# Patient Record
Sex: MALE | Race: WHITE | NOT HISPANIC OR LATINO | Employment: OTHER | URBAN - METROPOLITAN AREA
[De-identification: names, ages, dates, MRNs, and addresses within clinical notes are randomized per-mention and may not be internally consistent; named-entity substitution may affect disease eponyms.]

---

## 2017-01-16 ENCOUNTER — TRANSCRIBE ORDERS (OUTPATIENT)
Dept: ADMINISTRATIVE | Facility: HOSPITAL | Age: 62
End: 2017-01-16

## 2017-01-16 ENCOUNTER — ALLSCRIPTS OFFICE VISIT (OUTPATIENT)
Dept: OTHER | Facility: OTHER | Age: 62
End: 2017-01-16

## 2017-01-16 DIAGNOSIS — R53.81 OTHER MALAISE AND FATIGUE: ICD-10-CM

## 2017-01-16 DIAGNOSIS — E78.00 PURE HYPERCHOLESTEROLEMIA: Primary | ICD-10-CM

## 2017-01-16 DIAGNOSIS — R53.83 OTHER MALAISE AND FATIGUE: ICD-10-CM

## 2017-01-16 DIAGNOSIS — D61.818 OTHER PANCYTOPENIA (HCC): ICD-10-CM

## 2017-01-16 DIAGNOSIS — D75.89 OTHER SPECIFIED DISEASES OF BLOOD AND BLOOD-FORMING ORGANS: ICD-10-CM

## 2017-02-27 ENCOUNTER — HOSPITAL ENCOUNTER (OUTPATIENT)
Dept: ULTRASOUND IMAGING | Facility: HOSPITAL | Age: 62
Discharge: HOME | End: 2017-02-27
Attending: INTERNAL MEDICINE
Payer: COMMERCIAL

## 2017-02-27 DIAGNOSIS — D61.818 OTHER PANCYTOPENIA (HCC): ICD-10-CM

## 2017-02-27 PROCEDURE — 76700 US EXAM ABDOM COMPLETE: CPT

## 2017-03-08 ENCOUNTER — ALLSCRIPTS OFFICE VISIT (OUTPATIENT)
Dept: OTHER | Facility: OTHER | Age: 62
End: 2017-03-08

## 2017-05-04 ENCOUNTER — GENERIC CONVERSION - ENCOUNTER (OUTPATIENT)
Dept: OTHER | Facility: OTHER | Age: 62
End: 2017-05-04

## 2017-05-15 ENCOUNTER — ALLSCRIPTS OFFICE VISIT (OUTPATIENT)
Dept: OTHER | Facility: OTHER | Age: 62
End: 2017-05-15

## 2017-06-19 ENCOUNTER — ALLSCRIPTS OFFICE VISIT (OUTPATIENT)
Dept: OTHER | Facility: OTHER | Age: 62
End: 2017-06-19

## 2017-07-12 ENCOUNTER — ALLSCRIPTS OFFICE VISIT (OUTPATIENT)
Dept: OTHER | Facility: OTHER | Age: 62
End: 2017-07-12

## 2017-07-19 ENCOUNTER — GENERIC CONVERSION - ENCOUNTER (OUTPATIENT)
Dept: OTHER | Facility: OTHER | Age: 62
End: 2017-07-19

## 2017-11-10 DIAGNOSIS — D69.6 THROMBOCYTOPENIA (HCC): ICD-10-CM

## 2017-11-16 ENCOUNTER — ALLSCRIPTS OFFICE VISIT (OUTPATIENT)
Dept: OTHER | Facility: OTHER | Age: 62
End: 2017-11-16

## 2017-11-17 NOTE — PROGRESS NOTES
Assessment    1  DDD (degenerative disc disease), lumbar (722 52) (M51 36)   2  Strain of lumbar paraspinal muscle (847 2) (O55 103I)    Discussion/Summary    Patient with history of degenerative disc disease  History consistent with a sprain of the lumbar spine  He is improved today  Discussed imaging at this time is not recommended  No further treatment needed at this time  In the long run I do recommend core strengthening exercises to help improve have prevent back pain  up as needed and as scheduled visits  Chief Complaint  Patient complains of low back pain  Patient states he was bending down, and heard a crack, that's when the pain started  History of Present Illness  HPI: Four days ago patient was lifting something he hurt his back pop  He did have pain at the time  He does have a history of degenerative disc disease  He currently does not have any pain  No radiation of pain  No aggravation or alleviating symptoms  There is no numbness or tingling  No incontinence  Is able to walk  Review of Systems   Constitutional: no fever-- and-- no chills  Cardiovascular: no complaints of slow or fast heart rate, no chest pain, no palpitations, no leg claudication or lower extremity edema  Respiratory: no complaints of shortness of breath, no wheezing or cough, no dyspnea on exertion, no orthopnea or PND  Gastrointestinal: no complaints of abdominal pain, no constipation, no nausea or vomiting, no diarrhea or bloody stools  Active Problems    1  Acquired pancytopenia (284 19) (D61 818)   2  Acquired thrombocytopenia (287 5) (D69 6)   3  History of Bilateral hip pain (719 45) (M25 551,M25 552)   4  DDD (degenerative disc disease), lumbar (722 52) (M51 36)   5  Depression with anxiety (300 4) (F41 8)   6  Encounter for screening colonoscopy (V76 51) (Z12 11)   7  Hypercholesterolemia (272 0) (E78 00)   8  Major depression, chronic (296 20) (F32 9)   9   Screening PSA (prostate specific antigen) (V76 44) (Z12 5)   10  Strain of lumbar paraspinal muscle (847 2) (S39 012A)   11  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)    Past Medical History  1  History of Acute low back pain (724 2) (M54 5)   2  History of Acute upper respiratory infection (465 9) (J06 9)   3  History of Benign essential hypertension (401 1) (I10)   4  History of Bilateral hip pain (719 45) (M25 551,M25 552)   5  History of fatigue (V13 89) (Z87 898)   6  History of headache (V13 89) (Z87 898)   7  Denied: History of substance abuse   8  History of Macrocytosis (289 89) (D75 89)   9  Major depression, chronic (296 20) (F32 9)   10  History of Paronychia of toe (681 11) (L03 039)    Family History  Mother    1  Denied: Family history of substance abuse   2  Family history of Living and Healthy   3  Denied: Family history of Mental health problem  Father    4  Denied: Family history of substance abuse   5  Family history of valvular heart disease (V17 49) (Z82 49)   6  Family history of Living and Healthy   7  Denied: Family history of Mental health problem  Brother    8  Family history of Mini stroke    Social History     · Always uses seat belt   · Never a smoker   · No drug use   · Remote social alcohol use  The social history was reviewed and updated today  Current Meds   1  Aspirin Low Dose 81 MG TABS; Therapy: (Recorded:19Skt0151) to Recorded   2  BuPROPion HCl ER (XL) 150 MG Oral Tablet Extended Release 24 Hour; TAKE 1 TABLET DAILY AS DIRECTED; Therapy: 03MDU7788 to (Evaluate:14Jun2018)  Requested for: 69IBB6800; Last Rx:19Jun2017 Ordered   3  [employer] Vitamin D3 1000 UNIT CAPS; Therapy: (Recorded:15Hca9363) to Recorded   4  Flax Seed Oil CAPS; Therapy: (Recorded:14Fjd4598) to Recorded    Allergies  1   No Known Drug Allergies    Vitals   Recorded: 08YFE8496 08:14AM   Temperature 98 1 F   Heart Rate 799   Systolic 240   Diastolic 80   Height 6 ft 1 in   Weight 259 lb 6 4 oz   BMI Calculated 34 22   BSA Calculated 2 4       Physical Exam Constitutional  General appearance: No acute distress, well appearing and well nourished  Eyes  Conjunctiva and lids: No swelling, erythema, or discharge  Pupils and irises: Equal, round and reactive to light  Musculoskeletal  Gait and station: Abnormal  -- Low back: Symmetric, no muscle tenderness, full range of motion  Motor and sensory is intact  Reflexes are symmetricall and equal         Results/Data  PHQ-9 Adult Depression Screening 01YYH2231 08:19AM User, Ahs     Test Name Result Flag Reference   PHQ-9 Adult Depression Score 3       Over the last two weeks, how often have you been bothered by any of the following problems? Little interest or pleasure in doing things: Not at all - 0 Feeling down, depressed, or hopeless: Not at all - 0 Trouble falling or staying asleep, or sleeping too much: More than half the days - 2 Feeling tired or having little energy: Several days - 1 Poor appetite or over eating: Not at all - 0 Feeling bad about yourself - or that you are a failure or have let yourself or your family down: Not at all - 0 Trouble concentrating on things, such as reading the newspaper or watching television: Not at all - 0 Moving or speaking so slowly that other people could have noticed  Or the opposite -  being so fidgety or restless that you have been moving around a lot more than usual: Not at all - 0 Thoughts that you would be better off dead, or of hurting yourself in some way: Not at all - 0   PHQ-9 Adult Depression Screening Negative     PHQ-9 Difficulty Level Not difficult at all     PHQ-9 Severity Minimal Depression         Future Appointments    Date/Time Provider Specialty Site   12/04/2017 08:00 AM KELLY Newberry   Hematology [facility]   12/21/2017 07:40 AM Anay Lima MD Family Medicine jimenez aBrney MD       Signatures   Electronically signed by : Kristen Thibodeaux MD; Nov 16 2017  8:40AM EST                       (Author)

## 2017-12-04 ENCOUNTER — GENERIC CONVERSION - ENCOUNTER (OUTPATIENT)
Dept: OTHER | Facility: OTHER | Age: 62
End: 2017-12-04

## 2017-12-15 ENCOUNTER — GENERIC CONVERSION - ENCOUNTER (OUTPATIENT)
Dept: OTHER | Facility: OTHER | Age: 62
End: 2017-12-15

## 2017-12-18 ENCOUNTER — GENERIC CONVERSION - ENCOUNTER (OUTPATIENT)
Dept: OTHER | Facility: OTHER | Age: 62
End: 2017-12-18

## 2017-12-21 ENCOUNTER — GENERIC CONVERSION - ENCOUNTER (OUTPATIENT)
Dept: OTHER | Facility: OTHER | Age: 62
End: 2017-12-21

## 2018-01-10 NOTE — RESULT NOTES
Verified Results  * XR CHEST PA & LATERAL 39XLC3030 11:45AM Kiran Lima     Test Name Result Flag Reference   XR CHEST PA & LATERAL (Report)     CHEST      INDICATION: Shortness of breath for one year  COMPARISON: Chest radiographs May 20, 2010     VIEWS: Frontal and lateral projections; 2 images     FINDINGS:        Cardiomediastinal silhouette appears unremarkable  Atherosclerotic changes in the aorta  Lung volumes diminished  No focal infiltrates  Multiple calcifications in the right upper quadrant, compatible with gallstones  Visualized osseous structures appear within normal limits for the patient's age  IMPRESSION:   Diminished inspiration  No active pulmonary disease  Signed by:   Javi rIving DO   2/5/16       Discussion/Summary   please call  CXR is normal     indidentally it appears he also has gallstones  if he has no stomach pain we can simply watch this  however if he starts with any symtpoms such as stomach pain will need further evaluation

## 2018-01-11 ENCOUNTER — ALLSCRIPTS OFFICE VISIT (OUTPATIENT)
Dept: OTHER | Facility: OTHER | Age: 63
End: 2018-01-11

## 2018-01-12 VITALS
TEMPERATURE: 98.4 F | RESPIRATION RATE: 16 BRPM | HEIGHT: 73 IN | WEIGHT: 268.5 LBS | OXYGEN SATURATION: 95 % | HEART RATE: 106 BPM | BODY MASS INDEX: 35.59 KG/M2 | DIASTOLIC BLOOD PRESSURE: 89 MMHG | SYSTOLIC BLOOD PRESSURE: 134 MMHG

## 2018-01-12 VITALS
HEART RATE: 92 BPM | SYSTOLIC BLOOD PRESSURE: 114 MMHG | HEIGHT: 73 IN | BODY MASS INDEX: 35.41 KG/M2 | TEMPERATURE: 97.7 F | DIASTOLIC BLOOD PRESSURE: 90 MMHG | WEIGHT: 267.2 LBS

## 2018-01-12 NOTE — PROGRESS NOTES
Assessment   1  History of Acute URI (465 9) (J06 9)   2  Post-viral cough syndrome (786 2) (R05)    Discussion/Summary      Cough - c/w post-viral URI cough - feels better but still with cough - reassured this can last 4-6 wks, had no real benefit with Tessalon Perles so will hold on refilling that, advised to try OTC Delsym or Robitussin, call if no better in 2 wks or with any new/worsening symptoms/F/C/wheezing/SOB  The patient was counseled regarding instructions for management,-- patient and family education,-- impressions,-- risks and benefits of treatment options  The treatment plan was reviewed with the patient/guardian  The patient/guardian understands and agrees with the treatment plan       Self Referrals: No      Chief Complaint   cough      History of Present Illness   HPI: Pt with recent URI tx with Doxycycline and Tessalon Perles  He finished the Abx 2 wks ago and feels better  Since then he has con't to have an intermittent cough and a tickle in the throat  He has no current F/C/SOB/wheezing  He has mild congestion but does not note significant PND  He is currently not using anything for the cough but cough drops  He did not really think the Tessalon Perles helped much  He never smoked  Review of Systems        Constitutional: no fever-- and-- no chills  ENT: no earache,-- no sore throat-- and-- no nasal discharge  Cardiovascular: no chest pain  Respiratory: cough, but-- no shortness of breath-- and-- no wheezing  Gastrointestinal: no nausea,-- no vomiting-- and-- no diarrhea  Neurological: no headache-- and-- no dizziness  Active Problems   1  Acquired pancytopenia (284 19) (D61 818)   2  Acquired thrombocytopenia (287 5) (D69 6)   3  History of Bilateral hip pain (719 45) (M25 551,M25 552)   4  DDD (degenerative disc disease), lumbar (722 52) (M51 36)   5  Depression with anxiety (300 4) (F41 8)   6  Encounter for screening colonoscopy (V76 51) (Z12 11)   7  Hypercholesterolemia (272 0) (E78 00)   8  Major depression, chronic (296 20) (F32 9)   9  Screening PSA (prostate specific antigen) (V76 44) (Z12 5)    Social History    · Always uses seat belt   · Never a smoker   · No drug use   · Remote social alcohol use  The social history was reviewed and updated today  Family History   Family History Reviewed: The family history was reviewed and updated today  Current Meds    1  Aspirin Low Dose 81 MG TABS; Therapy: (Recorded:12Myh1796) to Recorded   2  BuPROPion HCl ER (XL) 150 MG Oral Tablet Extended Release 24 Hour; TAKE 1     TABLET DAILY AS DIRECTED; Therapy: 51REW6143 to (Evaluate:14Jun2018)  Requested for: 25NOP0560; Last     Rx:19Jun2017 Ordered   3  [employer] Vitamin D3 1000 UNIT CAPS; Therapy: (Recorded:54Smx7721) to Recorded   4  Flax Seed Oil CAPS; Therapy: (Recorded:11Kqm3591) to Recorded     The medication list was reviewed and updated today  Allergies   1  No Known Drug Allergies    Vitals    Recorded: 71LJD8720 10:22AM   Temperature 98 F   Heart Rate 88   Systolic 881   Diastolic 78   Height 6 ft 1 in   Weight 263 lb    BMI Calculated 34 7   BSA Calculated 2 42     Physical Exam        Constitutional      General appearance: No acute distress, well appearing and well nourished  Ears, Nose, Mouth, and Throat      External inspection of ears and nose: Normal        Otoscopic examination: Tympanic membrance translucent with normal light reflex  Canals patent without erythema  Oropharynx: Abnormal  -- mild post pharyngeal erythema, no exudate  Pulmonary      Respiratory effort: No increased work of breathing or signs of respiratory distress  Auscultation of lungs: Clear to auscultation, equal breath sounds bilaterally, no wheezes, no rales, no rhonci  Cardiovascular      Auscultation of heart: Normal rate and rhythm, normal S1 and S2, without murmurs         Lymphatic      Palpation of lymph nodes in neck: No lymphadenopathy  Musculoskeletal      Gait and station: Normal        Psychiatric      Mood and affect: Normal           Future Appointments      Date/Time Provider Specialty Site   09/10/2018 08:00 AM KELLY Flores   Hematology [facility]cology [facility]   06/21/2018 07:40 AM Dawit Lima MD Family Medicine Lorena Blackburn MD     Signatures    Electronically signed by : Bjorn Hernández DO; Jan 11 2018 10:38AM EST                       (Author)

## 2018-01-13 VITALS
HEIGHT: 74 IN | RESPIRATION RATE: 16 BRPM | DIASTOLIC BLOOD PRESSURE: 82 MMHG | BODY MASS INDEX: 34.43 KG/M2 | OXYGEN SATURATION: 98 % | HEART RATE: 100 BPM | TEMPERATURE: 97.6 F | SYSTOLIC BLOOD PRESSURE: 118 MMHG | WEIGHT: 268.31 LBS

## 2018-01-13 VITALS
WEIGHT: 273 LBS | OXYGEN SATURATION: 95 % | TEMPERATURE: 97.3 F | BODY MASS INDEX: 36.18 KG/M2 | SYSTOLIC BLOOD PRESSURE: 137 MMHG | HEART RATE: 77 BPM | HEIGHT: 73 IN | DIASTOLIC BLOOD PRESSURE: 80 MMHG | RESPIRATION RATE: 16 BRPM

## 2018-01-14 VITALS
HEIGHT: 73 IN | TEMPERATURE: 97.5 F | BODY MASS INDEX: 35.49 KG/M2 | HEART RATE: 104 BPM | DIASTOLIC BLOOD PRESSURE: 82 MMHG | SYSTOLIC BLOOD PRESSURE: 118 MMHG | WEIGHT: 267.8 LBS

## 2018-01-14 VITALS
TEMPERATURE: 98.1 F | SYSTOLIC BLOOD PRESSURE: 104 MMHG | DIASTOLIC BLOOD PRESSURE: 80 MMHG | HEART RATE: 112 BPM | BODY MASS INDEX: 34.38 KG/M2 | HEIGHT: 73 IN | WEIGHT: 259.4 LBS

## 2018-01-16 NOTE — RESULT NOTES
Verified Results  (1) CBC/PLT/DIFF 22Ley2196 07:19AM Kiran Lima     Test Name Result Flag Reference   WBC 5 2 x10E3/uL  3 4-10 8   RBC 5 14 x10E6/uL  4 14-5 80   Hemoglobin 17 3 g/dL  12 6-17 7   Hematocrit 50 2 %  37 5-51 0   MCV 98 fL H 79-97   MCH 33 7 pg H 26 6-33 0   MCHC 34 5 g/dL  31 5-35 7   RDW 13 5 %  12 3-15 4   Platelets 191 E66X0/BM L 150-379   Neutrophils 50 %     Lymphs 37 %     Monocytes 10 %     Eos 3 %     Basos 0 %     Neutrophils (Absolute) 2 6 x10E3/uL  1 4-7 0   Lymphs (Absolute) 2 0 x10E3/uL  0 7-3 1   Monocytes(Absolute) 0 5 x10E3/uL  0 1-0 9   Eos (Absolute) 0 2 x10E3/uL  0 0-0 4   Baso (Absolute) 0 0 x10E3/uL  0 0-0 2   Immature Granulocytes 0 %     Immature Grans (Abs) 0 0 x10E3/uL  0 0-0 1       Discussion/Summary   please call     labs are unremarkable except that his platelet count continues to be low  118 last year, right now 120  recommend seeing Hematology for further opinion regarding rthis           Patient notified of results

## 2018-01-18 NOTE — RESULT NOTES
Message   pt aware     Verified Results  (1) COMPREHENSIVE METABOLIC PANEL 50DHI8189 50:55YB Kiran Lima     Test Name Result Flag Reference   Glucose, Serum 97 mg/dL  65-99   BUN 18 mg/dL  8-27   Creatinine, Serum 1 21 mg/dL  0 76-1 27   eGFR If NonAfricn Am 63 mL/min/1 73  >59   eGFR If Africn Am 73 mL/min/1 73  >59   BUN/Creatinine Ratio 15  10-22   Sodium, Serum 141 mmol/L  134-144   Potassium, Serum 4 8 mmol/L  3 5-5 2   Chloride, Serum 100 mmol/L     Carbon Dioxide, Total 24 mmol/L  18-29   Calcium, Serum 9 2 mg/dL  8 6-10 2   Protein, Total, Serum 6 3 g/dL  6 0-8 5   Albumin, Serum 4 0 g/dL  3 6-4 8   Globulin, Total 2 3 g/dL  1 5-4 5   A/G Ratio 1 7  1 1-2 5   Bilirubin, Total 0 3 mg/dL  0 0-1 2   Alkaline Phosphatase, S 84 IU/L     AST (SGOT) 42 IU/L H 0-40   ALT (SGPT) 33 IU/L  0-44     (1) LIPID PANEL, FASTING 09UVH3017 07:19AM Kiran Lima     Test Name Result Flag Reference   Cholesterol, Total 169 mg/dL  100-199   Triglycerides 89 mg/dL  0-149   HDL Cholesterol 55 mg/dL  >39   VLDL Cholesterol Rudy 18 mg/dL  5-40   LDL Cholesterol Calc 96 mg/dL  0-99   T  Chol/HDL Ratio 3 1 ratio units  0 0-5 0   T  Chol/HDL Ratio                                                             Men  Women                                               1/2 Avg  Risk  3 4    3 3                                                   Avg Risk  5 0    4 4                                                2X Avg  Risk  9 6    7 1                                                3X Avg  Risk 23 4   11 0     (1) PSA (SCREEN) (Dx V76 44 Screen for Prostate Cancer) 22RLR8757 07:19AM Kiran Lima     Test Name Result Flag Reference   Prostate Specific Ag, Serum 0 8 ng/mL  0 0-4 0   Roche ECLIA methodology  According to the American Urological Association, Serum PSA should  decrease and remain at undetectable levels after radical  prostatectomy   The AUA defines biochemical recurrence as an initial  PSA value 0 2 ng/mL or greater followed by a subsequent confirmatory  PSA value 0 2 ng/mL or greater  Values obtained with different assay methods or kits cannot be used  interchangeably  Results cannot be interpreted as absolute evidence  of the presence or absence of malignant disease  Plan  Screening PSA (prostate specific antigen)    · (1) PSA (SCREEN) (Dx V76 44 Screen for Prostate Cancer) ; every 1 year; Last  10TQT1549; Next 08Wmw8447; Status:Active    Discussion/Summary   please call     labs are good

## 2018-01-23 VITALS
HEART RATE: 88 BPM | SYSTOLIC BLOOD PRESSURE: 120 MMHG | BODY MASS INDEX: 34.85 KG/M2 | HEIGHT: 73 IN | DIASTOLIC BLOOD PRESSURE: 78 MMHG | TEMPERATURE: 98 F | WEIGHT: 263 LBS

## 2018-01-24 VITALS
SYSTOLIC BLOOD PRESSURE: 142 MMHG | BODY MASS INDEX: 34.19 KG/M2 | DIASTOLIC BLOOD PRESSURE: 82 MMHG | HEIGHT: 73 IN | TEMPERATURE: 97.9 F | HEART RATE: 88 BPM | WEIGHT: 258 LBS

## 2018-01-24 VITALS
DIASTOLIC BLOOD PRESSURE: 77 MMHG | RESPIRATION RATE: 18 BRPM | SYSTOLIC BLOOD PRESSURE: 117 MMHG | HEART RATE: 94 BPM | HEIGHT: 73 IN | BODY MASS INDEX: 34.95 KG/M2 | TEMPERATURE: 98.2 F | OXYGEN SATURATION: 97 % | WEIGHT: 263.67 LBS

## 2018-01-24 VITALS
RESPIRATION RATE: 14 BRPM | WEIGHT: 259.6 LBS | DIASTOLIC BLOOD PRESSURE: 76 MMHG | TEMPERATURE: 98.6 F | HEIGHT: 73 IN | BODY MASS INDEX: 34.4 KG/M2 | HEART RATE: 108 BPM | SYSTOLIC BLOOD PRESSURE: 118 MMHG

## 2018-01-24 VITALS
SYSTOLIC BLOOD PRESSURE: 118 MMHG | BODY MASS INDEX: 34.38 KG/M2 | TEMPERATURE: 99.4 F | HEIGHT: 73 IN | DIASTOLIC BLOOD PRESSURE: 78 MMHG | WEIGHT: 259.4 LBS | HEART RATE: 100 BPM | RESPIRATION RATE: 20 BRPM

## 2018-02-21 ENCOUNTER — OFFICE VISIT (OUTPATIENT)
Dept: FAMILY MEDICINE CLINIC | Facility: HOSPITAL | Age: 63
End: 2018-02-21
Payer: COMMERCIAL

## 2018-02-21 VITALS
RESPIRATION RATE: 14 BRPM | HEART RATE: 108 BPM | DIASTOLIC BLOOD PRESSURE: 80 MMHG | HEIGHT: 73 IN | TEMPERATURE: 98.2 F | WEIGHT: 259.2 LBS | BODY MASS INDEX: 34.35 KG/M2 | SYSTOLIC BLOOD PRESSURE: 122 MMHG

## 2018-02-21 DIAGNOSIS — L02.212 CUTANEOUS ABSCESS OF BACK EXCLUDING BUTTOCKS: Primary | ICD-10-CM

## 2018-02-21 PROCEDURE — 99213 OFFICE O/P EST LOW 20 MIN: CPT | Performed by: FAMILY MEDICINE

## 2018-02-21 RX ORDER — BUPROPION HYDROCHLORIDE 150 MG/1
1 TABLET ORAL DAILY
COMMUNITY
Start: 2014-03-18 | End: 2018-05-22 | Stop reason: SDUPTHER

## 2018-02-21 RX ORDER — CEPHALEXIN 500 MG/1
500 CAPSULE ORAL EVERY 6 HOURS SCHEDULED
Qty: 40 CAPSULE | Refills: 0 | Status: SHIPPED | OUTPATIENT
Start: 2018-02-21 | End: 2018-03-03

## 2018-02-21 RX ORDER — PERPHENAZINE/AMITRIPTYLINE HCL 2 MG-25 MG
TABLET ORAL
COMMUNITY
End: 2018-08-22 | Stop reason: HOSPADM

## 2018-02-21 NOTE — PATIENT INSTRUCTIONS
Abscess   WHAT YOU NEED TO KNOW:   A warm compress may help your abscess drain  Your healthcare provider may make a cut in the abscess so it can drain  You may need surgery to remove an abscess that is on your hands or buttocks  DISCHARGE INSTRUCTIONS:   Return to the emergency department if:   · The area around your abscess becomes very painful, warm, or has red streaks  · You have a fever and chills  · Your heart is beating faster than usual      · You feel faint or confused  Contact your healthcare provider if:   · Your abscess gets bigger or does not get better  · Your abscess returns  · You have questions or concerns about your condition or care  Medicines: You may  need any of the following:  · Antibiotics  help treat a bacterial infection  · Acetaminophen  decreases pain and fever  It is available without a doctor's order  Ask how much to take and how often to take it  Follow directions  Acetaminophen can cause liver damage if not taken correctly  · NSAIDs , such as ibuprofen, help decrease swelling, pain, and fever  This medicine is available with or without a doctor's order  NSAIDs can cause stomach bleeding or kidney problems in certain people  If you take blood thinner medicine, always ask your healthcare provider if NSAIDs are safe for you  Always read the medicine label and follow directions  · Take your medicine as directed  Contact your healthcare provider if you think your medicine is not helping or if you have side effects  Tell him or her if you are allergic to any medicine  Keep a list of the medicines, vitamins, and herbs you take  Include the amounts, and when and why you take them  Bring the list or the pill bottles to follow-up visits  Carry your medicine list with you in case of an emergency  Self-care:   · Apply a warm compress to your abscess  This will help it open and drain  Wet a washcloth in warm, but not hot, water  Apply the compress for 10 minutes  Repeat this 4 times each day  Do not  press on an abscess or try to open it with a needle  You may push the bacteria deeper or into your blood  · Do not share your clothes, towels, or sheets with anyone  This can spread the infection to others  · Wash your hands often  This can help prevent the spread of germs  Use soap and water or an alcohol-based hand rub  Care for your wound after it is drained:   · Care for your wound as directed  If your healthcare provider says it is okay, carefully remove the bandage and gauze packing  You may need to soak the gauze to get it out of your wound  Clean your wound and the area around it as directed  Dry the area and put on new, clean bandages  Change your bandages when they get wet or dirty  · Ask your healthcare provider how to change the gauze in your wound  Keep track of how many pieces of gauze are placed inside the wound  Do not put too much packing in the wound  Do not pack the gauze too tightly in your wound  Follow up with your healthcare provider in 1 to 3 days: You may need to have your packing removed or your bandage changed  Write down your questions so you remember to ask them during your visits  © 2017 [address]  Information is for End User's use only and may not be sold, redistributed or otherwise used for commercial purposes  All illustrations and images included in CareNotes® are the copyrighted property of [employer] , [employer]  or [employer] Velaz[employer]  The above information is an  only  It is not intended as medical advice for individual conditions or treatments  Talk to your doctor, nurse or pharmacist before following any medical regimen to see if it is safe and effective for you

## 2018-02-21 NOTE — PROGRESS NOTES
Patient is here for follow up of   For about a week now patient has had a well on his back  Increasing in size  Unable to get any discharge  No fever chills  Pain on touch  Using warm compresses  Review of Systems   Constitutional: Negative  Negative for activity change, appetite change, chills and diaphoresis  HENT: Negative for congestion and dental problem  Respiratory: Negative  Negative for apnea, chest tightness, shortness of breath and wheezing  Cardiovascular: Negative  Negative for chest pain, palpitations and leg swelling  Gastrointestinal: Negative  Negative for abdominal distention, abdominal pain, constipation, diarrhea and nausea  Genitourinary: Negative  Negative for difficulty urinating, dysuria and frequency  Social History     Social History    Marital status: /Civil Union     Spouse name: N/A    Number of children: N/A    Years of education: N/A     Occupational History    Not on file  Social History Main Topics    Smoking status: Never Smoker    Smokeless tobacco: Never Used    Alcohol use Yes      Comment: Occasionally    Drug use: No    Sexual activity: Not on file     Other Topics Concern    Not on file     Social History Narrative    No narrative on file               No Known Allergies        Vitals:    02/21/18 0743   BP: 122/80   Pulse: (!) 108   Resp: 14   Temp: 98 2 °F (36 8 °C)     Physical Exam   Skin:   Erythematous , warm  patch with some fluctuance seen in the center  Small opening with minimal pus like drainage  Tender to touch   Nursing note and vitals reviewed  Problem List Items Addressed This Visit     None      Visit Diagnoses     Cutaneous abscess of back excluding buttocks    -  Primary    Relevant Medications    cephalexin (KEFLEX) 500 mg capsule    Other Relevant Orders    Ambulatory referral to General Surgery        To continue with warm compresses     Started on keflex but ultimately likely need surgical intervention  Referral placed to [facility]  To call our office if any concerns/questions at [phone]  No Follow-up on file

## 2018-03-01 ENCOUNTER — OFFICE VISIT (OUTPATIENT)
Dept: SURGERY | Facility: HOSPITAL | Age: 63
End: 2018-03-01
Payer: COMMERCIAL

## 2018-03-01 VITALS
RESPIRATION RATE: 16 BRPM | HEIGHT: 73 IN | TEMPERATURE: 98.5 F | BODY MASS INDEX: 34.25 KG/M2 | HEART RATE: 84 BPM | WEIGHT: 258.4 LBS | SYSTOLIC BLOOD PRESSURE: 124 MMHG | DIASTOLIC BLOOD PRESSURE: 84 MMHG

## 2018-03-01 DIAGNOSIS — D18.01 HEMANGIOMA OF SKIN: ICD-10-CM

## 2018-03-01 DIAGNOSIS — D22.9 MULTIPLE PIGMENTED NEVI: ICD-10-CM

## 2018-03-01 DIAGNOSIS — L72.3 SEBACEOUS CYST: ICD-10-CM

## 2018-03-01 DIAGNOSIS — K42.9 UMBILICAL HERNIA WITHOUT OBSTRUCTION AND WITHOUT GANGRENE: ICD-10-CM

## 2018-03-01 DIAGNOSIS — E66.9 OBESITY (BMI 30-39.9): ICD-10-CM

## 2018-03-01 DIAGNOSIS — L21.9 SEBORRHEA: ICD-10-CM

## 2018-03-01 DIAGNOSIS — L02.212 CUTANEOUS ABSCESS OF BACK EXCLUDING BUTTOCKS: ICD-10-CM

## 2018-03-01 DIAGNOSIS — M62.08 RECTUS DIASTASIS: ICD-10-CM

## 2018-03-01 DIAGNOSIS — L02.91 ABSCESS: ICD-10-CM

## 2018-03-01 PROCEDURE — 99244 OFF/OP CNSLTJ NEW/EST MOD 40: CPT | Performed by: SURGERY

## 2018-03-01 PROCEDURE — 10060 I&D ABSCESS SIMPLE/SINGLE: CPT | Performed by: SURGERY

## 2018-03-01 NOTE — PROGRESS NOTES
Assessment/Plan: Ric Chapman is a 57year old male who presents today, per referral by Dr Tarik Petty, for consultation regarding a boil/cyst to back area  Physical exam revealed a large infected sebaceous cyst of left mid-back and multiple other sebaceous cysts of the back  Discussed risks, benefits, and alternatives to incision and drainage of the infected cyst in the office  He consented to have the infected cyst I&D'd in the office today  The procedure was well-tolerated and occurred without complication  Explained proper packing and wound care  He should finish his current antibiotics  He will follow up in 2 weeks  He knows to call if any questions or concerns arise  Small umbilical hernia- As it is currently asymptomatic he may monitor for now  He knows the symptoms to monitor for, and will return if it becomes symptomatic  Rectus diastasis- Explained etiology  He understands this is not a hernia  Skin-  Encouraged him to have his multiple pigmented nevi, multiple seborrhea, scattered hemangiomas monitored by a dermatologist or his PCP  Obesity- Diet and exercise were discussed in the context of weight loss  No problem-specific Assessment & Plan notes found for this encounter  Diagnoses and all orders for this visit:    Cutaneous abscess of back excluding buttocks  -     Ambulatory referral to General Surgery          Subjective:      Patient ID: Jaime Roth is a 57 y o  male  Ric Chapman is a 57year old male who presents today, per referral by Dr Tarik Petty, for consultation regarding a boil/cyst to back area  He is obese with a BMI of 34 09  The following portions of the patient's history were reviewed and updated as appropriate: allergies, current medications, past family history, past medical history, past social history, past surgical history and problem list     Review of Systems   Constitutional: Negative  HENT: Negative  Eyes: Negative  Respiratory: Negative  Cardiovascular: Negative  Gastrointestinal: Negative  Endocrine: Negative  Genitourinary: Negative  Musculoskeletal: Negative  Skin:        Cyst   Allergic/Immunologic: Negative  Neurological: Negative  Hematological: Negative  Psychiatric/Behavioral: Negative  All other systems reviewed and are negative  Objective:      /84 (BP Location: Left arm, Patient Position: Sitting, Cuff Size: Standard)   Pulse 84   Temp 98 5 °F (36 9 °C) (Tympanic)   Resp 16   Ht 6' 1" (1 854 m)   Wt 117 kg (258 lb 6 4 oz)   BMI 34 09 kg/m²          Physical Exam   Constitutional: He is oriented to person, place, and time  He appears well-developed and well-nourished  No distress  Obese  HENT:   Head: Normocephalic and atraumatic  Right Ear: External ear normal    Left Ear: External ear normal    Nose: Nose normal    Mouth/Throat: Oropharynx is clear and moist  No oropharyngeal exudate  Eyes: Conjunctivae and EOM are normal  Right eye exhibits no discharge  Left eye exhibits no discharge  No scleral icterus  Neck: Normal range of motion  Neck supple  No JVD present  No tracheal deviation present  No thyromegaly present  Cardiovascular: Normal rate, regular rhythm, normal heart sounds and intact distal pulses  Exam reveals no gallop and no friction rub  No murmur heard  Pulmonary/Chest: Effort normal and breath sounds normal  No stridor  No respiratory distress  He has no wheezes  He has no rales  He exhibits no tenderness  Abdominal: Soft  Bowel sounds are normal  He exhibits no distension and no mass  There is no tenderness  There is no rebound and no guarding  Small umbilical hernia  Rectus diastasis  Musculoskeletal: Normal range of motion  He exhibits no edema, tenderness or deformity  Lymphadenopathy:     He has no cervical adenopathy  Neurological: He is alert and oriented to person, place, and time  No cranial nerve deficit   Coordination normal    Skin: Skin is dry  No rash noted  He is not diaphoretic  No erythema  No pallor  Large infected sebaceous cyst of left midback  Multiple sebaceous cysts of back  Multiple pigmented nevi, multiple seborrhea, scattered hemangiomas  Psychiatric: He has a normal mood and affect  His behavior is normal  Thought content normal    Nursing note and vitals reviewed  Incision and Drainage  Date/Time: 3/1/2018 9:26 AM  Performed by: Debi Osgood  Authorized by: Debi Osgood     Patient location:  Clinic  Consent:     Consent obtained:  Verbal    Consent given by:  Patient    Risks discussed:  Damage to other organs, infection, incomplete drainage, pain and bleeding  Universal protocol:     Patient identity confirmed:  Verbally with patient  Location:     Type:  Cyst (Infected )    Location:  Trunk    Trunk location:  Back  Pre-procedure details:     Skin preparation:  Betadine  Procedure details:     Scalpel blade:  15    Approach:  Open    Incision depth:  Skin    Wound management:  Probed and deloculated    Drainage:  Bloody and purulent    Drainage amount: Moderate    Wound treatment:  Packing placed    Packing materials:  1/2 in gauze  Post-procedure details:     Patient tolerance of procedure: Tolerated well, no immediate complications              By signing my name below, I, Romana Pierson, attest that this documentation has been prepared under the direction and in the presence of Dr Andi Lundborg, MD  Electronically signed: Sarah Jacinto Scribe  1/2/18  9:40  Beba Brown, personally performed the services described in this documentation  All medical record entries made by the scribe were at my direction and in my presence  I have reviewed the chart and agree that the record reflects my personal performance and is accurate and complete  Dr Andi Lundborg, MD  1/2/18  9:40

## 2018-03-15 ENCOUNTER — OFFICE VISIT (OUTPATIENT)
Dept: SURGERY | Facility: HOSPITAL | Age: 63
End: 2018-03-15

## 2018-03-15 VITALS — TEMPERATURE: 98.6 F | HEIGHT: 73 IN | WEIGHT: 263.8 LBS | BODY MASS INDEX: 34.96 KG/M2

## 2018-03-15 DIAGNOSIS — Z09 POSTOP CHECK: Primary | ICD-10-CM

## 2018-03-15 PROCEDURE — 99024 POSTOP FOLLOW-UP VISIT: CPT | Performed by: SURGERY

## 2018-03-15 NOTE — PROGRESS NOTES
Assessment/Plan: Paulino Munoz is a 57year old male who presents today status post incision and drainage of sebaceous cyst abscess from mid back area on 3/2/18  Physical exam revealed the I&D site is healing well but is still open  Area was repacked  He does not need to take anymore antibiotics  Discussed risks, benefits, and alternatives of returning to have his 5 sebaceous cysts excised once the recently incised and drained cyst has fully healed  He will follow up in 2-4 weeks  He knows to call if any questions or concerns arise  Obesity- Diet and exercise were discussed in the context of weight loss  No problem-specific Assessment & Plan notes found for this encounter  There are no diagnoses linked to this encounter  Subjective:      Patient ID: Santos Nagel is a 57 y o  male  Paulino Munoz is a 57year old male who presents today status post incision and drainage of sebaceous cyst abscess from mid back area on 3/2/18  His wife packs the wound for him daily and reports that she is able to get less packing in  He is obese with a BMI of 34 80  The following portions of the patient's history were reviewed and updated as appropriate: allergies, current medications, past family history, past medical history, past social history, past surgical history and problem list     Review of Systems   Constitutional: Negative  HENT: Negative  Eyes: Negative  Respiratory: Negative  Cardiovascular: Negative  Gastrointestinal: Negative  Endocrine: Negative  Genitourinary: Negative  Musculoskeletal: Negative  Skin: Positive for wound  Allergic/Immunologic: Negative  Neurological: Negative  Hematological: Negative  Psychiatric/Behavioral: Negative  All other systems reviewed and are negative          Objective:      Temp 98 6 °F (37 °C) (Tympanic)   Ht 6' 1" (1 854 m)   Wt 120 kg (263 lb 12 8 oz)   BMI 34 80 kg/m²          Physical Exam   Constitutional: He is oriented to person, place, and time  He appears well-developed and well-nourished  No distress  Obese  HENT:   Head: Normocephalic and atraumatic  Right Ear: External ear normal    Left Ear: External ear normal    Nose: Nose normal    Mouth/Throat: Oropharynx is clear and moist  No oropharyngeal exudate  Eyes: Conjunctivae and EOM are normal  Right eye exhibits no discharge  Left eye exhibits no discharge  No scleral icterus  Neck: Normal range of motion  Neck supple  No JVD present  No tracheal deviation present  No thyromegaly present  Cardiovascular: Normal rate, regular rhythm, normal heart sounds and intact distal pulses  Exam reveals no gallop and no friction rub  No murmur heard  Pulmonary/Chest: Effort normal and breath sounds normal  No stridor  No respiratory distress  He has no wheezes  He has no rales  He exhibits no tenderness  Abdominal: Soft  Bowel sounds are normal  He exhibits no distension and no mass  There is no tenderness  There is no rebound and no guarding  Musculoskeletal: Normal range of motion  He exhibits no edema, tenderness or deformity  Lymphadenopathy:     He has no cervical adenopathy  Neurological: He is alert and oriented to person, place, and time  No cranial nerve deficit  Coordination normal    Skin: Skin is dry  No rash noted  He is not diaphoretic  No erythema  No pallor  Wound still open on back, healing well  Four other sebaceous cysts of back  Psychiatric: He has a normal mood and affect  His behavior is normal  Thought content normal    Nursing note and vitals reviewed  By signing my name below, Gilma Franco, attest that this documentation has been prepared under the direction and in the presence of Dr Josette Martins MD  Electronically signed: Sarah Reide  2/15/18  9:35  [facility], personally performed the services described in this documentation   All medical record entries made by the scribe were at my direction and in my presence  I have reviewed the chart and agree that the record reflects my personal performance and is accurate and complete  Dr Tevin Sanchez MD  3/15/18  9:35

## 2018-04-12 ENCOUNTER — OFFICE VISIT (OUTPATIENT)
Dept: SURGERY | Facility: HOSPITAL | Age: 63
End: 2018-04-12
Payer: COMMERCIAL

## 2018-04-12 VITALS — HEIGHT: 73 IN | WEIGHT: 264.4 LBS | BODY MASS INDEX: 35.04 KG/M2 | TEMPERATURE: 99.1 F

## 2018-04-12 DIAGNOSIS — E66.9 OBESITY (BMI 30-39.9): ICD-10-CM

## 2018-04-12 DIAGNOSIS — L72.3 SEBACEOUS CYST: Primary | ICD-10-CM

## 2018-04-12 PROCEDURE — 99213 OFFICE O/P EST LOW 20 MIN: CPT | Performed by: SURGERY

## 2018-04-12 NOTE — PROGRESS NOTES
Assessment/Plan: Joey Halrey is a 57year old male who presents today status post incision and drainage of back abscess done in the office on 3/1/18 for recheck  Physical exam revealed the I&D site is still inflamed  He has 6 cysts from posterior neck down midline of back, all greater than 1 5 x 1 5 to 3 x 4 cm  He would like to schedule surgery to excise them after the Summer  He knows to call if any questions or concerns arise  Obesity- Diet and exercise were discussed in the context of weight loss  No problem-specific Assessment & Plan notes found for this encounter  There are no diagnoses linked to this encounter  Subjective:      Patient ID: Moraima Mike is a 57 y o  male  Joey Harley is a 57year old male who presents today status post incision and drainage of back abscess done in the office on 3/1/18 for recheck  He is obese with a BMI of 34 88  The following portions of the patient's history were reviewed and updated as appropriate: allergies, current medications, past family history, past medical history, past social history, past surgical history and problem list     Review of Systems   Constitutional: Negative  HENT: Negative  Eyes: Negative  Respiratory: Negative  Cardiovascular: Negative  Gastrointestinal: Negative  Endocrine: Negative  Genitourinary: Negative  Musculoskeletal: Negative  Skin: Positive for wound  Allergic/Immunologic: Negative  Neurological: Negative  Hematological: Negative  Psychiatric/Behavioral: Negative  All other systems reviewed and are negative  Objective:      Temp 99 1 °F (37 3 °C)   Ht 6' 1" (1 854 m)   Wt 120 kg (264 lb 6 4 oz)   BMI 34 88 kg/m²          Physical Exam   Constitutional: He is oriented to person, place, and time  He appears well-developed and well-nourished  No distress  Obese  HENT:   Head: Normocephalic and atraumatic     Right Ear: External ear normal    Left Ear: External ear normal    Nose: Nose normal    Mouth/Throat: Oropharynx is clear and moist  No oropharyngeal exudate  Eyes: Conjunctivae and EOM are normal  Right eye exhibits no discharge  Left eye exhibits no discharge  No scleral icterus  Neck: Normal range of motion  Neck supple  No JVD present  No tracheal deviation present  No thyromegaly present  Cardiovascular: Normal rate, regular rhythm, normal heart sounds and intact distal pulses  Exam reveals no gallop and no friction rub  No murmur heard  Pulmonary/Chest: Effort normal and breath sounds normal  No stridor  No respiratory distress  He has no wheezes  He has no rales  He exhibits no tenderness  Abdominal: Soft  Bowel sounds are normal  He exhibits no distension and no mass  There is no tenderness  There is no rebound and no guarding  Musculoskeletal: Normal range of motion  He exhibits no edema, tenderness or deformity  Lymphadenopathy:     He has no cervical adenopathy  Neurological: He is alert and oriented to person, place, and time  No cranial nerve deficit  Coordination normal    Skin: Skin is dry  No rash noted  He is not diaphoretic  No erythema  No pallor  Cyst is healing but is still inflamed  6 cysts from posterior neck down midline of back, all greater than 1 5 x 1 5 to 3 x 4 cm  Psychiatric: He has a normal mood and affect  His behavior is normal  Thought content normal    Nursing note and vitals reviewed  By signing my name below, Noman Sifuentes, attest that this documentation has been prepared under the direction and in the presence of Dr Lauren Saldana MD  Electronically signed: Mallika Avilez, Medical Scribe  4/12/18  Marc Phoenix, personally performed the services described in this documentation  All medical record entries made by the scribe were at my direction and in my presence  I have reviewed the chart and agree that the record reflects my personal performance and is accurate and complete     Sudeep Pickett MD  4/12/18

## 2018-04-12 NOTE — LETTER
April 13, 2018     Flo Lechuga MD  [address]  [id number] [id number]    Patient: Abebe Hernandez   YOB: 1955   Date of Visit: 4/12/2018       Dear Dr Tonya Jauregui: Thank you for referring Eva Granados to me for evaluation  Below are my notes for this consultation  If you have questions, please do not hesitate to call me  I look forward to following your patient along with you           Sincerely,        Juana Ferrari MD        CC: No Recipients

## 2018-05-22 DIAGNOSIS — F32.A DEPRESSION, UNSPECIFIED DEPRESSION TYPE: Primary | ICD-10-CM

## 2018-05-23 RX ORDER — BUPROPION HYDROCHLORIDE 150 MG/1
TABLET ORAL
Qty: 90 TABLET | Refills: 1 | Status: SHIPPED | OUTPATIENT
Start: 2018-05-23 | End: 2018-09-10 | Stop reason: SDUPTHER

## 2018-06-18 ENCOUNTER — OFFICE VISIT (OUTPATIENT)
Dept: FAMILY MEDICINE CLINIC | Facility: HOSPITAL | Age: 63
End: 2018-06-18
Payer: COMMERCIAL

## 2018-06-18 VITALS
TEMPERATURE: 98.1 F | HEART RATE: 102 BPM | RESPIRATION RATE: 16 BRPM | HEIGHT: 73 IN | DIASTOLIC BLOOD PRESSURE: 82 MMHG | SYSTOLIC BLOOD PRESSURE: 118 MMHG | WEIGHT: 265 LBS | BODY MASS INDEX: 35.12 KG/M2

## 2018-06-18 DIAGNOSIS — F32.9 MAJOR DEPRESSION, CHRONIC: ICD-10-CM

## 2018-06-18 DIAGNOSIS — E78.00 HYPERCHOLESTEROLEMIA: Primary | ICD-10-CM

## 2018-06-18 DIAGNOSIS — F41.8 DEPRESSION WITH ANXIETY: ICD-10-CM

## 2018-06-18 DIAGNOSIS — D61.818 ACQUIRED PANCYTOPENIA (HCC): ICD-10-CM

## 2018-06-18 PROBLEM — D69.6 ACQUIRED THROMBOCYTOPENIA (HCC): Status: ACTIVE | Noted: 2017-01-16

## 2018-06-18 PROCEDURE — 99213 OFFICE O/P EST LOW 20 MIN: CPT | Performed by: FAMILY MEDICINE

## 2018-06-18 NOTE — PROGRESS NOTES
Assessment/Plan:    Problem List Items Addressed This Visit     Acquired pancytopenia ([address] )    Relevant Orders    CBC    RESOLVED: Depression with anxiety    Relevant Orders    CBC    Comprehensive metabolic panel    TSH, 3rd generation with Free T4 reflex    Hypercholesterolemia - Primary    Relevant Orders    Lipid panel    CBC    Comprehensive metabolic panel    TSH, 3rd generation with Free T4 reflex    Major depression, chronic        Vera Eid is doing well  He will continue to f/u with Hematology  Has apt next week  Update labs as outlined  russ working on diet and exercise  Will f/u in 6 months        Return in about 6 months (around 12/18/2018)  To call our office if any concerns/questions at [phone]   ______________________________________________________________________          Patient is here for follow up of chronic conditions  Chief Complaint   Patient presents with    Follow-up     6 month       History of Present Illness:   Here for f/u  Hyperlipidemia  Doing well  Will need f/u labs  Thrombocytopenia  Now followed by hematology  Platelets have been stable  MDD  Doing well  No si/hi  Feeling well  No feeling depressed  Doing well with medications  Review of Systems   Constitutional: Negative  Negative for activity change, appetite change, chills and diaphoresis  HENT: Negative for congestion and dental problem  Respiratory: Negative  Negative for apnea, chest tightness, shortness of breath and wheezing  Cardiovascular: Negative  Negative for chest pain, palpitations and leg swelling  Gastrointestinal: Negative  Negative for abdominal distention, abdominal pain, constipation, diarrhea and nausea  Genitourinary: Negative  Negative for difficulty urinating, dysuria and frequency         Social History     Social History    Marital status: /Civil Union     Spouse name: N/A    Number of children: N/A    Years of education: N/A Occupational History    Not on file  Social History Main Topics    Smoking status: Never Smoker    Smokeless tobacco: Never Used    Alcohol use Yes      Comment: Occasionally/Remote Social    Drug use: No    Sexual activity: Not on file     Other Topics Concern    Not on file     Social History Narrative    Always uses seatbelt                   No Known Allergies    Immunization History   Administered Date(s) Administered    Influenza Quadrivalent, 6-35 Months IM 01/11/2018    Influenza TIV (IM) 10/20/2014, 10/07/2015    Td (adult), adsorbed 12/02/2011       Vitals:    06/18/18 0729   BP: 118/82   Pulse: 102   Resp: 16   Temp: 98 1 °F (36 7 °C)     Body mass index is 34 96 kg/m²  Physical Exam   Constitutional: He is oriented to person, place, and time  He appears well-developed and well-nourished  HENT:   Head: Normocephalic and atraumatic  Eyes: EOM are normal  Pupils are equal, round, and reactive to light  Neck: Normal range of motion  Neck supple  Cardiovascular: Normal rate, regular rhythm and normal heart sounds  Pulmonary/Chest: Effort normal and breath sounds normal    Abdominal: Soft  Bowel sounds are normal    Neurological: He is alert and oriented to person, place, and time  Skin: Skin is warm and dry  Psychiatric: He has a normal mood and affect  His behavior is normal    Nursing note and vitals reviewed      Diabetic Foot Exam                Health Maintenance Due   Topic Date Due    HIV SCREENING  1955    Hepatitis C Screening  1955    DTaP,Tdap,and Td Vaccines (1 - Tdap) 12/03/2011

## 2018-06-20 LAB
ALBUMIN SERPL-MCNC: 3.8 G/DL (ref 3.6–4.8)
ALBUMIN/GLOB SERPL: 1.6 {RATIO} (ref 1.2–2.2)
ALP SERPL-CCNC: 97 IU/L (ref 39–117)
ALT SERPL-CCNC: 36 IU/L (ref 0–44)
AST SERPL-CCNC: 53 IU/L (ref 0–40)
BILIRUB SERPL-MCNC: 1.1 MG/DL (ref 0–1.2)
BUN SERPL-MCNC: 15 MG/DL (ref 8–27)
BUN/CREAT SERPL: 15 (ref 10–24)
CALCIUM SERPL-MCNC: 9.3 MG/DL (ref 8.6–10.2)
CHLORIDE SERPL-SCNC: 103 MMOL/L (ref 96–106)
CHOLEST SERPL-MCNC: 163 MG/DL (ref 100–199)
CHOLEST/HDLC SERPL: 2.7 RATIO (ref 0–5)
CO2 SERPL-SCNC: 21 MMOL/L (ref 20–29)
CREAT SERPL-MCNC: 1 MG/DL (ref 0.76–1.27)
ERYTHROCYTE [DISTWIDTH] IN BLOOD BY AUTOMATED COUNT: 13.8 % (ref 12.3–15.4)
GLOBULIN SER-MCNC: 2.4 G/DL (ref 1.5–4.5)
GLUCOSE SERPL-MCNC: 94 MG/DL (ref 65–99)
HCT VFR BLD AUTO: 47.1 % (ref 37.5–51)
HDLC SERPL-MCNC: 60 MG/DL
HGB BLD-MCNC: 16 G/DL (ref 13–17.7)
LDLC SERPL CALC-MCNC: 84 MG/DL (ref 0–99)
MCH RBC QN AUTO: 33.1 PG (ref 26.6–33)
MCHC RBC AUTO-ENTMCNC: 34 G/DL (ref 31.5–35.7)
MCV RBC AUTO: 98 FL (ref 79–97)
PLATELET # BLD AUTO: 100 X10E3/UL (ref 150–379)
POTASSIUM SERPL-SCNC: 4.3 MMOL/L (ref 3.5–5.2)
PROT SERPL-MCNC: 6.2 G/DL (ref 6–8.5)
RBC # BLD AUTO: 4.83 X10E6/UL (ref 4.14–5.8)
SL AMB EGFR AFRICAN AMERICAN: 92 ML/MIN/1.73
SL AMB EGFR NON AFRICAN AMERICAN: 79 ML/MIN/1.73
SL AMB VLDL CHOLESTEROL CALC: 19 MG/DL (ref 5–40)
SODIUM SERPL-SCNC: 141 MMOL/L (ref 134–144)
TRIGL SERPL-MCNC: 95 MG/DL (ref 0–149)
TSH SERPL DL<=0.005 MIU/L-ACNC: 1.27 UIU/ML (ref 0.45–4.5)
WBC # BLD AUTO: 4.4 X10E3/UL (ref 3.4–10.8)

## 2018-07-26 ENCOUNTER — HOSPITAL ENCOUNTER (OUTPATIENT)
Dept: NON INVASIVE DIAGNOSTICS | Facility: HOSPITAL | Age: 63
Discharge: HOME | End: 2018-07-26
Attending: SURGERY
Payer: COMMERCIAL

## 2018-07-26 ENCOUNTER — APPOINTMENT (OUTPATIENT)
Dept: LAB | Facility: HOSPITAL | Age: 63
End: 2018-07-26
Attending: INTERNAL MEDICINE
Payer: COMMERCIAL

## 2018-07-26 ENCOUNTER — OFFICE VISIT (OUTPATIENT)
Dept: SURGERY | Facility: HOSPITAL | Age: 63
End: 2018-07-26
Payer: COMMERCIAL

## 2018-07-26 ENCOUNTER — HOSPITAL ENCOUNTER (OUTPATIENT)
Dept: RADIOLOGY | Facility: HOSPITAL | Age: 63
Discharge: HOME | End: 2018-07-26
Attending: SURGERY
Payer: COMMERCIAL

## 2018-07-26 VITALS
RESPIRATION RATE: 16 BRPM | DIASTOLIC BLOOD PRESSURE: 92 MMHG | HEIGHT: 73 IN | HEART RATE: 88 BPM | WEIGHT: 266.6 LBS | BODY MASS INDEX: 35.33 KG/M2 | TEMPERATURE: 98.9 F | SYSTOLIC BLOOD PRESSURE: 136 MMHG

## 2018-07-26 DIAGNOSIS — L98.9 SKIN LESION OF BACK: ICD-10-CM

## 2018-07-26 DIAGNOSIS — L72.3 SEBACEOUS CYST: ICD-10-CM

## 2018-07-26 DIAGNOSIS — L98.9 SKIN LESION OF BACK: Primary | ICD-10-CM

## 2018-07-26 DIAGNOSIS — D69.6 THROMBOCYTOPENIA (HCC): ICD-10-CM

## 2018-07-26 LAB
ANION GAP SERPL CALCULATED.3IONS-SCNC: 4 MMOL/L (ref 4–13)
ATRIAL RATE: 64 BPM
BASOPHILS # BLD AUTO: 0.01 THOUSANDS/ΜL (ref 0–0.1)
BASOPHILS NFR BLD AUTO: 0 % (ref 0–1)
BUN SERPL-MCNC: 18 MG/DL (ref 5–25)
CALCIUM SERPL-MCNC: 9 MG/DL (ref 8.3–10.1)
CHLORIDE SERPL-SCNC: 108 MMOL/L (ref 100–108)
CO2 SERPL-SCNC: 28 MMOL/L (ref 21–32)
CREAT SERPL-MCNC: 0.95 MG/DL (ref 0.6–1.3)
EOSINOPHIL # BLD AUTO: 0.12 THOUSAND/ΜL (ref 0–0.61)
EOSINOPHIL NFR BLD AUTO: 3 % (ref 0–6)
ERYTHROCYTE [DISTWIDTH] IN BLOOD BY AUTOMATED COUNT: 14 % (ref 11.6–15.1)
EST. AVERAGE GLUCOSE BLD GHB EST-MCNC: 111 MG/DL
GFR SERPL CREATININE-BSD FRML MDRD: 84 ML/MIN/1.73SQ M
GLUCOSE SERPL-MCNC: 87 MG/DL (ref 65–140)
HBA1C MFR BLD: 5.5 % (ref 4.2–6.3)
HCT VFR BLD AUTO: 48.1 % (ref 36.5–49.3)
HGB BLD-MCNC: 16.6 G/DL (ref 12–17)
IMM GRANULOCYTES # BLD AUTO: 0.01 THOUSAND/UL (ref 0–0.2)
IMM GRANULOCYTES NFR BLD AUTO: 0 % (ref 0–2)
LYMPHOCYTES # BLD AUTO: 1.79 THOUSANDS/ΜL (ref 0.6–4.47)
LYMPHOCYTES NFR BLD AUTO: 38 % (ref 14–44)
MCH RBC QN AUTO: 33.2 PG (ref 26.8–34.3)
MCHC RBC AUTO-ENTMCNC: 34.5 G/DL (ref 31.4–37.4)
MCV RBC AUTO: 96 FL (ref 82–98)
MONOCYTES # BLD AUTO: 0.63 THOUSAND/ΜL (ref 0.17–1.22)
MONOCYTES NFR BLD AUTO: 13 % (ref 4–12)
NEUTROPHILS # BLD AUTO: 2.15 THOUSANDS/ΜL (ref 1.85–7.62)
NEUTS SEG NFR BLD AUTO: 46 % (ref 43–75)
P AXIS: 38 DEGREES
PLATELET # BLD AUTO: 119 THOUSANDS/UL (ref 149–390)
PMV BLD AUTO: 10.3 FL (ref 8.9–12.7)
POTASSIUM SERPL-SCNC: 4.1 MMOL/L (ref 3.5–5.3)
PR INTERVAL: 170 MS
QRS AXIS: -4 DEGREES
QRSD INTERVAL: 86 MS
QT INTERVAL: 384 MS
QTC INTERVAL: 396 MS
RBC # BLD AUTO: 5 MILLION/UL (ref 3.88–5.62)
SODIUM SERPL-SCNC: 140 MMOL/L (ref 136–145)
T WAVE AXIS: 35 DEGREES
VENTRICULAR RATE: 64 BPM
WBC # BLD AUTO: 4.71 THOUSAND/UL (ref 4.31–10.16)

## 2018-07-26 PROCEDURE — 93005 ELECTROCARDIOGRAM TRACING: CPT

## 2018-07-26 PROCEDURE — 83036 HEMOGLOBIN GLYCOSYLATED A1C: CPT

## 2018-07-26 PROCEDURE — 93010 ELECTROCARDIOGRAM REPORT: CPT | Performed by: INTERNAL MEDICINE

## 2018-07-26 PROCEDURE — 99213 OFFICE O/P EST LOW 20 MIN: CPT | Performed by: SURGERY

## 2018-07-26 PROCEDURE — 36415 COLL VENOUS BLD VENIPUNCTURE: CPT

## 2018-07-26 PROCEDURE — 85025 COMPLETE CBC W/AUTO DIFF WBC: CPT

## 2018-07-26 PROCEDURE — 71046 X-RAY EXAM CHEST 2 VIEWS: CPT

## 2018-07-26 PROCEDURE — 80048 BASIC METABOLIC PNL TOTAL CA: CPT

## 2018-07-26 NOTE — PROGRESS NOTES
Assessment/Plan:    Shy Huber is a 57 y o  male who presents today in consultation regarding evaluation and management of sebaceous cysts located on his back  Physical examination revealed a sebaceous cyst of his posterior neck measuring 3 x 2 cm, sebaceous cyst of his upper-mid back measuring 1 x 1 cm, sebaceous cyst of his right upper back measuring 3 x 2 cm, old I&D site of left mid back measuring 1 x 1 cm, new open infected cyst spontaneously drained and resolving of left mid back measuring 3 x 2 cm  Dressing of gauze and tape changed today in the office for the now opened, infected cyst of the left, mid back  Discussed risks, benefits, and alternatives to five back cyst excisions all performed in one surgery under sedation at an inpatient facility  Discussed that given he has had chronic infection of his back cysts, he has higher risk for wound complications after surgery and he will likely require antibiotics after surgery  Instructions were given to hold aspirin one week prior to surgery  Patient verbalized understanding and has determined he would like to schedule sebaceous cyst excisions  He will schedule surgery after his vacation on 8/11/18  He knows to contact our office should any problems or concerns arise  There are no diagnoses linked to this encounter  Subjective:      Patient ID: Shy Huber is a 57 y o  male who presents today in consultation regarding evaluation and management of sebaceous cysts located on his back  Patient had previously had one of his back abscesses on his back incised and drained on 3/1/18  He had determined at that time he wanted to wait until summer to have his sebaceous cysts excised  At this time, another one of his back cysts opened approximately one week ago and continues to drain  He currently has it covered           The following portions of the patient's history were reviewed and updated as appropriate: allergies, current medications, past family history, past medical history, past social history, past surgical history and problem list     Review of Systems   Constitutional: Negative  HENT: Negative  Eyes: Negative  Respiratory: Negative  Cardiovascular: Negative  Gastrointestinal: Negative  Endocrine: Negative  Genitourinary: Negative  Musculoskeletal: Negative  Skin: Negative  Sebaceous cyst of the back spontaneously draining   Allergic/Immunologic: Negative  Neurological: Negative  Hematological: Negative  Psychiatric/Behavioral: Negative  All other systems reviewed and are negative  Objective:      /92   Pulse 88   Temp 98 9 °F (37 2 °C) (Tympanic)   Resp 16   Ht 6' 1" (1 854 m)   Wt 121 kg (266 lb 9 6 oz)   BMI 35 17 kg/m²          Physical Exam   Constitutional: He is oriented to person, place, and time  He appears well-developed and well-nourished  No distress  HENT:   Head: Normocephalic and atraumatic  Right Ear: External ear normal    Left Ear: External ear normal    Nose: Nose normal    Mouth/Throat: Oropharynx is clear and moist  No oropharyngeal exudate  Eyes: Conjunctivae and EOM are normal  Right eye exhibits no discharge  Left eye exhibits no discharge  No scleral icterus  Neck: Normal range of motion  Neck supple  No JVD present  No tracheal deviation present  No thyromegaly present  Cardiovascular: Normal rate, regular rhythm, normal heart sounds and intact distal pulses  Exam reveals no gallop and no friction rub  No murmur heard  Pulmonary/Chest: Effort normal and breath sounds normal  No stridor  No respiratory distress  He has no wheezes  He has no rales  He exhibits no tenderness  Abdominal: Soft  Bowel sounds are normal  He exhibits no distension and no mass  There is no tenderness  There is no rebound and no guarding  Musculoskeletal: Normal range of motion  He exhibits no edema, tenderness or deformity     Lymphadenopathy:     He has no cervical adenopathy  Neurological: He is alert and oriented to person, place, and time  No cranial nerve deficit  Coordination normal    Skin: Skin is dry  No rash noted  He is not diaphoretic  No pallor  sebaceous cyst of his posterior neck measuring 3 x 2 cm, sebaceous cyst of his upper-mid back measuring 1 x 1 cm, sebaceous cyst of his right upper back measuring 3 x 2 cm, old I&D site of left mid back measuring 1 x 1 cm, new open infected cyst spontaneously drained and resolving of left mid back measuring 3 x 2 cm   Psychiatric: He has a normal mood and affect  His behavior is normal  Thought content normal    Nursing note and vitals reviewed  Attestation:   By signing my name below, Moreno Barney, attest that this documentation has been prepared under the direction and in the presence of Arthur Acosta MD  Electronically Signed: Justyna Lara  07/26/18     I, Arthur Acosta, personally performed the services described in this documentation  All medical record entries made by the scribe were at my direction and in my presence  I have reviewed the chart and discharge instructions and agree that the record reflects my personal performance and is accurate and complete    Arthur Acosta MD  07/26/18

## 2018-07-31 RX ORDER — SODIUM CHLORIDE, SODIUM LACTATE, POTASSIUM CHLORIDE, CALCIUM CHLORIDE 600; 310; 30; 20 MG/100ML; MG/100ML; MG/100ML; MG/100ML
125 INJECTION, SOLUTION INTRAVENOUS CONTINUOUS
Status: CANCELLED | OUTPATIENT
Start: 2018-08-22 | End: 2019-08-21

## 2018-08-22 ENCOUNTER — ANESTHESIA EVENT (OUTPATIENT)
Dept: PERIOP | Facility: HOSPITAL | Age: 63
End: 2018-08-22
Payer: COMMERCIAL

## 2018-08-22 ENCOUNTER — ANESTHESIA (OUTPATIENT)
Dept: PERIOP | Facility: HOSPITAL | Age: 63
End: 2018-08-22
Payer: COMMERCIAL

## 2018-08-22 ENCOUNTER — HOSPITAL ENCOUNTER (OUTPATIENT)
Facility: HOSPITAL | Age: 63
Setting detail: OUTPATIENT SURGERY
Discharge: HOME | End: 2018-08-22
Attending: SURGERY | Admitting: SURGERY
Payer: COMMERCIAL

## 2018-08-22 VITALS
OXYGEN SATURATION: 94 % | BODY MASS INDEX: 34.46 KG/M2 | WEIGHT: 260 LBS | HEART RATE: 78 BPM | SYSTOLIC BLOOD PRESSURE: 179 MMHG | RESPIRATION RATE: 17 BRPM | DIASTOLIC BLOOD PRESSURE: 97 MMHG | TEMPERATURE: 97.5 F | HEIGHT: 73 IN

## 2018-08-22 DIAGNOSIS — L98.9 SKIN LESION OF BACK: ICD-10-CM

## 2018-08-22 PROCEDURE — 88304 TISSUE EXAM BY PATHOLOGIST: CPT | Performed by: PATHOLOGY

## 2018-08-22 PROCEDURE — 88305 TISSUE EXAM BY PATHOLOGIST: CPT | Performed by: PATHOLOGY

## 2018-08-22 PROCEDURE — 12034 INTMD RPR S/TR/EXT 7.6-12.5: CPT | Performed by: SURGERY

## 2018-08-22 PROCEDURE — 11403 EXC TR-EXT B9+MARG 2.1-3CM: CPT | Performed by: SURGERY

## 2018-08-22 PROCEDURE — 11402 EXC TR-EXT B9+MARG 1.1-2 CM: CPT | Performed by: SURGERY

## 2018-08-22 PROCEDURE — 12042 INTMD RPR N-HF/GENIT2.6-7.5: CPT | Performed by: SURGERY

## 2018-08-22 PROCEDURE — 11423 EXC H-F-NK-SP B9+MARG 2.1-3: CPT | Performed by: SURGERY

## 2018-08-22 RX ORDER — PROPOFOL 10 MG/ML
INJECTION, EMULSION INTRAVENOUS AS NEEDED
Status: DISCONTINUED | OUTPATIENT
Start: 2018-08-22 | End: 2018-08-22 | Stop reason: SURG

## 2018-08-22 RX ORDER — FENTANYL CITRATE 50 UG/ML
INJECTION, SOLUTION INTRAMUSCULAR; INTRAVENOUS AS NEEDED
Status: DISCONTINUED | OUTPATIENT
Start: 2018-08-22 | End: 2018-08-22 | Stop reason: SURG

## 2018-08-22 RX ORDER — SODIUM CHLORIDE, SODIUM LACTATE, POTASSIUM CHLORIDE, CALCIUM CHLORIDE 600; 310; 30; 20 MG/100ML; MG/100ML; MG/100ML; MG/100ML
125 INJECTION, SOLUTION INTRAVENOUS CONTINUOUS
Status: DISCONTINUED | OUTPATIENT
Start: 2018-08-22 | End: 2018-08-22 | Stop reason: HOSPADM

## 2018-08-22 RX ORDER — HYDROCODONE BITARTRATE AND ACETAMINOPHEN 5; 325 MG/1; MG/1
1 TABLET ORAL EVERY 6 HOURS PRN
Status: DISCONTINUED | OUTPATIENT
Start: 2018-08-22 | End: 2018-08-22 | Stop reason: HOSPADM

## 2018-08-22 RX ORDER — HYDROCODONE BITARTRATE AND ACETAMINOPHEN 5; 325 MG/1; MG/1
2 TABLET ORAL EVERY 4 HOURS PRN
Status: DISCONTINUED | OUTPATIENT
Start: 2018-08-22 | End: 2018-08-22 | Stop reason: HOSPADM

## 2018-08-22 RX ORDER — ACETAMINOPHEN 325 MG/1
650 TABLET ORAL EVERY 6 HOURS PRN
Status: DISCONTINUED | OUTPATIENT
Start: 2018-08-22 | End: 2018-08-22 | Stop reason: HOSPADM

## 2018-08-22 RX ORDER — MIDAZOLAM HYDROCHLORIDE 1 MG/ML
INJECTION INTRAMUSCULAR; INTRAVENOUS AS NEEDED
Status: DISCONTINUED | OUTPATIENT
Start: 2018-08-22 | End: 2018-08-22 | Stop reason: SURG

## 2018-08-22 RX ORDER — FENTANYL CITRATE/PF 50 MCG/ML
25 SYRINGE (ML) INJECTION
Status: DISCONTINUED | OUTPATIENT
Start: 2018-08-22 | End: 2018-08-22 | Stop reason: HOSPADM

## 2018-08-22 RX ORDER — HYDROCODONE BITARTRATE AND ACETAMINOPHEN 5; 325 MG/1; MG/1
1 TABLET ORAL EVERY 4 HOURS PRN
Qty: 18 TABLET | Refills: 0 | Status: SHIPPED | OUTPATIENT
Start: 2018-08-22 | End: 2018-09-01

## 2018-08-22 RX ORDER — ONDANSETRON 2 MG/ML
4 INJECTION INTRAMUSCULAR; INTRAVENOUS EVERY 6 HOURS PRN
Status: DISCONTINUED | OUTPATIENT
Start: 2018-08-22 | End: 2018-08-22 | Stop reason: HOSPADM

## 2018-08-22 RX ADMIN — MIDAZOLAM HYDROCHLORIDE 1 MG: 1 INJECTION, SOLUTION INTRAMUSCULAR; INTRAVENOUS at 07:40

## 2018-08-22 RX ADMIN — PROPOFOL 20 MG: 10 INJECTION, EMULSION INTRAVENOUS at 08:22

## 2018-08-22 RX ADMIN — PROPOFOL 20 MG: 10 INJECTION, EMULSION INTRAVENOUS at 08:02

## 2018-08-22 RX ADMIN — PROPOFOL 20 MG: 10 INJECTION, EMULSION INTRAVENOUS at 07:46

## 2018-08-22 RX ADMIN — CEFAZOLIN SODIUM 2000 MG: 2 SOLUTION INTRAVENOUS at 07:27

## 2018-08-22 RX ADMIN — PROPOFOL 10 MG: 10 INJECTION, EMULSION INTRAVENOUS at 08:17

## 2018-08-22 RX ADMIN — MIDAZOLAM HYDROCHLORIDE 1 MG: 1 INJECTION, SOLUTION INTRAMUSCULAR; INTRAVENOUS at 07:43

## 2018-08-22 RX ADMIN — FENTANYL CITRATE 50 MCG: 50 INJECTION, SOLUTION INTRAMUSCULAR; INTRAVENOUS at 07:29

## 2018-08-22 RX ADMIN — FENTANYL CITRATE 50 MCG: 50 INJECTION, SOLUTION INTRAMUSCULAR; INTRAVENOUS at 07:33

## 2018-08-22 RX ADMIN — MIDAZOLAM HYDROCHLORIDE 2 MG: 1 INJECTION, SOLUTION INTRAMUSCULAR; INTRAVENOUS at 07:27

## 2018-08-22 RX ADMIN — SODIUM CHLORIDE, SODIUM LACTATE, POTASSIUM CHLORIDE, AND CALCIUM CHLORIDE: .6; .31; .03; .02 INJECTION, SOLUTION INTRAVENOUS at 07:27

## 2018-08-22 RX ADMIN — PROPOFOL 10 MG: 10 INJECTION, EMULSION INTRAVENOUS at 08:18

## 2018-08-22 RX ADMIN — PROPOFOL 10 MG: 10 INJECTION, EMULSION INTRAVENOUS at 07:55

## 2018-08-22 RX ADMIN — PROPOFOL 20 MG: 10 INJECTION, EMULSION INTRAVENOUS at 08:13

## 2018-08-22 RX ADMIN — SODIUM CHLORIDE, SODIUM LACTATE, POTASSIUM CHLORIDE, AND CALCIUM CHLORIDE 125 ML/HR: .6; .31; .03; .02 INJECTION, SOLUTION INTRAVENOUS at 06:53

## 2018-08-22 NOTE — ANESTHESIA PREPROCEDURE EVALUATION
Review of Systems/Medical History          Cardiovascular  Hyperlipidemia, Hypertension ,    Pulmonary       GI/Hepatic            Endo/Other     GYN       Hematology  Anemia ,     Musculoskeletal    Comment: BMI 34 Arthritis     Neurology   Psychology   Depression ,              Physical Exam    Airway    Mallampati score: II  TM Distance: >3 FB  Neck ROM: full     Dental       Cardiovascular  Rhythm: regular, Rate: normal,     Pulmonary  Breath sounds clear to auscultation,     Other Findings        Anesthesia Plan  ASA Score- 2     Anesthesia Type- IV sedation with anesthesia with ASA Monitors  Additional Monitors:   Airway Plan:         Plan Factors-    Induction- intravenous  Postoperative Plan-     Informed Consent- Anesthetic plan and risks discussed with patient  I personally reviewed this patient with the CRNA  Discussed and agreed on the Anesthesia Plan with the CRNA  Jeremy Bah

## 2018-08-22 NOTE — INTERIM OP NOTE
EXCISION  BIOPSY LESION/MASS BACK X4 NECK X1  Postoperative Note  PATIENT NAME: Tanisha Aiken  : 1955  MRN: [id number]  [facility] OR ROOM 02    Surgery Date: 2018    Preop Diagnosis:  Skin lesion of back [L98 9]    Post-Op Diagnosis Codes:     * Skin lesion of back [L98 9]    Procedure(s) (LRB):  EXCISION  BIOPSY LESION/MASS BACK X4 NECK X1 (N/A)    Surgeon(s) and Role:     * Devin Bah MD - Primary     * Radha Contreras PA-C - Assisting    Specimens:  ID Type Source Tests Collected by Time Destination   1 : left lower back SQ mass Tissue Soft Tissue, Other TISSUE EXAM Devin Bah MD 2018 7313    2 : middle back SQ mass Tissue Soft Tissue, Other TISSUE EXAM Devin Bah MD 2018 1660    3 : right upper back #1 Tissue Soft Tissue, Other TISSUE EXAM Devin Bah MD 2018 6491    4 : right upper back #2 Tissue Soft Tissue, Other TISSUE EXAM Devin Bah MD 2018 0800    5 : neck Tissue Soft Tissue, Other TISSUE EXAM Devin Bah MD 2018 0801        Estimated Blood Loss:   30 mL    Anesthesia Type:   IV Sedation with Anesthesia     Findings:   Multiple subcutaneous subaqueous cysts as above  Complications:   None    SIGNATURE: Ludin Contreras PA-C   DATE: 2018   TIME: 8:46 AM

## 2018-08-22 NOTE — H&P (VIEW-ONLY)
Assessment/Plan:    Mago Rodriguez is a 57 y o  male who presents today in consultation regarding evaluation and management of sebaceous cysts located on his back  Physical examination revealed a sebaceous cyst of his posterior neck measuring 3 x 2 cm, sebaceous cyst of his upper-mid back measuring 1 x 1 cm, sebaceous cyst of his right upper back measuring 3 x 2 cm, old I&D site of left mid back measuring 1 x 1 cm, new open infected cyst spontaneously drained and resolving of left mid back measuring 3 x 2 cm  Dressing of gauze and tape changed today in the office for the now opened, infected cyst of the left, mid back  Discussed risks, benefits, and alternatives to five back cyst excisions all performed in one surgery under sedation at an inpatient facility  Discussed that given he has had chronic infection of his back cysts, he has higher risk for wound complications after surgery and he will likely require antibiotics after surgery  Instructions were given to hold aspirin one week prior to surgery  Patient verbalized understanding and has determined he would like to schedule sebaceous cyst excisions  He will schedule surgery after his vacation on 8/11/18  He knows to contact our office should any problems or concerns arise  There are no diagnoses linked to this encounter  Subjective:      Patient ID: Mago Rodriguez is a 57 y o  male who presents today in consultation regarding evaluation and management of sebaceous cysts located on his back  Patient had previously had one of his back abscesses on his back incised and drained on 3/1/18  He had determined at that time he wanted to wait until summer to have his sebaceous cysts excised  At this time, another one of his back cysts opened approximately one week ago and continues to drain  He currently has it covered           The following portions of the patient's history were reviewed and updated as appropriate: allergies, current medications, past family history, past medical history, past social history, past surgical history and problem list     Review of Systems   Constitutional: Negative  HENT: Negative  Eyes: Negative  Respiratory: Negative  Cardiovascular: Negative  Gastrointestinal: Negative  Endocrine: Negative  Genitourinary: Negative  Musculoskeletal: Negative  Skin: Negative  Sebaceous cyst of the back spontaneously draining   Allergic/Immunologic: Negative  Neurological: Negative  Hematological: Negative  Psychiatric/Behavioral: Negative  All other systems reviewed and are negative  Objective:      /92   Pulse 88   Temp 98 9 °F (37 2 °C) (Tympanic)   Resp 16   Ht 6' 1" (1 854 m)   Wt 121 kg (266 lb 9 6 oz)   BMI 35 17 kg/m²          Physical Exam   Constitutional: He is oriented to person, place, and time  He appears well-developed and well-nourished  No distress  HENT:   Head: Normocephalic and atraumatic  Right Ear: External ear normal    Left Ear: External ear normal    Nose: Nose normal    Mouth/Throat: Oropharynx is clear and moist  No oropharyngeal exudate  Eyes: Conjunctivae and EOM are normal  Right eye exhibits no discharge  Left eye exhibits no discharge  No scleral icterus  Neck: Normal range of motion  Neck supple  No JVD present  No tracheal deviation present  No thyromegaly present  Cardiovascular: Normal rate, regular rhythm, normal heart sounds and intact distal pulses  Exam reveals no gallop and no friction rub  No murmur heard  Pulmonary/Chest: Effort normal and breath sounds normal  No stridor  No respiratory distress  He has no wheezes  He has no rales  He exhibits no tenderness  Abdominal: Soft  Bowel sounds are normal  He exhibits no distension and no mass  There is no tenderness  There is no rebound and no guarding  Musculoskeletal: Normal range of motion  He exhibits no edema, tenderness or deformity     Lymphadenopathy:     He has no cervical adenopathy  Neurological: He is alert and oriented to person, place, and time  No cranial nerve deficit  Coordination normal    Skin: Skin is dry  No rash noted  He is not diaphoretic  No pallor  sebaceous cyst of his posterior neck measuring 3 x 2 cm, sebaceous cyst of his upper-mid back measuring 1 x 1 cm, sebaceous cyst of his right upper back measuring 3 x 2 cm, old I&D site of left mid back measuring 1 x 1 cm, new open infected cyst spontaneously drained and resolving of left mid back measuring 3 x 2 cm   Psychiatric: He has a normal mood and affect  His behavior is normal  Thought content normal    Nursing note and vitals reviewed  Attestation:   By signing my name below, Katie Darrick, attest that this documentation has been prepared under the direction and in the presence of Juana Ferrari MD  Electronically Signed: Justyna Varghese  07/26/18     IJuana, personally performed the services described in this documentation  All medical record entries made by the scribe were at my direction and in my presence  I have reviewed the chart and discharge instructions and agree that the record reflects my personal performance and is accurate and complete    Juana Ferrari MD  07/26/18

## 2018-08-22 NOTE — DISCHARGE INSTRUCTIONS
[facility] Instructions      Dr Arthur HO     1  General: Reed Goldmann will feel pulling sensations around the wound or funny aches and pains around the incisions  This is normal  Even minor surgery is a change in your body and this is your bodys way of reaction to it  If you have had abdominal surgery, it may help to support the incision with a small pillow or blanket for comfort when moving or coughing  2  Wound care:    Bandage/Dressing - Make sure to remove the bandage in about 48 hours, unless instructed otherwise  You usually don't have to redress the wound after 48 hours, unless for comfort  Keep the incision clean and dry  Let air get to it  Keep the wounds clean  Glue - Leave glue alone, it will fall off on its own, no need for an additional dressings    3  Water: You may shower over the wound, unless there are drain tubes left in place  Do not bathe or use a pool or hot tub until cleared by the physician  You may shower right over the staples, sutures, glue or Steri-Strips and rinse wound with soapy water but do not scrub incision  Pat dry when you are done  4  Activity: You may go up and down stairs, walk as much as you are comfortable, but walk at least 3 times each day  If you have had abdominal surgery, do not lift anything heavier than 15 pounds for at least 2 weeks, unless cleared by the doctor  5  Diet: You may resume a regular diet  If you had a same-day surgery or overnight stay surgery, you may wish to eat lightly for a few days: soups, crackers, and sandwiches  You may resume a regular diet when ready  6  Medications: Resume all of your previous medications, unless told otherwise by the doctor  Avoid aspirin or ibuprofen (Advil, Motrin, etc ) products for 2-3 days after the date of surgery  You may, at that time, began to take them again   Tylenol is always fine, unless you are taking any narcotic pain medication containing Tylenol (such as Percocet, Darvocet, Vicodin, or anything containing acetaminophen)  Do not take Tylenol if you're taking these medications  You do not need to take the narcotic pain medications unless you are having significant pain and discomfort  7  Driving: He will need someone to drive you home on the day of surgery  Do not drive or make any important decisions while on narcotic pain medication or 24 hours and after anesthesia or sedation for surgery  Generally, you may drive when your off all narcotic pain medications  8  Upset Stomach: You may take Maalox, Tums, or similar items for an upset stomach  If your narcotic pain medication causes an upset stomach, do not take it on an empty stomach  Try taking it with at least some crackers or toast      9  Constipation: Patients often experienced constipation after surgery  You may take over-the-counter medication for this, such as Metamucil, Senokot, Dulcolax, milk of magnesia, etc  You may take a suppository unless you have had anorectal surgery such as a procedure on your hemorrhoids  If you experience significant nausea or vomiting after abdominal surgery, call the office before trying any of these medications  10  Call the office: If you are experiencing any of the following, fevers above 101 5°, significant nausea or vomiting, if the wound develops drainage and/or is excessive redness around the wound, or if you have significant diarrhea or other worsening symptoms  11  Pain: You may be given a prescription for pain  This will be given to the hospital, the day of surgery  12  Sexual Activity: You may resume sexual activity when you feel ready and comfortable and your incision is sealed and healed without apparent infection risk  13  Urination: If you haven't urinated in 6 hours, go directly to the ER for evaluation for urinary retention  14  Follow-up in 2 weeks          [facility]  Phone: [phone]

## 2018-08-22 NOTE — ANESTHESIA POSTPROCEDURE EVALUATION
Post-Op Assessment Note      CV Status:  Stable    Mental Status:  Alert and awake    Hydration Status:  Stable and euvolemic    PONV Controlled:  Controlled    Airway Patency:  Patent and adequate    Post Op Vitals Reviewed: Yes          Staff: Anesthesiologist, CRNA           BP      Temp      Pulse     Resp      SpO2

## 2018-08-23 NOTE — OP NOTE
Sebaceous Cyst Resection Procedure Note    Name: Padilla Canseco   : 1955  MRN: [id number]  Date: 2018    Indications: The patient has a changing and/or enlarging soft tissue subcutaneous mass, clinically consistent with a sebaceous cyst x 5     Pre-operative Diagnosis: Sebaceous cyst of back x 4 and posterior neck x 1    Post-operative Diagnosis: Sebaceous cyst of back x 4 and posterior neck x 1    Procedure: Resection of Sebaceous cyst of back x 4 and posterior neck x 1    Surgeon: Matt Andrews MD    Assistants: Royce Contreras Pa-c     Anesthesia: Monitored Local Anesthesia with Sedation      Procedure Details   The patient was seen in the Holding Room  The risks, benefits, complications, treatment options, and expected outcomes were discussed with the patient  The possibilities of reaction to medication, bleeding, infection, the need for additional procedures, failure to diagnose a condition, and creating a complication operation were discussed with the patient  The patient concurred with the proposed plan, giving informed consent  The site of surgery properly noted/marked  The patient was taken to Operating Room, identified as Padilla Canseco and staff verified patient name, , procedure, site, and laterality  A Time Out was held and the above information confirmed  The patient was placed lying prone  The back and neck was prepped and draped in standard fashion  Local anesthesia was used to anesthetize the skin of all lesions  An  transverse elliptical incision was made over each lesion  Sharp and blunt dissection,Using scissors, knife, and cautery, were used to mobilize the masses which were in a subcutaneous location  5 mm margins were taken  Skin, soft tissue, and the mass, and surrounding fat were taken  Hemostasis was achieved with cautery  The wound was irrigated    The wound was closed in multiple layers using 3-0 Vicryl suture for subcutaneous tissue and 3-0 Nylon mattress suture for two incisions and 4-0 Monocryl subcuticular stitch for the other three  The wound was dressed  The specimen size    LLQ mass 3x2cm  Middle back mass 3x3cm  Right upper back mass #1 - 3x2 cm  Right upper back mass #2 - 1 5x1 5cm  Posterior neck mass - 3x2cm    At the end of the operation, all sponge, instrument, and needle counts were correct  Findings:  Sebaceous cyst of back x 4 and posterior neck x 1    This text is generated with voice recognition software  There may be translation, syntax,  or grammatical errors  If you have any questions, please contact the dictating provider  Estimated Blood Loss:  Minimal                      Specimens: Sebaceous cyst    Order Name Source Comment Collection Info Order Time   TISSUE EXAM Soft Tissue, Other  Collected By: Margot Cage MD 8/22/2018  8:05 AM              Implants: none           Complications:  None; patient tolerated the procedure well             Disposition: PACU            Condition: stable    Attending Attestation: I was present for the entire procedure and qualified resident physician was not available    Signature:   Margot Cage MD  Date: 8/23/2018 Time: 4:27 PM

## 2018-09-04 DIAGNOSIS — D69.6 ACQUIRED THROMBOCYTOPENIA (HCC): Primary | ICD-10-CM

## 2018-09-04 LAB
ALBUMIN SERPL-MCNC: 4 G/DL (ref 3.6–4.8)
ALBUMIN/GLOB SERPL: 1.5 {RATIO} (ref 1.2–2.2)
ALP SERPL-CCNC: 105 IU/L (ref 39–117)
ALT SERPL-CCNC: 39 IU/L (ref 0–44)
AST SERPL-CCNC: 54 IU/L (ref 0–40)
BASOPHILS # BLD AUTO: 0 X10E3/UL (ref 0–0.2)
BASOPHILS NFR BLD AUTO: 0 %
BILIRUB SERPL-MCNC: 1 MG/DL (ref 0–1.2)
BUN SERPL-MCNC: 16 MG/DL (ref 8–27)
BUN/CREAT SERPL: 16 (ref 10–24)
CALCIUM SERPL-MCNC: 9.2 MG/DL (ref 8.6–10.2)
CHLORIDE SERPL-SCNC: 105 MMOL/L (ref 96–106)
CO2 SERPL-SCNC: 20 MMOL/L (ref 20–29)
CREAT SERPL-MCNC: 0.99 MG/DL (ref 0.76–1.27)
EOSINOPHIL # BLD AUTO: 0.1 X10E3/UL (ref 0–0.4)
EOSINOPHIL NFR BLD AUTO: 2 %
ERYTHROCYTE [DISTWIDTH] IN BLOOD BY AUTOMATED COUNT: 13.7 % (ref 12.3–15.4)
GLOBULIN SER-MCNC: 2.6 G/DL (ref 1.5–4.5)
GLUCOSE SERPL-MCNC: 99 MG/DL (ref 65–99)
HCT VFR BLD AUTO: 46.9 % (ref 37.5–51)
HGB BLD-MCNC: 16.3 G/DL (ref 13–17.7)
IMM GRANULOCYTES # BLD: 0 X10E3/UL (ref 0–0.1)
IMM GRANULOCYTES NFR BLD: 0 %
LYMPHOCYTES # BLD AUTO: 1.5 X10E3/UL (ref 0.7–3.1)
LYMPHOCYTES NFR BLD AUTO: 34 %
MCH RBC QN AUTO: 33.9 PG (ref 26.6–33)
MCHC RBC AUTO-ENTMCNC: 34.8 G/DL (ref 31.5–35.7)
MCV RBC AUTO: 98 FL (ref 79–97)
MONOCYTES # BLD AUTO: 0.4 X10E3/UL (ref 0.1–0.9)
MONOCYTES NFR BLD AUTO: 8 %
NEUTROPHILS # BLD AUTO: 2.5 X10E3/UL (ref 1.4–7)
NEUTROPHILS NFR BLD AUTO: 56 %
PLATELET # BLD AUTO: 120 X10E3/UL (ref 150–379)
POTASSIUM SERPL-SCNC: 4.2 MMOL/L (ref 3.5–5.2)
PROT SERPL-MCNC: 6.6 G/DL (ref 6–8.5)
RBC # BLD AUTO: 4.81 X10E6/UL (ref 4.14–5.8)
SL AMB EGFR AFRICAN AMERICAN: 92 ML/MIN/1.73
SL AMB EGFR NON AFRICAN AMERICAN: 80 ML/MIN/1.73
SODIUM SERPL-SCNC: 140 MMOL/L (ref 134–144)
WBC # BLD AUTO: 4.5 X10E3/UL (ref 3.4–10.8)

## 2018-09-05 LAB — LDH SERPL-CCNC: 310 IU/L (ref 121–224)

## 2018-09-06 ENCOUNTER — OFFICE VISIT (OUTPATIENT)
Dept: SURGERY | Facility: HOSPITAL | Age: 63
End: 2018-09-06

## 2018-09-06 VITALS — BODY MASS INDEX: 34.72 KG/M2 | HEIGHT: 73 IN | TEMPERATURE: 98.3 F | WEIGHT: 262 LBS

## 2018-09-06 DIAGNOSIS — Z09 POSTOP CHECK: Primary | ICD-10-CM

## 2018-09-06 PROCEDURE — 99024 POSTOP FOLLOW-UP VISIT: CPT | Performed by: SURGERY

## 2018-09-06 NOTE — LETTER
September 6, 2018     John Gaytan MD  [facility]  [address] [address]    Patient: Jorge Tovar   YOB: 1955   Date of Visit: 9/6/2018       Dear Dr Ciara Gordon: Thank you for referring Wallace Gatica to me for evaluation  Below are my notes for this consultation  If you have questions, please do not hesitate to call me  I look forward to following your patient along with you           Sincerely,        Kaley Cantu MD        CC: No Recipients

## 2018-09-06 NOTE — PROGRESS NOTES
Assessment/Plan: Lupillo Atkins is a 70year old male who presents today status post excision of skin lesions of back and neck  Physical exam revealed the incisions are healing well  His inferior left incision has slight serous drainage  Removed sutures from incisions  Discussed pathology results  He should pack the wound of mid-back for a week  He will follow up in a week to re-evaluate the wounds  He knows to call the office if any questions or concerns arise  Obesity- Discussed diet and exercise in the context of weight loss  No problem-specific Assessment & Plan notes found for this encounter  There are no diagnoses linked to this encounter  Subjective:      Patient ID: Dave Fernandez is a 70 y o  male  Lupillo Atkins is a 70year old male who presents today status post excision of skin lesions of back and neck  He reports he is doing well  He is obese with a BMI of 34 57  The following portions of the patient's history were reviewed and updated as appropriate: allergies, current medications, past family history, past medical history, past social history, past surgical history and problem list     Review of Systems   Constitutional: Negative  HENT: Negative  Eyes: Negative  Respiratory: Negative  Cardiovascular: Negative  Gastrointestinal: Negative  Endocrine: Negative  Genitourinary: Negative  Musculoskeletal: Negative  Skin: Negative  Allergic/Immunologic: Negative  Neurological: Negative  Hematological: Negative  Psychiatric/Behavioral: Negative  All other systems reviewed and are negative  Objective:      Temp 98 3 °F (36 8 °C) (Tympanic)   Ht 6' 1" (1 854 m)   Wt 119 kg (262 lb)   BMI 34 57 kg/m²          Physical Exam   Constitutional: He is oriented to person, place, and time  He appears well-developed and well-nourished  No distress  Obese  HENT:   Head: Normocephalic and atraumatic     Right Ear: External ear normal    Left Ear: External ear normal    Nose: Nose normal    Mouth/Throat: Oropharynx is clear and moist  No oropharyngeal exudate  Eyes: Conjunctivae and EOM are normal  Right eye exhibits no discharge  Left eye exhibits no discharge  No scleral icterus  Neck: Normal range of motion  Neck supple  No JVD present  No tracheal deviation present  No thyromegaly present  Cardiovascular: Normal rate, regular rhythm, normal heart sounds and intact distal pulses  Exam reveals no gallop and no friction rub  No murmur heard  Pulmonary/Chest: Effort normal and breath sounds normal  No stridor  No respiratory distress  He has no wheezes  He has no rales  He exhibits no tenderness  Abdominal: Soft  Bowel sounds are normal  He exhibits no distension and no mass  There is no tenderness  There is no rebound and no guarding  Musculoskeletal: Normal range of motion  He exhibits no edema, tenderness or deformity  Lymphadenopathy:     He has no cervical adenopathy  Neurological: He is alert and oriented to person, place, and time  No cranial nerve deficit  Coordination normal    Skin: Skin is dry  No rash noted  He is not diaphoretic  No erythema  No pallor  Incisions are healing well  His inferior left incision has slight serous drainage  Psychiatric: He has a normal mood and affect  His behavior is normal  Thought content normal    Nursing note and vitals reviewed  By signing my name below, Temi HURD, attest that this documentation has been prepared under the direction and in the presence of Baird Councilman, MD  Electronically Signed: Justyna Boswell  9/6/18  I, Baird Councilman, personally performed the services described in this documentation  All medical record entries made by the [email or web address] were at my direction and in my presence  I have reviewed the chart and discharge instructions and agree that the record reflects my personal performance and is accurate and complete    Baird Councilman, MD  9/6/18

## 2018-09-10 ENCOUNTER — OFFICE VISIT (OUTPATIENT)
Dept: HEMATOLOGY ONCOLOGY | Facility: HOSPITAL | Age: 63
End: 2018-09-10
Payer: COMMERCIAL

## 2018-09-10 VITALS
TEMPERATURE: 97.9 F | RESPIRATION RATE: 16 BRPM | HEART RATE: 99 BPM | WEIGHT: 263 LBS | HEIGHT: 72 IN | OXYGEN SATURATION: 99 % | SYSTOLIC BLOOD PRESSURE: 132 MMHG | DIASTOLIC BLOOD PRESSURE: 86 MMHG | BODY MASS INDEX: 35.62 KG/M2

## 2018-09-10 DIAGNOSIS — F32.A DEPRESSION, UNSPECIFIED DEPRESSION TYPE: ICD-10-CM

## 2018-09-10 DIAGNOSIS — D69.6 ACQUIRED THROMBOCYTOPENIA (HCC): Primary | ICD-10-CM

## 2018-09-10 PROCEDURE — 99214 OFFICE O/P EST MOD 30 MIN: CPT | Performed by: INTERNAL MEDICINE

## 2018-09-10 NOTE — PROGRESS NOTES
Hematology/Oncology Outpatient Follow- up Note  Elvie Nuñez 70 y o  male MRN: @ Encounter: [id number]        Date:  9/10/2018    Presenting Complaint/Diagnosis :     Low platelet count      Previous Hematologic/ Oncologic History:      Workup    Current Hematologic/ Oncologic Treatment:      Observation    Interval History:      The patient returns for follow-up visit  The past His workup was all negative  Ultrasound of the abdomen did not show any cirrhosis or hepatomegaly  MMA was normal  Flow cytometry was negative  CBC shows a platelet count is still slightly low at 120  The patient is asymptomatic  The patient is at baseline  Denies any nausea denies any vomiting denies any diarrhea and denies any constipation  The rest of his 14 point review of systems today was negative  Test Results:    Imaging: No results found  Labs:   Lab Results   Component Value Date    WBC 4 5 09/04/2018    HGB 16 3 09/04/2018    HCT 46 9 09/04/2018    MCV 98 (H) 09/04/2018     (L) 09/04/2018     Lab Results   Component Value Date     07/26/2018    K 4 1 07/26/2018     09/04/2018    CO2 20 09/04/2018    ANIONGAP 8 06/06/2014    BUN 16 09/04/2018    CREATININE 0 99 09/04/2018    GLUCOSE 97 12/23/2016    CALCIUM 9 0 07/26/2018    AST 42 (H) 12/23/2016    ALT 33 12/23/2016    ALKPHOS 84 12/23/2016    PROT 6 3 12/23/2016    BILITOT 0 3 12/23/2016    EGFR 84 07/26/2018       Lab Results   Component Value Date    PSA 0 8 12/23/2016    PSA 0 6 06/06/2014       ROS: As stated in the history of present illness otherwise his 14 point review of systems today was negative        Active Problems:   Patient Active Problem List   Diagnosis    [facility]quired thrombocytopenia [facility]    DDD (degenerative disc disease), lumbar    Acquired pancytopenia ([facility] )    Hypercholesterolemia    Major depression, chronic       Past Medical History:   Past Medical History:   Diagnosis Date    Abscess of back 03/01/2018    Benign essential hypertension     Last Assessed:12/19/16    Cyst of skin     x6 from neck down back area    Macrocytosis     Last Assessed:1/16/17    Major depression, chronic     Last Assessed:12/21/17    Major depression, chronic 6/19/2017       Surgical History:   Past Surgical History:   Procedure Laterality Date    INCISION AND DRAINAGE OF WOUND N/A 03/01/2018    SEBACEOUS CYST ABSCESS OF BACK-VIJAYA MONZON MD    CA EXC SKIN BENIG 1 1-2 CM TRUNK,ARM,LEG N/A 8/22/2018    Procedure: EXCISION  BIOPSY LESION/MASS BACK X4 NECK X1;  Surgeon: Bandar Paris MD;  Location: [facility];  Service: General       Family History:    Family History   Problem Relation Age of Onset    Valvular heart disease Father     Stroke Brother         Mini       Social History:   Social History     Social History    Marital status: /Civil Union     Spouse name: N/A    Number of children: N/A    Years of education: N/A     Occupational History    Not on file  Social History Main Topics    Smoking status: Never Smoker    Smokeless tobacco: Never Used    Alcohol use Yes      Comment: Occasionally/1-2 drinks per weekend    Drug use: No    Sexual activity: Not on file     Other Topics Concern    Not on file     Social History Narrative    Always uses seatbelt           Current Medications:   Current Outpatient Prescriptions   Medication Sig Dispense Refill    Aspirin (ASPIR-81 PO) Take by mouth      buPROPion (WELLBUTRIN XL) 150 mg 24 hr tablet TAKE 1 TABLET BY MOUTH  DAILY AS DIRECTED 90 tablet 1    Cholecalciferol ([employer] VITAMIN D3) 1000 units capsule Take by mouth      FLAXSEED, LINSEED, PO Take by mouth       No current facility-administered medications for this visit  Allergies: No Known Allergies    Physical Exam:    Body surface area is 2 39 meters squared      Wt Readings from Last 3 Encounters:   09/10/18 119 kg (263 lb)   09/06/18 119 kg (262 lb)   08/22/18 118 kg (260 lb)        Temp Readings from Last 3 Encounters:   09/10/18 97 9 °F (36 6 °C) (Tympanic)   09/06/18 98 3 °F (36 8 °C) (Tympanic)   08/22/18 97 5 °F (36 4 °C)        BP Readings from Last 3 Encounters:   09/10/18 132/86   08/22/18 (!) 179/97   07/26/18 136/92         Pulse Readings from Last 3 Encounters:   09/10/18 99   08/22/18 78   07/26/18 88         Physical Exam     Constitutional   General appearance: No acute distress, well appearing and well nourished  Eyes   Conjunctiva and lids: No swelling, erythema or discharge  Pupils and irises: Equal, round and reactive to light  Ears, Nose, Mouth, and Throat   External inspection of ears and nose: Normal     Nasal mucosa, septum, and turbinates: Normal without edema or erythema  Oropharynx: Normal with no erythema, edema, exudate or lesions  Pulmonary   Respiratory effort: No increased work of breathing or signs of respiratory distress  Auscultation of lungs: Clear to auscultation  Cardiovascular   Palpation of heart: Normal PMI, no thrills  Auscultation of heart: Normal rate and rhythm, normal S1 and S2, without murmurs  Examination of extremities for edema and/or varicosities: Normal     Carotid pulses: Normal     Abdomen   Abdomen: Non-tender, no masses  Liver and spleen: No hepatomegaly or splenomegaly  Lymphatic   Palpation of lymph nodes in neck: No lymphadenopathy  Musculoskeletal   Gait and station: Normal     Digits and nails: Normal without clubbing or cyanosis  Inspection/palpation of joints, bones, and muscles: Normal     Skin   Skin and subcutaneous tissue: Normal without rashes or lesions  Neurologic   Cranial nerves: Cranial nerves 2-12 intact  Sensation: No sensory loss  Psychiatric   Orientation to person, place, and time: Normal     Mood and affect: Normal         Assessment / Plan:    The patient is a pleasant 56-year-old male who has had mild thrombocytopenia since 2014   We decided to order a workup and just observe his counts for now  His most recent platelet count was 976  Flow cytometry in the past did not show any significant immunophenotypic abnormality  MMA was normal  Ultrasound of the abdomen showed some cholelithiasis but was otherwise unremarkable  I will continue him on observation  I'll see him back in 9 months  If his platelet count dips below 50 we will consider a bone marrow biopsy or if his other blood counts deteriorate we will consider this also  The patient understands the risks and agrees  I'll continue him on observation for now  Goals and Barriers:  Current Goal:  Prolong Survival from Thrombocytopenia  Barriers: None  Patient's Capacity to Self Care:  Patient  able to self care  Portions of the record may have been created with voice recognition software   Occasional wrong word or "sound a like" substitutions may have occurred due to the inherent limitations of voice recognition software   Read the chart carefully and recognize, using context, where substitutions have occurred

## 2018-09-12 RX ORDER — BUPROPION HYDROCHLORIDE 150 MG/1
150 TABLET ORAL DAILY
Qty: 90 TABLET | Refills: 2 | Status: SHIPPED | OUTPATIENT
Start: 2018-09-12 | End: 2019-07-09 | Stop reason: SDUPTHER

## 2018-09-13 ENCOUNTER — OFFICE VISIT (OUTPATIENT)
Dept: SURGERY | Facility: HOSPITAL | Age: 63
End: 2018-09-13

## 2018-09-13 VITALS — BODY MASS INDEX: 35.94 KG/M2 | WEIGHT: 265 LBS | TEMPERATURE: 97.6 F

## 2018-09-13 DIAGNOSIS — Z09 POSTOP CHECK: Primary | ICD-10-CM

## 2018-09-13 PROCEDURE — 99024 POSTOP FOLLOW-UP VISIT: CPT | Performed by: SURGERY

## 2018-09-13 NOTE — PROGRESS NOTES
Assessment/Plan: Elbert Abarca is a 70year old male who presents today in re-evaluation of wounds from excision of lesions of back and neck performed on 8/14/18  Physical exam revealed wound of superior right back is healing well  He only need to put in a small amount of packing into the wound  Wound of mid-back is healing well and should be packed for another 2 weeks  He should follow up in 2 weeks  He knows to call the office if any questions or concerns arise  Obesity- Discussed diet and exercise in the context of weight loss  No problem-specific Assessment & Plan notes found for this encounter  There are no diagnoses linked to this encounter  Subjective:      Patient ID: Kelly Murphy is a 70 y o  male  Elbert Abarca is a 70year old male who presents today in re-evaluation of wounds from excision of lesions of back and neck performed on 8/14/18  He reports he is doing well  He is obese with a BMI of 35 94  Wound Check         The following portions of the patient's history were reviewed and updated as appropriate: allergies, current medications, past family history, past medical history, past social history, past surgical history and problem list     Review of Systems   Constitutional: Negative  HENT: Negative  Eyes: Negative  Respiratory: Negative  Cardiovascular: Negative  Gastrointestinal: Negative  Endocrine: Negative  Genitourinary: Negative  Musculoskeletal: Negative  Skin: Negative  Allergic/Immunologic: Negative  Neurological: Negative  Hematological: Negative  Psychiatric/Behavioral: Negative  All other systems reviewed and are negative  Objective:      Temp 97 6 °F (36 4 °C) (Tympanic)   Wt 120 kg (265 lb)   BMI 35 94 kg/m²          Physical Exam   Constitutional: He is oriented to person, place, and time  He appears well-developed and well-nourished  No distress  Obese  HENT:   Head: Normocephalic and atraumatic     Right Ear: External ear normal    Left Ear: External ear normal    Nose: Nose normal    Mouth/Throat: Oropharynx is clear and moist  No oropharyngeal exudate  Eyes: Conjunctivae and EOM are normal  Right eye exhibits no discharge  Left eye exhibits no discharge  No scleral icterus  Neck: Normal range of motion  Neck supple  No JVD present  No tracheal deviation present  No thyromegaly present  Cardiovascular: Normal rate, regular rhythm, normal heart sounds and intact distal pulses  Exam reveals no gallop and no friction rub  No murmur heard  Pulmonary/Chest: Effort normal and breath sounds normal  No stridor  No respiratory distress  He has no wheezes  He has no rales  He exhibits no tenderness  Abdominal: Soft  Bowel sounds are normal  He exhibits no distension and no mass  There is no tenderness  There is no rebound and no guarding  Musculoskeletal: Normal range of motion  He exhibits no edema, tenderness or deformity  Lymphadenopathy:     He has no cervical adenopathy  Neurological: He is alert and oriented to person, place, and time  No cranial nerve deficit  Coordination normal    Skin: Skin is dry  No rash noted  He is not diaphoretic  No erythema  No pallor  Wound of superior right back is healing well  Wound of mid-back is healing well  Psychiatric: He has a normal mood and affect  His behavior is normal  Thought content normal    Nursing note and vitals reviewed  By signing my name below, I, Shruthi James, attest that this documentation has been prepared under the direction and in the presence of Abbi Rg MD  Electronically Signed: Stephen Loya  9/13/18  Shola Abdi personally performed the services described in this documentation  All medical record entries made by the stephen were at my direction and in my presence   I have reviewed the chart and discharge instructions and agree that the record reflects my personal performance and is accurate and complete  Kaley Cantu MD  9/13/18

## 2018-09-13 NOTE — LETTER
September 13, 2018     Mat Alvarado MD  [address]  [id number] [facility] [address]    Patient: Julianna Andrews   YOB: 1955   Date of Visit: 9/13/2018       Dear Dr Alejandra Duran: Thank you for referring Candance Lesch to me for evaluation  Below are my notes for this consultation  If you have questions, please do not hesitate to call me  I look forward to following your patient along with you  Sincerely,        Lady Morris MD        CC: No Recipients  Mary Kim  9/13/2018  9:21 AM  Sign at close encounter  Assessment/Plan:    No problem-specific Assessment & Plan notes found for this encounter  There are no diagnoses linked to this encounter  Subjective:      Patient ID: Julianna Andrews is a 70 y o  male  Candance Lesch is a 70year old male who presents today in re-evaluation of wound of back and neck  Wound Check         The following portions of the patient's history were reviewed and updated as appropriate: allergies, current medications, past family history, past medical history, past social history, past surgical history and problem list     Review of Systems      Objective:      Temp 97 6 °F (36 4 °C) (Tympanic)   Wt 120 kg (265 lb)   BMI 35 94 kg/m²           Physical Exam      By signing my name below, I, Mary Kim, attest that this documentation has been prepared under the direction and in the presence of Lady Morris MD  Electronically Signed: Justyna Segura  9/13/18  Acosta Ann, personally performed the services described in this documentation  All medical record entries made by the [email or web address] were at my direction and in my presence  I have reviewed the chart and discharge instructions and agree that the record reflects my personal performance and is accurate and complete  Lady Morris MD  9/13/18

## 2018-09-28 ENCOUNTER — OFFICE VISIT (OUTPATIENT)
Dept: SURGERY | Facility: HOSPITAL | Age: 63
End: 2018-09-28

## 2018-09-28 VITALS
DIASTOLIC BLOOD PRESSURE: 82 MMHG | TEMPERATURE: 98.2 F | WEIGHT: 267 LBS | BODY MASS INDEX: 36.16 KG/M2 | SYSTOLIC BLOOD PRESSURE: 130 MMHG | HEIGHT: 72 IN

## 2018-09-28 DIAGNOSIS — Z98.890 POST-OPERATIVE STATE: Primary | ICD-10-CM

## 2018-09-28 PROCEDURE — 99024 POSTOP FOLLOW-UP VISIT: CPT | Performed by: PHYSICIAN ASSISTANT

## 2018-09-28 NOTE — PATIENT INSTRUCTIONS
All incision sites are healing well without signs of infection  No further surgical follow-up required unless patient develops wound changes, questions, or concerns

## 2018-09-28 NOTE — PROGRESS NOTES
Assessment/Plan:  Santos Sanchez is a 70 y  o male who comes in today for postoperative check after recent excision of 5 lesions on his back and neck performed on 8/14/18 at [facility] operating room  Patient is recovering well from this procedure  His pain has been well controlled  Exam reveals well-healed incision sites  Initially he had to sites with open areas in serous drainage  These have completely closed with minimal scabbing at these areas  There are signs of infection at this time  Patient was instructed to continue to monitor these wounds  He may use silver gel appointment for wounds with scabbed areas  He does not require further surgical follow-up at this time  He should call the office with any changes to these wounds, questions, or concerns  Pathology revealed epidermal cysts and scar tissue related to previous cysts  There are no findings of any malignant process  Pathology: Reviewed with patient, all questions answered  Postoperative restrictions reviewed  All questions answered  HPI:  Santos Sanchez is a 70 y  o male who comes in today for postoperative check after recent excision of 5 lesions on his back and neck performed on 8/14/18  Patient is currently doing well without problems  Patient denies fevers, chills, skin changes, chest pain and SOB  States incisions have healed well and are no longer draining  ROS:  General ROS: negative for - chills, fatigue, fever or night sweats, weight loss  Respiratory ROS: no cough, shortness of breath, or wheezing  Cardiovascular ROS: no chest pain or dyspnea on exertion  Genito-Urinary ROS: no dysuria, trouble voiding, or hematuria  Musculoskeletal ROS: negative for - gait disturbance, joint pain or muscle pain  Neurological ROS: no TIA or stroke symptoms  Abdomen:   No pain, nausea, vomiting  Skin: as per HPI      Patient has no known allergies      Current Outpatient Prescriptions:     Aspirin (ASPIR-81 PO), Take by mouth, Disp: , Rfl:     buPROPion (WELLBUTRIN XL) 150 mg 24 hr tablet, Take 1 tablet (150 mg total) by mouth daily, Disp: 90 tablet, Rfl: 2    Cholecalciferol ([employer] VITAMIN D3) 1000 units capsule, Take by mouth, Disp: , Rfl:     FLAXSEED, LINSEED, PO, Take by mouth, Disp: , Rfl:   Past Medical History:   Diagnosis Date    Abscess of back 03/01/2018    Benign essential hypertension     Last Assessed:12/19/16    Cyst of skin     x6 from neck down back area    Macrocytosis     Last Assessed:1/16/17    Major depression, chronic     Last Assessed:12/21/17    Major depression, chronic 6/19/2017     Past Surgical History:   Procedure Laterality Date    INCISION AND DRAINAGE OF WOUND N/A 03/01/2018    SEBACEOUS CYST ABSCESS OF BACK-VIJAYA MONZON MD    NC EXC SKIN BENIG 1 1-2 CM TRUNK,ARM,LEG N/A 8/22/2018    Procedure: EXCISION  BIOPSY LESION/MASS BACK X4 NECK X1;  Surgeon: Lovette Ganser, MD;  Location: [facility];  Service: General     Family History   Problem Relation Age of Onset    Valvular heart disease Father     Stroke Brother         Mini      reports that he has never smoked  He has never used smokeless tobacco  He reports that he drinks alcohol  He reports that he does not use drugs  Physical Exam   General: AOx3  He appears well-developed and well-nourished  No distress  Obese  Head: Normocephalic and atraumatic  Eyes: Conjunctivae and EOM are normal  No scleral icterus  Neck: Normal range of motion  Neck supple  No JVD present  No tracheal deviation present  No thyromegaly present  Cardiovascular: Normal rate, regular rhythm   Pulmonary/Chest:Clear to auscultation  No wheezes, rales, rhonchi  Skin: Skin is dry  Negative for rashes, erythema, pallor  Right upper back incision site has mild scabbing and is now closed  The left mid back incision also with mild scabbing    All incisions are fully closed with no signs of drainage, erythema, induration, fluctuance, increased warmth, or other signs of infection

## 2018-11-21 ENCOUNTER — ANESTHESIA EVENT (EMERGENCY)
Dept: PERIOP | Facility: HOSPITAL | Age: 63
DRG: 418 | End: 2018-11-21
Payer: COMMERCIAL

## 2018-11-21 ENCOUNTER — HOSPITAL ENCOUNTER (INPATIENT)
Facility: HOSPITAL | Age: 63
LOS: 1 days | Discharge: HOME | DRG: 418 | End: 2018-11-22
Attending: EMERGENCY MEDICINE | Admitting: SURGERY
Payer: COMMERCIAL

## 2018-11-21 ENCOUNTER — APPOINTMENT (OUTPATIENT)
Dept: RADIOLOGY | Facility: HOSPITAL | Age: 63
DRG: 418 | End: 2018-11-21
Payer: COMMERCIAL

## 2018-11-21 ENCOUNTER — APPOINTMENT (EMERGENCY)
Dept: CT IMAGING | Facility: HOSPITAL | Age: 63
DRG: 418 | End: 2018-11-21
Payer: COMMERCIAL

## 2018-11-21 ENCOUNTER — ANESTHESIA (EMERGENCY)
Dept: PERIOP | Facility: HOSPITAL | Age: 63
DRG: 418 | End: 2018-11-21
Payer: COMMERCIAL

## 2018-11-21 DIAGNOSIS — E78.00 HYPERCHOLESTEROLEMIA: ICD-10-CM

## 2018-11-21 DIAGNOSIS — K81.9 CHOLECYSTITIS: ICD-10-CM

## 2018-11-21 DIAGNOSIS — K81.0 ACUTE CHOLECYSTITIS: Primary | ICD-10-CM

## 2018-11-21 PROBLEM — S32.010A CLOSED COMPRESSION FRACTURE OF FIRST LUMBAR VERTEBRA (HCC): Status: ACTIVE | Noted: 2018-11-21

## 2018-11-21 PROBLEM — D69.6 ACQUIRED THROMBOCYTOPENIA (HCC): Chronic | Status: ACTIVE | Noted: 2017-01-16

## 2018-11-21 PROBLEM — K74.69 OTHER CIRRHOSIS OF LIVER (HCC): Status: ACTIVE | Noted: 2018-11-21

## 2018-11-21 PROBLEM — N40.0 ENLARGED PROSTATE: Chronic | Status: ACTIVE | Noted: 2018-11-21

## 2018-11-21 LAB
ALBUMIN SERPL BCP-MCNC: 3.6 G/DL (ref 3.5–5)
ALP SERPL-CCNC: 110 U/L (ref 46–116)
ALT SERPL W P-5'-P-CCNC: 49 U/L (ref 12–78)
ANION GAP SERPL CALCULATED.3IONS-SCNC: 10 MMOL/L (ref 4–13)
APTT PPP: 30 SECONDS (ref 26–38)
AST SERPL W P-5'-P-CCNC: 58 U/L (ref 5–45)
BASOPHILS # BLD AUTO: 0.01 THOUSANDS/ΜL (ref 0–0.1)
BASOPHILS NFR BLD AUTO: 0 % (ref 0–1)
BILIRUB SERPL-MCNC: 1.2 MG/DL (ref 0.2–1)
BUN SERPL-MCNC: 16 MG/DL (ref 5–25)
CALCIUM SERPL-MCNC: 9.2 MG/DL (ref 8.3–10.1)
CHLORIDE SERPL-SCNC: 105 MMOL/L (ref 100–108)
CO2 SERPL-SCNC: 26 MMOL/L (ref 21–32)
CREAT SERPL-MCNC: 0.99 MG/DL (ref 0.6–1.3)
EOSINOPHIL # BLD AUTO: 0.06 THOUSAND/ΜL (ref 0–0.61)
EOSINOPHIL NFR BLD AUTO: 1 % (ref 0–6)
ERYTHROCYTE [DISTWIDTH] IN BLOOD BY AUTOMATED COUNT: 13.5 % (ref 11.6–15.1)
EST. AVERAGE GLUCOSE BLD GHB EST-MCNC: 117 MG/DL
FERRITIN SERPL-MCNC: 234 NG/ML (ref 8–388)
GFR SERPL CREATININE-BSD FRML MDRD: 80 ML/MIN/1.73SQ M
GLUCOSE SERPL-MCNC: 112 MG/DL (ref 65–140)
HBA1C MFR BLD: 5.7 % (ref 4.2–6.3)
HCT VFR BLD AUTO: 46.5 % (ref 36.5–49.3)
HGB BLD-MCNC: 16.4 G/DL (ref 12–17)
IMM GRANULOCYTES # BLD AUTO: 0.01 THOUSAND/UL (ref 0–0.2)
IMM GRANULOCYTES NFR BLD AUTO: 0 % (ref 0–2)
INR PPP: 1.24 (ref 0.86–1.17)
IRON SATN MFR SERPL: 36 %
IRON SERPL-MCNC: 103 UG/DL (ref 65–175)
LIPASE SERPL-CCNC: 73 U/L (ref 73–393)
LYMPHOCYTES # BLD AUTO: 1.26 THOUSANDS/ΜL (ref 0.6–4.47)
LYMPHOCYTES NFR BLD AUTO: 19 % (ref 14–44)
MCH RBC QN AUTO: 34.2 PG (ref 26.8–34.3)
MCHC RBC AUTO-ENTMCNC: 35.3 G/DL (ref 31.4–37.4)
MCV RBC AUTO: 97 FL (ref 82–98)
MONOCYTES # BLD AUTO: 0.6 THOUSAND/ΜL (ref 0.17–1.22)
MONOCYTES NFR BLD AUTO: 9 % (ref 4–12)
NEUTROPHILS # BLD AUTO: 4.81 THOUSANDS/ΜL (ref 1.85–7.62)
NEUTS SEG NFR BLD AUTO: 71 % (ref 43–75)
PLATELET # BLD AUTO: 90 THOUSANDS/UL (ref 149–390)
PLATELET # BLD AUTO: 98 THOUSANDS/UL (ref 149–390)
PMV BLD AUTO: 10.4 FL (ref 8.9–12.7)
PMV BLD AUTO: 9.9 FL (ref 8.9–12.7)
POTASSIUM SERPL-SCNC: 4 MMOL/L (ref 3.5–5.3)
PROT SERPL-MCNC: 6.8 G/DL (ref 6.4–8.2)
PROTHROMBIN TIME: 14.9 SECONDS (ref 11.8–14.2)
RBC # BLD AUTO: 4.79 MILLION/UL (ref 3.88–5.62)
SODIUM SERPL-SCNC: 141 MMOL/L (ref 136–145)
TIBC SERPL-MCNC: 289 UG/DL (ref 250–450)
WBC # BLD AUTO: 6.75 THOUSAND/UL (ref 4.31–10.16)

## 2018-11-21 PROCEDURE — 96361 HYDRATE IV INFUSION ADD-ON: CPT

## 2018-11-21 PROCEDURE — 80074 ACUTE HEPATITIS PANEL: CPT | Performed by: SURGERY

## 2018-11-21 PROCEDURE — 85049 AUTOMATED PLATELET COUNT: CPT | Performed by: PHYSICIAN ASSISTANT

## 2018-11-21 PROCEDURE — 88313 SPECIAL STAINS GROUP 2: CPT | Performed by: PATHOLOGY

## 2018-11-21 PROCEDURE — 99285 EMERGENCY DEPT VISIT HI MDM: CPT

## 2018-11-21 PROCEDURE — 47379 UNLISTED LAPS PX LIVER: CPT | Performed by: SURGERY

## 2018-11-21 PROCEDURE — 83550 IRON BINDING TEST: CPT | Performed by: SURGERY

## 2018-11-21 PROCEDURE — 83036 HEMOGLOBIN GLYCOSYLATED A1C: CPT | Performed by: SURGERY

## 2018-11-21 PROCEDURE — 85610 PROTHROMBIN TIME: CPT | Performed by: ANESTHESIOLOGY

## 2018-11-21 PROCEDURE — 96374 THER/PROPH/DIAG INJ IV PUSH: CPT

## 2018-11-21 PROCEDURE — 0FB03ZX EXCISION OF LIVER, PERCUTANEOUS APPROACH, DIAGNOSTIC: ICD-10-PCS | Performed by: SURGERY

## 2018-11-21 PROCEDURE — 99222 1ST HOSP IP/OBS MODERATE 55: CPT | Performed by: INTERNAL MEDICINE

## 2018-11-21 PROCEDURE — 80053 COMPREHEN METABOLIC PANEL: CPT | Performed by: EMERGENCY MEDICINE

## 2018-11-21 PROCEDURE — 88307 TISSUE EXAM BY PATHOLOGIST: CPT | Performed by: PATHOLOGY

## 2018-11-21 PROCEDURE — 96375 TX/PRO/DX INJ NEW DRUG ADDON: CPT

## 2018-11-21 PROCEDURE — 47562 LAPAROSCOPIC CHOLECYSTECTOMY: CPT | Performed by: PHYSICIAN ASSISTANT

## 2018-11-21 PROCEDURE — 88304 TISSUE EXAM BY PATHOLOGIST: CPT | Performed by: PATHOLOGY

## 2018-11-21 PROCEDURE — 36415 COLL VENOUS BLD VENIPUNCTURE: CPT | Performed by: EMERGENCY MEDICINE

## 2018-11-21 PROCEDURE — 99219 PR INITIAL OBSERVATION CARE/DAY 50 MINUTES: CPT | Performed by: SURGERY

## 2018-11-21 PROCEDURE — 82728 ASSAY OF FERRITIN: CPT | Performed by: SURGERY

## 2018-11-21 PROCEDURE — 83540 ASSAY OF IRON: CPT | Performed by: SURGERY

## 2018-11-21 PROCEDURE — 85025 COMPLETE CBC W/AUTO DIFF WBC: CPT | Performed by: EMERGENCY MEDICINE

## 2018-11-21 PROCEDURE — 83690 ASSAY OF LIPASE: CPT | Performed by: EMERGENCY MEDICINE

## 2018-11-21 PROCEDURE — 0FT44ZZ RESECTION OF GALLBLADDER, PERCUTANEOUS ENDOSCOPIC APPROACH: ICD-10-PCS | Performed by: SURGERY

## 2018-11-21 PROCEDURE — 47562 LAPAROSCOPIC CHOLECYSTECTOMY: CPT | Performed by: SURGERY

## 2018-11-21 PROCEDURE — 85730 THROMBOPLASTIN TIME PARTIAL: CPT | Performed by: ANESTHESIOLOGY

## 2018-11-21 PROCEDURE — 96376 TX/PRO/DX INJ SAME DRUG ADON: CPT

## 2018-11-21 PROCEDURE — 74177 CT ABD & PELVIS W/CONTRAST: CPT

## 2018-11-21 RX ORDER — GLYCOPYRROLATE 0.2 MG/ML
INJECTION INTRAMUSCULAR; INTRAVENOUS AS NEEDED
Status: DISCONTINUED | OUTPATIENT
Start: 2018-11-21 | End: 2018-11-21 | Stop reason: SURG

## 2018-11-21 RX ORDER — ONDANSETRON 2 MG/ML
4 INJECTION INTRAMUSCULAR; INTRAVENOUS ONCE
Status: COMPLETED | OUTPATIENT
Start: 2018-11-21 | End: 2018-11-21

## 2018-11-21 RX ORDER — HEPARIN SODIUM 5000 [USP'U]/ML
5000 INJECTION, SOLUTION INTRAVENOUS; SUBCUTANEOUS EVERY 8 HOURS SCHEDULED
Status: DISCONTINUED | OUTPATIENT
Start: 2018-11-21 | End: 2018-11-22 | Stop reason: HOSPADM

## 2018-11-21 RX ORDER — CEFAZOLIN SODIUM 2 G/50ML
2000 SOLUTION INTRAVENOUS ONCE
Status: COMPLETED | OUTPATIENT
Start: 2018-11-21 | End: 2018-11-21

## 2018-11-21 RX ORDER — FENTANYL CITRATE/PF 50 MCG/ML
50 SYRINGE (ML) INJECTION
Status: DISCONTINUED | OUTPATIENT
Start: 2018-11-21 | End: 2018-11-21 | Stop reason: HOSPADM

## 2018-11-21 RX ORDER — ONDANSETRON 2 MG/ML
INJECTION INTRAMUSCULAR; INTRAVENOUS AS NEEDED
Status: DISCONTINUED | OUTPATIENT
Start: 2018-11-21 | End: 2018-11-21 | Stop reason: SURG

## 2018-11-21 RX ORDER — MAGNESIUM HYDROXIDE 1200 MG/15ML
LIQUID ORAL AS NEEDED
Status: DISCONTINUED | OUTPATIENT
Start: 2018-11-21 | End: 2018-11-21 | Stop reason: HOSPADM

## 2018-11-21 RX ORDER — HYDRALAZINE HYDROCHLORIDE 20 MG/ML
5 INJECTION INTRAMUSCULAR; INTRAVENOUS ONCE
Status: DISCONTINUED | OUTPATIENT
Start: 2018-11-21 | End: 2018-11-21

## 2018-11-21 RX ORDER — MORPHINE SULFATE 4 MG/ML
4 INJECTION, SOLUTION INTRAMUSCULAR; INTRAVENOUS EVERY 4 HOURS PRN
Status: DISCONTINUED | OUTPATIENT
Start: 2018-11-21 | End: 2018-11-22

## 2018-11-21 RX ORDER — MIDAZOLAM HYDROCHLORIDE 1 MG/ML
INJECTION INTRAMUSCULAR; INTRAVENOUS AS NEEDED
Status: DISCONTINUED | OUTPATIENT
Start: 2018-11-21 | End: 2018-11-21 | Stop reason: SURG

## 2018-11-21 RX ORDER — OXYCODONE HYDROCHLORIDE 10 MG/1
5-10 TABLET ORAL EVERY 4 HOURS PRN
Qty: 15 TABLET | Refills: 0 | Status: SHIPPED | OUTPATIENT
Start: 2018-11-21 | End: 2018-11-28

## 2018-11-21 RX ORDER — BUPROPION HYDROCHLORIDE 150 MG/1
150 TABLET ORAL DAILY
Status: DISCONTINUED | OUTPATIENT
Start: 2018-11-21 | End: 2018-11-22 | Stop reason: HOSPADM

## 2018-11-21 RX ORDER — SODIUM CHLORIDE, SODIUM LACTATE, POTASSIUM CHLORIDE, CALCIUM CHLORIDE 600; 310; 30; 20 MG/100ML; MG/100ML; MG/100ML; MG/100ML
INJECTION, SOLUTION INTRAVENOUS CONTINUOUS PRN
Status: DISCONTINUED | OUTPATIENT
Start: 2018-11-21 | End: 2018-11-21

## 2018-11-21 RX ORDER — SUCCINYLCHOLINE CHLORIDE 20 MG/ML
INJECTION INTRAMUSCULAR; INTRAVENOUS AS NEEDED
Status: DISCONTINUED | OUTPATIENT
Start: 2018-11-21 | End: 2018-11-21 | Stop reason: SURG

## 2018-11-21 RX ORDER — PROPOFOL 10 MG/ML
INJECTION, EMULSION INTRAVENOUS AS NEEDED
Status: DISCONTINUED | OUTPATIENT
Start: 2018-11-21 | End: 2018-11-21 | Stop reason: SURG

## 2018-11-21 RX ORDER — HYDROMORPHONE HCL/PF 1 MG/ML
0.5 SYRINGE (ML) INJECTION
Status: DISCONTINUED | OUTPATIENT
Start: 2018-11-21 | End: 2018-11-21 | Stop reason: HOSPADM

## 2018-11-21 RX ORDER — FENTANYL CITRATE 50 UG/ML
INJECTION, SOLUTION INTRAMUSCULAR; INTRAVENOUS AS NEEDED
Status: DISCONTINUED | OUTPATIENT
Start: 2018-11-21 | End: 2018-11-21 | Stop reason: SURG

## 2018-11-21 RX ORDER — SODIUM CHLORIDE 9 MG/ML
125 INJECTION, SOLUTION INTRAVENOUS CONTINUOUS
Status: DISCONTINUED | OUTPATIENT
Start: 2018-11-21 | End: 2018-11-22

## 2018-11-21 RX ORDER — LEVOFLOXACIN 5 MG/ML
500 INJECTION, SOLUTION INTRAVENOUS EVERY 24 HOURS
Status: DISCONTINUED | OUTPATIENT
Start: 2018-11-21 | End: 2018-11-22

## 2018-11-21 RX ORDER — ONDANSETRON 2 MG/ML
4 INJECTION INTRAMUSCULAR; INTRAVENOUS EVERY 6 HOURS PRN
Status: DISCONTINUED | OUTPATIENT
Start: 2018-11-21 | End: 2018-11-22 | Stop reason: HOSPADM

## 2018-11-21 RX ORDER — OXYCODONE HYDROCHLORIDE 5 MG/1
10 TABLET ORAL EVERY 4 HOURS PRN
Status: DISCONTINUED | OUTPATIENT
Start: 2018-11-21 | End: 2018-11-22 | Stop reason: HOSPADM

## 2018-11-21 RX ORDER — BUPROPION HYDROCHLORIDE 150 MG/1
150 TABLET ORAL DAILY
Status: CANCELLED | OUTPATIENT
Start: 2018-11-21

## 2018-11-21 RX ORDER — SODIUM CHLORIDE, SODIUM LACTATE, POTASSIUM CHLORIDE, CALCIUM CHLORIDE 600; 310; 30; 20 MG/100ML; MG/100ML; MG/100ML; MG/100ML
125 INJECTION, SOLUTION INTRAVENOUS CONTINUOUS
Status: ACTIVE | OUTPATIENT
Start: 2018-11-21 | End: 2018-11-21

## 2018-11-21 RX ORDER — ROCURONIUM BROMIDE 10 MG/ML
INJECTION, SOLUTION INTRAVENOUS AS NEEDED
Status: DISCONTINUED | OUTPATIENT
Start: 2018-11-21 | End: 2018-11-21 | Stop reason: SURG

## 2018-11-21 RX ORDER — MORPHINE SULFATE 10 MG/ML
8 INJECTION, SOLUTION INTRAMUSCULAR; INTRAVENOUS ONCE
Status: COMPLETED | OUTPATIENT
Start: 2018-11-21 | End: 2018-11-21

## 2018-11-21 RX ORDER — PANTOPRAZOLE SODIUM 40 MG/1
40 TABLET, DELAYED RELEASE ORAL DAILY
Status: DISCONTINUED | OUTPATIENT
Start: 2018-11-21 | End: 2018-11-22 | Stop reason: HOSPADM

## 2018-11-21 RX ORDER — MORPHINE SULFATE 4 MG/ML
4 INJECTION, SOLUTION INTRAMUSCULAR; INTRAVENOUS ONCE
Status: COMPLETED | OUTPATIENT
Start: 2018-11-21 | End: 2018-11-21

## 2018-11-21 RX ORDER — METOCLOPRAMIDE HYDROCHLORIDE 5 MG/ML
10 INJECTION INTRAMUSCULAR; INTRAVENOUS ONCE AS NEEDED
Status: DISCONTINUED | OUTPATIENT
Start: 2018-11-21 | End: 2018-11-21 | Stop reason: HOSPADM

## 2018-11-21 RX ADMIN — FENTANYL CITRATE 100 MCG: 50 INJECTION, SOLUTION INTRAMUSCULAR; INTRAVENOUS at 09:26

## 2018-11-21 RX ADMIN — SODIUM CHLORIDE 125 ML/HR: 0.9 INJECTION, SOLUTION INTRAVENOUS at 15:32

## 2018-11-21 RX ADMIN — GLYCOPYRROLATE 0.1 MG: 0.2 INJECTION, SOLUTION INTRAMUSCULAR; INTRAVENOUS at 09:17

## 2018-11-21 RX ADMIN — PROPOFOL 200 MG: 10 INJECTION, EMULSION INTRAVENOUS at 09:26

## 2018-11-21 RX ADMIN — FENTANYL CITRATE 50 MCG: 50 INJECTION, SOLUTION INTRAMUSCULAR; INTRAVENOUS at 10:30

## 2018-11-21 RX ADMIN — HYDROMORPHONE HYDROCHLORIDE 0.5 MG: 1 INJECTION, SOLUTION INTRAMUSCULAR; INTRAVENOUS; SUBCUTANEOUS at 12:21

## 2018-11-21 RX ADMIN — FENTANYL CITRATE 50 MCG: 50 INJECTION, SOLUTION INTRAMUSCULAR; INTRAVENOUS at 11:00

## 2018-11-21 RX ADMIN — FENTANYL CITRATE 50 MCG: 50 INJECTION, SOLUTION INTRAMUSCULAR; INTRAVENOUS at 11:52

## 2018-11-21 RX ADMIN — FENTANYL CITRATE 50 MCG: 50 INJECTION, SOLUTION INTRAMUSCULAR; INTRAVENOUS at 09:55

## 2018-11-21 RX ADMIN — PANTOPRAZOLE SODIUM 40 MG: 40 TABLET, DELAYED RELEASE ORAL at 15:29

## 2018-11-21 RX ADMIN — METRONIDAZOLE 500 MG: 500 INJECTION, SOLUTION INTRAVENOUS at 09:42

## 2018-11-21 RX ADMIN — HEPARIN SODIUM 5000 UNITS: 5000 INJECTION INTRAVENOUS; SUBCUTANEOUS at 22:35

## 2018-11-21 RX ADMIN — ONDANSETRON 4 MG: 2 INJECTION, SOLUTION INTRAMUSCULAR; INTRAVENOUS at 04:51

## 2018-11-21 RX ADMIN — HYDROMORPHONE HYDROCHLORIDE 0.5 MG: 1 INJECTION, SOLUTION INTRAMUSCULAR; INTRAVENOUS; SUBCUTANEOUS at 12:44

## 2018-11-21 RX ADMIN — SODIUM CHLORIDE, SODIUM LACTATE, POTASSIUM CHLORIDE, AND CALCIUM CHLORIDE 125 ML/HR: .6; .31; .03; .02 INJECTION, SOLUTION INTRAVENOUS at 11:59

## 2018-11-21 RX ADMIN — NEOSTIGMINE METHYLSULFATE 2 MG: 1 INJECTION, SOLUTION INTRAMUSCULAR; INTRAVENOUS; SUBCUTANEOUS at 11:13

## 2018-11-21 RX ADMIN — DEXAMETHASONE SODIUM PHOSPHATE 4 MG: 10 INJECTION INTRAMUSCULAR; INTRAVENOUS at 10:59

## 2018-11-21 RX ADMIN — ROCURONIUM BROMIDE 40 MG: 10 SOLUTION INTRAVENOUS at 09:32

## 2018-11-21 RX ADMIN — IOHEXOL 100 ML: 350 INJECTION, SOLUTION INTRAVENOUS at 06:15

## 2018-11-21 RX ADMIN — LEVOFLOXACIN 500 MG: 5 INJECTION, SOLUTION INTRAVENOUS at 15:33

## 2018-11-21 RX ADMIN — FENTANYL CITRATE 50 MCG: 50 INJECTION, SOLUTION INTRAMUSCULAR; INTRAVENOUS at 11:40

## 2018-11-21 RX ADMIN — SUCCINYLCHOLINE CHLORIDE 100 MG: 20 INJECTION, SOLUTION INTRAMUSCULAR; INTRAVENOUS; PARENTERAL at 09:26

## 2018-11-21 RX ADMIN — CEFAZOLIN SODIUM 2000 MG: 2 SOLUTION INTRAVENOUS at 09:33

## 2018-11-21 RX ADMIN — MORPHINE SULFATE 8 MG: 10 INJECTION INTRAVENOUS at 06:46

## 2018-11-21 RX ADMIN — MORPHINE SULFATE 4 MG: 4 INJECTION INTRAVENOUS at 05:28

## 2018-11-21 RX ADMIN — GLYCOPYRROLATE 0.4 MG: 0.2 INJECTION, SOLUTION INTRAMUSCULAR; INTRAVENOUS at 11:13

## 2018-11-21 RX ADMIN — SODIUM CHLORIDE 1000 ML: 0.9 INJECTION, SOLUTION INTRAVENOUS at 05:26

## 2018-11-21 RX ADMIN — ROCURONIUM BROMIDE 10 MG: 10 SOLUTION INTRAVENOUS at 09:26

## 2018-11-21 RX ADMIN — FENTANYL CITRATE 50 MCG: 50 INJECTION, SOLUTION INTRAMUSCULAR; INTRAVENOUS at 11:19

## 2018-11-21 RX ADMIN — FAMOTIDINE 20 MG: 10 INJECTION, SOLUTION INTRAVENOUS at 05:28

## 2018-11-21 RX ADMIN — HYDROMORPHONE HYDROCHLORIDE 0.5 MG: 1 INJECTION, SOLUTION INTRAMUSCULAR; INTRAVENOUS; SUBCUTANEOUS at 12:06

## 2018-11-21 RX ADMIN — ONDANSETRON 4 MG: 2 INJECTION INTRAMUSCULAR; INTRAVENOUS at 11:03

## 2018-11-21 RX ADMIN — SODIUM CHLORIDE, SODIUM LACTATE, POTASSIUM CHLORIDE, AND CALCIUM CHLORIDE: .6; .31; .03; .02 INJECTION, SOLUTION INTRAVENOUS at 09:00

## 2018-11-21 RX ADMIN — METRONIDAZOLE 500 MG: 500 INJECTION, SOLUTION INTRAVENOUS at 17:21

## 2018-11-21 RX ADMIN — MIDAZOLAM HYDROCHLORIDE 2 MG: 1 INJECTION, SOLUTION INTRAMUSCULAR; INTRAVENOUS at 09:17

## 2018-11-21 NOTE — ANESTHESIA POSTPROCEDURE EVALUATION
Post-Op Assessment Note      CV Status:  Stable    Mental Status:  Alert and awake    Hydration Status:  Euvolemic    PONV Controlled:  Controlled    Airway Patency:  Patent    Post Op Vitals Reviewed: Yes          Staff: Anesthesiologist           /88 (11/21/18 1125)    Temp 97 7 °F (36 5 °C) (11/21/18 1125)    Pulse (!) 119 (11/21/18 1125)   Resp      SpO2

## 2018-11-21 NOTE — DISCHARGE INSTRUCTIONS
[facility]      Dr Kimmie HO     1  General: Danya Peña will feel pulling sensations around the wound or funny aches and pains around the incisions  This is normal  Even minor surgery is a change in your body and this is your bodys way of reaction to it  If you have had abdominal surgery, it may help to support the incision with a small pillow or blanket for comfort when moving or coughing  2  Wound care:       Glue - Leave glue alone, it will fall off on its own, no need for an additional dressings    3  Water: You may shower over the wound, unless there are drain tubes left in place  Do not bathe or use a pool or hot tub until cleared by the physician  You may shower right over the staples, glue or Steri-Strips and rinse wound with soapy water but do not scrub incision pat dry when you are done  4  Activity: You may go up and down stairs, walk as much as you are comfortable, but walk at least 3 times each day  If you have had abdominal surgery, do not lift anything heavier than 15 pounds for at least 2 weeks, unless cleared by the doctor  5  Diet: You may resume a regular diet  If you had a same-day surgery or overnight stay surgery, you may wish to eat lightly for a few days: soups, crackers, and sandwiches  You may resume a regular diet when ready  6  Medications: Resume all of your previous medications, unless told otherwise by the doctor  Avoid aspirin or ibuprofen (Advil, Motrin, etc ) products for 2-3 days after the date of surgery  You may, at that time, began to take them again  Tylenol is always fine, unless you are taking any narcotic pain medication containing Tylenol (such as Percocet, Darvocet, Vicodin, or anything containing acetaminophen)  Do not take Tylenol if you're taking these medications  You do not need to take the narcotic pain medications unless you are having significant pain and discomfort      7  Driving: He will need someone to drive you home on the day of surgery  Do not drive or make any important decisions while on narcotic pain medication or 24 hours and after anesthesia or sedation for surgery  Generally, you may drive when your off all narcotic pain medications  8  Upset Stomach: You may take Maalox, Tums, or similar items for an upset stomach  If your narcotic pain medication causes an upset stomach, do not take it on an empty stomach  Try taking it with at least some crackers or toast      9  Constipation: Patients often experienced constipation after surgery  You may take over-the-counter medication for this, such as Metamucil, Senokot, Dulcolax, milk of magnesia, etc  You may take a suppository unless you have had anorectal surgery such as a procedure on your hemorrhoids  If you experience significant nausea or vomiting after abdominal surgery, call the office before trying any of these medications  10  Call the office: If you are experiencing any of the following, fevers above 101 5°, significant nausea or vomiting, if the wound develops drainage and/or is excessive redness around the wound, or if you have significant diarrhea or other worsening symptoms  11  Pain: You may be given a prescription for pain  This will be given to the hospital, the day of surgery  12  Sexual Activity: You may resume sexual activity when you feel ready and comfortable and your incision is sealed and healed without apparent infection risk  13  Urination: If you haven't urinated in 6 hours, go directly to the ER for evaluation for urinary retention  14  Follow-up in 2 weeks          [facility]  Phone: [phone]

## 2018-11-21 NOTE — H&P
H&P Exam - General Surgery   Aren Rodriguez 70 y o  male MRN: [id number]  Unit/Bed#: ED 09 Encounter: [id number]    Assessment/Plan     Acute cholecystitis- confirmed by CT scan  - history of multiple previous gallbladder attacks  -will plan for laparoscopic cholecystectomy  Procedure discussed in detail as well as possible risks and complications with the patient and he has given his consent written form  -continue NPO  -pain control and antiemetics as needed  -preop antibiotics  -will admit to med surge postoperatively  Plan for discharge later tonight or tomorrow    Umbilical hernia- reducible, found on physical exam  -patient with known history, remains asymptomatic  -consider primary repair at time of surgical procedure versus repair with mesh in the future    The CT findings of early cirrhosis and portal hypertension  -will do liver biopsy at time of surgical procedure  -patient denies any history of alcohol abuse  -no elevated liver enzymes  -patient will need gastroenterology follow-up  -discussed with patient    Hiatal hernia- patient asymptomatic  -GI follow-up, possible EGD    Medical comorbidities including hypertension, depression-stable  -continue home medications postop    Obesity-recommend weight management program        History of Present Illness     HPI:  Aren Rodriguez is a 70 y o  male who presents with a few hour history of severe upper abdominal pain  Pain described as sharp in upper abdomen without radiation reaching intensity of 10/10  CT scan consistent with acute cholecystitis  Patient admits to previous history of similar pain on at least of 4-5 previous occasions in which pain resolved after a few hours  Symptoms appear to be associated with fatty meals  Otherwise patient denies any associated symptoms  He denies any nausea or vomiting  He denies any fevers or chills  No change in bowel habits or bloody stools  He denies any sick contacts or recent travel    He has no previous history of abdominal surgery  CT also with incidental findings of liver cirrhosis and hiatal hernia  Patient denies to history of heavy alcohol use  He also denies history of reflux disease or dysphagia  Review of Systems   Constitutional: Negative for appetite change, chills, fever and unexpected weight change  HENT: Negative  Eyes: Negative  Respiratory: Negative  Negative for cough, chest tightness, shortness of breath and wheezing  Cardiovascular: Positive for leg swelling  Negative for chest pain and palpitations  Gastrointestinal: Positive for abdominal pain  Negative for blood in stool, constipation, diarrhea, nausea and vomiting  Endocrine: Negative  Genitourinary: Negative  Negative for difficulty urinating, dysuria and hematuria  Musculoskeletal: Negative  Skin: Negative  Negative for rash and wound  Neurological: Negative  Hematological: Negative  Psychiatric/Behavioral: Negative          Historical Information   Past Medical History:   Diagnosis Date    Abscess of back 03/01/2018    Benign essential hypertension     Last Assessed:12/19/16    Cyst of skin     x6 from neck down back area    Macrocytosis     Last Assessed:1/16/17    Major depression, chronic     Last Assessed:12/21/17    Major depression, chronic 6/19/2017     Past Surgical History:   Procedure Laterality Date    INCISION AND DRAINAGE OF WOUND N/A 03/01/2018    SEBACEOUS CYST ABSCESS OF BACK-VIJAYA MONZON MD    KS EXC SKIN BENIG 1 1-2 CM TRUNK,ARM,LEG N/A 8/22/2018    Procedure: EXCISION  BIOPSY LESION/MASS BACK X4 NECK X1;  Surgeon: Peter Ivan MD;  Location:  MAIN OR;  Service: General     Social History   History   Alcohol Use    Yes     Comment: Occasionally/1-2 drinks per weekend     History   Drug Use No     History   Smoking Status    Never Smoker   Smokeless Tobacco    Never Used     Family History: non-contributory    Meds/Allergies   all medications and allergies reviewed  No Known Allergies    Objective   First Vitals:   Blood Pressure: (!) 174/84 (11/21/18 0439)  Pulse: 79 (11/21/18 0439)  Temperature: 98 3 °F (36 8 °C) (11/21/18 0439)  Respirations: 18 (11/21/18 0439)  Height: 6' 1" (185 4 cm) (11/21/18 0439)  Weight - Scale: 118 kg (260 lb) (11/21/18 0439)  SpO2: 97 % (11/21/18 0439)    Current Vitals:   Blood Pressure: 136/84 (11/21/18 0833)  Pulse: 103 (11/21/18 0833)  Temperature: 98 3 °F (36 8 °C) (11/21/18 0439)  Respirations: 20 (11/21/18 0833)  Height: 6' 1" (185 4 cm) (11/21/18 0439)  Weight - Scale: 118 kg (260 lb) (11/21/18 0439)  SpO2: 95 % (11/21/18 0833)      Intake/Output Summary (Last 24 hours) at 11/21/18 0837  Last data filed at 11/21/18 0526   Gross per 24 hour   Intake             1000 ml   Output                0 ml   Net             1000 ml       Invasive Devices     Peripheral Intravenous Line            Peripheral IV 11/21/18 Left Antecubital less than 1 day                Physical Exam   Constitutional: He is oriented to person, place, and time  No distress  Obese   HENT:   Head: Normocephalic and atraumatic  Mouth/Throat: Oropharynx is clear and moist  No oropharyngeal exudate  Eyes: EOM are normal  Right eye exhibits no discharge  Left eye exhibits no discharge  No scleral icterus  Neck: Neck supple  No JVD present  No tracheal deviation present  No thyromegaly present  Cardiovascular: Normal rate, regular rhythm and normal heart sounds  No murmur heard  Pulmonary/Chest: Effort normal and breath sounds normal  No respiratory distress  He has no wheezes  He has no rales  Abdominal: Soft  Mild distended, mild tenderness upper abdomen without rebound or guarding, active bowel sounds   Musculoskeletal: Normal range of motion  He exhibits no deformity  Trace edema bilateral lower extremities   Neurological: He is alert and oriented to person, place, and time  No focal deficits   Skin: Skin is warm and dry  No rash noted   He is not diaphoretic  No pallor  Psychiatric: He has a normal mood and affect  His behavior is normal        Lab Results:   I have personally reviewed pertinent lab results  , CBC:   Lab Results   Component Value Date    WBC 6 75 11/21/2018    HGB 16 4 11/21/2018    HCT 46 5 11/21/2018    MCV 97 11/21/2018    PLT 98 (L) 11/21/2018    MCH 34 2 11/21/2018    MCHC 35 3 11/21/2018    RDW 13 5 11/21/2018    MPV 10 4 11/21/2018   , CMP:   Lab Results   Component Value Date    SODIUM 141 11/21/2018    K 4 0 11/21/2018     11/21/2018    CO2 26 11/21/2018    BUN 16 11/21/2018    CREATININE 0 99 11/21/2018    CALCIUM 9 2 11/21/2018    AST 58 (H) 11/21/2018    ALT 49 11/21/2018    ALKPHOS 110 11/21/2018    EGFR 80 11/21/2018   , Coagulation: No results found for: PT, INR, APTT, Urinalysis: No results found for: COLORU, CLARITYU, SPECGRAV, PHUR, LEUKOCYTESUR, NITRITE, PROTEINUA, GLUCOSEU, KETONESU, BILIRUBINUR, BLOODU, Amylase: No results found for: AMYLASE, Lipase:   Lab Results   Component Value Date    LIPASE 73 11/21/2018     Imaging: I have personally reviewed pertinent reports  CT A/P:  IMPRESSION:  1  Cholelithiasis with findings suggestive of acute cholecystitis  Gallstone in either the gallbladder neck or proximal cystic duct  These findings are new from the prior study  If there is continued concern for acute cholecystitis, a follow-up   nuclear medicine HIDA scan is recommended to evaluate for cystic duct patency  2   Colonic diverticulosis  3   Findings compatible with early cirrhosis and portal hypertension  4   Hiatal hernia with thickening of the distal esophagus  Correlate for reflux esophagitis  Consider follow-up upper endoscopy to rule out esophageal neoplasm  5   Degenerative changes lumbar spine with compression fracture of L1, new from the prior study  Given the lack of surrounding inflammation, the findings are likely chronic    6   Prostatomegaly with probable bladder outlet obstruction  EKG, Pathology, and Other Studies: I have personally reviewed pertinent reports        Code Status: Full  Advance Directive and Living Will:      Power of :    POLST:      Poly Ellsworth PA-C

## 2018-11-21 NOTE — ED NOTES
Pt returned from [facility], [address] [facility]  11/21/18 2154

## 2018-11-21 NOTE — INTERIM OP NOTE
CHOLECYSTECTOMY LAPAROSCOPIC, wedge liver biopsy  Postoperative Note  PATIENT NAME: Michael Marie  : 1955  MRN: [id number]  [facility] ROOM 02    Surgery Date: 2018    Preop Diagnosis:  Cholecystitis [K81 9]    Post-Op Diagnosis Codes:     * Cholecystitis [K81 9]    Procedure(s) (LRB):  CHOLECYSTECTOMY LAPAROSCOPIC, wedge liver biopsy (N/A)    Surgeon(s) and Role:     * Tammy Acuña MD - Primary     Renata Contreras PA-C - Assisting    Specimens:  ID Type Source Tests Collected by Time Destination   1 :  Tissue Gallbladder TISSUE EXAM Tammy Acuña MD 2018 1009    2 : Wedge Liver Biopsy Tissue Liver TISSUE EXAM Tammy Acuña MD 2018 1052        Estimated Blood Loss:   200 mL    Anesthesia Type:   General ETA    Drain: 13 F leesa drain right lateral abdomen    Findings:   Cirrhotic appearing liver  GB edema, cholelithiasis   oozing generalized and from liver bed, controlled    Complications:     None    SIGNATURE: Jose Alcantara MD   DATE: 2018   TIME: 11:24 AM

## 2018-11-21 NOTE — ED NOTES
Pt updated on plan of care  Wife, Elvis Burn, at bedside  Pt denies vomiting  Provided teaching on medications administered  Pt given blue emesis bag and given call bell  Lights turned off to promote comfort and decrease nausea        Narinder Hernandez RN  11/21/18 9525

## 2018-11-21 NOTE — ED NOTES
Pt updated on plan of care  Pt provided teaching on medications administered        Rico Rosenthal RN  11/21/18 6286

## 2018-11-21 NOTE — PLAN OF CARE
INFECTION - ADULT     Absence or prevention of progression during hospitalization Progressing        PAIN - ADULT     Verbalizes/displays adequate comfort level or baseline comfort level Progressing        RESPIRATORY - ADULT     Achieves optimal ventilation and oxygenation Progressing        SKIN/TISSUE INTEGRITY - ADULT     Incision(s), wounds(s) or drain site(s) healing without S/S of infection Progressing

## 2018-11-21 NOTE — ED PROVIDER NOTES
History  Chief Complaint   Patient presents with    Abdominal Pain     Pt presents to ED reporting central lower abdominal pain, radiating up to epigastric area  Pt reports feeling bloated and heartburn symptoms starting yesterday after eating nuts and pasta  Pt reports last BM was yesterday  Pt denies vomiting  Here w/ abd pain and bloating since yesterday  Hx of diverticulosis in past, has never required tx for diverticulitis  Had some nuts before dinner last night and noted some stomach upset starting around 1730  Noted central / periumbilical abd pain, difficult to qualify w/ radiation into the GLEN region  Notes heartburn into the chest and bloating throughout the abd   +nausea, no vomiting  Had 3 bms yesterday, +flatus, no dysuria, frequency or urgency  No prev abd surgeries  Hasn't taken anything for pain, unable to sleep due to pain        History provided by:  Patient and spouse   used: No    Abdominal Pain   Pain location:  Periumbilical  Pain quality: bloating    Pain radiates to:  Epigastric region  Pain severity:  Moderate  Onset quality:  Gradual  Duration:  12 hours  Timing:  Constant  Progression:  Worsening  Chronicity:  New  Context: not previous surgeries, not sick contacts and not suspicious food intake    Relieved by:  None tried  Worsened by: Movement and position changes  Ineffective treatments:  None tried  Associated symptoms: anorexia, flatus and nausea    Associated symptoms: no belching, no chills, no cough, no diarrhea, no fatigue, no fever, no hematemesis, no hematochezia, no hematuria, no melena and no vomiting    Risk factors: has not had multiple surgeries        Prior to Admission Medications   Prescriptions Last Dose Informant Patient Reported? Taking?    Aspirin (ASPIR-81 PO)   Yes No   Sig: Take by mouth   Cholecalciferol ([employer] VITAMIN D3) 1000 units capsule  Self Yes No   Sig: Take by mouth   buPROPion (WELLBUTRIN XL) 150 mg 24 hr tablet 11/21/2018 at Unknown time  No Yes   Sig: Take 1 tablet (150 mg total) by mouth daily      Facility-Administered Medications: None       Past Medical History:   Diagnosis Date    Abscess of back 03/01/2018    Benign essential hypertension     Last Assessed:12/19/16    Cyst of skin     x6 from neck down back area    Macrocytosis     Last Assessed:1/16/17    Major depression, chronic     Last Assessed:12/21/17    Major depression, chronic 6/19/2017       Past Surgical History:   Procedure Laterality Date    CHOLECYSTECTOMY LAPAROSCOPIC N/A 11/21/2018    Procedure: CHOLECYSTECTOMY LAPAROSCOPIC, wedge liver biopsy;  Surgeon: Jerrell Rosales MD;  [facility];  Service: General    INCISION AND DRAINAGE OF WOUND N/A 03/01/2018    SEBACEOUS CYST ABSCESS OF BACK-VIJAYA MONZON MD    AK EXC SKIN BENIG 1 1-2 CM TRUNK,ARM,LEG N/A 8/22/2018    Procedure: EXCISION  BIOPSY LESION/MASS BACK X4 NECK X1;  Surgeon: Jerrell Rosales MD;  [facility];  Service: General       Family History   Problem Relation Age of Onset    Valvular heart disease Father     Stroke Brother         Mini     I have reviewed and agree with the history as documented  Social History   Substance Use Topics    Smoking status: Never Smoker    Smokeless tobacco: Never Used    Alcohol use Yes      Comment: Occasionally/1-2 drinks per weekend        Review of Systems   Constitutional: Negative for chills, fatigue and fever  Respiratory: Negative for cough  Gastrointestinal: Positive for abdominal pain, anorexia, flatus and nausea  Negative for diarrhea, hematemesis, hematochezia, melena and vomiting  Genitourinary: Negative for hematuria  All other systems reviewed and are negative  Physical Exam  Physical Exam   Constitutional: He appears well-developed and well-nourished  HENT:   Nose: Nose normal    Mouth/Throat: Oropharynx is clear and moist    Eyes: Conjunctivae are normal    Neck: Neck supple  Cardiovascular: Normal rate and regular rhythm  Pulmonary/Chest: Effort normal and breath sounds normal    Abdominal: He exhibits distension  Bowel sounds are decreased  There is tenderness in the periumbilical area  There is no rigidity, no rebound and no guarding  Musculoskeletal: He exhibits no deformity  Neurological: He is alert  Skin: Skin is warm  Psychiatric: He has a normal mood and affect  Nursing note and vitals reviewed        Vital Signs  ED Triage Vitals   Temperature Pulse Respirations Blood Pressure SpO2   11/21/18 0439 11/21/18 0439 11/21/18 0439 11/21/18 0439 11/21/18 0439   98 3 °F (36 8 °C) 79 18 (!) 174/84 97 %      Temp Source Heart Rate Source Patient Position - Orthostatic VS BP Location FiO2 (%)   11/21/18 1400 11/21/18 0439 11/21/18 0721 11/21/18 0721 --   Oral Monitor Lying Right arm       Pain Score       11/21/18 0439       8           Vitals:    11/21/18 1911 11/21/18 2238 11/22/18 0656 11/22/18 1100   BP: 119/65 113/70 136/94 115/61   Pulse: 103 104 103 98   Patient Position - Orthostatic VS: Lying Lying Lying Lying       Visual Acuity  Visual Acuity      Most Recent Value   L Pupil Size (mm)  3   R Pupil Size (mm)  3          ED Medications  Medications   lactated ringers infusion (0 mL/hr Intravenous Stopped 11/21/18 2213)   ondansetron (ZOFRAN) injection 4 mg (4 mg Intravenous Given 11/21/18 0451)   sodium chloride 0 9 % bolus 1,000 mL (0 mL Intravenous Stopped 11/21/18 0647)   morphine (PF) 4 mg/mL injection 4 mg (4 mg Intravenous Given 11/21/18 0528)   famotidine (PEPCID) injection 20 mg (20 mg Intravenous Given 11/21/18 0528)   iohexol (OMNIPAQUE) 350 MG/ML injection (MULTI-DOSE) 100 mL (100 mL Intravenous Given 11/21/18 0615)   morphine (PF) 10 mg/mL injection 8 mg (8 mg Intravenous Given 11/21/18 0646)   ceFAZolin (ANCEF) IVPB (premix) 2,000 mg (2,000 mg Intravenous Given 11/21/18 0933)   metroNIDAZOLE (FLAGYL) IVPB (premix) 500 mg (500 mg Intravenous Given 11/21/18 0942)   metroNIDAZOLE (FLAGYL) IVPB (premix) 500 mg (500 mg Intravenous New Bag 11/21/18 1721)   pneumococcal 13-valent conjugate vaccine (PREVNAR-13) IM injection 0 5 mL (0 5 mL Intramuscular Given 11/22/18 1337)   influenza vaccine, high-dose (FLUZONE HIGH-DOSE) IM injection VENITA 0 5 mL (0 5 mL Intramuscular Given 11/22/18 0500)       Diagnostic Studies  Results Reviewed     Procedure Component Value Units Date/Time    CBC and differential [[id number]]  (Abnormal) Collected:  11/21/18 0450    Lab Status:  Final result Specimen:  Blood from Arm, Left Updated:  11/21/18 0524     WBC 6 75 Thousand/uL      RBC 4 79 Million/uL      Hemoglobin 16 4 g/dL      Hematocrit 46 5 %      MCV 97 fL      MCH 34 2 pg      MCHC 35 3 g/dL      RDW 13 5 %      MPV 10 4 fL      Platelets 98 (L) Thousands/uL      Neutrophils Relative 71 %      Immat GRANS % 0 %      Lymphocytes Relative 19 %      Monocytes Relative 9 %      Eosinophils Relative 1 %      Basophils Relative 0 %      Neutrophils Absolute 4 81 Thousands/µL      Immature Grans Absolute 0 01 Thousand/uL      Lymphocytes Absolute 1 26 Thousands/µL      Monocytes Absolute 0 60 Thousand/µL      Eosinophils Absolute 0 06 Thousand/µL      Basophils Absolute 0 01 Thousands/µL     Narrative:        No Clots    Comprehensive metabolic panel [[id number]]  (Abnormal) Collected:  11/21/18 0450    Lab Status:  Final result Specimen:  Blood from Arm, Left Updated:  11/21/18 0514     Sodium 141 mmol/L      Potassium 4 0 mmol/L      Chloride 105 mmol/L      CO2 26 mmol/L      ANION GAP 10 mmol/L      BUN 16 mg/dL      Creatinine 0 99 mg/dL      Glucose 112 mg/dL      Calcium 9 2 mg/dL      AST 58 (H) U/L      ALT 49 U/L      Alkaline Phosphatase 110 U/L      Total Protein 6 8 g/dL      Albumin 3 6 g/dL      Total Bilirubin 1 20 (H) mg/dL      eGFR 80 ml/min/1 73sq m     Narrative:         National Kidney Disease Education Program recommendations are as follows:  GFR calculation is accurate only with a steady state creatinine  Chronic Kidney disease less than 60 ml/min/1 73 sq  meters  Kidney failure less than 15 ml/min/1 73 sq  meters  Lipase [[id number]]  (Normal) Collected:  11/21/18 0450    Lab Status:  Final result Specimen:  Blood from Arm, Left Updated:  11/21/18 0514     Lipase 73 u/L                  CT abdomen pelvis with contrast   ED Interpretation by Ivan Guardado DO (11/21 1559)   See below      Final Result by Joelle Pisano DO (11/21 7175)   1  Cholelithiasis with findings suggestive of acute cholecystitis  Gallstone in either the gallbladder neck or proximal cystic duct  These findings are new from the prior study  If there is continued concern for acute cholecystitis, a follow-up    nuclear medicine HIDA scan is recommended to evaluate for cystic duct patency  2   Colonic diverticulosis  3   Findings compatible with early cirrhosis and portal hypertension  4   Hiatal hernia with thickening of the distal esophagus  Correlate for reflux esophagitis  Consider follow-up upper endoscopy to rule out esophageal neoplasm  5   Degenerative changes lumbar spine with compression fracture of L1, new from the prior study  Given the lack of surrounding inflammation, the findings are likely chronic  6   Prostatomegaly with probable bladder outlet obstruction  The study was marked in [facility] for immediate notification  Workstation performed: [id number]                    Procedures  Procedures       Phone Contacts  ED Phone Contact    ED Course  ED Course as of Nov 25 1619   Wed Nov 21, 2018   0630 D/w pt lab/rad  Has seen Dr Adenike Lai in the past   Will page surgery to eval pt     1345 Per Odell Resendez - Dr Marisa Pettit on call, will page    0700 Care transferred to Dr Darrell Mcarthur  Awaiting call back from surgery                                  MDM  Number of Diagnoses or Management Options  Cholecystitis: new and requires workup     Amount and/or Complexity of Data Reviewed  Clinical lab tests: reviewed and ordered  Tests in the radiology section of CPT®: reviewed and ordered  Obtain history from someone other than the patient: yes  Independent visualization of images, tracings, or specimens: yes      CritCare Time    Disposition  Final diagnoses:   Cholecystitis     Time reflects when diagnosis was documented in both MDM as applicable and the Disposition within this note     Time User Action Codes Description Comment    11/21/2018  8:08 AM Wallowa Reeves Add [K81 9] Cholecystitis     11/21/2018  8:39 AM Khushbu Petersen Modify [K81 9] Cholecystitis     11/21/2018 11:06 AM Trish Worthingtonel Modify [K81 9] Cholecystitis     11/21/2018 11:34 AM Wallowa Reeves Modify [K81 9] Cholecystitis     11/21/2018 11:34 AM Wallowa Reeves Add [E78 00] Hypercholesterolemia     11/22/2018 11:46 AM Júnior Settles Modify [K81 9] Cholecystitis     11/22/2018 11:46 AM Júnior Settles Add [K81 0] Acute cholecystitis       ED Disposition     None      Follow-up Information     Follow up With Specialties Details Why Contact Info    Kimmie Cox MD General Surgery Follow up in 2 week(s) as scheduled [address]      Hola Roman MD Gastroenterology Schedule an appointment as soon as possible for a visit in 1 week(s) liver cirrhosis and distal esophageal thickening [place] [address]  [address] [place], [place] [phone]      Kirt Whalen MD Family Medicine Schedule an appointment as soon as possible for a visit in 1 week(s) Should have DEXA scan in the future-incidental node of L1 compression fracture on his CT scan   [place] [id number] [facility] [address]  [phone]            Discharge Medication List as of 11/22/2018 11:48 AM      START taking these medications    Details   oxyCODONE (ROXICODONE) 10 MG TABS Take 0 5-1 tablets (5-10 mg total) by mouth every 4 (four) hours as needed for moderate pain for up to 10 days Max Daily Amount: 60 mg, Starting Wed 11/21/2018, Until Sat 12/1/2018, Print         CONTINUE these medications which have NOT CHANGED    Details   buPROPion (WELLBUTRIN XL) 150 mg 24 hr tablet Take 1 tablet (150 mg total) by mouth daily, Starting Wed 9/12/2018, Normal      Aspirin (ASPIR-81 PO) Take by mouth, Historical Med      Cholecalciferol ([employer]S VITAMIN D3) 1000 units capsule Take by mouth, Historical Med             Outpatient Discharge Orders  Discharge Diet     Discharge Diet     Activity as tolerated     Lifting restrictions     No strenuous exercise     Shower Daily     No driving until     Call provider for:  persistent nausea or vomiting     Call provider for:  severe uncontrolled pain     Call provider for:  redness, tenderness, or signs of infection (pain, swelling, redness, odor or green/yellow discharge around incision site)     Call provider for: active or persistent bleeding     Call provider for:  difficulty breathing, headache or visual disturbances     No dressing needed     Activity as tolerated     No strenuous exercise     Shower Daily     No driving until     May return to work/school on:     Call provider for:  persistent nausea or vomiting     Call provider for:  severe uncontrolled pain     Call provider for:  redness, tenderness, or signs of infection (pain, swelling, redness, odor or green/yellow discharge around incision site)     Call provider for: active or persistent bleeding     Remove dressing in 24 hours         ED Provider  Electronically Signed by           Zahida Quinonez DO  11/25/18 2661

## 2018-11-22 VITALS
HEART RATE: 98 BPM | TEMPERATURE: 97.8 F | BODY MASS INDEX: 36.96 KG/M2 | OXYGEN SATURATION: 93 % | HEIGHT: 73 IN | DIASTOLIC BLOOD PRESSURE: 61 MMHG | SYSTOLIC BLOOD PRESSURE: 115 MMHG | RESPIRATION RATE: 18 BRPM | WEIGHT: 278.88 LBS

## 2018-11-22 LAB
ALBUMIN SERPL BCP-MCNC: 3 G/DL (ref 3.5–5)
ALP SERPL-CCNC: 74 U/L (ref 46–116)
ALT SERPL W P-5'-P-CCNC: 68 U/L (ref 12–78)
ANION GAP SERPL CALCULATED.3IONS-SCNC: 8 MMOL/L (ref 4–13)
AST SERPL W P-5'-P-CCNC: 83 U/L (ref 5–45)
BILIRUB SERPL-MCNC: 1.4 MG/DL (ref 0.2–1)
BUN SERPL-MCNC: 18 MG/DL (ref 5–25)
CALCIUM SERPL-MCNC: 8.5 MG/DL (ref 8.3–10.1)
CHLORIDE SERPL-SCNC: 105 MMOL/L (ref 100–108)
CO2 SERPL-SCNC: 27 MMOL/L (ref 21–32)
CREAT SERPL-MCNC: 1.05 MG/DL (ref 0.6–1.3)
ERYTHROCYTE [DISTWIDTH] IN BLOOD BY AUTOMATED COUNT: 13.4 % (ref 11.6–15.1)
GFR SERPL CREATININE-BSD FRML MDRD: 74 ML/MIN/1.73SQ M
GLUCOSE SERPL-MCNC: 114 MG/DL (ref 65–140)
HAV IGM SER QL: NORMAL
HBV CORE IGM SER QL: NORMAL
HBV SURFACE AG SER QL: NORMAL
HCT VFR BLD AUTO: 43.8 % (ref 36.5–49.3)
HCV AB SER QL: NORMAL
HGB BLD-MCNC: 14.6 G/DL (ref 12–17)
MCH RBC QN AUTO: 33.8 PG (ref 26.8–34.3)
MCHC RBC AUTO-ENTMCNC: 33.3 G/DL (ref 31.4–37.4)
MCV RBC AUTO: 101 FL (ref 82–98)
PLATELET # BLD AUTO: 95 THOUSANDS/UL (ref 149–390)
PMV BLD AUTO: 10.8 FL (ref 8.9–12.7)
POTASSIUM SERPL-SCNC: 4.4 MMOL/L (ref 3.5–5.3)
PROT SERPL-MCNC: 6.2 G/DL (ref 6.4–8.2)
RBC # BLD AUTO: 4.32 MILLION/UL (ref 3.88–5.62)
SODIUM SERPL-SCNC: 140 MMOL/L (ref 136–145)
WBC # BLD AUTO: 7.93 THOUSAND/UL (ref 4.31–10.16)

## 2018-11-22 PROCEDURE — G0009 ADMIN PNEUMOCOCCAL VACCINE: HCPCS | Performed by: INTERNAL MEDICINE

## 2018-11-22 PROCEDURE — G0008 ADMIN INFLUENZA VIRUS VAC: HCPCS | Performed by: INTERNAL MEDICINE

## 2018-11-22 PROCEDURE — 99232 SBSQ HOSP IP/OBS MODERATE 35: CPT | Performed by: INTERNAL MEDICINE

## 2018-11-22 PROCEDURE — 90662 IIV NO PRSV INCREASED AG IM: CPT | Performed by: INTERNAL MEDICINE

## 2018-11-22 PROCEDURE — 85027 COMPLETE CBC AUTOMATED: CPT | Performed by: PHYSICIAN ASSISTANT

## 2018-11-22 PROCEDURE — 90670 PCV13 VACCINE IM: CPT | Performed by: INTERNAL MEDICINE

## 2018-11-22 PROCEDURE — 80053 COMPREHEN METABOLIC PANEL: CPT | Performed by: PHYSICIAN ASSISTANT

## 2018-11-22 PROCEDURE — 99238 HOSP IP/OBS DSCHRG MGMT 30/<: CPT | Performed by: SURGERY

## 2018-11-22 RX ADMIN — INFLUENZA A VIRUS A/MICHIGAN/45/2015 X-275 (H1N1) ANTIGEN (FORMALDEHYDE INACTIVATED), INFLUENZA A VIRUS A/SINGAPORE/INFIMH-16-0019/2016 IVR-186 (H3N2) ANTIGEN (FORMALDEHYDE INACTIVATED), AND INFLUENZA B VIRUS B/MARYLAND/15/2016 BX-69A (A B/COLORADO/6/2017-LIKE VIRUS) ANTIGEN (FORMALDEHYDE INACTIVATED) 0.5 ML: 60; 60; 60 INJECTION, SUSPENSION INTRAMUSCULAR at 05:00

## 2018-11-22 RX ADMIN — PANTOPRAZOLE SODIUM 40 MG: 40 TABLET, DELAYED RELEASE ORAL at 09:16

## 2018-11-22 RX ADMIN — BUPROPION HYDROCHLORIDE 150 MG: 150 TABLET, FILM COATED, EXTENDED RELEASE ORAL at 09:16

## 2018-11-22 RX ADMIN — HEPARIN SODIUM 5000 UNITS: 5000 INJECTION INTRAVENOUS; SUBCUTANEOUS at 05:01

## 2018-11-22 RX ADMIN — PNEUMOCOCCAL 13-VALENT CONJUGATE VACCINE 0.5 ML: 2.2; 2.2; 2.2; 2.2; 2.2; 4.4; 2.2; 2.2; 2.2; 2.2; 2.2; 2.2; 2.2 INJECTION, SUSPENSION INTRAMUSCULAR at 13:37

## 2018-11-22 NOTE — PLAN OF CARE
Problem: RESPIRATORY - ADULT  Goal: Achieves optimal ventilation and oxygenation  INTERVENTIONS:  - Assess for changes in respiratory status  - Assess for changes in mentation and behavior  - Position to facilitate oxygenation and minimize respiratory effort  - Oxygen administration by appropriate delivery method based on oxygen saturation (per order) or ABGs  - Initiate smoking cessation education as indicated  - Encourage broncho-pulmonary hygiene including cough, deep breathe, Incentive Spirometry  - Assess the need for suctioning and aspirate as needed  - Assess and instruct to report SOB or any respiratory difficulty  - Respiratory Therapy support as indicated   Outcome: Adequate for Discharge      Problem: SKIN/TISSUE INTEGRITY - ADULT  Goal: Incision(s), wounds(s) or drain site(s) healing without S/S of infection  INTERVENTIONS  - Assess and document risk factors for skin impairment   - Assess and document dressing, incision, wound bed, drain sites and surrounding tissue  - Initiate Nutrition services consult and/or wound management as needed   Outcome: Adequate for Discharge      Problem: PAIN - ADULT  Goal: Verbalizes/displays adequate comfort level or baseline comfort level  Interventions:  - Encourage patient to monitor pain and request assistance  - Assess pain using appropriate pain scale  - Administer analgesics based on type and severity of pain and evaluate response  - Implement non-pharmacological measures as appropriate and evaluate response  - Consider cultural and social influences on pain and pain management  - Notify physician/advanced practitioner if interventions unsuccessful or patient reports new pain   Outcome: Adequate for Discharge      Problem: INFECTION - ADULT  Goal: Absence or prevention of progression during hospitalization  INTERVENTIONS:  - Assess and monitor for signs and symptoms of infection  - Monitor lab/diagnostic results  - Monitor all insertion sites, i e  indwelling lines, tubes, and drains  - Monitor endotracheal (as able) and nasal secretions for changes in amount and color  - [place] appropriate cooling/warming therapies per order  - Administer medications as ordered  - Instruct and encourage patient and family to use good hand hygiene technique  - Identify and instruct in appropriate isolation precautions for identified infection/condition   Outcome: Adequate for Discharge

## 2018-11-22 NOTE — PLAN OF CARE
Problem: RESPIRATORY - ADULT  Goal: Achieves optimal ventilation and oxygenation  INTERVENTIONS:  - Assess for changes in respiratory status  - Assess for changes in mentation and behavior  - Position to facilitate oxygenation and minimize respiratory effort  - Oxygen administration by appropriate delivery method based on oxygen saturation (per order) or ABGs  - Initiate smoking cessation education as indicated  - Encourage broncho-pulmonary hygiene including cough, deep breathe, Incentive Spirometry  - Assess the need for suctioning and aspirate as needed  - Assess and instruct to report SOB or any respiratory difficulty  - Respiratory Therapy support as indicated   Outcome: Progressing      Problem: SKIN/TISSUE INTEGRITY - ADULT  Goal: Incision(s), wounds(s) or drain site(s) healing without S/S of infection  INTERVENTIONS  - Assess and document risk factors for skin impairment   - Assess and document dressing, incision, wound bed, drain sites and surrounding tissue  - Initiate Nutrition services consult and/or wound management as needed   Outcome: Progressing      Problem: PAIN - ADULT  Goal: Verbalizes/displays adequate comfort level or baseline comfort level  Interventions:  - Encourage patient to monitor pain and request assistance  - Assess pain using appropriate pain scale  - Administer analgesics based on type and severity of pain and evaluate response  - Implement non-pharmacological measures as appropriate and evaluate response  - Consider cultural and social influences on pain and pain management  - Notify physician/advanced practitioner if interventions unsuccessful or patient reports new pain   Outcome: Progressing      Problem: INFECTION - ADULT  Goal: Absence or prevention of progression during hospitalization  INTERVENTIONS:  - Assess and monitor for signs and symptoms of infection  - Monitor lab/diagnostic results  - Monitor all insertion sites, i e  indwelling lines, tubes, and drains  - Monitor endotracheal (as able) and nasal secretions for changes in amount and color  - [place] appropriate cooling/warming therapies per order  - Administer medications as ordered  - Instruct and encourage patient and family to use good hand hygiene technique  - Identify and instruct in appropriate isolation precautions for identified infection/condition   Outcome: Progressing

## 2018-11-22 NOTE — CONSULTS
Consult- Leo Hale 1955, 70 y o  male MRN: [id number]    Unit/Bed#: [address] [place] [address] Encounter: [id number]    Primary Care Provider: Jeramy Galvez MD   Date and time admitted to hospital: 11/21/2018  4:35 AM      Consults    No new Assessment & Plan notes have been filed under this hospital service since the last note was generated  Service: Internal Medicine            History of Present Illness:    Leo Hale is a 70 y o  male who is originally admitted to the surgery service on 11/21/2018 due to acute cholecystitis  We are consulted for medical management in light of thrombocytopenia and some elevated LFTs     Patient underwent cholecystectomy and liver biopsy earlier today  He has minimal complaints of abdominal pain and denies nausea or vomiting at present  Patient had abnormal appearing liver and biopsy is pending  He denies  drinking any steady alcohol intake but has 1-2 on a weekend  He is uncertain if he was ever tested for hepatitis prior to this and this was ordered by the surgery team today  Iron panel was ordered and I will also order a ferritin level to assess for hemochromatosis    Review of Systems:    Review of Systems   Constitutional: Positive for appetite change  Negative for fever  Gastrointestinal: Positive for nausea  Musculoskeletal: Negative for arthralgias and joint swelling  Hematological: Does not bruise/bleed easily         Past Medical and Surgical History:     Past Medical History:   Diagnosis Date    Abscess of back 03/01/2018    Benign essential hypertension     Last Assessed:12/19/16    Cyst of skin     x6 from neck down back area    Macrocytosis     Last Assessed:1/16/17    Major depression, chronic     Last Assessed:12/21/17    Major depression, chronic 6/19/2017       Past Surgical History:   Procedure Laterality Date    INCISION AND DRAINAGE OF WOUND N/A 03/01/2018    SEBACEOUS CYST ABSCESS OF BACK-VIJAYA MONZON MD    MS EXC SKIN BENENID 1 1-2 CM TRUNK,ARM,LEG N/A 8/22/2018    Procedure: EXCISION  BIOPSY LESION/MASS BACK X4 NECK X1;  Surgeon: Adebayo Rodriguez MD;  Location: [facility];  Service: General       Meds/Allergies:      Current Facility-Administered Medications:     buPROPion (WELLBUTRIN XL) 24 hr tablet 150 mg, 150 mg, Oral, Daily    heparin (porcine) subcutaneous injection 5,000 Units, 5,000 Units, Subcutaneous, Q8H [address] **AND** Platelet count, , , Once    influenza vaccine, high-dose (FLUZONE HIGH-DOSE) IM injection VENITA 0 5 mL, 0 5 mL, Intramuscular, Prior to discharge    levofloxacin (LEVAQUIN) IVPB (premix) 500 mg, 500 mg, Intravenous, Q24H, 500 mg at 11/21/18 1533    morphine (PF) 4 mg/mL injection 4 mg, 4 mg, Intravenous, Q4H PRN    morphine injection 2 mg, 2 mg, Intravenous, Q2H PRN    ondansetron (ZOFRAN) injection 4 mg, 4 mg, Intravenous, Q6H PRN    oxyCODONE (ROXICODONE) IR tablet 10 mg, 10 mg, Oral, Q4H PRN    pantoprazole (PROTONIX) EC tablet 40 mg, 40 mg, Oral, Daily, 40 mg at 11/21/18 1529    pneumococcal 13-valent conjugate vaccine (PREVNAR-13) IM injection 0 5 mL, 0 5 mL, Intramuscular, Prior to discharge    sodium chloride 0 9 % infusion, 125 mL/hr, Intravenous, Continuous, 125 mL/hr at 11/21/18 1532    Prior to Admission Medications   Prescriptions Last Dose Informant Patient Reported? Taking?    Aspirin (ASPIR-81 PO)   Yes No   Sig: Take by mouth   Cholecalciferol ([employer] VITAMIN D3) 1000 units capsule  Self Yes No   Sig: Take by mouth   buPROPion (WELLBUTRIN XL) 150 mg 24 hr tablet 11/21/2018 at Unknown time  No Yes   Sig: Take 1 tablet (150 mg total) by mouth daily      Facility-Administered Medications: None       Allergies: No Known Allergies    Social History:     Substance Use History:   History   Alcohol Use    Yes     Comment: Occasionally/1-2 drinks per weekend     History   Smoking Status    Never Smoker   Smokeless Tobacco    Never Used     History   Drug Use No       Family History:    Family History   Problem Relation Age of Onset    Valvular heart disease Father     Stroke Brother         Mini       Physical Exam:     Vitals:   Blood Pressure: 119/65 (11/21/18 1911)  Pulse: 103 (11/21/18 1911)  Temperature: 97 8 °F (36 6 °C) (11/21/18 1911)  Temp Source: Oral (11/21/18 1911)  Respirations: 20 (11/21/18 1911)  Height: 6' 1" (185 4 cm) (11/21/18 1400)  Weight - Scale: 126 kg (277 lb 12 5 oz) (11/21/18 1400)  SpO2: 95 % (11/21/18 1911)    Physical Exam   Constitutional: He is oriented to person, place, and time  He appears well-developed and well-nourished  No distress  HENT:   Head: Normocephalic  Mouth/Throat: Oropharynx is clear and moist    Eyes: Right eye exhibits no discharge  Left eye exhibits no discharge  Neck: No JVD present  No thyromegaly present  Cardiovascular: Normal rate and regular rhythm  Exam reveals no friction rub  No murmur heard  Pulmonary/Chest: No respiratory distress  He has no wheezes  He has no rales  Abdominal: He exhibits distension  Mild distention and generalized tenderness  No guarding or rigidity   Musculoskeletal: He exhibits no edema or tenderness  Neurological: He is alert and oriented to person, place, and time  No cranial nerve deficit  Coordination normal    Skin: No rash noted  No erythema  Nursing note and vitals reviewed            Additional Data:     Lab and imaging results: I personally reviewed them      Results from last 7 days  Lab Units 11/21/18  1407 11/21/18  0450   WBC Thousand/uL  --  6 75   HEMOGLOBIN g/dL  --  16 4   HEMATOCRIT %  --  46 5   PLATELETS Thousands/uL 90* 98*   NEUTROS PCT %  --  71   LYMPHS PCT %  --  19   MONOS PCT %  --  9   EOS PCT %  --  1       Results from last 7 days  Lab Units 11/21/18  0450   POTASSIUM mmol/L 4 0   CHLORIDE mmol/L 105   CO2 mmol/L 26   BUN mg/dL 16   CREATININE mg/dL 0 99   CALCIUM mg/dL 9 2   ALK PHOS U/L 110   ALT U/L 49   AST U/L 58*       Results from last 7 days  Lab Units 11/21/18  1029   INR  1 24*         Lab Results   Component Value Date/Time    HGBA1C 5 7 11/21/2018 02:07 PM    HGBA1C 5 5 07/26/2018 10:32 AM       CT abdomen pelvis with contrast   ED Interpretation by Karl Mcintyre DO (11/21 9666)   See below      Final Result by Phuc Wilks DO (11/21 0100)   1  Cholelithiasis with findings suggestive of acute cholecystitis  Gallstone in either the gallbladder neck or proximal cystic duct  These findings are new from the prior study  If there is continued concern for acute cholecystitis, a follow-up    nuclear medicine HIDA scan is recommended to evaluate for cystic duct patency  2   Colonic diverticulosis  3   Findings compatible with early cirrhosis and portal hypertension  4   Hiatal hernia with thickening of the distal esophagus  Correlate for reflux esophagitis  Consider follow-up upper endoscopy to rule out esophageal neoplasm  5   Degenerative changes lumbar spine with compression fracture of L1, new from the prior study  Given the lack of surrounding inflammation, the findings are likely chronic  6   Prostatomegaly with probable bladder outlet obstruction  The study was marked in [facility] for immediate notification  Workstation performed: [id number]             Nelia Dunn DO    ** Please Note: This note has been constructed using a voice recognition system   **

## 2018-11-22 NOTE — PROGRESS NOTES
Progress Note - Devin Soulier 70 y o  male MRN: [id number]    Unit/Bed#: [address] [place] [address] [id number] Encounter: [id number]      Assessment:  Principal Problem:    Acute cholecystitis  Active Problems:    Enlarged prostate    Other cirrhosis of liver (HCC)    Acquired thrombocytopenia (HCC)    Hypercholesterolemia    Acquired pancytopenia (HCC)    DDD (degenerative disc disease), lumbar    Closed compression fracture of first lumbar vertebra ([address] )  Resolved Problems:    * No resolved hospital problems  *        Plan:  · Postop day 1  Cholecystectomy for acute cholecystitis-patient tolerating diet-he is awaiting surgery re-evaluation is hoping to go home  · Prostatic enlargement-patient was noted to have enlargement on his CT scan and will need follow-up with his primary physician for this in the future  · Thrombocytopenia/history of pancytopenia for which she has followed with Dr Sharon Boxer  · Compression fracture in L1 noted incidentally patient on to be started on calcium vitamin D supplement and should have a DEXA scan in the future   · Possible cirrhosis-hepatitis profile is negative-await biopsy report    Subjective:   Patient doing fairly well with diet and ambulating to the bathroom without difficulty  Mild right upper quadrant tenderness in the op site  ROS  Comprehensive system review negative other than noted above    Objective:     Vitals: Blood pressure 136/94, pulse 103, temperature 98 4 °F (36 9 °C), temperature source Oral, resp  rate 18, height 6' 1" (1 854 m), weight 126 kg (278 lb 14 1 oz), SpO2 93 %  ,Body mass index is 36 79 kg/m²    Current Facility-Administered Medications   Medication Dose Route Frequency Provider Last Rate Last Dose    buPROPion (WELLBUTRIN XL) 24 hr tablet 150 mg  150 mg Oral Daily Radha Contreras PA-C   150 mg at 11/22/18 0916    heparin (porcine) subcutaneous injection 5,000 Units  5,000 Units Subcutaneous [facility] Radha Contreras PA-C   5,000 Units at 11/22/18 0501    ondansetron [facility]) injection 4 mg  4 mg Intravenous Q6H PRN Montara Plaster Lawl-GAIL Chapman        oxyCODONE (ROXICODONE) IR tablet 10 mg  10 mg Oral Q4H PRN Montara Plaster Astl-AdelaJUSTUS matthews-MARY        pantoprazole (PROTONIX) EC tablet 40 mg  40 mg Oral Daily JUSTUS Rodriguez-C   40 mg at 11/22/18 0916    pneumococcal 13-valent conjugate vaccine (PREVNAR-13) IM injection 0 5 mL  0 5 mL Intramuscular Prior to discharge Keeganpepe SamsDO         Prescriptions Prior to Admission   Medication    buPROPion (WELLBUTRIN XL) 150 mg 24 hr tablet    Aspirin (ASPIR-81 PO)    Cholecalciferol ([employer] VITAMIN D3) 1000 units capsule         Intake/Output Summary (Last 24 hours) at 11/22/18 1016  Last data filed at 11/22/18 0454   Gross per 24 hour   Intake              740 ml   Output              285 ml   Net              455 ml       Physical Exam:  General appearance: alert and cooperative  Neck: no adenopathy, no JVD, supple, symmetrical, trachea midline and thyroid not enlarged, symmetric, no tenderness/mass/nodules  Lungs: clear to auscultation bilaterally  Heart: regular rate and rhythm, S1, S2 normal, no murmur, click, rub or gallop  Abdomen: Mild right upper quadrant tenderness  Active bowel sounds are noted  Extremities: extremities normal, warm and well-perfused; no cyanosis, clubbing, or edema  Skin: Skin color, texture, turgor normal  No rashes or lesions  Neurologic:  No focal deficits       Lab, Imaging and other studies: I have personally reviewed pertinent reports              Results from last 7 days  Lab Units 11/22/18  0439 11/21/18  1407 11/21/18  0450   WBC Thousand/uL 7 93  --  6 75   HEMOGLOBIN g/dL 14 6  --  16 4   HEMATOCRIT % 43 8  --  46 5   PLATELETS Thousands/uL 95* 90* 98*   NEUTROS PCT %  --   --  71   LYMPHS PCT %  --   --  19   MONOS PCT %  --   --  9   EOS PCT %  --   --  1       Results from last 7 days  Lab Units 11/22/18  0439 11/21/18  0450   POTASSIUM mmol/L 4 4 4 0   CHLORIDE mmol/L 105 105   CO2 mmol/L 27 26   BUN mg/dL 18 16   CREATININE mg/dL 1 05 0 99   CALCIUM mg/dL 8 5 9 2   ALK PHOS U/L 74 110   ALT U/L 68 49   AST U/L 83* 58*     No results found for: TROPONINI, CKMB, CKTOTAL    Results from last 7 days  Lab Units 11/21/18  1029   INR  1 24*     No results found for: Rolley Silvia, SPUTUMCULTUR    Imaging:  No results found for this or any previous visit  Results for orders placed during the hospital encounter of 07/26/18   XR chest pa & lateral    Narrative CHEST     INDICATION:   L98 9: Disorder of the skin and subcutaneous tissue, unspecified  COMPARISON:  Chest x-ray dated February 5, 2016  EXAM PERFORMED/VIEWS:  XR CHEST PA & LATERAL      FINDINGS:    Cardiomediastinal silhouette appears unremarkable  There is mild bibasilar atelectasis with no focal infiltrate or consolidation  No pneumothorax or pleural effusion  Osseous structures appear within normal limits for patient age  Impression No acute cardiopulmonary disease  Workstation performed: [id number]         PATIENT CENTERED ROUNDS: I have performed rounds with the nursing staff            Parker Olivas DO

## 2018-11-22 NOTE — UTILIZATION REVIEW
[address]  Utilization Review Department  Phone: [phone]; Fax [phone]  [email or web address]  ATTENTION: Please call with any questions or concerns to [phone]  and carefully listen to the prompts so that you are directed to the right person  Send all requests for admission clinical reviews, approved or denied determinations and any other requests to fax [phone]  All voicemails are confidential   Initial Clinical Review    Admission: Date/Time/Statement: [email or web address] D/T CHOLECYSTITIS     11/21/18 1354  Inpatient Admission Once     Transfer Service: Surgery-General    Expected Discharge Date: 11/22/18       Question Answer Comment   Admitting Physician Yuval Alvarez    Level of Care Med Surg    Estimated length of stay More than 2 Midnights    Certification I certify that inpatient services are medically necessary for this patient for a duration of greater than two midnights  See H&P and MD Progress Notes for additional information about the patient's course of treatment                ED: Date/Time/Mode of Arrival:   ED Arrival Information     Expected Arrival [address] of Arrival Escorted By Service Admission Type    - 11/21/2018 04:14 Urgent Walk-In Spouse Surgery-General Urgent    Arrival Complaint    ABDOMINAL PAIN          Chief Complaint:   Chief Complaint   Patient presents with    Abdominal Pain     Pt presents to ED reporting central lower abdominal pain, radiating up to epigastric area  Pt reports feeling bloated and heartburn symptoms starting yesterday after eating nuts and pasta  Pt reports last BM was yesterday  Pt denies vomiting  History of Illness: 70 y o  male who presents with a few hour history of severe upper abdominal pain  Pain described as sharp in upper abdomen without radiation reaching intensity of 10/10  CT scan consistent with acute cholecystitis    Patient admits to previous history of similar pain on at least of 4-5 previous occasions in which pain resolved after a few hours  Symptoms appear to be associated with fatty meals  Otherwise patient denies any associated symptoms  He denies any nausea or vomiting  He denies any fevers or chills  No change in bowel habits or bloody stools  He denies any sick contacts or recent travel  He has no previous history of abdominal surgery  CT also with incidental findings of liver cirrhosis and hiatal hernia       ED Vital Signs:   ED Triage Vitals   Temperature Pulse Respirations Blood Pressure SpO2   11/21/18 0439 11/21/18 0439 11/21/18 0439 11/21/18 0439 11/21/18 0439   98 3 °F (36 8 °C) 79 18 (!) 174/84 97 %      Temp Source Heart Rate Source Patient Position - Orthostatic VS BP Location FiO2 (%)   11/21/18 1400 11/21/18 0439 11/21/18 0721 11/21/18 0721 --   Oral Monitor Lying Right arm       Pain Score       11/21/18 0439       8        Wt Readings from Last 1 Encounters:   11/22/18 126 kg (278 lb 14 1 oz)       Vital Signs (abnormal):   11/21/18 1235  --   108  15  143/100  97 %  Nasal cannula  --  --   11/21/18 1225  --  100  16  131/85  95 %  Nasal cannula  --  --   11/21/18 1210  --   106  14  142/81  96 %  --  --  --   11/21/18 1155  --   108  16  159/89  94 %  Nasal cannula  --  --   11/21/18 1140  --   114  17  149/93  93 %  Nasal cannula  --  --   11/21/18 1125  97 7 °F (36 5 °C)   119  20  141/88  95 %  Nasal cannula  WDL  --   11/21/18 0833  --  103  20  136/84  95 %  None (Room air)  --  Lying   11/21/18 0721  --  103  20  132/94  95 %  None (Room air)  --  Lying   11/21/18 0545  --  91  18  135/83  96 %  --  --  --   11/21/18 0530  --  88  16  135/85  96 %  --  --  --   11/21/18 0515  --  90  18  145/84  96 %  --  --  --   11/21/18 0500  --  91  --  150/82  95 %  --  --  --   11/21/18 0445  --  86  18  132/80  96 %  --  --  --   11/21/18 0439  98 3 °F (36 8 °C)  79  18   174/84  97 %  --  --  --            Abnormal Labs/Diagnostic Test Results:   plt 98  ast 58  Lipase 73    CT abd:1   Cholelithiasis with findings suggestive of acute cholecystitis   Gallstone in either the gallbladder neck or proximal cystic duct   These findings are new from the prior study   If there is continued concern for acute cholecystitis, a follow-up   nuclear medicine HIDA scan is recommended to evaluate for cystic duct patency  2   Colonic diverticulosis  3   Findings compatible with early cirrhosis and portal hypertension  4   Hiatal hernia with thickening of the distal esophagus   Correlate for reflux esophagitis   Consider follow-up upper endoscopy to rule out esophageal neoplasm  5   Degenerative changes lumbar spine with compression fracture of L1, new from the prior study   Given the lack of surrounding inflammation, the findings are likely chronic  6   Prostatomegaly with probable bladder outlet obstruction        ED Treatment:   Medication Administration from 11/21/2018 0414 to 11/21/2018 0840       Date/Time Order Dose Route Action Action by Comments     11/21/2018 0451 ondansetron (ZOFRAN) injection 4 mg 4 mg Intravenous Given Nilesh Craven RN      11/21/2018 0647 sodium chloride 0 9 % bolus 1,000 mL 0 mL Intravenous Stopped Vladimir Calderon RN      11/21/2018 0526 sodium chloride 0 9 % bolus 1,000 mL 1,000 mL Intravenous New [facility] Nilesh Craven RN      11/21/2018 0528 morphine (PF) 4 mg/mL injection 4 mg 4 mg Intravenous Given Nilesh Craven RN      11/21/2018 0528 famotidine (PEPCID) injection 20 mg 20 mg Intravenous Given Nilesh Craven RN      11/21/2018 0615 iohexol (OMNIPAQUE) 350 MG/ML injection (MULTI-DOSE) 100 mL 100 mL Intravenous Given Reza Gay      11/21/2018 0646 morphine (PF) 10 mg/mL injection 8 mg 8 mg Intravenous Given Vladimir Calderon RN           Past Medical/Surgical History:    Active Ambulatory Problems     Diagnosis Date Noted    Acquired thrombocytopenia ([facility] [address] ) 01/16/2017    DDD (degenerative disc disease), lumbar 04/15/2015    Acquired pancytopenia ([facility] [address] ) 2017    Hypercholesterolemia 2014    Major depression, chronic 2017     Resolved Ambulatory Problems     Diagnosis Date Noted    Depression with anxiety 2014    Strain of lumbar paraspinal muscle 2014    Skin lesion of back 2018     Past Medical History:   Diagnosis Date    Abscess of back 2018    Benign essential hypertension     Cyst of skin     Macrocytosis     Major depression, chronic     Major depression, chronic 2017       Admitting Diagnosis: Cholecystitis [K81 9]  Abdominal pain [R10 9]    Age/Sex: 70 y o  male    Assessment/Plan: 71 yo male c/o severe abd pain with acute cholecystitis, npo, OR- lap cindy, [email or web address] iv levaquin  Elevated bilirubin - may be related to possible cirrhosis, check IOC during lap cindy r/o CBD stone    CHOLECYSTECTOMY LAPAROSCOPIC, wedge liver biopsy  Postoperative Note  PATIENT NAME: Tona Palumbo  : 1955  MRN: [id number]  [facility] OR ROOM 02     Surgery Date: 2018     Preop Diagnosis:  Cholecystitis [K81 9]     Post-Op Diagnosis Codes:     * Cholecystitis [K81 9]     Procedure(s) (LRB):  CHOLECYSTECTOMY LAPAROSCOPIC, wedge liver biopsy (N/A)     Surgeon(s) and Role:     * Josette Martins MD - Primary     Orion Contreras PA-C - Assisting     Specimens:  ID Type Source Tests Collected by Time Destination   1 :  Tissue Gallbladder TISSUE EXAM Josette Martins MD 2018 1009     2 :  Wedge Liver Biopsy Tissue Liver TISSUE EXAM Josette Martins MD 2018 1052           Estimated Blood Loss:   200 mL     Anesthesia Type:   General ETA     Drain: 13 F leesa drain right lateral abdomen     Findings:   Cirrhotic appearing liver  GB edema, cholelithiasis   oozing generalized and from liver bed, controlled     Complications:      None      Admission Orders:  inpt  Is  OR- lap cindy  oob  benji drain  Reg diet    Scheduled Meds: Current Facility-Administered Medications:  buPROPion 150 mg Oral Daily Radha Contreras PA-C    heparin (porcine) 5,000 Units Subcutaneous Q8H [facility] Radha Contreras PA-C    levofloxacin 500 mg Intravenous Q24H Radha Contreras PA-C Last Rate: 500 mg (11/21/18 1533)   morphine injection 4 mg Intravenous Q4H PRN Radha Lopez-JUSTUS Chapman-MARY    morphine injection 2 mg Intravenous Q2H PRN Radha Contreras PA-C    ondansetron 4 mg Intravenous Q6H PRN Marda Exon John-GAIL Champan    oxyCODONE 10 mg Oral Q4H PRN Marda Exon John-GAIL Chapman    pantoprazole 40 mg Oral Daily Radha Contreras PA-C    pneumococcal 13-valent conjugate vaccine 0 5 mL Intramuscular Prior to discharge Dion Pinto DO    sodium chloride 125 mL/hr Intravenous Continuous Radha Contreras PA-C Last Rate: 125 mL/hr (11/21/18 1532)     Continuous Infusions:   sodium chloride 125 mL/hr Last Rate: 125 mL/hr (11/21/18 1532)     PRN Meds: morphine injection    morphine injection    ondansetron    oxyCODONE    pneumococcal 13-valent conjugate vaccine  IV DILAUDID X 3

## 2018-11-22 NOTE — DISCHARGE SUMMARY
Discharge Summary - Leroy Angel 70 y o  male MRN: [id number]    Unit/Bed#: 2 [facility] 204-01 Encounter: [id number]    Admission Date:   Admission Orders     Ordered        11/21/18 1353  Inpatient Admission  Once         11/21/18 1145  Place in Observation  Once               Admitting Diagnosis: Cholecystitis [K81 9]  Abdominal pain [R10 9]    HPI:  The patient presented with signs and symptoms of acute calculous cholecystitis  Procedures Performed: No orders of the defined types were placed in this encounter  Summary of Hospital Course:  Patient underwent a laparoscopic cholecystectomy on November 21, 2018  They convalesced 1 hospital night  On the morning of November 22, 2018 the patient was tolerating his diet ambulating independently, his pain controlled with oral narcotic analgesics and is discharged home in stable condition  Significant Findings, Care, Treatment and Services Provided:  Laparoscopic cholecystectomy    Complications:  None    Discharge Diagnosis:  Acute calculous cholecystitis    Resolved Problems  Date Reviewed: 11/22/2018    None          Condition at Discharge: good         Discharge instructions/Information to patient and family:   See after visit summary for information provided to patient and family  Provisions for Follow-Up Care:  See after visit summary for information related to follow-up care and any pertinent home health orders  PCP: Osman Chow MD    Disposition: Home    Planned Readmission: No    Discharge Statement   I spent 30 minutes discharging the patient  This time was spent on the day of discharge  I had direct contact with the patient on the day of discharge  Additional documentation is required if more than 30 minutes were spent on discharge  Discharge Medications:  See after visit summary for reconciled discharge medications provided to patient and family

## 2018-11-23 ENCOUNTER — TRANSITIONAL CARE MANAGEMENT (OUTPATIENT)
Dept: FAMILY MEDICINE CLINIC | Facility: HOSPITAL | Age: 63
End: 2018-11-23

## 2018-11-24 ENCOUNTER — TRANSITIONAL CARE MANAGEMENT (OUTPATIENT)
Dept: FAMILY MEDICINE CLINIC | Facility: HOSPITAL | Age: 63
End: 2018-11-24

## 2018-11-26 ENCOUNTER — TELEPHONE (OUTPATIENT)
Dept: SURGERY | Facility: HOSPITAL | Age: 63
End: 2018-11-26

## 2018-11-27 NOTE — UTILIZATION REVIEW
[id number]    Notification of Discharge  This is a Notification of Discharge from our facility [address]  Please be advised that this patient has been discharge from our facility  Below you will find the admission and discharge date and time including the patients disposition  PRESENTATION DATE: 11/21/2018  4:35 AM  IP ADMISSION DATE: 11/21/18 1353  DISCHARGE DATE: 11/22/2018  1:53 PM  DISPOSITION: [facility] Utilization Review Department  Phone: [phone]; Fax [phone]  ATTENTION: Please call with any questions or concerns to [phone]  and carefully listen to the prompts so that you are directed to the right person  Send all requests for admission clinical reviews, approved or denied determinations and any other requests to fax [phone]   All voicemails are confidential

## 2018-11-28 ENCOUNTER — OFFICE VISIT (OUTPATIENT)
Dept: FAMILY MEDICINE CLINIC | Facility: HOSPITAL | Age: 63
End: 2018-11-28
Payer: COMMERCIAL

## 2018-11-28 VITALS
HEART RATE: 92 BPM | SYSTOLIC BLOOD PRESSURE: 112 MMHG | DIASTOLIC BLOOD PRESSURE: 70 MMHG | BODY MASS INDEX: 34.17 KG/M2 | TEMPERATURE: 97.7 F | WEIGHT: 257.8 LBS | HEIGHT: 73 IN

## 2018-11-28 DIAGNOSIS — S32.010D CLOSED COMPRESSION FRACTURE OF L1 LUMBAR VERTEBRA WITH ROUTINE HEALING, SUBSEQUENT ENCOUNTER: ICD-10-CM

## 2018-11-28 DIAGNOSIS — K74.69 OTHER CIRRHOSIS OF LIVER (HCC): Primary | ICD-10-CM

## 2018-11-28 DIAGNOSIS — D61.818 ACQUIRED PANCYTOPENIA (HCC): ICD-10-CM

## 2018-11-28 DIAGNOSIS — K81.0 ACUTE CHOLECYSTITIS: ICD-10-CM

## 2018-11-28 DIAGNOSIS — Z13.820 OSTEOPOROSIS SCREENING: ICD-10-CM

## 2018-11-28 PROCEDURE — 99496 TRANSJ CARE MGMT HIGH F2F 7D: CPT | Performed by: FAMILY MEDICINE

## 2018-11-28 PROCEDURE — 1111F DSCHRG MED/CURRENT MED MERGE: CPT | Performed by: FAMILY MEDICINE

## 2018-11-28 NOTE — PROGRESS NOTES
Patient is here for  [facility] Call (since 10/28/2018)     Date and time call was made  11/23/2018 10:09 AM    Hospital care reviewed  Records reviewed    Patient was hospitialized at  [facility]    Date of Admission  11/21/18    Date of discharge  11/22/18    Diagnosis  Cholecystitis    Disposition  Home    Current Symptoms  None      TCM Call (since 10/28/2018)     Post hospital issues  None    Should patient be enrolled in anticoag monitoring? No    Scheduled for follow up? Yes    Did you obtain your prescribed medications  Yes    Do you need help managing your prescriptions or medications  No    Is transportation to your appointment needed  No    I have advised the patient to call PCP with any new or worsening symptoms  Talitha Harada, MA    Living Arrangements  Spouse or Significiant other    Support System  Spouse    Are you recieving any outpatient services  No    Are you recieving home care services  No    Have you fallen in the last 12 months  No    Counseling  Patient           70year old here for TCM  Initially presented to the ER with abdominal pain  Found evidence of acute cholecystitis  Lap cindy was done  Ct scan showing finding of compression fracture  No pain  Advised f/u for dexa scan  He has not had a dexa in the past    No hx of osteoporosis  Also with finding of cirrhosis of the liver  US in 2017 showing no evidence of this  Has been following up with hematology regarding pancytopenia/thrombocytopenia  Was advised to f/u with GI  Otherwise feels like he is doing well  No new complaints  Eating well  No nausea, no diarrhea  No pain  Review of Systems   Constitutional: Negative  Negative for activity change, appetite change, chills and diaphoresis  HENT: Negative for congestion and dental problem  Respiratory: Negative  Negative for apnea, chest tightness, shortness of breath and wheezing  Cardiovascular: Negative  Negative for chest pain, palpitations and leg swelling  Gastrointestinal: Negative  Negative for abdominal distention, abdominal pain, constipation, diarrhea and nausea  Genitourinary: Negative  Negative for difficulty urinating, dysuria and frequency  Social History     Social History    Marital status: /Civil Union     Spouse name: N/A    Number of children: N/A    Years of education: N/A     Occupational History    Not on file  Social History Main Topics    Smoking status: Never Smoker    Smokeless tobacco: Never Used    Alcohol use Yes      Comment: Occasionally/1-2 drinks per weekend    Drug use: No    Sexual activity: Not on file     Other Topics Concern    Not on file     Social History Narrative    Always uses seatbelt                   No Known Allergies        Vitals:    11/28/18 1411   BP: 112/70   Pulse: 92   Temp: 97 7 °F (36 5 °C)     Physical Exam   Constitutional: He is oriented to person, place, and time  He appears well-developed and well-nourished  HENT:   Head: Normocephalic and atraumatic  Eyes: Pupils are equal, round, and reactive to light  EOM are normal    Neck: Normal range of motion  Neck supple  Cardiovascular: Normal rate, regular rhythm and normal heart sounds  Pulmonary/Chest: Effort normal and breath sounds normal    Abdominal: Soft  Bowel sounds are normal    Neurological: He is alert and oriented to person, place, and time  Skin: Skin is warm and dry  Lap cindy incisions healing well  No signs of infection  Psychiatric: He has a normal mood and affect  His behavior is normal    Nursing note and vitals reviewed              Problem List Items Addressed This Visit        Digestive    Acute cholecystitis    Other cirrhosis of liver ([facility] [address] ) - Primary       Musculoskeletal and Integument    Closed compression fracture of first lumbar vertebra ([facility] [address] )    Relevant Orders    DXA bone density spine hip and pelvis Hematopoietic and Hemostatic    Acquired pancytopenia ([facility] [address] )      Other Visit Diagnoses     Osteoporosis screening        Relevant Orders    DXA bone density spine hip and pelvis        Doing well post cindy for acute cindy  With cirrhosis  Agree with GI referral   Advised regarding alcohol intake  Likely cause of thrombocytopenia  Depression  Stable  Doing well with current regimen  Old compression fracture of vertebrae  Agree with Dexa scan  Order given  To call our office if any concerns/questions at [phone]  Return in about 3 months (around 2/28/2019)

## 2018-12-03 ENCOUNTER — OFFICE VISIT (OUTPATIENT)
Dept: SURGERY | Facility: HOSPITAL | Age: 63
End: 2018-12-03

## 2018-12-03 VITALS — WEIGHT: 255.4 LBS | TEMPERATURE: 98.6 F | HEIGHT: 73 IN | BODY MASS INDEX: 33.85 KG/M2

## 2018-12-03 DIAGNOSIS — Z09 POSTOP CHECK: Primary | ICD-10-CM

## 2018-12-03 PROCEDURE — 99024 POSTOP FOLLOW-UP VISIT: CPT | Performed by: SURGERY

## 2018-12-03 PROCEDURE — 4040F PNEUMOC VAC/ADMIN/RCVD: CPT | Performed by: SURGERY

## 2018-12-03 NOTE — PROGRESS NOTES
Assessment/Plan: Maria Eugenia Connors is a 70year old male who presents today in post-operative state status post laparoscopic cholecystectomy performed on 11/21/18  Physical exam revealed the incisions are healing well  Lifting restrictions of no greater than 25 pounds for 2 more weeks  He may now return to a normal diet  Pathology showed chronic cholecystitis and cholelithiasis with possible cirrhosis of liver  Recommend he follows up with GI regarding his liver  He may follow up as needed  He knows to contact the office if any questions or concerns arise  No problem-specific Assessment & Plan notes found for this encounter  There are no diagnoses linked to this encounter  Subjective:      Patient ID: Ella Matthews is a 70 y o  male  Maria Eugenia Connors is a 70year old male who presents today in post-operative state status post laparoscopic cholecystectomy performed on 11/21/18  He reports he is doing well  The following portions of the patient's history were reviewed and updated as appropriate: allergies, current medications, past family history, past medical history, past social history, past surgical history and problem list     Review of Systems      Objective:      Temp 98 6 °F (37 °C) (Tympanic)   Ht 6' 1" (1 854 m)   Wt 116 kg (255 lb 6 4 oz)   BMI 33 70 kg/m²          Physical Exam   Abdominal:   Incisions are healing well  By signing my name below, I, Max Moctezuma, attest that this documentation has been prepared under the direction and in the presence of Yemi Moody MD  Electronically Signed: Justyna Michelle  12/3/18  Onamina Calero, personally performed the services described in this documentation  All medical record entries made by the mckaylaibkrista were at my direction and in my presence  I have reviewed the chart and discharge instructions and agree that the record reflects my personal performance and is accurate and complete  Yemi Moody MD  12/3/18

## 2018-12-03 NOTE — LETTER
December 5, 2018     Aguila Batista MD  [address]  [address] [id number] [facility] [address]    Patient: Michael Marie   YOB: 1955   Date of Visit: 12/3/2018       Dear Dr Saira Núñez: Thank you for referring Jennifer Mckenzie to me for evaluation  Below are my notes for this consultation  If you have questions, please do not hesitate to call me  I look forward to following your patient along with you           Sincerely,        Tammy Acuña MD        CC: No Recipients

## 2018-12-04 NOTE — OP NOTE
CHOLECYSTECTOMY LAPAROSCOPIC, wedge liver biopsy  Postoperative Note  PATIENT NAME: Pratik Brennan  : 1955  MRN: [id number]  [facility] OR ROOM 02    Surgery Date: 2018    Pre-op Dx:  Cholecystitis [K81 9]    Post-Op Diagnosis Codes:     * Cholecystitis [K81 9]    Procedure(s):  CHOLECYSTECTOMY LAPAROSCOPIC, wedge liver biopsy    Surgeon(s) and Role:     * Peter Ivan MD - Primary     * Echo Casas PA-C - Assisting    The Physician Assistant was medically necessary for surgical safety the case including suturing, retraction, and hemostasis  Specimens:  ID Type Source Tests Collected by Time Destination   1 :  Tissue Gallbladder TISSUE EXAM Peter Ivan MD 2018 1009    2 : Wedge Liver Biopsy Tissue Liver TISSUE EXAM Peter Ivan MD 2018 1052        Estimated Blood Loss:   200 mL    Anesthesia Type:   General     Procedure: The patient was seen again in the Holding Room  The risks, benefits, complications, treatment options, and expected outcomes were discussed with the patient  The possibilities of reaction to medication, pulmonary aspiration, perforation of viscus, bleeding, recurrent infection, finding a normal gallbladder, the need for additional procedures, failure to diagnose a condition, the possible need to convert to an open procedure, and creating a complication requiring transfusion or operation were discussed with the patient  The patient and/or family concurred with the proposed plan, giving informed consent  The site of surgery properly noted/marked  The patient was taken to Operating Room, identified as Pratik Brennan  and the procedure verified as Laparoscopic Cholecystectomy with possible Intraoperative Cholangiogram  A Time Out was held after prepping and draping in sterile fashion  The above information was confirmed  Prior to the induction of general anesthesia, antibiotic prophylaxis was administered   General endotracheal anesthesia was then administered and tolerated well  After the induction, the abdomen was prepped in the usual sterile fashion  The patient was positioned in the supine position, along with some reverse Trendelenburg  Local anesthetic agent was injected into the skin near the umbilicus and an incision made  The midline fascia was incised and the open technique was used to introduce a  port under direct vision  Pneumoperitoneum was then created with CO2 and tolerated well without any adverse changes in the patient's vital signs  Additional trocars were introduced under direct vision  All skin incisions were infiltrated with a local anesthetic agent before making the incision and placing the trocars  The patient was placed in the head up, left side down position  The gallbladder was identified, the fundus grasped and retracted cephalad and laterally  Adhesions were lysed bluntly and with the electrocautery or harmonic scapel  where indicated, taking care not to injure any adjacent organs or viscus  The infundibulum was grasped and retracted laterally, exposing the peritoneum overlying the triangle of Calot  This was then dissected anteriorily and posteriorly from the gallbladder,  and exposed in a blunt fashion or using cautery or harmonic scapel where appropriate  The cystic duct was clearly identified and  dissected circumferentially, as was the cystic artery, as the only two tubular structures leading into the gallbladder   The critical view of the [address] was identified and opened  The posterior aspect of the gallbladder was lifted off the cystic plate, to insure that there were no posterior structres behind the gallbladder or leading into the liver  Once this was all clearly identified, the cystic duct was then doubly ligated with surgical clips and/or Endoloop suture on the patient side and singly clipped on the gallbladder side and divided   The cystic artery was re-identified,  ligated with clips and divided as well  If necessary, the cystic duct was "miked" back of any stones felt in the cystic duct, prior to clipping the patient side of the duct  The gallbladder was dissected from the liver bed in retrograde fashion with the electrocautery and/or harmonic scalpel where appropriate  The gallbladder was removed, via an endopouch, through the umbilical port  The liver bed was irrigated and inspected  Hemostasis was achieved with the electrocautery  Copious irrigation was utilized and was repeatedly aspirated until clear  Given the abnormal appearance of the liver a wedge biopsy of the liver was performed  A harmonic was used to perform a wedge biopsy of right lobe of liver  Cautery was used to obtain hemostasis  Pneumoperitoneum was completely reduced after viewing removal of the trocars under direct vision  The wound was thoroughly irrigated and any fascia defect was then closed with a figure of eight suture 0-vicryl; the skin was then closed with 4-0 monocryl and a sterile dressings were applied  Instrument, sponge, and needle counts were correct at closure and at the conclusion of the case  This text is generated with voice recognition software  There may be translation, syntax,  or grammatical errors  If you have any questions, please contact the dictating provider       Complications: None    Condition: Stable to PACU    SIGNATURE: Tevin Sanchez MD   DATE: December 4, 2018   TIME: 3:18 PM

## 2018-12-11 ENCOUNTER — HOSPITAL ENCOUNTER (OUTPATIENT)
Dept: BONE DENSITY | Facility: IMAGING CENTER | Age: 63
Discharge: HOME | End: 2018-12-11
Payer: COMMERCIAL

## 2018-12-11 DIAGNOSIS — Z13.820 OSTEOPOROSIS SCREENING: ICD-10-CM

## 2018-12-11 DIAGNOSIS — S32.010D CLOSED COMPRESSION FRACTURE OF L1 LUMBAR VERTEBRA WITH ROUTINE HEALING, SUBSEQUENT ENCOUNTER: ICD-10-CM

## 2018-12-11 PROCEDURE — 77080 DXA BONE DENSITY AXIAL: CPT

## 2019-03-01 ENCOUNTER — TELEPHONE (OUTPATIENT)
Dept: GASTROENTEROLOGY | Facility: CLINIC | Age: 64
End: 2019-03-01

## 2019-03-01 NOTE — TELEPHONE ENCOUNTER
3-mo recall for LFT's and anti-smooth muscle Ab      Pt has OV Monday w/ Dr Florian Abebely be addressed

## 2019-03-04 ENCOUNTER — OFFICE VISIT (OUTPATIENT)
Dept: FAMILY MEDICINE CLINIC | Facility: HOSPITAL | Age: 64
End: 2019-03-04
Payer: MEDICARE

## 2019-03-04 VITALS
WEIGHT: 257.8 LBS | HEART RATE: 69 BPM | HEIGHT: 73 IN | BODY MASS INDEX: 34.17 KG/M2 | SYSTOLIC BLOOD PRESSURE: 126 MMHG | DIASTOLIC BLOOD PRESSURE: 82 MMHG | TEMPERATURE: 97.5 F

## 2019-03-04 DIAGNOSIS — F32.9 MAJOR DEPRESSION, CHRONIC: Primary | ICD-10-CM

## 2019-03-04 DIAGNOSIS — E78.00 HYPERCHOLESTEROLEMIA: ICD-10-CM

## 2019-03-04 DIAGNOSIS — I85.00 ESOPHAGEAL VARICES DETERMINED BY ENDOSCOPY (HCC): ICD-10-CM

## 2019-03-04 DIAGNOSIS — K74.69 OTHER CIRRHOSIS OF LIVER (HCC): ICD-10-CM

## 2019-03-04 DIAGNOSIS — D69.6 ACQUIRED THROMBOCYTOPENIA (HCC): Chronic | ICD-10-CM

## 2019-03-04 PROBLEM — K81.0 ACUTE CHOLECYSTITIS: Status: RESOLVED | Noted: 2018-11-21 | Resolved: 2019-03-04

## 2019-03-04 PROCEDURE — 99214 OFFICE O/P EST MOD 30 MIN: CPT | Performed by: FAMILY MEDICINE

## 2019-03-04 RX ORDER — NADOLOL 20 MG/1
20 TABLET ORAL DAILY
Refills: 0 | COMMUNITY
Start: 2019-01-07 | End: 2019-04-02 | Stop reason: SDUPTHER

## 2019-03-04 RX ORDER — PANTOPRAZOLE SODIUM 40 MG/1
40 TABLET, DELAYED RELEASE ORAL DAILY
Refills: 0 | COMMUNITY
Start: 2019-01-07 | End: 2019-04-02 | Stop reason: SDUPTHER

## 2019-03-04 NOTE — PROGRESS NOTES
ASSESSMENT/PLAN:    Problem List Items Addressed This Visit        Digestive    Other cirrhosis of liver ([facility] )       Cardiovascular and Mediastinum    Esophageal varices determined by endoscopy ([facility] [facility] )       Hematopoietic and Hemostatic    Acquired thrombocytopenia ([facility] ) (Chronic)       Other    Hypercholesterolemia    Major depression, chronic - Primary        He will be following up with GI regardin liver cirrhosis and esophageal varices  Explained to him why he is on corgard  He will cotninue to f/u with GI  Thrombocytopenia  Also being monitored by Hematology  Asked him to see him one more time as advised last visit by Dr Elkin Matson  Likely due to cirrhosis  MDD  Doing relatively well  Has multiple stressors  No si/hi  Feels okay on current regimen of wellbutrin at 150 mg daily  F/u in 6 months  No follow-ups on file  To call our office if any concerns/questions at [phone]   ______________________________________________________________________          Patient is here for follow up of chronic conditions  Chief Complaint   Patient presents with    Follow-up     3 month        History of Present Illness:     Here for f/u of chronic conditions  MDD  Doing well  No si/hi  Has his moments of feeling down and sad  Overall has not been very active due to the weather  Does not want to add or increase any of his medications  cirrshosis with esophageal varices  He is currently on corgard  F/u with GI  Otherwise doing well from that standpoint  Thrombocytopenia  Doing well  Labs have remained stable  clementineley due to cirrhosis  Has f/u with hematology in a  Few months  Review of Systems   Constitutional: Negative  Negative for activity change, appetite change, chills and diaphoresis  HENT: Negative for congestion and dental problem  Respiratory: Negative  Negative for apnea, chest tightness, shortness of breath and wheezing  Cardiovascular: Negative  Negative for chest pain, palpitations and leg swelling  Gastrointestinal: Negative  Negative for abdominal distention, abdominal pain, constipation, diarrhea and nausea  Genitourinary: Negative  Negative for difficulty urinating, dysuria and frequency         Social History     Socioeconomic History    Marital status: /Civil Union     Spouse name: Not on file    Number of children: Not on file    Years of education: Not on file    Highest education level: Not on file   Occupational History    Not on file   Social Needs    Financial resource strain: Not on file    Food insecurity:     Worry: Not on file     Inability: Not on file    Transportation needs:     Medical: Not on file     Non-medical: Not on file   Tobacco Use    Smoking status: Never Smoker    Smokeless tobacco: Never Used   Substance and Sexual Activity    Alcohol use: Yes     Comment: Occasionally/1-2 drinks per weekend    Drug use: No    Sexual activity: Not on file   Lifestyle    Physical activity:     Days per week: Not on file     Minutes per session: Not on file    Stress: Not on file   Relationships    Social connections:     Talks on phone: Not on file     Gets together: Not on file     Attends [employer] service: Not on file     Active member of club or organization: Not on file     Attends meetings of clubs or organizations: Not on file     Relationship status: Not on file    Intimate partner violence:     Fear of current or ex partner: Not on file     Emotionally abused: Not on file     Physically abused: Not on file     Forced sexual activity: Not on file   Other Topics Concern    Not on file   Social History Narrative    Always uses seatbelt           Current Outpatient Medications:     Aspirin (ASPIR-81 PO), Take by mouth, Disp: , Rfl:     buPROPion (WELLBUTRIN XL) 150 mg 24 hr tablet, Take 1 tablet (150 mg total) by mouth daily, Disp: 90 tablet, Rfl: 2    Cholecalciferol ([employer]S VITAMIN D3) 1000 units capsule, Take by mouth, Disp: , Rfl:     Flaxseed, Linseed, (FLAX SEED OIL PO), Take by mouth, Disp: , Rfl:     nadolol (CORGARD) 20 mg tablet, Take 20 mg by mouth daily, Disp: , Rfl: 0    pantoprazole (PROTONIX) 40 mg tablet, Take 40 mg by mouth daily, Disp: , Rfl: 0    No Known Allergies    Immunization History   Administered Date(s) Administered    Influenza Quadrivalent, 6-35 Months IM 01/11/2018    Influenza TIV (IM) 10/20/2014, 10/07/2015    Influenza, high dose seasonal 0 5 mL 11/22/2018    Pneumococcal Conjugate 13-Valent 11/22/2018    Td (adult), adsorbed 12/02/2011       Vitals:    03/04/19 1103   BP: 126/82   Pulse: 69   Temp: 97 5 °F (36 4 °C)     Wt Readings from Last 3 Encounters:   03/04/19 117 kg (257 lb 12 8 oz)   12/03/18 116 kg (255 lb 6 4 oz)   11/28/18 117 kg (257 lb 12 8 oz)      Body mass index is 34 01 kg/m²  Physical Exam   Constitutional: He is oriented to person, place, and time  He appears well-developed and well-nourished  HENT:   Head: Normocephalic and atraumatic  Right Ear: External ear normal    Left Ear: External ear normal    Nose: Nose normal    Mouth/Throat: Oropharynx is clear and moist    Eyes: Pupils are equal, round, and reactive to light  EOM are normal    Neck: Normal range of motion  Neck supple  Cardiovascular: Normal rate, regular rhythm and normal heart sounds  Exam reveals no friction rub  No murmur heard  Pulmonary/Chest: Effort normal and breath sounds normal  No stridor  No respiratory distress  He has no wheezes  He has no rales  Abdominal: Soft  Bowel sounds are normal  He exhibits no distension  There is no tenderness  Neurological: He is alert and oriented to person, place, and time  Skin: Skin is warm and dry  Psychiatric: He has a normal mood and affect  His behavior is normal    Nursing note and vitals reviewed                  Health Maintenance Due   Topic Date Due    Medicare Annual Wellness Visit (AWV)  1955    BMI: Followup Plan 07/28/1971    DTaP,Tdap,and Td Vaccines (1 - Tdap) 12/03/2011

## 2019-04-02 ENCOUNTER — OFFICE VISIT (OUTPATIENT)
Dept: GASTROENTEROLOGY | Facility: CLINIC | Age: 64
End: 2019-04-02
Payer: MEDICARE

## 2019-04-02 VITALS
DIASTOLIC BLOOD PRESSURE: 70 MMHG | BODY MASS INDEX: 33.93 KG/M2 | SYSTOLIC BLOOD PRESSURE: 108 MMHG | WEIGHT: 256 LBS | HEART RATE: 68 BPM | HEIGHT: 73 IN

## 2019-04-02 DIAGNOSIS — K22.10 EROSIVE ESOPHAGITIS: ICD-10-CM

## 2019-04-02 DIAGNOSIS — I85.00 ESOPHAGEAL VARICES DETERMINED BY ENDOSCOPY (HCC): ICD-10-CM

## 2019-04-02 DIAGNOSIS — Z12.11 COLON CANCER SCREENING: ICD-10-CM

## 2019-04-02 DIAGNOSIS — D69.6 ACQUIRED THROMBOCYTOPENIA (HCC): Chronic | ICD-10-CM

## 2019-04-02 DIAGNOSIS — K74.69 OTHER CIRRHOSIS OF LIVER (HCC): Primary | ICD-10-CM

## 2019-04-02 PROCEDURE — 99214 OFFICE O/P EST MOD 30 MIN: CPT | Performed by: INTERNAL MEDICINE

## 2019-04-02 RX ORDER — PANTOPRAZOLE SODIUM 20 MG/1
20 TABLET, DELAYED RELEASE ORAL DAILY
Qty: 90 TABLET | Refills: 2 | Status: SHIPPED | OUTPATIENT
Start: 2019-04-02 | End: 2019-10-17 | Stop reason: SDUPTHER

## 2019-04-02 RX ORDER — NADOLOL 20 MG/1
20 TABLET ORAL DAILY
Qty: 90 TABLET | Refills: 3 | Status: SHIPPED | OUTPATIENT
Start: 2019-04-02 | End: 2019-10-17 | Stop reason: SDUPTHER

## 2019-04-03 ENCOUNTER — OFFICE VISIT (OUTPATIENT)
Dept: FAMILY MEDICINE CLINIC | Facility: HOSPITAL | Age: 64
End: 2019-04-03
Payer: MEDICARE

## 2019-04-03 VITALS
TEMPERATURE: 96.3 F | HEART RATE: 73 BPM | HEIGHT: 73 IN | SYSTOLIC BLOOD PRESSURE: 110 MMHG | BODY MASS INDEX: 33.98 KG/M2 | WEIGHT: 256.4 LBS | DIASTOLIC BLOOD PRESSURE: 82 MMHG

## 2019-04-03 DIAGNOSIS — Z23 ENCOUNTER FOR IMMUNIZATION: ICD-10-CM

## 2019-04-03 DIAGNOSIS — Z00.00 WELCOME TO MEDICARE PREVENTIVE VISIT: ICD-10-CM

## 2019-04-03 DIAGNOSIS — E78.00 HYPERCHOLESTEROLEMIA: ICD-10-CM

## 2019-04-03 DIAGNOSIS — D61.818 OTHER PANCYTOPENIA (HCC): ICD-10-CM

## 2019-04-03 DIAGNOSIS — H61.23 BILATERAL IMPACTED CERUMEN: Primary | ICD-10-CM

## 2019-04-03 DIAGNOSIS — I85.00 ESOPHAGEAL VARICES DETERMINED BY ENDOSCOPY (HCC): ICD-10-CM

## 2019-04-03 DIAGNOSIS — K74.69 OTHER CIRRHOSIS OF LIVER (HCC): ICD-10-CM

## 2019-04-03 DIAGNOSIS — D69.6 ACQUIRED THROMBOCYTOPENIA (HCC): Chronic | ICD-10-CM

## 2019-04-03 PROBLEM — S32.010A CLOSED COMPRESSION FRACTURE OF FIRST LUMBAR VERTEBRA (HCC): Status: RESOLVED | Noted: 2018-11-21 | Resolved: 2019-04-03

## 2019-04-03 PROCEDURE — 99214 OFFICE O/P EST MOD 30 MIN: CPT | Performed by: FAMILY MEDICINE

## 2019-04-03 PROCEDURE — 69209 REMOVE IMPACTED EAR WAX UNI: CPT | Performed by: FAMILY MEDICINE

## 2019-04-03 PROCEDURE — G0010 ADMIN HEPATITIS B VACCINE: HCPCS | Performed by: FAMILY MEDICINE

## 2019-04-03 PROCEDURE — 90632 HEPA VACCINE ADULT IM: CPT | Performed by: FAMILY MEDICINE

## 2019-04-03 PROCEDURE — G0402 INITIAL PREVENTIVE EXAM: HCPCS | Performed by: FAMILY MEDICINE

## 2019-04-03 PROCEDURE — 90746 HEPB VACCINE 3 DOSE ADULT IM: CPT | Performed by: FAMILY MEDICINE

## 2019-05-02 ENCOUNTER — CLINICAL SUPPORT (OUTPATIENT)
Dept: FAMILY MEDICINE CLINIC | Facility: HOSPITAL | Age: 64
End: 2019-05-02
Payer: MEDICARE

## 2019-05-02 DIAGNOSIS — Z23 ENCOUNTER FOR IMMUNIZATION: Primary | ICD-10-CM

## 2019-05-02 PROCEDURE — 90746 HEPB VACCINE 3 DOSE ADULT IM: CPT | Performed by: FAMILY MEDICINE

## 2019-05-02 PROCEDURE — G0010 ADMIN HEPATITIS B VACCINE: HCPCS | Performed by: FAMILY MEDICINE

## 2019-06-06 LAB
ALBUMIN SERPL-MCNC: 3.6 G/DL (ref 3.6–4.8)
ALBUMIN/GLOB SERPL: 1.4 {RATIO} (ref 1.2–2.2)
ALP SERPL-CCNC: 113 IU/L (ref 39–117)
ALT SERPL-CCNC: 29 IU/L (ref 0–44)
AST SERPL-CCNC: 44 IU/L (ref 0–40)
BILIRUB SERPL-MCNC: 1 MG/DL (ref 0–1.2)
BUN SERPL-MCNC: 18 MG/DL (ref 8–27)
BUN/CREAT SERPL: 18 (ref 10–24)
CALCIUM SERPL-MCNC: 9.2 MG/DL (ref 8.6–10.2)
CHLORIDE SERPL-SCNC: 106 MMOL/L (ref 96–106)
CO2 SERPL-SCNC: 23 MMOL/L (ref 20–29)
CREAT SERPL-MCNC: 1 MG/DL (ref 0.76–1.27)
GLOBULIN SER-MCNC: 2.5 G/DL (ref 1.5–4.5)
GLUCOSE SERPL-MCNC: 100 MG/DL (ref 65–99)
POTASSIUM SERPL-SCNC: 4.5 MMOL/L (ref 3.5–5.2)
PROT SERPL-MCNC: 6.1 G/DL (ref 6–8.5)
SL AMB EGFR AFRICAN AMERICAN: 91 ML/MIN/1.73
SL AMB EGFR NON AFRICAN AMERICAN: 79 ML/MIN/1.73
SODIUM SERPL-SCNC: 141 MMOL/L (ref 134–144)

## 2019-06-07 LAB
BASOPHILS # BLD AUTO: 0 X10E3/UL (ref 0–0.2)
BASOPHILS NFR BLD AUTO: 0 %
EOSINOPHIL # BLD AUTO: 0.2 X10E3/UL (ref 0–0.4)
EOSINOPHIL NFR BLD AUTO: 5 %
ERYTHROCYTE [DISTWIDTH] IN BLOOD BY AUTOMATED COUNT: 14.3 % (ref 12.3–15.4)
HCT VFR BLD AUTO: 46.5 % (ref 37.5–51)
HGB BLD-MCNC: 15.5 G/DL (ref 13–17.7)
IMM GRANULOCYTES # BLD: 0 X10E3/UL (ref 0–0.1)
IMM GRANULOCYTES NFR BLD: 0 %
LYMPHOCYTES # BLD AUTO: 1.6 X10E3/UL (ref 0.7–3.1)
LYMPHOCYTES NFR BLD AUTO: 38 %
MCH RBC QN AUTO: 32.9 PG (ref 26.6–33)
MCHC RBC AUTO-ENTMCNC: 33.3 G/DL (ref 31.5–35.7)
MCV RBC AUTO: 99 FL (ref 79–97)
MONOCYTES # BLD AUTO: 0.4 X10E3/UL (ref 0.1–0.9)
MONOCYTES NFR BLD AUTO: 10 %
MORPHOLOGY BLD-IMP: ABNORMAL
NEUTROPHILS # BLD AUTO: 2 X10E3/UL (ref 1.4–7)
NEUTROPHILS NFR BLD AUTO: 47 %
PLATELET # BLD AUTO: 89 X10E3/UL (ref 150–450)
RBC # BLD AUTO: 4.71 X10E6/UL (ref 4.14–5.8)
WBC # BLD AUTO: 4.2 X10E3/UL (ref 3.4–10.8)

## 2019-06-17 ENCOUNTER — OFFICE VISIT (OUTPATIENT)
Dept: HEMATOLOGY ONCOLOGY | Facility: HOSPITAL | Age: 64
End: 2019-06-17
Payer: MEDICARE

## 2019-06-17 VITALS
WEIGHT: 263 LBS | RESPIRATION RATE: 16 BRPM | SYSTOLIC BLOOD PRESSURE: 106 MMHG | BODY MASS INDEX: 35.62 KG/M2 | HEIGHT: 72 IN | TEMPERATURE: 98.5 F | DIASTOLIC BLOOD PRESSURE: 72 MMHG | HEART RATE: 82 BPM | OXYGEN SATURATION: 97 %

## 2019-06-17 DIAGNOSIS — K74.69 OTHER CIRRHOSIS OF LIVER (HCC): ICD-10-CM

## 2019-06-17 DIAGNOSIS — D69.6 ACQUIRED THROMBOCYTOPENIA (HCC): Primary | ICD-10-CM

## 2019-06-17 PROCEDURE — 99213 OFFICE O/P EST LOW 20 MIN: CPT | Performed by: PHYSICIAN ASSISTANT

## 2019-07-09 DIAGNOSIS — F32.A DEPRESSION, UNSPECIFIED DEPRESSION TYPE: ICD-10-CM

## 2019-07-10 LAB
ACTIN IGG SERPL-ACNC: 21 UNITS (ref 0–19)
ALBUMIN SERPL-MCNC: 3.7 G/DL (ref 3.6–4.8)
ALP SERPL-CCNC: 103 IU/L (ref 39–117)
ALT SERPL-CCNC: 31 IU/L (ref 0–44)
AST SERPL-CCNC: 49 IU/L (ref 0–40)
BILIRUB DIRECT SERPL-MCNC: 0.23 MG/DL (ref 0–0.4)
BILIRUB SERPL-MCNC: 0.7 MG/DL (ref 0–1.2)
PROT SERPL-MCNC: 5.8 G/DL (ref 6–8.5)

## 2019-07-10 RX ORDER — BUPROPION HYDROCHLORIDE 150 MG/1
150 TABLET ORAL DAILY
Qty: 90 TABLET | Refills: 2 | Status: SHIPPED | OUTPATIENT
Start: 2019-07-10 | End: 2020-04-10 | Stop reason: SDUPTHER

## 2019-07-19 ENCOUNTER — OFFICE VISIT (OUTPATIENT)
Dept: GASTROENTEROLOGY | Facility: CLINIC | Age: 64
End: 2019-07-19
Payer: MEDICARE

## 2019-07-19 VITALS
SYSTOLIC BLOOD PRESSURE: 130 MMHG | WEIGHT: 264 LBS | DIASTOLIC BLOOD PRESSURE: 90 MMHG | BODY MASS INDEX: 34.99 KG/M2 | HEIGHT: 73 IN | HEART RATE: 102 BPM

## 2019-07-19 DIAGNOSIS — I85.00 ESOPHAGEAL VARICES DETERMINED BY ENDOSCOPY (HCC): ICD-10-CM

## 2019-07-19 DIAGNOSIS — Z86.010 PERSONAL HISTORY OF COLONIC POLYPS: ICD-10-CM

## 2019-07-19 DIAGNOSIS — K74.69 OTHER CIRRHOSIS OF LIVER (HCC): Primary | ICD-10-CM

## 2019-07-19 DIAGNOSIS — D69.6 ACQUIRED THROMBOCYTOPENIA (HCC): Chronic | ICD-10-CM

## 2019-07-19 DIAGNOSIS — K21.00 GASTROESOPHAGEAL REFLUX DISEASE WITH ESOPHAGITIS: ICD-10-CM

## 2019-07-19 PROCEDURE — 1124F ACP DISCUSS-NO DSCNMKR DOCD: CPT | Performed by: INTERNAL MEDICINE

## 2019-07-19 PROCEDURE — 99214 OFFICE O/P EST MOD 30 MIN: CPT | Performed by: INTERNAL MEDICINE

## 2019-07-19 NOTE — PROGRESS NOTES
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, incidentally discovered during lap choly  Viral and metabolic serology  negative  Borderline anti smooth muscle antibody  Continue alcohol abstinence  He started hepatitis a and B vaccination with his PCP  Will obtain surveillance labs and ultrasound prior to his next office visit  - US abdomen limited; Future  - AFP tumor marker; Future  - Hepatic function panel; Future  - Protime-INR; Future      2  Esophageal varices determined by endoscopy ([facility] )  Continue nadolol  Surveillance EGD in 1 year    3  Gastroesophageal reflux disease with esophagitis  Stable on pantoprazole 20 mg daily with minimal breakthrough symptom    4  Personal history of colonic polyps  No polyps on last colonoscopy in 2016, 5 year recall    5  Acquired thrombocytopenia ([place] [address] )  Exacerbated by cirrhosis, continue Hematology follow-up      Followup Appointment:  3 months with ultrasound and labs beforehand  ______________________________________________________________________    Chief Complaint   Patient presents with    Follow-up     cirrhosis     HPI:  Jinger Merlin returns for follow-up on cirrhosis and erosive esophagitis  He denies any jaundice, icterus, pruritus or other signs or symptoms of liver disease  At his last office visit we reduce pantoprazole to 20 mg daily  Symptoms are generally well controlled  He has mild dysphagia with very dry foods like pretzels, otherwise he denies any alarm symptoms       Historical Information   Past Medical History:   Diagnosis Date    Abscess of back 03/01/2018    Benign essential hypertension     Last Assessed:12/19/16    Cirrhosis (HCC)     Cyst of skin     x6 from neck down back area    Esophageal varices (HCC)     Macrocytosis     Last Assessed:1/16/17    Major depression, chronic     Last Assessed:12/21/17    Major depression, chronic 6/19/2017     Past Surgical History:   Procedure Laterality Date    CHOLECYSTECTOMY  11/2018    CHOLECYSTECTOMY LAPAROSCOPIC N/A 11/21/2018    Procedure: CHOLECYSTECTOMY LAPAROSCOPIC, wedge liver biopsy;  Surgeon: Jed Broderick MD;  Location: [facility];  Service: General    INCISION AND DRAINAGE OF WOUND N/A 03/01/2018    SEBACEOUS CYST ABSCESS OF BACK-VIJAYA MONZON MD    NE EXC SKIN BENIG 1 1-2 CM TRUNK,ARM,LEG N/A 8/22/2018    Procedure: EXCISION  BIOPSY LESION/MASS BACK X4 NECK X1;  Surgeon: Jed Broderick MD;  Location: [facility];  Service: General     Social History     Substance and Sexual Activity   Alcohol Use Not Currently    Comment: 7/19/19-last drink 12/2018- Occasionally/1-2 drinks per weekend     Social History     Substance and Sexual Activity   Drug Use No     Social History     Tobacco Use   Smoking Status Never Smoker   Smokeless Tobacco Never Used     Family History   Problem Relation Age of Onset    Valvular heart disease Father     Stroke Brother         Mini    Colon cancer Neg Hx     Colon polyps Neg Hx          Current Outpatient Medications:     Aspirin (ASPIR-81 PO)    buPROPion (WELLBUTRIN XL) 150 mg 24 hr tablet    Cholecalciferol (CVS VITAMIN D3) 1000 units capsule    Flaxseed, Linseed, (FLAX SEED OIL PO)    nadolol (CORGARD) 20 mg tablet    pantoprazole (PROTONIX) 20 mg tablet  No Known Allergies    10 Point REVIEW OF SYSTEMS IS OTHERWISE NEGATIVE  PHYSICAL EXAM:    Blood pressure 130/90, pulse 102, height 6' 1" (1 854 m), weight 120 kg (264 lb)  Body mass index is 34 83 kg/m²  General Appearance:  Alert, cooperative, no distress  HEENT:  Normocephalic, atraumatic, anicteric      Neck: Supple, symmetrical, trachea midline  Lungs: Clear to auscultation bilaterally; no rales, rhonchi or wheezing; respirations unlabored   Heart: Regular rate and rhythm; no murmur, rub, or gallop  Abdomen:   Soft, non-tender, non-distended; normal bowel sounds; no masses, no organomegaly   Rectal:  Deferred   Extremities:  No cyanosis, clubbing or edema   Skin:  No jaundice, rashes, or lesions   Lymph nodes: No palpable cervical lymphadenopathy     Lab Results:   Lab Results   Component Value Date    WBC 4 2 06/06/2019    HGB 15 5 06/06/2019    HCT 46 5 06/06/2019    MCV 99 (H) 06/06/2019    PLT 89 (LL) 06/06/2019     Lab Results   Component Value Date     12/23/2016    K 4 5 06/06/2019     06/06/2019    CO2 23 06/06/2019    ANIONGAP 8 06/06/2014    BUN 18 06/06/2019    CREATININE 1 00 06/06/2019    GLUCOSE 97 12/23/2016    CALCIUM 8 5 11/22/2018    AST 49 (H) 07/09/2019    ALT 31 07/09/2019    ALKPHOS 74 11/22/2018    PROT 6 3 12/23/2016    BILITOT 0 3 12/23/2016    EGFR 74 11/22/2018     Lab Results   Component Value Date    IRON 103 11/21/2018    TIBC 289 11/21/2018    FERRITIN 234 11/21/2018     Lab Results   Component Value Date    LIPASE 73 11/21/2018       Radiology Results:   No results found

## 2019-09-10 LAB
ALBUMIN SERPL-MCNC: 3.5 G/DL (ref 3.6–4.8)
ALBUMIN/GLOB SERPL: 1.6 {RATIO} (ref 1.2–2.2)
ALP SERPL-CCNC: 125 IU/L (ref 39–117)
ALT SERPL-CCNC: 38 IU/L (ref 0–44)
AST SERPL-CCNC: 51 IU/L (ref 0–40)
BASOPHILS # BLD AUTO: 0 X10E3/UL (ref 0–0.2)
BASOPHILS NFR BLD AUTO: 0 %
BILIRUB SERPL-MCNC: 1 MG/DL (ref 0–1.2)
BUN SERPL-MCNC: 17 MG/DL (ref 8–27)
BUN/CREAT SERPL: 18 (ref 10–24)
CALCIUM SERPL-MCNC: 9.1 MG/DL (ref 8.6–10.2)
CHLORIDE SERPL-SCNC: 107 MMOL/L (ref 96–106)
CO2 SERPL-SCNC: 22 MMOL/L (ref 20–29)
CREAT SERPL-MCNC: 0.96 MG/DL (ref 0.76–1.27)
EOSINOPHIL # BLD AUTO: 0.1 X10E3/UL (ref 0–0.4)
EOSINOPHIL NFR BLD AUTO: 4 %
ERYTHROCYTE [DISTWIDTH] IN BLOOD BY AUTOMATED COUNT: 14.1 % (ref 12.3–15.4)
GLOBULIN SER-MCNC: 2.2 G/DL (ref 1.5–4.5)
GLUCOSE SERPL-MCNC: 102 MG/DL (ref 65–99)
HCT VFR BLD AUTO: 45.3 % (ref 37.5–51)
HGB BLD-MCNC: 15.4 G/DL (ref 13–17.7)
IMM GRANULOCYTES # BLD: 0 X10E3/UL (ref 0–0.1)
IMM GRANULOCYTES NFR BLD: 0 %
LYMPHOCYTES # BLD AUTO: 1.3 X10E3/UL (ref 0.7–3.1)
LYMPHOCYTES NFR BLD AUTO: 34 %
MCH RBC QN AUTO: 33.3 PG (ref 26.6–33)
MCHC RBC AUTO-ENTMCNC: 34 G/DL (ref 31.5–35.7)
MCV RBC AUTO: 98 FL (ref 79–97)
MONOCYTES # BLD AUTO: 0.4 X10E3/UL (ref 0.1–0.9)
MONOCYTES NFR BLD AUTO: 12 %
MORPHOLOGY BLD-IMP: ABNORMAL
NEUTROPHILS # BLD AUTO: 2 X10E3/UL (ref 1.4–7)
NEUTROPHILS NFR BLD AUTO: 50 %
PLATELET # BLD AUTO: 95 X10E3/UL (ref 150–450)
POTASSIUM SERPL-SCNC: 4.5 MMOL/L (ref 3.5–5.2)
PROT SERPL-MCNC: 5.7 G/DL (ref 6–8.5)
RBC # BLD AUTO: 4.62 X10E6/UL (ref 4.14–5.8)
SL AMB EGFR AFRICAN AMERICAN: 95 ML/MIN/1.73
SL AMB EGFR NON AFRICAN AMERICAN: 82 ML/MIN/1.73
SODIUM SERPL-SCNC: 142 MMOL/L (ref 134–144)
WBC # BLD AUTO: 3.8 X10E3/UL (ref 3.4–10.8)

## 2019-09-11 ENCOUNTER — OFFICE VISIT (OUTPATIENT)
Dept: FAMILY MEDICINE CLINIC | Facility: HOSPITAL | Age: 64
End: 2019-09-11
Payer: MEDICARE

## 2019-09-11 VITALS
DIASTOLIC BLOOD PRESSURE: 84 MMHG | HEART RATE: 96 BPM | HEIGHT: 73 IN | BODY MASS INDEX: 34.38 KG/M2 | TEMPERATURE: 97.9 F | WEIGHT: 259.4 LBS | SYSTOLIC BLOOD PRESSURE: 132 MMHG

## 2019-09-11 DIAGNOSIS — E66.9 CLASS 1 OBESITY WITH BODY MASS INDEX (BMI) OF 34.0 TO 34.9 IN ADULT, UNSPECIFIED OBESITY TYPE, UNSPECIFIED WHETHER SERIOUS COMORBIDITY PRESENT: Primary | ICD-10-CM

## 2019-09-11 DIAGNOSIS — I85.00 ESOPHAGEAL VARICES DETERMINED BY ENDOSCOPY (HCC): ICD-10-CM

## 2019-09-11 DIAGNOSIS — D69.6 ACQUIRED THROMBOCYTOPENIA (HCC): Chronic | ICD-10-CM

## 2019-09-11 DIAGNOSIS — E78.00 HYPERCHOLESTEROLEMIA: ICD-10-CM

## 2019-09-11 DIAGNOSIS — F32.9 MAJOR DEPRESSION, CHRONIC: ICD-10-CM

## 2019-09-11 DIAGNOSIS — K74.69 OTHER CIRRHOSIS OF LIVER (HCC): ICD-10-CM

## 2019-09-11 PROCEDURE — 99214 OFFICE O/P EST MOD 30 MIN: CPT | Performed by: FAMILY MEDICINE

## 2019-09-13 NOTE — PROGRESS NOTES
ASSESSMENT/PLAN:    Problem List Items Addressed This Visit        Digestive    Other cirrhosis of liver ([facility] [address] )       Cardiovascular and Mediastinum    Es[facility] varices determined by endoscopy ([facility] )       Hematopoietic and Hemostatic    Acquired thrombocytopenia ([facility] ) (Chronic)       Other    Hypercholesterolemia    Major depression, chronic      Other Visit Diagnoses     Class 1 obesity with body mass index (BMI) of 34 0 to 34 9 in adult, unspecified obesity type, unspecified whether serious comorbidity present    -  Primary        Conditions are stable  He will continue with Gi follow up for cirrhosis which is the main cause of thrombocytopenia  Has remained  Stable  MDD  Doing well on current regimen  Continue with the same  No follow-ups on file  To call our office if any concerns/questions at [phone]   ______________________________________________________________________          Patient is here for follow up of chronic conditions  Chief Complaint   Patient presents with    Follow-up     6 month        HISTORY OF PRESENT ILLNESS    Here for f/u  MDD  Doing well  No feeling of depression  Has tried decreasing in the past further but this did not work well  Has bewen doing good on the lower dose  Cirrhosis with thromobyctopenia  Continues f/u with GI  Remains stable  Review of Systems   Constitutional: Negative  Negative for activity change, appetite change, chills and diaphoresis  HENT: Negative for congestion and dental problem  Respiratory: Negative  Negative for apnea, chest tightness, shortness of breath and wheezing  Cardiovascular: Negative  Negative for chest pain, palpitations and leg swelling  Gastrointestinal: Negative  Negative for abdominal distention, abdominal pain, constipation, diarrhea and nausea  Genitourinary: Negative  Negative for difficulty urinating, dysuria and frequency         Social History     Socioeconomic History    Marital status: /Civil Union     Spouse name: Not on file    Number of children: Not on file    Years of education: Not on file    Highest education level: Not on file   Occupational History    Not on file   Social Needs    Financial resource strain: Not on file    Food insecurity:     Worry: Not on file     Inability: Not on file    Transportation needs:     Medical: Not on file     Non-medical: Not on file   Tobacco Use    Smoking status: Never Smoker    Smokeless tobacco: Never Used   Substance and Sexual Activity    Alcohol use: Not Currently     Comment: 7/19/19-last drink 12/2018- Occasionally/1-2 drinks per weekend    Drug use: No    Sexual activity: Not on file   Lifestyle    Physical activity:     Days per week: Not on file     Minutes per session: Not on file    Stress: Not on file   Relationships    Social connections:     Talks on phone: Not on file     Gets together: Not on file     Attends [employer] service: Not on file     Active member of club or organization: Not on file     Attends meetings of clubs or organizations: Not on file     Relationship status: Not on file    Intimate partner violence:     Fear of current or ex partner: Not on file     Emotionally abused: Not on file     Physically abused: Not on file     Forced sexual activity: Not on file   Other Topics Concern    Not on file   Social History Narrative    Always uses seatbelt     Family History   Problem Relation Age of Onset    Valvular heart disease Father     Stroke Brother         Mini    Colon cancer Neg Hx     Colon polyps Neg Hx             Current Outpatient Medications:     Aspirin (ASPIR-81 PO), Take by mouth, Disp: , Rfl:     buPROPion (WELLBUTRIN XL) 150 mg 24 hr tablet, Take 1 tablet (150 mg total) by mouth daily, Disp: 90 tablet, Rfl: 2    Cholecalciferol (CVS VITAMIN D3) 1000 units capsule, Take by mouth, Disp: , Rfl:     Flaxseed, Linseed, (FLAX SEED OIL PO), Take by mouth, Disp: , Rfl:     nadolol (CORGARD) 20 mg tablet, Take 1 tablet (20 mg total) by mouth daily, Disp: 90 tablet, Rfl: 3    pantoprazole (PROTONIX) 20 mg tablet, Take 1 tablet (20 mg total) by mouth daily, Disp: 90 tablet, Rfl: 2    No Known Allergies    Immunization History   Administered Date(s) Administered    Hep A, adult 04/03/2019    Hep B, adult 04/03/2019, 05/02/2019    INFLUENZA 10/20/2014, 10/07/2015, 01/11/2018, 11/22/2018    Influenza Quadrivalent, 6-35 Months IM 01/11/2018    Influenza TIV (IM) 10/20/2014, 10/07/2015    Influenza, high dose seasonal 0 5 mL 11/22/2018    Pneumococcal Conjugate 13-Valent 11/22/2018    Td (adult), adsorbed 12/02/2011       Vitals:    09/11/19 1420   BP: 132/84   Pulse: 96   Temp: 97 9 °F (36 6 °C)     Wt Readings from Last 3 Encounters:   09/11/19 118 kg (259 lb 6 4 oz)   07/19/19 120 kg (264 lb)   06/17/19 119 kg (263 lb)      Body mass index is 34 22 kg/m²  Physical Exam   Constitutional: He is oriented to person, place, and time  He appears well-developed and well-nourished  HENT:   Head: Normocephalic and atraumatic  Eyes: Pupils are equal, round, and reactive to light  EOM are normal    Neck: Normal range of motion  Neck supple  Cardiovascular: Normal rate, regular rhythm and normal heart sounds  Pulmonary/Chest: Effort normal and breath sounds normal    Abdominal: Soft  Bowel sounds are normal    Neurological: He is alert and oriented to person, place, and time  Skin: Skin is warm and dry  Psychiatric: He has a normal mood and affect  His behavior is normal    Nursing note and vitals reviewed                  Health Maintenance Due   Topic Date Due    BMI: Followup Plan  07/28/1971    DTaP,Tdap,and Td Vaccines (1 - Tdap) 12/03/2011    INFLUENZA VACCINE  07/01/2019    HEPATITIS B VACCINES (3 of 3 - Risk 3-dose series) 09/02/2019

## 2019-09-18 ENCOUNTER — OFFICE VISIT (OUTPATIENT)
Dept: HEMATOLOGY ONCOLOGY | Facility: HOSPITAL | Age: 64
End: 2019-09-18
Payer: MEDICARE

## 2019-09-18 VITALS
TEMPERATURE: 98.8 F | SYSTOLIC BLOOD PRESSURE: 132 MMHG | DIASTOLIC BLOOD PRESSURE: 82 MMHG | HEART RATE: 84 BPM | BODY MASS INDEX: 36.54 KG/M2 | OXYGEN SATURATION: 97 % | WEIGHT: 261 LBS | HEIGHT: 71 IN | RESPIRATION RATE: 16 BRPM

## 2019-09-18 DIAGNOSIS — D69.6 ACQUIRED THROMBOCYTOPENIA (HCC): Primary | ICD-10-CM

## 2019-09-18 DIAGNOSIS — K74.69 OTHER CIRRHOSIS OF LIVER (HCC): ICD-10-CM

## 2019-09-18 PROCEDURE — 99214 OFFICE O/P EST MOD 30 MIN: CPT | Performed by: INTERNAL MEDICINE

## 2019-09-18 NOTE — PROGRESS NOTES
Hematology/Oncology Outpatient Follow- up Note  Jany Velazquez 75 y o  male MRN: @ Encounter: [id number]        Date:  9/18/2019    Presenting Complaint/Diagnosis : Low platelet count    Previous Hematologic/ Oncologic History:    Workup    Current Hematologic/ Oncologic Treatment:    Observation    Interval History:    The patient returns for follow-up visit  Since he last saw him he has been diagnosed with cirrhosis which is thought to be secondary to a fatty liver  He is seeing her collagen GI for this  This would explain his low platelet count over the last few years  He has also stopped drinking  Denies any nausea denies any vomiting denies any diarrhea  His most recent platelet count is in the 90 range  The rest of his 14 point review of systems today was negative  Test Results:    Imaging: No results found  Labs:   Lab Results   Component Value Date    WBC 3 8 09/09/2019    HGB 15 4 09/09/2019    HCT 45 3 09/09/2019    MCV 98 (H) 09/09/2019    PLT 95 (LL) 09/09/2019     Lab Results   Component Value Date     12/23/2016    K 4 5 09/09/2019     (H) 09/09/2019    CO2 22 09/09/2019    ANIONGAP 8 06/06/2014    BUN 17 09/09/2019    CREATININE 0 96 09/09/2019    GLUCOSE 97 12/23/2016    CALCIUM 8 5 11/22/2018    AST 51 (H) 09/09/2019    ALT 38 09/09/2019    ALKPHOS 74 11/22/2018    PROT 6 3 12/23/2016    BILITOT 0 3 12/23/2016    EGFR 74 11/22/2018       Lab Results   Component Value Date    PSA 0 8 12/23/2016    PSA 0 6 06/06/2014     Lab Results   Component Value Date    IRON 103 11/21/2018    TIBC 289 11/21/2018    FERRITIN 234 11/21/2018     ROS: As stated in the history of present illness otherwise his 14 point review of systems today was negative        Active Problems:   Patient Active Problem List   Diagnosis    Acquired thrombocytopenia ([address] )    DDD (degenerative disc disease), lumbar    Acquired pancytopenia ([address] )    Hypercholesterolemia    Major depression, chronic    Other cirrhosis of liver (HCC)    Enlarged prostate    Esophageal varices determined by endoscopy [facility])    Colon cancer screening       Past Medical History:   Past Medical History:   Diagnosis Date    Abscess of back 03/01/2018    Benign essential hypertension     Last Assessed:12/19/16    Cirrhosis (HCC)     Cyst of skin     x6 from neck down back area    Esophageal varices (HCC)     Macrocytosis     Last Assessed:1/16/17    Major depression, chronic     Last Assessed:12/21/17    Major depression, chronic 6/19/2017       Surgical History:   Past Surgical History:   Procedure Laterality Date    CHOLECYSTECTOMY  11/2018    CHOLECYSTECTOMY LAPAROSCOPIC N/A 11/21/2018    Procedure: CHOLECYSTECTOMY LAPAROSCOPIC, wedge liver biopsy;  Surgeon: Anila Soliz MD;  [facility];  Service: General    INCISION AND DRAINAGE OF WOUND N/A 03/01/2018    SEBACEOUS CYST ABSCESS OF BACK-VIJAYA MONZON MD    HI EXC SKIN BENIG 1 1-2 CM TRUNK,ARM,LEG N/A 8/22/2018    Procedure: EXCISION  BIOPSY LESION/MASS BACK X4 NECK X1;  Surgeon: Anila Soliz MD;  [facility];  Service: General       Family History:    Family History   Problem Relation Age of Onset    Valvular heart disease Father     Stroke Brother         Mini    Colon cancer Neg Hx     Colon polyps Neg Hx        Cancer-related family history is negative for Colon cancer      Social History:   Social History     Socioeconomic History    Marital status: /Civil Union     Spouse name: Not on file    Number of children: Not on file    Years of education: Not on file    Highest education level: Not on file   Occupational History    Not on file   Social Needs    Financial resource strain: Not on file    Food insecurity:     Worry: Not on file     Inability: Not on file    Transportation needs:     Medical: Not on file     Non-medical: Not on file   Tobacco Use    Smoking status: Never Smoker    Smokeless tobacco: Never Used Substance and Sexual Activity    Alcohol use: Not Currently     Comment: 7/19/19-last drink 12/2018- Occasionally/1-2 drinks per weekend    Drug use: No    Sexual activity: Not on file   Lifestyle    Physical activity:     Days per week: Not on file     Minutes per session: Not on file    Stress: Not on file   Relationships    Social connections:     Talks on phone: Not on file     Gets together: Not on file     Attends Jainism service: Not on file     Active member of club or organization: Not on file     Attends meetings of clubs or organizations: Not on file     Relationship status: Not on file    Intimate partner violence:     Fear of current or ex partner: Not on file     Emotionally abused: Not on file     Physically abused: Not on file     Forced sexual activity: Not on file   Other Topics Concern    Not on file   Social History Narrative    Always uses seatbelt       Current Medications:   Current Outpatient Medications   Medication Sig Dispense Refill    Aspirin (ASPIR-81 PO) Take by mouth      buPROPion (WELLBUTRIN XL) 150 mg 24 hr tablet Take 1 tablet (150 mg total) by mouth daily 90 tablet 2    Cholecalciferol (CVS VITAMIN D3) 1000 units capsule Take by mouth      Flaxseed, Linseed, (FLAX SEED OIL PO) Take by mouth      nadolol (CORGARD) 20 mg tablet Take 1 tablet (20 mg total) by mouth daily 90 tablet 3    pantoprazole (PROTONIX) 20 mg tablet Take 1 tablet (20 mg total) by mouth daily 90 tablet 2     No current facility-administered medications for this visit  Allergies: No Known Allergies    Physical Exam:    Body surface area is 2 36 meters squared      Wt Readings from Last 3 Encounters:   09/18/19 118 kg (261 lb)   09/11/19 118 kg (259 lb 6 4 oz)   07/19/19 120 kg (264 lb)        Temp Readings from Last 3 Encounters:   09/18/19 98 8 °F (37 1 °C) (Tympanic)   09/11/19 97 9 °F (36 6 °C)   06/17/19 98 5 °F (36 9 °C) (Tympanic)        BP Readings from Last 3 Encounters: 09/18/19 132/82   09/11/19 132/84   07/19/19 130/90         Pulse Readings from Last 3 Encounters:   09/18/19 84   09/11/19 96   07/19/19 102         Physical Exam     Constitutional   General appearance: No acute distress, well appearing and well nourished  Eyes   Conjunctiva and lids: No swelling, erythema or discharge  Pupils and irises: Equal, round and reactive to light  Ears, Nose, Mouth, and Throat   External inspection of ears and nose: Normal     Nasal mucosa, septum, and turbinates: Normal without edema or erythema  Oropharynx: Normal with no erythema, edema, exudate or lesions  Pulmonary   Respiratory effort: No increased work of breathing or signs of respiratory distress  Auscultation of lungs: Clear to auscultation  Cardiovascular   Palpation of heart: Normal PMI, no thrills  Auscultation of heart: Normal rate and rhythm, normal S1 and S2, without murmurs  Examination of extremities for edema and/or varicosities: Normal     Carotid pulses: Normal     Abdomen   Abdomen: Non-tender, no masses  Liver and spleen: No hepatomegaly or splenomegaly  Lymphatic   Palpation of lymph nodes in neck: No lymphadenopathy  Musculoskeletal   Gait and station: Normal     Digits and nails: Normal without clubbing or cyanosis  Inspection/palpation of joints, bones, and muscles: Normal     Skin   Skin and subcutaneous tissue: Normal without rashes or lesions  Neurologic   Cranial nerves: Cranial nerves 2-12 intact  Sensation: No sensory loss  Psychiatric   Orientation to person, place, and time: Normal     Mood and affect: Normal         Assessment / Plan:    The patient is a pleasant 33-year-old male who has had mild thrombocytopenia since 2014  We decided to order a workup and just observe his counts  Flow cytometry in the past did not show any significant immunophenotypic abnormality   MMA was normal  Ultrasound of the abdomen showed some cholelithiasis but was otherwise unremarkable Initially but then he was found to have cirrhosis recently at the time of her cholecystectomy  This could explain his thrombocytopenia so I Would advise continued observation  He states he is seeing a GI doctor regularly now along with his PCP  Since we have an explanation for his low platelets he wishes to see me as needed and does follow with his other physicians otherwise he states there is too much blood work being duplicated  I agree with this  If his platelet count was below 50 he should come back to see me  Otherwise he can follow with his other physicians  He understands this is not an absolute diagnosis and a bone marrow disorder cannot be completely ruled out but clinically it seems the cirrhosis is definitely contributing to his platelet count and because his numbers are running in the same range I think it is reasonable for him to just follow with his other physicians for now with a plan to come back if any of his other blood counts deteriorate or if his platelet count gets worse  Goals and Barriers:  Current Goal:  Prolong Survival from Thrombocytopenia  Barriers: None  Patient's Capacity to Self Care:  Patient able to self care  Portions of the record may have been created with voice recognition software   Occasional wrong word or "sound a like" substitutions may have occurred due to the inherent limitations of voice recognition software   Read the chart carefully and recognize, using context, where substitutions have occurred

## 2019-09-23 ENCOUNTER — TELEPHONE (OUTPATIENT)
Dept: GASTROENTEROLOGY | Facility: CLINIC | Age: 64
End: 2019-09-23

## 2019-09-23 NOTE — TELEPHONE ENCOUNTER
Pt left  mssg stating to get blood work for Dr Hailey Malone but pas questions; will probably do next wk; asks for [phone]

## 2019-10-03 ENCOUNTER — CLINICAL SUPPORT (OUTPATIENT)
Dept: FAMILY MEDICINE CLINIC | Facility: HOSPITAL | Age: 64
End: 2019-10-03
Payer: MEDICARE

## 2019-10-03 DIAGNOSIS — Z23 ENCOUNTER FOR IMMUNIZATION: Primary | ICD-10-CM

## 2019-10-03 PROCEDURE — 90632 HEPA VACCINE ADULT IM: CPT | Performed by: FAMILY MEDICINE

## 2019-10-03 PROCEDURE — 90746 HEPB VACCINE 3 DOSE ADULT IM: CPT | Performed by: FAMILY MEDICINE

## 2019-10-03 PROCEDURE — G0010 ADMIN HEPATITIS B VACCINE: HCPCS | Performed by: FAMILY MEDICINE

## 2019-10-03 PROCEDURE — 90471 IMMUNIZATION ADMIN: CPT | Performed by: FAMILY MEDICINE

## 2019-10-08 ENCOUNTER — HOSPITAL ENCOUNTER (OUTPATIENT)
Dept: ULTRASOUND IMAGING | Facility: HOSPITAL | Age: 64
Discharge: HOME | End: 2019-10-08
Attending: INTERNAL MEDICINE
Payer: MEDICARE

## 2019-10-08 DIAGNOSIS — K74.69 OTHER CIRRHOSIS OF LIVER (HCC): ICD-10-CM

## 2019-10-08 LAB
AFP-TM SERPL-MCNC: 2.6 NG/ML (ref 0–8.3)
ALBUMIN SERPL-MCNC: 4 G/DL (ref 3.6–4.8)
ALP SERPL-CCNC: 108 IU/L (ref 39–117)
ALT SERPL-CCNC: 28 IU/L (ref 0–44)
AST SERPL-CCNC: 47 IU/L (ref 0–40)
BILIRUB DIRECT SERPL-MCNC: 0.34 MG/DL (ref 0–0.4)
BILIRUB SERPL-MCNC: 1.2 MG/DL (ref 0–1.2)
INR PPP: 1.2 (ref 0.8–1.2)
PROT SERPL-MCNC: 6.2 G/DL (ref 6–8.5)
PROTHROMBIN TIME: 12.1 SEC (ref 9.1–12)

## 2019-10-08 PROCEDURE — 76705 ECHO EXAM OF ABDOMEN: CPT

## 2019-10-17 ENCOUNTER — OFFICE VISIT (OUTPATIENT)
Dept: GASTROENTEROLOGY | Facility: CLINIC | Age: 64
End: 2019-10-17
Payer: MEDICARE

## 2019-10-17 VITALS
WEIGHT: 264 LBS | HEART RATE: 84 BPM | HEIGHT: 71 IN | SYSTOLIC BLOOD PRESSURE: 122 MMHG | DIASTOLIC BLOOD PRESSURE: 74 MMHG | BODY MASS INDEX: 36.96 KG/M2

## 2019-10-17 DIAGNOSIS — K74.69 OTHER CIRRHOSIS OF LIVER (HCC): Primary | ICD-10-CM

## 2019-10-17 DIAGNOSIS — I85.00 ESOPHAGEAL VARICES DETERMINED BY ENDOSCOPY (HCC): ICD-10-CM

## 2019-10-17 DIAGNOSIS — D69.6 ACQUIRED THROMBOCYTOPENIA (HCC): Chronic | ICD-10-CM

## 2019-10-17 DIAGNOSIS — K22.10 EROSIVE ESOPHAGITIS: ICD-10-CM

## 2019-10-17 DIAGNOSIS — Z86.010 PERSONAL HISTORY OF COLONIC POLYPS: ICD-10-CM

## 2019-10-17 DIAGNOSIS — K21.00 GASTROESOPHAGEAL REFLUX DISEASE WITH ESOPHAGITIS: ICD-10-CM

## 2019-10-17 PROCEDURE — 99214 OFFICE O/P EST MOD 30 MIN: CPT | Performed by: INTERNAL MEDICINE

## 2019-10-17 RX ORDER — NADOLOL 20 MG/1
20 TABLET ORAL DAILY
Qty: 90 TABLET | Refills: 3 | Status: SHIPPED | OUTPATIENT
Start: 2019-10-17 | End: 2020-08-20 | Stop reason: SDUPTHER

## 2019-10-17 RX ORDER — PANTOPRAZOLE SODIUM 20 MG/1
20 TABLET, DELAYED RELEASE ORAL DAILY
Qty: 90 TABLET | Refills: 2 | Status: SHIPPED | OUTPATIENT
Start: 2019-10-17 | End: 2020-05-18 | Stop reason: HOSPADM

## 2019-10-17 NOTE — PROGRESS NOTES
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, incidentally discovered during lap choly  Viral and metabolic serology  negative  Borderline anti smooth muscle antibody  Continue alcohol abstinence  He will Complete hep a and B vaccination with his PCP  [place] [id number]  surveillance semi annually, recent ultrasound and AFP negative  2  Esophageal varices determined by endoscopy (HCC)  Continue nadolol 20 mg daily  Surveillance EGD January 2020    3  Gastroesophageal reflux disease with esophagitis  Well controlled on pantoprazole 20 mg daily, will assess further on his upcoming EGD    4  Personal history of colonic polyps  No polyps 2016, 5 year recall    5  Acquired thrombocytopenia (HCC)  Due to cirrhosis  Follow CBC semi annually  Followup Appointment:  EGD January, office visit 1 year  ______________________________________________________________________    Chief Complaint   Patient presents with    Follow up-cirrhosis     HPI:  The patient returns for follow-up on cirrhosis complicated by esophageal varices  He is accompanied by his wife  He currently denies any GI complaints  Acid reflux is very well controlled with pantoprazole 20 mg daily  He denies any alarm symptoms such as dysphagia  He denies any breakthrough symptoms, nausea or weight loss  He denies any side effects such as diarrhea  He denies any complications of liver disease such as jaundice, icterus, dark urine, pruritus, ascites or lower extremity edema      Historical Information   Past Medical History:   Diagnosis Date    Abscess of back 03/01/2018    Benign essential hypertension     Last Assessed:12/19/16    Cirrhosis (HCC)     Cyst of skin     x6 from neck down back area    Esophageal varices (HCC)     Macrocytosis     Last Assessed:1/16/17    Major depression, chronic     Last Assessed:12/21/17    Major depression, chronic 6/19/2017     Past Surgical History:   Procedure Laterality Date    CHOLECYSTECTOMY  11/2018    CHOLECYSTECTOMY LAPAROSCOPIC N/A 11/21/2018    Procedure: CHOLECYSTECTOMY LAPAROSCOPIC, wedge liver biopsy;  Surgeon: Trent Rojas MD;  [facility];  Service: General    INCISION AND DRAINAGE OF WOUND N/A 03/01/2018    SEBACEOUS CYST ABSCESS OF BACK-VIJAYA MONZON MD    VT EXC SKIN BENIG 1 1-2 CM TRUNK,ARM,LEG N/A 8/22/2018    Procedure: EXCISION  BIOPSY LESION/MASS BACK X4 NECK X1;  Surgeon: Trent Rojas MD;  [facility];  Service: General     Social History     Substance and Sexual Activity   Alcohol Use Not Currently    Comment: 7/19/19-last drink 12/2018- Occasionally/1-2 drinks per weekend     Social History     Substance and Sexual Activity   Drug Use No     Social History     Tobacco Use   Smoking Status Never Smoker   Smokeless Tobacco Never Used     Family History   Problem Relation Age of Onset    Valvular heart disease Father     Stroke Brother         Mini    Colon cancer Neg Hx     Colon polyps Neg Hx          Current Outpatient Medications:     Aspirin (ASPIR-81 PO)    buPROPion (WELLBUTRIN XL) 150 mg 24 hr tablet    Cholecalciferol (CVS VITAMIN D3) 1000 units capsule    Flaxseed, Linseed, (FLAX SEED OIL PO)    nadolol (CORGARD) 20 mg tablet    pantoprazole (PROTONIX) 20 mg tablet  No Known Allergies  Reviewed medications and allergies and updated as indicated    PHYSICAL EXAM:    Blood pressure 122/74, pulse 84, height 5' 11" (1 803 m), weight 120 kg (264 lb)  Body mass index is 36 82 kg/m²  General Appearance: NAD, cooperative, alert  Eyes: Anicteric, PERRLA, EOMI  ENT:  Normocephalic, atraumatic, normal mucosa      Neck:  Supple, symmetrical, trachea midline  Resp:  Clear to auscultation bilaterally; no rales, rhonchi or wheezing; respirations unlabored   CV:  S1 S2, Regular rate and rhythm; no murmur, rub, or gallop  GI:  Soft, non-tender, non-distended; normal bowel sounds; no masses, no organomegaly   Rectal: Deferred  Musculoskeletal: No cyanosis, clubbing or edema  Normal ROM  Skin:  No jaundice, rashes, or lesions   Heme/Lymph: No palpable cervical lymphadenopathy  Psych: Normal affect, good eye contact  Neuro: No gross deficits, AAOx3    Lab Results:   Lab Results   Component Value Date    WBC 3 8 09/09/2019    HGB 15 4 09/09/2019    HCT 45 3 09/09/2019    MCV 98 (H) 09/09/2019    PLT 95 (LL) 09/09/2019     Lab Results   Component Value Date     12/23/2016    K 4 5 09/09/2019     (H) 09/09/2019    CO2 22 09/09/2019    ANIONGAP 8 06/06/2014    BUN 17 09/09/2019    CREATININE 0 96 09/09/2019    GLUCOSE 97 12/23/2016    CALCIUM 8 5 11/22/2018    AST 47 (H) 10/07/2019    ALT 28 10/07/2019    ALKPHOS 74 11/22/2018    PROT 6 3 12/23/2016    BILITOT 0 3 12/23/2016    EGFR 74 11/22/2018     Lab Results   Component Value Date    IRON 103 11/21/2018    TIBC 289 11/21/2018    FERRITIN 234 11/21/2018     Lab Results   Component Value Date    LIPASE 73 11/21/2018       Radiology Results:   Us Abdomen Limited    Result Date: 10/10/2019  Narrative: RIGHT UPPER QUADRANT ULTRASOUND INDICATION:    K74 69: Other cirrhosis of liver  COMPARISON:  CT abdomen/pelvis 7/21/2018 and abdominal ultrasound 2/27/2017 TECHNIQUE:   Real-time ultrasound of the right upper quadrant was performed with a curvilinear transducer with both volumetric sweeps and still imaging techniques  FINDINGS: PANCREAS:  Portions of the pancreas are obscured by bowel gas  Visualized portions of the pancreas are unremarkable  AORTA AND IVC:  Visualized portions are normal for patient age  LIVER: Size:  Within normal range  The liver measures cm in the midclavicular line  Contour:  Surface contour is nodular  Parenchyma:  Diffusely coarsened echotexture  No evidence of suspicious mass   Limited imaging of the main portal vein shows it to be patent and hepatopetal   BILIARY: Patient has undergone prior cholecystectomy  No intrahepatic biliary dilatation  CBD is not identified, likely not dilated  No choledocholithiasis  KIDNEY: Right kidney measures 13 5 x 7 2 cm  Within normal limits  Stable cyst in the lower pole  ASCITES:   None  Impression: Cirrhosis without evidence of a focal hepatic lesion   Workstation performed: [id number]

## 2019-10-17 NOTE — Clinical Note
Please enter recall right upper quadrant ultrasound, CBC, CMP, alpha fetoprotein and INR in January 2020

## 2019-10-21 ENCOUNTER — TELEPHONE (OUTPATIENT)
Dept: GASTROENTEROLOGY | Facility: CLINIC | Age: 64
End: 2019-10-21

## 2019-10-21 DIAGNOSIS — K74.69 OTHER CIRRHOSIS OF LIVER (HCC): Primary | ICD-10-CM

## 2019-10-21 NOTE — TELEPHONE ENCOUNTER
----- Message from Jessi Loaiza DO sent at 10/17/2019  5:49 PM EDT -----  Please enter recall right upper quadrant ultrasound, CBC, CMP, alpha fetoprotein and INR in January 2020    Pended to nurse for signature

## 2019-10-21 NOTE — TELEPHONE ENCOUNTER
Please verify date of repeat testing  Note to Donya is repeat in January 2020   Patient had recent tests October 2019 and your office note states semi annual testing (April 2020 due?) Please advise

## 2020-02-18 ENCOUNTER — HOSPITAL ENCOUNTER (OUTPATIENT)
Dept: ULTRASOUND IMAGING | Facility: HOSPITAL | Age: 65
Discharge: HOME | End: 2020-02-18
Attending: INTERNAL MEDICINE
Payer: MEDICARE

## 2020-02-18 DIAGNOSIS — K74.69 OTHER CIRRHOSIS OF LIVER (HCC): ICD-10-CM

## 2020-02-18 LAB
AFP-TM SERPL-MCNC: 2.9 NG/ML (ref 0–8.3)
ALBUMIN SERPL-MCNC: 3.6 G/DL (ref 3.8–4.8)
ALBUMIN/GLOB SERPL: 1.6 {RATIO} (ref 1.2–2.2)
ALP SERPL-CCNC: 121 IU/L (ref 39–117)
ALT SERPL-CCNC: 35 IU/L (ref 0–44)
AST SERPL-CCNC: 43 IU/L (ref 0–40)
BASOPHILS # BLD AUTO: 0 X10E3/UL (ref 0–0.2)
BASOPHILS NFR BLD AUTO: 0 %
BILIRUB SERPL-MCNC: 0.7 MG/DL (ref 0–1.2)
BUN SERPL-MCNC: 21 MG/DL (ref 8–27)
BUN/CREAT SERPL: 21 (ref 10–24)
CALCIUM SERPL-MCNC: 9.1 MG/DL (ref 8.6–10.2)
CHLORIDE SERPL-SCNC: 107 MMOL/L (ref 96–106)
CO2 SERPL-SCNC: 25 MMOL/L (ref 20–29)
CREAT SERPL-MCNC: 0.99 MG/DL (ref 0.76–1.27)
EOSINOPHIL # BLD AUTO: 0.1 X10E3/UL (ref 0–0.4)
EOSINOPHIL NFR BLD AUTO: 4 %
ERYTHROCYTE [DISTWIDTH] IN BLOOD BY AUTOMATED COUNT: 14.3 % (ref 11.6–15.4)
GLOBULIN SER-MCNC: 2.3 G/DL (ref 1.5–4.5)
GLUCOSE SERPL-MCNC: 101 MG/DL (ref 65–99)
HCT VFR BLD AUTO: 45.1 % (ref 37.5–51)
HGB BLD-MCNC: 15.6 G/DL (ref 13–17.7)
IMM GRANULOCYTES # BLD: 0 X10E3/UL (ref 0–0.1)
IMM GRANULOCYTES NFR BLD: 0 %
INR PPP: 1.2 (ref 0.8–1.2)
LYMPHOCYTES # BLD AUTO: 1.4 X10E3/UL (ref 0.7–3.1)
LYMPHOCYTES NFR BLD AUTO: 36 %
MCH RBC QN AUTO: 33.8 PG (ref 26.6–33)
MCHC RBC AUTO-ENTMCNC: 34.6 G/DL (ref 31.5–35.7)
MCV RBC AUTO: 98 FL (ref 79–97)
MONOCYTES # BLD AUTO: 0.4 X10E3/UL (ref 0.1–0.9)
MONOCYTES NFR BLD AUTO: 10 %
MORPHOLOGY BLD-IMP: ABNORMAL
NEUTROPHILS # BLD AUTO: 1.9 X10E3/UL (ref 1.4–7)
NEUTROPHILS NFR BLD AUTO: 50 %
PLATELET # BLD AUTO: 94 X10E3/UL (ref 150–450)
POTASSIUM SERPL-SCNC: 4.5 MMOL/L (ref 3.5–5.2)
PROT SERPL-MCNC: 5.9 G/DL (ref 6–8.5)
PROTHROMBIN TIME: 12.1 SEC (ref 9.1–12)
RBC # BLD AUTO: 4.62 X10E6/UL (ref 4.14–5.8)
SL AMB EGFR AFRICAN AMERICAN: 91 ML/MIN/1.73
SL AMB EGFR NON AFRICAN AMERICAN: 79 ML/MIN/1.73
SODIUM SERPL-SCNC: 143 MMOL/L (ref 134–144)
WBC # BLD AUTO: 3.8 X10E3/UL (ref 3.4–10.8)

## 2020-02-18 PROCEDURE — 76705 ECHO EXAM OF ABDOMEN: CPT

## 2020-02-26 ENCOUNTER — OFFICE VISIT (OUTPATIENT)
Dept: GASTROENTEROLOGY | Facility: CLINIC | Age: 65
End: 2020-02-26
Payer: MEDICARE

## 2020-02-26 VITALS
BODY MASS INDEX: 23.38 KG/M2 | SYSTOLIC BLOOD PRESSURE: 120 MMHG | HEART RATE: 75 BPM | DIASTOLIC BLOOD PRESSURE: 74 MMHG | HEIGHT: 71 IN | WEIGHT: 167 LBS

## 2020-02-26 DIAGNOSIS — K74.69 OTHER CIRRHOSIS OF LIVER (HCC): Primary | ICD-10-CM

## 2020-02-26 DIAGNOSIS — I85.00 ESOPHAGEAL VARICES DETERMINED BY ENDOSCOPY (HCC): ICD-10-CM

## 2020-02-26 DIAGNOSIS — K21.00 GASTROESOPHAGEAL REFLUX DISEASE WITH ESOPHAGITIS: ICD-10-CM

## 2020-02-26 DIAGNOSIS — D69.6 THROMBOCYTOPENIA (HCC): ICD-10-CM

## 2020-02-26 DIAGNOSIS — Z86.010 PERSONAL HISTORY OF COLONIC POLYPS: ICD-10-CM

## 2020-02-26 PROCEDURE — 3008F BODY MASS INDEX DOCD: CPT | Performed by: INTERNAL MEDICINE

## 2020-02-26 PROCEDURE — 1160F RVW MEDS BY RX/DR IN RCRD: CPT | Performed by: INTERNAL MEDICINE

## 2020-02-26 PROCEDURE — 1036F TOBACCO NON-USER: CPT | Performed by: INTERNAL MEDICINE

## 2020-02-26 PROCEDURE — 99214 OFFICE O/P EST MOD 30 MIN: CPT | Performed by: INTERNAL MEDICINE

## 2020-02-26 PROCEDURE — 4040F PNEUMOC VAC/ADMIN/RCVD: CPT | Performed by: INTERNAL MEDICINE

## 2020-02-26 NOTE — Clinical Note
Please enter six-month recall for office visit with LFTs, AFP and right upper quadrant ultrasound beforehand

## 2020-02-26 NOTE — PROGRESS NOTES
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  incidentally discovered during lap cindy   Viral and metabolic serology negative  Borderline anti smooth muscle antibody  Continue alcohol abstinence  continue semi annual [facility] [address]  surveillance with ultrasound and AFP  Continue to avoid alcohol  He completed hep a and hep B vaccination    2  Esophageal varices determined by endoscopy [facility])  Will arrange surveillance EGD  Continue nadolol 20 mg daily in the interim    3  Personal history of colonic polyps  No polyps 2016, 5 year recall    4  Gastroesophageal reflux disease with esophagitis  Well controlled on pantoprazole 20 mg daily, will assess further on his upcoming EGD     5  Acquired thrombocytopenia (HCC)  Due to cirrhosis  stable      Followup Appointment:  EGD now, office 6 months with labs and ultrasound before and  ______________________________________________________________________    Chief Complaint   Patient presents with    Follow-up    Cirrhosis     HPI:  The patient returns for follow-up on cirrhosis complicated by esophageal varices  He was last seen in the office in October  He denies any symptoms of liver disease such as pruritus, jaundice, ascites or edema  Reflux is very well controlled on pantoprazole 20 mg daily  He denies any nausea, vomiting or reflux  Appetite is good and his weight is stable  He has completed vaccination for hepatitis a and B  He denies any lower GI complaints      Historical Information   Past Medical History:   Diagnosis Date    Abscess of back 03/01/2018    Benign essential hypertension     Last Assessed:12/19/16    Cirrhosis (HCC)     Cyst of skin     x6 from neck down back area    Esophageal varices (HCC)     Macrocytosis     Last Assessed:1/16/17    Major depression, chronic Last Assessed:12/21/17    Major depression, chronic 6/19/2017    Personal history of colonic polyps      Past Surgical History:   Procedure Laterality Date    CHOLECYSTECTOMY  11/2018    CHOLECYSTECTOMY LAPAROSCOPIC N/A 11/21/2018    Procedure: CHOLECYSTECTOMY LAPAROSCOPIC, wedge liver biopsy;  Surgeon: Avis Deutsch MD;  Location:  MAIN OR;  Service: General    COLONOSCOPY  05/2011    COLONOSCOPY  05/2016    EGD  01/2019    Esophagitis, esophageal varices    INCISION AND DRAINAGE OF WOUND N/A 03/01/2018    SEBACEOUS CYST ABSCESS OF BACK-VIJAYA MONZON MD    GA EXC SKIN BENIG 1 1-2 CM TRUNK,ARM,LEG N/A 8/22/2018    Procedure: EXCISION  BIOPSY LESION/MASS BACK X4 NECK X1;  Surgeon: Avis Deutsch MD;  [facility];  Service: General     Social History     Substance and Sexual Activity   Alcohol Use Not Currently    Comment: 7/19/19-last drink 12/2018- Occasionally/1-2 drinks per weekend     Social History     Substance and Sexual Activity   Drug Use No     Social History     Tobacco Use   Smoking Status Never Smoker   Smokeless Tobacco Never Used     Family History   Problem Relation Age of Onset    Valvular heart disease Father     Stroke Brother         Mini    Colon cancer Neg Hx     Colon polyps Neg Hx          Current Outpatient Medications:     Aspirin (ASPIR-81 PO)    buPROPion (WELLBUTRIN XL) 150 mg 24 hr tablet    Cholecalciferol (CVS VITAMIN D3) 1000 units capsule    Flaxseed, Linseed, (FLAX SEED OIL PO)    nadolol (CORGARD) 20 mg tablet    pantoprazole (PROTONIX) 20 mg tablet  No Known Allergies  Reviewed medications and allergies and updated as indicated    PHYSICAL EXAM:    Blood pressure 120/74, pulse 75, height 5' 11" (1 803 m), weight 75 8 kg (167 lb)  Body mass index is 23 29 kg/m²  General Appearance: NAD, cooperative, alert  Eyes: Anicteric, PERRLA, EOMI  ENT:  Normocephalic, atraumatic, normal mucosa      Neck:  Supple, symmetrical, trachea midline  Resp: Clear to auscultation bilaterally; no rales, rhonchi or wheezing; respirations unlabored   CV:  S1 S2, Regular rate and rhythm; no murmur, rub, or gallop  GI:  Soft, non-tender, non-distended; normal bowel sounds; no masses, no organomegaly   Rectal: Deferred  Musculoskeletal: No cyanosis, clubbing or edema  Normal ROM  Skin:  No jaundice, rashes, or lesions   Heme/Lymph: No palpable cervical lymphadenopathy  Psych: Normal affect, good eye contact  Neuro: No gross deficits, AAOx3    Lab Results:   Lab Results   Component Value Date    WBC 3 8 02/17/2020    HGB 15 6 02/17/2020    HCT 45 1 02/17/2020    MCV 98 (H) 02/17/2020    PLT 94 (LL) 02/17/2020     Lab Results   Component Value Date     12/23/2016    K 4 5 02/17/2020     (H) 02/17/2020    CO2 25 02/17/2020    ANIONGAP 8 06/06/2014    BUN 21 02/17/2020    CREATININE 0 99 02/17/2020    GLUCOSE 97 12/23/2016    CALCIUM 8 5 11/22/2018    AST 43 (H) 02/17/2020    ALT 35 02/17/2020    ALKPHOS 74 11/22/2018    PROT 6 3 12/23/2016    BILITOT 0 3 12/23/2016    EGFR 74 11/22/2018     Lab Results   Component Value Date    IRON 103 11/21/2018    TIBC 289 11/21/2018    FERRITIN 234 11/21/2018     Lab Results   Component Value Date    LIPASE 73 11/21/2018       Radiology Results:   Us Abdomen Limited    Result Date: 2/21/2020  Narrative: RIGHT UPPER QUADRANT ULTRASOUND INDICATION:    K74 69: Other cirrhosis of liver  History of cirrhosis and cholecystectomy  COMPARISON:  Abdomen ultrasound from 10/8/2019, and abdomen and pelvic CT from 11/21/2018  TECHNIQUE:   Real-time ultrasound of the right upper quadrant was performed with a curvilinear transducer with both volumetric sweeps and still imaging techniques  FINDINGS: PANCREAS:  Visualized portions of the pancreas are within normal limits  AORTA AND IVC:  Visualized portions are normal for patient age  Visualization of the liver is limited because it could only be seen using intercostal approach   LIVER: Size: Atrophic  The liver measures 10 2 cm in the midclavicular line  Contour:  Surface contour is smooth  Parenchyma: There is diffuse coarsened heterogeneous echotexture suggesting underlying cirrhotic changes  No evidence of suspicious mass  Limited imaging of the main portal vein shows it to be patent and hepatopetal   Portal vein was not well visualized however  There is a recanalized umbilical vein  BILIARY: Patient has undergone cholecystectomy  No intrahepatic biliary dilatation  Common bile duct could not be visualized  No choledocholithiasis  KIDNEY: Right kidney measures 16 9 cm  There is a exophytic simple cyst off the right kidney lower pole measuring 5 1 x 3 4 x 4 3 cm  Right kidney otherwise unremarkable  ASCITES:   None  Impression: Visualization of the liver is limited because it could only be seen using intercostal approach  Again seen is an atrophic liver with coarsened liver echotexture suspicious for early cirrhosis  There is also recanalization of the umbilical vein indicating portal hypertension   Workstation performed: [id number]

## 2020-03-16 ENCOUNTER — OFFICE VISIT (OUTPATIENT)
Dept: FAMILY MEDICINE CLINIC | Facility: HOSPITAL | Age: 65
End: 2020-03-16
Payer: MEDICARE

## 2020-03-16 VITALS
HEART RATE: 71 BPM | HEIGHT: 71 IN | DIASTOLIC BLOOD PRESSURE: 72 MMHG | BODY MASS INDEX: 37.72 KG/M2 | SYSTOLIC BLOOD PRESSURE: 130 MMHG | WEIGHT: 269.4 LBS | TEMPERATURE: 96.8 F

## 2020-03-16 DIAGNOSIS — D61.818 ACQUIRED PANCYTOPENIA (HCC): ICD-10-CM

## 2020-03-16 DIAGNOSIS — K74.69 OTHER CIRRHOSIS OF LIVER (HCC): ICD-10-CM

## 2020-03-16 DIAGNOSIS — R06.02 SOB (SHORTNESS OF BREATH) ON EXERTION: Primary | ICD-10-CM

## 2020-03-16 DIAGNOSIS — F32.9 MAJOR DEPRESSION, CHRONIC: ICD-10-CM

## 2020-03-16 DIAGNOSIS — I85.00 ESOPHAGEAL VARICES DETERMINED BY ENDOSCOPY (HCC): ICD-10-CM

## 2020-03-16 DIAGNOSIS — D61.818 OTHER PANCYTOPENIA (HCC): ICD-10-CM

## 2020-03-16 PROCEDURE — 3008F BODY MASS INDEX DOCD: CPT | Performed by: FAMILY MEDICINE

## 2020-03-16 PROCEDURE — 1160F RVW MEDS BY RX/DR IN RCRD: CPT | Performed by: FAMILY MEDICINE

## 2020-03-16 PROCEDURE — 1036F TOBACCO NON-USER: CPT | Performed by: FAMILY MEDICINE

## 2020-03-16 PROCEDURE — 99214 OFFICE O/P EST MOD 30 MIN: CPT | Performed by: FAMILY MEDICINE

## 2020-03-16 PROCEDURE — 4040F PNEUMOC VAC/ADMIN/RCVD: CPT | Performed by: FAMILY MEDICINE

## 2020-03-16 NOTE — PROGRESS NOTES
Assessment/Plan:      Problem List Items Addressed This Visit        Digestive    Other cirrhosis of liver ([facility] [address] )       Cardiovascular and Mediastinum    Esophageal varices determined by [facility]oscopy ([facility] )       Hematopoietic and Hemostatic    Acquired pancytopenia ([facility] )       Other    Major depression, chronic      Other Visit Diagnoses     SOB (shortness of breath) on exertion    -  Primary    Relevant Orders    Echo complete with contrast if indicated    Ambulatory referral to Cardiology    XR chest pa & lateral    Complete PFT with post bronchodilator    Other pancytopenia ([facility] )           today patient experiencing shortness of breath  This has been progressively worse over the last few months  Referral back to Cardiology  His last stress test was 3 years ago  At that time a stress echo was unremarkable  Obtain pulmonary function test, chest x-ray and echo to further evaluate  To call our office if with worsening  ER precautions discussed  Pancytopenia  Has been stable  Secondary to cirrhosis of the liver  This has been stable as well  Continue to follow-up with Gastroenterology  Esophageal varices is again related to cirrhosis  Currently on nadolol through Gastroenterology  Depression  Stable  Knows SI no HI  Continues on Wellbutrin 150 mg once a day  BMI Counseling: Body mass index is 37 57 kg/m²  The BMI is above normal  Nutrition recommendations include decreasing portion sizes, encouraging healthy choices of fruits and vegetables, decreasing fast food intake and moderation in carbohydrate intake  Exercise recommendations include moderate physical activity 150 minutes/week  Subjective:   Chief Complaint   Patient presents with    Follow-up     6 month        Patient ID: David Vaughn is a 75 y o  male  Patient is here for follow-up of chronic conditions  Patient known to have cirrhosis  Has seen gastroenterology  He does have esophageal varices    He is now on or restarted on not a low  He is doing well on the medication  No lightheadedness no dizziness  Has not had any bleeding  He does have a history of pancytopenia secondary to cirrhosis  This has been stable  Today he reports an increasing sensation of shortness of breath  Has had more shortness of breath on exertion  Notably he is not able to walk very far without needing to rest   He is not able to go up a flight of stairs without feeling winded  Not associated with wheezing  No associated chest pain or palpitations  He is a nonsmoker  No significant exposure to asbestos  No family history of lung cancer  Has not had a pulmonary function test   No history of asthma  The following portions of the patient's history were reviewed and updated as appropriate: allergies, current medications, past family history, past medical history, past social history, past surgical history and problem list     Review of Systems   Constitutional: Positive for fatigue  Negative for activity change, appetite change, chills and diaphoresis  HENT: Negative for congestion and dental problem  Respiratory: Positive for shortness of breath  Negative for apnea, chest tightness and wheezing  Cardiovascular: Negative  Negative for chest pain, palpitations and leg swelling  Gastrointestinal: Negative  Negative for abdominal distention, abdominal pain, constipation, diarrhea and nausea  Genitourinary: Negative  Negative for difficulty urinating, dysuria and frequency           Objective:  Vitals:    03/16/20 0850   BP: 130/72   Pulse: 71   Temp: (!) 96 8 °F (36 °C)   Weight: 122 kg (269 lb 6 4 oz)   Height: 5' 11" (1 803 m)     BP Readings from Last 6 Encounters:   03/16/20 130/72   02/26/20 120/74   10/17/19 122/74   09/18/19 132/82   09/11/19 132/84   07/19/19 130/90      Wt Readings from Last 6 Encounters:   03/16/20 122 kg (269 lb 6 4 oz)   02/26/20 75 8 kg (167 lb)   10/17/19 120 kg (264 lb) 09/18/19 118 kg (261 lb)   09/11/19 118 kg (259 lb 6 4 oz)   07/19/19 120 kg (264 lb)             Physical Exam   Constitutional: He is oriented to person, place, and time  He appears well-developed and well-nourished  No distress  HENT:   Head: Normocephalic and atraumatic  Right Ear: External ear normal    Left Ear: External ear normal    Nose: Nose normal    Mouth/Throat: Oropharynx is clear and moist    Eyes: Pupils are equal, round, and reactive to light  Conjunctivae and EOM are normal    Neck: Normal range of motion  Neck supple  Cardiovascular: Normal rate, regular rhythm and normal heart sounds  Exam reveals no gallop and no friction rub  No murmur heard  Pulmonary/Chest: Effort normal and breath sounds normal  No respiratory distress  He has no wheezes  He has no rales  He exhibits no tenderness  Abdominal: Soft  Bowel sounds are normal  He exhibits no distension and no mass  There is no tenderness  There is no rebound and no guarding  Musculoskeletal: Normal range of motion  Neurological: He is alert and oriented to person, place, and time  Skin: Skin is warm  Capillary refill takes less than 2 seconds  Psychiatric: He has a normal mood and affect  His behavior is normal  Judgment and thought content normal    Nursing note and vitals reviewed          Telephone on 10/21/2019   Component Date Value Ref Range Status    White Blood Cell Count 02/17/2020 3 8  3 4 - 10 8 x10E3/uL Final    Red Blood Cell Count 02/17/2020 4 62  4 14 - 5 80 x10E6/uL Final    Hemoglobin 02/17/2020 15 6  13 0 - 17 7 g/dL Final    HCT 02/17/2020 45 1  37 5 - 51 0 % Final    MCV 02/17/2020 98* 79 - 97 fL Final    MCH 02/17/2020 33 8* 26 6 - 33 0 pg Final    MCHC 02/17/2020 34 6  31 5 - 35 7 g/dL Final    RDW 02/17/2020 14 3  11 6 - 15 4 % Final    Platelet Count 27/61/8215 94* 150 - 450 x10E3/uL Final    Neutrophils 02/17/2020 50  Not Estab  % Final    Lymphocytes 02/17/2020 36  Not Estab  % Final    Monocytes 02/17/2020 10  Not Estab  % Final    Eosinophils 02/17/2020 4  Not Estab  % Final    Basophils PCT 02/17/2020 0  Not Estab  % Final    Neutrophils (Absolute) 02/17/2020 1 9  1 4 - 7 0 x10E3/uL Final    Lymphocytes (Absolute) 02/17/2020 1 4  0 7 - 3 1 x10E3/uL Final    Monocytes (Absolute) 02/17/2020 0 4  0 1 - 0 9 x10E3/uL Final    Eosinophils (Absolute) 02/17/2020 0 1  0 0 - 0 4 x10E3/uL Final    Basophils ABS 02/17/2020 0 0  0 0 - 0 2 x10E3/uL Final    Immature Granulocytes 02/17/2020 0  Not Estab  % Final    Immature Granulocytes (Absolute) 02/17/2020 0 0  0 0 - 0 1 x10E3/uL Final    Hematology Comments 02/17/2020 Note:   Final    Verified by microscopic examination      Glucose, Random 02/17/2020 101* 65 - 99 mg/dL Final    BUN 02/17/2020 21  8 - 27 mg/dL Final    Creatinine 02/17/2020 0 99  0 76 - 1 27 mg/dL Final    eGFR Non  02/17/2020 79  >59 mL/min/1 73 Final    eGFR  02/17/2020 91  >59 mL/min/1 73 Final    SL AMB BUN/CREATININE RATIO 02/17/2020 21  10 - 24 Final    Sodium 02/17/2020 143  134 - 144 mmol/L Final    Potassium 02/17/2020 4 5  3 5 - 5 2 mmol/L Final    Chloride 02/17/2020 107* 96 - 106 mmol/L Final    CO2 02/17/2020 25  20 - 29 mmol/L Final    CALCIUM 02/17/2020 9 1  8 6 - 10 2 mg/dL Final    Protein, Total 02/17/2020 5 9* 6 0 - 8 5 g/dL Final    Albumin 02/17/2020 3 6* 3 8 - 4 8 g/dL Final                  **Please note reference interval change**    Globulin, Total 02/17/2020 2 3  1 5 - 4 5 g/dL Final    Albumin/Globulin Ratio 02/17/2020 1 6  1 2 - 2 2 Final    TOTAL BILIRUBIN 02/17/2020 0 7  0 0 - 1 2 mg/dL Final    Alk Phos Isoenzymes 02/17/2020 121* 39 - 117 IU/L Final    AST 02/17/2020 43* 0 - 40 IU/L Final    ALT 02/17/2020 35  0 - 44 IU/L Final    AFP-Tumor Marker 02/17/2020 2 9  0 0 - 8 3 ng/mL Final    Comment: [employer] Electrochemiluminescence Immunoassay (ECLIA)  Values obtained with different assay methods or kits cannot be  used interchangeably  Results cannot be interpreted as absolute  evidence of the presence or absence of malignant disease  This test is not interpretable in pregnant females   INR 02/17/2020 1 2  0 8 - 1 2 Final    Comment: Reference interval is for non-anticoagulated patients    Suggested INR therapeutic range for Vitamin K  antagonist therapy:     Standard Dose (moderate intensity                    therapeutic range):       2 0 - 3 0     Higher intensity therapeutic range       2 5 - 3 5      Prothrombin Time 02/17/2020 12 1* 9 1 - 12 0 sec Final

## 2020-03-17 ENCOUNTER — HOSPITAL ENCOUNTER (OUTPATIENT)
Dept: NON INVASIVE DIAGNOSTICS | Age: 65
Discharge: HOME | End: 2020-03-17
Payer: MEDICARE

## 2020-03-17 DIAGNOSIS — R06.02 SOB (SHORTNESS OF BREATH) ON EXERTION: ICD-10-CM

## 2020-03-17 PROCEDURE — 93306 TTE W/DOPPLER COMPLETE: CPT | Performed by: INTERNAL MEDICINE

## 2020-03-17 PROCEDURE — 93306 TTE W/DOPPLER COMPLETE: CPT

## 2020-03-18 ENCOUNTER — HOSPITAL ENCOUNTER (OUTPATIENT)
Dept: GASTROENTEROLOGY | Facility: AMBULATORY SURGERY CENTER | Age: 65
Discharge: HOME | End: 2020-03-18

## 2020-03-24 ENCOUNTER — HOSPITAL ENCOUNTER (OUTPATIENT)
Dept: RADIOLOGY | Facility: HOSPITAL | Age: 65
Discharge: HOME | End: 2020-03-24
Payer: MEDICARE

## 2020-03-24 ENCOUNTER — TELEPHONE (OUTPATIENT)
Dept: FAMILY MEDICINE CLINIC | Facility: HOSPITAL | Age: 65
End: 2020-03-24

## 2020-03-24 DIAGNOSIS — R06.02 SOB (SHORTNESS OF BREATH) ON EXERTION: ICD-10-CM

## 2020-03-24 PROCEDURE — 71046 X-RAY EXAM CHEST 2 VIEWS: CPT

## 2020-03-25 DIAGNOSIS — R91.1 LUNG NODULE SEEN ON IMAGING STUDY: Primary | ICD-10-CM

## 2020-03-27 NOTE — TELEPHONE ENCOUNTER
Please call  If insurance approved I would prefer he get this with contrast    It is 14 mm in size on CXR and would like to get more detail

## 2020-03-27 NOTE — TELEPHONE ENCOUNTER
CT scan w/ contrast ordered for lung nodule - they recommend it being done w/o contrast - please advise - do you still want done w/ contrast?    PCB [place] Sample - 4000

## 2020-04-01 ENCOUNTER — HOSPITAL ENCOUNTER (OUTPATIENT)
Dept: CT IMAGING | Facility: HOSPITAL | Age: 65
Discharge: HOME | End: 2020-04-01
Payer: MEDICARE

## 2020-04-01 DIAGNOSIS — R91.1 LUNG NODULE SEEN ON IMAGING STUDY: ICD-10-CM

## 2020-04-01 PROCEDURE — 71260 CT THORAX DX C+: CPT

## 2020-04-01 RX ADMIN — IOHEXOL 85 ML: 350 INJECTION, SOLUTION INTRAVENOUS at 09:38

## 2020-04-10 ENCOUNTER — TELEPHONE (OUTPATIENT)
Dept: FAMILY MEDICINE CLINIC | Facility: HOSPITAL | Age: 65
End: 2020-04-10

## 2020-04-10 ENCOUNTER — TELEMEDICINE (OUTPATIENT)
Dept: FAMILY MEDICINE CLINIC | Facility: HOSPITAL | Age: 65
End: 2020-04-10
Payer: MEDICARE

## 2020-04-10 VITALS — BODY MASS INDEX: 36.96 KG/M2 | WEIGHT: 264 LBS | HEIGHT: 71 IN

## 2020-04-10 DIAGNOSIS — M54.32 SCIATICA OF LEFT SIDE: Primary | ICD-10-CM

## 2020-04-10 DIAGNOSIS — F32.A DEPRESSION, UNSPECIFIED DEPRESSION TYPE: ICD-10-CM

## 2020-04-10 PROCEDURE — 99214 OFFICE O/P EST MOD 30 MIN: CPT | Performed by: NURSE PRACTITIONER

## 2020-04-10 RX ORDER — MELOXICAM 15 MG/1
15 TABLET ORAL DAILY
Qty: 30 TABLET | Refills: 0 | Status: SHIPPED | OUTPATIENT
Start: 2020-04-10 | End: 2020-08-07 | Stop reason: ALTCHOICE

## 2020-04-13 ENCOUNTER — TELEPHONE (OUTPATIENT)
Dept: FAMILY MEDICINE CLINIC | Facility: HOSPITAL | Age: 65
End: 2020-04-13

## 2020-04-13 DIAGNOSIS — M54.32 LEFT SIDED SCIATICA: Primary | ICD-10-CM

## 2020-04-13 RX ORDER — BUPROPION HYDROCHLORIDE 150 MG/1
150 TABLET ORAL DAILY
Qty: 90 TABLET | Refills: 2 | Status: SHIPPED | OUTPATIENT
Start: 2020-04-13 | End: 2021-02-03 | Stop reason: SDUPTHER

## 2020-04-16 ENCOUNTER — EVALUATION (OUTPATIENT)
Dept: PHYSICAL THERAPY | Facility: CLINIC | Age: 65
End: 2020-04-16
Payer: MEDICARE

## 2020-04-16 DIAGNOSIS — M54.10 RADICULOPATHY WITH LOWER EXTREMITY SYMPTOMS: ICD-10-CM

## 2020-04-16 DIAGNOSIS — M54.50 LUMBOSACRAL PAIN: Primary | ICD-10-CM

## 2020-04-16 DIAGNOSIS — M54.32 LEFT SIDED SCIATICA: ICD-10-CM

## 2020-04-16 PROCEDURE — 97162 PT EVAL MOD COMPLEX 30 MIN: CPT | Performed by: PHYSICAL THERAPIST

## 2020-04-21 ENCOUNTER — OFFICE VISIT (OUTPATIENT)
Dept: PHYSICAL THERAPY | Facility: CLINIC | Age: 65
End: 2020-04-21
Payer: MEDICARE

## 2020-04-21 DIAGNOSIS — M54.50 LUMBOSACRAL PAIN: ICD-10-CM

## 2020-04-21 DIAGNOSIS — M54.32 LEFT SIDED SCIATICA: Primary | ICD-10-CM

## 2020-04-21 PROCEDURE — 97110 THERAPEUTIC EXERCISES: CPT

## 2020-04-21 PROCEDURE — 97112 NEUROMUSCULAR REEDUCATION: CPT

## 2020-04-28 ENCOUNTER — OFFICE VISIT (OUTPATIENT)
Dept: PHYSICAL THERAPY | Facility: CLINIC | Age: 65
End: 2020-04-28
Payer: MEDICARE

## 2020-04-28 DIAGNOSIS — M54.50 LUMBOSACRAL PAIN: ICD-10-CM

## 2020-04-28 DIAGNOSIS — M54.32 LEFT SIDED SCIATICA: Primary | ICD-10-CM

## 2020-04-28 PROCEDURE — 97014 ELECTRIC STIMULATION THERAPY: CPT

## 2020-04-28 PROCEDURE — 97035 APP MDLTY 1+ULTRASOUND EA 15: CPT

## 2020-04-30 ENCOUNTER — TELEPHONE (OUTPATIENT)
Dept: GASTROENTEROLOGY | Facility: CLINIC | Age: 65
End: 2020-04-30

## 2020-05-04 ENCOUNTER — OFFICE VISIT (OUTPATIENT)
Dept: PHYSICAL THERAPY | Facility: CLINIC | Age: 65
End: 2020-05-04
Payer: MEDICARE

## 2020-05-04 DIAGNOSIS — M54.32 LEFT SIDED SCIATICA: Primary | ICD-10-CM

## 2020-05-04 DIAGNOSIS — M54.50 LUMBOSACRAL PAIN: ICD-10-CM

## 2020-05-04 DIAGNOSIS — M54.10 RADICULOPATHY WITH LOWER EXTREMITY SYMPTOMS: ICD-10-CM

## 2020-05-04 PROCEDURE — 97112 NEUROMUSCULAR REEDUCATION: CPT | Performed by: PHYSICAL THERAPIST

## 2020-05-04 PROCEDURE — 97110 THERAPEUTIC EXERCISES: CPT | Performed by: PHYSICAL THERAPIST

## 2020-05-04 PROCEDURE — 97140 MANUAL THERAPY 1/> REGIONS: CPT | Performed by: PHYSICAL THERAPIST

## 2020-05-05 ENCOUNTER — APPOINTMENT (OUTPATIENT)
Dept: PHYSICAL THERAPY | Facility: CLINIC | Age: 65
End: 2020-05-05
Payer: MEDICARE

## 2020-05-11 DIAGNOSIS — Z20.822 ENCOUNTER FOR LABORATORY TESTING FOR COVID-19 VIRUS: ICD-10-CM

## 2020-05-11 PROCEDURE — U0003 INFECTIOUS AGENT DETECTION BY NUCLEIC ACID (DNA OR RNA); SEVERE ACUTE RESPIRATORY SYNDROME CORONAVIRUS 2 (SARS-COV-2) (CORONAVIRUS DISEASE [COVID-19]), AMPLIFIED PROBE TECHNIQUE, MAKING USE OF HIGH THROUGHPUT TECHNOLOGIES AS DESCRIBED BY CMS-2020-01-R: HCPCS

## 2020-05-12 ENCOUNTER — OFFICE VISIT (OUTPATIENT)
Dept: PHYSICAL THERAPY | Facility: CLINIC | Age: 65
End: 2020-05-12
Payer: MEDICARE

## 2020-05-12 DIAGNOSIS — M54.50 LUMBOSACRAL PAIN: ICD-10-CM

## 2020-05-12 DIAGNOSIS — M54.10 RADICULOPATHY WITH LOWER EXTREMITY SYMPTOMS: ICD-10-CM

## 2020-05-12 DIAGNOSIS — M54.32 LEFT SIDED SCIATICA: Primary | ICD-10-CM

## 2020-05-12 PROCEDURE — 97140 MANUAL THERAPY 1/> REGIONS: CPT

## 2020-05-12 PROCEDURE — 97110 THERAPEUTIC EXERCISES: CPT

## 2020-05-13 LAB — SARS-COV-2 RNA SPEC QL NAA+PROBE: NOT DETECTED

## 2020-05-18 ENCOUNTER — HOSPITAL ENCOUNTER (OUTPATIENT)
Dept: GASTROENTEROLOGY | Facility: AMBULATORY SURGERY CENTER | Age: 65
Discharge: HOME | End: 2020-05-18
Payer: MEDICARE

## 2020-05-18 ENCOUNTER — ANESTHESIA (OUTPATIENT)
Dept: GASTROENTEROLOGY | Facility: AMBULATORY SURGERY CENTER | Age: 65
End: 2020-05-18

## 2020-05-18 ENCOUNTER — ANESTHESIA EVENT (OUTPATIENT)
Dept: GASTROENTEROLOGY | Facility: AMBULATORY SURGERY CENTER | Age: 65
End: 2020-05-18

## 2020-05-18 VITALS
TEMPERATURE: 98.2 F | SYSTOLIC BLOOD PRESSURE: 118 MMHG | HEART RATE: 85 BPM | HEIGHT: 73 IN | BODY MASS INDEX: 35.25 KG/M2 | DIASTOLIC BLOOD PRESSURE: 69 MMHG | WEIGHT: 266 LBS | RESPIRATION RATE: 20 BRPM | OXYGEN SATURATION: 94 %

## 2020-05-18 DIAGNOSIS — K21.00 GASTROESOPHAGEAL REFLUX DISEASE WITH ESOPHAGITIS: ICD-10-CM

## 2020-05-18 DIAGNOSIS — I85.00 ESOPHAGEAL VARICES DETERMINED BY ENDOSCOPY (HCC): ICD-10-CM

## 2020-05-18 DIAGNOSIS — K22.10 EROSIVE ESOPHAGITIS: ICD-10-CM

## 2020-05-18 PROCEDURE — 43235 EGD DIAGNOSTIC BRUSH WASH: CPT | Performed by: INTERNAL MEDICINE

## 2020-05-18 RX ORDER — METOCLOPRAMIDE HYDROCHLORIDE 5 MG/ML
10 INJECTION INTRAMUSCULAR; INTRAVENOUS ONCE AS NEEDED
Status: CANCELLED | OUTPATIENT
Start: 2020-05-18

## 2020-05-18 RX ORDER — PROMETHAZINE HYDROCHLORIDE 25 MG/ML
6.25 INJECTION, SOLUTION INTRAMUSCULAR; INTRAVENOUS ONCE AS NEEDED
Status: CANCELLED | OUTPATIENT
Start: 2020-05-18

## 2020-05-18 RX ORDER — MEPERIDINE HYDROCHLORIDE 50 MG/ML
12.5 INJECTION INTRAMUSCULAR; INTRAVENOUS; SUBCUTANEOUS
Status: CANCELLED | OUTPATIENT
Start: 2020-05-18

## 2020-05-18 RX ORDER — SODIUM CHLORIDE 9 MG/ML
50 INJECTION, SOLUTION INTRAVENOUS ONCE
Status: COMPLETED | OUTPATIENT
Start: 2020-05-18 | End: 2020-05-18

## 2020-05-18 RX ORDER — LABETALOL 20 MG/4 ML (5 MG/ML) INTRAVENOUS SYRINGE
5
Status: CANCELLED | OUTPATIENT
Start: 2020-05-18

## 2020-05-18 RX ORDER — PROPOFOL 10 MG/ML
INJECTION, EMULSION INTRAVENOUS AS NEEDED
Status: DISCONTINUED | OUTPATIENT
Start: 2020-05-18 | End: 2020-05-18 | Stop reason: SURG

## 2020-05-18 RX ORDER — SODIUM CHLORIDE 9 MG/ML
INJECTION, SOLUTION INTRAVENOUS CONTINUOUS PRN
Status: DISCONTINUED | OUTPATIENT
Start: 2020-05-18 | End: 2020-05-18 | Stop reason: SURG

## 2020-05-18 RX ORDER — FENTANYL CITRATE/PF 50 MCG/ML
25 SYRINGE (ML) INJECTION
Status: CANCELLED | OUTPATIENT
Start: 2020-05-18

## 2020-05-18 RX ORDER — ONDANSETRON 2 MG/ML
4 INJECTION INTRAMUSCULAR; INTRAVENOUS ONCE AS NEEDED
Status: CANCELLED | OUTPATIENT
Start: 2020-05-18

## 2020-05-18 RX ORDER — HYDRALAZINE HYDROCHLORIDE 20 MG/ML
5 INJECTION INTRAMUSCULAR; INTRAVENOUS
Status: CANCELLED | OUTPATIENT
Start: 2020-05-18

## 2020-05-18 RX ADMIN — PROPOFOL 30 MG: 10 INJECTION, EMULSION INTRAVENOUS at 07:30

## 2020-05-18 RX ADMIN — SODIUM CHLORIDE: 9 INJECTION, SOLUTION INTRAVENOUS at 07:24

## 2020-05-18 RX ADMIN — PROPOFOL 100 MG: 10 INJECTION, EMULSION INTRAVENOUS at 07:27

## 2020-05-18 RX ADMIN — SODIUM CHLORIDE 50 ML/HR: 9 INJECTION, SOLUTION INTRAVENOUS at 07:10

## 2020-05-19 ENCOUNTER — OFFICE VISIT (OUTPATIENT)
Dept: PHYSICAL THERAPY | Facility: CLINIC | Age: 65
End: 2020-05-19
Payer: MEDICARE

## 2020-05-19 DIAGNOSIS — M54.50 LUMBOSACRAL PAIN: ICD-10-CM

## 2020-05-19 DIAGNOSIS — M54.10 RADICULOPATHY WITH LOWER EXTREMITY SYMPTOMS: ICD-10-CM

## 2020-05-19 DIAGNOSIS — M54.32 LEFT SIDED SCIATICA: Primary | ICD-10-CM

## 2020-05-19 PROCEDURE — 97112 NEUROMUSCULAR REEDUCATION: CPT | Performed by: PHYSICAL THERAPIST

## 2020-05-19 PROCEDURE — 97140 MANUAL THERAPY 1/> REGIONS: CPT | Performed by: PHYSICAL THERAPIST

## 2020-05-26 ENCOUNTER — OFFICE VISIT (OUTPATIENT)
Dept: PHYSICAL THERAPY | Facility: CLINIC | Age: 65
End: 2020-05-26
Payer: MEDICARE

## 2020-05-26 DIAGNOSIS — M54.50 LUMBOSACRAL PAIN: ICD-10-CM

## 2020-05-26 DIAGNOSIS — M54.10 RADICULOPATHY WITH LOWER EXTREMITY SYMPTOMS: ICD-10-CM

## 2020-05-26 DIAGNOSIS — M54.32 LEFT SIDED SCIATICA: Primary | ICD-10-CM

## 2020-05-26 PROCEDURE — 97110 THERAPEUTIC EXERCISES: CPT | Performed by: PHYSICAL THERAPIST

## 2020-05-26 PROCEDURE — 97140 MANUAL THERAPY 1/> REGIONS: CPT | Performed by: PHYSICAL THERAPIST

## 2020-06-08 ENCOUNTER — TELEPHONE (OUTPATIENT)
Dept: FAMILY MEDICINE CLINIC | Facility: HOSPITAL | Age: 65
End: 2020-06-08

## 2020-06-08 DIAGNOSIS — Z20.828 EXPOSURE TO SARS-ASSOCIATED CORONAVIRUS: Primary | ICD-10-CM

## 2020-06-08 DIAGNOSIS — Z20.828 EXPOSURE TO SARS-ASSOCIATED CORONAVIRUS: ICD-10-CM

## 2020-06-08 PROCEDURE — U0003 INFECTIOUS AGENT DETECTION BY NUCLEIC ACID (DNA OR RNA); SEVERE ACUTE RESPIRATORY SYNDROME CORONAVIRUS 2 (SARS-COV-2) (CORONAVIRUS DISEASE [COVID-19]), AMPLIFIED PROBE TECHNIQUE, MAKING USE OF HIGH THROUGHPUT TECHNOLOGIES AS DESCRIBED BY CMS-2020-01-R: HCPCS

## 2020-06-09 LAB — SARS-COV-2 RNA SPEC QL NAA+PROBE: NOT DETECTED

## 2020-06-15 ENCOUNTER — HOSPITAL ENCOUNTER (OUTPATIENT)
Dept: PULMONOLOGY | Facility: HOSPITAL | Age: 65
Discharge: HOME | End: 2020-06-15
Payer: MEDICARE

## 2020-06-15 DIAGNOSIS — R06.02 SOB (SHORTNESS OF BREATH) ON EXERTION: ICD-10-CM

## 2020-06-15 PROCEDURE — 94726 PLETHYSMOGRAPHY LUNG VOLUMES: CPT

## 2020-06-15 PROCEDURE — 94760 N-INVAS EAR/PLS OXIMETRY 1: CPT

## 2020-06-15 PROCEDURE — 94010 BREATHING CAPACITY TEST: CPT

## 2020-06-15 PROCEDURE — 94729 DIFFUSING CAPACITY: CPT

## 2020-06-15 PROCEDURE — 94726 PLETHYSMOGRAPHY LUNG VOLUMES: CPT | Performed by: INTERNAL MEDICINE

## 2020-06-15 PROCEDURE — 94729 DIFFUSING CAPACITY: CPT | Performed by: INTERNAL MEDICINE

## 2020-06-15 PROCEDURE — 94010 BREATHING CAPACITY TEST: CPT | Performed by: INTERNAL MEDICINE

## 2020-07-10 NOTE — ANESTHESIA PREPROCEDURE EVALUATION
Review of Systems/Medical History  Patient summary reviewed  Chart reviewed      Cardiovascular  EKG reviewed, Hyperlipidemia, Hypertension controlled,    Pulmonary  Sleep apnea ,        GI/Hepatic  Negative GI/hepatic ROS   Liver disease , cirrhosis,  Hiatal hernia,        Negative  ROS        Endo/Other  Negative endo/other ROS   Obesity  morbid obesity   GYN       Hematology  Negative hematology ROS      Musculoskeletal    Arthritis     Neurology  Negative neurology ROS      Psychology   Depression , depressed and being treated for depression,              Physical Exam    Airway    Mallampati score: II  TM Distance: <3 FB  Neck ROM: full     Dental   No notable dental hx     Cardiovascular  Cardiovascular exam normal    Pulmonary  Pulmonary exam normal     Other Findings        Anesthesia Plan  ASA Score- 3     Anesthesia Type- general with ASA Monitors  Additional Monitors:   Airway Plan: ETT  Plan Factors-    Induction- intravenous  Postoperative Plan- Plan for postoperative opioid use  Informed Consent- Anesthetic plan and risks discussed with patient  Awake

## 2020-07-14 ENCOUNTER — TELEPHONE (OUTPATIENT)
Dept: FAMILY MEDICINE CLINIC | Facility: HOSPITAL | Age: 65
End: 2020-07-14

## 2020-08-07 ENCOUNTER — CONSULT (OUTPATIENT)
Dept: CARDIOLOGY CLINIC | Facility: CLINIC | Age: 65
End: 2020-08-07
Payer: MEDICARE

## 2020-08-07 VITALS
DIASTOLIC BLOOD PRESSURE: 86 MMHG | BODY MASS INDEX: 35.04 KG/M2 | HEART RATE: 60 BPM | WEIGHT: 264.4 LBS | HEIGHT: 73 IN | TEMPERATURE: 98 F | SYSTOLIC BLOOD PRESSURE: 124 MMHG

## 2020-08-07 DIAGNOSIS — R06.02 SOB (SHORTNESS OF BREATH) ON EXERTION: ICD-10-CM

## 2020-08-07 DIAGNOSIS — E78.00 HYPERCHOLESTEROLEMIA: Primary | ICD-10-CM

## 2020-08-07 DIAGNOSIS — R60.0 BILATERAL LOWER EXTREMITY EDEMA: ICD-10-CM

## 2020-08-07 PROCEDURE — 93000 ELECTROCARDIOGRAM COMPLETE: CPT | Performed by: INTERNAL MEDICINE

## 2020-08-07 PROCEDURE — 99204 OFFICE O/P NEW MOD 45 MIN: CPT | Performed by: INTERNAL MEDICINE

## 2020-08-07 RX ORDER — TORSEMIDE 10 MG/1
10 TABLET ORAL DAILY
Qty: 30 TABLET | Refills: 6 | Status: SHIPPED | OUTPATIENT
Start: 2020-08-07 | End: 2021-02-12

## 2020-08-07 NOTE — PATIENT INSTRUCTIONS
Low-Sodium Diet   AMBULATORY CARE:   A low-sodium diet  limits foods that are high in sodium (salt)  You will need to follow a low-sodium diet if you have high blood pressure, kidney disease, or heart failure  You may also need to follow this diet if you have a condition that is causing your body to retain (hold) extra fluid  You may need to limit the amount of sodium you eat to 1,500 mg  Ask your healthcare provider how much sodium you can have each day  How to use food labels to choose foods that are low in sodium:  Read food labels to find the amount of sodium they contain  The amount of sodium is listed in milligrams (mg)  The % Daily Value (DV) column tells you how much of your daily needs are met by 1 serving of the food for each nutrient listed  Choose foods that have less than 5% of the DV of sodium  These foods are considered low in sodium  Foods that have 20% or more of the DV of sodium are considered high in sodium  Some food labels may also list any of the following terms that tell you about the sodium content in the food:  · Sodium-free:  Less than 5 mg in each serving    · Very low sodium:  35 mg of sodium or less in each serving    · Low sodium:  140 mg of sodium or less in each serving    · Reduced sodium: At least 25% less sodium in each serving than the regular type    · Light in sodium:  50% less sodium in each serving    · Unsalted or no added salt:  No extra salt is added during processing (the food may still contain sodium)  Foods to avoid:  Salty foods are high in sodium   You should avoid the following:  · Processed foods:      ¨ Mixes for cornbread, biscuits, cake, and pudding     ¨ Instant foods, such as potatoes, cereals, noodles, and rice     ¨ Packaged foods, such as bread stuffing, rice and pasta mixes, snack dip mixes, and macaroni and cheese     ¨ Canned foods, such as canned vegetables, soups, broths, sauces, and vegetable or tomato juice    ¨ Snack foods, such as salted chips, popcorn, pretzels, pork rinds, salted crackers, and salted nuts    ¨ Frozen foods, such as dinners, entrees, vegetables with sauces, and breaded meats    ¨ Sauerkraut, pickled vegetables, and other foods prepared in brine    · Meats and cheeses:      ¨ Smoked or cured meat, such as corned beef, saunders, ham, hot dogs, and sausage    ¨ Canned meats or spreads, such as potted meats, sardines, anchovies, and imitation seafood    ¨ Deli or lunch meats, such as bologna, ham, turkey, and roast beef    ¨ Processed cheese, such as American cheese and cheese spreads    · Condiments, sauces, and seasonings:      ¨ Salt (¼ teaspoon of salt contains 575 mg of sodium)    ¨ Seasonings made with salt, such as garlic salt, celery salt, onion salt, and seasoned salt    ¨ Regular soy sauce, barbecue sauce, teriyaki sauce, steak sauce, Worcestershire sauce, and most flavored vinegars    ¨ Canned gravy and mixes     ¨ Regular condiments, such as mustard, ketchup, and salad dressings    ¨ Pickles and olives    ¨ Meat tenderizers and monosodium glutamate (MSG)  Foods to include:  Read the food label to find the exact amount of sodium in each serving  · Bread and cereal:  Try to choose breads with less than 80 mg of sodium per serving  ¨ Bread, roll, dwight, tortilla, or unsalted crackers  ¨ Ready-to-eat cereals with less than 5% DV of sodium (examples include shredded wheat and puffed rice)    ¨ Pasta    · Vegetables and fruits:      ¨ Unsalted fresh, frozen, or canned vegetables    ¨ Fresh, frozen, or canned fruits    ¨ Fruit juice    · Dairy:  One serving has about 150 mg of sodium  ¨ Milk, all types    ¨ Yogurt    ¨ Hard cheese, such as cheddar, Swiss, [employer], or mozzarella    · Meat and other protein foods:  Some raw meats may have added sodium       ¨ Plain meats, fish, and poultry     ¨ Eggs    · Other foods:      ¨ Homemade pudding    ¨ Unsalted nuts, popcorn, or pretzels    ¨ Unsalted butter or margarine  Ways to decrease sodium:   · Add spices and herbs to foods instead of salt during cooking  Use salt-free seasonings to add flavor to foods  Examples include onion powder, garlic powder, basil, heredia powder, paprika, and parsley  Try lemon or lime juice or vinegar to give foods a tart flavor  Use hot peppers, pepper, or cayenne pepper to add a spicy flavor to foods  · Do not keep a salt shaker at your kitchen table  This may help keep you from adding salt to food at the table  It may take time to get used to enjoying the natural flavor of food instead of adding salt  Talk to your healthcare provider before you use salt substitutes  Some salt substitutes have a high amount of potassium and need to be avoided if you have kidney disease  · Choose low-sodium foods at restaurants  Meals from restaurants are often high in sodium  Some restaurants have nutrition information on the menu that tells you the amount of sodium in their foods  If possible, ask for your food to be prepared with less, or no salt  · Shop for unsalted or low-sodium foods and snacks at the grocery store  Examples include unsalted or low-sodium broths, soups, and canned vegetables  Choose fresh or frozen vegetables instead  Choose unsalted nuts or seeds or fresh fruits or vegetables as snacks  Read food labels and choose salt-free, very low-sodium, or low-sodium foods  © 2017 [address] [employer] Information is for End User's use only and may not be sold, redistributed or otherwise used for commercial purposes  All illustrations and images included in CareNotes® are the copyrighted property of [employer]  or Monty [employer]az[employer]  The above information is an  only  It is not intended as medical advice for individual conditions or treatments  Talk to your doctor, nurse or pharmacist before following any medical regimen to see if it is safe and effective for you

## 2020-08-07 NOTE — PROGRESS NOTES
Cardiology Consultation     Ryne Acuña  [id number]  1955  [address] [facility] Octavia Cabrera  [address] [place] [id number] [phone]    1  Hypercholesterolemia     2  SOB (shortness of breath) on exertion  Ambulatory referral to Cardiology    POCT ECG    Echo stress test w contrast if indicated    Diagnostic Sleep Study   3  Bilateral lower extremity edema  Diagnostic Sleep Study    torsemide (DEMADEX) 10 mg tablet    Basic metabolic panel       HPI:    Mr Dorita Brewster comes in for consultation to discuss symptoms of shortness of breath and exercise intolerance that he has had at least over the last 6 months if not a year  Linden Hampton has no cardiac history, is only risk factor appears to be hypercholesterolemia in which he takes over the counter supplements for  He does have cirrhosis associated with GRACIA and documented esophageal varices  He does take Nadolol for this  His family history includes 2 family members who have had valve replacements, but no premature CAD in the family  Linden Hampton describes shortness of breath with exertion, at levels that would not have produce shortness of breath last year  He frequents flea markets and does a lot a walking associated with this, and he has noticed that with just walking around at these herbert its he would become short of breath  He still tries to work through this, and even exercises at times  He tells me there has days that he even woke get short of breath  He denies any shortness of breath at rest   He denies chest pain or any other symptoms of angina  He does have lower extremity edema and has gained weight over the last year as well  He does convey features of obstructive sleep apnea that does include snoring  He tells me he did have a sleep study but many years ago  He denies palpitations, lightheadedness or any syncope      Earlier this year his PCP had him undergo an echocardiogram because of the symptoms  This did not show any changes from prior  He has normal LV systolic function with biatrial enlargement and mild RV enlargement  No significant valve disease  He did have an exercise echo test about 4 years ago which did not show any evidence of ischemia        Patient Active Problem List   Diagnosis    Acquired thrombocytopenia ([facility] )    DDD (degenerative disc disease), lumbar    [facility]    Hypercholesterolemia    Major depression, chronic    Other cirrhosis of liver (HCC)    Enlarged prostate    Esophageal varices determined by endoscopy ([facility] )    Colon cancer screening    Personal history of colonic polyps    SOB (shortness of breath) on exertion    Bilateral lower extremity edema     Past Medical History:   Diagnosis Date    Abscess of back 03/01/2018    Benign essential hypertension     Last Assessed:12/19/16; Pt reports heart and BP has been "fine" as of 4/16/2020    Cirrhosis ([facility] )     Cyst of skin     x6 from neck down back area    Esophageal varices (HCC)     Macrocytosis     Last Assessed:1/16/17    Major depression, chronic     Last Assessed:12/21/17    Major depression, chronic 6/19/2017    Personal history of colonic polyps      Social History     Socioeconomic History    Marital status: /Civil Union     Spouse name: Not on file    Number of children: Not on file    Years of education: Not on file    Highest education level: Not on file   Occupational History    Not on file   Social Needs    Financial resource strain: Not on file    Food insecurity     Worry: Not on file     Inability: Not on file    Transportation needs     Medical: Not on file     Non-medical: Not on file   Tobacco Use    Smoking status: Never Smoker    Smokeless tobacco: Never Used   Substance and Sexual Activity    Alcohol use: Not Currently     Comment: 7/19/19-last drink 12/2018- Occasionally/1-2 drinks per weekend   Eliazar Pert Drug use: No    Sexual activity: Not on file   Lifestyle    Physical activity     Days per week: Not on file     Minutes per session: Not on file    Stress: Not on file   Relationships    Social connections     Talks on phone: Not on file     Gets together: Not on file     Attends Synagogue service: Not on file     Active member of club or organization: Not on file     Attends meetings of clubs or organizations: Not on file     Relationship status: Not on file    Intimate partner violence     Fear of current or ex partner: Not on file     Emotionally abused: Not on file     Physically abused: Not on file     Forced sexual activity: Not on file   Other Topics Concern    Not on file   Social History Narrative    Always uses seatbelt      Family History   Problem Relation Age of Onset    Valvular heart disease Father     Stroke Brother         Mini    Valvular heart disease Brother     Colon cancer Neg Hx     Colon polyps Neg Hx      Past Surgical History:   Procedure Laterality Date    CHOLECYSTECTOMY  11/2018    CHOLECYSTECTOMY LAPAROSCOPIC N/A 11/21/2018    Procedure: CHOLECYSTECTOMY LAPAROSCOPIC, wedge liver biopsy;  Surgeon: Josette Martins MD;  Location:  MAIN OR;  Service: General    COLONOSCOPY  05/2011    COLONOSCOPY  05/2016    EGD  01/2019    Esophagitis, esophageal varices    INCISION AND DRAINAGE OF WOUND N/A 03/01/2018    SEBACEOUS CYST ABSCESS OF BACK-VIJAYA MONZON MD    NC EXC SKIN BENIG 1 1-2 CM TRUNK,ARM,LEG N/A 8/22/2018    Procedure: EXCISION  BIOPSY LESION/MASS BACK X4 NECK X1;  Surgeon: Josette Martins MD;  Location:  MAIN OR;  Service: General       Current Outpatient Medications:     Aspirin (ASPIR-81 PO), Take by mouth, Disp: , Rfl:     buPROPion (WELLBUTRIN XL) 150 mg 24 hr tablet, Take 1 tablet (150 mg total) by mouth daily, Disp: 90 tablet, Rfl: 2    Cholecalciferol (CVS VITAMIN D3) 1000 units capsule, Take by mouth, Disp: , Rfl:     Flaxseed, Linseed, (FLAX SEED OIL PO), Take by mouth, Disp: , Rfl:     nadolol (CORGARD) 20 mg tablet, Take 1 tablet (20 mg total) by mouth daily, Disp: 90 tablet, Rfl: 3    torsemide (DEMADEX) 10 mg tablet, Take 1 tablet (10 mg total) by mouth daily, Disp: 30 tablet, Rfl: 6  No Known Allergies  Vitals:    08/07/20 0856   BP: 124/86   BP Location: Left arm   Patient Position: Sitting   Cuff Size: Large   Pulse: 60   Temp: 98 °F (36 7 °C)   Weight: 120 kg (264 lb 6 4 oz)   Height: 6' 1" (1 854 m)       Labs:  Lab Results   Component Value Date     12/23/2016    K 4 5 02/17/2020     (H) 02/17/2020    CO2 25 02/17/2020    BUN 21 02/17/2020    CREATININE 0 99 02/17/2020    CREATININE 1 05 11/22/2018    CREATININE 1 21 12/23/2016    GLUCOSE 97 12/23/2016    CALCIUM 8 5 11/22/2018    CALCIUM 9 2 12/23/2016     Lab Results   Component Value Date    WBC 3 8 02/17/2020    WBC 7 93 11/22/2018    WBC 5 2 12/23/2016    HGB 15 6 02/17/2020    HGB 14 6 11/22/2018    HGB 17 3 12/23/2016    HCT 45 1 02/17/2020    HCT 43 8 11/22/2018    HCT 50 2 12/23/2016    MCV 98 (H) 02/17/2020     (H) 11/22/2018    MCV 98 (H) 12/23/2016    PLT 94 (LL) 02/17/2020    PLT 95 (L) 11/22/2018     (L) 12/23/2016     Lab Results   Component Value Date    CHOL 169 12/23/2016    TRIG 95 06/19/2018    HDL 60 06/19/2018     Imaging:  ECG today shows sinus rhythm, low voltage, otherwise normal ECG  ECHO (3/2020):  LEFT VENTRICLE:  Systolic function was normal by visual assessment  Ejection fraction was estimated to be 65 %  There were no regional wall motion abnormalities  Wall thickness was mildly increased      RIGHT VENTRICLE:  The ventricle was mildly dilated      LEFT ATRIUM:  The atrium was mildly dilated      RIGHT ATRIUM:  The atrium was mildly dilated      TRICUSPID VALVE:  There was mild regurgitation  Review of Systems:  Review of Systems   Constitutional: Negative  HENT: Negative  Eyes: Negative  Respiratory: Negative  Cardiovascular: Negative  Gastrointestinal: Negative  Musculoskeletal: Negative  Skin: Negative  Allergic/Immunologic: Negative  Neurological: Negative  Hematological: Negative  Psychiatric/Behavioral: Negative  All other systems reviewed and are negative  Vitals:    20 0856   BP: 124/86   BP Location: Left arm   Patient Position: Sitting   Cuff Size: Large   Pulse: 60   Temp: 98 °F (36 7 °C)   Weight: 120 kg (264 lb 6 4 oz)   Height: 6' 1" (1 854 m)     Physical Exam:  Physical Exam   Constitutional: He is oriented to person, place, and time  He appears well-developed  HENT:   Head: Normocephalic and atraumatic  Eyes: Pupils are equal, round, and reactive to light  Right eye exhibits no discharge  Left eye exhibits no discharge  No scleral icterus  Neck: Normal range of motion  Neck supple  No JVD present  No thyromegaly present  Cardiovascular: Normal rate, regular rhythm, S1 normal, S2 normal, normal heart sounds and normal pulses  No extrasystoles are present  Exam reveals no gallop, no friction rub and no decreased pulses  No murmur heard  Pulmonary/Chest: Effort normal and breath sounds normal  No respiratory distress  He has no wheezes  He has no rales  Abdominal: Soft  Bowel sounds are normal  He exhibits no distension  There is no abdominal tenderness  Musculoskeletal: Normal range of motion  General: No tenderness or deformity  Right lower le+ Pitting Edema present  Left lower le+ Pitting Edema present  Neurological: He is alert and oriented to person, place, and time  No cranial nerve deficit  Skin: Skin is warm and dry  No rash noted  Psychiatric: Judgment and thought content normal    Nursing note and vitals reviewed  Discussion/Summary:    1  Shortness of breath with exertion and exercise intolerance - Magda Parsons has noticed this at least over the last 6 months if not a year    Etiology's to this include obstructive coronary disease, diastolic congestive heart failure and obstructive sleep apnea  We will do an updated ischemic evaluation and will repeat the test he had 4 years ago to compare  He does convey features of obstructive sleep apnea, and he does have a dilated RV on echocardiogram   Therefore we ordered a sleep study as well  Given this and his edema, we will start torsemide 10 mg daily to see if he has a clinical response to it  We will check blood work in about a month  We will call him with the results of his tests  2   Lower extremity edema - This could also be associated with obstructive sleep apnea or his chronic liver disease  As stated we are starting low-dose diuretic therapy and will follow his blood work  Is testing is unremarkable will have him back in about 6 months to follow this  A low-sodium diet is suggested  3   Hypercholesterolemia - He manages this with dietary/lifestyle modifications and over-the-counter supplements  He gets blood work followed by his PCP on a regular basis  Counseling / Coordination of Care  Total office time spent today 40 minutes  Greater than 50% of total time was spent with the patient and / or family counseling and / or coordination of care

## 2020-08-09 LAB
BUN SERPL-MCNC: 17 MG/DL (ref 8–27)
BUN/CREAT SERPL: 18 (ref 10–24)
CALCIUM SERPL-MCNC: 9.1 MG/DL (ref 8.6–10.2)
CHLORIDE SERPL-SCNC: 106 MMOL/L (ref 96–106)
CO2 SERPL-SCNC: 22 MMOL/L (ref 20–29)
CREAT SERPL-MCNC: 0.92 MG/DL (ref 0.76–1.27)
GLUCOSE SERPL-MCNC: 102 MG/DL (ref 65–99)
POTASSIUM SERPL-SCNC: 4.6 MMOL/L (ref 3.5–5.2)
SL AMB EGFR AFRICAN AMERICAN: 99 ML/MIN/1.73
SL AMB EGFR NON AFRICAN AMERICAN: 86 ML/MIN/1.73
SODIUM SERPL-SCNC: 139 MMOL/L (ref 134–144)

## 2020-08-17 ENCOUNTER — TELEPHONE (OUTPATIENT)
Dept: SLEEP CENTER | Facility: CLINIC | Age: 65
End: 2020-08-17

## 2020-08-17 NOTE — TELEPHONE ENCOUNTER
----- Message from Shasta Marinelli MD sent at 8/14/2020  4:01 PM EDT -----  Approved  ----- Message -----  From: Usama Lyles: 8/11/2020   9:57 AM EDT  To: Sleep Medicine [facility], #    PLEASE REVIEW FOR APPROVAL OR DENIAL AND WHY

## 2020-08-20 ENCOUNTER — OFFICE VISIT (OUTPATIENT)
Dept: GASTROENTEROLOGY | Facility: CLINIC | Age: 65
End: 2020-08-20
Payer: MEDICARE

## 2020-08-20 VITALS
HEART RATE: 78 BPM | TEMPERATURE: 97.8 F | BODY MASS INDEX: 34.33 KG/M2 | SYSTOLIC BLOOD PRESSURE: 118 MMHG | WEIGHT: 259 LBS | DIASTOLIC BLOOD PRESSURE: 80 MMHG | HEIGHT: 73 IN

## 2020-08-20 DIAGNOSIS — Z86.010 PERSONAL HISTORY OF COLONIC POLYPS: ICD-10-CM

## 2020-08-20 DIAGNOSIS — K74.69 OTHER CIRRHOSIS OF LIVER (HCC): Primary | ICD-10-CM

## 2020-08-20 DIAGNOSIS — I85.00 ESOPHAGEAL VARICES DETERMINED BY ENDOSCOPY (HCC): ICD-10-CM

## 2020-08-20 PROCEDURE — 1160F RVW MEDS BY RX/DR IN RCRD: CPT | Performed by: INTERNAL MEDICINE

## 2020-08-20 PROCEDURE — 99213 OFFICE O/P EST LOW 20 MIN: CPT | Performed by: INTERNAL MEDICINE

## 2020-08-20 PROCEDURE — 1036F TOBACCO NON-USER: CPT | Performed by: INTERNAL MEDICINE

## 2020-08-20 PROCEDURE — 3008F BODY MASS INDEX DOCD: CPT | Performed by: INTERNAL MEDICINE

## 2020-08-20 PROCEDURE — 4040F PNEUMOC VAC/ADMIN/RCVD: CPT | Performed by: INTERNAL MEDICINE

## 2020-08-20 RX ORDER — NADOLOL 20 MG/1
20 TABLET ORAL DAILY
Qty: 90 TABLET | Refills: 3 | Status: SHIPPED | OUTPATIENT
Start: 2020-08-20 | End: 2021-10-07

## 2020-08-20 NOTE — PROGRESS NOTES
[address] [facility] - Outpatient Follow-up Note  Matt Cortez 77 y o  male MRN: [id number]  Encounter: [id number]    ASSESSMENT AND PLAN:      1  Other cirrhosis of liver ([facility] )  Due to GRACIA  Discovered incidentally during laparoscopic cholecystectomy  Serologies negative other than a borderline anti smooth muscle antibody  Up-to-date on surveillance, will repeat ultrasound and AFP in early 2021    2  Esophageal varices determined by endoscopy ([facility] )  Grade 1 varices on EGD 2020, continue nadolol, surveillance EGD in 1 year  - nadolol (CORGARD) 20 mg tablet; Take 1 tablet (20 mg total) by mouth daily  Dispense: 90 tablet; Refill: 3    3  Personal history of colonic polyps  No polyps on most recent colonoscopy in 2016, recall in 2021, will coordinate with upper endoscopy      Followup Appointment:  6 months, with ultrasound, AFP, and LFTs beforehand  ______________________________________________________________________    Chief Complaint   Patient presents with    Follow up-cirrhosis     HPI:  The patient returns for follow-up on cirrhosis due to [facility]  He is accompanied by his wife  He was last seen at the time of an upper endoscopy in May  This showed nonbleeding grade 1 esophageal varices  He currently denies any digestive complaints  He remains off pantoprazole with no significant reflux complaints  Since his last visit he saw Cardiology about shortness of breath  He workup thus far is unrevealing  He started on Demadex with some weight loss and improvement in breathing  He is due for a stress test and sleep study       Historical Information   Past Medical History:   Diagnosis Date    Abscess of back 03/01/2018    Benign essential hypertension     Last Assessed:12/19/16; Pt reports heart and BP has been "fine" as of 4/16/2020    Cirrhosis (HCC)     Cyst of skin     x6 from neck down back area    Esophageal varices (HCC)     Macrocytosis     Last Assessed:1/16/17    Major depression, chronic     Last Assessed:12/21/17    Major depression, chronic 6/19/2017    Personal history of colonic polyps      Past Surgical History:   Procedure Laterality Date    CHOLECYSTECTOMY  11/2018    CHOLECYSTECTOMY LAPAROSCOPIC N/A 11/21/2018    Procedure: CHOLECYSTECTOMY LAPAROSCOPIC, wedge liver biopsy;  Surgeon: Ruben Galeas MD;  Location:  MAIN OR;  Service: General    COLONOSCOPY  05/2011    COLONOSCOPY  05/2016    EGD  01/2019    Esophagitis, esophageal varices    INCISION AND DRAINAGE OF WOUND N/A 03/01/2018    SEBACEOUS CYST ABSCESS OF BACK-VIJAYA MONZON MD    GA EXC SKIN BENIG 1 1-2 CM TRUNK,ARM,LEG N/A 8/22/2018    Procedure: EXCISION  BIOPSY LESION/MASS BACK X4 NECK X1;  Surgeon: Ruben Galeas MD;  Location: [facility];  Service: General     Social History     Substance and Sexual Activity   Alcohol Use Not Currently    Comment: 7/19/19-last drink 12/2018- Occasionally/1-2 drinks per weekend     Social History     Substance and Sexual Activity   Drug Use No     Social History     Tobacco Use   Smoking Status Never Smoker   Smokeless Tobacco Never Used     Family History   Problem Relation Age of Onset    Valvular heart disease Father     Stroke Brother         Mini    Valvular heart disease Brother     Colon cancer Neg Hx     Colon polyps Neg Hx          Current Outpatient Medications:     buPROPion (WELLBUTRIN XL) 150 mg 24 hr tablet    Cholecalciferol (CVS VITAMIN D3) 1000 units capsule    Flaxseed, Linseed, (FLAX SEED OIL PO)    nadolol (CORGARD) 20 mg tablet    torsemide (DEMADEX) 10 mg tablet    Aspirin (ASPIR-81 PO)  No Known Allergies  Reviewed medications and allergies and updated as indicated    PHYSICAL EXAM:    Blood pressure 118/80, pulse 78, temperature 97 8 °F (36 6 °C), height 6' 1" (1 854 m), weight 117 kg (259 lb)  Body mass index is 34 17 kg/m²    General Appearance: NAD, cooperative, alert  Eyes: Anicteric, PERRLA, EOMI  ENT: Normocephalic, atraumatic, normal mucosa  Neck:  Supple, symmetrical, trachea midline  Resp:  Clear to auscultation bilaterally; no rales, rhonchi or wheezing; respirations unlabored   CV:  S1 S2, Regular rate and rhythm; no murmur, rub, or gallop  GI:  Soft, non-tender, non-distended; normal bowel sounds; no masses, no organomegaly   Rectal: Deferred  Musculoskeletal: No cyanosis, clubbing, +2 edema  Normal ROM  Skin:  No jaundice, rashes, or lesions   Heme/Lymph: No palpable cervical lymphadenopathy  Psych: Normal affect, good eye contact  Neuro: No gross deficits, AAOx3    Lab Results:   Lab Results   Component Value Date    WBC 3 8 02/17/2020    HGB 15 6 02/17/2020    HCT 45 1 02/17/2020    MCV 98 (H) 02/17/2020    PLT 94 (LL) 02/17/2020     Lab Results   Component Value Date     12/23/2016    K 4 6 08/08/2020     08/08/2020    CO2 22 08/08/2020    ANIONGAP 8 06/06/2014    BUN 17 08/08/2020    CREATININE 0 92 08/08/2020    GLUCOSE 97 12/23/2016    CALCIUM 8 5 11/22/2018    AST 43 (H) 02/17/2020    ALT 35 02/17/2020    ALKPHOS 74 11/22/2018    PROT 6 3 12/23/2016    BILITOT 0 3 12/23/2016    EGFR 74 11/22/2018     Lab Results   Component Value Date    IRON 103 11/21/2018    TIBC 289 11/21/2018    FERRITIN 234 11/21/2018     Lab Results   Component Value Date    LIPASE 73 11/21/2018       Radiology Results:   No results found

## 2020-09-14 ENCOUNTER — OFFICE VISIT (OUTPATIENT)
Dept: FAMILY MEDICINE CLINIC | Facility: HOSPITAL | Age: 65
End: 2020-09-14
Payer: MEDICARE

## 2020-09-14 VITALS
HEIGHT: 73 IN | HEART RATE: 70 BPM | DIASTOLIC BLOOD PRESSURE: 82 MMHG | TEMPERATURE: 97.6 F | BODY MASS INDEX: 34.72 KG/M2 | WEIGHT: 262 LBS | SYSTOLIC BLOOD PRESSURE: 128 MMHG

## 2020-09-14 DIAGNOSIS — R60.0 BILATERAL LOWER EXTREMITY EDEMA: ICD-10-CM

## 2020-09-14 DIAGNOSIS — E78.00 HYPERCHOLESTEROLEMIA: ICD-10-CM

## 2020-09-14 DIAGNOSIS — R06.02 SOB (SHORTNESS OF BREATH) ON EXERTION: ICD-10-CM

## 2020-09-14 DIAGNOSIS — Z00.00 MEDICARE ANNUAL WELLNESS VISIT, SUBSEQUENT: Primary | ICD-10-CM

## 2020-09-14 DIAGNOSIS — F32.9 MAJOR DEPRESSION, CHRONIC: ICD-10-CM

## 2020-09-14 DIAGNOSIS — K74.69 OTHER CIRRHOSIS OF LIVER (HCC): ICD-10-CM

## 2020-09-14 PROCEDURE — 99214 OFFICE O/P EST MOD 30 MIN: CPT | Performed by: FAMILY MEDICINE

## 2020-09-14 PROCEDURE — G0438 PPPS, INITIAL VISIT: HCPCS | Performed by: FAMILY MEDICINE

## 2020-09-14 NOTE — PROGRESS NOTES
Assessment and Plan:     Problem List Items Addressed This Visit        Digestive    Other cirrhosis of liver (HCC)       Other    Bilateral lower extremity edema    Hypercholesterolemia    Major depression, chronic    SOB (shortness of breath) on exertion      Other Visit Diagnoses     Medicare annual wellness visit, subsequent    -  Primary           Preventive health issues were discussed with patient, and age appropriate screening tests were ordered as noted in patient's After Visit Summary  Personalized health advice and appropriate referrals for health education or preventive services given if needed, as noted in patient's After Visit Summary       History of Present Illness:     Patient presents for Medicare Annual Wellness visit    Patient Care Team:  Aguila Batista MD as PCP - General  MD Munira Grajeda MD Lanora Pinal, MD     Problem List:     Patient Active Problem List   Diagnosis    Acquired thrombocytopenia ([address] )    DDD (degenerative disc disease), lumbar    Acquired pancytopenia ([address] )    Hypercholesterolemia    Major depression, chronic    Other cirrhosis of liver ([address] )    Enlarged prostate    Esophageal varices determined by endoscopy ([address] )    Colon cancer screening    Personal history of colonic polyps    SOB (shortness of breath) on exertion    Bilateral lower extremity edema      Past Medical and Surgical History:     Past Medical History:   Diagnosis Date    Abscess of back 03/01/2018    Benign essential hypertension     Last Assessed:12/19/16; Pt reports heart and BP has been "fine" as of 4/16/2020    Cirrhosis ([address] )     Cyst of skin     x6 from neck down back area    Esophageal varices ([address] )     Macrocytosis     Last Assessed:1/16/17    Major depression, chronic     Last Assessed:12/21/17    Major depression, chronic 6/19/2017    Personal history of colonic polyps      Past Surgical History:   Procedure Laterality Date    CHOLECYSTECTOMY  11/2018  CHOLECYSTECTOMY LAPAROSCOPIC N/A 11/21/2018    Procedure: CHOLECYSTECTOMY LAPAROSCOPIC, wedge liver biopsy;  Surgeon: Lilia Moyer MD;  Location: [facility];  Service: General    COLONOSCOPY  05/2011    COLONOSCOPY  05/2016    EGD  01/2019    Esophagitis, esophageal varices    INCISION AND DRAINAGE OF WOUND N/A 03/01/2018    SEBACEOUS CYST ABSCESS OF BACK-VIJAYA MONZON MD    MO EXC SKIN BENIG 1 1-2 CM TRUNK,ARM,LEG N/A 8/22/2018    Procedure: EXCISION  BIOPSY LESION/MASS BACK X4 NECK X1;  Surgeon: Lilia Moyer MD;  Location: [facility];  Service: General      Family History:     Family History   Problem Relation Age of Onset    Valvular heart disease Father     Stroke Brother         Mini    Valvular heart disease Brother     Colon cancer Neg Hx     Colon polyps Neg Hx       Social History:     E-Cigarette/Vaping    E-Cigarette Use Never User      E-Cigarette/Vaping Substances    Nicotine No     THC No     CBD No     Flavoring No     Other No     Unknown No      Social History     Socioeconomic History    Marital status: /Civil Union     Spouse name: None    Number of children: None    Years of education: None    Highest education level: None   Occupational History    None   Social Needs    Financial resource strain: None    Food insecurity     Worry: None     Inability: None    Transportation needs     Medical: None     Non-medical: None   Tobacco Use    Smoking status: Never Smoker    Smokeless tobacco: Never Used   Substance and Sexual Activity    Alcohol use: Not Currently     Comment: 7/19/19-last drink 12/2018- Occasionally/1-2 drinks per weekend    Drug use: No    Sexual activity: None   Lifestyle    Physical activity     Days per week: None     Minutes per session: None    Stress: None   Relationships    Social connections     Talks on phone: None     Gets together: None     Attends Taoism service: None     Active member of club or organization: None     Attends meetings of clubs or organizations: None     Relationship status: None    Intimate partner violence     Fear of current or ex partner: None     Emotionally abused: None     Physically abused: None     Forced sexual activity: None   Other Topics Concern    None   Social History Narrative    Always uses seatbelt      Medications and Allergies:     Current Outpatient Medications   Medication Sig Dispense Refill    buPROPion (WELLBUTRIN XL) 150 mg 24 hr tablet Take 1 tablet (150 mg total) by mouth daily 90 tablet 2    Cholecalciferol ([employer] VITAMIN D3) 1000 units capsule Take by mouth      Flaxseed, Linseed, (FLAX SEED OIL PO) Take by mouth      nadolol (CORGARD) 20 mg tablet Take 1 tablet (20 mg total) by mouth daily 90 tablet 3    torsemide (DEMADEX) 10 mg tablet Take 1 tablet (10 mg total) by mouth daily 30 tablet 6     No current facility-administered medications for this visit  No Known Allergies   Immunizations:     Immunization History   Administered Date(s) Administered    Hep A, adult 04/03/2019, 10/03/2019    Hep B, adult 04/03/2019, 05/02/2019, 10/03/2019    INFLUENZA 10/20/2014, 10/07/2015, 01/11/2018, 11/22/2018, 10/23/2019, 09/04/2020    Influenza Quadrivalent, 6-35 Months IM 01/11/2018    Influenza, high dose seasonal 0 7 mL 11/22/2018    Influenza, seasonal, injectable 10/20/2014, 10/07/2015    Pneumococcal Conjugate 13-Valent 11/22/2018, 07/24/2020    Td (adult), adsorbed 12/02/2011      Health Maintenance:         Topic Date Due    Colonoscopy Surveillance  05/26/2021    Colorectal Cancer Screening  05/26/2026    Hepatitis C Screening  Completed         Topic Date Due    DTaP,Tdap,and Td Vaccines (1 - Tdap) 07/28/1974      Medicare Health Risk Assessment:     /82   Pulse 70   Temp 97 6 °F (36 4 °C)   Ht 6' 1" (1 854 m)   Wt 119 kg (262 lb)   BMI 34 57 kg/m²      Linden Hampton is here for his Subsequent Wellness visit       Health Risk Assessment:   Patient rates overall health as fair  Patient feels that their physical health rating is same  Eyesight was rated as same  Hearing was rated as same  Patient feels that their emotional and mental health rating is same  Pain experienced in the last 7 days has been none  Patient states that he has experienced no weight loss or gain in last 6 months  Depression Screening:   PHQ-2 Score: 0  PHQ-9 Score: 1      Fall Risk Screening: In the past year, patient has experienced: no history of falling in past year      Home Safety:  Patient has trouble with stairs inside or outside of their home  Patient has working smoke alarms and has working carbon monoxide detector  Home safety hazards include: none  Nutrition:   Current diet is Regular  Medications:   Patient is currently taking over-the-counter supplements  OTC medications include: see medication list  Patient is able to manage medications  Activities of Daily Living (ADLs)/Instrumental Activities of Daily Living (IADLs):   Walk and transfer into and out of bed and chair?: Yes  Dress and groom yourself?: Yes    Bathe or shower yourself?: Yes    Feed yourself? Yes  Do your laundry/housekeeping?: Yes  Manage your money, pay your bills and track your expenses?: Yes  Make your own meals?: Yes    Do your own shopping?: Yes    Previous Hospitalizations:   Any hospitalizations or ED visits within the last 12 months?: No      Advance Care Planning:   Living will: Yes    Durable POA for healthcare:  Yes    Advanced directive: Yes      Cognitive Screening:   Provider or family/friend/caregiver concerned regarding cognition?: No    PREVENTIVE SCREENINGS      Cardiovascular Screening:    General: Screening Not Indicated and History Lipid Disorder      Diabetes Screening:     General: Screening Current      Colorectal Cancer Screening:     General: Screening Current      Prostate Cancer Screening:    General: Screening Current      Abdominal Aortic Aneurysm (AAA) Screening: Risk factors include: age between 73-67 yo        Lung Cancer Screening:     General: Screening Not Indicated      Hepatitis C Screening:    General: Screening Current      Annmarie Montiel MD

## 2020-09-14 NOTE — PROGRESS NOTES
Assessment/Plan:      Problem List Items Addressed This Visit        Digestive    Other cirrhosis of liver ([address] )       Other    Bilateral lower extremity edema    Hypercholesterolemia    Major depression, chronic    SOB (shortness of breath) on exertion      Other Visit Diagnoses     Medicare annual wellness visit, subsequent    -  Primary         Awaiting sleep study and stress testing  Continue efforts with weight loss and exercise  Continue f/u with GI regarding cirrhosis/varices  Continue with nadolol  ( ? Cause of sob)  Will monitor  Depression  Stable  Continue with the same  Recent loss of mother  Subjective:   Chief Complaint   Patient presents with    Follow-up     6 month     Medicare Wellness Visit     Subsequent         Patient ID: Ngozi Skaggs is a 77 y o  male  Pt seen for f/u of chronic conditions  Reports has a sleep study and stress test scheduled this week  Reviewed consultations from Cardio and GI  Reports ongoing sob  More of not able to do as much exercise as he used to or would like  Mentally doing okay  Mother passed away last month  He is getting through it  Somewhat upset with wife going back to work as a  in school  Doing okay on wellbutrin  The following portions of the patient's history were reviewed and updated as appropriate: allergies, current medications, past family history, past medical history, past social history, past surgical history and problem list     Review of Systems   Constitutional: Negative  Negative for activity change, appetite change, chills and diaphoresis  HENT: Negative for congestion and dental problem  Respiratory: Negative  Negative for apnea, chest tightness, shortness of breath and wheezing  Cardiovascular: Negative  Negative for chest pain, palpitations and leg swelling  Gastrointestinal: Negative    Negative for abdominal distention, abdominal pain, constipation, diarrhea and nausea  Genitourinary: Negative  Negative for difficulty urinating, dysuria and frequency  Objective:  Vitals:    09/14/20 0845   BP: 128/82   Pulse: 70   Temp: 97 6 °F (36 4 °C)   Weight: 119 kg (262 lb)   Height: 6' 1" (1 854 m)     BP Readings from Last 6 Encounters:   09/14/20 128/82   08/20/20 118/80   08/07/20 124/86   05/18/20 118/69   03/16/20 130/72   02/26/20 120/74      Wt Readings from Last 6 Encounters:   09/14/20 119 kg (262 lb)   08/20/20 117 kg (259 lb)   08/07/20 120 kg (264 lb 6 4 oz)   05/18/20 121 kg (266 lb)   04/10/20 120 kg (264 lb)   03/16/20 122 kg (269 lb 6 4 oz)             Physical Exam  Vitals signs and nursing note reviewed  Constitutional:       General: He is not in acute distress  Appearance: He is well-developed  He is not ill-appearing  HENT:      Head: Normocephalic and atraumatic  Right Ear: Tympanic membrane, ear canal and external ear normal       Left Ear: Tympanic membrane, ear canal and external ear normal       Nose: Nose normal  No congestion or rhinorrhea  Mouth/Throat:      Mouth: Mucous membranes are moist       Pharynx: No oropharyngeal exudate or posterior oropharyngeal erythema  Eyes:      Extraocular Movements: Extraocular movements intact  Conjunctiva/sclera: Conjunctivae normal       Pupils: Pupils are equal, round, and reactive to light  Neck:      Musculoskeletal: Normal range of motion and neck supple  Cardiovascular:      Rate and Rhythm: Normal rate and regular rhythm  Heart sounds: Normal heart sounds  No murmur  No friction rub  No gallop  Pulmonary:      Effort: Pulmonary effort is normal  No respiratory distress  Breath sounds: Normal breath sounds  No wheezing or rales  Chest:      Chest wall: No tenderness  Abdominal:      General: Bowel sounds are normal  There is no distension  Palpations: Abdomen is soft  There is no mass  Tenderness:  There is no abdominal tenderness  There is no guarding or rebound  Musculoskeletal: Normal range of motion  Skin:     General: Skin is warm  Capillary Refill: Capillary refill takes less than 2 seconds  Neurological:      Mental Status: He is alert and oriented to person, place, and time  Psychiatric:         Mood and Affect: Mood normal          Behavior: Behavior normal            Consult on 08/07/2020   Component Date Value Ref Range Status    Glucose, Random 08/08/2020 102* 65 - 99 mg/dL Final    BUN 08/08/2020 17  8 - 27 mg/dL Final    Creatinine 08/08/2020 0 92  0 76 - 1 27 mg/dL Final    eGFR Non  08/08/2020 86  >59 mL/min/1 73 Final    eGFR  08/08/2020 99  >59 mL/min/1 73 Final    SL AMB BUN/CREATININE RATIO 08/08/2020 18  10 - 24 Final    Sodium 08/08/2020 139  134 - 144 mmol/L Final    Potassium 08/08/2020 4 6  3 5 - 5 2 mmol/L Final    Chloride 08/08/2020 106  96 - 106 mmol/L Final    CO2 08/08/2020 22  20 - 29 mmol/L Final    CALCIUM 08/08/2020 9 1  8 6 - 10 2 mg/dL Final   Orders Only on 06/08/2020   Component Date Value Ref Range Status    SARS-CoV-2  06/08/2020 Not Detected  Not Detected Final    Comment: Testing was performed using the fly(R) SARS-CoV-2 test   This test was developed and its performance characteristics determined  by [employer]  This test has not been FDA cleared or  approved  This test has been authorized by FDA under an Emergency Use  Authorization (EUA)  This test is only authorized for the duration of  time the declaration that circumstances exist justifying the  authorization of the emergency use of in vitro diagnostic tests for  detection of SARS-CoV-2 virus and/or diagnosis of COVID-19 infection  under section 564(b)(1) of the Act, 21 U  S C  755IUF-3(Y)(6), unless  the authorization is terminated or revoked sooner    When diagnostic testing is negative, the possibility of a false  negative result should be considered in the context of a patient's  recent exposures and the presence of clinical signs and symptoms  consistent with COVID-19  An individual without symptoms of COVID-19  and who is not shedding SARS-CoV-2 virus would expect to have                            a  negative (not detected) result in this assay  Orders Only on 05/11/2020   Component Date Value Ref Range Status    SARS-CoV-2  05/11/2020 Not Detected  Not Detected Final    This test was developed and its performance characteristics determined  by [facility] [employer]  This test has not been FDA cleared or  approved  This test has been authorized by FDA under an Emergency Use  Authorization (EUA)  This test is only authorized for the duration of  time the declaration that circumstances exist justifying the  authorization of the emergency use of in vitro diagnostic tests for  detection of SARS-CoV-2 virus and/or diagnosis of COVID-19 infection  under section 564(b)(1) of the Act, 21 U  S C  063RCO-7(P)(2), unless  the authorization is terminated or revoked sooner  When diagnostic testing is negative, the possibility of a false  negative result should be considered in the context of a patient's  recent exposures and the presence of clinical signs and symptoms  consistent with COVID-19  An individual without symptoms of COVID-19  and who is not shedding SARS-CoV-2 virus would expect to have a  negative (not detected) result in this assay

## 2020-09-14 NOTE — PATIENT INSTRUCTIONS

## 2020-09-16 ENCOUNTER — HOSPITAL ENCOUNTER (OUTPATIENT)
Dept: NON INVASIVE DIAGNOSTICS | Age: 65
Discharge: HOME | End: 2020-09-16
Payer: MEDICARE

## 2020-09-16 DIAGNOSIS — R06.02 SOB (SHORTNESS OF BREATH) ON EXERTION: ICD-10-CM

## 2020-09-16 PROCEDURE — 93350 STRESS TTE ONLY: CPT

## 2020-09-16 PROCEDURE — 93351 STRESS TTE COMPLETE: CPT | Performed by: INTERNAL MEDICINE

## 2020-09-17 LAB
CHEST PAIN STATEMENT: NORMAL
MAX DIASTOLIC BP: 80 MMHG
MAX HEART RATE: 142 BPM
MAX PREDICTED HEART RATE: 153 BPM
MAX. SYSTOLIC BP: 170 MMHG
PROTOCOL NAME: NORMAL
REASON FOR TERMINATION: NORMAL
TARGET HR FORMULA: NORMAL
TEST INDICATION: NORMAL
TIME IN EXERCISE PHASE: NORMAL

## 2020-09-18 ENCOUNTER — HOSPITAL ENCOUNTER (OUTPATIENT)
Dept: SLEEP CENTER | Facility: HOSPITAL | Age: 65
Discharge: HOME | End: 2020-09-18
Attending: INTERNAL MEDICINE
Payer: MEDICARE

## 2020-09-18 DIAGNOSIS — R60.0 BILATERAL LOWER EXTREMITY EDEMA: ICD-10-CM

## 2020-09-18 DIAGNOSIS — R06.02 SOB (SHORTNESS OF BREATH) ON EXERTION: ICD-10-CM

## 2020-09-18 PROCEDURE — 95810 POLYSOM 6/> YRS 4/> PARAM: CPT

## 2020-09-18 NOTE — PROGRESS NOTES
Sleep Study Documentation    Pre-Sleep Study       Sleep testing procedure explained to patient:YES    Patient napped prior to study:NO    Caffeine:Dayshift worker after 12PM   Caffeine use:NO    Alcohol:Dayshift workers after 5PM: Alcohol use:NO    Typical day for patient:YES       Study Documentation    Sleep Study Indications: Snoring, unrefreshing sleep, and EDS    Sleep Study: Diagnostic   Snore:Severe  Supplemental O2: no    O2 flow rate (L/min) range n/a  O2 flow rate (L/min) final n/a  Minimum SaO2 76%  Baseline SaO2 96%        EKG abnormalities: yes:  EPOCH example and comments: PVC epoch 942, PACs epoch 726, 754, etc    EEG abnormalities: no    Sleep Study Recorded < 2 hours: N/A    Sleep Study Recorded > 2 hours but incomplete study: N/A    Sleep Study Recorded 6 hours but no sleep obtained: NO    Patient classification: retired       Post-Sleep Study    Medication used at bedtime or during sleep study:NO    Patient reports time it took to fall asleep:30 to 60 minutes    Patient reports waking up during study:1 to 2 times  Patient reports returning to sleep without difficulty  Patient reports sleeping 4 to 6 hours with dreaming  Patient reports sleep during study:typical    Patient rated sleepiness: Not sleepy or tired    PAP treatment:no

## 2020-09-23 ENCOUNTER — TELEPHONE (OUTPATIENT)
Dept: SLEEP CENTER | Facility: CLINIC | Age: 65
End: 2020-09-23

## 2020-10-26 ENCOUNTER — TELEPHONE (OUTPATIENT)
Dept: CARDIOLOGY CLINIC | Facility: CLINIC | Age: 65
End: 2020-10-26

## 2020-11-02 ENCOUNTER — HOSPITAL ENCOUNTER (OUTPATIENT)
Dept: RADIOLOGY | Facility: HOSPITAL | Age: 65
Discharge: HOME | End: 2020-11-02
Payer: MEDICARE

## 2020-11-02 ENCOUNTER — OFFICE VISIT (OUTPATIENT)
Dept: FAMILY MEDICINE CLINIC | Facility: HOSPITAL | Age: 65
End: 2020-11-02
Payer: MEDICARE

## 2020-11-02 VITALS
BODY MASS INDEX: 34.72 KG/M2 | HEART RATE: 74 BPM | DIASTOLIC BLOOD PRESSURE: 78 MMHG | HEIGHT: 73 IN | WEIGHT: 262 LBS | SYSTOLIC BLOOD PRESSURE: 128 MMHG | TEMPERATURE: 96.3 F

## 2020-11-02 DIAGNOSIS — E78.00 HYPERCHOLESTEROLEMIA: ICD-10-CM

## 2020-11-02 DIAGNOSIS — E66.09 CLASS 1 OBESITY DUE TO EXCESS CALORIES WITH SERIOUS COMORBIDITY AND BODY MASS INDEX (BMI) OF 34.0 TO 34.9 IN ADULT: ICD-10-CM

## 2020-11-02 DIAGNOSIS — F32.9 MAJOR DEPRESSION, CHRONIC: ICD-10-CM

## 2020-11-02 DIAGNOSIS — M54.41 CHRONIC LEFT-SIDED LOW BACK PAIN WITH RIGHT-SIDED SCIATICA: ICD-10-CM

## 2020-11-02 DIAGNOSIS — M54.41 CHRONIC LEFT-SIDED LOW BACK PAIN WITH RIGHT-SIDED SCIATICA: Primary | ICD-10-CM

## 2020-11-02 DIAGNOSIS — I85.00 ESOPHAGEAL VARICES DETERMINED BY ENDOSCOPY (HCC): ICD-10-CM

## 2020-11-02 DIAGNOSIS — G47.33 OBSTRUCTIVE SLEEP APNEA: ICD-10-CM

## 2020-11-02 DIAGNOSIS — G89.29 CHRONIC LEFT-SIDED LOW BACK PAIN WITH RIGHT-SIDED SCIATICA: Primary | ICD-10-CM

## 2020-11-02 DIAGNOSIS — M51.36 DDD (DEGENERATIVE DISC DISEASE), LUMBAR: ICD-10-CM

## 2020-11-02 DIAGNOSIS — G89.29 CHRONIC LEFT-SIDED LOW BACK PAIN WITH RIGHT-SIDED SCIATICA: ICD-10-CM

## 2020-11-02 PROCEDURE — 72110 X-RAY EXAM L-2 SPINE 4/>VWS: CPT

## 2020-11-02 PROCEDURE — 99214 OFFICE O/P EST MOD 30 MIN: CPT | Performed by: FAMILY MEDICINE

## 2020-11-03 ENCOUNTER — OFFICE VISIT (OUTPATIENT)
Dept: SLEEP CENTER | Facility: HOSPITAL | Age: 65
End: 2020-11-03
Payer: MEDICARE

## 2020-11-03 VITALS
WEIGHT: 262.2 LBS | HEIGHT: 73 IN | SYSTOLIC BLOOD PRESSURE: 122 MMHG | DIASTOLIC BLOOD PRESSURE: 80 MMHG | BODY MASS INDEX: 34.75 KG/M2

## 2020-11-03 DIAGNOSIS — K74.69 OTHER CIRRHOSIS OF LIVER (HCC): ICD-10-CM

## 2020-11-03 DIAGNOSIS — F45.8 BRUXISM: ICD-10-CM

## 2020-11-03 DIAGNOSIS — F32.9 MAJOR DEPRESSION, CHRONIC: ICD-10-CM

## 2020-11-03 DIAGNOSIS — E66.9 OBESITY (BMI 30-39.9): ICD-10-CM

## 2020-11-03 DIAGNOSIS — R06.02 SOB (SHORTNESS OF BREATH) ON EXERTION: ICD-10-CM

## 2020-11-03 DIAGNOSIS — G47.33 OSA (OBSTRUCTIVE SLEEP APNEA): Primary | ICD-10-CM

## 2020-11-03 PROCEDURE — 99204 OFFICE O/P NEW MOD 45 MIN: CPT | Performed by: INTERNAL MEDICINE

## 2020-11-05 ENCOUNTER — TELEPHONE (OUTPATIENT)
Dept: FAMILY MEDICINE CLINIC | Facility: HOSPITAL | Age: 65
End: 2020-11-05

## 2020-11-05 DIAGNOSIS — M51.36 DDD (DEGENERATIVE DISC DISEASE), LUMBAR: ICD-10-CM

## 2020-11-05 DIAGNOSIS — G89.29 CHRONIC LEFT-SIDED LOW BACK PAIN WITH RIGHT-SIDED SCIATICA: Primary | ICD-10-CM

## 2020-11-05 DIAGNOSIS — M54.41 CHRONIC LEFT-SIDED LOW BACK PAIN WITH RIGHT-SIDED SCIATICA: Primary | ICD-10-CM

## 2020-11-05 LAB
ALBUMIN SERPL-MCNC: 3.9 G/DL (ref 3.8–4.8)
ALBUMIN/GLOB SERPL: 1.7 {RATIO} (ref 1.2–2.2)
ALP SERPL-CCNC: 133 IU/L (ref 39–117)
ALT SERPL-CCNC: 35 IU/L (ref 0–44)
AST SERPL-CCNC: 55 IU/L (ref 0–40)
BILIRUB SERPL-MCNC: 1.6 MG/DL (ref 0–1.2)
BUN SERPL-MCNC: 20 MG/DL (ref 8–27)
BUN/CREAT SERPL: 19 (ref 10–24)
CALCIUM SERPL-MCNC: 9.6 MG/DL (ref 8.6–10.2)
CHLORIDE SERPL-SCNC: 104 MMOL/L (ref 96–106)
CHOLEST SERPL-MCNC: 157 MG/DL (ref 100–199)
CO2 SERPL-SCNC: 25 MMOL/L (ref 20–29)
CREAT SERPL-MCNC: 1.03 MG/DL (ref 0.76–1.27)
ERYTHROCYTE [DISTWIDTH] IN BLOOD BY AUTOMATED COUNT: 12.4 % (ref 11.6–15.4)
GLOBULIN SER-MCNC: 2.3 G/DL (ref 1.5–4.5)
GLUCOSE SERPL-MCNC: 100 MG/DL (ref 65–99)
HCT VFR BLD AUTO: 45.1 % (ref 37.5–51)
HDLC SERPL-MCNC: 71 MG/DL
HGB BLD-MCNC: 15.7 G/DL (ref 13–17.7)
LDLC SERPL CALC-MCNC: 70 MG/DL (ref 0–99)
LDLC/HDLC SERPL: 1 RATIO (ref 0–3.6)
MCH RBC QN AUTO: 35 PG (ref 26.6–33)
MCHC RBC AUTO-ENTMCNC: 34.8 G/DL (ref 31.5–35.7)
MCV RBC AUTO: 100 FL (ref 79–97)
MORPHOLOGY BLD-IMP: NORMAL
PLATELET # BLD AUTO: 98 X10E3/UL (ref 150–450)
POTASSIUM SERPL-SCNC: 4.9 MMOL/L (ref 3.5–5.2)
PROT SERPL-MCNC: 6.2 G/DL (ref 6–8.5)
RBC # BLD AUTO: 4.49 X10E6/UL (ref 4.14–5.8)
SL AMB EGFR AFRICAN AMERICAN: 86 ML/MIN/1.73
SL AMB EGFR NON AFRICAN AMERICAN: 75 ML/MIN/1.73
SL AMB VLDL CHOLESTEROL CALC: 16 MG/DL (ref 5–40)
SODIUM SERPL-SCNC: 142 MMOL/L (ref 134–144)
TRIGL SERPL-MCNC: 83 MG/DL (ref 0–149)
TSH SERPL DL<=0.005 MIU/L-ACNC: 1.16 UIU/ML (ref 0.45–4.5)
WBC # BLD AUTO: 4.1 X10E3/UL (ref 3.4–10.8)

## 2020-11-16 ENCOUNTER — HOSPITAL ENCOUNTER (OUTPATIENT)
Dept: MRI IMAGING | Facility: HOSPITAL | Age: 65
Discharge: HOME | End: 2020-11-16
Payer: MEDICARE

## 2020-11-16 DIAGNOSIS — G89.29 CHRONIC LEFT-SIDED LOW BACK PAIN WITH RIGHT-SIDED SCIATICA: ICD-10-CM

## 2020-11-16 DIAGNOSIS — M51.36 DDD (DEGENERATIVE DISC DISEASE), LUMBAR: ICD-10-CM

## 2020-11-16 DIAGNOSIS — M54.41 CHRONIC LEFT-SIDED LOW BACK PAIN WITH RIGHT-SIDED SCIATICA: ICD-10-CM

## 2020-11-16 PROCEDURE — 72148 MRI LUMBAR SPINE W/O DYE: CPT

## 2020-11-16 PROCEDURE — G1004 CDSM NDSC: HCPCS

## 2020-11-23 ENCOUNTER — TELEPHONE (OUTPATIENT)
Dept: PAIN MEDICINE | Facility: CLINIC | Age: 65
End: 2020-11-23

## 2020-12-01 ENCOUNTER — CONSULT (OUTPATIENT)
Dept: PAIN MEDICINE | Facility: CLINIC | Age: 65
End: 2020-12-01
Payer: MEDICARE

## 2020-12-01 VITALS
HEIGHT: 73 IN | BODY MASS INDEX: 34.84 KG/M2 | TEMPERATURE: 98.9 F | HEART RATE: 84 BPM | DIASTOLIC BLOOD PRESSURE: 80 MMHG | SYSTOLIC BLOOD PRESSURE: 120 MMHG | WEIGHT: 262.9 LBS

## 2020-12-01 DIAGNOSIS — M54.16 LUMBAR RADICULOPATHY: ICD-10-CM

## 2020-12-01 DIAGNOSIS — M48.062 SPINAL STENOSIS OF LUMBAR REGION WITH NEUROGENIC CLAUDICATION: Primary | ICD-10-CM

## 2020-12-01 PROCEDURE — 99204 OFFICE O/P NEW MOD 45 MIN: CPT | Performed by: ANESTHESIOLOGY

## 2020-12-01 RX ORDER — PHENOL 1.4 %
600 AEROSOL, SPRAY (ML) MUCOUS MEMBRANE 2 TIMES DAILY WITH MEALS
COMMUNITY
End: 2021-04-27 | Stop reason: ALTCHOICE

## 2020-12-02 ENCOUNTER — TELEPHONE (OUTPATIENT)
Dept: PAIN MEDICINE | Facility: CLINIC | Age: 65
End: 2020-12-02

## 2020-12-02 DIAGNOSIS — M48.062 SPINAL STENOSIS OF LUMBAR REGION WITH NEUROGENIC CLAUDICATION: Primary | ICD-10-CM

## 2020-12-02 DIAGNOSIS — M54.16 LUMBAR RADICULOPATHY: ICD-10-CM

## 2020-12-11 ENCOUNTER — HOSPITAL ENCOUNTER (OUTPATIENT)
Dept: RADIOLOGY | Facility: CLINIC | Age: 65
Discharge: HOME | End: 2020-12-11
Attending: ANESTHESIOLOGY | Admitting: ANESTHESIOLOGY
Payer: MEDICARE

## 2020-12-11 VITALS
RESPIRATION RATE: 20 BRPM | OXYGEN SATURATION: 97 % | TEMPERATURE: 98.7 F | SYSTOLIC BLOOD PRESSURE: 124 MMHG | HEART RATE: 100 BPM | DIASTOLIC BLOOD PRESSURE: 80 MMHG

## 2020-12-11 DIAGNOSIS — M54.16 LUMBAR RADICULOPATHY: ICD-10-CM

## 2020-12-11 DIAGNOSIS — M48.062 SPINAL STENOSIS OF LUMBAR REGION WITH NEUROGENIC CLAUDICATION: ICD-10-CM

## 2020-12-11 PROCEDURE — 62323 NJX INTERLAMINAR LMBR/SAC: CPT | Performed by: ANESTHESIOLOGY

## 2020-12-11 RX ORDER — METHYLPREDNISOLONE ACETATE 80 MG/ML
160 INJECTION, SUSPENSION INTRA-ARTICULAR; INTRALESIONAL; INTRAMUSCULAR; PARENTERAL; SOFT TISSUE ONCE
Status: COMPLETED | OUTPATIENT
Start: 2020-12-11 | End: 2020-12-11

## 2020-12-11 RX ORDER — LIDOCAINE HYDROCHLORIDE 10 MG/ML
5 INJECTION, SOLUTION EPIDURAL; INFILTRATION; INTRACAUDAL; PERINEURAL ONCE
Status: COMPLETED | OUTPATIENT
Start: 2020-12-11 | End: 2020-12-11

## 2020-12-11 RX ADMIN — METHYLPREDNISOLONE ACETATE 160 MG: 80 INJECTION, SUSPENSION INTRA-ARTICULAR; INTRALESIONAL; INTRAMUSCULAR; SOFT TISSUE at 14:43

## 2020-12-11 RX ADMIN — LIDOCAINE HYDROCHLORIDE 3 ML: 10 INJECTION, SOLUTION EPIDURAL; INFILTRATION; INTRACAUDAL; PERINEURAL at 14:42

## 2020-12-11 RX ADMIN — IOHEXOL 1 ML: 300 INJECTION, SOLUTION INTRAVENOUS at 14:43

## 2020-12-18 ENCOUNTER — TELEPHONE (OUTPATIENT)
Dept: PAIN MEDICINE | Facility: CLINIC | Age: 65
End: 2020-12-18

## 2020-12-28 ENCOUNTER — HOSPITAL ENCOUNTER (OUTPATIENT)
Dept: RADIOLOGY | Facility: CLINIC | Age: 65
Discharge: HOME | End: 2020-12-28
Attending: ANESTHESIOLOGY
Payer: MEDICARE

## 2020-12-28 VITALS
OXYGEN SATURATION: 97 % | DIASTOLIC BLOOD PRESSURE: 74 MMHG | SYSTOLIC BLOOD PRESSURE: 119 MMHG | RESPIRATION RATE: 20 BRPM | HEART RATE: 78 BPM | TEMPERATURE: 97.8 F

## 2020-12-28 DIAGNOSIS — M48.062 SPINAL STENOSIS OF LUMBAR REGION WITH NEUROGENIC CLAUDICATION: ICD-10-CM

## 2020-12-28 DIAGNOSIS — M54.16 LUMBAR RADICULOPATHY: ICD-10-CM

## 2020-12-28 PROCEDURE — 62323 NJX INTERLAMINAR LMBR/SAC: CPT | Performed by: ANESTHESIOLOGY

## 2020-12-28 RX ORDER — METHYLPREDNISOLONE ACETATE 80 MG/ML
80 INJECTION, SUSPENSION INTRA-ARTICULAR; INTRALESIONAL; INTRAMUSCULAR; PARENTERAL; SOFT TISSUE ONCE
Status: COMPLETED | OUTPATIENT
Start: 2020-12-28 | End: 2020-12-28

## 2020-12-28 RX ORDER — LIDOCAINE HYDROCHLORIDE 10 MG/ML
5 INJECTION, SOLUTION EPIDURAL; INFILTRATION; INTRACAUDAL; PERINEURAL ONCE
Status: COMPLETED | OUTPATIENT
Start: 2020-12-28 | End: 2020-12-28

## 2020-12-28 RX ADMIN — METHYLPREDNISOLONE ACETATE 80 MG: 80 INJECTION, SUSPENSION INTRA-ARTICULAR; INTRALESIONAL; INTRAMUSCULAR; SOFT TISSUE at 10:00

## 2020-12-28 RX ADMIN — LIDOCAINE HYDROCHLORIDE 3 ML: 10 INJECTION, SOLUTION EPIDURAL; INFILTRATION; INTRACAUDAL; PERINEURAL at 09:59

## 2020-12-28 RX ADMIN — IOHEXOL 1 ML: 300 INJECTION, SOLUTION INTRAVENOUS at 10:00

## 2021-01-04 ENCOUNTER — TELEPHONE (OUTPATIENT)
Dept: PAIN MEDICINE | Facility: CLINIC | Age: 66
End: 2021-01-04

## 2021-01-25 DIAGNOSIS — K74.69 OTHER CIRRHOSIS OF LIVER (HCC): Primary | ICD-10-CM

## 2021-01-25 NOTE — TELEPHONE ENCOUNTER
Mitch Chester ( wife ) of the pt called in to see if she would be able to be in the exam room on 02/09/2021  When the pt has his appt so that she could speak with [facility] in regard to his treatment  The procedures are not working for him so she would like to be in there  Please advise thank you

## 2021-01-25 NOTE — TELEPHONE ENCOUNTER
Sweta pt, advised of above  Both pt and wife must wear a mask for the visit  Pt verbalized understanding and appreciation  Will fu as scheduled

## 2021-02-03 DIAGNOSIS — F32.A DEPRESSION, UNSPECIFIED DEPRESSION TYPE: ICD-10-CM

## 2021-02-03 RX ORDER — BUPROPION HYDROCHLORIDE 150 MG/1
150 TABLET ORAL DAILY
Qty: 90 TABLET | Refills: 2 | Status: SHIPPED | OUTPATIENT
Start: 2021-02-03 | End: 2021-11-16 | Stop reason: SDUPTHER

## 2021-02-12 ENCOUNTER — OFFICE VISIT (OUTPATIENT)
Dept: PAIN MEDICINE | Facility: CLINIC | Age: 66
End: 2021-02-12
Payer: MEDICARE

## 2021-02-12 VITALS
SYSTOLIC BLOOD PRESSURE: 124 MMHG | DIASTOLIC BLOOD PRESSURE: 80 MMHG | HEIGHT: 73 IN | TEMPERATURE: 98.7 F | WEIGHT: 260 LBS | BODY MASS INDEX: 34.46 KG/M2 | HEART RATE: 80 BPM

## 2021-02-12 DIAGNOSIS — G89.4 CHRONIC PAIN SYNDROME: Primary | ICD-10-CM

## 2021-02-12 DIAGNOSIS — G89.29 CHRONIC BILATERAL LOW BACK PAIN WITHOUT SCIATICA: ICD-10-CM

## 2021-02-12 DIAGNOSIS — M54.16 LUMBAR RADICULOPATHY: ICD-10-CM

## 2021-02-12 DIAGNOSIS — M48.062 SPINAL STENOSIS OF LUMBAR REGION WITH NEUROGENIC CLAUDICATION: ICD-10-CM

## 2021-02-12 DIAGNOSIS — M54.50 CHRONIC BILATERAL LOW BACK PAIN WITHOUT SCIATICA: ICD-10-CM

## 2021-02-12 DIAGNOSIS — M51.36 DDD (DEGENERATIVE DISC DISEASE), LUMBAR: ICD-10-CM

## 2021-02-12 PROCEDURE — 99214 OFFICE O/P EST MOD 30 MIN: CPT | Performed by: NURSE PRACTITIONER

## 2021-02-12 RX ORDER — GABAPENTIN 300 MG/1
CAPSULE ORAL
Qty: 90 CAPSULE | Refills: 1 | Status: SHIPPED | OUTPATIENT
Start: 2021-02-12 | End: 2021-03-12

## 2021-02-12 NOTE — PROGRESS NOTES
Assessment:  1  Chronic pain syndrome    2  Chronic bilateral low back pain without sciatica    3  Lumbar radiculopathy    4  DDD (degenerative disc disease), lumbar    5  Spinal stenosis of lumbar region with neurogenic claudication        Plan:    While the patient was in the office today, I did have a thorough conversation with the patient regarding their chronic pain syndrome, symptoms, medication regimen, and treatment plan  I did review with the patient the results of his most recent lumbar sacral spine MRI and fully reviewed with him the severity of the stenosis and osteoarthritis until he verbalized understanding that his current level of etiology is not going to improve or go way and is most likely going to slowly and steadily progress as time goes on  I did explain to the patient that with his current symptoms as he does not feel that he is actually having significant pain but more of a numbness and weakness in his low back and legs and that is what is preventing him from standing or walking for any length of time as it is not as sharp stabbing or burning pain but more of a dull aching shooting pain symptoms that is again more weakness and numbing that he finds annoying  The patient reports that it does seem to be hampering his ability to be active, stand, or walk for any length of time  I did discussed the patient that unfortunately if his weakness and numbness symptoms worsen in the future, he may need to seriously consider a surgical consultation, however, at this point we can see if there are alternative options in the meantime  The patient was agreeable and verbalized an understanding  I discussed the patient that at this point since he has not noted even moderate or stable relief as a result of the injections, I do not feel it is in his best interest to proceed with any repeat injections at this time  The patient was agreeable and verbalized an understanding              I discussed with the patient that at this point time I feel that because there is definitely significant underlying neuropathic component to his pain symptoms, he may benefit from a neuron membrane stabilizer such as gabapentin  I discussed with the patient the type of medication it is, how it works, and that it requires a titration process that is specific to each individual  I reviewed with the patient that it may take 3-4 weeks for the medication's effects to be noticed and that it should never be abruptly stopped  Possible side effects include but are not limited to; vertigo, lethargy, nausea, and edema of the extremities  Advised the patient to call our office if they experience any side effects  The patient verbalized an understanding  The patient will follow-up in 6-7 weeks for medication prescription refill and reevaluation  The patient was advised to contact the office should their symptoms worsen in the interim  The patient was agreeable and verbalized an understanding  History of Present Illness: The patient is a 77 y o  male last seen on 12/28/2020 who presents for a follow up office visit in regards to Chronic pain syndrome secondary to lumbar degenerative disc disease with stenosis and radiculopathy  The patient currently reports that since his last office visit he feels his chronic low back pain and left lower extremity radicular symptoms has continued to worsen and he is status post his 2nd L5-S1 interlaminar lumbar epidural steroid injection on December 28, 2020 with Dr Phuc Street and unfortunately he has not noted even moderate or any kind of stable relief as a result of the injections  After the 2nd injection it was recommended that he proceed with a surgical evaluation and at this point time the patient reports that he is very hesitant to consider surgery at this time presents today for re-evaluation and to discuss any other treatment plan options      I have personally reviewed and/or updated the patient's past medical history, past surgical history, family history, social history, current medications, allergies, and vital signs today  Review of Systems:    Review of Systems   Respiratory: Negative for shortness of breath  Cardiovascular: Negative for chest pain  Gastrointestinal: Negative for constipation, diarrhea, nausea and vomiting  Musculoskeletal: Positive for gait problem  Negative for arthralgias, joint swelling (joint stiffness) and myalgias  Skin: Negative for rash  Neurological: Negative for dizziness, seizures and weakness  All other systems reviewed and are negative          Past Medical History:   Diagnosis Date    Abscess of back 03/01/2018    Benign essential hypertension     Last Assessed:12/19/16; Pt reports heart and BP has been "fine" as of 4/16/2020    Cirrhosis (HCC)     Cyst of skin     x6 from neck down back area    Esophageal varices (HCC)     Macrocytosis     Last Assessed:1/16/17    Major depression, chronic     Last Assessed:12/21/17    Major depression, chronic 6/19/2017    Personal history of colonic polyps        Past Surgical History:   Procedure Laterality Date    CHOLECYSTECTOMY  11/2018    CHOLECYSTECTOMY LAPAROSCOPIC N/A 11/21/2018    Procedure: CHOLECYSTECTOMY LAPAROSCOPIC, wedge liver biopsy;  Surgeon: Salley Schaumann, MD;  Location:  MAIN OR;  Service: General    COLONOSCOPY  05/2011    COLONOSCOPY  05/2016    EGD  01/2019    Esophagitis, esophageal varices    GALLBLADDER SURGERY      INCISION AND DRAINAGE OF WOUND N/A 03/01/2018    SEBACEOUS CYST ABSCESS OF BACK-VIJAYA MONZON MD    MS EXC SKIN BENIG 1 1-2 CM TRUNK,ARM,LEG N/A 8/22/2018    Procedure: EXCISION  BIOPSY LESION/MASS BACK X4 NECK X1;  Surgeon: Salley Schaumann, MD;  Location: [facility];  Service: General       Family History   Problem Relation Age of Onset    Alzheimer's disease Mother     Valvular heart disease Father     Stroke Brother         Mini    Valvular heart disease Brother     Colon cancer Neg Hx     Colon polyps Neg Hx        Social History     Occupational History    Not on file   Tobacco Use    Smoking status: Never Smoker    Smokeless tobacco: Never Used   Substance and Sexual Activity    Alcohol use: Not Currently     Comment: 7/19/19-last drink 12/2018- Occasionally/1-2 drinks per weekend    Drug use: No    Sexual activity: Not on file         Current Outpatient Medications:     buPROPion (WELLBUTRIN XL) 150 mg 24 hr tablet, Take 1 tablet (150 mg total) by mouth daily, Disp: 90 tablet, Rfl: 2    calcium carbonate (OS-CARLOS) 600 MG tablet, Take 600 mg by mouth 2 (two) times a day with meals, Disp: , Rfl:     Cholecalciferol (CVS VITAMIN D3) 1000 units capsule, Take by mouth, Disp: , Rfl:     Flaxseed, Linseed, (FLAX SEED OIL PO), Take by mouth, Disp: , Rfl:     nadolol (CORGARD) 20 mg tablet, Take 1 tablet (20 mg total) by mouth daily, Disp: 90 tablet, Rfl: 3    gabapentin (NEURONTIN) 300 mg capsule, Take 1 PO HS x 1 week, then 1 PO BID x 1 week, then 1 PO TID , Disp: 90 capsule, Rfl: 1    No Known Allergies    Physical Exam:    /80 (BP Location: Left arm, Patient Position: Sitting, Cuff Size: Standard)   Pulse 80   Temp 98 7 °F (37 1 °C)   Ht 6' 1" (1 854 m)   Wt 118 kg (260 lb)   BMI 34 30 kg/m²     Constitutional:normal, well developed, well nourished, alert, in no distress and non-toxic and no overt pain behavior  and overweight  Eyes:anicteric  HEENT:grossly intact  Neck:supple, symmetric, trachea midline and no masses   Pulmonary:even and unlabored  Cardiovascular:No edema or pitting edema present  Skin:Normal without rashes or lesions and well hydrated  Psychiatric:Mood and affect appropriate  Neurologic:Cranial Nerves II-XII grossly intact  Musculoskeletal:The patient's gait is slightly antalgic, but steady with the use of a single cane        Imaging  No orders to display         No orders of the defined types were placed in this encounter

## 2021-02-12 NOTE — PATIENT INSTRUCTIONS
Gabapentin (By mouth)   Gabapentin (tonya-a-PEN-tin)  Treats seizures and pain caused by shingles  Brand Name(s): FusePaq Fanatrex, Gralise, Neurontin   There may be other brand names for this medicine  When This Medicine Should Not Be Used: This medicine is not right for everyone  Do not use it if you had an allergic reaction to gabapentin  How to Use This Medicine:   Capsule, Liquid, Tablet  · Take your medicine as directed  Your dose may need to be changed several times to find what works best for you  If you have epilepsy, do not allow more than 12 hours to pass between doses  · Capsule: Swallow the capsule whole with plenty of water  Do not open, crush, or chew it  · Gralise® tablet: Swallow the tablet whole   Do not crush, break, or chew it  · Neurontin® tablet: If you break a tablet into 2 pieces, use the second half as your next dose  Do not use the half-tablet if the whole tablet has been cut or broken after 28 days  · Oral liquid: Measure the oral liquid medicine with a marked measuring spoon, oral syringe, or medicine cup  · This medicine should come with a Medication Guide  Ask your pharmacist for a copy if you do not have one  · Missed dose: Take a dose as soon as you remember  If it is almost time for your next dose, wait until then and take a regular dose  Do not take extra medicine to make up for a missed dose  · Store the medicine in a closed container at room temperature, away from heat, moisture, and direct light  Store the Neurontin® oral liquid in the refrigerator  Do not freeze  Drugs and Foods to Avoid:   Ask your doctor or pharmacist before using any other medicine, including over-the-counter medicines, vitamins, and herbal products  · Some medicines can affect how gabapentin works  Tell your doctor if you also using hydrocodone or morphine  · If you take an antacid, wait at least 2 hours before you take gabapentin    · Do not drink alcohol while you are using this medicine  · Tell your doctor if you use anything else that makes you sleepy  Some examples are allergy medicine, narcotic pain medicine, and alcohol  Tell your doctor if you are also using lorazepam, oxycodone, or zolpidem  Warnings While Using This Medicine:   · Tell your doctor if you are pregnant or breastfeeding, or if you have kidney problems (including patients receiving dialysis) or lung problems  Tell your doctor if you have a history of depression or mental health problems  · This medicine may cause the following problems:  ? Drug reaction with eosinophilia and systemic symptoms (DRESS) or multiorgan hypersensitivity, which may damage the liver, kidney, blood, heart, or muscles  ? Changes in mood or behavior, including suicidal thoughts or behavior  ? Respiratory depression (serious breathing problem that can be life-threatening), when used with narcotic pain medicines  · Do not stop using this medicine suddenly  Your doctor will need to slowly decrease your dose before you stop it completely  · This medicine may make you dizzy or drowsy  Do not drive or do anything else that could be dangerous until you know how this medicine affects you  · Tell any doctor or dentist who treats you that you are using this medicine  This medicine may affect certain medical test results  · Your doctor will check your progress and the effects of this medicine at regular visits  Keep all appointments  · Keep all medicine out of the reach of children  Never share your medicine with anyone    Possible Side Effects While Using This Medicine:   Call your doctor right away if you notice any of these side effects:  · Allergic reaction: Itching or hives, swelling in your face or hands, swelling or tingling in your mouth or throat, chest tightness, trouble breathing  · Behavior problems, aggression, restlessness, trouble concentrating, moodiness (especially in children)  · Blistering, peeling, red skin rash  · Blue lips, fingernails, or skin, chest pain, fast heartbeat, trouble breathing  · Change in how much or how often you urinate, bloody or cloudy urine  · Dark urine or pale stools, nausea, vomiting, loss of appetite, stomach pain, yellow skin or eyes  · Fever, chills, cough, sore throat, body aches  · Problems with coordination, shakiness, unsteadiness, unusual eye movement  · Rapid weight gain, swelling in your hands, ankles, or feet  · Rash, swollen or tender glands in the neck, armpit, or groin  · Unusual moods or behaviors, thoughts of hurting yourself, feeling depressed  If you notice these less serious side effects, talk with your doctor:   · Dizziness, drowsiness, sleepiness, tiredness  If you notice other side effects that you think are caused by this medicine, tell your doctor  Call your doctor for medical advice about side effects  You may report side effects to FDA at 5-468-FDA-3678  © Copyright [facility] [address] Information is for End User's use only and may not be sold, redistributed or otherwise used for commercial purposes  The above information is an  only  It is not intended as medical advice for individual conditions or treatments  Talk to your doctor, nurse or pharmacist before following any medical regimen to see if it is safe and effective for you

## 2021-02-14 ENCOUNTER — HOSPITAL ENCOUNTER (OUTPATIENT)
Dept: ULTRASOUND IMAGING | Facility: HOSPITAL | Age: 66
Discharge: HOME | End: 2021-02-14
Attending: INTERNAL MEDICINE
Payer: MEDICARE

## 2021-02-14 DIAGNOSIS — K74.69 OTHER CIRRHOSIS OF LIVER (HCC): ICD-10-CM

## 2021-02-14 LAB
AFP-TM SERPL-MCNC: 3.3 NG/ML (ref 0–8.3)
ALBUMIN SERPL-MCNC: 3.7 G/DL (ref 3.8–4.8)
ALP SERPL-CCNC: 137 IU/L (ref 39–117)
ALT SERPL-CCNC: 40 IU/L (ref 0–44)
AST SERPL-CCNC: 58 IU/L (ref 0–40)
BILIRUB DIRECT SERPL-MCNC: 0.49 MG/DL (ref 0–0.4)
BILIRUB SERPL-MCNC: 1.9 MG/DL (ref 0–1.2)
PROT SERPL-MCNC: 6.1 G/DL (ref 6–8.5)

## 2021-02-14 PROCEDURE — 76705 ECHO EXAM OF ABDOMEN: CPT

## 2021-02-16 ENCOUNTER — OFFICE VISIT (OUTPATIENT)
Dept: GASTROENTEROLOGY | Facility: CLINIC | Age: 66
End: 2021-02-16
Payer: MEDICARE

## 2021-02-16 VITALS
DIASTOLIC BLOOD PRESSURE: 70 MMHG | HEIGHT: 73 IN | HEART RATE: 81 BPM | SYSTOLIC BLOOD PRESSURE: 100 MMHG | WEIGHT: 261.4 LBS | BODY MASS INDEX: 34.64 KG/M2

## 2021-02-16 DIAGNOSIS — K74.69 OTHER CIRRHOSIS OF LIVER (HCC): Primary | ICD-10-CM

## 2021-02-16 DIAGNOSIS — Z86.010 PERSONAL HISTORY OF COLONIC POLYPS: ICD-10-CM

## 2021-02-16 DIAGNOSIS — K21.9 GASTROESOPHAGEAL REFLUX DISEASE WITHOUT ESOPHAGITIS: ICD-10-CM

## 2021-02-16 DIAGNOSIS — I85.00 ESOPHAGEAL VARICES DETERMINED BY ENDOSCOPY (HCC): ICD-10-CM

## 2021-02-16 PROCEDURE — 99213 OFFICE O/P EST LOW 20 MIN: CPT | Performed by: INTERNAL MEDICINE

## 2021-02-16 NOTE — PROGRESS NOTES
[address] [facility] Outpatient Follow[facility]up [facility]  Damaris Perez 77 y o  male MRN: [id number]  Encounter: [id number]    ASSESSMENT AND PLAN:      1  Other cirrhosis of liver ([facility] [address] )  Due to GRACIA  Discovered incidentally during laparoscopic cholecystectomy  recent labs including AFP normal   Ultrasound was performed this weekend, official read is pending    2  Esophageal varices determined by endoscopy ([address] )   continue nadolol  Due for surveillance EGD in May    3  Personal history of colonic polyps   prior history, no polyps 2016, due for 5 year recall May 2021    4  Gastroesophageal reflux disease without esophagitis   stable off PPI      Followup Appointment:   EGD/ colonoscopy me in May  Office visit 1 year with ultrasound and labs beforehand   ______________________________________________________________________    Chief Complaint   Patient presents with    Follow up cirrhosis of liver     HPI:    The patient presents for follow-up on cirrhosis due to GRACIA  He was last seen in the office in August   He denies any GI complaints since then  He denies any issues with ascites, edema or jaundice  Since he denies any reflux complaints since stopping pantoprazole  He recently started Neurontin for neuropathy but otherwise denies any interval medical history      Historical Information   Past Medical History:   Diagnosis Date    Abscess of back 03/01/2018    Benign essential hypertension     Last Assessed:12/19/16; Pt reports heart and BP has been "fine" as of 4/16/2020    Cirrhosis (HCC)     Cyst of skin     x6 from neck down back area    Esophageal varices (HCC)     Macrocytosis     Last Assessed:1/16/17    Major depression, chronic     Last Assessed:12/21/17    Major depression, chronic 6/19/2017    Personal history of colonic polyps      Past Surgical History:   Procedure Laterality Date    CHOLECYSTECTOMY  11/2018    CHOLECYSTECTOMY LAPAROSCOPIC N/A 11/21/2018 Procedure: CHOLECYSTECTOMY LAPAROSCOPIC, wedge liver biopsy;  Surgeon: Yecenia Underwood MD;  Location:  MAIN OR;  Service: General    COLONOSCOPY  05/2011    COLONOSCOPY  05/2016    EGD  01/2019    Esophagitis, esophageal varices    GALLBLADDER SURGERY      INCISION AND DRAINAGE OF WOUND N/A 03/01/2018    SEBACEOUS CYST ABSCESS OF BACK-VIJAYA MONZON MD    OR EXC SKIN BENIG 1 1-2 CM TRUNK,ARM,LEG N/A 8/22/2018    Procedure: EXCISION  BIOPSY LESION/MASS BACK X4 NECK X1;  Surgeon: Yecenia Underwood MD;  [facility];  Service: General     Social History     Substance and Sexual Activity   Alcohol Use Not Currently    Comment: 7/19/19-last drink 12/2018- Occasionally/1-2 drinks per weekend     Social History     Substance and Sexual Activity   Drug Use No     Social History     Tobacco Use   Smoking Status Never Smoker   Smokeless Tobacco Never Used     Family History   Problem Relation Age of Onset    Alzheimer's disease Mother     Valvular heart disease Father     Stroke Brother         Mini    Valvular heart disease Brother     Colon cancer Neg Hx     Colon polyps Neg Hx          Current Outpatient Medications:     buPROPion (WELLBUTRIN XL) 150 mg 24 hr tablet    calcium carbonate (OS-CARLOS) 600 MG tablet    Cholecalciferol (CVS VITAMIN D3) 1000 units capsule    Flaxseed, Linseed, (FLAX SEED OIL PO)    gabapentin (NEURONTIN) 300 mg capsule    nadolol (CORGARD) 20 mg tablet  No Known Allergies  Reviewed medications and allergies and updated as indicated    PHYSICAL EXAM:    Blood pressure 100/70, pulse 81, height 6' 1" (1 854 m), weight 119 kg (261 lb 6 4 oz)  Body mass index is 34 49 kg/m²  General Appearance: NAD, cooperative, alert  Eyes: Anicteric, PERRLA, EOMI  ENT:  Normocephalic, atraumatic, normal mucosa      Neck:  Supple, symmetrical, trachea midline  Resp:  Clear to auscultation bilaterally; no rales, rhonchi or wheezing; respirations unlabored   CV:  S1 S2, Regular rate and rhythm; no murmur, rub, or gallop  GI:  Soft, non-tender, non-distended; normal bowel sounds; no masses, no organomegaly   Rectal: Deferred  Musculoskeletal: No cyanosis, clubbing or edema  Normal ROM  Skin:  No jaundice, rashes, or lesions   Heme/Lymph: No palpable cervical lymphadenopathy  Psych: Normal affect, good eye contact  Neuro: No gross deficits, AAOx3    Lab Results:   Lab Results   Component Value Date    WBC 4 1 11/05/2020    HGB 15 7 11/05/2020    HCT 45 1 11/05/2020     (H) 11/05/2020    PLT 98 (LL) 11/05/2020     Lab Results   Component Value Date     12/23/2016    K 4 9 11/05/2020     11/05/2020    CO2 25 11/05/2020    ANIONGAP 8 06/06/2014    BUN 20 11/05/2020    CREATININE 1 03 11/05/2020    GLUCOSE 97 12/23/2016    CALCIUM 8 5 11/22/2018    AST 58 (H) 02/13/2021    ALT 40 02/13/2021    ALKPHOS 74 11/22/2018    PROT 6 3 12/23/2016    BILITOT 0 3 12/23/2016    EGFR 74 11/22/2018     Lab Results   Component Value Date    IRON 103 11/21/2018    TIBC 289 11/21/2018    FERRITIN 234 11/21/2018     Lab Results   Component Value Date    LIPASE 73 11/21/2018       Radiology Results:   No results found

## 2021-02-17 ENCOUNTER — PREP FOR PROCEDURE (OUTPATIENT)
Dept: GASTROENTEROLOGY | Facility: CLINIC | Age: 66
End: 2021-02-17

## 2021-02-17 DIAGNOSIS — Z86.010 PERSONAL HISTORY OF COLONIC POLYPS: ICD-10-CM

## 2021-02-17 DIAGNOSIS — I85.00 ESOPHAGEAL VARICES DETERMINED BY ENDOSCOPY (HCC): Primary | ICD-10-CM

## 2021-03-04 DIAGNOSIS — Z23 ENCOUNTER FOR IMMUNIZATION: ICD-10-CM

## 2021-03-08 ENCOUNTER — TELEPHONE (OUTPATIENT)
Dept: PAIN MEDICINE | Facility: CLINIC | Age: 66
End: 2021-03-08

## 2021-03-12 ENCOUNTER — TELEPHONE (OUTPATIENT)
Dept: PAIN MEDICINE | Facility: CLINIC | Age: 66
End: 2021-03-12

## 2021-03-12 DIAGNOSIS — G89.4 CHRONIC PAIN SYNDROME: ICD-10-CM

## 2021-03-12 DIAGNOSIS — M54.16 LUMBAR RADICULOPATHY: Primary | ICD-10-CM

## 2021-03-12 DIAGNOSIS — M48.062 SPINAL STENOSIS OF LUMBAR REGION WITH NEUROGENIC CLAUDICATION: ICD-10-CM

## 2021-03-12 DIAGNOSIS — M51.36 DDD (DEGENERATIVE DISC DISEASE), LUMBAR: ICD-10-CM

## 2021-03-12 RX ORDER — PREGABALIN 50 MG/1
CAPSULE ORAL
Qty: 90 CAPSULE | Refills: 1 | Status: SHIPPED | OUTPATIENT
Start: 2021-03-12 | End: 2021-03-30 | Stop reason: SDUPTHER

## 2021-03-12 RX ORDER — PREDNISONE 10 MG/1
TABLET ORAL
Qty: 21 TABLET | Refills: 0 | Status: SHIPPED | OUTPATIENT
Start: 2021-03-12 | End: 2021-04-27 | Stop reason: ALTCHOICE

## 2021-03-12 NOTE — TELEPHONE ENCOUNTER
I would recommend we titrate him off of the Gabapentin as follows: decrease it down to 1 PO QD x 4 days, then STOP  We can try Lyrica in it's place, but he can have very similar side effects, but he may not  We can also try a titrating dose of oral prednisone to try to help with the current flare up until he has his surgical evaluation?

## 2021-03-12 NOTE — TELEPHONE ENCOUNTER
Patients wife calling to make DG aware left hip pain is acting up, and tylenol is not working what do you recommend       Thank you    [phone]

## 2021-03-12 NOTE — TELEPHONE ENCOUNTER
I sent a script for Prednisone 10 mg, 2 PO TID on the first day and then decrease by 1 pill daily until gone  He is to call with an update when finished  NO NSAIDS while on the prednisone  He is to titrate off of the Gabapentin as discussed and once he is off of the Gabapentin he can start the Lyrica 50 mg as prescribed and follow the titration on the bottle  He should not drive or operate machinery until he sees how it affects him and should not abruptly stop this medication  He should call our office if he has any side effects or issues  Please re-schedule his f/u OV with me for 6-7 weeks from now  Thank you

## 2021-03-12 NOTE — TELEPHONE ENCOUNTER
S/w pt's wife, Quin Huber, per medical communication consent on file  Stated that the pt is c/o increased pain, difficulty walking  Pt is scheduled to see another surgeon in 10 days  Per Quin Huber, pt is currently taking gabapentin bid  Pt did try tid however, could not tolerate the se; did not feel well, dizzy, slow, not responding  Per Quin Huber, the pt does not want to increase the gabapentin to tid as it is also causing weight gain and he is trying very hard to loose weight  Per Quin Huber, no relief w/ gabapentin  Gauri Lee, will d/w DG and cb to advise  Bina verbalized understanding and appreciation

## 2021-03-12 NOTE — TELEPHONE ENCOUNTER
S/w pt's wife, Mitch Gravely  Advised of above  Bina verbalized understanding and appreciation  Will cb if se's or questions arise  Moved ov to 4/27 at 1300

## 2021-03-12 NOTE — TELEPHONE ENCOUNTER
S/w pt's wife kate  Discussed above  Kate verbalized agreement  Advised kate, will make DG aware  Anticipate lyrica and prednisone will be called in to the pharmacy  The writer will cb to review the instructions for both of these medications  Kate verbalized understanding and appreciation  Will await the writers call back

## 2021-03-17 ENCOUNTER — IMMUNIZATIONS (OUTPATIENT)
Dept: FAMILY MEDICINE CLINIC | Facility: HOSPITAL | Age: 66
End: 2021-03-17

## 2021-03-17 ENCOUNTER — TELEPHONE (OUTPATIENT)
Dept: PAIN MEDICINE | Facility: CLINIC | Age: 66
End: 2021-03-17

## 2021-03-17 DIAGNOSIS — Z23 ENCOUNTER FOR IMMUNIZATION: Primary | ICD-10-CM

## 2021-03-17 PROCEDURE — 0001A SARS-COV-2 / COVID-19 MRNA VACCINE (PFIZER-BIONTECH) 30 MCG: CPT

## 2021-03-17 PROCEDURE — 91300 SARS-COV-2 / COVID-19 MRNA VACCINE (PFIZER-BIONTECH) 30 MCG: CPT

## 2021-03-18 NOTE — TELEPHONE ENCOUNTER
LMOM to cb  Provided cb number and office hours  NOTE:  Per 3/12 note from DG: He is to titrate off of the Gabapentin as discussed and once he is off of the Gabapentin he can start the Lyrica 50 mg as prescribed and follow the titration on the bottle

## 2021-03-18 NOTE — TELEPHONE ENCOUNTER
Pt called and finished off Prednisone and states he seemed ok after a couple days  Now has numbness in lower left leg and throwing his balance off  And seems like he is walking like a duck  Do you want him to start on the Lyrica today or wait until tomorrow?     Pt # [phone]

## 2021-03-18 NOTE — TELEPHONE ENCOUNTER
S/w pt, he said he has been off the Gabapentin for a few days and wants to know if he should start the Lyrica, RN advised pt he can start and should call with any s/e

## 2021-03-23 ENCOUNTER — TELEPHONE (OUTPATIENT)
Dept: PAIN MEDICINE | Facility: CLINIC | Age: 66
End: 2021-03-23

## 2021-03-23 ENCOUNTER — TELEPHONE (OUTPATIENT)
Dept: FAMILY MEDICINE CLINIC | Facility: HOSPITAL | Age: 66
End: 2021-03-23

## 2021-03-23 DIAGNOSIS — M51.36 DDD (DEGENERATIVE DISC DISEASE), LUMBAR: Primary | ICD-10-CM

## 2021-03-23 DIAGNOSIS — G89.4 CHRONIC PAIN SYNDROME: ICD-10-CM

## 2021-03-23 DIAGNOSIS — M48.062 SPINAL STENOSIS OF LUMBAR REGION WITH NEUROGENIC CLAUDICATION: ICD-10-CM

## 2021-03-23 DIAGNOSIS — M54.16 LUMBAR RADICULOPATHY: ICD-10-CM

## 2021-03-23 NOTE — TELEPHONE ENCOUNTER
Saulo Fong says she needs a referral from [facility] for the patient to see Lenore Villafana, neurosurgeon

## 2021-03-23 NOTE — TELEPHONE ENCOUNTER
S/w Nichole Jarvis, who states she just listened to our message  RN reviewed DG's message w/ Nichole Jarvis verbalized understanding and was appreciative of the information, she will try her PCP for the [facility] paperwork

## 2021-03-23 NOTE — TELEPHONE ENCOUNTER
Wife asking if our office will complete FMLA forms for her employer  Forms are needed so she is able to take the time off to accompany Luna Severance to his many appts for his back pain and upcoming surgery consults  She previously asked Jay Lawton's office to complete forms and they directed her to PCP office  PCB and let her know

## 2021-03-23 NOTE — TELEPHONE ENCOUNTER
Alex Bergman ( wife )  called returning the nurses call  Please be advise thank you        Please call Alex Bergman back @ [phone]

## 2021-03-23 NOTE — TELEPHONE ENCOUNTER
Pts wife Jessie Crigler, wants to talk to a nurse regarding the patients care  Also has questions about [facility]# [phone]

## 2021-03-23 NOTE — TELEPHONE ENCOUNTER
S/w pt's wife, Cassi Kidd, she said pt went for an appt today at [facility] to discuss surgery, they said he is a candidate for surgery but they would like a second opinion with [facility] so she will be calling to make that appt  Cassi Kidd would like to know if DG would fill out ppw for her to file for intermittent FMLA through her job so she can take her  to all of his appts and in the future help with his surgery, she said she is almost out of PTO

## 2021-03-30 RX ORDER — PREGABALIN 50 MG/1
CAPSULE ORAL
Qty: 120 CAPSULE | Refills: 1
Start: 2021-03-30 | End: 2021-04-13

## 2021-03-30 NOTE — TELEPHONE ENCOUNTER
Patient states he still has a lot of low back pain the pregabalin (LYRICA) 50 mg capsule     Is not working what else do you recommend       Please advise,    [phone]

## 2021-03-30 NOTE — TELEPHONE ENCOUNTER
S/w pt, stated that he has been taking lyrica 50 mg tid since saturday with no se's and "not much" relief  Pt stated that she hs scheduled to see Dr Zulma Del Cid at the end of the month  Advised pt, it may take more time and possibly additional adjustments to the lyrica before the pt see's + relief with this medication  Advised pt to proceed w/ the cons w/ Dr Zulma Del Cid as scheduled and fu w/ SALLY as scheduled  The writer will discuss Lyrica w/ DG and cb to advise  Pt verbalized understanding and appreciatoin

## 2021-03-30 NOTE — TELEPHONE ENCOUNTER
He is still on a low dose of Lyrica  I would like him to increase it to Lyrica 50 mg 1 PO in AM and afternoon and 2 PO HS to see if that his more helpful  He should give us a f/u phone call in 1 week with an update as we still may need to go further as the therapeutic dose is 300 mg per day or more

## 2021-03-30 NOTE — TELEPHONE ENCOUNTER
S/w pt, advised of above  Pt verbalized understandign and appreciation  Will cb if questions / concerns arise

## 2021-04-08 ENCOUNTER — IMMUNIZATIONS (OUTPATIENT)
Dept: FAMILY MEDICINE CLINIC | Facility: HOSPITAL | Age: 66
End: 2021-04-08

## 2021-04-08 DIAGNOSIS — Z23 ENCOUNTER FOR IMMUNIZATION: Primary | ICD-10-CM

## 2021-04-08 PROCEDURE — 0002A SARS-COV-2 / COVID-19 MRNA VACCINE (PFIZER-BIONTECH) 30 MCG: CPT

## 2021-04-08 PROCEDURE — 91300 SARS-COV-2 / COVID-19 MRNA VACCINE (PFIZER-BIONTECH) 30 MCG: CPT

## 2021-04-12 ENCOUNTER — TELEPHONE (OUTPATIENT)
Dept: PAIN MEDICINE | Facility: MEDICAL CENTER | Age: 66
End: 2021-04-12

## 2021-04-12 NOTE — TELEPHONE ENCOUNTER
Pt called stating that his legs are starting to swell up and he would like to know if it has to do with his medication   Pt can be reached at [phone]

## 2021-04-13 NOTE — TELEPHONE ENCOUNTER
Breonna Donaldson  called returning the nurses call  Please be advise thank you        Please call patient back @ [phone]

## 2021-04-13 NOTE — TELEPHONE ENCOUNTER
S/w pt's wife, kate  Stated that the swelling in the pt's lower legs / ankles / feet is getting worse - especially the right leg which did not have a problem with swelling before  Per Kathy Ramos, pt is almost immoble s/t difficulty walking  Little to no relief w/ lyrica  Questioning a water pill or something to help with the swelling  Romeo Hardwick, tierra d/w DG and cb to advise  Kate verbalized understanding and appreciation

## 2021-04-13 NOTE — TELEPHONE ENCOUNTER
The swelling could be from the Lyrica  At this point, I would recommend he titrate off of the Lyrica as follows: Decrease to 1 pill 2 x daily x 4 days, then 1 pill daily x 4 days, then STOP  They should call us with an update once he is off of the Lyrica  I would recommend f/u with PCP regarding a diuretic/water pill, especially if the edema does not improve

## 2021-04-13 NOTE — TELEPHONE ENCOUNTER
S/w pt and his wife, advised of above  Both verbalized understanding and appreciation  Will proceed as directed

## 2021-04-21 NOTE — TELEPHONE ENCOUNTER
Patient is still having swelling in the left leg &foot  Pt is ok,but stated that he'll go toPCP for a water pill script if swelling continues   Thank you     cb [phone]

## 2021-04-27 ENCOUNTER — OFFICE VISIT (OUTPATIENT)
Dept: NEUROSURGERY | Facility: CLINIC | Age: 66
End: 2021-04-27
Payer: MEDICARE

## 2021-04-27 VITALS
HEIGHT: 73 IN | TEMPERATURE: 97.9 F | BODY MASS INDEX: 34.33 KG/M2 | RESPIRATION RATE: 16 BRPM | SYSTOLIC BLOOD PRESSURE: 120 MMHG | HEART RATE: 84 BPM | DIASTOLIC BLOOD PRESSURE: 75 MMHG | WEIGHT: 259 LBS

## 2021-04-27 DIAGNOSIS — G89.4 CHRONIC PAIN SYNDROME: ICD-10-CM

## 2021-04-27 DIAGNOSIS — R26.9 UNSPECIFIED ABNORMALITIES OF GAIT AND MOBILITY: Primary | ICD-10-CM

## 2021-04-27 DIAGNOSIS — M54.16 LUMBAR RADICULOPATHY: ICD-10-CM

## 2021-04-27 DIAGNOSIS — G95.9 MYELOPATHY (HCC): ICD-10-CM

## 2021-04-27 DIAGNOSIS — M51.36 DDD (DEGENERATIVE DISC DISEASE), LUMBAR: ICD-10-CM

## 2021-04-27 DIAGNOSIS — M48.062 SPINAL STENOSIS OF LUMBAR REGION WITH NEUROGENIC CLAUDICATION: ICD-10-CM

## 2021-04-27 PROCEDURE — 99204 OFFICE O/P NEW MOD 45 MIN: CPT | Performed by: NEUROLOGICAL SURGERY

## 2021-04-27 NOTE — PROGRESS NOTES
DISCUSSION SUMMARY  This is a 77 y o  male with profound balance difficulties and loss of position sense especially severe on the left lower extremity  It is possible that this is coming from narrowing of the neural foraminal at multiple levels but unlikely  More likely is a central cause for this type of ambulation such as cervical spinal stenosis  Is for these reasons that the myelopathy requires a workup with an MRI of the cervical spine  We will see him back in the office following the completion of the study  Return for follow-up after test       Diagnosis ICD-10-CM Associated Orders   1  Unspecified abnormalities of gait and mobility  R26 9 MRI cervical spine without contrast   2  DDD (degenerative disc disease), lumbar  M51 36 Ambulatory referral to Neurosurgery     MRI cervical spine without contrast   3  Lumbar radiculopathy  M54 16 Ambulatory referral to Neurosurgery     MRI cervical spine without contrast   4  Spinal stenosis of lumbar region with neurogenic claudication  M48 062 Ambulatory referral to Neurosurgery     MRI cervical spine without contrast   5  Chronic pain syndrome  G89 4 Ambulatory referral to Neurosurgery     MRI cervical spine without contrast   6  Myelopathy ([address] )  G95 9           Type of Visit: Consult       HPI: Patient presents for NP (2nd opinion) for LBP with MRI [facility] 11/16/20  In review - Patient states that he noticed like a stabbing pain in his low back initially but it was ignored because his main complaint is that of leg numbness  His symptoms are constant and daily  He initially went to his PCP who advised physical therapy  This was a year ago  He states that physical therapy provided very mild relief but didn't fix the issue  Then he was also seen by Pain Management and they gave his 2 epidural injections which did not help  He was eventually sent for an MRI of the lumbar spine  He was seen by [facility] (Kiesha Cavazos)   He recommended a lumbar fusion  They also which to have a second opinion and presented to our office  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 and has had a tremendous worsening in his ability to walk  He loses balance constantly  He has back pain with progressive leg pain weakness and numbness the more he walks  He denies neck pain  He did do physical therapy in the past about a year ago which was ineffective for him  He has gone through 2 epidural steroid injections neither of which was effective for him  He can now walk only short distances  He uses a cane for ambulation because of concerns of falling  I understand that he has been offered a decompression fusion fixation  Review of Systems   Constitutional: Positive for activity change (unable to do his normal acitivites due to leg numbness)  HENT: Negative  Eyes: Negative  Respiratory: Negative  Cardiovascular: Negative  Gastrointestinal: Negative  Endocrine: Negative  Genitourinary: Negative  Musculoskeletal: Positive for back pain (he states that when he goes from sitting or lying position to standing he will feel a sharp pain in his low back which last for a couple minutes and it goes away) and gait problem (very limited)  NO recent PT; only a year ago and he did 5 weeks and doing exercises at home with slight relief  GINA: x2, without relief of symptoms  Allergic/Immunologic: Negative  Neurological: Positive for weakness (bilateral legs) and numbness (L>R from the knee down to the feet)  Hematological: Negative  Psychiatric/Behavioral: Negative  All other systems reviewed and are negative  I reviewed the ROS        Vitals:    /75 (BP Location: Right arm, Patient Position: Sitting, Cuff Size: Standard)   Pulse 84   Temp 97 9 °F (36 6 °C) (Probe) Comment: Spouse: 98 1  Resp 16   Ht 6' 1" (1 854 m)   Wt 117 kg (259 lb)   BMI 34 17 kg/m²       MEDICAL HISTORY  Past Medical History:   Diagnosis Date    Abscess of back 03/01/2018    Benign essential hypertension     Last Assessed:12/19/16; Pt reports heart and BP has been "fine" as of 4/16/2020    Cirrhosis (HCC)     Cyst of skin     x6 from neck down back area    Esophageal varices (HCC)     Macrocytosis     Last Assessed:1/16/17    Major depression, chronic     Last Assessed:12/21/17    Major depression, chronic 6/19/2017    Personal history of colonic polyps      Past Surgical History:   Procedure Laterality Date    CHOLECYSTECTOMY  11/2018    CHOLECYSTECTOMY LAPAROSCOPIC N/A 11/21/2018    Procedure: CHOLECYSTECTOMY LAPAROSCOPIC, wedge liver biopsy;  Surgeon: Gideon Rizo MD;  [facility];  Service: General    COLONOSCOPY  05/2011    COLONOSCOPY  05/2016    EGD  01/2019    Esophagitis, esophageal varices    GALLBLADDER SURGERY      INCISION AND DRAINAGE OF WOUND N/A 03/01/2018    SEBACEOUS CYST ABSCESS OF BACK-VIJAYA MONZON MD    [place] EXC SKIN BENIG 1 1-2 CM TRUNK,ARM,LEG N/A 8/22/2018    Procedure: EXCISION  BIOPSY LESION/MASS BACK X4 NECK X1;  Surgeon: Gideon Rizo MD;  [facility];  Service: General     Social History     Tobacco Use    Smoking status: Never Smoker    Smokeless tobacco: Never Used   Substance Use Topics    Alcohol use: Not Currently     Comment: 7/19/19-last drink 12/2018- Occasionally/1-2 drinks per weekend    Drug use: No        Current Outpatient Medications:     buPROPion (WELLBUTRIN XL) 150 mg 24 hr tablet, Take 1 tablet (150 mg total) by mouth daily, Disp: 90 tablet, Rfl: 2    Cholecalciferol (CVS VITAMIN D3) 1000 units capsule, Take 1,000 Units by mouth daily , Disp: , Rfl:     Flaxseed, Linseed, (FLAX SEED OIL PO), Take by mouth daily , Disp: , Rfl:     nadolol (CORGARD) 20 mg tablet, Take 1 tablet (20 mg total) by mouth daily, Disp: 90 tablet, Rfl: 3     No Known Allergies     The following portions of the patient's history were updated by MA and reviewed by MD: allergies, current medications, past family history, past medical history, past social history, past surgical history and problem list       Physical Exam  Vitals signs and nursing note reviewed  Constitutional:       General: He is not in acute distress  Appearance: Normal appearance  He is normal weight  He is not ill-appearing, toxic-appearing or diaphoretic  HENT:      Head: Normocephalic and atraumatic  Nose: Nose normal    Eyes:      Extraocular Movements: Extraocular movements intact  Pupils: Pupils are equal, round, and reactive to light  Neck:      Musculoskeletal: Normal range of motion  Neck rigidity present  Musculoskeletal: Normal range of motion  General: No swelling, tenderness, deformity or signs of injury  Right lower leg: No edema  Left lower leg: No edema  Skin:     General: Skin is warm and dry  Neurological:      General: No focal deficit present  Mental Status: He is alert and oriented to person, place, and time  Mental status is at baseline  Cranial Nerves: No cranial nerve deficit  Sensory: Sensory deficit [place] testing is very poor in the left lower extremity for position sense ) present  Motor: Weakness present  Coordination: Coordination normal       Gait: Gait abnormal ( Weakness of the lower extremities bilaterally very unbalanced gait  He is unable to perform tandem gait)  Deep Tendon Reflexes: Reflexes abnormal ( deep tendon reflexes are absent in the legs as well as the upper extremities)  Psychiatric:         Mood and Affect: Mood normal          Behavior: Behavior normal          Thought Content: Thought content normal          Judgment: Judgment normal             RESULTS/DATA  I have personally reviewed pertinent films in PACS   An MRI of the LS spine from 16 November 2020 is compared with a CT scan from November 21 2018     This study demonstrates progression of the Schmorl node abnormality at the L1 region and a new Schmorl's node at L3  There is severe facet arthropathy bilaterally and some central canal narrowing although there are no levels with severe spinal stenosis  An EMG nerve conduction study is carefully reviewed  This demonstrates the suggestion of polyneuropathy as well as poly radiculopathy  The polyneuropathy could explain baseline numbness however poly radiculopathy would be secondary to nerve root impingement  I believe in the end it is likely that he has a product of both of these

## 2021-04-27 NOTE — H&P (VIEW-ONLY)
DISCUSSION SUMMARY  This is a 77 y o  male with profound balance difficulties and loss of position sense especially severe on the left lower extremity  It is possible that this is coming from narrowing of the neural foraminal at multiple levels but unlikely  More likely is a central cause for this type of ambulation such as cervical spinal stenosis  Is for these reasons that the myelopathy requires a workup with an MRI of the cervical spine  We will see him back in the office following the completion of the study  Return for follow-up after test       Diagnosis ICD-10-CM Associated Orders   1  Unspecified abnormalities of gait and mobility  R26 9 MRI cervical spine without contrast   2  DDD (degenerative disc disease), lumbar  M51 36 Ambulatory referral to Neurosurgery     MRI cervical spine without contrast   3  Lumbar radiculopathy  M54 16 Ambulatory referral to Neurosurgery     MRI cervical spine without contrast   4  Spinal stenosis of lumbar region with neurogenic claudication  M48 062 Ambulatory referral to Neurosurgery     MRI cervical spine without contrast   5  Chronic pain syndrome  G89 4 Ambulatory referral to Neurosurgery     MRI cervical spine without contrast   6  Myelopathy ([address] )  G95 9           Type of Visit: Consult       HPI: Patient presents for NP (2nd opinion) for LBP with MRI [facility] 11/16/20  In review - Patient states that he noticed like a stabbing pain in his low back initially but it was ignored because his main complaint is that of leg numbness  His symptoms are constant and daily  He initially went to his PCP who advised physical therapy  This was a year ago  He states that physical therapy provided very mild relief but didn't fix the issue  Then he was also seen by Pain Management and they gave his 2 epidural injections which did not help  He was eventually sent for an MRI of the lumbar spine  He was seen by 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 and has had a tremendous worsening in his ability to walk  He loses balance constantly  He has back pain with progressive leg pain weakness and numbness the more he walks  He denies neck pain  He did do physical therapy in the past about a year ago which was ineffective for him  He has gone through 2 epidural steroid injections neither of which was effective for him  He can now walk only short distances  He uses a cane for ambulation because of concerns of falling  I understand that he has been offered a decompression fusion fixation  Review of Systems   Constitutional: Positive for activity change (unable to do his normal acitivites due to leg numbness)  HENT: Negative  Eyes: Negative  Respiratory: Negative  Cardiovascular: Negative  Gastrointestinal: Negative  Endocrine: Negative  Genitourinary: Negative  Musculoskeletal: Positive for back pain (he states that when he goes from sitting or lying position to standing he will feel a sharp pain in his low back which last for a couple minutes and it goes away) and gait problem (very limited)  NO recent PT; only a year ago and he did 5 weeks and doing exercises at home with slight relief  GINA: x2, without relief of symptoms  Allergic/Immunologic: Negative  Neurological: Positive for weakness (bilateral legs) and numbness (L>R from the knee down to the feet)  Hematological: Negative  Psychiatric/Behavioral: Negative  All other systems reviewed and are negative  I reviewed the ROS        Vitals:    /75 (BP Location: Right arm, Patient Position: Sitting, Cuff Size: Standard)   Pulse 84   Temp 97 9 °F (36 6 °C) (Probe) Comment: Spouse: 98 1  Resp 16   Ht 6' 1" (1 854 m)   Wt 117 kg (259 lb)   BMI 34 17 kg/m²       MEDICAL HISTORY  Past Medical History:   Diagnosis Date    Abscess of back 03/01/2018    Benign essential hypertension     Last Assessed:12/19/16; Pt reports heart and BP has been "fine" as of 4/16/2020    Cirrhosis (HCC)     Cyst of skin     x6 from neck down back area    Esophageal varices (HCC)     Macrocytosis     Last Assessed:1/16/17    Major depression, chronic     Last Assessed:12/21/17    Major depression, chronic 6/19/2017    Personal history of colonic polyps      Past Surgical History:   Procedure Laterality Date    CHOLECYSTECTOMY  11/2018    CHOLECYSTECTOMY LAPAROSCOPIC N/A 11/21/2018    Procedure: CHOLECYSTECTOMY LAPAROSCOPIC, wedge liver biopsy;  Surgeon: Pao Pollock MD;  [facility];  Service: General    COLONOSCOPY  05/2011    COLONOSCOPY  05/2016    EGD  01/2019    Esophagitis, esophageal varices    GALLBLADDER SURGERY      INCISION AND DRAINAGE OF WOUND N/A 03/01/2018    SEBACEOUS CYST ABSCESS OF BACK-VIJAYA MONZON MD    [place] EXC SKIN BENIG 1 1-2 CM TRUNK,ARM,LEG N/A 8/22/2018    Procedure: EXCISION  BIOPSY LESION/MASS BACK X4 NECK X1;  Surgeon: Pao Pollock MD;  [facility];  Service: General     Social History     Tobacco Use    Smoking status: Never Smoker    Smokeless tobacco: Never Used   Substance Use Topics    Alcohol use: Not Currently     Comment: 7/19/19-last drink 12/2018- Occasionally/1-2 drinks per weekend    Drug use: No        Current Outpatient Medications:     buPROPion (WELLBUTRIN XL) 150 mg 24 hr tablet, Take 1 tablet (150 mg total) by mouth daily, Disp: 90 tablet, Rfl: 2    Cholecalciferol (CVS VITAMIN D3) 1000 units capsule, Take 1,000 Units by mouth daily , Disp: , Rfl:     Flaxseed, Linseed, (FLAX SEED OIL PO), Take by mouth daily , Disp: , Rfl:     nadolol (CORGARD) 20 mg tablet, Take 1 tablet (20 mg total) by mouth daily, Disp: 90 tablet, Rfl: 3     No Known Allergies     The following portions of the patient's history were updated by MA and reviewed by MD: allergies, current medications, past family history, past medical history, past social history, past surgical history and problem list       Physical Exam  Vitals signs and nursing note reviewed  Constitutional:       General: He is not in acute distress  Appearance: Normal appearance  He is normal weight  He is not ill-appearing, toxic-appearing or diaphoretic  HENT:      Head: Normocephalic and atraumatic  Nose: Nose normal    Eyes:      Extraocular Movements: Extraocular movements intact  Pupils: Pupils are equal, round, and reactive to light  Neck:      Musculoskeletal: Normal range of motion  Neck rigidity present  Musculoskeletal: Normal range of motion  General: No swelling, tenderness, deformity or signs of injury  Right lower leg: No edema  Left lower leg: No edema  Skin:     General: Skin is warm and dry  Neurological:      General: No focal deficit present  Mental Status: He is alert and oriented to person, place, and time  Mental status is at baseline  Cranial Nerves: No cranial nerve deficit  Sensory: Sensory deficit [place] testing is very poor in the left lower extremity for position sense ) present  Motor: Weakness present  Coordination: Coordination normal       Gait: Gait abnormal ( Weakness of the lower extremities bilaterally very unbalanced gait  He is unable to perform tandem gait)  Deep Tendon Reflexes: Reflexes abnormal ( deep tendon reflexes are absent in the legs as well as the upper extremities)  Psychiatric:         Mood and Affect: Mood normal          Behavior: Behavior normal          Thought Content: Thought content normal          Judgment: Judgment normal             RESULTS/DATA  I have personally reviewed pertinent films in PACS   An MRI of the LS spine from 16 November 2020 is compared with a CT scan from November 21 2018     This study demonstrates progression of the Schmorl node abnormality at the L1 region and a new Schmorl's node at L3  There is severe facet arthropathy bilaterally and some central canal narrowing although there are no levels with severe spinal stenosis  An EMG nerve conduction study is carefully reviewed  This demonstrates the suggestion of polyneuropathy as well as poly radiculopathy  The polyneuropathy could explain baseline numbness however poly radiculopathy would be secondary to nerve root impingement  I believe in the end it is likely that he has a product of both of these

## 2021-05-03 ENCOUNTER — HOSPITAL ENCOUNTER (OUTPATIENT)
Dept: MRI IMAGING | Facility: HOSPITAL | Age: 66
Discharge: HOME | End: 2021-05-03
Attending: NEUROLOGICAL SURGERY
Payer: MEDICARE

## 2021-05-03 VITALS — WEIGHT: 260 LBS | BODY MASS INDEX: 34.46 KG/M2 | HEIGHT: 73 IN

## 2021-05-03 DIAGNOSIS — M54.16 LUMBAR RADICULOPATHY: ICD-10-CM

## 2021-05-03 DIAGNOSIS — R26.9 UNSPECIFIED ABNORMALITIES OF GAIT AND MOBILITY: ICD-10-CM

## 2021-05-03 DIAGNOSIS — G89.4 CHRONIC PAIN SYNDROME: ICD-10-CM

## 2021-05-03 DIAGNOSIS — M51.36 DDD (DEGENERATIVE DISC DISEASE), LUMBAR: ICD-10-CM

## 2021-05-03 DIAGNOSIS — M48.062 SPINAL STENOSIS OF LUMBAR REGION WITH NEUROGENIC CLAUDICATION: ICD-10-CM

## 2021-05-03 PROCEDURE — G1004 CDSM NDSC: HCPCS

## 2021-05-03 PROCEDURE — 72141 MRI NECK SPINE W/O DYE: CPT

## 2021-05-03 RX ORDER — GUARN/MA-HUANG/P.GIN/S.GINSENG
1 TABLET ORAL DAILY
COMMUNITY
End: 2021-09-14 | Stop reason: ALTCHOICE

## 2021-05-03 NOTE — TELEPHONE ENCOUNTER
Why does your doctor want you to have this procedure? Hx polyps; esophageal varices; GERD    Do you have kidney disease?  no  If yes, are you on dialysis :     Have you had diverticulitis within the past 2 months? no    Are you diabetic?  no  If yes, insulin dependent: If yes, provide diabetic instructions sheet     Do take iron supplements?  no  If yes, instruct patient to hold iron supplement for 7 days prior    Are you on a blood thinner? no   Was the blood thinner sheet complete and faxed to cardiologist no  Plavix (clopidogrel), Coumadin (warfarin), Lovenox (enoxaparin), Xarelto (rivaroxaban), Pradaxa(dabigatran), Eliquis(apixaban) Savaysa/Lixiana (edoxapan)    Do you have an automatic implantable cardiac defibrillator (AICD)/pacemaker ([facility])? no  Was AICD/pacemaker sheet completed and faxed to cardiologist? no    Are you on home oxygen? no  If yes, continuous or nocturnal:     Have you been treated for MRSA, VRE or any communicable diseases? no    Heart attack, stroke, or stent within 3 months? no  Schedule at Hospital if within 3-6 months   Use nitroglycerin for chest pain in the last 6 months? no    History of organ  transplant?  no   If yes, notify Endo      History of neck/throat/tongue surgery or cancer? no  IF yes, notify Endo      Any problems with anesthesia in the past? no     Was stool C diff ordered?  no Stool specimen needs to be completed prior to procedure    Do have any facial or body piercings?no     Do you have a latex allergy? no     Do have an allergy to metals? (Bravo study only) no     If pediatric patient, was consent faxed to pediatrician no     Patient rights reviewed yes     Combo phone prep completed; miralax instructions reivewed and emailed to pt

## 2021-05-04 ENCOUNTER — OFFICE VISIT (OUTPATIENT)
Dept: FAMILY MEDICINE CLINIC | Facility: HOSPITAL | Age: 66
End: 2021-05-04
Payer: MEDICARE

## 2021-05-04 VITALS
OXYGEN SATURATION: 97 % | BODY MASS INDEX: 34.75 KG/M2 | DIASTOLIC BLOOD PRESSURE: 72 MMHG | TEMPERATURE: 98 F | HEART RATE: 88 BPM | WEIGHT: 262.2 LBS | HEIGHT: 73 IN | SYSTOLIC BLOOD PRESSURE: 124 MMHG

## 2021-05-04 DIAGNOSIS — R73.9 HYPERGLYCEMIA: ICD-10-CM

## 2021-05-04 DIAGNOSIS — M79.89 LEG SWELLING: ICD-10-CM

## 2021-05-04 DIAGNOSIS — D61.818 ACQUIRED PANCYTOPENIA (HCC): ICD-10-CM

## 2021-05-04 DIAGNOSIS — F32.9 MAJOR DEPRESSION, CHRONIC: Primary | ICD-10-CM

## 2021-05-04 DIAGNOSIS — Z12.5 PROSTATE CANCER SCREENING: ICD-10-CM

## 2021-05-04 DIAGNOSIS — D69.6 ACQUIRED THROMBOCYTOPENIA (HCC): ICD-10-CM

## 2021-05-04 DIAGNOSIS — Z13.220 NEED FOR LIPID SCREENING: ICD-10-CM

## 2021-05-04 PROCEDURE — 99214 OFFICE O/P EST MOD 30 MIN: CPT | Performed by: NURSE PRACTITIONER

## 2021-05-04 RX ORDER — HYDROCHLOROTHIAZIDE 12.5 MG/1
12.5 TABLET ORAL DAILY
Qty: 30 TABLET | Refills: 1 | Status: SHIPPED | OUTPATIENT
Start: 2021-05-04 | End: 2021-09-14 | Stop reason: SDUPTHER

## 2021-05-04 NOTE — PROGRESS NOTES
Assessment/Plan:    Acquired thrombocytopenia [facility])  Order given to recheck CBC w/labs due in Sept    Acquired pancytopenia [facility])  Order given to recheck CBC w/labs due in Sept    Major depression, chronic  Mood stable on daily bupropion w/PHQ score of 4  Continue same dose as pt desires  Return in 4 months for next med check  Call sooner w/any mood concerns/changes  Diagnoses and all orders for this visit:    Major depression, chronic    Acquired pancytopenia (HCC)  -     CBC and differential; Future  -     Comprehensive metabolic panel; Future  -     CBC and differential  -     Comprehensive metabolic panel    Acquired thrombocytopenia (HCC)  -     CBC and differential; Future  -     Comprehensive metabolic panel; Future  -     CBC and differential  -     Comprehensive metabolic panel    Hyperglycemia  -     Hemoglobin A1C; Future  -     Comprehensive metabolic panel; Future  -     Hemoglobin A1C  -     Comprehensive metabolic panel    Need for lipid screening  -     Lipid panel; Future  -     Lipid panel    Prostate cancer screening  -     PSA, Serum (Serial Monitor); Future  -     PSA, Serum (Serial Monitor)    Leg swelling  Comments:  use HCTZ as rx'd, advise adequate hydration w/water and limit salt intake  Orders:  -     hydrochlorothiazide (HYDRODIURIL) 12 5 mg tablet; Take 1 tablet (12 5 mg total) by mouth daily As needed for leg swelling     Update colonoscopy on 5/10 as scheduleded     Subjective:      Patient ID: Jaziel Mccray is a 77 y o  male  Here for routine follow up  Has ongoing concerns for back pain  Received MRI of cervical spine yesterday  LV recommended surgery, patient sought out second opinion from [facility] Dr Lashay Chase  Following up next week  Complains of numbness in lower extremities as well, L>R  Unable to complete ADLs like he used too, ability to walk is slightly limited and patient has been ambulating with a cane    Feels well on the Wellbutrin although feels a lot of 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  Mother passed about a year ago  States he is noting more swelling in his legs some days  Had been on a water pill previously but has since stopped taking  Does consume salt but he feels is minimal       The following portions of the patient's history were reviewed and updated as appropriate: allergies, current medications, past family history, past medical history, past social history, past surgical history and problem list     Review of Systems   Constitutional: Positive for activity change (unable to complete normal ADLs; ambulating with cane)  Negative for fatigue and fever  HENT: Negative for congestion, sinus pressure, sinus pain and sore throat  Eyes: Negative for photophobia and visual disturbance  Respiratory: Negative for chest tightness, shortness of breath and wheezing  Cardiovascular: Positive for leg swelling  Negative for chest pain and palpitations  Gastrointestinal: Negative for abdominal pain, diarrhea, nausea and vomiting  Genitourinary: Negative for difficulty urinating, frequency and hematuria  Musculoskeletal: Positive for back pain and neck pain  Negative for joint swelling and myalgias  Skin: Negative for pallor and rash  Neurological: Positive for numbness (BL LE, L>R)  Negative for dizziness, tremors, speech difficulty, weakness, light-headedness and headaches  Psychiatric/Behavioral: Negative for behavioral problems, dysphoric mood, hallucinations and sleep disturbance  The patient is not nervous/anxious  Objective:      /72 (BP Location: Left arm, Patient Position: Sitting, Cuff Size: Standard)   Pulse 88   Temp 98 °F (36 7 °C) (Tympanic)   Ht 6' 1" (1 854 m)   Wt 119 kg (262 lb 3 2 oz)   SpO2 97%   BMI 34 59 kg/m²          Physical Exam  Vitals signs reviewed  Constitutional:       General: He is not in acute distress  Appearance: Normal appearance  He is obese  He is not toxic-appearing     HENT: Head: Normocephalic  Mouth/Throat:      Mouth: Mucous membranes are moist       Pharynx: Oropharynx is clear  Eyes:      General: No scleral icterus  Pupils: Pupils are equal, round, and reactive to light  Neck:      Musculoskeletal: Full passive range of motion without pain  Thyroid: No thyromegaly  Vascular: No carotid bruit  Cardiovascular:      Rate and Rhythm: Normal rate and regular rhythm  Pulses: Normal pulses  Heart sounds: Normal heart sounds  Pulmonary:      Effort: Pulmonary effort is normal  No respiratory distress  Breath sounds: Normal breath sounds  Abdominal:      General: Bowel sounds are normal       Palpations: Abdomen is soft  Musculoskeletal:      Right lower leg: Edema (trace) present  Left lower leg: Edema (trace) present  Skin:     General: Skin is warm and dry  Capillary Refill: Capillary refill takes less than 2 seconds  Neurological:      General: No focal deficit present  Mental Status: He is alert and oriented to person, place, and time  GCS: GCS eye subscore is 4  GCS verbal subscore is 5  GCS motor subscore is 6  Sensory: Sensation is intact  Motor: Weakness (lower extremities) present  Gait: Gait abnormal (unbalanced, ambulates w/cane)  Psychiatric:         Mood and Affect: Mood normal          Behavior: Behavior normal          Thought Content: Thought content normal          Judgment: Judgment normal          SWATHI-7 Flowsheet Screening      Most Recent Value   Over the last two weeks, how often have you been bothered by the following problems?     Feeling nervous, anxious, or on edge  1   Not being able to stop or control worrying  0   Worrying too much about different things  1   Trouble relaxing   0   Being so restless that it's hard to sit still  0   Becoming easily annoyed or irritable   0   Feeling afraid as if something awful might happen  1   How difficult have these problems made it for you to do your work, take care of things at home, or get along with other people? Somewhat difficult   SWATHI Score   3            PHQ-9 Depression Screening    PHQ-9:   Frequency of the following problems over the past two weeks:      Little interest or pleasure in doing things: 1 - several days  Feeling down, depressed, or hopeless: 0 - not at all  Trouble falling or staying asleep, or sleeping too much: 2 - more than half the days  Feeling tired or having little energy: 1 - several days  Poor appetite or overeatin - not at all  Feeling bad about yourself - or that you are a failure or have let yourself or your family down: 0 - not at all  Trouble concentrating on things, such as reading the newspaper or watching television: 0 - not at all  Moving or speaking so slowly that other people could have noticed   Or the opposite - being so fidgety or restless that you have been moving around a lot more than usual: 0 - not at all  Thoughts that you would be better off dead, or of hurting yourself in some way: 0 - not at all  PHQ-2 Score: 1  PHQ-9 Score: 4

## 2021-05-04 NOTE — ASSESSMENT & PLAN NOTE
Mood stable on daily bupropion w/PHQ score of 4  Continue same dose as pt desires  Return in 4 months for next med check  Call sooner w/any mood concerns/changes

## 2021-05-06 ENCOUNTER — OFFICE VISIT (OUTPATIENT)
Dept: NEUROSURGERY | Facility: CLINIC | Age: 66
End: 2021-05-06
Payer: MEDICARE

## 2021-05-06 VITALS
TEMPERATURE: 98 F | WEIGHT: 262 LBS | HEIGHT: 73 IN | DIASTOLIC BLOOD PRESSURE: 77 MMHG | BODY MASS INDEX: 34.72 KG/M2 | HEART RATE: 81 BPM | SYSTOLIC BLOOD PRESSURE: 116 MMHG | RESPIRATION RATE: 16 BRPM

## 2021-05-06 DIAGNOSIS — M48.02 CERVICAL SPINAL STENOSIS: Primary | ICD-10-CM

## 2021-05-06 PROCEDURE — 99213 OFFICE O/P EST LOW 20 MIN: CPT | Performed by: NEUROLOGICAL SURGERY

## 2021-05-06 RX ORDER — CEFAZOLIN SODIUM 2 G/50ML
2000 SOLUTION INTRAVENOUS ONCE
Status: CANCELLED | OUTPATIENT
Start: 2021-05-06 | End: 2021-05-06

## 2021-05-06 RX ORDER — CHLORHEXIDINE GLUCONATE 0.12 MG/ML
15 RINSE ORAL ONCE
Status: CANCELLED | OUTPATIENT
Start: 2021-05-06 | End: 2021-05-06

## 2021-05-10 ENCOUNTER — LAB (OUTPATIENT)
Dept: LAB | Facility: HOSPITAL | Age: 66
End: 2021-05-10
Attending: NEUROLOGICAL SURGERY
Payer: MEDICARE

## 2021-05-10 ENCOUNTER — LAB REQUISITION (OUTPATIENT)
Dept: LAB | Facility: HOSPITAL | Age: 66
End: 2021-05-10
Payer: MEDICARE

## 2021-05-10 ENCOUNTER — TELEPHONE (OUTPATIENT)
Dept: FAMILY MEDICINE CLINIC | Facility: HOSPITAL | Age: 66
End: 2021-05-10

## 2021-05-10 ENCOUNTER — HOSPITAL ENCOUNTER (OUTPATIENT)
Dept: GASTROENTEROLOGY | Facility: AMBULATORY SURGERY CENTER | Age: 66
Discharge: HOME | End: 2021-05-10

## 2021-05-10 ENCOUNTER — TRANSCRIBE ORDERS (OUTPATIENT)
Dept: ADMINISTRATIVE | Facility: HOSPITAL | Age: 66
End: 2021-05-10

## 2021-05-10 DIAGNOSIS — Z01.818 ENCOUNTER FOR OTHER PREPROCEDURAL EXAMINATION: ICD-10-CM

## 2021-05-10 DIAGNOSIS — Z01.818 OTHER SPECIFIED PRE-OPERATIVE EXAMINATION: Primary | ICD-10-CM

## 2021-05-10 DIAGNOSIS — M48.02 CERVICAL SPINAL STENOSIS: ICD-10-CM

## 2021-05-10 DIAGNOSIS — Z01.818 OTHER SPECIFIED PRE-OPERATIVE EXAMINATION: ICD-10-CM

## 2021-05-10 LAB
ABO GROUP BLD: NORMAL
ALBUMIN SERPL BCP-MCNC: 3.4 G/DL (ref 3.5–5)
ALP SERPL-CCNC: 107 U/L (ref 46–116)
ALT SERPL W P-5'-P-CCNC: 44 U/L (ref 12–78)
ANION GAP SERPL CALCULATED.3IONS-SCNC: 3 MMOL/L (ref 4–13)
APTT PPP: 32 SECONDS (ref 23–37)
AST SERPL W P-5'-P-CCNC: 53 U/L (ref 5–45)
BASOPHILS # BLD AUTO: 0.01 THOUSANDS/ΜL (ref 0–0.1)
BASOPHILS NFR BLD AUTO: 0 % (ref 0–1)
BILIRUB SERPL-MCNC: 1.9 MG/DL (ref 0.2–1)
BILIRUB UR QL STRIP: NEGATIVE
BLD GP AB SCN SERPL QL: NEGATIVE
BUN SERPL-MCNC: 13 MG/DL (ref 5–25)
CALCIUM ALBUM COR SERPL-MCNC: 10 MG/DL (ref 8.3–10.1)
CALCIUM SERPL-MCNC: 9.5 MG/DL (ref 8.3–10.1)
CHLORIDE SERPL-SCNC: 107 MMOL/L (ref 100–108)
CLARITY UR: CLEAR
CO2 SERPL-SCNC: 30 MMOL/L (ref 21–32)
COLOR UR: YELLOW
CREAT SERPL-MCNC: 0.82 MG/DL (ref 0.6–1.3)
EOSINOPHIL # BLD AUTO: 0.15 THOUSAND/ΜL (ref 0–0.61)
EOSINOPHIL NFR BLD AUTO: 3 % (ref 0–6)
ERYTHROCYTE [DISTWIDTH] IN BLOOD BY AUTOMATED COUNT: 13.2 % (ref 11.6–15.1)
GFR SERPL CREATININE-BSD FRML MDRD: 92 ML/MIN/1.73SQ M
GLUCOSE P FAST SERPL-MCNC: 99 MG/DL (ref 65–99)
GLUCOSE UR STRIP-MCNC: NEGATIVE MG/DL
HCT VFR BLD AUTO: 48.6 % (ref 36.5–49.3)
HGB BLD-MCNC: 16.5 G/DL (ref 12–17)
HGB UR QL STRIP.AUTO: NEGATIVE
IMM GRANULOCYTES # BLD AUTO: 0.01 THOUSAND/UL (ref 0–0.2)
IMM GRANULOCYTES NFR BLD AUTO: 0 % (ref 0–2)
INR PPP: 1.26 (ref 0.84–1.19)
KETONES UR STRIP-MCNC: NEGATIVE MG/DL
LEUKOCYTE ESTERASE UR QL STRIP: NEGATIVE
LYMPHOCYTES # BLD AUTO: 1.6 THOUSANDS/ΜL (ref 0.6–4.47)
LYMPHOCYTES NFR BLD AUTO: 32 % (ref 14–44)
MCH RBC QN AUTO: 34.4 PG (ref 26.8–34.3)
MCHC RBC AUTO-ENTMCNC: 34 G/DL (ref 31.4–37.4)
MCV RBC AUTO: 102 FL (ref 82–98)
MONOCYTES # BLD AUTO: 0.54 THOUSAND/ΜL (ref 0.17–1.22)
MONOCYTES NFR BLD AUTO: 11 % (ref 4–12)
NEUTROPHILS # BLD AUTO: 2.72 THOUSANDS/ΜL (ref 1.85–7.62)
NEUTS SEG NFR BLD AUTO: 54 % (ref 43–75)
NITRITE UR QL STRIP: NEGATIVE
NRBC BLD AUTO-RTO: 0 /100 WBCS
PH UR STRIP.AUTO: 7 [PH]
PLATELET # BLD AUTO: 95 THOUSANDS/UL (ref 149–390)
PMV BLD AUTO: 10.8 FL (ref 8.9–12.7)
POTASSIUM SERPL-SCNC: 4 MMOL/L (ref 3.5–5.3)
PROT SERPL-MCNC: 6.3 G/DL (ref 6.4–8.2)
PROT UR STRIP-MCNC: NEGATIVE MG/DL
PROTHROMBIN TIME: 15.8 SECONDS (ref 11.6–14.5)
RBC # BLD AUTO: 4.79 MILLION/UL (ref 3.88–5.62)
RH BLD: POSITIVE
SODIUM SERPL-SCNC: 140 MMOL/L (ref 136–145)
SP GR UR STRIP.AUTO: 1.01 (ref 1–1.03)
SPECIMEN EXPIRATION DATE: NORMAL
UROBILINOGEN UR QL STRIP.AUTO: 0.2 E.U./DL
WBC # BLD AUTO: 5.03 THOUSAND/UL (ref 4.31–10.16)

## 2021-05-10 PROCEDURE — 85025 COMPLETE CBC W/AUTO DIFF WBC: CPT

## 2021-05-10 PROCEDURE — 86900 BLOOD TYPING SEROLOGIC ABO: CPT | Performed by: NEUROLOGICAL SURGERY

## 2021-05-10 PROCEDURE — 83036 HEMOGLOBIN GLYCOSYLATED A1C: CPT

## 2021-05-10 PROCEDURE — 85610 PROTHROMBIN TIME: CPT

## 2021-05-10 PROCEDURE — 93005 ELECTROCARDIOGRAM TRACING: CPT

## 2021-05-10 PROCEDURE — 80053 COMPREHEN METABOLIC PANEL: CPT

## 2021-05-10 PROCEDURE — 86850 RBC ANTIBODY SCREEN: CPT | Performed by: NEUROLOGICAL SURGERY

## 2021-05-10 PROCEDURE — 36415 COLL VENOUS BLD VENIPUNCTURE: CPT

## 2021-05-10 PROCEDURE — 86901 BLOOD TYPING SEROLOGIC RH(D): CPT | Performed by: NEUROLOGICAL SURGERY

## 2021-05-10 PROCEDURE — 85730 THROMBOPLASTIN TIME PARTIAL: CPT

## 2021-05-10 PROCEDURE — 87081 CULTURE SCREEN ONLY: CPT

## 2021-05-10 PROCEDURE — 81003 URINALYSIS AUTO W/O SCOPE: CPT | Performed by: NEUROLOGICAL SURGERY

## 2021-05-10 NOTE — TELEPHONE ENCOUNTER
Patient having neurosurgery w/ dr Marie Hernández 5/24 and needs medical clearance    Just checking that you DO need to see him in the office? ?  I scheduled him for 5/14 assuming that you'll need to see him?

## 2021-05-11 LAB
ATRIAL RATE: 92 BPM
EST. AVERAGE GLUCOSE BLD GHB EST-MCNC: 105 MG/DL
HBA1C MFR BLD: 5.3 %
MRSA NOSE QL CULT: NORMAL
P AXIS: 34 DEGREES
PR INTERVAL: 180 MS
QRS AXIS: 29 DEGREES
QRSD INTERVAL: 72 MS
QT INTERVAL: 348 MS
QTC INTERVAL: 430 MS
T WAVE AXIS: 29 DEGREES
VENTRICULAR RATE: 92 BPM

## 2021-05-11 PROCEDURE — 93010 ELECTROCARDIOGRAM REPORT: CPT | Performed by: INTERNAL MEDICINE

## 2021-05-14 ENCOUNTER — OFFICE VISIT (OUTPATIENT)
Dept: FAMILY MEDICINE CLINIC | Facility: HOSPITAL | Age: 66
End: 2021-05-14
Payer: MEDICARE

## 2021-05-14 VITALS
TEMPERATURE: 98.1 F | WEIGHT: 264 LBS | HEIGHT: 73 IN | OXYGEN SATURATION: 97 % | BODY MASS INDEX: 34.99 KG/M2 | HEART RATE: 93 BPM | DIASTOLIC BLOOD PRESSURE: 78 MMHG | SYSTOLIC BLOOD PRESSURE: 122 MMHG

## 2021-05-14 DIAGNOSIS — M48.02 CERVICAL SPINAL STENOSIS: ICD-10-CM

## 2021-05-14 DIAGNOSIS — G95.9 MYELOPATHY (HCC): ICD-10-CM

## 2021-05-14 DIAGNOSIS — Z01.818 PREOP EXAMINATION: Primary | ICD-10-CM

## 2021-05-14 PROCEDURE — 99215 OFFICE O/P EST HI 40 MIN: CPT | Performed by: NURSE PRACTITIONER

## 2021-05-14 NOTE — PROGRESS NOTES
Gerda Escalera MD    NAME: Mike Bettencourt  AGE: 77 y o  SEX: male  : 1955     DATE: 2021    [facility] Pre-Operative Evaluation      Chief Complaint: Pre-operative Evaluation     Surgery: posterior cervical decompression fixation fusion C3-C5  Anticipated Date of Surgery: 21  Referring Provider: No ref  provider found       History of Present Illness:     Mike Bettencourt is a 77 y o  male who presents to the office today for a preoperative consultation at the request of surgeon, Dr Elmira Nicole, who plans on performing posterior cervical decompression laminectomy and lateral mass fixation fusion C3-5 on 21  Planned anesthesia is general  Patient has a bleeding risk of: no recent abnormal bleeding, no remote history of abnormal bleeding and no use of Ca-channel blockers  Patient does not have objections to receiving blood products if needed  Current anti-platelet/anti-coagulation medications that the patient is prescribed includes: none        Assessment of Chronic Conditions:   - Liver Cirrhosis: non-alcoholic, mild     Assessment of Cardiac Risk:  · Denies unstable or severe angina or MI in the last 6 weeks or history of stent placement in the last year   · Denies decompensated heart failure (e g  New onset heart failure, NYHA functional class IV heart failure, or worsening existing heart failure)  · Denies significant arrhythmias such as high grade AV block, symptomatic ventricular arrhythmia, newly recognized ventricular tachycardia, supraventricular tachycardia with resting heart rate >100, or symptomatic bradycardia  · Denies severe heart valve disease including aortic stenosis or symptomatic mitral stenosis     Exercise Capacity:  · Able to walk 4 blocks without symptoms?: No, because of LE weakness  · Able to walk 2 flights without symptoms?: No, because of LE weakness    Prior Anesthesia Reactions: No     Personal history of venous thromboembolic disease? No    History of steroid use for >2 weeks within last year? No         Review of Systems:     Review of Systems   Constitutional: Negative  HENT: Negative  Respiratory: Negative  Cardiovascular: Negative  Gastrointestinal: Negative  Musculoskeletal: Positive for back pain, gait problem and neck pain  Neurological: Positive for weakness  Psychiatric/Behavioral: The patient is nervous/anxious (re: surgery)          Current Problem List:     Patient Active Problem List   Diagnosis    Acquired thrombocytopenia ([place] [address] )    DDD (degenerative disc disease), lumbar    Acquired pancytopenia ([facility] )    Hypercholesterolemia    Major depression, chronic    Other cirrhosis of liver ([place] [address] )    Enlarged prostate    Esophageal varices determined by endoscopy ([place] [address] )    Colon cancer screening    Personal history of colonic polyps    SOB (shortness of breath) on exertion    Bilateral lower extremity edema    Spinal stenosis of lumbar region with neurogenic claudication    Lumbar radiculopathy    Chronic pain syndrome    Unspecified abnormalities of gait and mobility    Myelopathy (HCC)       Allergies:     No Known Allergies    Current Medications:       Current Outpatient Medications:     buPROPion (WELLBUTRIN XL) 150 mg 24 hr tablet, Take 1 tablet (150 mg total) by mouth daily, Disp: 90 tablet, Rfl: 2    Calcium 600-200 MG-UNIT per tablet, Take 1 tablet by mouth daily, Disp: , Rfl:     Cholecalciferol (CVS VITAMIN D3) 1000 units capsule, Take 1,000 Units by mouth daily , Disp: , Rfl:     Flaxseed, Linseed, (FLAX SEED OIL PO), Take by mouth daily , Disp: , Rfl:     hydrochlorothiazide (HYDRODIURIL) 12 5 mg tablet, Take 1 tablet (12 5 mg total) by mouth daily As needed for leg swelling, Disp: 30 tablet, Rfl: 1    nadolol (CORGARD) 20 mg tablet, Take 1 tablet (20 mg total) by mouth daily, Disp: 90 tablet, Rfl: 3    Past Medical History:       Past Medical History:   Diagnosis Date    Abscess of back 03/01/2018    Benign essential hypertension     Last Assessed:12/19/16; Pt reports heart and BP has been "fine" as of 4/16/2020    Cirrhosis (HCC)     Colon polyp     Cyst of skin     x6 from neck down back area    Depression     Esophageal varices (HCC)     Macrocytosis     Last Assessed:1/16/17    Major depression, chronic     Last Assessed:12/21/17    Major depression, chronic 6/19/2017    Personal history of colonic polyps         Past Surgical History:   Procedure Laterality Date    CHOLECYSTECTOMY  11/2018    CHOLECYSTECTOMY LAPAROSCOPIC N/A 11/21/2018    Procedure: CHOLECYSTECTOMY LAPAROSCOPIC, wedge liver biopsy;  Surgeon: Gillian Menon MD;  Location: [facility];  Service: General    COLONOSCOPY  05/2011    COLONOSCOPY  05/2016    COLONOSCOPY      EGD  01/2019    Esophagitis, esophageal varices    GALLBLADDER SURGERY      INCISION AND DRAINAGE OF WOUND N/A 03/01/2018    SEBACEOUS CYST ABSCESS OF BACK-VIJAYA MONZON MD    GA EXC SKIN BENIG 1 1-2 CM TRUNK,ARM,LEG N/A 8/22/2018    Procedure: EXCISION  BIOPSY LESION/MASS BACK X4 NECK X1;  Surgeon: Gillian Menon MD;  Location: [facility];  Service: General        Family History   Problem Relation Age of Onset    Alzheimer's disease Mother     Valvular heart disease Father     Stroke Brother         Mini    Valvular heart disease Brother     Colon cancer Neg Hx     Colon polyps Neg Hx         Social History     Socioeconomic History    Marital status: /Civil Union     Spouse name: Not on file    Number of children: Not on file    Years of education: Not on file    Highest education level: Not on file   Occupational History    Not on file   Social Needs    Financial resource strain: Not on file    Food insecurity     Worry: Not on file     Inability: Not on file    Transportation needs     Medical: Not on file     Non-medical: Not on file   Tobacco Use    Smoking status: Never Smoker    Smokeless tobacco: Never Used   Substance and Sexual Activity    Alcohol use: Not Currently     Comment: 7/19/19-last drink 12/2018- Occasionally/1-2 drinks per weekend    Drug use: No    Sexual activity: Not on file   Lifestyle    Physical activity     Days per week: Not on file     Minutes per session: Not on file    Stress: Not on file   Relationships    Social connections     Talks on phone: Not on file     Gets together: Not on file     Attends [employer] service: Not on file     Active member of club or organization: Not on file     Attends meetings of clubs or organizations: Not on file     Relationship status: Not on file    Intimate partner violence     Fear of current or ex partner: Not on file     Emotionally abused: Not on file     Physically abused: Not on file     Forced sexual activity: Not on file   Other Topics Concern    Not on file   Social History Narrative    Always uses seatbelt        Physical Exam:     /78 (Patient Position: Standing, Cuff Size: Large)   Pulse 93   Temp 98 1 °F (36 7 °C) (Tympanic)   Ht 6' 1" (1 854 m)   Wt 120 kg (264 lb)   SpO2 97%   BMI 34 83 kg/m²     Physical Exam  Vitals signs reviewed  Constitutional:       General: He is not in acute distress  Appearance: Normal appearance  He is obese  HENT:      Head: Normocephalic and atraumatic  Eyes:      General: No scleral icterus  Neck:      Musculoskeletal: Normal range of motion  Thyroid: No thyromegaly  Vascular: No carotid bruit  Cardiovascular:      Rate and Rhythm: Normal rate and regular rhythm  Heart sounds: No murmur  Pulmonary:      Effort: Pulmonary effort is normal  No respiratory distress  Breath sounds: Normal breath sounds  Abdominal:      General: Bowel sounds are normal       Palpations: Abdomen is soft  Musculoskeletal:      Right lower leg: No edema  Left lower leg: No edema     Lymphadenopathy:      Cervical: No cervical adenopathy  Skin:     General: Skin is warm and dry  Neurological:      General: No focal deficit present  Mental Status: He is alert and oriented to person, place, and time  Motor: Weakness (BLE) present  Psychiatric:         Mood and Affect: Mood is anxious  Behavior: Behavior normal          Thought Content: Thought content normal          Judgment: Judgment normal           Data:     Pre-operative work-up    Laboratory Results: I have personally reviewed the pertinent laboratory results/reports      EKG: I have personally reviewed pertinent reports  Chest x-ray: n/a      Previous cardiopulmonary studies within the past year:  · Echocardiogram: 3/2020 (normal)  · Cardiac Catheterization: n/a  · Stress Test: 2020 normal exercise stress echo  · Pulmonary Function Testin2020 (normal)      Assessment & Recommendations:     1  Preop examination      H&P completed and clearance issued   2  Cervical spinal stenosis     3  Myelopathy ([address] )         Pre-Op Evaluation Assessment  77 y o  male with planned surgery: posterior cervical decompression laminectomy and lateral mass fixation fusion C3-5  Known risk factors for perioperative complications: Hepatic dysfunction  Current medications which may produce withdrawal symptoms if withheld perioperatively: n/a  Pre-Op Evaluation Plan  1  Further preoperative workup as follows:   - None; no further preoperative work-up is required    2  Medication Management/Recommendations:   - None, continue medication regimen including morning of surgery, with sip of water    3  Prophylaxis for cardiac events with perioperative beta-blockers: not indicated  4  Patient requires further consultation with: None    Clearance  Patient is CLEARED for surgery without any additional cardiac testing       Misty Wilks MD  [address] [facility] [address]  Phone#  [phone]  Fax#  [phone]

## 2021-05-21 ENCOUNTER — DOCUMENTATION (OUTPATIENT)
Dept: NEUROSURGERY | Facility: CLINIC | Age: 66
End: 2021-05-21

## 2021-05-21 NOTE — PRE-PROCEDURE INSTRUCTIONS
Pre-Surgery Instructions:   Medication Instructions    buPROPion (WELLBUTRIN XL) 150 mg 24 hr tablet Instructed patient per Anesthesia Guidelines  continue, take dos    Calcium 600-200 MG-UNIT per tablet Instructed patient per Anesthesia Guidelines  continue, do not take dos    Cholecalciferol (CVS VITAMIN D3) 1000 units capsule Instructed patient per Anesthesia Guidelines  continue, do not take dos    hydrochlorothiazide (HYDRODIURIL) 12 5 mg tablet Instructed patient per Anesthesia Guidelines  continue if needed, do not take dos    nadolol (CORGARD) 20 mg tablet Instructed patient per Anesthesia Guidelines  continue, take dos    You will receive a phone call from hospital for arrival time  Please call surgeons office if any changes in your condition  Wear easy on/off clothing; consider type of surgery;  Valuables, jewelry, piercing's please keep at home  **COVID-19  education/surgical guidelines      Please: No contact lenses or eye make up, artificial eyelashes    Please bring special ordered sling or braces if needed for  Your particular surgery  n/a    Please secure transportation     Follow pre surgery showering or cleaning instructions as  Reviewed by nurse or surgeons office      Questions answered and concerns addressed

## 2021-05-23 ENCOUNTER — ANESTHESIA EVENT (OUTPATIENT)
Dept: PERIOP | Facility: HOSPITAL | Age: 66
DRG: 472 | End: 2021-05-23
Payer: MEDICARE

## 2021-05-24 ENCOUNTER — HOSPITAL ENCOUNTER (INPATIENT)
Facility: HOSPITAL | Age: 66
LOS: 1 days | Discharge: HOME | DRG: 472 | End: 2021-05-26
Attending: NEUROLOGICAL SURGERY | Admitting: NEUROLOGICAL SURGERY
Payer: MEDICARE

## 2021-05-24 ENCOUNTER — ANESTHESIA (OUTPATIENT)
Dept: PERIOP | Facility: HOSPITAL | Age: 66
DRG: 472 | End: 2021-05-24
Payer: MEDICARE

## 2021-05-24 ENCOUNTER — APPOINTMENT (OUTPATIENT)
Dept: RADIOLOGY | Facility: HOSPITAL | Age: 66
DRG: 472 | End: 2021-05-24
Payer: MEDICARE

## 2021-05-24 DIAGNOSIS — G95.9 MYELOPATHY (HCC): Primary | ICD-10-CM

## 2021-05-24 DIAGNOSIS — M48.02 CERVICAL SPINAL STENOSIS: ICD-10-CM

## 2021-05-24 LAB
ABO GROUP BLD: NORMAL
ANION GAP SERPL CALCULATED.3IONS-SCNC: 7 MMOL/L (ref 4–13)
ARTERIAL PATENCY WRIST A: YES
ARTERIAL PATENCY WRIST A: YES
BASE EXCESS BLDA CALC-SCNC: -1.5 MMOL/L
BASE EXCESS BLDA CALC-SCNC: 0 MMOL/L (ref -2–3)
BASE EXCESS BLDA CALC-SCNC: 0.1 MMOL/L
BASOPHILS # BLD AUTO: 0 THOUSANDS/ΜL (ref 0–0.1)
BASOPHILS NFR BLD AUTO: 0 % (ref 0–1)
BUN SERPL-MCNC: 15 MG/DL (ref 5–25)
CA-I BLD-SCNC: 1.23 MMOL/L (ref 1.12–1.32)
CALCIUM SERPL-MCNC: 9.1 MG/DL (ref 8.3–10.1)
CHLORIDE SERPL-SCNC: 110 MMOL/L (ref 100–108)
CO2 SERPL-SCNC: 25 MMOL/L (ref 21–32)
CREAT SERPL-MCNC: 0.92 MG/DL (ref 0.6–1.3)
EOSINOPHIL # BLD AUTO: 0.03 THOUSAND/ΜL (ref 0–0.61)
EOSINOPHIL NFR BLD AUTO: 1 % (ref 0–6)
ERYTHROCYTE [DISTWIDTH] IN BLOOD BY AUTOMATED COUNT: 13.6 % (ref 11.6–15.1)
GFR SERPL CREATININE-BSD FRML MDRD: 86 ML/MIN/1.73SQ M
GLUCOSE P FAST SERPL-MCNC: 125 MG/DL (ref 65–99)
GLUCOSE SERPL-MCNC: 125 MG/DL (ref 65–140)
GLUCOSE SERPL-MCNC: 125 MG/DL (ref 65–140)
HCO3 BLDA-SCNC: 23.7 MMOL/L (ref 22–28)
HCO3 BLDA-SCNC: 26.9 MMOL/L (ref 22–28)
HCO3 BLDA-SCNC: 28.7 MMOL/L (ref 22–28)
HCT VFR BLD AUTO: 42.4 % (ref 36.5–49.3)
HCT VFR BLD CALC: 43 % (ref 36.5–49.3)
HGB BLD-MCNC: 14.3 G/DL (ref 12–17)
HGB BLDA-MCNC: 14.6 G/DL (ref 12–17)
IMM GRANULOCYTES # BLD AUTO: 0.02 THOUSAND/UL (ref 0–0.2)
IMM GRANULOCYTES NFR BLD AUTO: 1 % (ref 0–2)
LYMPHOCYTES # BLD AUTO: 0.64 THOUSANDS/ΜL (ref 0.6–4.47)
LYMPHOCYTES NFR BLD AUTO: 19 % (ref 14–44)
MAGNESIUM SERPL-MCNC: 2.9 MG/DL (ref 1.6–2.6)
MCH RBC QN AUTO: 34.6 PG (ref 26.8–34.3)
MCHC RBC AUTO-ENTMCNC: 33.7 G/DL (ref 31.4–37.4)
MCV RBC AUTO: 103 FL (ref 82–98)
MONOCYTES # BLD AUTO: 0.1 THOUSAND/ΜL (ref 0.17–1.22)
MONOCYTES NFR BLD AUTO: 3 % (ref 4–12)
NEUTROPHILS # BLD AUTO: 2.55 THOUSANDS/ΜL (ref 1.85–7.62)
NEUTS SEG NFR BLD AUTO: 76 % (ref 43–75)
NRBC BLD AUTO-RTO: 0 /100 WBCS
O2 CT BLDA-SCNC: 20.8 ML/DL (ref 16–23)
O2 CT BLDA-SCNC: 20.9 ML/DL (ref 16–23)
OTHER FIO2: 0.5 %
OTHER FIO2: 0.5 %
OXYHGB MFR BLDA: 97.7 % (ref 94–97)
OXYHGB MFR BLDA: 97.9 % (ref 94–97)
PCO2 BLD: 31 MMOL/L (ref 21–32)
PCO2 BLD: 66.5 MM HG (ref 36–44)
PCO2 BLDA: 41.8 MM HG (ref 36–44)
PCO2 BLDA: 51.8 MM HG (ref 36–44)
PH BLD: 7.24 [PH] (ref 7.35–7.45)
PH BLDA: 7.33 [PH] (ref 7.35–7.45)
PH BLDA: 7.37 [PH] (ref 7.35–7.45)
PLATELET # BLD AUTO: 113 THOUSANDS/UL (ref 149–390)
PMV BLD AUTO: 10 FL (ref 8.9–12.7)
PO2 BLD: 96 MM HG (ref 75–129)
PO2 BLDA: 142.3 MM HG (ref 75–129)
PO2 BLDA: 171.2 MM HG (ref 75–129)
POTASSIUM BLD-SCNC: 4 MMOL/L (ref 3.5–5.3)
POTASSIUM SERPL-SCNC: 4 MMOL/L (ref 3.5–5.3)
RBC # BLD AUTO: 4.13 MILLION/UL (ref 3.88–5.62)
RH BLD: POSITIVE
SAO2 % BLD FROM PO2: 96 % (ref 60–85)
SARS-COV-2 RNA RESP QL NAA+PROBE: NEGATIVE
SODIUM BLD-SCNC: 141 MMOL/L (ref 136–145)
SODIUM SERPL-SCNC: 142 MMOL/L (ref 136–145)
SPECIMEN SOURCE: ABNORMAL
WBC # BLD AUTO: 3.34 THOUSAND/UL (ref 4.31–10.16)

## 2021-05-24 PROCEDURE — 0RB30ZZ EXCISION OF CERVICAL VERTEBRAL DISC, OPEN APPROACH: ICD-10-PCS | Performed by: NEUROLOGICAL SURGERY

## 2021-05-24 PROCEDURE — C1713 ANCHOR/SCREW BN/BN,TIS/BN: HCPCS | Performed by: NEUROLOGICAL SURGERY

## 2021-05-24 PROCEDURE — 85014 HEMATOCRIT: CPT

## 2021-05-24 PROCEDURE — 80048 BASIC METABOLIC PNL TOTAL CA: CPT | Performed by: NURSE ANESTHETIST, CERTIFIED REGISTERED

## 2021-05-24 PROCEDURE — 72040 X-RAY EXAM NECK SPINE 2-3 VW: CPT

## 2021-05-24 PROCEDURE — U0005 INFEC AGEN DETEC AMPLI PROBE: HCPCS | Performed by: STUDENT IN AN ORGANIZED HEALTH CARE EDUCATION/TRAINING PROGRAM

## 2021-05-24 PROCEDURE — 84295 ASSAY OF SERUM SODIUM: CPT

## 2021-05-24 PROCEDURE — 22600 ARTHRD PST TQ 1NTRSPC CRV: CPT | Performed by: NEUROLOGICAL SURGERY

## 2021-05-24 PROCEDURE — 85025 COMPLETE CBC W/AUTO DIFF WBC: CPT | Performed by: STUDENT IN AN ORGANIZED HEALTH CARE EDUCATION/TRAINING PROGRAM

## 2021-05-24 PROCEDURE — 82330 ASSAY OF CALCIUM: CPT

## 2021-05-24 PROCEDURE — 83735 ASSAY OF MAGNESIUM: CPT | Performed by: STUDENT IN AN ORGANIZED HEALTH CARE EDUCATION/TRAINING PROGRAM

## 2021-05-24 PROCEDURE — 22842 INSERT SPINE FIXATION DEVICE: CPT | Performed by: NEUROLOGICAL SURGERY

## 2021-05-24 PROCEDURE — 00NW0ZZ RELEASE CERVICAL SPINAL CORD, OPEN APPROACH: ICD-10-PCS | Performed by: NEUROLOGICAL SURGERY

## 2021-05-24 PROCEDURE — 22614 ARTHRD PST TQ 1NTRSPC EA ADD: CPT | Performed by: NEUROLOGICAL SURGERY

## 2021-05-24 PROCEDURE — 94002 VENT MGMT INPAT INIT DAY: CPT

## 2021-05-24 PROCEDURE — 84132 ASSAY OF SERUM POTASSIUM: CPT

## 2021-05-24 PROCEDURE — 0RG2071 FUSION OF 2 OR MORE CERVICAL VERTEBRAL JOINTS WITH AUTOLOGOUS TISSUE SUBSTITUTE, POSTERIOR APPROACH, POSTERIOR COLUMN, OPEN APPROACH: ICD-10-PCS | Performed by: NEUROLOGICAL SURGERY

## 2021-05-24 PROCEDURE — U0003 INFECTIOUS AGENT DETECTION BY NUCLEIC ACID (DNA OR RNA); SEVERE ACUTE RESPIRATORY SYNDROME CORONAVIRUS 2 (SARS-COV-2) (CORONAVIRUS DISEASE [COVID-19]), AMPLIFIED PROBE TECHNIQUE, MAKING USE OF HIGH THROUGHPUT TECHNOLOGIES AS DESCRIBED BY CMS-2020-01-R: HCPCS | Performed by: STUDENT IN AN ORGANIZED HEALTH CARE EDUCATION/TRAINING PROGRAM

## 2021-05-24 PROCEDURE — P9035 PLATELET PHERES LEUKOREDUCED: HCPCS

## 2021-05-24 PROCEDURE — 94760 N-INVAS EAR/PLS OXIMETRY 1: CPT

## 2021-05-24 PROCEDURE — 82805 BLOOD GASES W/O2 SATURATION: CPT | Performed by: STUDENT IN AN ORGANIZED HEALTH CARE EDUCATION/TRAINING PROGRAM

## 2021-05-24 PROCEDURE — 20930 SP BONE ALGRFT MORSEL ADD-ON: CPT | Performed by: NEUROLOGICAL SURGERY

## 2021-05-24 PROCEDURE — 63015 REMOVE SPINE LAMINA >2 CRVCL: CPT | Performed by: NEUROLOGICAL SURGERY

## 2021-05-24 PROCEDURE — 82947 ASSAY GLUCOSE BLOOD QUANT: CPT

## 2021-05-24 PROCEDURE — 82803 BLOOD GASES ANY COMBINATION: CPT

## 2021-05-24 DEVICE — MULTI AXIAL SCREW 3603512 3.5 X 12MM
Type: IMPLANTABLE DEVICE | Site: POSTERIOR CERVICAL | Status: FUNCTIONAL
Brand: INFINITY™ OCCIPITOCERVICAL UPPER THORACIC SYSTEM

## 2021-05-24 DEVICE — DBM 006005 PROGENIX PLUS 5CC
Type: IMPLANTABLE DEVICE | Site: POSTERIOR CERVICAL | Status: FUNCTIONAL
Brand: PROGENIX® PUTTY AND PROGENIX® PLUS

## 2021-05-24 RX ORDER — MELATONIN
1000 DAILY
Status: DISCONTINUED | OUTPATIENT
Start: 2021-05-25 | End: 2021-05-26 | Stop reason: HOSPADM

## 2021-05-24 RX ORDER — SODIUM CHLORIDE 9 MG/ML
INJECTION, SOLUTION INTRAVENOUS CONTINUOUS PRN
Status: DISCONTINUED | OUTPATIENT
Start: 2021-05-24 | End: 2021-05-24

## 2021-05-24 RX ORDER — CALCIUM CARBONATE 200(500)MG
1000 TABLET,CHEWABLE ORAL DAILY PRN
Status: DISCONTINUED | OUTPATIENT
Start: 2021-05-24 | End: 2021-05-26 | Stop reason: HOSPADM

## 2021-05-24 RX ORDER — CEFAZOLIN SODIUM 1 G/50ML
1000 SOLUTION INTRAVENOUS EVERY 8 HOURS
Status: COMPLETED | OUTPATIENT
Start: 2021-05-24 | End: 2021-05-25

## 2021-05-24 RX ORDER — HYDROMORPHONE HCL 110MG/55ML
PATIENT CONTROLLED ANALGESIA SYRINGE INTRAVENOUS AS NEEDED
Status: DISCONTINUED | OUTPATIENT
Start: 2021-05-24 | End: 2021-05-24

## 2021-05-24 RX ORDER — DEXAMETHASONE SODIUM PHOSPHATE 10 MG/ML
INJECTION, SOLUTION INTRAMUSCULAR; INTRAVENOUS AS NEEDED
Status: DISCONTINUED | OUTPATIENT
Start: 2021-05-24 | End: 2021-05-24

## 2021-05-24 RX ORDER — KETAMINE HCL IN NACL, ISO-OSM 100MG/10ML
SYRINGE (ML) INJECTION AS NEEDED
Status: DISCONTINUED | OUTPATIENT
Start: 2021-05-24 | End: 2021-05-24

## 2021-05-24 RX ORDER — FENTANYL CITRATE/PF 50 MCG/ML
25 SYRINGE (ML) INJECTION
Status: DISCONTINUED | OUTPATIENT
Start: 2021-05-24 | End: 2021-05-24 | Stop reason: HOSPADM

## 2021-05-24 RX ORDER — CHLORHEXIDINE GLUCONATE 0.12 MG/ML
15 RINSE ORAL ONCE
Status: COMPLETED | OUTPATIENT
Start: 2021-05-24 | End: 2021-05-24

## 2021-05-24 RX ORDER — CALCIUM CARBONATE 500(1250)
1 TABLET ORAL
Status: DISCONTINUED | OUTPATIENT
Start: 2021-05-25 | End: 2021-05-26 | Stop reason: HOSPADM

## 2021-05-24 RX ORDER — ALBUMIN, HUMAN INJ 5% 5 %
SOLUTION INTRAVENOUS CONTINUOUS PRN
Status: DISCONTINUED | OUTPATIENT
Start: 2021-05-24 | End: 2021-05-24

## 2021-05-24 RX ORDER — HYDROCHLOROTHIAZIDE 12.5 MG/1
12.5 TABLET ORAL DAILY
Status: DISCONTINUED | OUTPATIENT
Start: 2021-05-25 | End: 2021-05-26 | Stop reason: HOSPADM

## 2021-05-24 RX ORDER — BUPROPION HYDROCHLORIDE 150 MG/1
150 TABLET ORAL DAILY
Status: DISCONTINUED | OUTPATIENT
Start: 2021-05-25 | End: 2021-05-26 | Stop reason: HOSPADM

## 2021-05-24 RX ORDER — BUPIVACAINE HYDROCHLORIDE AND EPINEPHRINE 5; 5 MG/ML; UG/ML
INJECTION, SOLUTION EPIDURAL; INTRACAUDAL; PERINEURAL AS NEEDED
Status: DISCONTINUED | OUTPATIENT
Start: 2021-05-24 | End: 2021-05-24 | Stop reason: HOSPADM

## 2021-05-24 RX ORDER — MAGNESIUM SULFATE HEPTAHYDRATE 40 MG/ML
INJECTION, SOLUTION INTRAVENOUS AS NEEDED
Status: DISCONTINUED | OUTPATIENT
Start: 2021-05-24 | End: 2021-05-24

## 2021-05-24 RX ORDER — LIDOCAINE HYDROCHLORIDE 10 MG/ML
INJECTION, SOLUTION EPIDURAL; INFILTRATION; INTRACAUDAL; PERINEURAL AS NEEDED
Status: DISCONTINUED | OUTPATIENT
Start: 2021-05-24 | End: 2021-05-24 | Stop reason: HOSPADM

## 2021-05-24 RX ORDER — METHOCARBAMOL 500 MG/1
500 TABLET, FILM COATED ORAL EVERY 6 HOURS SCHEDULED
Status: DISCONTINUED | OUTPATIENT
Start: 2021-05-24 | End: 2021-05-26 | Stop reason: HOSPADM

## 2021-05-24 RX ORDER — MIDAZOLAM HYDROCHLORIDE 2 MG/2ML
INJECTION, SOLUTION INTRAMUSCULAR; INTRAVENOUS AS NEEDED
Status: DISCONTINUED | OUTPATIENT
Start: 2021-05-24 | End: 2021-05-24

## 2021-05-24 RX ORDER — ALBUTEROL SULFATE 90 UG/1
AEROSOL, METERED RESPIRATORY (INHALATION) AS NEEDED
Status: DISCONTINUED | OUTPATIENT
Start: 2021-05-24 | End: 2021-05-24

## 2021-05-24 RX ORDER — PROPOFOL 10 MG/ML
INJECTION, EMULSION INTRAVENOUS CONTINUOUS PRN
Status: DISCONTINUED | OUTPATIENT
Start: 2021-05-24 | End: 2021-05-24

## 2021-05-24 RX ORDER — DOCUSATE SODIUM 100 MG/1
100 CAPSULE, LIQUID FILLED ORAL 2 TIMES DAILY
Status: DISCONTINUED | OUTPATIENT
Start: 2021-05-24 | End: 2021-05-26 | Stop reason: HOSPADM

## 2021-05-24 RX ORDER — ACETAMINOPHEN 325 MG/1
975 TABLET ORAL EVERY 8 HOURS SCHEDULED
Status: DISCONTINUED | OUTPATIENT
Start: 2021-05-24 | End: 2021-05-26 | Stop reason: HOSPADM

## 2021-05-24 RX ORDER — VANCOMYCIN HYDROCHLORIDE 1 G/20ML
INJECTION, POWDER, LYOPHILIZED, FOR SOLUTION INTRAVENOUS AS NEEDED
Status: DISCONTINUED | OUTPATIENT
Start: 2021-05-24 | End: 2021-05-24 | Stop reason: HOSPADM

## 2021-05-24 RX ORDER — ONDANSETRON 2 MG/ML
INJECTION INTRAMUSCULAR; INTRAVENOUS AS NEEDED
Status: DISCONTINUED | OUTPATIENT
Start: 2021-05-24 | End: 2021-05-24

## 2021-05-24 RX ORDER — NALOXONE HYDROCHLORIDE 0.4 MG/ML
INJECTION, SOLUTION INTRAMUSCULAR; INTRAVENOUS; SUBCUTANEOUS AS NEEDED
Status: DISCONTINUED | OUTPATIENT
Start: 2021-05-24 | End: 2021-05-24

## 2021-05-24 RX ORDER — SODIUM CHLORIDE, SODIUM LACTATE, POTASSIUM CHLORIDE, CALCIUM CHLORIDE 600; 310; 30; 20 MG/100ML; MG/100ML; MG/100ML; MG/100ML
INJECTION, SOLUTION INTRAVENOUS CONTINUOUS PRN
Status: DISCONTINUED | OUTPATIENT
Start: 2021-05-24 | End: 2021-05-24

## 2021-05-24 RX ORDER — PROPOFOL 10 MG/ML
INJECTION, EMULSION INTRAVENOUS AS NEEDED
Status: DISCONTINUED | OUTPATIENT
Start: 2021-05-24 | End: 2021-05-24

## 2021-05-24 RX ORDER — CEFAZOLIN SODIUM 2 G/50ML
2000 SOLUTION INTRAVENOUS ONCE
Status: COMPLETED | OUTPATIENT
Start: 2021-05-24 | End: 2021-05-24

## 2021-05-24 RX ORDER — ONDANSETRON 2 MG/ML
4 INJECTION INTRAMUSCULAR; INTRAVENOUS ONCE AS NEEDED
Status: DISCONTINUED | OUTPATIENT
Start: 2021-05-24 | End: 2021-05-24 | Stop reason: HOSPADM

## 2021-05-24 RX ORDER — OXYCODONE HYDROCHLORIDE 5 MG/1
5 TABLET ORAL EVERY 4 HOURS PRN
Status: DISCONTINUED | OUTPATIENT
Start: 2021-05-24 | End: 2021-05-26 | Stop reason: HOSPADM

## 2021-05-24 RX ORDER — SENNOSIDES 8.6 MG
1 TABLET ORAL DAILY
Status: DISCONTINUED | OUTPATIENT
Start: 2021-05-25 | End: 2021-05-26 | Stop reason: HOSPADM

## 2021-05-24 RX ORDER — HYDROMORPHONE HCL/PF 1 MG/ML
0.5 SYRINGE (ML) INJECTION
Status: DISCONTINUED | OUTPATIENT
Start: 2021-05-24 | End: 2021-05-26 | Stop reason: HOSPADM

## 2021-05-24 RX ORDER — HYDROMORPHONE HCL/PF 1 MG/ML
0.5 SYRINGE (ML) INJECTION
Status: DISCONTINUED | OUTPATIENT
Start: 2021-05-24 | End: 2021-05-24 | Stop reason: HOSPADM

## 2021-05-24 RX ORDER — FENTANYL CITRATE 50 UG/ML
INJECTION, SOLUTION INTRAMUSCULAR; INTRAVENOUS AS NEEDED
Status: DISCONTINUED | OUTPATIENT
Start: 2021-05-24 | End: 2021-05-24

## 2021-05-24 RX ORDER — OXYCODONE HYDROCHLORIDE 10 MG/1
10 TABLET ORAL EVERY 4 HOURS PRN
Status: DISCONTINUED | OUTPATIENT
Start: 2021-05-24 | End: 2021-05-26 | Stop reason: HOSPADM

## 2021-05-24 RX ORDER — SODIUM CHLORIDE 9 MG/ML
100 INJECTION, SOLUTION INTRAVENOUS CONTINUOUS
Status: DISCONTINUED | OUTPATIENT
Start: 2021-05-24 | End: 2021-05-25

## 2021-05-24 RX ORDER — SUCCINYLCHOLINE/SOD CL,ISO/PF 100 MG/5ML
SYRINGE (ML) INTRAVENOUS AS NEEDED
Status: DISCONTINUED | OUTPATIENT
Start: 2021-05-24 | End: 2021-05-24

## 2021-05-24 RX ORDER — EPHEDRINE SULFATE 50 MG/ML
INJECTION INTRAVENOUS AS NEEDED
Status: DISCONTINUED | OUTPATIENT
Start: 2021-05-24 | End: 2021-05-24

## 2021-05-24 RX ORDER — LIDOCAINE HYDROCHLORIDE AND EPINEPHRINE 10; 10 MG/ML; UG/ML
INJECTION, SOLUTION INFILTRATION; PERINEURAL AS NEEDED
Status: DISCONTINUED | OUTPATIENT
Start: 2021-05-24 | End: 2021-05-24 | Stop reason: HOSPADM

## 2021-05-24 RX ORDER — SODIUM CHLORIDE, SODIUM LACTATE, POTASSIUM CHLORIDE, CALCIUM CHLORIDE 600; 310; 30; 20 MG/100ML; MG/100ML; MG/100ML; MG/100ML
100 INJECTION, SOLUTION INTRAVENOUS CONTINUOUS
Status: DISCONTINUED | OUTPATIENT
Start: 2021-05-24 | End: 2021-05-26 | Stop reason: HOSPADM

## 2021-05-24 RX ORDER — NADOLOL 20 MG/1
20 TABLET ORAL DAILY
Status: DISCONTINUED | OUTPATIENT
Start: 2021-05-25 | End: 2021-05-26 | Stop reason: HOSPADM

## 2021-05-24 RX ORDER — LIDOCAINE HYDROCHLORIDE 10 MG/ML
INJECTION, SOLUTION EPIDURAL; INFILTRATION; INTRACAUDAL; PERINEURAL AS NEEDED
Status: DISCONTINUED | OUTPATIENT
Start: 2021-05-24 | End: 2021-05-24

## 2021-05-24 RX ORDER — HEPARIN SODIUM 5000 [USP'U]/ML
5000 INJECTION, SOLUTION INTRAVENOUS; SUBCUTANEOUS EVERY 8 HOURS SCHEDULED
Status: DISCONTINUED | OUTPATIENT
Start: 2021-05-25 | End: 2021-05-26

## 2021-05-24 RX ORDER — ONDANSETRON 2 MG/ML
4 INJECTION INTRAMUSCULAR; INTRAVENOUS EVERY 6 HOURS PRN
Status: DISCONTINUED | OUTPATIENT
Start: 2021-05-24 | End: 2021-05-26 | Stop reason: HOSPADM

## 2021-05-24 RX ADMIN — PHENYLEPHRINE HYDROCHLORIDE 100 MCG: 10 INJECTION INTRAVENOUS at 07:33

## 2021-05-24 RX ADMIN — PHENYLEPHRINE HYDROCHLORIDE 300 MCG: 10 INJECTION INTRAVENOUS at 07:38

## 2021-05-24 RX ADMIN — DOCUSATE SODIUM 100 MG: 100 CAPSULE, LIQUID FILLED ORAL at 17:23

## 2021-05-24 RX ADMIN — EPHEDRINE SULFATE 10 MG: 50 INJECTION, SOLUTION INTRAVENOUS at 10:07

## 2021-05-24 RX ADMIN — PHENYLEPHRINE HYDROCHLORIDE 200 MCG: 10 INJECTION INTRAVENOUS at 11:14

## 2021-05-24 RX ADMIN — DEXAMETHASONE SODIUM PHOSPHATE 10 MG: 10 INJECTION, SOLUTION INTRAMUSCULAR; INTRAVENOUS at 07:37

## 2021-05-24 RX ADMIN — CHLORHEXIDINE GLUCONATE 15 ML: 1.2 SOLUTION ORAL at 05:58

## 2021-05-24 RX ADMIN — NALOXONE HYDROCHLORIDE 0.04 MG: 0.4 INJECTION, SOLUTION INTRAMUSCULAR; INTRAVENOUS; SUBCUTANEOUS at 11:23

## 2021-05-24 RX ADMIN — CEFAZOLIN SODIUM 1000 MG: 1 SOLUTION INTRAVENOUS at 17:23

## 2021-05-24 RX ADMIN — SODIUM CHLORIDE, SODIUM LACTATE, POTASSIUM CHLORIDE, AND CALCIUM CHLORIDE: .6; .31; .03; .02 INJECTION, SOLUTION INTRAVENOUS at 07:18

## 2021-05-24 RX ADMIN — NALOXONE HYDROCHLORIDE 0.04 MG: 0.4 INJECTION, SOLUTION INTRAMUSCULAR; INTRAVENOUS; SUBCUTANEOUS at 11:16

## 2021-05-24 RX ADMIN — SODIUM CHLORIDE 100 ML/HR: 0.9 INJECTION, SOLUTION INTRAVENOUS at 12:00

## 2021-05-24 RX ADMIN — PHENYLEPHRINE HYDROCHLORIDE 50 MCG/MIN: 10 INJECTION INTRAVENOUS at 07:42

## 2021-05-24 RX ADMIN — EPHEDRINE SULFATE 5 MG: 50 INJECTION, SOLUTION INTRAVENOUS at 08:50

## 2021-05-24 RX ADMIN — METHOCARBAMOL 500 MG: 500 TABLET, FILM COATED ORAL at 17:23

## 2021-05-24 RX ADMIN — ONDANSETRON 4 MG: 2 INJECTION INTRAMUSCULAR; INTRAVENOUS at 08:08

## 2021-05-24 RX ADMIN — CEFAZOLIN SODIUM 2000 MG: 2 SOLUTION INTRAVENOUS at 07:58

## 2021-05-24 RX ADMIN — PHENYLEPHRINE HYDROCHLORIDE 150 MCG: 10 INJECTION INTRAVENOUS at 07:50

## 2021-05-24 RX ADMIN — ACETAMINOPHEN 975 MG: 325 TABLET, FILM COATED ORAL at 21:34

## 2021-05-24 RX ADMIN — MAGNESIUM SULFATE HEPTAHYDRATE 2 G: 40 INJECTION, SOLUTION INTRAVENOUS at 09:31

## 2021-05-24 RX ADMIN — Medication 30 MG: at 08:38

## 2021-05-24 RX ADMIN — PHENYLEPHRINE HYDROCHLORIDE 400 MCG: 10 INJECTION INTRAVENOUS at 10:09

## 2021-05-24 RX ADMIN — ALBUTEROL SULFATE 6 PUFF: 90 AEROSOL, METERED RESPIRATORY (INHALATION) at 11:22

## 2021-05-24 RX ADMIN — MIDAZOLAM 1 MG: 1 INJECTION INTRAMUSCULAR; INTRAVENOUS at 07:29

## 2021-05-24 RX ADMIN — Medication 100 MG: at 07:34

## 2021-05-24 RX ADMIN — PHENYLEPHRINE HYDROCHLORIDE 200 MCG: 10 INJECTION INTRAVENOUS at 10:31

## 2021-05-24 RX ADMIN — HYDROMORPHONE HYDROCHLORIDE 0.5 MG: 2 INJECTION, SOLUTION INTRAMUSCULAR; INTRAVENOUS; SUBCUTANEOUS at 07:41

## 2021-05-24 RX ADMIN — PHENYLEPHRINE HYDROCHLORIDE 150 MCG/MIN: 10 INJECTION INTRAVENOUS at 07:50

## 2021-05-24 RX ADMIN — MIDAZOLAM 1 MG: 1 INJECTION INTRAMUSCULAR; INTRAVENOUS at 07:52

## 2021-05-24 RX ADMIN — FENTANYL CITRATE 100 MCG: 50 INJECTION INTRAMUSCULAR; INTRAVENOUS at 07:33

## 2021-05-24 RX ADMIN — PROPOFOL 50 MG: 10 INJECTION, EMULSION INTRAVENOUS at 07:58

## 2021-05-24 RX ADMIN — NALOXONE HYDROCHLORIDE 0.04 MG: 0.4 INJECTION, SOLUTION INTRAMUSCULAR; INTRAVENOUS; SUBCUTANEOUS at 11:20

## 2021-05-24 RX ADMIN — PROPOFOL 130 MCG/KG/MIN: 10 INJECTION, EMULSION INTRAVENOUS at 07:42

## 2021-05-24 RX ADMIN — ACETAMINOPHEN 975 MG: 325 TABLET, FILM COATED ORAL at 17:23

## 2021-05-24 RX ADMIN — HYDROMORPHONE HYDROCHLORIDE 0.5 MG: 2 INJECTION, SOLUTION INTRAMUSCULAR; INTRAVENOUS; SUBCUTANEOUS at 08:00

## 2021-05-24 RX ADMIN — PROPOFOL 150 MG: 10 INJECTION, EMULSION INTRAVENOUS at 07:33

## 2021-05-24 RX ADMIN — SODIUM CHLORIDE: 0.9 INJECTION, SOLUTION INTRAVENOUS at 07:44

## 2021-05-24 RX ADMIN — ALBUMIN (HUMAN): 12.5 INJECTION, SOLUTION INTRAVENOUS at 07:52

## 2021-05-24 RX ADMIN — LIDOCAINE HYDROCHLORIDE 100 MG: 10 INJECTION, SOLUTION EPIDURAL; INFILTRATION; INTRACAUDAL; PERINEURAL at 07:33

## 2021-05-24 RX ADMIN — PHENYLEPHRINE HYDROCHLORIDE 100 MCG: 10 INJECTION INTRAVENOUS at 07:36

## 2021-05-24 NOTE — ANESTHESIA PROCEDURE NOTES
Arterial Line Insertion  Performed by: Andres Orta CRNA  Authorized by: Lucius Moore MD   Consent: Verbal consent obtained  Written consent obtained  Risks and benefits: risks, benefits and alternatives were discussed  Consent given by: patient and spouse  Patient understanding: patient states understanding of the procedure being performed  Patient consent: the patient's understanding of the procedure matches consent given  Procedure consent: procedure consent matches procedure scheduled  Relevant documents: relevant documents present and verified  Test results: test results available and properly labeled  Site marked: the operative site was marked  Radiology Images: Radiology Images displayed and confirmed  If images not available, report reviewed  Required items: required blood products, implants, devices, and special equipment available  Patient identity confirmed: verbally with patient, arm band and provided demographic data  Time out: Immediately prior to procedure a "time out" was called to verify the correct patient, procedure, equipment, support staff and site/side marked as required  Preparation: Patient was prepped and draped in the usual sterile fashion  Indications: hemodynamic monitoring  Orientation:  Left  Location: radial artery  Sedation:  Patient sedated: yes  Analgesia: see MAR for details    Procedure Details:  Quincy's test normal: yes  Needle gauge: 20  Number of attempts: 1    Post-procedure:  Post-procedure: dressing applied  Waveform: good waveform  Post-procedure CNS: normal  Patient tolerance: Patient tolerated the procedure well with no immediate complications and patient tolerated the procedure well with no immediate complications  Comments:  Inserted by Black Jama CRNA

## 2021-05-24 NOTE — ANESTHESIA PREPROCEDURE EVALUATION
Patient is calling post urolift. Patient had urolift on 5/17/2019. He states that his symptoms have been getting worse post urolift. He c/o urinary frequency every 30 mins during the day and urinary incontinence at night. Patient states that he has minimal dysuria and denies f/c. This would be his first post op visit. Patient had VT on 5/20/19.   Writer scheduled appointment for 6/18/19 with [facility] for UA/PVR and AUA symptom sheet.    Procedure:  Posterior cervical decompressive laminectomy and lateral mass fixation fusion C3-5 (N/A Spine Cervical)    Relevant Problems   CARDIO   (+) Hypercholesterolemia   (+) SOB (shortness of breath) on exertion      GI/HEPATIC   (+) Other cirrhosis of liver (HCC)      HEMATOLOGY   (+) Acquired thrombocytopenia (HCC)      MUSCULOSKELETAL   (+) DDD (degenerative disc disease), lumbar      NEURO/PSYCH   (+) Chronic pain syndrome   (+) Major depression, chronic   (+) Myelopathy (HCC)   (+) Personal history of colonic polyps      PULMONARY   (+) SOB (shortness of breath) on exertion   NPO verified   Denies chest pain, JAEGER, Asthma, COPD   Nadolol for esophageal varices  Cervical stenosis- denies upper extremity paresis or paraesthesia; lower  extremity weakness and gait instability   Thrombocytopenia      Results for Salma Blunt (MRN [id number]) as of 5/23/2021 20:15   Ref  Range 5/10/2021 10:55   WBC Latest Ref Range: 4 31 - 10 16 Thousand/uL 5 03   Red Blood Cell Count Latest Ref Range: 3 88 - 5 62 Million/uL 4 79   Hemoglobin Latest Ref Range: 12 0 - 17 0 g/dL 16 5   HCT Latest Ref Range: 36 5 - 49 3 % 48 6   MCV Latest Ref Range: 82 - 98 fL 102 (H)   MCH Latest Ref Range: 26 8 - 34 3 pg 34 4 (H)   MCHC Latest Ref Range: 31 4 - 37 4 g/dL 34 0   RDW Latest Ref Range: 11 6 - 15 1 % 13 2   Platelet Count Latest Ref Range: 149 - 390 Thousands/uL 95 (L)   MPV Latest Ref Range: 8 9 - 12 7 fL 10 8   nRBC Latest Units: /100 WBCs 0     Physical Exam    Airway    Mallampati score: III  TM Distance: >3 FB  Neck ROM: full     Dental   Comment: Denies any loose teeth,     Cardiovascular      Pulmonary      Other Findings        Anesthesia Plan  ASA Score- 3     Anesthesia Type- general with ASA Monitors  Additional Monitors: arterial line  Airway Plan: ETT  Plan Factors-Exercise tolerance (METS): >4 METS  Chart reviewed  EKG reviewed  Imaging results reviewed  Existing labs reviewed   Patient summary reviewed  Induction- intravenous  Postoperative Plan- Plan for postoperative opioid use  Planned trial extubation    Informed Consent- Anesthetic plan and risks discussed with patient  I personally reviewed this patient with the CRNA  Discussed and agreed on the Anesthesia Plan with the CRNA  Jeane Gomez

## 2021-05-24 NOTE — OP NOTE
OPERATIVE REPORT  PATIENT NAME: Jannie Blackwell    :  1955  MRN: [id number]  Pt Location:  OR ROOM 09    SURGERY DATE: 2021    Surgeon(s) and Role:     * Letty Hu MD - Primary    Preop Diagnosis:  Cervical spinal stenosis [M48 02]    Post-Op Diagnosis Codes:     * Cervical spinal stenosis [M48 02]    Procedure(s) (LRB):  Posterior cervical decompressive laminectomy and lateral mass fixation fusion C3-5 (N/A)    Specimen(s):  * No specimens in log *    Estimated Blood Loss:   210 mL    Drains:  Closed/Suction Drain Right;Lateral Abdomen Bulb 15 Fr  (Active)   Number of days: 915       Closed/Suction Drain Posterior;Right Neck Bulb (Active)   Number of days: 0       NG/OG/Enteral Tube Orogastric 18 Fr Center mouth (Active)   Number of days: 0       [REMOVED] Urethral Catheter Latex;Straight-tip 16 Fr  (Removed)   Number of days: 0       Anesthesia Type:   General    Operative Indications:  Cervical spinal stenosis [M48 02]  Myelopathy    Operative Findings:  Ligamentous hypertrophy and spinal cord compression    Complications:   None    Procedure and Technique: The head was placed into 3 point head fixation after the patient had been placed in adequate general endotracheal anesthesia  The patient was turned prone and the head and neck were clipped free of hair  This was then prepped with Betadine soap then DuraPrep  Double layer drapes were placed in normal fashion and a Betadine impregnated sticky drape was placed over these  A time-out was called and all parameters a time-out were followed  The the skin in the midline centered over C2 spinous process to the C6 spinous process was injected with 1% lidocaine with 1 100,000 epinephrine  A 15  Blade was used to incise the skin  Bovie and bipolar were used throughout the procedure to maintain hemostasis  The Bovie and a cutting setting was used to divide the tissues to the dorsal fascia    A subperiosteal dissection was carried out of the spinous processes lamina and lateral masses of C3 through C5  Self-retaining retractors retractors were used to maintain operative exposure  Under fluoroscopic guidance infinity ([employer]) lateral mass screws were placed into the lateral masses of C3 through C5  Next, 40 mm rods were cut to the appropriate length and introduced into the tulips of the aforementioned screws  Caps were placed and tightened to the appropriate torque  The cartilaginous material in the facets was carefully debrided and the bone was lightly decorticated  This was done so as to facilitate later posterior lateral fusion  A trough was then drilled at the lateral aspect of the lamina and medial to the lateral masses from C C3 through C5 with partial involvement of C6  The interspinous ligament was released with the use of the Bovie, bipolar and scissors  Heraclio Serve was then used to hold the spinous process and elevate the spinous process and laminar an interspinous ligament elements off the underlying dura  As this was done epidural veins were coagulated and divided  Copious quantities of irrigation were used to cleanse out the region  FloSeal was placed in the lateral gutters as well as bone wax were appropriate  Copious quantities of antibiotic irrigation were used to cleanse out the region  Locally harvested bone from the spinous process and lamina was debrided of soft tissue and then placed the bone mill and morcellated  This was mixed with demineralized bone matrix and then placed in the posterior lateral position so as to allow for a posterior lateral fusion to be performed  1 g of vancomycin powder was placed in this region  The muscle was then approximated with interrupted 0 Vicryl suture  The fascia was closed with interrupted 0 Vicryl suture  The fat was approximated with interrupted inverted to 0 Vicryl suture the skin was closed with staples    A 7 mm flat Martín-Arroyo drain was placed in the epidural space  Clean sterile dressings were placed  The patient was taken out of pins, turned supine  There were no significant alterations in the EMG the, somatosensory-evoked potentials were motor-evoked potentials in this case     I was present for the entire procedure    Patient Disposition:  PACU     SIGNATURE: Cathy Negro MD  DATE: May 24, 2021  TIME: 10:47 AM

## 2021-05-24 NOTE — ANESTHESIA POSTPROCEDURE EVALUATION
Post-Op Assessment Note    CV Status:  Stable    Pain management: adequate     Mental Status:  Alert, awake and sleepy   Hydration Status:  Euvolemic   PONV Controlled:  Controlled   Airway Patency:  Patent      Post Op Vitals Reviewed: Yes      Staff: CRNA, Anesthesiologist         No complications documented      BP   125/75   Temp   96 8   Pulse  85   Resp   16   SpO2   98 face mask

## 2021-05-24 NOTE — INTERVAL H&P NOTE
H&P reviewed  After examining the patient I find no changes in the patients condition since the H&P had been written      Vitals:    05/24/21 0552   BP: 126/89   Pulse: 93   Resp: 18   Temp: 97 8 °F (36 6 °C)   SpO2: 96%     ABD flat NT

## 2021-05-24 NOTE — PLAN OF CARE
Problem: Potential for Falls  Goal: Patient will remain free of falls  Description: INTERVENTIONS:  - Assess patient frequently for physical needs  -  Identify cognitive and physical deficits and behaviors that affect risk of falls    -  [place] fall precautions as indicated by assessment   - Educate patient/family on patient safety including physical limitations  - Instruct patient to call for assistance with activity based on assessment  - Modify environment to reduce risk of injury  - Consider OT/PT consult to assist with strengthening/mobility  Outcome: Progressing     Problem: PAIN - ADULT  Goal: Verbalizes/displays adequate comfort level or baseline comfort level  Description: Interventions:  - Encourage patient to monitor pain and request assistance  - Assess pain using appropriate pain scale  - Administer analgesics based on type and severity of pain and evaluate response  - Implement non-pharmacological measures as appropriate and evaluate response  - Consider cultural and social influences on pain and pain management  - Notify physician/advanced practitioner if interventions unsuccessful or patient reports new pain  Outcome: Progressing     Problem: INFECTION - ADULT  Goal: Absence or prevention of progression during hospitalization  Description: INTERVENTIONS:  - Assess and monitor for signs and symptoms of infection  - Monitor lab/diagnostic results  - Monitor all insertion sites, i e  indwelling lines, tubes, and drains  - Monitor endotracheal if appropriate and nasal secretions for changes in amount and color  - [place] appropriate cooling/warming therapies per order  - Administer medications as ordered  - Instruct and encourage patient and family to use good hand hygiene technique  - Identify and instruct in appropriate isolation precautions for identified infection/condition  Outcome: Progressing     Problem: SAFETY ADULT  Goal: Patient will remain free of falls  Description: INTERVENTIONS:  - Assess patient frequently for physical needs  -  Identify cognitive and physical deficits and behaviors that affect risk of falls    -  [place] fall precautions as indicated by assessment   - Educate patient/family on patient safety including physical limitations  - Instruct patient to call for assistance with activity based on assessment  - Modify environment to reduce risk of injury  - Consider OT/PT consult to assist with strengthening/mobility  Outcome: Progressing  Goal: Maintain or return to baseline ADL function  Description: INTERVENTIONS:  -  Assess patient's ability to carry out ADLs; assess patient's baseline for ADL function and identify physical deficits which impact ability to perform ADLs (bathing, care of mouth/teeth, toileting, grooming, dressing, etc )  - Assess/evaluate cause of self-care deficits   - Assess range of motion  - Assess patient's mobility; develop plan if impaired  - Assess patient's need for assistive devices and provide as appropriate  - Encourage maximum independence but intervene and supervise when necessary  - Involve family in performance of ADLs  - Assess for home care needs following discharge   - Consider OT consult to assist with ADL evaluation and planning for discharge  - Provide patient education as appropriate  Outcome: Progressing  Goal: Maintain or return mobility status to optimal level  Description: INTERVENTIONS:  - Assess patient's baseline mobility status (ambulation, transfers, stairs, etc )    - Identify cognitive and physical deficits and behaviors that affect mobility  - Identify mobility aids required to assist with transfers and/or ambulation (gait belt, sit-to-stand, lift, walker, cane, etc )  - [place] fall precautions as indicated by assessment  - Record patient progress and toleration of activity level on Mobility SBAR; progress patient to next Phase/Stage  - Instruct patient to call for assistance with activity based on assessment  - Consider rehabilitation consult to assist with strengthening/weightbearing, etc   Outcome: Progressing     Problem: DISCHARGE PLANNING  Goal: Discharge to home or other facility with appropriate resources  Description: INTERVENTIONS:  - Identify barriers to discharge w/patient and caregiver  - Arrange for needed discharge resources and transportation as appropriate  - Identify discharge learning needs (meds, wound care, etc )  - Arrange for interpretive services to assist at discharge as needed  - Refer to Case Management Department for coordinating discharge planning if the patient needs post-hospital services based on physician/advanced practitioner order or complex needs related to functional status, cognitive ability, or social support system  Outcome: Progressing     Problem: Knowledge Deficit  Goal: Patient/family/caregiver demonstrates understanding of disease process, treatment plan, medications, and discharge instructions  Description: Complete learning assessment and assess knowledge base    Interventions:  - Provide teaching at level of understanding  - Provide teaching via preferred learning methods  Outcome: Progressing

## 2021-05-25 ENCOUNTER — APPOINTMENT (OUTPATIENT)
Dept: RADIOLOGY | Facility: HOSPITAL | Age: 66
DRG: 472 | End: 2021-05-25
Payer: MEDICARE

## 2021-05-25 LAB
ABO GROUP BLD BPU: NORMAL
ABO GROUP BLD BPU: NORMAL
ANION GAP SERPL CALCULATED.3IONS-SCNC: 5 MMOL/L (ref 4–13)
BPU ID: NORMAL
BPU ID: NORMAL
BUN SERPL-MCNC: 20 MG/DL (ref 5–25)
CALCIUM SERPL-MCNC: 8.7 MG/DL (ref 8.3–10.1)
CHLORIDE SERPL-SCNC: 110 MMOL/L (ref 100–108)
CO2 SERPL-SCNC: 25 MMOL/L (ref 21–32)
CREAT SERPL-MCNC: 0.94 MG/DL (ref 0.6–1.3)
ERYTHROCYTE [DISTWIDTH] IN BLOOD BY AUTOMATED COUNT: 13.1 % (ref 11.6–15.1)
GFR SERPL CREATININE-BSD FRML MDRD: 84 ML/MIN/1.73SQ M
GLUCOSE SERPL-MCNC: 146 MG/DL (ref 65–140)
HCT VFR BLD AUTO: 41.9 % (ref 36.5–49.3)
HGB BLD-MCNC: 14.3 G/DL (ref 12–17)
MCH RBC QN AUTO: 34.9 PG (ref 26.8–34.3)
MCHC RBC AUTO-ENTMCNC: 34.1 G/DL (ref 31.4–37.4)
MCV RBC AUTO: 102 FL (ref 82–98)
PLATELET # BLD AUTO: 91 THOUSANDS/UL (ref 149–390)
PLATELET # BLD AUTO: 95 THOUSANDS/UL (ref 149–390)
PMV BLD AUTO: 10.6 FL (ref 8.9–12.7)
PMV BLD AUTO: 10.7 FL (ref 8.9–12.7)
POTASSIUM SERPL-SCNC: 3.8 MMOL/L (ref 3.5–5.3)
RBC # BLD AUTO: 4.1 MILLION/UL (ref 3.88–5.62)
SODIUM SERPL-SCNC: 140 MMOL/L (ref 136–145)
UNIT DISPENSE STATUS: NORMAL
UNIT DISPENSE STATUS: NORMAL
UNIT PRODUCT CODE: NORMAL
UNIT PRODUCT CODE: NORMAL
UNIT RH: NORMAL
UNIT RH: NORMAL
WBC # BLD AUTO: 6.53 THOUSAND/UL (ref 4.31–10.16)

## 2021-05-25 PROCEDURE — 80048 BASIC METABOLIC PNL TOTAL CA: CPT | Performed by: PHYSICIAN ASSISTANT

## 2021-05-25 PROCEDURE — 85049 AUTOMATED PLATELET COUNT: CPT | Performed by: PHYSICIAN ASSISTANT

## 2021-05-25 PROCEDURE — 85027 COMPLETE CBC AUTOMATED: CPT | Performed by: PHYSICIAN ASSISTANT

## 2021-05-25 PROCEDURE — 99024 POSTOP FOLLOW-UP VISIT: CPT | Performed by: PHYSICIAN ASSISTANT

## 2021-05-25 PROCEDURE — 72040 X-RAY EXAM NECK SPINE 2-3 VW: CPT

## 2021-05-25 PROCEDURE — 97166 OT EVAL MOD COMPLEX 45 MIN: CPT

## 2021-05-25 PROCEDURE — 97163 PT EVAL HIGH COMPLEX 45 MIN: CPT

## 2021-05-25 RX ADMIN — HEPARIN SODIUM 5000 UNITS: 5000 INJECTION INTRAVENOUS; SUBCUTANEOUS at 13:16

## 2021-05-25 RX ADMIN — Medication 1000 UNITS: at 08:03

## 2021-05-25 RX ADMIN — DOCUSATE SODIUM 100 MG: 100 CAPSULE, LIQUID FILLED ORAL at 08:03

## 2021-05-25 RX ADMIN — METHOCARBAMOL 500 MG: 500 TABLET, FILM COATED ORAL at 19:07

## 2021-05-25 RX ADMIN — METHOCARBAMOL 500 MG: 500 TABLET, FILM COATED ORAL at 23:34

## 2021-05-25 RX ADMIN — METHOCARBAMOL 500 MG: 500 TABLET, FILM COATED ORAL at 05:03

## 2021-05-25 RX ADMIN — METHOCARBAMOL 500 MG: 500 TABLET, FILM COATED ORAL at 13:17

## 2021-05-25 RX ADMIN — DOCUSATE SODIUM 100 MG: 100 CAPSULE, LIQUID FILLED ORAL at 19:08

## 2021-05-25 RX ADMIN — METHOCARBAMOL 500 MG: 500 TABLET, FILM COATED ORAL at 00:21

## 2021-05-25 RX ADMIN — CEFAZOLIN SODIUM 1000 MG: 1 SOLUTION INTRAVENOUS at 00:21

## 2021-05-25 RX ADMIN — ACETAMINOPHEN 975 MG: 325 TABLET, FILM COATED ORAL at 13:16

## 2021-05-25 RX ADMIN — ACETAMINOPHEN 975 MG: 325 TABLET, FILM COATED ORAL at 22:05

## 2021-05-25 RX ADMIN — BUPROPION HYDROCHLORIDE 150 MG: 150 TABLET, EXTENDED RELEASE ORAL at 08:02

## 2021-05-25 RX ADMIN — SENNOSIDES 8.6 MG: 8.6 TABLET ORAL at 08:03

## 2021-05-25 RX ADMIN — NADOLOL 20 MG: 20 TABLET ORAL at 08:27

## 2021-05-25 RX ADMIN — ACETAMINOPHEN 975 MG: 325 TABLET, FILM COATED ORAL at 05:02

## 2021-05-25 RX ADMIN — HEPARIN SODIUM 5000 UNITS: 5000 INJECTION INTRAVENOUS; SUBCUTANEOUS at 22:05

## 2021-05-25 RX ADMIN — HYDROCHLOROTHIAZIDE 12.5 MG: 12.5 TABLET ORAL at 08:03

## 2021-05-25 RX ADMIN — CALCIUM 1 TABLET: 500 TABLET ORAL at 08:03

## 2021-05-25 NOTE — PHYSICIAN ADVISOR
Current patient class: Outpatient Surgery  The patient is currently on Hospital Day: 2 at [address] [place] [address]      This patient was originally admitted to the hospital under outpatient surgical class  After admission the patient was reevaluated and determined to require further hospitalization  The patient was then documented to require at least a 2nd midnight in the hospital  As such the patient was then expected to satisfy the 2 midnight benchmark and was therefore eligible for inpatient admission  After review of the relevant documentation, labs, vital signs and test results, the patient is appropriate for INPATIENT ADMISSION  Admission to the hospital as an inpatient is a complex decision making process which requires the practitioner to consider the patients presenting complaint, history and physical examination and all relevant testing  With this in mind, in this case, the patient was deemed appropriate for INPATIENT ADMISSION  After review of the documentation and testing available at the time of the admission I concur with this clinical determination of medical necessity  Rationale is as follows: The patient is a 77 yrs Male who presented for cervical spinal stenosis and decompressive laminectomy  Surgery was performed 5/24/21 s/p Posterior cervical decompressive laminectomy and lateral mass fixation fusion C3-5  The procedure went well  Neurosurgery continues to follow patient  A ANGELIQUE drain remains in place and per neurosurgery needs to remain in place due to output Drain output 5/24/21 170cc  He does have a history of thrombocytopenia and platelets monitored  He has been evaluated by PT/OT and stable for discharge     Given his continued high ANGELIQUE drain output and need for monitoring this, he is inpatient appropriate       The patients vitals on arrival were   ED Triage Vitals   Temperature Pulse Respirations Blood Pressure SpO2   05/24/21 0552 05/24/21 0552 05/24/21 1537 05/24/21 0552 05/24/21 0552   97 8 °F (36 6 °C) 93 18 126/89 96 %      Temp Source Heart Rate Source Patient Position - Orthostatic VS BP Location FiO2 (%)   05/24/21 0552 05/24/21 0552 -- -- 05/24/21 1127   Temporal Monitor   0 5      Pain Score       05/24/21 1215       No Pain           Past Medical History:   Diagnosis Date    Abscess of back 03/01/2018    Benign essential hypertension     Last Assessed:12/19/16; Pt reports heart and BP has been "fine" as of 4/16/2020    Cirrhosis (HCC)     Colon polyp     Cyst of skin     x6 from neck down back area    Depression     Esophageal varices (HCC)     Macrocytosis     Last Assessed:1/16/17    Major depression, chronic     Last Assessed:12/21/17    Major depression, chronic 6/19/2017    Personal history of colonic polyps     Sleep apnea      Past Surgical History:   Procedure Laterality Date    CHOLECYSTECTOMY  11/2018    CHOLECYSTECTOMY LAPAROSCOPIC N/A 11/21/2018    Procedure: CHOLECYSTECTOMY LAPAROSCOPIC, wedge liver biopsy;  Surgeon: Kimmie Cox MD;  Location: [facility];  Service: General    COLONOSCOPY  05/2011    COLONOSCOPY  05/2016    COLONOSCOPY      EGD  01/2019    Esophagitis, esophageal varices    GALLBLADDER SURGERY      INCISION AND DRAINAGE OF WOUND N/A 03/01/2018    SEBACEOUS CYST ABSCESS OF BACK-VIJAYA MONZON MD    [place] ARTHRODESIS POSTERIOR/POSTERIORLATERAL CERVICAL BELOW C2 N/A 5/24/2021    Procedure: Posterior cervical decompressive laminectomy and lateral mass fixation fusion C3-5;  Surgeon: Arash Perez MD;  [facility];  Service: Neurosurgery    [place] EXC SKIN BENIG 1 1-2 CM TRUNK,ARM,LEG N/A 8/22/2018    Procedure: EXCISION  BIOPSY LESION/MASS BACK X4 NECK X1;  Surgeon: Kimmie Cox MD;  Location: [facility];  Service: General       The patient was admitted to the hospital at N/A on N/A for the following diagnosis:  Cervical spinal stenosis [M48 02]    Consults have been placed to:   IP CONSULT TO CASE MANAGEMENT    Vitals:    05/25/21 0827 05/25/21 0828 05/25/21 1035 05/25/21 1420   BP: 124/84 124/84 127/81 121/80   Pulse: 87 87 81 84   Resp:   17 17   Temp:   98 °F (36 7 °C) 98 1 °F (36 7 °C)   TempSrc:       SpO2:  95% 95% 95%   Weight:       Height:           Most recent labs:    Recent Labs     05/25/21  0040 05/25/21  0525   WBC  --  6 53   HGB  --  14 3   HCT  --  41 9   PLT 95* 91*   K 3 8  --    CALCIUM 8 7  --    BUN 20  --    CREATININE 0 94  --        Scheduled Meds:  Current Facility-Administered Medications   Medication Dose Route Frequency Provider Last Rate    acetaminophen  975 mg Oral Q8H [address] Dessiree Berback, PA-C      buPROPion  150 mg Oral Daily [facility], [facility]      calcium carbonate  1 tablet Oral Daily With Breakfast [facility], PA-MARY      calcium carbonate  1,000 mg Oral Daily PRN Desree Meg Dugan, PA-C      cholecalciferol  1,000 Units Oral Daily Dessiree Berback, PA-C      docusate sodium  100 mg Oral BID Desree Berback, PA-C      heparin (porcine)  5,000 Units Subcutaneous Q8H [address] Dessiree Meg Dugan, PA-C      hydrochlorothiazide  12 5 mg Oral Daily [facility], [facility]      HYDROmorphone  0 5 mg Intravenous Q3H PRN Desree Berback, PA-C      lactated ringers  100 mL/hr Intravenous Continuous Vanessa Rahman CRNA      methocarbamol  500 mg Oral Q6H [address] Dessiree Meg Dugan, PA-C      nadolol  20 mg Oral Daily Dessiree Berback, PA-C      naloxone  0 04 mg Intravenous Q1MIN PRN Dessiree Berback, PA-C      ondansetron  4 mg Intravenous Q6H PRN Dessiree Berback, PA-C      oxyCODONE  10 mg Oral Q4H PRN Dessiree Meg Dugan, PA-C      oxyCODONE  5 mg Oral Q4H PRN Dessiree Berback, PA-C      senna  1 tablet Oral Daily [facility], PA-[facility]       Continuous Infusions:lactated ringers, 100 mL/hr      PRN Meds: calcium carbonate    HYDROmorphone    naloxone    ondansetron    oxyCODONE    oxyCODONE    Surgical procedures (if appropriate):  Procedure(s):  Posterior cervical decompressive laminectomy and lateral mass fixation fusion C3-5

## 2021-05-25 NOTE — ASSESSMENT & PLAN NOTE
1 Day Post-Op Procedure(s):  Posterior cervical decompressive laminectomy and lateral mass fixation fusion C3-5    Images  · Cervical x-rays: pending    Plan  · [place] brace ordered to be worn when OOB  · Ordered upright cervical spine XR AP/lateral and pending  · Mobilize with PT/OT with brace  · Per recommendations, patient is clear to go home with assistive devices  · DVT PPX: SCDs and SQH  · ANGELIQUE drain will remain another day  · Multimodal pain control  · Continue use of IS  · Will D/C fluids at this time  Neurosurgery will continue to follow as primary  Patient will require another hospital stay secondary to ANGELIQUE drain output  Call with questions/concerns in the interim  Discussed rounds with Vickey Lee RN

## 2021-05-25 NOTE — PHYSICAL THERAPY NOTE
PHYSICAL THERAPY EVALUATION  NAME:  Keysha Loo  DATE: 05/25/21    AGE:   77 y o  Mrn:   [id number]  ADMIT DX:  Cervical spinal stenosis [M48 02]    Past Medical History:   Diagnosis Date    Abscess of back 03/01/2018    Benign essential hypertension     Last Assessed:12/19/16; Pt reports heart and BP has been "fine" as of 4/16/2020    Cirrhosis (HCC)     Colon polyp     Cyst of skin     x6 from neck down back area    Depression     Esophageal varices (HCC)     Macrocytosis     Last Assessed:1/16/17    Major depression, chronic     Last Assessed:12/21/17    Major depression, chronic 6/19/2017    Personal history of colonic polyps     Sleep apnea        Past Surgical History:   Procedure Laterality Date    CHOLECYSTECTOMY  11/2018    CHOLECYSTECTOMY LAPAROSCOPIC N/A 11/21/2018    Procedure: CHOLECYSTECTOMY LAPAROSCOPIC, wedge liver biopsy;  Surgeon: Stephanie Huang MD;  [facility];  Service: General    COLONOSCOPY  05/2011    COLONOSCOPY  05/2016    COLONOSCOPY      EGD  01/2019    Esophagitis, esophageal varices    GALLBLADDER SURGERY      INCISION AND DRAINAGE OF WOUND N/A 03/01/2018    SEBACEOUS CYST ABSCESS OF BACK-VIJAYA MONZON MD    [place] ARTHRODESIS POSTERIOR/POSTERIORLATERAL CERVICAL BELOW C2 N/A 5/24/2021    Procedure: Posterior cervical decompressive laminectomy and lateral mass fixation fusion C3-5;  Surgeon: Mickey Moya MD;  [facility];  Service: Neurosurgery    [place] EXC SKIN BENIG 1 1-2 CM TRUNK,ARM,LEG N/A 8/22/2018    Procedure: EXCISION  BIOPSY LESION/MASS BACK X4 NECK X1;  Surgeon: Stephanie Huang MD;  [facility];  Service: General       Length Of Stay: 0    PHYSICAL THERAPY EVALUATION:        05/25/21 1105   Note Type   Note type Evaluation   Pain Assessment   Pain Assessment Tool 0-10   Pain Score 3   Pain Location/Orientation Location: Neck   Pain Onset/Description Onset: Ongoing;Frequency: Constant/Continuous; Descriptor: Aching Patient's Stated Pain Goal No pain   Hospital Pain Intervention(s) Ambulation/increased activity;Repositioned   Home Living   Type of [address] Two level;Stairs to enter with rails;Bed/bath upstairs  (3 NICOLE )   Home Equipment Cane   Additional Comments Patient reports living with supportive spouse who is able to assist as needed  Patient states his spouse is taking the rest of the summer off and will be at home and able to assist    Prior Function   Level of [place] Independent with ADLs and functional mobility   Lives With Spouse   Receives Help From Family   ADL Assistance Independent   Falls in the last 6 months 0   Comments Patient reports use of a single-point cane for ambulation prior to admission   Restrictions/Precautions   Weight Bearing Precautions Per Order No   Braces or Orthoses C/S Collar   Other Precautions Chair Alarm; Impulsive;Multiple lines; Fall Risk;Pain;Spinal precautions  (ANGELIQUE drain )   General   Family/Caregiver Present No   Cognition   Overall Cognitive Status WFL   Arousal/Participation Alert   Attention Attends with cues to redirect   Orientation Level Oriented to person;Oriented to place;Oriented to time   Memory Within functional limits   Following Commands Follows one step commands without difficulty   RUE Assessment   RUE Assessment WFL   LUE Assessment   LUE Assessment WFL   RLE Assessment   RLE Assessment WFL   Strength RLE   RLE Overall Strength 4-/5   LLE Assessment   LLE Assessment WFL   Strength LLE   LLE Overall Strength 4-/5   Bed Mobility   Supine to Sit 5  Supervision   Additional items Increased time required;Verbal cues   Transfers   Sit to Stand 4  Minimal assistance   Additional items Assist x 1; Increased time required;Verbal cues   Stand to Sit 4  Minimal assistance   Additional items Assist x 1; Increased time required;Verbal cues   Toilet transfer 4  Minimal assistance   Additional items Assist x 1; Increased time required;Standard toilet   Additional Comments Verbal cues and tactile cues needed for hand placement and safety   Ambulation/Elevation   Gait pattern Short stride; Foward flexed   Gait Assistance 5  Supervision   Assistive Device Rolling walker   Distance 65ft x 2    Stair Management Assistance 4  Minimal assist   Additional items Assist x 1   Stair Management Technique Two rails   Number of Stairs 3   Balance   Static Sitting Fair +   Static Standing Fair   Ambulatory Fair -   Endurance Deficit   Endurance Deficit No   Activity Tolerance   Activity Tolerance Patient tolerated treatment well   Medical Staff Made Aware Chise, OT; Pt seen for co evaluation with OT due to pts clinically unstable presentation, s/p surgical intervention with physical limitations, decreased activity tolerance compared to baseline, new spinal precautions/ limitations   Nurse Made Aware Patient appropriate to be seen and mobilized per nursing   Assessment   Prognosis Good   Problem List Decreased strength;Decreased range of motion; Impaired balance;Decreased mobility;Pain;Orthopedic restrictions;Decreased safety awareness   Assessment Pt is 77 y o  male seen for PT evaluation s/p admit to [facility] on 5/24/2021  Two pt identifiers were used to confirm  Pt presented for scheduled Posterior cervical decompressive laminectomy and lateral mass fixation fusion C3-5 (N/A) was performed on 05/24/2021   Pt was admitted with a primary dx of:  Cervical spinal stenosis s/p Posterior cervical decompressive laminectomy and lateral mass fixation fusion C3-5 (N/A)  PT now consulted for assessment of mobility and d/c needs  Pt with OOB orders  Pts current co morbidities affecting treatment include:  Benign essential HTN, cirrhosis, depression, macrocytosis, sleep apnea, and personal factors including steps to manage at home   Pts current clinical presentation is Unstable/ Unpredictable (high complexity) due to Ongoing medical management for primary dx, Increased reliance on more restrictive AD compared to baseline, Decreased activity tolerance compared to baseline, Fall risk, Spinal precautions at current time, Continuous pulse oximetry monitoring , s/p surgical intervention    Upon evaluation, pt currently is requiring Supervision for bed mobility; Min Ax1 for transfers and Supervision for ambulation w/ RW   Pt presents at PT eval functioning below baseline and currently w/ overall mobility deficits 2* to: BLE weakness, decreased ROM, impaired balance, gait deviations, pain, decreased activity tolerance compared to baseline, fall risk, spinal precautions  Pt currently at a fall risk 2* to impairments listed above  Based on the aforementioned PT evaluation, pt will continue to benefit from skilled Acute PT interventions to address stated impairments; to maximize functional mobility; for ongoing pt/ family training; and DME needs  At conclusion of PT session pt returned back in chair with phone and call bell within reach  Pt denies any further questions at this time  PT is currently recommending Home with increased family support and Outpatient PT when appropriate  PT will continue to follow during hospital stay  Barriers to Discharge None   Barriers to Discharge Comments Patient denies any mobility or safety concerns about returning home at time of discharge  Patient states his supportive spouse is taking time off work and will be able to assist him as needed   Goals   Patient Goals " to go home"   STG Expiration Date 06/04/21   Short Term Goal #1 In 10 days pt will complete: 1) Bed mobility skills with mod I to increase safety and independence as well as decrease caregiver burden  2) Functional transfers with mod I to promote increased independence, safety, and QOL  3) Ambulate 300' using least restrictive AD with mod I without LOB and stable vitals so that pt can negotiate previous living environment safely and promote independence with functional mobility and return to PLOF   4) Stair training up/ down 10 step/s using rail/s with mod I so that pt can enter/negotiate previous living environment safely and decrease fall risk  5) Improve balance grades by 1/2 grade to increase safety with all mobility and decrease fall risk  6) Improve BLE strength by 1/2 grade to help increase overall functional mobility and decrease fall risk  Plan   Treatment/Interventions Functional transfer training;LE strengthening/ROM; Elevations; Therapeutic exercise; Endurance training;Patient/family training;Equipment eval/education; Bed mobility;Gait training;Spoke to nursing;OT;Spoke to advanced practitioner   PT Frequency Other (Comment)  (3-6x a week )   Recommendation   PT Discharge Recommendation Home with outpatient rehabilitation  (home with increased support, OPPT when appropriate )   Equipment Recommended [facility] Recommended Wheeled walker   PT - OK to Discharge Yes  (When medically cleared)   AM-PAC Basic Mobility Inpatient   Turning in Bed Without Bedrails 4   Lying on Back to Sitting on Edge of Flat Bed 4   Moving Bed to Chair 3   Standing Up From Chair 3   Walk in Room 4   Climb 3-5 Stairs 3   Basic Mobility Inpatient Raw Score 21   Basic Mobility Standardized Score 45 55   Modified Dickey Scale   Modified Dickey Scale 4   Barthel Index   Feeding 10   Bathing 0   Grooming Score 5   Dressing Score 5   Bladder Score 10   Bowels Score 10   Toilet Use Score 5   Transfers (Bed/Chair) Score 10   Mobility (Level Surface) Score 10   Stairs Score 5   Barthel Index Score 70   Portions of the documentation may have been created using voice recognition software  Occasional wrong word or sound alike substitutions may have occurred due to the inherent limitations of the voice recognition software  Read the chart carefully and recognize, using context, where substitutions have occurred      Elke Alfaro, PT, DPT

## 2021-05-25 NOTE — PROGRESS NOTES
[address] [facility]  Progress Note - Joselin Boo 1955, 77 y o  male MRN: [id number]  Unit/Bed#: [facility] 625-01 Encounter: [id number]  Primary Care Provider: FADUMO Michelle   Date and time admitted to hospital: 5/24/2021  5:06 AM    * Cervical spinal stenosis  Assessment & Plan  1 Day Post-Op Procedure(s):  Posterior cervical decompressive laminectomy and lateral mass fixation fusion C3-5    Images  · Cervical x-rays: pending    Plan  · [place] brace ordered to be worn when OOB  · Ordered upright cervical spine XR AP/lateral pending  · Mobilize with PT/OT with brace  · Per recommendations, patient is clear to go home with assistive devices  · DVT PPX: SCDs and SQH  · ANGELIQUE drain will remain another day  · Multimodal pain control  · Continue use of IS  · Will D/C fluids at this time  Neurosurgery will continue to follow as primary  Patient will require another hospital stay secondary to ANGELIQUE drain output  Call with questions/concerns in the interim  Discussed rounds with [facility] RICHARD ART RN  Subjective/Objective     Patient seen and examined  Doing well  Feels better functionality in his feet with decreased paresthesia  His left pinky has noted improvement in tingling  Neck pain is controlled with current pain regimen    I/O       05/23 0701 - 05/24 0700 05/24 0701 - 05/25 0700 05/25 0701 - 05/26 0700    P  O   360 240    I V  (mL/kg)  2400 (19 5)     Blood  600     IV Piggyback  350     Total Intake(mL/kg)  3710 (30 2) 240 (2)    Urine (mL/kg/hr)  800 (0 3) 825 (1 1)    Drains  170 25    Stool   0    Blood  250     Total Output  1220 850    Net  +2490 -610           Unmeasured Urine Occurrence   1 x    Unmeasured Stool Occurrence   0 x          Invasive Devices     Peripheral Intravenous Line            Peripheral IV 05/24/21 Left Hand 1 day    Peripheral IV 05/24/21 Left;Right Hand 1 day          Drain            Closed/Suction Drain Right;Lateral Abdomen Bulb 15 Fr   916 days Closed/Suction Drain Posterior;Right Neck Bulb 1 day                Physical Exam:  Vitals: Blood pressure 127/81, pulse 81, temperature 98 °F (36 7 °C), resp  rate 17, height 6' 1" (1 854 m), weight 123 kg (272 lb 3 2 oz), SpO2 95 %  ,Body mass index is 35 91 kg/m²      Hemodynamic Monitoring: MAP: Arterial Line MAP (mmHg): 92 mmHg    General appearance: alert, appears stated age, cooperative and no distress  Head: Normocephalic, without obvious abnormality, atraumatic  Eyes: EOMI, PERRL  Neck: supple, symmetrical, trachea midline and NT  Back: no kyphosis present, no tenderness to percussion or palpation  Lungs: non labored breathing  Heart: regular heart rate  Neurologic:   Mental status: Alert, awake, oriented x 3, thought content appropriate  Cranial nerves: grossly intact (Cranial nerves II-XII)  Sensory: normal to LT  Motor: moving all extremities without focal weakness   Reflexes: 2+ and symmetric        Lab Results:  Results from last 7 days   Lab Units 05/25/21  0525 05/25/21  0040 05/24/21  1149 05/24/21  1113   WBC Thousand/uL 6 53  --  3 34*  --    HEMOGLOBIN g/dL 14 3  --  14 3  --    I STAT HEMOGLOBIN g/dl  --   --   --  14 6   HEMATOCRIT % 41 9  --  42 4  --    HEMATOCRIT, ISTAT %  --   --   --  43   PLATELETS Thousands/uL 91* 95* 113*  --    NEUTROS PCT %  --   --  76*  --    MONOS PCT %  --   --  3*  --      Results from last 7 days   Lab Units 05/25/21  0040 05/24/21  1149 05/24/21  1113   POTASSIUM mmol/L 3 8 4 0  --    CHLORIDE mmol/L 110* 110*  --    CO2 mmol/L 25 25  --    CO2, I-STAT mmol/L  --   --  31   BUN mg/dL 20 15  --    CREATININE mg/dL 0 94 0 92  --    CALCIUM mg/dL 8 7 9 1  --    GLUCOSE, ISTAT mg/dl  --   --  125     Results from last 7 days   Lab Units 05/24/21  1149   MAGNESIUM mg/dL 2 9*             No results found for: TROPONINT  ABG:  Lab Results   Component Value Date    PHART 7 372 05/24/2021    [id number] 41 8 05/24/2021    PO2ART 142 3 (H) 05/24/2021    [id number] 23 7 05/24/2021 BEART -1 5 05/24/2021    SOURCE Line, Arterial 05/24/2021       Imaging Studies: I have personally reviewed pertinent reports  and I have personally reviewed pertinent films in PACS    No results found  EKG, Pathology, and Other Studies: I have personally reviewed pertinent reports        VTE Pharmacologic Prophylaxis: Sequential compression device (Venodyne)  and Heparin    VTE Mechanical Prophylaxis: sequential compression device

## 2021-05-25 NOTE — OCCUPATIONAL THERAPY NOTE
Occupational Therapy Evaluation     Patient Name: Michael Marie  LOFXO'VY Date: 5/25/2021  Problem List  Principal Problem:    Cervical spinal stenosis  Active Problems:    Myelopathy [facility])    Past Medical History  Past Medical History:   Diagnosis Date    Abscess of back 03/01/2018    Benign essential hypertension     Last Assessed:12/19/16; Pt reports heart and BP has been "fine" as of 4/16/2020    Cirrhosis ([address] )     Colon polyp     Cyst of skin     x6 from neck down back area    Depression     Esophageal varices (HCC)     Macrocytosis     Last Assessed:1/16/17    Major depression, chronic     Last Assessed:12/21/17    Major depression, chronic 6/19/2017    Personal history of colonic polyps     Sleep apnea      Past Surgical History  Past Surgical History:   Procedure Laterality Date    CHOLECYSTECTOMY  11/2018    CHOLECYSTECTOMY LAPAROSCOPIC N/A 11/21/2018    Procedure: CHOLECYSTECTOMY LAPAROSCOPIC, wedge liver biopsy;  Surgeon: Tammy Acuña MD;  [facility];  Service: General    COLONOSCOPY  05/2011    COLONOSCOPY  05/2016    COLONOSCOPY      EGD  01/2019    Esophagitis, esophageal varices    GALLBLADDER SURGERY      INCISION AND DRAINAGE OF WOUND N/A 03/01/2018    SEBACEOUS CYST ABSCESS OF BACK-VIJAYA MONZON MD    [place] ARTHRODESIS POSTERIOR/POSTERIORLATERAL CERVICAL BELOW C2 N/A 5/24/2021    Procedure: Posterior cervical decompressive laminectomy and lateral mass fixation fusion C3-5;  Surgeon: Lois Espino MD;  [facility];  Service: Neurosurgery    [place] EXC SKIN BENIG 1 1-2 CM TRUNK,ARM,LEG N/A 8/22/2018    Procedure: EXCISION  BIOPSY LESION/MASS BACK X4 NECK X1;  Surgeon: Tammy Acuña MD;  [facility];  Service: General         05/25/21 1102   OT Last Visit   OT Visit Date 05/25/21   Note Type   Note type Evaluation   Restrictions/Precautions   Weight Bearing Precautions Per Order No   Braces or Orthoses C/S Collar   Other Precautions Chair Alarm; Impulsive;Multiple lines; Fall Risk;Pain;Spinal precautions  (ANGELIQUE DRAIN )   Pain Assessment   Pain Assessment Tool 0-10   Pain Score 3   Pain Location/Orientation Location: Neck   Hospital Pain Intervention(s) Repositioned; Ambulation/increased activity; Emotional support   Home Living   Type of [address] [facility] Two level;Stairs to enter with rails;Bed/bath upstairs  (3 NICOLE)   Bathroom Shower/Tub Tub/shower unit   Bathroom Toilet Standard   Bathroom Accessibility Accessible   Home Equipment Cane   Additional Comments PT REPORTS USE OF SPC PTA    Prior Function   Level of [place] Independent with ADLs and functional mobility   Lives With Spouse   Receives Help From Family   ADL Assistance Independent   IADLs Independent   Falls in the last 6 months 0   Vocational Retired   Lifestyle   Autonomy  [facility] ADLS/IADLS/DRIVING PTA   Reciprocal Relationships LIVES WITH SUPPORTIVE SPOUSE WHO TOOK PROLONGED TIME OFF FROM WORK TO ASSIST AS NEEDED    Service to Others RETIRED MAINTENANCE    Intrinsic Gratification ENJOYS MOWING THE YARD   ADL   Eating Assistance 7  Independent   Grooming Assistance 5  Supervision/Setup   UB Bathing Assistance 5  Supervision/Setup   LB Bathing Assistance 4  Minimal Assistance   UB Dressing Assistance 5  Supervision/Setup    [facility] 4  [address] [facility]  4  [address] [facility] 4  Minimal Assistance   Bed Mobility   Supine to Sit 5  Supervision   Additional items Assist x 1; Increased time required   Sit to Supine Unable to assess  (PT LEFT OOB WITH ALARM ON AND ALL NEEDS IN REACH)   Transfers   Sit to Stand 4  Minimal assistance   Additional items Assist x 1; Increased time required;Verbal cues   Stand to Sit 4  Minimal assistance   Additional items Assist x 1; Increased time required;Verbal cues   Functional Mobility   Functional Mobility 5  Supervision   Additional items Rolling walker   Balance   Static Sitting Fair +   Static Standing Fair   Ambulatory Fair -   Activity Tolerance   Activity Tolerance Patient tolerated treatment well   Medical Staff Made Aware PT SEEN FOR CO-SESSION WITH SKILLED PHYSICAL THERAPIST 2' CLINICALLY UNSTABLE PRESENTATION, NEW PRECAUTIONS/LIMITATIONS, LIMITED ACTIVITY TOLERANCE AND PRESENT IMPAIRMENTS WHICH ARE A REGRESSION FROM THE PT'S BASELINE AND IMPACTING OVERALL OCCUPATIONAL PERFORMANCE  CM    Nurse Made Aware APPROPRIATE TO SEE    RUE Assessment   RUE Assessment WFL   LUE Assessment   LUE Assessment WFL   Hand Function   Gross Motor Coordination Functional   Fine Motor Coordination Functional   Sensation   Light Touch Partial deficits in the RLE;Partial deficits in the LLE  (DISTALLY )   Cognition   Overall Cognitive Status WFL   Arousal/Participation Alert; Cooperative   Attention Attends with cues to redirect   Orientation Level Oriented X4   Memory Within functional limits   Following Commands Follows one step commands without difficulty   Comments PT IS OVERALL PLEASANT AND COOPERATIVE  MILDLY IMPULSIVE AT TIMES  Assessment   Assessment 78 YO Male SEEN FOR INITIAL OCCUPATIONAL THERAPY EVALUATION S/P POSTERIOR CERVICAL DECOMPRESSIVE LAMINECTOMY AND LATERAL MASS FIXATION FUSION OF C3-5 ON 5/24/21  PT CURRENTLY HAS THE FOLLOWING RESTRICTIONS;SPINAL PRECAUTIONS and C-COLLAR   PROBLEMS LIST INCLUDES CERVICAL SPINAL STENOSIS, DDD, LUMBAR RADICULOPATHY, MYELOPATHY AND CHRONIC PAIN SYNDROME  PT IS FROM HOME WITH SUPPORTIVE SPOUSE WHERE HE REPORTS BEING INDEPENDENT WITH ADLS/IADLS/DRIVING PTA  PT CURRENTLY REQUIRES OVERALL SUPERVISION-MIN A WITH ADLS, TRANSFERS AND FUNCTIONAL MOBILITY WITH USE OF RW  PT IS EXPERIENCING EXPECTED LIMITATIONS 2' PAIN, FATIGUE, IMPAIRED BALANCE, SPINAL PRECAUTIONS, OVERALL WEAKNESS/DECONDITIONING , INACCESSIBLE HOME ENVIRONMENT and OVERALL LIMITED ACTIVITY TOLERANCE   PT EDUCATED ON SPINAL PRECAUTIONS, C-COLLAR MANAGEMENT, DEEP BREATHING TECHNIQUES T/O ACTIVITY, SLOWING OF PACE, ENERGY CONSERVATION TECHNIQUES FOR CARRY OVER UPON D/C, INCREASED FAMILY SUPPORT and CONTINUE PARTICIPATION IN SELF-CARE/MOBILITY WITH STAFF [address]   The patient's raw score on the AM-PAC Daily Activity inpatient short form is 19, standardized score is 40 22, greater than 39 4  Patients at this level are likely to benefit from discharge to home  Please refer to the recommendation of the Occupational Therapist for safe discharge planning  FROM AN OCCUPATIONAL THERAPY PERSPECTIVE, PT CAN RETURN HOME WITH INCREASED FAMILY SUPPORT WHEN MEDICALLY CLEARED  DME RECS INCLUDE BSC/SC, URINAL FOR 1ST FLOOR AS NEEDED AND AGREE WITH USE OF RW  ALL QUESTIONS/CONCERNS ADDRESSED  NO ADDITIONAL ACUTE CARE TO NEEDS  D/C OT      Goals   Patient Goals TO RETURN HOME    Recommendation   OT Discharge Recommendation No rehabilitation needs  (INCREASED FAMILY SUPPORT )   Equipment Recommended Bedside commode   Commode Type Standard   OT - OK to Discharge Yes   AM-PAC Daily Activity Inpatient   Lower Body Dressing 3   Bathing 3   Toileting 3   Upper Body Dressing 3   Grooming 3   Eating 4   Daily Activity Raw Score 19   Daily Activity Standardized Score (Calc for Raw Score >=11) 40 22   AM-PAC Applied Cognition Inpatient   Following a Speech/Presentation 4   Understanding Ordinary Conversation 4   Taking Medications 4   Remembering Where Things Are Placed or Put Away 4   Remembering List of 4-5 Errands 4   Taking Care of Complicated Tasks 4   Applied Cognition Raw Score 24   Applied Cognition Standardized Score 62 21   Modified [place] Scale   Modified [place] Scale 3       Documentation completed by RENATO Damon, OTR/L

## 2021-05-25 NOTE — PLAN OF CARE
Problem: PHYSICAL THERAPY ADULT  Goal: Performs mobility at highest level of function for planned discharge setting  See evaluation for individualized goals  Description: Treatment/Interventions: Functional transfer training, LE strengthening/ROM, Elevations, Therapeutic exercise, Endurance training, Patient/family training, Equipment eval/education, Bed mobility, Gait training, Spoke to nursing, OT, Spoke to advanced practitioner  Equipment Recommended: Maude Brock       See flowsheet documentation for full assessment, interventions and recommendations  Note: Prognosis: Good  Problem List: Decreased strength, Decreased range of motion, Impaired balance, Decreased mobility, Pain, Orthopedic restrictions, Decreased safety awareness  Assessment: Pt is 77 y o  male seen for PT evaluation s/p admit to [facility] on 5/24/2021  Two pt identifiers were used to confirm  Pt presented for scheduled Posterior cervical decompressive laminectomy and lateral mass fixation fusion C3-5 (N/A) was performed on 05/24/2021   Pt was admitted with a primary dx of:  Cervical spinal stenosis s/p Posterior cervical decompressive laminectomy and lateral mass fixation fusion C3-5 (N/A)  PT now consulted for assessment of mobility and d/c needs  Pt with OOB orders  Pts current co morbidities affecting treatment include:  Benign essential HTN, cirrhosis, depression, macrocytosis, sleep apnea, and personal factors including steps to manage at home  Pts current clinical presentation is Unstable/ Unpredictable (high complexity) due to Ongoing medical management for primary dx, Increased reliance on more restrictive AD compared to baseline, Decreased activity tolerance compared to baseline, Fall risk, Spinal precautions at current time, Continuous pulse oximetry monitoring , s/p surgical intervention    Upon evaluation, pt currently is requiring Supervision for bed mobility; Min Ax1 for transfers and Supervision for ambulation w/ RW   Pt presents at PT eval functioning below baseline and currently w/ overall mobility deficits 2* to: BLE weakness, decreased ROM, impaired balance, gait deviations, pain, decreased activity tolerance compared to baseline, fall risk, spinal precautions  Pt currently at a fall risk 2* to impairments listed above  Based on the aforementioned PT evaluation, pt will continue to benefit from skilled Acute PT interventions to address stated impairments; to maximize functional mobility; for ongoing pt/ family training; and DME needs  At conclusion of PT session pt returned back in chair with phone and call bell within reach  Pt denies any further questions at this time  PT is currently recommending Home with increased family support and Outpatient PT when appropriate  PT will continue to follow during hospital stay  Barriers to Discharge: None  Barriers to Discharge Comments: Patient denies any mobility or safety concerns about returning home at time of discharge  Patient states his supportive spouse is taking time off work and will be able to assist him as needed     PT Discharge Recommendation: Home with outpatient rehabilitation(home with increased support, OPPT when appropriate )     PT - OK to Discharge: Yes(When medically cleared)    See flowsheet documentation for full assessment

## 2021-05-25 NOTE — CASE MANAGEMENT
Walker and yared ordered via parachute  Patient informed that equipment will be delivered to his room and not to leave the hospital without equipment  Pt verbalized understanding  Wife will be visiting at 15, pt to decide whether he would like [facility]  vs OPPT    Patient and wife state that they would like to do OPPT when cleared    Wife currently on JASMYN and will be present with patient 24/7 at home to assist

## 2021-05-26 VITALS
TEMPERATURE: 97.9 F | OXYGEN SATURATION: 95 % | HEART RATE: 81 BPM | DIASTOLIC BLOOD PRESSURE: 88 MMHG | RESPIRATION RATE: 17 BRPM | BODY MASS INDEX: 36.87 KG/M2 | WEIGHT: 278.22 LBS | HEIGHT: 73 IN | SYSTOLIC BLOOD PRESSURE: 150 MMHG

## 2021-05-26 LAB
DME PARACHUTE DELIVERY DATE ACTUAL: NORMAL
DME PARACHUTE DELIVERY DATE ACTUAL: NORMAL
DME PARACHUTE DELIVERY DATE REQUESTED: NORMAL
DME PARACHUTE DELIVERY DATE REQUESTED: NORMAL
DME PARACHUTE ITEM DESCRIPTION: NORMAL
DME PARACHUTE ITEM DESCRIPTION: NORMAL
DME PARACHUTE ORDER STATUS: NORMAL
DME PARACHUTE ORDER STATUS: NORMAL
DME PARACHUTE SUPPLIER NAME: NORMAL
DME PARACHUTE SUPPLIER NAME: NORMAL
DME PARACHUTE SUPPLIER PHONE: NORMAL
DME PARACHUTE SUPPLIER PHONE: NORMAL

## 2021-05-26 PROCEDURE — 99024 POSTOP FOLLOW-UP VISIT: CPT | Performed by: PHYSICIAN ASSISTANT

## 2021-05-26 PROCEDURE — NC001 PR NO CHARGE: Performed by: PHYSICIAN ASSISTANT

## 2021-05-26 RX ORDER — METHOCARBAMOL 500 MG/1
500 TABLET, FILM COATED ORAL EVERY 6 HOURS SCHEDULED
Qty: 28 TABLET | Refills: 0 | Status: SHIPPED | OUTPATIENT
Start: 2021-05-26 | End: 2021-10-11

## 2021-05-26 RX ORDER — ACETAMINOPHEN 325 MG/1
975 TABLET ORAL EVERY 8 HOURS SCHEDULED
Refills: 0
Start: 2021-05-26 | End: 2021-09-14 | Stop reason: ALTCHOICE

## 2021-05-26 RX ORDER — SENNOSIDES 8.6 MG
8.6 TABLET ORAL DAILY
Refills: 0
Start: 2021-05-27 | End: 2021-09-14 | Stop reason: ALTCHOICE

## 2021-05-26 RX ORDER — OXYCODONE HYDROCHLORIDE 10 MG/1
10 TABLET ORAL EVERY 4 HOURS PRN
Qty: 42 TABLET | Refills: 0 | Status: SHIPPED | OUTPATIENT
Start: 2021-05-26 | End: 2021-06-02

## 2021-05-26 RX ORDER — DOCUSATE SODIUM 100 MG/1
100 CAPSULE, LIQUID FILLED ORAL 2 TIMES DAILY
Qty: 10 CAPSULE | Refills: 0
Start: 2021-05-26 | End: 2021-09-14 | Stop reason: ALTCHOICE

## 2021-05-26 RX ADMIN — METHOCARBAMOL 500 MG: 500 TABLET, FILM COATED ORAL at 12:20

## 2021-05-26 RX ADMIN — DOCUSATE SODIUM 100 MG: 100 CAPSULE, LIQUID FILLED ORAL at 08:38

## 2021-05-26 RX ADMIN — BUPROPION HYDROCHLORIDE 150 MG: 150 TABLET, EXTENDED RELEASE ORAL at 08:39

## 2021-05-26 RX ADMIN — HEPARIN SODIUM 5000 UNITS: 5000 INJECTION INTRAVENOUS; SUBCUTANEOUS at 06:20

## 2021-05-26 RX ADMIN — ACETAMINOPHEN 975 MG: 325 TABLET, FILM COATED ORAL at 06:20

## 2021-05-26 RX ADMIN — CALCIUM 1 TABLET: 500 TABLET ORAL at 08:39

## 2021-05-26 RX ADMIN — SENNOSIDES 8.6 MG: 8.6 TABLET ORAL at 08:38

## 2021-05-26 RX ADMIN — NADOLOL 20 MG: 20 TABLET ORAL at 08:39

## 2021-05-26 RX ADMIN — Medication 1000 UNITS: at 08:39

## 2021-05-26 RX ADMIN — HYDROCHLOROTHIAZIDE 12.5 MG: 12.5 TABLET ORAL at 08:39

## 2021-05-26 RX ADMIN — METHOCARBAMOL 500 MG: 500 TABLET, FILM COATED ORAL at 06:21

## 2021-05-26 NOTE — PROGRESS NOTES
[facility]  Progress Note - Joselin Boo 1955, 77 y o  male MRN: [id number]  Unit/Bed#: [facility] 625-01 Encounter: [id number]  Primary Care Provider: FADUMO Michelle   Date and time admitted to hospital: 5/24/2021  5:06 AM    * Cervical spinal stenosis  Assessment & Plan  2 Day Post-Op Procedure(s):  Posterior cervical decompressive laminectomy and lateral mass fixation fusion C3-5    Images  · Cervical x-rays 5/25/21: Status post recent posterior cervical decompressive laminectomy is noted  Plan  · [place] brace ordered to be worn when OOB  · Shelby collar for showering  · Ordered upright cervical spine XR AP/lateral and pending  · Mobilize with PT/OT with brace  · Per recommendations, patient is clear to go home with assistive devices  · DVT PPX: SCDs and SQH  · ANGELIQUE drain-removed today   · Multimodal pain control  · Continue use of IS  Neurosurgery will follow up with this patient at there already scheduled appointments in out office  ANGELIQUE drain removed today  Call with questions/concerns in the interim  Discussed rounds with ESTEPHANIA Mcneil  Subjective/Objective     Patient reports that they are doing well  Ready to go home  Feels that he safe to ambulate and walk up stairs  I/O       05/24 0701 - 05/25 0700 05/25 0701 - 05/26 0700 05/26 0701 - 05/27 0700    P  O  360 480 540    I V  (mL/kg) 2400 (19 5) 650 (5 2)     Blood 600      IV Piggyback 350      Total Intake(mL/kg) 3710 (30 2) 1130 (9) 540 (4 3)    Urine (mL/kg/hr) 800 (0 3) 2300 (0 8) 500 (0 9)    Drains 170 95 0    Stool  0 0    Blood 250      Total Output 1220 2395 500    Net +2490 -1265 +40           Unmeasured Urine Occurrence  3 x 2 x    Unmeasured Stool Occurrence  0 x 1 x          Invasive Devices     Peripheral Intravenous Line            Peripheral IV 05/24/21 Left Hand 2 days    Peripheral IV 05/24/21 Left;Right Hand 2 days          Drain            Closed/Suction Drain Right;Lateral Abdomen Bulb 15 Fr  916 days    Closed/Suction Drain Posterior;Right Neck Bulb 2 days                Physical Exam:  Vitals: Blood pressure 150/88, pulse 81, temperature 97 9 °F (36 6 °C), resp  rate 17, height 6' 1" (1 854 m), weight 126 kg (278 lb 3 5 oz), SpO2 95 %  ,Body mass index is 36 71 kg/m²  Hemodynamic Monitoring: MAP: Arterial Line MAP (mmHg): 92 mmHg    General appearance: alert, appears stated age, cooperative and no distress  Head: Normocephalic, without obvious abnormality, atraumatic  Eyes: EOMI, PERRL  Neck: supple, symmetrical, trachea midline and NT  Back: no kyphosis present, no tenderness to percussion or palpation  Lungs: non labored breathing  Heart: regular heart rate  Neurologic:   Mental status: Alert, awake, oriented x 3, thought content appropriate  Cranial nerves: grossly intact (Cranial nerves II-XII)  Sensory: normal to LT except in left pinky improvement of numbness and bilateral feet with intermittent tingling     Motor: moving all extremities without focal weakness   Reflexes: 2+ and symmetric  Coordination: finger to nose normal bilaterally, no drift bilaterally  Incision: C/D/I      Lab Results:  Results from last 7 days   Lab Units 05/25/21  0525 05/25/21  0040 05/24/21  1149 05/24/21  1113   WBC Thousand/uL 6 53  --  3 34*  --    HEMOGLOBIN g/dL 14 3  --  14 3  --    I STAT HEMOGLOBIN g/dl  --   --   --  14 6   HEMATOCRIT % 41 9  --  42 4  --    HEMATOCRIT, ISTAT %  --   --   --  43   PLATELETS Thousands/uL 91* 95* 113*  --    NEUTROS PCT %  --   --  76*  --    MONOS PCT %  --   --  3*  --      Results from last 7 days   Lab Units 05/25/21  0040 05/24/21  1149 05/24/21  1113   POTASSIUM mmol/L 3 8 4 0  --    CHLORIDE mmol/L 110* 110*  --    CO2 mmol/L 25 25  --    CO2, I-STAT mmol/L  --   --  31   BUN mg/dL 20 15  --    CREATININE mg/dL 0 94 0 92  --    CALCIUM mg/dL 8 7 9 1  --    GLUCOSE, ISTAT mg/dl  --   --  125     Results from last 7 days   Lab Units 05/24/21  7041 MAGNESIUM mg/dL 2 9*             No results found for: TROPONINT  ABG:  Lab Results   Component Value Date    PHART 7 372 05/24/2021    BWE6NKD 41 8 05/24/2021    PO2ART 142 3 (H) 05/24/2021    XRQ1NWF 23 7 05/24/2021    BEART -1 5 05/24/2021    SOURCE Line, Arterial 05/24/2021       Imaging Studies: I have personally reviewed pertinent reports  and I have personally reviewed pertinent films in PACS    No results found  EKG, Pathology, and Other Studies: I have personally reviewed pertinent reports        VTE Pharmacologic Prophylaxis: Sequential compression device (Venodyne)  and Heparin    VTE Mechanical Prophylaxis: sequential compression device

## 2021-05-26 NOTE — DISCHARGE INSTRUCTIONS
Discharge Instructions  Posterior cervical decompression and fixation/fusion      Surgical incisional care:   Keep incision clean and dry  Avoid applying creams, lotion or antiseptic to incision area   Check the wound daily  If the incision becomes red, swollen, tender, warm, or has increased drainage please notify physician immediately   Okay to apply a padded dressing over incision while wearing VISTA collar in order to reduce risk of irritation   May shower 3 days after surgery, but do not soak in a tub and no swimming  o Use mild antimicrobial soap and water  Pat incision dry after showering   Cervical VISTA collar to be worn at all times except for showering  Change from VISTA (grey) collar to the [facility] (peach) collar prior to showering  Incision may be cleaned with water and a mild antimicrobial soap using a clean washcloth  Incision is to be gently patted dry with a clean towel  Once dry, collar should be changed back to a VISTA (grey) collar with clean pads in place   Hand wash collar pads with mild soap and water  They are to be laid flat to dry on a clean towel  Recommend changing every 1-2 days   Please refer to VISTA collar instructions for further details  Activity Restrictions:   No heavy lifting greater than 5 - 10lbs  No strenuous activities   May walk as tolerated  Encourage at least 4 short walks per day   No driving while requiring cervical collar, anticipated six weeks   No significant neck movement  Postoperative medication:   Please take pain medications to relieve incision pain, and muscle relaxants to prevent spasms as directed  Please see after visit summary (AVS) for details   Please take over the counter stool softeners such as colace or senna-s to avoid constipation while on narcotics   Do not take ibuprofen, Naproxen/Aleve or any NSAID until cleared by surgeon  May take Tylenol instead     If taking Coumadin, Aspirin, or Plavix, you may resume these medications when cleared by Neurosurgery  Follow-up as scheduled for a 2 week incision check  Follow-up 6 weeks after surgery with a repeat cervical spine upright x-rays to be completed prior to visit  **Please notify MD immediately if you experience a fever of 101  F, have increased neck or arm pain, new numbness and/or weakness in your arms, difficulty swallowing or breathing especially while lying down, numbness or weakness in arms or legs  **

## 2021-05-26 NOTE — PLAN OF CARE
Problem: Potential for Falls  Goal: Patient will remain free of falls  Description: INTERVENTIONS:  - Assess patient frequently for physical needs  -  Identify cognitive and physical deficits and behaviors that affect risk of falls    -  [place] fall precautions as indicated by assessment   - Educate patient/family on patient safety including physical limitations  - Instruct patient to call for assistance with activity based on assessment  - Modify environment to reduce risk of injury  - Consider OT/PT consult to assist with strengthening/mobility  5/25/2021 2050 by Gracia Esparza RN  Outcome: Progressing  5/25/2021 2050 by Gracia Esparza RN  Outcome: Progressing     Problem: PAIN - ADULT  Goal: Verbalizes/displays adequate comfort level or baseline comfort level  Description: Interventions:  - Encourage patient to monitor pain and request assistance  - Assess pain using appropriate pain scale  - Administer analgesics based on type and severity of pain and evaluate response  - Implement non-pharmacological measures as appropriate and evaluate response  - Consider cultural and social influences on pain and pain management  - Notify physician/advanced practitioner if interventions unsuccessful or patient reports new pain  5/25/2021 2050 by Gracia Esparza RN  Outcome: Progressing  5/25/2021 2050 by Gracia Esparza RN  Outcome: Progressing     Problem: INFECTION - ADULT  Goal: Absence or prevention of progression during hospitalization  Description: INTERVENTIONS:  - Assess and monitor for signs and symptoms of infection  - Monitor lab/diagnostic results  - Monitor all insertion sites, i e  indwelling lines, tubes, and drains  - Monitor endotracheal if appropriate and nasal secretions for changes in amount and color  - [place] appropriate cooling/warming therapies per order  - Administer medications as ordered  - Instruct and encourage patient and family to use good hand hygiene technique  - Identify and instruct in appropriate isolation precautions for identified infection/condition  5/25/2021 2050 by Eliel Payne RN  Outcome: Progressing  5/25/2021 2050 by Eliel Payne RN  Outcome: Progressing     Problem: SAFETY ADULT  Goal: Patient will remain free of falls  Description: INTERVENTIONS:  - Assess patient frequently for physical needs  -  Identify cognitive and physical deficits and behaviors that affect risk of falls    -  [place] fall precautions as indicated by assessment   - Educate patient/family on patient safety including physical limitations  - Instruct patient to call for assistance with activity based on assessment  - Modify environment to reduce risk of injury  - Consider OT/PT consult to assist with strengthening/mobility  5/25/2021 2050 by Eliel Payne RN  Outcome: Progressing  5/25/2021 2050 by Eliel Payne RN  Outcome: Progressing  Goal: Maintain or return to baseline ADL function  Description: INTERVENTIONS:  -  Assess patient's ability to carry out ADLs; assess patient's baseline for ADL function and identify physical deficits which impact ability to perform ADLs (bathing, care of mouth/teeth, toileting, grooming, dressing, etc )  - Assess/evaluate cause of self-care deficits   - Assess range of motion  - Assess patient's mobility; develop plan if impaired  - Assess patient's need for assistive devices and provide as appropriate  - Encourage maximum independence but intervene and supervise when necessary  - Involve family in performance of ADLs  - Assess for home care needs following discharge   - Consider OT consult to assist with ADL evaluation and planning for discharge  - Provide patient education as appropriate  5/25/2021 2050 by Eliel Payne RN  Outcome: Progressing  5/25/2021 2050 by Eliel Payne RN  Outcome: Progressing  Goal: Maintain or return mobility status to optimal level  Description: INTERVENTIONS:  - Assess patient's baseline mobility status (ambulation, transfers, stairs, etc )    - Identify cognitive and physical deficits and behaviors that affect mobility  - Identify mobility aids required to assist with transfers and/or ambulation (gait belt, sit-to-stand, lift, walker, cane, etc )  - [place] fall precautions as indicated by assessment  - Record patient progress and toleration of activity level on Mobility SBAR; progress patient to next Phase/Stage  - Instruct patient to call for assistance with activity based on assessment  - Consider rehabilitation consult to assist with strengthening/weightbearing, etc   5/25/2021 2050 by Megan Black RN  Outcome: Progressing  5/25/2021 2050 by Megan Black RN  Outcome: Progressing     Problem: DISCHARGE PLANNING  Goal: Discharge to home or other facility with appropriate resources  Description: INTERVENTIONS:  - Identify barriers to discharge w/patient and caregiver  - Arrange for needed discharge resources and transportation as appropriate  - Identify discharge learning needs (meds, wound care, etc )  - Arrange for interpretive services to assist at discharge as needed  - Refer to Case Management Department for coordinating discharge planning if the patient needs post-hospital services based on physician/advanced practitioner order or complex needs related to functional status, cognitive ability, or social support system  5/25/2021 2050 by Megan Black RN  Outcome: Progressing  5/25/2021 2050 by Megan Black RN  Outcome: Progressing     Problem: Knowledge Deficit  Goal: Patient/family/caregiver demonstrates understanding of disease process, treatment plan, medications, and discharge instructions  Description: Complete learning assessment and assess knowledge base    Interventions:  - Provide teaching at level of understanding  - Provide teaching via preferred learning methods  5/25/2021 2050 by Megan Black RN  Outcome: Progressing  5/25/2021 2050 by Megan Black RN  Outcome: Progressing

## 2021-05-26 NOTE — ASSESSMENT & PLAN NOTE
2 Day Post-Op Procedure(s):  Posterior cervical decompressive laminectomy and lateral mass fixation fusion C3-5    Images  · Cervical x-rays 5/25/21: Status post recent posterior cervical decompressive laminectomy is noted  Plan  · [place] brace ordered to be worn when OOB  · Shelby collar for showering  · Ordered upright cervical spine XR AP/lateral and pending  · Mobilize with PT/OT with brace  · Per recommendations, patient is clear to go home with assistive devices  · DVT PPX: SCDs and SQH  · ANGELIQUE drain-removed today   · Multimodal pain control  · Continue use of IS  Neurosurgery will follow up with this patient at there already scheduled appointments in out office  ANGELIQUE drain removed today  Call with questions/concerns in the interim  Discussed rounds with ESTEPHANIA Mcneil

## 2021-05-26 NOTE — CASE MANAGEMENT
Not a bundle  Not a readmission  Met with pt & explained CM role  Pt lives with wife in 2 sty home with 8 lila  Bedroom & bath on 2nd flr  Reports [employer], retired & does not drive right now  Wife will transport home  DME cane, RW & BSC was ordered via parachute & wife took home yesterday  No h/o vna or rhb  Denies any MH,D&A tx  Uses [facility] [facility]  Emergency contact, wife [employer] [phone]  Plans to go to OPPT  List given  CM reviewed d/c planning process including the following: identifying help at home, patient preference for d/c planning needs, Discharge Lounge, [facility] Meds to Bed program, availability of treatment team to discuss questions or concerns patient and/or family may have regarding understanding medications and recognizing signs and symptoms once discharged  CM also encouraged patient to follow up with all recommended appointments after discharge  Patient advised of importance for patient and family to participate in managing patients medical well being

## 2021-05-26 NOTE — ORTHOTIC NOTE
Orthotic Note            Date: 5/26/2021      Patient Name: Jaziel Mccray            Reason for Consult:  Patient Active Problem List   Diagnosis    Acquired thrombocytopenia ([facility] )    DDD (degenerative disc disease), lumbar    Acquired pancytopenia ([facility] )    Hypercholesterolemia    Major depression, chronic    [facility]    Enlarged prostate    Es[facility] varices determined by [facility]    Colon cancer screening    Personal history of colonic polyps    SOB (shortness of breath) on exertion    Bilateral lower extremity edema    Spinal stenosis of lumbar region with neurogenic claudication    Lumbar radiculopathy    Chronic pain syndrome    Unspecified abnormalities of gait and mobility    [facility]elopathy [facility]    Cervical spinal stenosis     Shelby Shower Collar    Delivered a [place] Shower Collar and cervical brace handout to pt room  Instructions and adjustments reviewed with pt  Pt denies any questions  Extra pads, shelby collar, and cervical brace handout at pt bedside  [employer] will continue to follow  Recommendations:  Please call Mobility Coordinator at ext  [phone] in regards to bracing instruction and/or adjustment    Candida Roca Restorative Technician, BS

## 2021-05-27 ENCOUNTER — TELEMEDICINE (OUTPATIENT)
Dept: NEUROSURGERY | Facility: CLINIC | Age: 66
End: 2021-05-27

## 2021-05-27 ENCOUNTER — TRANSITIONAL CARE MANAGEMENT (OUTPATIENT)
Dept: FAMILY MEDICINE CLINIC | Facility: HOSPITAL | Age: 66
End: 2021-05-27

## 2021-05-27 DIAGNOSIS — Z98.890 POST-OPERATIVE STATE: Primary | ICD-10-CM

## 2021-05-27 PROCEDURE — 99024 POSTOP FOLLOW-UP VISIT: CPT

## 2021-05-27 PROCEDURE — 1123F ACP DISCUSS/DSCN MKR DOCD: CPT

## 2021-05-27 NOTE — PROGRESS NOTES
Virtual Brief Visit    Assessment/Plan:    Problem List Items Addressed This Visit     None            Reason for visit is postop call  Chief Complaint   Patient presents with    Post-op    Virtual Brief Visit        Encounter provider Neurosurgery Nurse Tobias    Provider located at [address] [place]  [address] [facility] [address] [phone]    Recent Visits  No visits were found meeting these conditions  Showing recent visits within past 7 days and meeting all other requirements     Today's Visits  Date Type Provider Dept   05/27/21 Telemedicine NEUROSURGERY NURSE [address] today's visits and meeting all other requirements     Future Appointments  No visits were found meeting these conditions  Showing future appointments within next 150 days and meeting all other requirements        After connecting through telephone, the patient was identified by name and date of birth  Corine Kaplan was informed that this is a telemedicine visit and that the visit is being conducted through telephone  My office door was closed  No one else was in the room  He acknowledged consent and understanding of privacy and security of the platform  The patient has agreed to participate and understands he can discontinue the visit at any time  Subjective    Corine Kaplan is a 77 y o  male Posterior cervical decompressive laminectomy and lateral mass fixation fusion C3-5 (N/A Spine Cervical)         Past Medical History:   Diagnosis Date    Abscess of back 03/01/2018    Benign essential hypertension     Last Assessed:12/19/16; Pt reports heart and BP has been "fine" as of 4/16/2020    Cirrhosis (HCC)     Colon polyp     Cyst of skin     x6 from neck down back area    Depression     Esophageal varices (HCC)     Macrocytosis     Last Assessed:1/16/17    Major depression, chronic     Last Assessed:12/21/17    Major depression, chronic 6/19/2017    Personal history of colonic polyps     Sleep apnea        Past Surgical History:   Procedure Laterality Date    CHOLECYSTECTOMY  11/2018    CHOLECYSTECTOMY LAPAROSCOPIC N/A 11/21/2018    Procedure: CHOLECYSTECTOMY LAPAROSCOPIC, wedge liver biopsy;  Surgeon: Tevin Sanchez MD;  Location:  MAIN OR;  Service: General    COLONOSCOPY  05/2011    COLONOSCOPY  05/2016    COLONOSCOPY      EGD  01/2019    Esophagitis, esophageal varices    GALLBLADDER SURGERY      INCISION AND DRAINAGE OF WOUND N/A 03/01/2018    SEBACEOUS CYST ABSCESS OF BACK-VIJAYA MONZON MD    NE ARTHRODESIS POSTERIOR/POSTERIORLATERAL CERVICAL BELOW C2 N/A 5/24/2021    Procedure: Posterior cervical decompressive laminectomy and lateral mass fixation fusion C3-5;  Surgeon: Lucille Siddiqui MD;  [facility];  Service: Neurosurgery    NE EXC SKIN BENIG 1 1-2 CM TRUNK,ARM,LEG N/A 8/22/2018    Procedure: EXCISION  BIOPSY LESION/MASS BACK X4 NECK X1;  Surgeon: Tevin Sanchez MD;  [facility];  Service: General       Current Outpatient Medications   Medication Sig Dispense Refill    acetaminophen (TYLENOL) 325 mg tablet Take 3 tablets (975 mg total) by mouth every 8 (eight) hours  0    buPROPion (WELLBUTRIN XL) 150 mg 24 hr tablet Take 1 tablet (150 mg total) by mouth daily 90 tablet 2    Calcium 600-200 MG-UNIT per tablet Take 1 tablet by mouth daily      Cholecalciferol (CVS VITAMIN D3) 1000 units capsule Take 1,000 Units by mouth daily       docusate sodium (COLACE) 100 mg capsule Take 1 capsule (100 mg total) by mouth 2 (two) times a day 10 capsule 0    Flaxseed, Linseed, (FLAX SEED OIL PO) Take by mouth daily       hydrochlorothiazide (HYDRODIURIL) 12 5 mg tablet Take 1 tablet (12 5 mg total) by mouth daily As needed for leg swelling 30 tablet 1    methocarbamol (ROBAXIN) 500 mg tablet Take 1 tablet (500 mg total) by mouth every 6 (six) hours 28 tablet 0    nadolol (CORGARD) 20 mg tablet Take 1 tablet (20 mg total) by mouth daily 90 tablet 3    oxyCODONE (ROXICODONE) 10 MG TABS Take 1 tablet (10 mg total) by mouth every 4 (four) hours as needed for severe pain for up to 7 daysMax Daily Amount: 60 mg 42 tablet 0    senna (SENOKOT) 8 6 mg Take 1 tablet (8 6 mg total) by mouth daily  0     No current facility-administered medications for this visit  Called patient to see how he is doing after surgery  Patient reports he is doing well overall and denies any incisional issues or fevers  Patient is able to ambulate around the house and complete ADLs  Educated the patient about the importance of preventing blood clots and provided measures how to prevent them  Patient has moved his bowels since the surgery  Encouraged patient to take an over the counter stool softener, if he is taking narcotic pain medication  Encouraged fiber intake and fluids  Reviewed incision care with the patient  Advised that after three days he may take a shower and gently wash the surgical site with soap and water  Use clean wash cloth, towels, and clothing  Do not submerge in water until cleared by the surgeon  Do not apply any creams, ointments, or lotions to the site  Patient is aware to call the office if any redness, swelling, drainage, dehiscence of incision, or fever >100 F occurs  Patient is aware to call the office if any concerns or questions may arise  Reminded patient of his upcoming appointments with the date/time/location  Patient was appreciative for the call  VIRTUAL VISIT DISCLAIMER    Farhad Garber acknowledges that he has consented to an online visit or consultation  He understands that the online visit is based solely on information provided by him, and that, in the absence of a face-to-face physical evaluation by the physician, the diagnosis he receives is both limited and provisional in terms of accuracy and completeness   This is not intended to replace a full medical face-to-face evaluation by the physician  Aren Rodriguez understands and accepts these terms

## 2021-05-28 ENCOUNTER — TELEPHONE (OUTPATIENT)
Dept: NEUROSURGERY | Facility: CLINIC | Age: 66
End: 2021-05-28

## 2021-05-28 NOTE — TELEPHONE ENCOUNTER
Received a call from [place] questioning the amount of pain Roberto Alvarado is in  Returned call and left a voicemail explaining patient should be taking tylenol around the clock every 8 hours, robaxin every 6 hours and supplementing with the oxycodone  Advised to alternate these medications so Roberto Alvarado is always having medication in his system  Advised patient may also try heat or ice making sure to use a barrier to incision site  Provided office number should they have any other questions/concerns

## 2021-05-31 NOTE — CASE MANAGEMENT
CM received a TC from pt's wife who reported that she is unable to care for this patient at home  CM was informed that this patient did not go home with VNA, pt was recommended for OPPT  Pt wife reported that at this time pt's needs are too great for her to care for him and take care of herself as she also has health conditions of her own  CM placed an in-basket message to Claudene Rising and requested an OP CM and RN f/u with pt and wife as wife is now asking for STR for this patient

## 2021-06-01 DIAGNOSIS — Z78.9 NEED FOR FOLLOW-UP BY SOCIAL WORKER: Primary | ICD-10-CM

## 2021-06-02 NOTE — DISCHARGE SUMMARY
Discharge Summary - Neurosurgery   Mike Bettencourt 77 y o  male MRN: [id number]  Unit/Bed#: [facility] 625-01 Encounter: [id number]    Admission Date:   Admission Orders (From admission, onward)     Ordered        05/25/21 1550  Inpatient Admission  Once                      Discharge Date: 5/26/21    Admitting Diagnosis: Cervical spinal stenosis [M48 02]    Discharge Diagnosis: Cervical spinal stenosis    Resolved Problems  Date Reviewed: 5/24/2021    None          Attending: Dr Helio Reyes Physician(s): Anesthesia    Procedures Performed: No orders of the defined types were placed in this encounter  Pathology: N/A    Hospital Course: Barrington Medina is a 78 y/o male who presented to the outpatient office complaining of balance difficulties and loss of position sense  Imaging revealed cervical stenosis and a region of myelomalacia  Patient underwent a posterior cervical decompressive laminectomy and lateral mass fixation fusion C3-5 under general anesthesia with minimal blood loss and no complications  Patient was kept in the PACU until stable and then moved to the floor  Patient received imaging postoperatively which revealed stable alignment of spine and hardware  Drain was placed perioperatively and removed without difficulty; patient tolerated well  PT and OT were consulted and recommended home   Patient was cleared medically for discharge  Prior to discharge, postoperative instructions were discussed with patient  During that time, all questions and concerns were addressed  Patient will follow up outpatient in 2 weeks for an incision check and 6 weeks with repeat imaging of a cervical x-ray  Condition at Discharge: good     Discharge instructions/Information to patient and family:   See after visit summary for information provided to patient and family        Provisions for Follow-Up Care:  See after visit summary for information related to follow-up care and any pertinent home health orders  Disposition: Home      Planned Readmission: No    Discharge Statement   I spent 20 minutes discharging the patient  This time was spent on the day of discharge  I had direct contact with the patient on the day of discharge  Additional documentation is required if more than 30 minutes were spent on discharge  Discharge Medications:  See after visit summary for reconciled discharge medications provided to patient and family

## 2021-06-03 ENCOUNTER — PATIENT OUTREACH (OUTPATIENT)
Dept: FAMILY MEDICINE CLINIC | Facility: HOSPITAL | Age: 66
End: 2021-06-03

## 2021-06-03 NOTE — PROGRESS NOTES
SW received referral for SW outreach  SW reviewed pt chart  Pt had spinal surgery and returned home last week with spouse  Spouse reported cannot care for him as his needs are too great  Pt was recommended to go home with VNA but did not  Pt is now looking to get into STR  Per OT notes on 5/25, pt was referred to home with family support  Per PT, recommendation was home with OP PT      EMY called pt  Reached voicemail and left message with reason for call, requested call back

## 2021-06-07 ENCOUNTER — PATIENT OUTREACH (OUTPATIENT)
Dept: FAMILY MEDICINE CLINIC | Facility: HOSPITAL | Age: 66
End: 2021-06-07

## 2021-06-07 ENCOUNTER — CLINICAL SUPPORT (OUTPATIENT)
Dept: NEUROSURGERY | Facility: CLINIC | Age: 66
End: 2021-06-07

## 2021-06-07 VITALS — DIASTOLIC BLOOD PRESSURE: 88 MMHG | TEMPERATURE: 98.6 F | SYSTOLIC BLOOD PRESSURE: 130 MMHG

## 2021-06-07 DIAGNOSIS — Z98.890 POST-OPERATIVE STATE: Primary | ICD-10-CM

## 2021-06-07 DIAGNOSIS — M48.02 CERVICAL SPINAL STENOSIS: ICD-10-CM

## 2021-06-07 PROCEDURE — 99024 POSTOP FOLLOW-UP VISIT: CPT

## 2021-06-07 NOTE — PROGRESS NOTES
EMY called and spoke with pt  EMY introduced self and role in care management  Pt said he is in the office right now  Pt was seeing his PCP for follow up  Pt passed phone to spouse Fernandez Umaña  EMY explained to Fernandez Umaña the reason pt was referred to me  Fernandez Umaña said prior to hearing from me that she and pt had some difficulties following his discharge  Fernandez Umaña explained that the d/c plan was very poor and pt was sent home and no one followed up  They thought pt needed rehab in inpatient setting, due to multiple barriers at home  She called hospital pt was d/c from,  around [facility] Day weekend, and spoke with a CM and was told by the CM that this information (about the d/c plan) was going to be relayed to the appropriate person; however, Fernandez Umaña did not receive a call back  By end of the second week p/o, Fernandez Umaña said pt  did improve  He has  awalker and cane and is now  making it up the steps  EMY explained to Fernandez Umaña that, per pt's chart, PT recommended home with OP PT  Fernandez Umaña understood but could not speak longer as pt was going in to his appt  EMY told Fernandez Nicolekrista I will call back at a better time, as I would like to make sure pt does not need need anything further, but if so, EMY may be able to address his needs  Fernandez Umaña appreciated the follow up, and requested I call back later in the week  Will follow up this week  Will add care plan if needed   Episode currently socially complex, added self to team

## 2021-06-07 NOTE — PROGRESS NOTES
Post-Op Visit- Neurosurgery    Zully Arana 77 y o  male MRN: [id number]    Chief Complaint:  Patient presents post: Posterior cervical decompressive laminectomy and lateral mass fixation fusion C3-5    History of Present Illness:  Patient presents for 2 week POV for incision check accompanied by spouse and ambulating with an assistive device  Patient reports he is doing well overall and denies any incisional issues  he denies any new weakness, numbness or tingling since the surgery  Patient has been compliant with wearing the cervical collar            Current Outpatient Medications:     acetaminophen (TYLENOL) 325 mg tablet, Take 3 tablets (975 mg total) by mouth every 8 (eight) hours, Disp:  , Rfl: 0    buPROPion (WELLBUTRIN XL) 150 mg 24 hr tablet, Take 1 tablet (150 mg total) by mouth daily, Disp: 90 tablet, Rfl: 2    nadolol (CORGARD) 20 mg tablet, Take 1 tablet (20 mg total) by mouth daily, Disp: 90 tablet, Rfl: 3    Calcium 600-200 MG-UNIT per tablet, Take 1 tablet by mouth daily, Disp: , Rfl:     Cholecalciferol (CVS VITAMIN D3) 1000 units capsule, Take 1,000 Units by mouth daily , Disp: , Rfl:     docusate sodium (COLACE) 100 mg capsule, Take 1 capsule (100 mg total) by mouth 2 (two) times a day (Patient not taking: Reported on 6/7/2021), Disp: 10 capsule, Rfl: 0    Flaxseed, Linseed, (FLAX SEED OIL PO), Take by mouth daily , Disp: , Rfl:     hydrochlorothiazide (HYDRODIURIL) 12 5 mg tablet, Take 1 tablet (12 5 mg total) by mouth daily As needed for leg swelling, Disp: 30 tablet, Rfl: 1    methocarbamol (ROBAXIN) 500 mg tablet, Take 1 tablet (500 mg total) by mouth every 6 (six) hours (Patient not taking: Reported on 6/7/2021), Disp: 28 tablet, Rfl: 0    senna (SENOKOT) 8 6 mg, Take 1 tablet (8 6 mg total) by mouth daily (Patient not taking: Reported on 6/7/2021), Disp:  , Rfl: 0     No Known Allergies Assessment:    Vitals:    06/07/21 1002   BP: 130/88   Temp: 98 6 °F (37 °C)       Wound Exam: Incision well approximated  No erythema, edema or drainage present  Location: posterior neck  See image below             Procedure:  Staple/suture removal    Procedure Note: staples and drain suture were removed  Cleansed area with hydrogen peroxide  Patient Status: the patient tolerated the procedure well  DSD & C-Collar in place  Complications: None  Discussion/Summary  Doing well postoperatively  Reviewed incision care with patient including daily observation for s/s infection including: increased erythema, edema, drainage, dehiscence of incision or fever >101  Should these be observed, he understands that he is to call and/or return immediately for reassessment  Advised patient to continue cleansing area with mild soap and water and pat dry  Not to apply any lotions, creams, or ointments, & not to submerge in any water for 4  more weeks  He is to maintain activity restrictions and continue wearing cervical collar until cleared by the surgeon  Activity levels were also reviewed with the patient in detail, he is to lift no greater than 10 pounds and ambulation is encouraged as tolerated  Patient is aware that he cannot drive while still in the cervical collar  Verified date/time/location of upcoming POV and reminded him to complete x-rays prior to his visit on 7/6/2021  He is to call the office with any further questions or concerns, or if any incisional issues or fevers would arise

## 2021-06-14 ENCOUNTER — PATIENT OUTREACH (OUTPATIENT)
Dept: FAMILY MEDICINE CLINIC | Facility: HOSPITAL | Age: 66
End: 2021-06-14

## 2021-06-14 ENCOUNTER — TELEPHONE (OUTPATIENT)
Dept: NEUROSURGERY | Facility: CLINIC | Age: 66
End: 2021-06-14

## 2021-06-14 NOTE — PROGRESS NOTES
SW called pt's wife Samantha Bond to follow up after last conversation  Reached voicemail and left message with reason for call, requested call back

## 2021-06-14 NOTE — TELEPHONE ENCOUNTER
Received a call from [facility] requesting callback regarding paperwork received from [place] [employer]  Explained this is the company we use for neuromonitoring during the procedure  Advised ok to sign this is for insurance authorization for direct billing to insurance  She was encouraged to call with questions and was appreciative

## 2021-06-21 ENCOUNTER — PATIENT OUTREACH (OUTPATIENT)
Dept: FAMILY MEDICINE CLINIC | Facility: HOSPITAL | Age: 66
End: 2021-06-21

## 2021-06-21 NOTE — PROGRESS NOTES
3rd outreach    SW called [employer]  Bina's home phone is out of service  SW called Bina's cell  SW left message  Unable to Reach letter mailed; will be available if pt or spouse contacts me again

## 2021-06-21 NOTE — LETTER
[place]    Re: referral from PCP for social needs   6/21/2021       Dear Aiden Ruiz,    We tried to reach you by phone on 6/14 and 6/21 to follow up on our discussion on 6/7,but was unfortunately unable to reach you  Should you need any assistance with any social needs, please feel free to contact me at [phone]       Sincerely,     Fernando Ireland LCSW

## 2021-06-22 ENCOUNTER — PATIENT OUTREACH (OUTPATIENT)
Dept: FAMILY MEDICINE CLINIC | Facility: HOSPITAL | Age: 66
End: 2021-06-22

## 2021-06-22 NOTE — PROGRESS NOTES
SW received voicemail from pt's spouse Ruben Hinkle who called late afternoon 6/21  Per Ruben Hinkle, pt does not need anything, pt is doing pretty good and has follow up with PCP in two weeks  Ruben Hinkle thanked me for outreach

## 2021-07-02 ENCOUNTER — HOSPITAL ENCOUNTER (OUTPATIENT)
Dept: RADIOLOGY | Facility: HOSPITAL | Age: 66
Discharge: HOME | End: 2021-07-02
Attending: NEUROLOGICAL SURGERY
Payer: MEDICARE

## 2021-07-02 DIAGNOSIS — Z98.890 POST-OPERATIVE STATE: ICD-10-CM

## 2021-07-02 DIAGNOSIS — M48.02 CERVICAL SPINAL STENOSIS: ICD-10-CM

## 2021-07-02 PROCEDURE — 72040 X-RAY EXAM NECK SPINE 2-3 VW: CPT

## 2021-07-06 ENCOUNTER — OFFICE VISIT (OUTPATIENT)
Dept: NEUROSURGERY | Facility: CLINIC | Age: 66
End: 2021-07-06

## 2021-07-06 VITALS
TEMPERATURE: 97.7 F | RESPIRATION RATE: 16 BRPM | DIASTOLIC BLOOD PRESSURE: 64 MMHG | SYSTOLIC BLOOD PRESSURE: 117 MMHG | HEART RATE: 78 BPM | WEIGHT: 264.8 LBS | BODY MASS INDEX: 35.09 KG/M2 | HEIGHT: 73 IN

## 2021-07-06 DIAGNOSIS — Z09 POSTOPERATIVE EXAMINATION: Primary | ICD-10-CM

## 2021-07-06 PROCEDURE — 99024 POSTOP FOLLOW-UP VISIT: CPT | Performed by: NEUROLOGICAL SURGERY

## 2021-07-06 RX ORDER — DIPHENOXYLATE HYDROCHLORIDE AND ATROPINE SULFATE 2.5; .025 MG/1; MG/1
1 TABLET ORAL EVERY MORNING
COMMUNITY

## 2021-07-06 NOTE — PROGRESS NOTES
DISCUSSION SUMMARY  This is a 77 y o  male who is status post a decompressive cervical laminectomy and fixation fusion for myelopathy  He has improved tremendously since the time of his decompression  He is now ready to begin the weaning process from his collar which should occur for 2 weeks  Physical therapy should commence after this time  Will plan on seeing him back in the office and 3 months time    Return in 3 months (on 10/6/2021)  Diagnosis ICD-10-CM Associated Orders   1  Postoperative examination  Z09 XR spine cervical complete 4 or 5 vw non injury     Ambulatory referral to Physical Therapy          Chief Complaint   Patient presents with    Post-op     6 weeks post-op with Cspine x-rays s/p Posterior cervical decompressive laminectomy and lateral mass fixation fusion C3-5 on 5/24/21 (DKO)       This patient is doing well after surgery  The pre-op symptoms are better  Current treatment: none  Eating a regular diet without difficulty  Bowel movements are Normal  Bladder is  Normal  Incision: healing well  Says that he is doing fairly well  He has some difficulty with sleep positions  He is not driving  Review of Systems   Constitutional: Negative  HENT: Negative  Eyes: Negative  Respiratory: Negative  Cardiovascular: Negative  Gastrointestinal: Negative  Endocrine: Negative  Genitourinary: Negative  Musculoskeletal: Positive for neck stiffness (Cspine collar in place)  Skin: Negative  Allergic/Immunologic: Negative  Neurological: Positive for weakness (bilateral legs; better)  Hematological: Negative  Psychiatric/Behavioral: Negative  All other systems reviewed and are negative  I reviewed the ROS      Vitals:    /64 (BP Location: Right arm, Patient Position: Sitting, Cuff Size: Standard)   Pulse 78   Temp 97 7 °F (36 5 °C) (Probe)   Resp 16   Ht 6' 1" (1 854 m)   Wt 120 kg (264 lb 12 8 oz)   BMI 34 94 kg/m²       MEDICAL HISTORY  Past Medical History:   Diagnosis Date    Abscess of back 03/01/2018    Benign essential hypertension     Last Assessed:12/19/16; Pt reports heart and BP has been "fine" as of 4/16/2020    Cirrhosis (HCC)     Colon polyp     Cyst of skin     x6 from neck down back area    Depression     Esophageal varices (HCC)     Macrocytosis     Last Assessed:1/16/17    Major depression, chronic     Last Assessed:12/21/17    Major depression, chronic 6/19/2017    Personal history of colonic polyps     Sleep apnea      Past Surgical History:   Procedure Laterality Date    CHOLECYSTECTOMY  11/2018    CHOLECYSTECTOMY LAPAROSCOPIC N/A 11/21/2018    Procedure: CHOLECYSTECTOMY LAPAROSCOPIC, wedge liver biopsy;  Surgeon: Praveen Delgado MD;  [facility];  Service: General    COLONOSCOPY  05/2011    COLONOSCOPY  05/2016    COLONOSCOPY      EGD  01/2019    Esophagitis, esophageal varices    GALLBLADDER SURGERY      INCISION AND DRAINAGE OF WOUND N/A 03/01/2018    SEBACEOUS CYST ABSCESS OF BACK-VIJAYA MONZON MD    DC ARTHRODESIS POSTERIOR/POSTERIORLATERAL CERVICAL BELOW C2 N/A 5/24/2021    Procedure: Posterior cervical decompressive laminectomy and lateral mass fixation fusion C3-5;  Surgeon: Danelle Zhang MD;  [facility];  Service: Neurosurgery    DC EXC SKIN BENIG 1 1-2 CM TRUNK,ARM,LEG N/A 8/22/2018    Procedure: EXCISION  BIOPSY LESION/MASS BACK X4 NECK X1;  Surgeon: Praveen Delgado MD;  [facility];  Service: General     Social History     Tobacco Use    Smoking status: Never Smoker    Smokeless tobacco: Never Used   Vaping Use    Vaping Use: Never used   Substance Use Topics    Alcohol use: Not Currently     Comment: 7/19/19-last drink 12/2018- Occasionally/1-2 drinks per weekend    Drug use: No        Current Outpatient Medications:     b complex vitamins tablet, Take 1 tablet by mouth daily, Disp: , Rfl:     buPROPion (WELLBUTRIN XL) 150 mg 24 hr tablet, Take 1 tablet (150 mg total) by mouth daily, Disp: 90 tablet, Rfl: 2    hydrochlorothiazide (HYDRODIURIL) 12 5 mg tablet, Take 1 tablet (12 5 mg total) by mouth daily As needed for leg swelling, Disp: 30 tablet, Rfl: 1    multivitamin (THERAGRAN) TABS, Take 1 tablet by mouth daily, Disp: , Rfl:     nadolol (CORGARD) 20 mg tablet, Take 1 tablet (20 mg total) by mouth daily, Disp: 90 tablet, Rfl: 3    acetaminophen (TYLENOL) 325 mg tablet, Take 3 tablets (975 mg total) by mouth every 8 (eight) hours (Patient not taking: Reported on 7/6/2021), Disp:  , Rfl: 0    Calcium 600-200 MG-UNIT per tablet, Take 1 tablet by mouth daily (Patient not taking: Reported on 7/6/2021), Disp: , Rfl:     Cholecalciferol (CVS VITAMIN D3) 1000 units capsule, Take 1,000 Units by mouth daily  (Patient not taking: Reported on 7/6/2021), Disp: , Rfl:     docusate sodium (COLACE) 100 mg capsule, Take 1 capsule (100 mg total) by mouth 2 (two) times a day (Patient not taking: Reported on 6/7/2021), Disp: 10 capsule, Rfl: 0    Flaxseed, Linseed, (FLAX SEED OIL PO), Take by mouth daily  (Patient not taking: Reported on 7/6/2021), Disp: , Rfl:     methocarbamol (ROBAXIN) 500 mg tablet, Take 1 tablet (500 mg total) by mouth every 6 (six) hours (Patient not taking: Reported on 6/7/2021), Disp: 28 tablet, Rfl: 0    senna (SENOKOT) 8 6 mg, Take 1 tablet (8 6 mg total) by mouth daily (Patient not taking: Reported on 6/7/2021), Disp:  , Rfl: 0   No Known Allergies     The following portions of the patient's history were updated by MA and reviewed by MD: allergies, current medications, past family history, past medical history, past social history, past surgical history and problem list       Physical Exam  Skin:     Comments: Incision is clean and dry and well healed and barely visible   Neurological:      Mental Status: He is oriented to person, place, and time  Gait: Gait abnormal (His ambulation is much improved    Is now able to ambulate along the straight line without assistance)             RESULTS/DATA  I have personally reviewed pertinent films in PACS   X-rays of cervical spine demonstrate the instrumentation to be in good position without signs of loosening or breakage

## 2021-07-19 ENCOUNTER — TELEPHONE (OUTPATIENT)
Dept: GASTROENTEROLOGY | Facility: CLINIC | Age: 66
End: 2021-07-19

## 2021-07-19 NOTE — TELEPHONE ENCOUNTER
07/19/21  Screened by: Oralia Harp    Referring Provider      Pre- Screening: There is no height or weight on file to calculate BMI  Has patient been referred for a routine screening Colonoscopy? no -- resched colon/egd canceled in may   Is the patient between 39-70 years old? yes      Previous Colonoscopy yes   If yes:    Date: 2016    Facility: [facility]     Reason:        SCHEDULING STAFF: If the patient is between 45yrs-49yrs, please advise patient to confirm benefits/coverage with their insurance company for a routine screening colonoscopy, some insurance carriers will only cover at [address] or older  If the patient is over 66years old, please schedule an office visit  Does the patient want to see a Gastroenterologist prior to their procedure OR are they having any GI symptoms? no    Has the patient been hospitalized or had abdominal surgery in the past 6 months? Had fusion, ok per nitin magaña rn, and anesthesia dept to schedule at [facility]     Does the patient use supplemental oxygen? no    Does the patient take Coumadin, Lovenox, Plavix, Elliquis, Xarelto, or other blood thinning medication? no    Has the patient had a stroke, cardiac event, or stent placed in the past year? no    SCHEDULING STAFF: If patient answers NO to above questions, then schedule procedure  If patient answers YES to above questions, then schedule office appointment  If patient is between 45yrs - 49yrs, please advise patient that we will have to confirm benefits & coverage with their insurance company for a routine screening colonoscopy

## 2021-07-20 ENCOUNTER — EVALUATION (OUTPATIENT)
Dept: PHYSICAL THERAPY | Facility: CLINIC | Age: 66
End: 2021-07-20
Payer: MEDICARE

## 2021-07-20 DIAGNOSIS — R26.9 UNSPECIFIED ABNORMALITIES OF GAIT AND MOBILITY: Primary | ICD-10-CM

## 2021-07-20 DIAGNOSIS — Z09 POSTOPERATIVE EXAMINATION: ICD-10-CM

## 2021-07-20 PROCEDURE — 97110 THERAPEUTIC EXERCISES: CPT

## 2021-07-20 PROCEDURE — 97162 PT EVAL MOD COMPLEX 30 MIN: CPT

## 2021-07-20 NOTE — PROGRESS NOTES
PT Evaluation     Today's date: 2021  Patient name: Abebe Hernandez  : 1955  MRN: [id number]  Referring provider: Elza Lesches, MD  Dx:   Encounter Diagnosis     ICD-10-CM    1  Postoperative examination  Z09 Ambulatory referral to Physical Therapy                  Assessment  Assessment details: Pt is a 64y o  male who presents to OP PT for IE s/p decompressive cervical laminectomy and fixation fusion C3-5 for myelopathy 21  Pt has been cleared for initiation of PT and removal of C collar by surgeon  Pt currently c/o of balance and gait deficits  BLE strength testing reveals no overt weakness  Noted gait deviations include B knee flexion t/o stance phase, WBOS, decreased B arm swing, decreased gait speed, and decreased B stride length  Pt is able to ambulate w/ and w/o SPC; gait deviations present w/ and w/o use of AD  6MWT to be completed NV  Functionally pt is able to complete STS transfers w/o use of UEs  Pt does keep B knees flexed following transfers and with majority of mobility as pt feels unsteady and reports this helps with his balance  Pt with + balance deficits and is at an elevated risk for falls as indicated by BBS score 40/56 and FGA 20/30  Given these findings pt is recommended for skilled therapy intervention 2x week  Pt and wife agreeable to this plan  Impairments: abnormal gait, activity intolerance and impaired balance  Understanding of Dx/Px/POC: excellent  Goals  STGs (to be achieved within 2 weeks)  1  Pt will report ability to ride stationary bike at home for at least 15 minutes 3-5x week  2  Pt will complete 6MWT and goals will be set accordingly  Pt stated goal: walk w/o SPC    LTGs (to be achieved within 8-10 weeks)   1  Pt will be I with HEP at d/c to promote PT carry-over  2  Pt will meet FOTO predicted score  3  Pt will improve BBS score to 45/56 or greater indicating that he is at a reduced risk for falls    4  Pt will improve FGA score to 24/30 or greater indicating that he is at a reduced risk for falls  Plan  Patient would benefit from: skilled physical therapy  Planned modality interventions: unattended electrical stimulation, cryotherapy and thermotherapy: hydrocollator packs  Planned therapy interventions: abdominal trunk stabilization, manual therapy, balance, strengthening, stretching, therapeutic activities, patient education, neuromuscular re-education, therapeutic exercise, therapeutic training, transfer training, gait training, functional ROM exercises, graded activity, flexibility, graded exercise and home exercise program  Frequency: 2x week  Duration in weeks: 8  Treatment plan discussed with: patient (wife)        Subjective Evaluation    History of Present Illness  Date of surgery: 5/24/21  Mechanism of injury: surgery  Mechanism of injury: Pt presents s/p decompressive cervical laminectomy and fixation fusion C3-5 for myelopathy on 5/24/21  Physician cleared pt for removal of C-collar and initiation of skilled PT approx  2 weeks ago  Since surgery pt has no pain, he does endorse mild "numbess" in feet, which has been improving since surgery  Pt ambulates with SPC in community  Does not use AD at home, but wife endorses "furniture walking"  He does endorse continued feelings of unsteadiness and imbalance since surgery although this has improved since surgical intervention  Pt does note that he "tries to keep lower to the ground" with B knee flexion due to these feelings of unsteadiness  Pt has full flight of stairs in home, reports he has been completing w/o problem w/ use of handrail  Has stationary bike, would like to return to riding  Is able to walk for approx  10min continuously prior to needing rest break  Pt and wife like to go to flea markets, they have been able to since surgery, however outings are limited by distance pt can ambulate        Patient Goals  Patient goal: "get walking without the cane"        Objective Strength/Myotome Testing     Left Hip   Planes of Motion   Flexion: 4+    Right Hip   Planes of Motion   Flexion: 4+    Left Knee   Flexion: 5  Extension: 5    Right Knee   Flexion: 5  Extension: 5    Left Ankle/Foot   Dorsiflexion: 4  Plantar flexion: 5    Right Ankle/Foot   Dorsiflexion: 4  Plantar flexion: 5  Neuro Exam:     Functional outcomes   Functional outcome comment: Hernandez Balance Scale: 40/56  FGA: 20/30  Functional outcome gait comment: Ambulates w/ and w/o AD, gait deviations noted with both including: B knee flexion t/o stance phase, WBOS, decreased B arm swing, decreased gait speed, decreased B stride length             Precautions: C spine fusion C3-C5, fall risk  HEP: ride stationary bike at pt home, increase walking      Manuals                                                                 Neuro Re-Ed             Standing on firm NBOS              Standing on foam              NBOS w/ pertubations             NBOS EC                                                    Ther Ex             6MWT NV            HEP NV                                                                                          Ther Activity             NuStep             Step-ups             Picking up objects from floor             Amb w/ HT/HN             Backwards walking              Obstacle course                          Gait Training             TM              Amb outside (over stones/grass)             Modalities                          EDUCATION POC, HEP

## 2021-07-23 ENCOUNTER — OFFICE VISIT (OUTPATIENT)
Dept: PHYSICAL THERAPY | Facility: CLINIC | Age: 66
End: 2021-07-23
Payer: MEDICARE

## 2021-07-23 DIAGNOSIS — R26.9 UNSPECIFIED ABNORMALITIES OF GAIT AND MOBILITY: Primary | ICD-10-CM

## 2021-07-23 PROCEDURE — 97530 THERAPEUTIC ACTIVITIES: CPT

## 2021-07-23 PROCEDURE — 97112 NEUROMUSCULAR REEDUCATION: CPT

## 2021-07-23 NOTE — PROGRESS NOTES
Daily Note     Today's date: 2021  Patient name: Mago Rodriguez  : 1955  MRN: [id number]  Referring provider: Javi Ingram MD  Dx:   Encounter Diagnosis     ICD-10-CM    1  Unspecified abnormalities of gait and mobility  R26 9                   Subjective: Pt has resumed riding exercise bike at home for 15min daily with no difficulties  Objective: See treatment diary below      Assessment: Tolerated treatment well, complaints of mild LBP with 6MWT, resolved with seated rest break  Patient demonstrated fatigue post treatment and would benefit from continued PT  Pt expressed frustration t/o session at difficulty with balance interventions  All interventions besides 6MWT and STSs completed in // bars today  Pt with multiple episodes of LOB, always able to self correct with stepping or grabbing strategy  Frequent use of hip and ankle strategies also observed  Plan: Continue per plan of care        Precautions: C spine fusion C3-C5, fall risk  HEP: ride stationary bike at pt home, increase walking, standing on firm FT EC, standing on foam FT EO, tandem stance, STS repeats      Manuals                                                                Neuro Re-Ed             Tandem stance  EO 2x30"           Standing on foam   EO 2x30"           NBOS w/ pertubations             NBOS EC  FT EC firm 2x30"           Tandem amb   x3 laps (no UE support)                                                                            Ther Ex             6MWT ' no AD           HEP NV See above                                                                                         Ther Activity             NuStep             Step-ups  x20 (forward, 1 box)           Picking up objects from floor             Amb w/ HT/HN             Backwards walking              Obstacle course             STS repeats  x10 from chair 0UE                                     Gait Training             TM Amb outside (over stones/grass)             Modalities                          EDUCATION POC, HEP

## 2021-07-27 ENCOUNTER — OFFICE VISIT (OUTPATIENT)
Dept: PHYSICAL THERAPY | Facility: CLINIC | Age: 66
End: 2021-07-27
Payer: MEDICARE

## 2021-07-27 DIAGNOSIS — R26.9 UNSPECIFIED ABNORMALITIES OF GAIT AND MOBILITY: Primary | ICD-10-CM

## 2021-07-27 PROCEDURE — 97530 THERAPEUTIC ACTIVITIES: CPT

## 2021-07-27 PROCEDURE — 97112 NEUROMUSCULAR REEDUCATION: CPT

## 2021-07-27 NOTE — PROGRESS NOTES
Daily Note     Today's date: 2021  Patient name: Kelly Murphy  : 1955  MRN: [id number]  Referring provider: Merline Shores, MD  Dx:   Encounter Diagnosis     ICD-10-CM    1  Unspecified abnormalities of gait and mobility  R26 9                   Subjective: Pt complains of B calf pain  He is unsure of why this pain has started but believes it may be due to walking around a lot this weekend  Objective: See treatment diary below      Assessment: Tolerated treatment well  Patient demonstrated fatigue post treatment, exhibited good technique with therapeutic exercises and would benefit from continued PT  Pt continues to demonstrate difficulty with STS w/o UE support and frequently has to utilize stepping strategy after completing transition to standing to regain balance  All activities completed in // bars today; all dynamic balance activities completed first with 1UE support followed by additional reps/laps w/o UE support  Cueing to slow down with tasks improved pt balance  Plan: Continue per plan of care        Precautions: C spine fusion C3-C5, fall risk  HEP: ride stationary bike at pt home, increase walking, standing on firm FT EC, standing on foam FT EO, tandem stance, STS repeats      Manuals                                                               Neuro Re-Ed             Tandem stance  EO 2x30" EO 2x30"          Standing on foam   EO 2x30" EO/EC 2x30"          NBOS w/ pertubations             NBOS EC  FT EC firm 2x30"           Tandem amb   x3 laps (no UE support) x3 laps (no UE support)                                                                           Ther Ex             6MWT ' no AD           HEP NV See above                                                                                         Ther Activity             NuStep             Step-ups  x20 (forward, 1 box) x20 (for & lat, 1 box)          Picking up objects from floor             Amb w/ HT/HN   NV          Backwards walking    NV          Obstacle course             STS repeats  x10 from chair 0UE 2x10 from chair 0UE          Hurdles   For/lat 3 laps ea                       Gait Training             TM    1  2mph x5'          Amb outside (over stones/grass)             Modalities                          EDUCATION POC, HEP

## 2021-07-29 ENCOUNTER — OFFICE VISIT (OUTPATIENT)
Dept: PHYSICAL THERAPY | Facility: CLINIC | Age: 66
End: 2021-07-29
Payer: MEDICARE

## 2021-07-29 DIAGNOSIS — R26.9 UNSPECIFIED ABNORMALITIES OF GAIT AND MOBILITY: Primary | ICD-10-CM

## 2021-07-29 PROCEDURE — 97112 NEUROMUSCULAR REEDUCATION: CPT

## 2021-07-29 NOTE — PROGRESS NOTES
Daily Note     Today's date: 2021  Patient name: Lizeth Bowen  : 1955  MRN: [id number]  Referring provider: Laureen Fernandez MD  Dx:   Encounter Diagnosis     ICD-10-CM    1  Unspecified abnormalities of gait and mobility  R26 9                   Subjective: Pt with no new complaints  He reports trying STSs at home and is able to complete w/o UE now from recliner  Objective: See treatment diary below      Assessment: Tolerated treatment well  Patient demonstrated fatigue post treatment, exhibited good technique with therapeutic exercises and would benefit from continued PT  Pertubations initiated today, pt with greatest difficulties w/ posterior nudruth Galan also continues to be challenged by hurdles and step ups  Improved performance with beginning exercises with 1UE and progressing to 0UE for second lap/set of repetitions  Plan: Continue per plan of care        Precautions: C spine fusion C3-C5, fall risk  HEP: ride stationary bike at pt home, increase walking, standing on firm FT EC, standing on foam FT EO, tandem stance, STS repeats      Manuals                                                              Neuro Re-Ed             Tandem stance  EO 2x30" EO 2x30" EO 2x30"         Standing on foam   EO 2x30" EO/EC 2x30" EO/EC 2x30"         NBOS w/ pertubations    On foam x20         NBOS EC  FT EC firm 2x30"           Tandem amb   x3 laps (no UE support) x3 laps (no UE support) x3 laps (no UE support)                                                                          Ther Ex             6MWT ' no AD           HEP NV See above                                                                                         Ther Activity             NuStep             Step-ups  x20 (forward, 1 box) x20 (for & lat, 1 box) x15 for & lat 1 box)         Picking up objects from floor             Amb w/ HT/HN   NV 3 laps // bars         Backwards walking    NV 3 laps // bars Obstacle course             STS repeats  x10 from chair 0UE 2x10 from chair 0UE 2x10 from chair 0UE         Hurdles   For/lat 3 laps ea For/lat 3 laps ea                      Gait Training             TM    1  2mph x5' 1  2mph x8'         Amb outside (over stones/grass)             Modalities                          EDUCATION POC, HEP

## 2021-08-03 ENCOUNTER — OFFICE VISIT (OUTPATIENT)
Dept: PHYSICAL THERAPY | Facility: CLINIC | Age: 66
End: 2021-08-03
Payer: MEDICARE

## 2021-08-03 DIAGNOSIS — R26.9 UNSPECIFIED ABNORMALITIES OF GAIT AND MOBILITY: Primary | ICD-10-CM

## 2021-08-03 PROCEDURE — 97112 NEUROMUSCULAR REEDUCATION: CPT

## 2021-08-03 PROCEDURE — 97530 THERAPEUTIC ACTIVITIES: CPT

## 2021-08-03 NOTE — PROGRESS NOTES
Daily Note     Today's date: 8/3/2021  Patient name: Julianna Andrews  : 1955  MRN: [id number]  Referring provider: FADUMO Araujo  Dx:   Encounter Diagnosis     ICD-10-CM    1  Unspecified abnormalities of gait and mobility  R26 9                   Subjective: Pt c/o of mild levels of fatigue at start of session, attributes this to increased activity over the weekend  Objective: See treatment diary below      Assessment: Tolerated treatment well  Patient demonstrated fatigue post treatment, exhibited good technique with therapeutic exercises and would benefit from continued PT  Pt's SPC adjusted to proper height, pt educated in ideal height and safe use  Prefers to carry it with him and not use it "unless really needed"  Pt able to complete entire session w/o need for seated rest break  Standing rest break x1 taken following ambulation tasks  Pt continues to present with no use of ankle/hip strategies during balance activities, +stepping strategy use  Plan: Continue per plan of care  Precautions: C spine fusion C3-C5, fall risk  HEP: ride stationary bike at pt home, increase walking, standing on firm FT EC, standing on foam FT EO, tandem stance, STS repeats      Manuals 7/20 7/23 7/27 7/29 8/3                                                            Neuro Re-Ed             Tandem stance  EO 2x30" EO 2x30" EO 2x30" EO 2x30"        Standing on foam   EO 2x30" EO/EC 2x30" EO/EC 2x30" EO/EC 2x30"        NBOS w/ pertubations    On foam x20 NV        NBOS EC  FT EC firm 2x30"           Tandem amb   x3 laps (no UE support) x3 laps (no UE support) x3 laps (no UE support) x3 laps (2 finger touch)        Walking on foam beams     NV ?                                                             Ther Ex             6MWT ' no AD           HEP NV See above                                                                                         Ther Activity             NuStep             Step-ups (for/lat)  x20 (forward, 1 box) x20 (for & lat, 1 box) x15 for & lat 1 box) x15ea (1 box & riser)        Picking up objects from floor             Amb w/ HT/HN   NV 3 laps // bars 3 laps ea // bars        Backwards walking    NV 3 laps // bars 3x // bars        Obstacle course             STS repeats  x10 from chair 0UE 2x10 from chair 0UE 2x10 from chair 0UE 2x10 from chair 0UE        Hurdles   For/lat 3 laps ea For/lat 3 laps ea For/lat 3 laps ea                     Gait Training             TM    1  2mph x5' 1  2mph x8' 2 0 mph x5'        Amb outside (over stones/grass)             Modalities                          EDUCATION POC, HEP    Use of SPC and height of AD

## 2021-08-05 ENCOUNTER — OFFICE VISIT (OUTPATIENT)
Dept: PHYSICAL THERAPY | Facility: CLINIC | Age: 66
End: 2021-08-05
Payer: MEDICARE

## 2021-08-05 DIAGNOSIS — R26.9 UNSPECIFIED ABNORMALITIES OF GAIT AND MOBILITY: Primary | ICD-10-CM

## 2021-08-05 PROCEDURE — 97116 GAIT TRAINING THERAPY: CPT

## 2021-08-05 PROCEDURE — 97112 NEUROMUSCULAR REEDUCATION: CPT

## 2021-08-05 NOTE — PROGRESS NOTES
Daily Note     Today's date: 2021  Patient name: Mariam King  : 1955  MRN: [id number]  Referring provider: FADUMO Pitt  Dx:   Encounter Diagnosis     ICD-10-CM    1  Unspecified abnormalities of gait and mobility  R26 9                   Subjective: Pt c/o of increase BLE stiffness following trip to [place] yesterday  Objective: See treatment diary below      Assessment: Tolerated treatment well  Patient demonstrated fatigue post treatment and would benefit from continued PT  Pt with increased difficulty completing STSs and step-ups due to "weakness in the knees" today  Initiated rocker-board interventions  Pt with difficulty achieving equal anterior/posterior weight distribution with weight primarily in heels  Continue to utilize rockerboard in attempts to regain ankle and hip strategies; pt continues to rely only on stepping/reaching strategies  Plan: Continue per plan of care  Precautions: C spine fusion C3-C5, fall risk  HEP: ride stationary bike at pt home, increase walking, standing on firm FT EC, standing on foam FT EO, tandem stance, STS repeats      Manuals 7/20 7/23 7/27 7/29 8/3 8/5                                                           Neuro Re-Ed             Tandem stance  EO 2x30" EO 2x30" EO 2x30" EO 2x30" EO 2x30"       Standing on foam   EO 2x30" EO/EC 2x30" EO/EC 2x30" EO/EC 2x30" EO/EC 2x30"       NBOS w/ pertubations    On foam x20 NV On foam x20       NBOS EC  FT EC firm 2x30"           Tandem amb   x3 laps (no UE support) x3 laps (no UE support) x3 laps (no UE support) x3 laps (2 finger touch)        Walking on foam beams     NV ?         Rockerboard taps      x20 f/b s/s       Rockerboard balance (static)      2x30' ea       Rockerboard (pertubations)      x20 (s/s only)                                                           Ther Ex             6MWT ' no AD           HEP NV See above Ther Activity             NuStep             Step-ups (for/lat)  x20 (forward, 1 box) x20 (for & lat, 1 box) x15 for & lat 1 box) x15ea (1 box & riser) x15ea (1box 1 riser)       Picking up objects from floor             Amb w/ HT/HN   NV 3 laps // bars 3 laps ea // bars        Backwards walking    NV 3 laps // bars 3x // bars        Obstacle course             STS repeats  x10 from chair 0UE 2x10 from chair 0UE 2x10 from chair 0UE 2x10 from chair 0UE 2x10 from chair 0UE       Hurdles   For/lat 3 laps ea For/lat 3 laps ea For/lat 3 laps ea For/at 3 laps ea                    Gait Training             TM    1  2mph x5' 1  2mph x8' 2 0 mph x5' 1 5mph x8'       Amb outside (over stones/grass)             Modalities                          EDUCATION POC, HEP    Use of SPC and height of AD

## 2021-08-10 ENCOUNTER — OFFICE VISIT (OUTPATIENT)
Dept: PHYSICAL THERAPY | Facility: CLINIC | Age: 66
End: 2021-08-10
Payer: MEDICARE

## 2021-08-10 DIAGNOSIS — R26.9 UNSPECIFIED ABNORMALITIES OF GAIT AND MOBILITY: Primary | ICD-10-CM

## 2021-08-10 PROCEDURE — 97530 THERAPEUTIC ACTIVITIES: CPT

## 2021-08-10 PROCEDURE — 97112 NEUROMUSCULAR REEDUCATION: CPT

## 2021-08-10 NOTE — PROGRESS NOTES
Daily Note     Today's date: 8/10/2021  Patient name: Judge Navarro  : 1955  MRN: [id number]  Referring provider: FADUMO Marion  Dx:   Encounter Diagnosis     ICD-10-CM    1  Unspecified abnormalities of gait and mobility  R26 9                   Subjective: Pt continues to note increased LE fatigue with walking around flea markets that he used to be able to visit with no difficulties  Objective: See treatment diary below      Assessment: Tolerated treatment well  Patient demonstrated fatigue post treatment and would benefit from continued PT  Pt continues to demonstrate a lack of ankle and hip strategies  Attempted rocker-board/fitterboard again today; pt w/ increased difficulty w/ forward/backward direction  Multiple episodes of LOB w/ dynamic balance activities; able to self-correct w/ use of stepping/reaching strategy  All exercises performed in // bars  Plan: Continue per plan of care  Precautions: C spine fusion C3-C5, fall risk  HEP: ride stationary bike at pt home, increase walking, standing on firm FT EC, standing on foam FT EO, tandem stance, STS repeats      Manuals 7/20 7/23 7/27 7/29 8/3 8/5 8/10                                                          Neuro Re-Ed             Tandem stance  EO 2x30" EO 2x30" EO 2x30" EO 2x30" EO 2x30" EO 2x30"      Standing on foam   EO 2x30" EO/EC 2x30" EO/EC 2x30" EO/EC 2x30" EO/EC 2x30" EO/EC 2x3"      NBOS w/ pertubations    On foam x20 NV On foam x20 On foam x20      NBOS EC  FT EC firm 2x30"           Tandem amb   x3 laps (no UE support) x3 laps (no UE support) x3 laps (no UE support) x3 laps (2 finger touch)        Walking on foam beams     NV ?         Rockerboard taps      x20 f/b s/s x20 f/b s/s      Rockerboard balance (static)      2x30" ea 2x30" ea (no UE s/s)      Rockerboard (pertubations)      x20 (s/s only) x20 f/b s/s                                                          Ther Ex             6MWT ' no AD HEP NV See above                                                                                         Ther Activity             NuStep             Step-ups (for/lat)  x20 (forward, 1 box) x20 (for & lat, 1 box) x15 for & lat 1 box) x15ea (1 box & riser) x15ea (1box 1 riser) x10 ea (1box;1riser)      Picking up objects from floor             Amb w/ HT/HN   NV 3 laps // bars 3 laps ea // bars        Backwards walking    NV 3 laps // bars 3x // bars        Obstacle course             STS repeats  x10 from chair 0UE 2x10 from chair 0UE 2x10 from chair 0UE 2x10 from chair 0UE 2x10 from chair 0UE 2x10 from chair 0UE      Hurdles   For/lat 3 laps ea For/lat 3 laps ea For/lat 3 laps ea For/at 3 laps ea For/lat 3 laps ea                   Gait Training             TM    1  2mph x5' 1  2mph x8' 2 0 mph x5' 1 5mph x8' 1 5mph x6'      Amb outside (over stones/grass)             Modalities                          EDUCATION POC, HEP    Use of SPC and height of AD

## 2021-08-12 ENCOUNTER — OFFICE VISIT (OUTPATIENT)
Dept: PHYSICAL THERAPY | Facility: CLINIC | Age: 66
End: 2021-08-12
Payer: MEDICARE

## 2021-08-12 DIAGNOSIS — R26.9 UNSPECIFIED ABNORMALITIES OF GAIT AND MOBILITY: Primary | ICD-10-CM

## 2021-08-12 PROCEDURE — 97112 NEUROMUSCULAR REEDUCATION: CPT

## 2021-08-12 PROCEDURE — 97530 THERAPEUTIC ACTIVITIES: CPT

## 2021-08-12 NOTE — PROGRESS NOTES
Daily Note     Today's date: 2021  Patient name: Santos Sanchez  : 1955  MRN: [id number]  Referring provider: FADUMO Molina  Dx: No diagnosis found  Subjective: Pt with nothing new to report  Objective: See treatment diary below      Assessment: Tolerated treatment well  Patient demonstrated fatigue post treatment, exhibited good technique with therapeutic exercises and would benefit from continued PT  Pt with improvements in STS, hurdles, and step-up performance  Pt demonstrated increased use of hip strategies today in addition to stepping/reaching strategies  Continue to utilize rocker-board/fitter-board to promote use of hip and ankle strategies  Plan: Continue per plan of care        Precautions: C spine fusion C3-C5, fall risk  HEP: ride stationary bike at pt home, increase walking, standing on firm FT EC, standing on foam FT EO, tandem stance, STS repeats      Manuals 7/20 7/23 7/27 7/29 8/3 8/5 8/10 8/12                                                         Neuro Re-Ed             Tandem stance  EO 2x30" EO 2x30" EO 2x30" EO 2x30" EO 2x30" EO 2x30" D/c to HEP     Standing on foam   EO 2x30" EO/EC 2x30" EO/EC 2x30" EO/EC 2x30" EO/EC 2x30" EO/EC 2x3" EO/EC 2x30" ea     NBOS w/ pertubations    On foam x20 NV On foam x20 On foam x20      NBOS EC  FT EC firm 2x30"           Tandem amb   x3 laps (no UE support) x3 laps (no UE support) x3 laps (no UE support) x3 laps (2 finger touch)        Walking on foam beams     NV ?   x3 laps(no UE)     Rockerboard taps      x20 f/b s/s x20 f/b s/s x20 f/b s/s     Rockerboard balance (static)      2x30" ea 2x30" ea (no UE s/s) 2x30" ea no UE s/s)     Rockerboard (pertubations)      x20 (s/s only) x20 f/b s/s x30 f/b s/s                                                         Ther Ex             6MWT ' no AD           HEP NV See above                                                                                         Ther Activity             NuStep             Step-ups (for/lat)  x20 (forward, 1 box) x20 (for & lat, 1 box) x15 for & lat 1 box) x15ea (1 box & riser) x15ea (1box 1 riser) x10 ea (1box;1riser) x10 ea (box+1 riser)     Picking up objects from floor             Amb w/ HT/HN   NV 3 laps // bars 3 laps ea // bars        Backwards walking    NV 3 laps // bars 3x // bars        Obstacle course             STS repeats  x10 from chair 0UE 2x10 from chair 0UE 2x10 from chair 0UE 2x10 from chair 0UE 2x10 from chair 0UE 2x10 from chair 0UE 2x10 from chair 0UE     Hurdles   For/lat 3 laps ea For/lat 3 laps ea For/lat 3 laps ea For/at 3 laps ea For/lat 3 laps ea For/lat 3 laps ea                  Gait Training             TM    1  2mph x5' 1  2mph x8' 2 0 mph x5' 1 5mph x8' 1 5mph x6' 1 5mph x6'     Amb outside (over stones/grass)             Modalities                          EDUCATION POC, HEP    Use of SPC and height of AD

## 2021-08-17 ENCOUNTER — OFFICE VISIT (OUTPATIENT)
Dept: PHYSICAL THERAPY | Facility: CLINIC | Age: 66
End: 2021-08-17
Payer: MEDICARE

## 2021-08-17 DIAGNOSIS — R26.9 UNSPECIFIED ABNORMALITIES OF GAIT AND MOBILITY: Primary | ICD-10-CM

## 2021-08-17 DIAGNOSIS — Z09 POSTOPERATIVE EXAMINATION: ICD-10-CM

## 2021-08-17 PROCEDURE — 97110 THERAPEUTIC EXERCISES: CPT | Performed by: PHYSICAL THERAPIST

## 2021-08-17 PROCEDURE — 97112 NEUROMUSCULAR REEDUCATION: CPT | Performed by: PHYSICAL THERAPIST

## 2021-08-17 NOTE — PROGRESS NOTES
Daily Note     Today's date: 2021  Patient name: Sukumar Mckeon  : 1955  MRN: [id number]  Referring provider: FADUMO Dooley  Dx:   Encounter Diagnosis     ICD-10-CM    1  Unspecified abnormalities of gait and mobility  R26 9    2  Postoperative examination  Z09                   Subjective: Feeling continued LOB and limited ability to walk with SPC  Objective: See treatment diary below      Assessment: Limited DF in [address] leading to limited ankle strategy with balance and romberg type movement  With toe taps and step ups also has increased trunk frontal plane movement secondary to limited hip and core coordination  Noting also slight knee flexion gait with any stance phase during ambulation  Likely leading to continued LOB  Provided TBand for DF strength  Plan: Continue per plan of care    Progress with DF strength     Precautions: C spine fusion C3-C5, fall risk  HEP: ride stationary bike at pt home, increase walking, standing on firm FT EC, standing on foam FT EO, tandem stance, STS repeats      Manuals 7/20 7/23 7/27 7/29 8/3 8/5 8/10 8/12 8/17                                                        Neuro Re-Ed             Tandem stance  EO 2x30" EO 2x30" EO 2x30" EO 2x30" EO 2x30" EO 2x30" D/c to HEP     Standing on foam   EO 2x30" EO/EC 2x30" EO/EC 2x30" EO/EC 2x30" EO/EC 2x30" EO/EC 2x3" EO/EC 2x30" ea 2x20"    NBOS w/ pertubations    On foam x20 NV On foam x20 On foam x20  x20    NBOS EC  FT EC firm 2x30"       30x2    Tandem amb   x3 laps (no UE support) x3 laps (no UE support) x3 laps (no UE support) x3 laps (2 finger touch)        Walking on foam beams     NV ?   x3 laps(no UE) NV    Rockerboard taps      x20 f/b s/s x20 f/b s/s x20 f/b s/s 20x    Rockerboard balance (static)      2x30" ea 2x30" ea (no UE s/s) 2x30" ea no UE s/s) 2x30"    Rockerboard (pertubations)      x20 (s/s only) x20 f/b s/s x30 f/b s/s x30                                                        Ther Ex 6MWT ' no AD           HEP NV See above                                                                                         Ther Activity             NuStep             Step-ups (for/lat)  x20 (forward, 1 box) x20 (for & lat, 1 box) x15 for & lat 1 box) x15ea (1 box & riser) x15ea (1box 1 riser) x10 ea (1box;1riser) x10 ea (box+1 riser) x20 ea    Picking up objects from floor             Amb w/ HT/HN   NV 3 laps // bars 3 laps ea // bars    3 laps    Backwards walking    NV 3 laps // bars 3x // bars        Obstacle course             STS repeats  x10 from chair 0UE 2x10 from chair 0UE 2x10 from chair 0UE 2x10 from chair 0UE 2x10 from chair 0UE 2x10 from chair 0UE 2x10 from chair 0UE 2x10    Hurdles   For/lat 3 laps ea For/lat 3 laps ea For/lat 3 laps ea For/at 3 laps ea For/lat 3 laps ea For/lat 3 laps ea NV                 Gait Training             TM    1  2mph x5' 1  2mph x8' 2 0 mph x5' 1 5mph x8' 1 5mph x6' 1 5mph x6' 1 5 mph x6'    Amb outside (over stones/grass)             Modalities                          EDUCATION POC, HEP    Use of SPC and height of AD

## 2021-08-19 ENCOUNTER — OFFICE VISIT (OUTPATIENT)
Dept: PHYSICAL THERAPY | Facility: CLINIC | Age: 66
End: 2021-08-19
Payer: MEDICARE

## 2021-08-19 DIAGNOSIS — Z09 POSTOPERATIVE EXAMINATION: ICD-10-CM

## 2021-08-19 DIAGNOSIS — R26.9 UNSPECIFIED ABNORMALITIES OF GAIT AND MOBILITY: Primary | ICD-10-CM

## 2021-08-19 PROCEDURE — 97110 THERAPEUTIC EXERCISES: CPT | Performed by: PHYSICAL THERAPIST

## 2021-08-19 PROCEDURE — 97112 NEUROMUSCULAR REEDUCATION: CPT | Performed by: PHYSICAL THERAPIST

## 2021-08-19 NOTE — PROGRESS NOTES
Daily Note     Today's date: 2021  Patient name: Cornel Butts  : 1955  MRN: [id number]  Referring provider: FADUMO Tolbert  Dx:   Encounter Diagnosis     ICD-10-CM    1  Unspecified abnormalities of gait and mobility  R26 9    2  Postoperative examination  Z09                   Subjective: Notes feeling pretty good today, compliant with HEP  Objective: See treatment diary below      Assessment: Continued challenged with L knee buckling with any static stepping fwd or posterior  Improved ability to perform with cues and feedback with positive re-enforcement  Trial of retro stepping with TM walking on incline without difficulty  Will continue to work on frontal plane balance as well  Improved ankle strategy with posterior LOB and use of stepping strategy as well vs knee flexion  Plan: Continue per plan of care        Precautions: C spine fusion C3-C5, fall risk  HEP: ride stationary bike at pt home, increase walking, standing on firm FT EC, standing on foam FT EO, tandem stance, STS repeats      Manuals 7/20 7/23 7/27 7/29 8/3 8/5 8/10 8/12 8/17 8/19                                                       Neuro Re-Ed             Tandem stance  EO 2x30" EO 2x30" EO 2x30" EO 2x30" EO 2x30" EO 2x30" D/c to HEP     Standing on foam   EO 2x30" EO/EC 2x30" EO/EC 2x30" EO/EC 2x30" EO/EC 2x30" EO/EC 2x3" EO/EC 2x30" ea 2x20" 2x20   NBOS w/ pertubations    On foam x20 NV On foam x20 On foam x20  x20 x20   NBOS EC  FT EC firm 2x30"       30x2 30x2   Tandem amb   x3 laps (no UE support) x3 laps (no UE support) x3 laps (no UE support) x3 laps (2 finger touch)        Walking on foam beams     NV ?   x3 laps(no UE) NV 3 laps   Rockerboard taps      x20 f/b s/s x20 f/b s/s x20 f/b s/s 20x NV   Rockerboard balance (static)      2x30" ea 2x30" ea (no UE s/s) 2x30" ea no UE s/s) 2x30" NV   Rockerboard (pertubations)      x20 (s/s only) x20 f/b s/s x30 f/b s/s x30 NV Ther Ex             6MWT ' no AD           HEP NV See above                                                                                         Ther Activity             NuStep             Step-ups (for/lat)  x20 (forward, 1 box) x20 (for & lat, 1 box) x15 for & lat 1 box) x15ea (1 box & riser) x15ea (1box 1 riser) x10 ea (1box;1riser) x10 ea (box+1 riser) x20 ea x30 ea   Picking up objects from floor             Amb w/ HT/HN   NV 3 laps // bars 3 laps ea // bars    3 laps 3 laps   Backwards walking    NV 3 laps // bars 3x // bars     TM 2 min   Obstacle course             STS repeats  x10 from chair 0UE 2x10 from chair 0UE 2x10 from chair 0UE 2x10 from chair 0UE 2x10 from chair 0UE 2x10 from chair 0UE 2x10 from chair 0UE 2x10 2x10   Hurdles   For/lat 3 laps ea For/lat 3 laps ea For/lat 3 laps ea For/at 3 laps ea For/lat 3 laps ea For/lat 3 laps ea NV NV                Gait Training             TM    1  2mph x5' 1  2mph x8' 2 0 mph x5' 1 5mph x8' 1 5mph x6' 1 5mph x6' 1 5 mph x6' 2 0 mph x 6 min   Amb outside (over stones/grass)             Modalities                          EDUCATION POC, HEP    Use of SPC and height of AD

## 2021-08-24 ENCOUNTER — OFFICE VISIT (OUTPATIENT)
Dept: PHYSICAL THERAPY | Facility: CLINIC | Age: 66
End: 2021-08-24
Payer: MEDICARE

## 2021-08-24 DIAGNOSIS — Z09 POSTOPERATIVE EXAMINATION: ICD-10-CM

## 2021-08-24 DIAGNOSIS — R26.9 UNSPECIFIED ABNORMALITIES OF GAIT AND MOBILITY: Primary | ICD-10-CM

## 2021-08-24 PROCEDURE — 97112 NEUROMUSCULAR REEDUCATION: CPT | Performed by: PHYSICAL THERAPIST

## 2021-08-24 PROCEDURE — 97110 THERAPEUTIC EXERCISES: CPT | Performed by: PHYSICAL THERAPIST

## 2021-08-24 NOTE — PROGRESS NOTES
Daily Note     Today's date: 2021  Patient name: Charlie Tomas  : 1955  MRN: [id number]  Referring provider: Annie Son, FADUMO  Dx:   Encounter Diagnosis     ICD-10-CM    1  Unspecified abnormalities of gait and mobility  R26 9    2  Postoperative examination  Z09                   Subjective: Notes feeling okay overall, still with unsteadiness at times  Objective: See treatment diary below      Assessment: Improved ability to perform wt shifting and stepping fwd and laterally post cues for slowed shifting  Able to ambulate across level surfaces and up/down curb with close supervision and only min A  Continues to have high fatigue with prolonged walking and frustration with efforts  Plan: Continue per plan of care        Precautions: C spine fusion C3-C5, fall risk  HEP: ride stationary bike at pt home, increase walking, standing on firm FT EC, standing on foam FT EO, tandem stance, STS repeats      Manuals 8/24 7/23 7/27 7/29 8/3 8/5 8/10 8/12 8/17 8/19                                                       Neuro Re-Ed             Tandem stance 2x30" EO 2x30" EO 2x30" EO 2x30" EO 2x30" EO 2x30" EO 2x30" D/c to HEP     Standing on foam  2x30" EO 2x30" EO/EC 2x30" EO/EC 2x30" EO/EC 2x30" EO/EC 2x30" EO/EC 2x3" EO/EC 2x30" ea 2x20" 2x20   NBOS w/ pertubations NV   On foam x20 NV On foam x20 On foam x20  x20 x20   NBOS EC 2x20" FT EC firm 2x30"       30x2 30x2   Tandem amb   x3 laps (no UE support) x3 laps (no UE support) x3 laps (no UE support) x3 laps (2 finger touch)        Walking on foam beams     NV ?   x3 laps(no UE) NV 3 laps   Rockerboard taps 20 AP     x20 f/b s/s x20 f/b s/s x20 f/b s/s 20x NV   Rockerboard balance (static) NV     2x30" ea 2x30" ea (no UE s/s) 2x30" ea no UE s/s) 2x30" NV   Rockerboard (pertubations) NV     x20 (s/s only) x20 f/b s/s x30 f/b s/s x30 NV                                                       Ther Ex             6MWT  810' no AD           HEP  See above                                                                                         Ther Activity             NuStep             Step-ups (for/lat) x20 fwd and lat x20 (forward, 1 box) x20 (for & lat, 1 box) x15 for & lat 1 box) x15ea (1 box & riser) x15ea (1box 1 riser) x10 ea (1box;1riser) x10 ea (box+1 riser) x20 ea x30 ea   Picking up objects from floor             Amb w/ HT/ ft  NV 3 laps // bars 3 laps ea // bars    3 laps 3 laps   Backwards walking  TM decline 2 min  NV 3 laps // bars 3x // bars     TM 2 min   Obstacle course             STS repeats 2x10 x10 from chair 0UE 2x10 from chair 0UE 2x10 from chair 0UE 2x10 from chair 0UE 2x10 from chair 0UE 2x10 from chair 0UE 2x10 from chair 0UE 2x10 2x10   Hurdles   For/lat 3 laps ea For/lat 3 laps ea For/lat 3 laps ea For/at 3 laps ea For/lat 3 laps ea For/lat 3 laps ea NV NV                Gait Training             TM  5 mins incline 2 mph  1 2mph x5' 1  2mph x8' 2 0 mph x5' 1 5mph x8' 1 5mph x6' 1 5mph x6' 1 5 mph x6' 2 0 mph x 6 min   Amb outside (over stones/grass)             Modalities                          EDUCATION     Use of SPC and height of AD

## 2021-08-26 ENCOUNTER — EVALUATION (OUTPATIENT)
Dept: PHYSICAL THERAPY | Facility: CLINIC | Age: 66
End: 2021-08-26
Payer: MEDICARE

## 2021-08-26 DIAGNOSIS — R26.9 UNSPECIFIED ABNORMALITIES OF GAIT AND MOBILITY: ICD-10-CM

## 2021-08-26 DIAGNOSIS — Z09 POSTOPERATIVE EXAMINATION: Primary | ICD-10-CM

## 2021-08-26 PROCEDURE — 97110 THERAPEUTIC EXERCISES: CPT | Performed by: PHYSICAL THERAPIST

## 2021-08-26 PROCEDURE — 97112 NEUROMUSCULAR REEDUCATION: CPT | Performed by: PHYSICAL THERAPIST

## 2021-08-26 NOTE — PROGRESS NOTES
PT Re-Evaluation     Today's date: 2021  Patient name: Jt Abel  : 1955  MRN: [id number]  Referring provider: FADUMO David  Dx:   Encounter Diagnosis     ICD-10-CM    1  Postoperative examination  Z09    2  Unspecified abnormalities of gait and mobility  R26 9                   Assessment  Assessment details: Pt is a 65y o  male who presents to OP PT for IE s/p decompressive cervical laminectomy and fixation fusion C3-5 for myelopathy 21  Pt has been cleared for initiation of PT and removal of C collar by surgeon  Pt currently c/o of balance and gait deficits  BLE strength testing reveals no overt weakness  Noted gait deviations include B knee flexion t/o stance phase, WBOS, decreased B arm swing, decreased gait speed, and decreased B stride length  Pt is able to ambulate w/ and w/o SPC; gait deviations present w/ and w/o use of AD  6MWT to be completed NV  Functionally pt is able to complete STS transfers w/o use of UEs  Pt does keep B knees flexed following transfers and with majority of mobility as pt feels unsteady and reports this helps with his balance  Pt with + balance deficits and is at an elevated risk for falls as indicated by BBS score 40/56 and FGA 20/30  Given these findings pt is recommended for skilled therapy intervention 2x week  Pt and wife agreeable to this plan  Re-assessment 21:  Yara Gtz has attended 12 visits to date and reports a 30% GROC since the initiation of PT  Clinically demonstrates improved ambulation capacity and efficiency with performance of TUG less than 15 seconds without AD- continues to have a high falls risk however with a score of 40/56  He continues to have limited balance and control of hip/ankle strategy as well as limited walking endurance due to fatigue and potential foot drop despite improved overall ankle strength- still limited with DF and EV    These findings suggest overall that he is in continued need for skilled OPPT to address his remaining deficits, achieve established goals and minimize his risk for falls  Impairments: abnormal gait, activity intolerance and impaired balance  Understanding of Dx/Px/POC: excellent  Goals  STGs (to be achieved within 2 weeks)  1  Pt will report ability to ride stationary bike at home for at least 15 minutes 3-5x week  - MET  2  Pt will complete 6MWT and goals will be set accordingly  - Partially MET  Pt stated goal: walk w/o SPC- Partially MET    LTGs (to be achieved within 8-10 weeks)   1  Pt will be I with HEP at d/c to promote PT carry-over  - MET   2  Pt will meet FOTO predicted score  - Partially MET  3  Pt will improve BBS score to 45/56 or greater indicating that he is at a reduced risk for falls  - Partially MET  4  Pt will improve FGA score to 24/30 or greater indicating that he is at a reduced risk for falls  - Partially MET      Plan  Plan details: Continue POC for ankle stability, balance and walking endurance; minimize falls risk and return to PLOF  Patient would benefit from: skilled physical therapy  Planned modality interventions: unattended electrical stimulation, cryotherapy and thermotherapy: hydrocollator packs  Planned therapy interventions: abdominal trunk stabilization, manual therapy, balance, strengthening, stretching, therapeutic activities, patient education, neuromuscular re-education, therapeutic exercise, therapeutic training, transfer training, gait training, functional ROM exercises, graded activity, flexibility, graded exercise and home exercise program  Frequency: 2x week  Duration in visits: 16  Duration in weeks: 8  Plan of Care beginning date: 8/26/2021  Plan of Care expiration date: 10/21/2021  Treatment plan discussed with: patient (wife)        Subjective Evaluation    History of Present Illness  Date of surgery: 5/24/21    Mechanism of injury: surgery  Mechanism of injury: Pt presents s/p decompressive cervical laminectomy and fixation fusion C3-5 for myelopathy on 5/24/21  Physician cleared pt for removal of C-collar and initiation of skilled PT approx  2 weeks ago  Since surgery pt has no pain, he does endorse mild "numbess" in feet, which has been improving since surgery  Pt ambulates with SPC in community  Does not use AD at home, but wife endorses "furniture walking"  He does endorse continued feelings of unsteadiness and imbalance since surgery although this has improved since surgical intervention  Pt does note that he "tries to keep lower to the ground" with B knee flexion due to these feelings of unsteadiness  Pt has full flight of stairs in home, reports he has been completing w/o problem w/ use of handrail  Has stationary bike, would like to return to riding  Is able to walk for approx  10min continuously prior to needing rest break  Pt and wife like to go to flea markets, they have been able to since surgery, however outings are limited by distance pt can ambulate  Re-assessment 8/26/21:  Misty Gomez has attended 12 visits and reports a 30% GROC since the initiation of PT  Reports improved walking without the cane around the house as well as at the [place]  Plans on beginning walking around the block  Still having R sided LOB with stance  Reports improved ability to perform things when he "takes his time"  Notes still feeling fatigue with going up and down stairs at times  Has been biking up to 30 mins on a recumbant  Notes improved sensation in B/L feet and calves  Has F/U with MD in October  Notes slightly disturbed sleep  Denies trouble getting to the bathroom in the middle of the night  Wishes to continue PT in order to walk better        Pain  No pain reported    Patient Goals  Patient goal: "get walking without the cane"        Objective     Strength/Myotome Testing     Left Hip   Planes of Motion   Flexion: 5    Right Hip   Planes of Motion   Flexion: 5    Left Knee   Flexion: 5  Extension: 5    Right Knee   Flexion: 5  Extension: 5    Left Ankle/Foot   Dorsiflexion: 4+  Plantar flexion: 5    Right Ankle/Foot   Dorsiflexion: 4+  Plantar flexion: 5  Neuro Exam:     Functional outcomes   5x sit to stand: 19 4 (seconds)  TU 50 (seconds)  Functional outcome comment: Hernandez Balance Scale: 40/56  FGA:   Functional outcome gait comment: Ambulates w/ and w/o AD, gait deviations noted with both including: B knee flexion t/o stance phase, WBOS, decreased B arm swing, decreased gait speed, decreased B stride length             Precautions: C spine fusion C3-C5, fall risk  HEP: ride stationary bike at pt home, increase walking      Manuals 8/24 8/26 7/27 7/29 8/3 8/5 8/10 8/12 8/17 8/19   Re-assessment  PF 25'                                                  Neuro Re-Ed             Tandem stance 2x30" 2x30" EO 2x30" EO 2x30" EO 2x30" EO 2x30" EO 2x30" D/c to HEP     Standing on foam  2x30" 2x30" EO/EC 2x30" EO/EC 2x30" EO/EC 2x30" EO/EC 2x30" EO/EC 2x3" EO/EC 2x30" ea 2x20" 2x20   NBOS w/ pertubations NV   On foam x20 NV On foam x20 On foam x20  x20 x20   NBOS EC 2x20"        30x2 30x2   Tandem amb    x3 laps (no UE support) x3 laps (no UE support) x3 laps (2 finger touch)        Walking on foam beams     NV ?   x3 laps(no UE) NV 3 laps   Rockerboard taps 20 AP     x20 f/b s/s x20 f/b s/s x20 f/b s/s 20x NV   Rockerboard balance (static) NV     2x30" ea 2x30" ea (no UE s/s) 2x30" ea no UE s/s) 2x30" NV   Rockerboard (pertubations) NV     x20 (s/s only) x20 f/b s/s x30 f/b s/s x30 NV                                                       Ther Ex             6MWT             HEP                                                                                           Ther Activity             NuStep             Step-ups (for/lat) x20 fwd and lat  x20 (for & lat, 1 box) x15 for & lat 1 box) x15ea (1 box & riser) x15ea (1box 1 riser) x10 ea (1box;1riser) x10 ea (box+1 riser) x20 ea x30 ea   Picking up objects from floor  PF x3 Amb w/ HT/ ft  NV 3 laps // bars 3 laps ea // bars    3 laps 3 laps   Backwards walking  TM decline 2 min  NV 3 laps // bars 3x // bars     TM 2 min   Obstacle course             STS repeats 2x10 10x 2x10 from chair 0UE 2x10 from chair 0UE 2x10 from chair 0UE 2x10 from chair 0UE 2x10 from chair 0UE 2x10 from chair 0UE 2x10 2x10   Hurdles   For/lat 3 laps ea For/lat 3 laps ea For/lat 3 laps ea For/at 3 laps ea For/lat 3 laps ea For/lat 3 laps ea NV NV                Gait Training             TM  5 mins incline 2 mph 10 mins today 1  2mph x5' 1  2mph x8' 2 0 mph x5' 1 5mph x8' 1 5mph x6' 1 5mph x6' 1 5 mph x6' 2 0 mph x 6 min   Amb outside (over stones/grass)             Modalities                          EDUCATION     Use of SPC and height of AD

## 2021-08-31 ENCOUNTER — OFFICE VISIT (OUTPATIENT)
Dept: PHYSICAL THERAPY | Facility: CLINIC | Age: 66
End: 2021-08-31
Payer: MEDICARE

## 2021-08-31 DIAGNOSIS — Z09 POSTOPERATIVE EXAMINATION: Primary | ICD-10-CM

## 2021-08-31 DIAGNOSIS — R26.9 UNSPECIFIED ABNORMALITIES OF GAIT AND MOBILITY: ICD-10-CM

## 2021-08-31 PROCEDURE — 97110 THERAPEUTIC EXERCISES: CPT | Performed by: PHYSICAL THERAPIST

## 2021-08-31 PROCEDURE — 97112 NEUROMUSCULAR REEDUCATION: CPT | Performed by: PHYSICAL THERAPIST

## 2021-08-31 NOTE — PROGRESS NOTES
Daily Note     Today's date: 2021  Patient name: Lizz Haley  : 1955  MRN: [id number]  Referring provider: FADUMO Ratliff  Dx:   Encounter Diagnosis     ICD-10-CM    1  Postoperative examination  Z09    2  Unspecified abnormalities of gait and mobility  R26 9                   Subjective: Notes feeling better, more compliant with HEP  Objective: See treatment diary below      Assessment: Demonstrates improved ability to perform 3 way wt shifting with less LOB  Able perform color stepping excursion with external focus  Challenged with addition with ladder stepping- noting as pt works harder has more fatigue and LOB  Plan: Continue per plan of care        Precautions: C spine fusion C3-C5, fall risk  HEP: ride stationary bike at pt home, increase walking      Manuals 8/24 8/26 8/31 7/29 8/3 8/5 8/10 8/12 8/17 8/19   Re-assessment  PF 25'                                                  Neuro Re-Ed             Tandem stance 2x30" 2x30" 2x30" EO 2x30" EO 2x30" EO 2x30" EO 2x30" D/c to HEP     Standing on foam  2x30" 2x30" 2x30" EO/EC 2x30" EO/EC 2x30" EO/EC 2x30" EO/EC 2x3" EO/EC 2x30" ea 2x20" 2x20   NBOS w/ pertubations NV   On foam x20 NV On foam x20 On foam x20  x20 x20   NBOS EC 2x20"  2x20"      30x2 30x2   Tandem amb     x3 laps (no UE support) x3 laps (2 finger touch)        Walking on foam beams     NV ?   x3 laps(no UE) NV 3 laps   Rockerboard taps 20 AP  20x   x20 f/b s/s x20 f/b s/s x20 f/b s/s 20x NV   Rockerboard balance (static) NV     2x30" ea 2x30" ea (no UE s/s) 2x30" ea no UE s/s) 2x30" NV   Rockerboard (pertubations) NV     x20 (s/s only) x20 f/b s/s x30 f/b s/s x30 NV                                                       Ther Ex             6MWT             HEP                                                                                           Ther Activity             NuStep             Step-ups (for/lat) x20 fwd and lat  20x fwd and lat x15 for & lat 1 box) x15ea (1 box & riser) x15ea (1box 1 riser) x10 ea (1box;1riser) x10 ea (box+1 riser) x20 ea x30 ea   Picking up objects from floor  PF x3 PF x 3          Amb w/ HT/ ft   3 laps // bars 3 laps ea // bars    3 laps 3 laps   Backwards walking  TM decline 2 min  35 lbs walkback 2x10 3 laps // bars 3x // bars     TM 2 min   Obstacle course             STS repeats 2x10 10x 20x 2x10 from chair 0UE 2x10 from chair 0UE 2x10 from chair 0UE 2x10 from chair 0UE 2x10 from chair 0UE 2x10 2x10   Hurdles    For/lat 3 laps ea For/lat 3 laps ea For/at 3 laps ea For/lat 3 laps ea For/lat 3 laps ea NV NV                Gait Training             TM  5 mins incline 2 mph 10 mins today 10 mins 1  2mph x8' 2 0 mph x5' 1 5mph x8' 1 5mph x6' 1 5mph x6' 1 5 mph x6' 2 0 mph x 6 min   Amb outside (over stones/grass)             Modalities                          EDUCATION     Use of SPC and height of AD

## 2021-09-02 ENCOUNTER — OFFICE VISIT (OUTPATIENT)
Dept: PHYSICAL THERAPY | Facility: CLINIC | Age: 66
End: 2021-09-02
Payer: MEDICARE

## 2021-09-02 DIAGNOSIS — Z09 POSTOPERATIVE EXAMINATION: Primary | ICD-10-CM

## 2021-09-02 DIAGNOSIS — R26.9 UNSPECIFIED ABNORMALITIES OF GAIT AND MOBILITY: ICD-10-CM

## 2021-09-02 PROCEDURE — 97112 NEUROMUSCULAR REEDUCATION: CPT | Performed by: PHYSICAL THERAPIST

## 2021-09-02 PROCEDURE — 97110 THERAPEUTIC EXERCISES: CPT | Performed by: PHYSICAL THERAPIST

## 2021-09-02 NOTE — PROGRESS NOTES
Daily Note     Today's date: 2021  Patient name: Margo Given  : 1955  MRN: [id number]  Referring provider: FADUMO Webster  Dx:   Encounter Diagnosis     ICD-10-CM    1  Postoperative examination  Z09    2  Unspecified abnormalities of gait and mobility  R26 9                   Subjective: Reports less soreness or tiredness in his LEs today  Feeling pretty good overall  Objective: See treatment diary below      Assessment: Continued progress with LE strength- able to perform toe raises with improved endurance but still with limited ability to perform ankle strategy with any LOB  Noting limited hip hinge with glute activation with sit to stand  Trial of RDL to facilitate glute activation with fair response  Plan: Continue per plan of care        Precautions: C spine fusion C3-C5, fall risk  HEP: ride stationary bike at pt home, increase walking      Manuals 8/24 8/26 8/31 9/2 8/3 8/5 8/10 8/12 8/17 8/19   Re-assessment  PF 25'                                                  Neuro Re-Ed             Tandem stance 2x30" 2x30" 2x30" 2x30" EO 2x30" EO 2x30" EO 2x30" D/c to HEP     Standing on foam  2x30" 2x30" 2x30"  EO/EC 2x30" EO/EC 2x30" EO/EC 2x3" EO/EC 2x30" ea 2x20" 2x20   NBOS w/ pertubations NV    NV On foam x20 On foam x20  x20 x20   NBOS EC 2x20"  2x20" 2x20"     30x2 30x2   Tandem amb      x3 laps (2 finger touch)        Walking on foam beams     NV ?   x3 laps(no UE) NV 3 laps   Rockerboard taps 20 AP  20x 20x  x20 f/b s/s x20 f/b s/s x20 f/b s/s 20x NV   Rockerboard balance (static) NV     2x30" ea 2x30" ea (no UE s/s) 2x30" ea no UE s/s) 2x30" NV   Rockerboard (pertubations) NV     x20 (s/s only) x20 f/b s/s x30 f/b s/s x30 NV                                                       Ther Ex             6MWT             HEP                                                    RDL    35 lbs 2x10         Side stepping    3 laps         STD hip 3 way    OTB 10x ea         Ther Activity             NuStep             Step-ups (for/lat) x20 fwd and lat  20x fwd and lat 20x fwd lat x15ea (1 box & riser) x15ea (1box 1 riser) x10 ea (1box;1riser) x10 ea (box+1 riser) x20 ea x30 ea   Picking up objects from floor  PF x3 PF x 3 NV         Amb w/ HT/ ft    3 laps ea // bars    3 laps 3 laps   Backwards walking  TM decline 2 min  35 lbs walkback 2x10 35 lbs TKE 2x10 3x // bars     TM 2 min   Obstacle course             STS repeats 2x10 10x 20x 20x 2x10 from chair 0UE 2x10 from chair 0UE 2x10 from chair 0UE 2x10 from chair 0UE 2x10 2x10   Hurdles    10x For/lat 3 laps ea For/at 3 laps ea For/lat 3 laps ea For/lat 3 laps ea NV NV                Gait Training             TM  5 mins incline 2 mph 10 mins today 10 mins 10 mins 2 0 mph x5' 1 5mph x8' 1 5mph x6' 1 5mph x6' 1 5 mph x6' 2 0 mph x 6 min   Amb outside (over stones/grass)             Modalities                          EDUCATION     Use of SPC and height of AD

## 2021-09-03 LAB
ALBUMIN SERPL-MCNC: 3.6 G/DL (ref 3.8–4.8)
ALBUMIN/GLOB SERPL: 1.7 {RATIO} (ref 1.2–2.2)
ALP SERPL-CCNC: 109 IU/L (ref 48–121)
ALT SERPL-CCNC: 34 IU/L (ref 0–44)
AST SERPL-CCNC: 46 IU/L (ref 0–40)
BASOPHILS # BLD AUTO: 0 X10E3/UL (ref 0–0.2)
BASOPHILS NFR BLD AUTO: 0 %
BILIRUB SERPL-MCNC: 1.5 MG/DL (ref 0–1.2)
BUN SERPL-MCNC: 14 MG/DL (ref 8–27)
BUN/CREAT SERPL: 17 (ref 10–24)
CALCIUM SERPL-MCNC: 9.1 MG/DL (ref 8.6–10.2)
CHLORIDE SERPL-SCNC: 106 MMOL/L (ref 96–106)
CHOLEST SERPL-MCNC: 149 MG/DL (ref 100–199)
CHOLEST/HDLC SERPL: 2.5 RATIO (ref 0–5)
CO2 SERPL-SCNC: 25 MMOL/L (ref 20–29)
CREAT SERPL-MCNC: 0.83 MG/DL (ref 0.76–1.27)
EOSINOPHIL # BLD AUTO: 0.2 X10E3/UL (ref 0–0.4)
EOSINOPHIL NFR BLD AUTO: 4 %
ERYTHROCYTE [DISTWIDTH] IN BLOOD BY AUTOMATED COUNT: 12.4 % (ref 11.6–15.4)
EST. AVERAGE GLUCOSE BLD GHB EST-MCNC: 108 MG/DL
GLOBULIN SER-MCNC: 2.1 G/DL (ref 1.5–4.5)
GLUCOSE SERPL-MCNC: 98 MG/DL (ref 65–99)
HBA1C MFR BLD: 5.4 % (ref 4.8–5.6)
HCT VFR BLD AUTO: 41.6 % (ref 37.5–51)
HDLC SERPL-MCNC: 60 MG/DL
HGB BLD-MCNC: 14.5 G/DL (ref 13–17.7)
IMM GRANULOCYTES # BLD: 0 X10E3/UL (ref 0–0.1)
IMM GRANULOCYTES NFR BLD: 0 %
LDLC SERPL CALC-MCNC: 73 MG/DL (ref 0–99)
LYMPHOCYTES # BLD AUTO: 1.6 X10E3/UL (ref 0.7–3.1)
LYMPHOCYTES NFR BLD AUTO: 34 %
MCH RBC QN AUTO: 33.8 PG (ref 26.6–33)
MCHC RBC AUTO-ENTMCNC: 34.9 G/DL (ref 31.5–35.7)
MCV RBC AUTO: 97 FL (ref 79–97)
MONOCYTES # BLD AUTO: 0.6 X10E3/UL (ref 0.1–0.9)
MONOCYTES NFR BLD AUTO: 13 %
NEUTROPHILS # BLD AUTO: 2.4 X10E3/UL (ref 1.4–7)
NEUTROPHILS NFR BLD AUTO: 49 %
PLATELET # BLD AUTO: 102 X10E3/UL (ref 150–450)
POTASSIUM SERPL-SCNC: 4.5 MMOL/L (ref 3.5–5.2)
PROT SERPL-MCNC: 5.7 G/DL (ref 6–8.5)
PSA SERPL-MCNC: 0.5 NG/ML (ref 0–4)
RBC # BLD AUTO: 4.29 X10E6/UL (ref 4.14–5.8)
SL AMB EGFR AFRICAN AMERICAN: 105 ML/MIN/1.73
SL AMB EGFR NON AFRICAN AMERICAN: 90 ML/MIN/1.73
SL AMB VLDL CHOLESTEROL CALC: 16 MG/DL (ref 5–40)
SODIUM SERPL-SCNC: 141 MMOL/L (ref 134–144)
TRIGL SERPL-MCNC: 85 MG/DL (ref 0–149)
WBC # BLD AUTO: 4.9 X10E3/UL (ref 3.4–10.8)

## 2021-09-07 ENCOUNTER — OFFICE VISIT (OUTPATIENT)
Dept: PHYSICAL THERAPY | Facility: CLINIC | Age: 66
End: 2021-09-07
Payer: MEDICARE

## 2021-09-07 DIAGNOSIS — Z09 POSTOPERATIVE EXAMINATION: Primary | ICD-10-CM

## 2021-09-07 DIAGNOSIS — R26.9 UNSPECIFIED ABNORMALITIES OF GAIT AND MOBILITY: ICD-10-CM

## 2021-09-07 PROCEDURE — 97112 NEUROMUSCULAR REEDUCATION: CPT | Performed by: PHYSICAL THERAPIST

## 2021-09-07 PROCEDURE — 97110 THERAPEUTIC EXERCISES: CPT | Performed by: PHYSICAL THERAPIST

## 2021-09-07 PROCEDURE — 97014 ELECTRIC STIMULATION THERAPY: CPT | Performed by: PHYSICAL THERAPIST

## 2021-09-07 NOTE — PROGRESS NOTES
Daily Note     Today's date: 2021  Patient name: Mariam King  : 1955  MRN: [id number]  Referring provider: FADUMO Pitt  Dx:   Encounter Diagnosis     ICD-10-CM    1  Postoperative examination  Z09    2  Unspecified abnormalities of gait and mobility  R26 9                   Subjective: Notes feeling improved overall endurance physically this past weekend  Able to go up the stairs at home without a railing recently  Objective: See treatment diary below      Assessment: Continued limited ability to perform tandem balance statically and dynamically with ambulation  Continues to have increased ML sway with tandem- likely related to continued core instability and hip weakness into ABD  Post cues for TA with BP cuff feedback - able to perform perform tandem with slight improvement but still with limited ankle balance and stability  Trial of NMES for tib anterior facilitation with fair response  Will continue with core/hip/ankle stability as able  Plan: Continue per plan of care  Precautions: C spine fusion C3-C5, fall risk  HEP: ride stationary bike at pt home, increase walking      Manuals 8/24 8/26 8/31 9/2 9/7 8/5 8/10 8/12 8/17 8/19   Re-assessment  PF 25'                                                  Neuro Re-Ed             Tandem stance 2x30" 2x30" 2x30" 2x30" 3x30 EO 2x30" EO 2x30" D/c to HEP     Standing on foam  2x30" 2x30" 2x30"  2x30" EO/EC 2x30" EO/EC 2x3" EO/EC 2x30" ea 2x20" 2x20   NBOS w/ pertubations NV     On foam x20 On foam x20  x20 x20   NBOS EC 2x20"  2x20" 2x20" 2x20"    30x2 30x2   Tandem amb       3 x 30ft        Walking on foam beams        x3 laps(no UE) NV 3 laps   Rockerboard taps 20 AP  20x 20x 20x x20 f/b s/s x20 f/b s/s x20 f/b s/s 20x NV   Rockerboard balance (static) NV    AP 2x20" 2x30" ea 2x30" ea (no UE s/s) 2x30" ea no UE s/s) 2x30" NV   Rockerboard (pertubations) NV     x20 (s/s only) x20 f/b s/s x30 f/b s/s x30 NV   NSP marching BP cuff feedback     30x        NSP heel slide with BP cuff feedback     30x                                  Ther Ex             6MWT             HEP                                                    RDL    35 lbs 2x10 35 lbs 2x10        Side stepping    3 laps 3 laps        STD hip 3 way    OTB 10x ea OTB 10x ea        Ther Activity             NuStep             Step-ups (for/lat) x20 fwd and lat  20x fwd and lat 20x fwd lat  x15ea (1box 1 riser) x10 ea (1box;1riser) x10 ea (box+1 riser) x20 ea x30 ea   Picking up objects from floor  PF x3 PF x 3 NV         Amb w/ HT/ ft        3 laps 3 laps   Backwards walking  TM decline 2 min  35 lbs walkback 2x10 35 lbs TKE 2x10      TM 2 min   Obstacle course             STS repeats 2x10 10x 20x 20x  2x10 from chair 0UE 2x10 from chair 0UE 2x10 from chair 0UE 2x10 2x10   Hurdles    10x  For/at 3 laps ea For/lat 3 laps ea For/lat 3 laps ea NV NV                Gait Training             TM  5 mins incline 2 mph 10 mins today 10 mins 10 mins  1  5mph x8' 1 5mph x6' 1 5mph x6' 1 5 mph x6' 2 0 mph x 6 min   Amb outside (over stones/grass)             Modalities             NMES ankles     10 min        EDUCATION

## 2021-09-09 ENCOUNTER — OFFICE VISIT (OUTPATIENT)
Dept: PHYSICAL THERAPY | Facility: CLINIC | Age: 66
End: 2021-09-09
Payer: MEDICARE

## 2021-09-09 DIAGNOSIS — Z09 POSTOPERATIVE EXAMINATION: Primary | ICD-10-CM

## 2021-09-09 DIAGNOSIS — R26.9 UNSPECIFIED ABNORMALITIES OF GAIT AND MOBILITY: ICD-10-CM

## 2021-09-09 PROCEDURE — 97112 NEUROMUSCULAR REEDUCATION: CPT | Performed by: PHYSICAL THERAPIST

## 2021-09-09 PROCEDURE — 97110 THERAPEUTIC EXERCISES: CPT | Performed by: PHYSICAL THERAPIST

## 2021-09-09 NOTE — PROGRESS NOTES
Daily Note     Today's date: 2021  Patient name: Carl Mackey  : 1955  MRN: [id number]  Referring provider: FADUMO Kiran  Dx:   Encounter Diagnosis     ICD-10-CM    1  Postoperative examination  Z09    2  Unspecified abnormalities of gait and mobility  R26 9                   Subjective: Notes feeling better today, less overall discomfort since last session  Objective: See treatment diary below      Assessment: With biodex- limited ability to shift weight anterior or posterior  With further examination- unable to perform B/L HR despite full strength with manual resistance for plantar flexion  Trial of leg press- able to attain partial ROM with 45 lbs resistance  Plan: Continue per plan of care  Precautions: C spine fusion C3-C5, fall risk  HEP: ride stationary bike at pt home, increase walking      Manuals 8/24 8/26 8/31 9/2 9/7 9/9 8/10 8/12 8/17 8/19   Re-assessment  PF 25'                                                  Neuro Re-Ed             Tandem stance 2x30" 2x30" 2x30" 2x30" 3x30 3x30" EO 2x30" D/c to HEP     Standing on foam  2x30" 2x30" 2x30" 2x30" 2x30" 2x30 EO/EC 2x3" EO/EC 2x30" ea 2x20" 2x20   NBOS w/ pertubations NV      On foam x20  x20 x20   NBOS EC 2x20"  2x20" 2x20" 2x20" 2x20"   30x2 30x2   Tandem amb       3 x 30ft        Walking on foam beams        x3 laps(no UE) NV 3 laps   Rockerboard taps 20 AP  20x 20x 20x  x20 f/b s/s x20 f/b s/s 20x NV   Rockerboard balance (static) NV    AP 2x20" AP 2x20" 2x30" ea (no UE s/s) 2x30" ea no UE s/s) 2x30" NV   Rockerboard (pertubations) NV      x20 f/b s/s x30 f/b s/s x30 NV   NSP marching BP cuff feedback     30x        NSP heel slide with BP cuff feedback     30x        Biodex      15 mins                    Ther Ex             6MWT             HEP                                                    RDL    35 lbs 2x10 35 lbs 2x10        Side stepping    3 laps 3 laps        STD hip 3 way    OTB 10x ea OTB 10x ea Ther Activity             NuStep             Step-ups (for/lat) x20 fwd and lat  20x fwd and lat 20x fwd lat  20x fwd x10 ea (1box;1riser) x10 ea (box+1 riser) x20 ea x30 ea   Picking up objects from floor  PF x3 PF x 3 NV         Amb w/ HT/ ft        3 laps 3 laps   Backwards walking  TM decline 2 min  35 lbs walkback 2x10 35 lbs TKE 2x10      TM 2 min   Obstacle course             STS repeats 2x10 10x 20x 20x   2x10 from chair 0UE 2x10 from chair 0UE 2x10 2x10   Hurdles    10x   For/lat 3 laps ea For/lat 3 laps ea NV NV   Leg press HR      45 lbs 2x30       Gait Training             TM  5 mins incline 2 mph 10 mins today 10 mins 10 mins  2 mph 8 mins 1  5mph x6' 1 5mph x6' 1 5 mph x6' 2 0 mph x 6 min   Amb outside (over stones/grass)             Modalities             NMES ankles     10 min        EDUCATION

## 2021-09-14 ENCOUNTER — OFFICE VISIT (OUTPATIENT)
Dept: PHYSICAL THERAPY | Facility: CLINIC | Age: 66
End: 2021-09-14
Payer: MEDICARE

## 2021-09-14 ENCOUNTER — OFFICE VISIT (OUTPATIENT)
Dept: FAMILY MEDICINE CLINIC | Facility: HOSPITAL | Age: 66
End: 2021-09-14
Payer: MEDICARE

## 2021-09-14 VITALS
DIASTOLIC BLOOD PRESSURE: 84 MMHG | BODY MASS INDEX: 34.8 KG/M2 | TEMPERATURE: 98.7 F | SYSTOLIC BLOOD PRESSURE: 126 MMHG | HEIGHT: 73 IN | HEART RATE: 102 BPM | OXYGEN SATURATION: 98 % | WEIGHT: 262.6 LBS

## 2021-09-14 DIAGNOSIS — M79.89 LEG SWELLING: Primary | ICD-10-CM

## 2021-09-14 DIAGNOSIS — R26.9 UNSPECIFIED ABNORMALITIES OF GAIT AND MOBILITY: ICD-10-CM

## 2021-09-14 DIAGNOSIS — G95.9 MYELOPATHY (HCC): ICD-10-CM

## 2021-09-14 DIAGNOSIS — M79.89 LEG SWELLING: ICD-10-CM

## 2021-09-14 DIAGNOSIS — E78.00 HYPERCHOLESTEROLEMIA: ICD-10-CM

## 2021-09-14 DIAGNOSIS — Z00.00 ENCOUNTER FOR SUBSEQUENT ANNUAL WELLNESS VISIT IN MEDICARE PATIENT: ICD-10-CM

## 2021-09-14 DIAGNOSIS — G47.33 OBSTRUCTIVE SLEEP APNEA SYNDROME: ICD-10-CM

## 2021-09-14 DIAGNOSIS — Z09 POSTOPERATIVE EXAMINATION: Primary | ICD-10-CM

## 2021-09-14 DIAGNOSIS — H61.23 BILATERAL IMPACTED CERUMEN: ICD-10-CM

## 2021-09-14 PROCEDURE — 97110 THERAPEUTIC EXERCISES: CPT | Performed by: PHYSICAL THERAPIST

## 2021-09-14 PROCEDURE — 99215 OFFICE O/P EST HI 40 MIN: CPT | Performed by: NURSE PRACTITIONER

## 2021-09-14 PROCEDURE — 97112 NEUROMUSCULAR REEDUCATION: CPT | Performed by: PHYSICAL THERAPIST

## 2021-09-14 PROCEDURE — 69210 REMOVE IMPACTED EAR WAX UNI: CPT | Performed by: NURSE PRACTITIONER

## 2021-09-14 PROCEDURE — G0439 PPPS, SUBSEQ VISIT: HCPCS | Performed by: NURSE PRACTITIONER

## 2021-09-14 RX ORDER — HYDROCHLOROTHIAZIDE 25 MG/1
25 TABLET ORAL DAILY
Qty: 30 TABLET | Refills: 2 | Status: SHIPPED | OUTPATIENT
Start: 2021-09-14 | End: 2021-12-14 | Stop reason: SDUPTHER

## 2021-09-14 NOTE — PROGRESS NOTES
Assessment/Plan:    Sleep apnea  Advise he make arrangements to obtain c-pap machine as previously recommended by cardiology    Myelopathy ([address] )  Gradually improving post-op c-spine fusion and following closely w/PT    BMI 34 0-34 9,adult  Weight loss encouraged w/diet and exercise efforts    Hypercholesterolemia  Normal FLP managing w/lifestyle  Continue to maximize efforts and plan to recheck next in 1 year       Diagnoses and all orders for this visit:    Leg swelling  -     Compression Stocking  -     hydrochlorothiazide (HYDRODIURIL) 25 mg tablet; Take 1 tablet (25 mg total) by mouth daily    Leg swelling  Comments:  use HCTZ as rx'd (dose increased from 12 5 to 25mg), advise adequate hydration w/water and limit salt intake; return in 3 months  Orders:  -     Compression Stocking  -     hydrochlorothiazide (HYDRODIURIL) 25 mg tablet; Take 1 tablet (25 mg total) by mouth daily    BMI 34 0-34 9,adult    Obstructive sleep apnea syndrome    Myelopathy (HCC)    Hypercholesterolemia    Bilateral impacted cerumen  -     Ear cerumen removal    Encounter for subsequent annual wellness visit in [employer] patient      AWV updated today      Subjective:      Patient ID: Rufus Onofre is a 74 y o  male  Pt has been recovering well from neck surgery in June  Had PT appt this AM  Pt has been working on rebuilding strength in legs  Still has difficulty walking in town as he used to  States pain has improved in lower back  Pt feels like both his ears have wax build up that is causing him to be harder of hearing  No relief with OTC drops  States he was diagnosed w/sleep apnea by cardiology but never obtained c-pap machine  Pt has been going to flea markets most weekends with wife        The following portions of the patient's history were reviewed and updated as appropriate: allergies, current medications, past family history, past medical history, past social history, past surgical history and problem list     Review of Systems   Constitutional: Negative  HENT: Positive for ear discharge (feels like ear wax is builging up, using OTC drops with no relief)  Eyes: Negative  Respiratory: Negative  Negative for cough and shortness of breath  Cardiovascular: Positive for leg swelling (interferes w/comfort of walking some days, hydrates with water and eats minimal salt, no benefit w/HCTZ as rx'd)  Negative for chest pain and palpitations  Gastrointestinal: Negative  Endocrine: Negative  Genitourinary: Negative  Musculoskeletal: Positive for back pain (lower back pain )  Skin: Negative  Allergic/Immunologic: Negative  Neurological: Negative  Negative for dizziness and syncope  Hematological: Negative  Psychiatric/Behavioral: Positive for sleep disturbance (sleep apnea confirmed w/sleep study, still has to arrange for c-pap)  Objective:      /84 (Patient Position: Sitting, Cuff Size: Standard)   Pulse 102   Temp 98 7 °F (37 1 °C) (Tympanic)   Ht 6' 1" (1 854 m)   Wt 119 kg (262 lb 9 6 oz)   SpO2 98%   BMI 34 65 kg/m²        Physical Exam  Vitals reviewed  Constitutional:       General: He is not in acute distress  Appearance: Normal appearance  He is obese  He is not ill-appearing  HENT:      Head: Normocephalic and atraumatic  Right Ear: Tympanic membrane, ear canal and external ear normal  There is impacted cerumen  Left Ear: Tympanic membrane, ear canal and external ear normal  There is impacted cerumen  Nose: No congestion  Eyes:      General: No scleral icterus  Conjunctiva/sclera: Conjunctivae normal    Neck:      Vascular: No carotid bruit  Cardiovascular:      Rate and Rhythm: Normal rate and regular rhythm  Pulses: Normal pulses  Heart sounds: Normal heart sounds  Pulmonary:      Effort: Pulmonary effort is normal  No respiratory distress  Breath sounds: Normal breath sounds  Chest:      Chest wall: No tenderness  Abdominal:      General: Abdomen is flat  Bowel sounds are normal       Palpations: Abdomen is soft  Musculoskeletal:         General: Normal range of motion  Cervical back: Neck supple  No tenderness  Right lower leg: Edema (+1 pitting edema) present  Left lower leg: Edema (+1 pitting edema) present  Skin:     General: Skin is warm and dry  Capillary Refill: Capillary refill takes less than 2 seconds  Neurological:      General: No focal deficit present  Mental Status: He is alert and oriented to person, place, and time  Mental status is at baseline  Psychiatric:         Mood and Affect: Mood normal          Behavior: Behavior normal          Thought Content: Thought content normal          Judgment: Judgment normal            Ear cerumen removal    Date/Time: 9/14/2021 1:22 PM  Performed by: FADUMO Rogers  Authorized by: FADUMO Rogers   Universal Protocol:  Procedure performed by:  Consent: Verbal consent not obtained  Consent given by: patient  Timeout called at: 9/14/2021 1:23 PM   Patient understanding: patient states understanding of the procedure being performed      Patient location:  Clinic  Procedure details:     Local anesthetic:  None    Location:  L ear and R ear    Procedure type: irrigation with instrumentation      Instrumentation: curette      Approach:  External  Post-procedure details:     Complication:  None    Hearing quality:  Improved    Patient tolerance of procedure: Tolerated well, no immediate complications    BMI Counseling: Body mass index is 34 65 kg/m²  The BMI is above normal  Nutrition recommendations include 3-5 servings of fruits/vegetables daily, decreasing soda and/or juice intake, moderation in carbohydrate intake and reducing intake of cholesterol  Exercise recommendations include exercising 3-5 times per week

## 2021-09-14 NOTE — PROGRESS NOTES
Daily Note     Today's date: 2021  Patient name: Missy Madera  : 1955  MRN: [id number]  Referring provider: FADUMO Babin  Dx:   Encounter Diagnosis     ICD-10-CM    1  Postoperative examination  Z09    2  Unspecified abnormalities of gait and mobility  R26 9                   Subjective: Reports feeling continued progress overall with performance of walking  Objective: See treatment diary below      Assessment: Demonstrates continued limited ability to perform HR in standing- able to perform with limited clearance supine on reformer  Continued challenge with performance of biodex AP and ML  Improved ability to perform AP static stepping and ML static stepping  Plan: Continue per plan of care  Precautions: C spine fusion C3-C5, fall risk  HEP: ride stationary bike at pt home, increase walking      Manuals    Re-assessment  PF 25'                                                  Neuro Re-Ed             Tandem stance 2x30" 2x30" 2x30" 2x30" 3x30 3x30" 3x30" D/c to HEP     Standing on foam  2x30" 2x30" 2x30" 2x30" 2x30" 2x30 2x20" EO/EC 2x30" ea 2x20" 2x20   NBOS w/ pertubations NV        x20 x20   NBOS EC 2x20"  2x20" 2x20" 2x20" 2x20" 2x20"  30x2 30x2   Tandem amb       3 x 30ft        Walking on foam beams        x3 laps(no UE) NV 3 laps   Rockerboard taps 20 AP  20x 20x 20x  20x x20 f/b s/s 20x NV   Rockerboard balance (static) NV    AP 2x20" AP 2x20" AP 2x20 2x30" ea no UE s/s) 2x30" NV   Rockerboard (pertubations) NV       x30 f/b s/s x30 NV   NSP marching BP cuff feedback     30x        NSP heel slide with BP cuff feedback     30x        Biodex      15 mins 10 mins                   Ther Ex             6MWT             HEP                                                    RDL    35 lbs 2x10 35 lbs 2x10  35 lbs 2x10      Side stepping    3 laps 3 laps  3 laps      STD hip 3 way    OTB 10x ea OTB 10x ea        Ther Activity NuStep             Step-ups (for/lat) x20 fwd and lat  20x fwd and lat 20x fwd lat  20x fwd NV x10 ea (box+1 riser) x20 ea x30 ea   Picking up objects from floor  PF x3 PF x 3 NV         Amb w/ HT/ ft        3 laps 3 laps   Backwards walking  TM decline 2 min  35 lbs walkback 2x10 35 lbs TKE 2x10   55 lbs TKE 2x10   TM 2 min   Obstacle course             STS repeats 2x10 10x 20x 20x   10x 2x10 from chair 0UE 2x10 2x10   Hurdles    10x    For/lat 3 laps ea NV NV   Leg press HR      45 lbs 2x30 105 lbs 2x20      Gait Training             TM  5 mins incline 2 mph 10 mins today 10 mins 10 mins  2 mph 8 mins 7 mins 2 1 mph 1 5mph x6' 1 5 mph x6' 2 0 mph x 6 min   Amb outside (over stones/grass)             Modalities             NMES ankles     10 min        EDUCATION

## 2021-09-14 NOTE — PATIENT INSTRUCTIONS
Obesity   AMBULATORY CARE:   Obesity  is when your body mass index (BMI) is greater than 30  Your healthcare provider will use your height and weight to measure your BMI  The risks of obesity include  many health problems, such as injuries or physical disability  You may need tests to check for the following:  · Diabetes    · High blood pressure or high cholesterol    · Heart disease    · Gallbladder or liver disease    · Cancer of the colon, breast, prostate, liver, or kidney    · Sleep apnea    · Arthritis or gout    Seek care immediately if:   · You have a severe headache, confusion, or difficulty speaking  · You have weakness on one side of your body  · You have chest pain, sweating, or shortness of breath  Contact your healthcare provider if:   · You have symptoms of gallbladder or liver disease, such as pain in your upper abdomen  · You have knee or hip pain and discomfort while walking  · You have symptoms of diabetes, such as intense hunger and thirst, and frequent urination  · You have symptoms of sleep apnea, such as snoring or daytime sleepiness  · You have questions or concerns about your condition or care  Treatment for obesity  focuses on helping you lose weight to improve your health  Even a small decrease in BMI can reduce the risk for many health problems  Your healthcare provider will help you set a weight-loss goal   · Lifestyle changes  are the first step in treating obesity  These include making healthy food choices and getting regular physical activity  Your healthcare provider may suggest a weight-loss program that involves coaching, education, and therapy  · Medicine  may help you lose weight when it is used with a healthy diet and physical activity  · Surgery  can help you lose weight if you are very obese and have other health problems  There are several types of weight-loss surgery  Ask your healthcare provider for more information      Be successful losing weight:   · Set small, realistic goals  An example of a small goal is to walk for 20 minutes 5 days a week  Anther goal is to lose 5% of your body weight  · Tell friends, family members, and coworkers about your goals  and ask for their support  Ask a friend to lose weight with you, or join a weight-loss support group  · Identify foods or triggers that may cause you to overeat , and find ways to avoid them  Remove tempting high-calorie foods from your home and workplace  Place a bowl of fresh fruit on your kitchen counter  If stress causes you to eat, then find other ways to cope with stress  · Keep a diary to track what you eat and drink  Also write down how many minutes of physical activity you do each day  Weigh yourself once a week and record it in your diary  Eating changes: You will need to eat 500 to 1,000 fewer calories each day than you currently eat to lose 1 to 2 pounds a week  The following changes will help you cut calories:  · Eat smaller portions  Use small plates, no larger than 9 inches in diameter  Fill your plate half full of fruits and vegetables  Measure your food using measuring cups until you know what a serving size looks like  · Eat 3 meals and 1 or 2 snacks each day  Plan your meals in advance  [place] and eat at home most of the time  Eat slowly  Do not skip meals  Skipping meals can lead to overeating later in the day  This can make it harder for you to lose weight  Talk with a dietitian to help you make a meal plan and schedule that is right for you  · Eat fruits and vegetables at every meal   They are low in calories and high in fiber, which makes you feel full  Do not add butter, margarine, or cream sauce to vegetables  Use herbs to season steamed vegetables  · Eat less fat and fewer fried foods  Eat more baked or grilled chicken and fish  These protein sources are lower in calories and fat than red meat  Limit fast food   Dress your salads with olive oil and vinegar instead of bottled dressing  · Limit the amount of sugar you eat  Do not drink sugary beverages  Limit alcohol  Activity changes:  Physical activity is good for your body in many ways  It helps you burn calories and build strong muscles  It decreases stress and depression, and improves your mood  It can also help you sleep better  Talk to your healthcare provider before you begin an exercise program   · Exercise for at least 30 minutes 5 days a week  Start slowly  Set aside time each day for physical activity that you enjoy and that is convenient for you  It is best to do both weight training and an activity that increases your heart rate, such as walking, bicycling, or swimming  · Find ways to be more active  Do yard work and housecleaning  Walk up the stairs instead of using elevators  Spend your leisure time going to events that require walking, such as outdoor festivals or fairs  This extra physical activity can help you lose weight and keep it off  Follow up with your healthcare provider as directed: You may need to meet with a dietitian  Write down your questions so you remember to ask them during your visits  © Copyright [employer] 2021 Information is for End User's use only and may not be sold, redistributed or otherwise used for commercial purposes  All illustrations and images included in CareNotes® are the copyrighted property of [employer]  or Hiwot Ortiz  The above information is an  only  It is not intended as medical advice for individual conditions or treatments  Talk to your doctor, nurse or pharmacist before following any medical regimen to see if it is safe and effective for you

## 2021-09-14 NOTE — PROGRESS NOTES
Assessment and Plan:     Problem List Items Addressed This Visit     None           Preventive health issues were discussed with patient, and age appropriate screening tests were ordered as noted in patient's After Visit Summary  Personalized health advice and appropriate referrals for health education or preventive services given if needed, as noted in patient's After Visit Summary       History of Present Illness:     Patient presents for Medicare Annual Wellness visit    Patient Care Team:  FADUMO Rogers as PCP - General (Family Medicine)  MD Cipriano Morris MD     Problem List:     Patient Active Problem List   Diagnosis    Acquired thrombocytopenia ([address] )    DDD (degenerative disc disease), lumbar    Acquired pancytopenia ([address] )    Hypercholesterolemia    Major depression, chronic    Other cirrhosis of liver ([address] )    Enlarged prostate    Esophageal varices determined by endoscopy ([address] )    Colon cancer screening    Personal history of colonic polyps    SOB (shortness of breath) on exertion    Bilateral lower extremity edema    Spinal stenosis of lumbar region with neurogenic claudication    Lumbar radiculopathy    Chronic pain syndrome    Unspecified abnormalities of gait and mobility    Myelopathy ([address] )    Cervical spinal stenosis      Past Medical and Surgical History:     Past Medical History:   Diagnosis Date    Abscess of back 03/01/2018    Benign essential hypertension     Last Assessed:12/19/16; Pt reports heart and BP has been "fine" as of 4/16/2020    Cirrhosis ([address] )     Colon polyp     Cyst of skin     x6 from neck down back area    Depression     Esophageal varices ([address] )     Macrocytosis     Last Assessed:1/16/17    Major depression, chronic     Last Assessed:12/21/17    Major depression, chronic 6/19/2017    Personal history of colonic polyps     Sleep apnea      Past Surgical History:   Procedure Laterality Date    CHOLECYSTECTOMY  11/2018    CHOLECYSTECTOMY LAPAROSCOPIC N/A 11/21/2018    Procedure: CHOLECYSTECTOMY LAPAROSCOPIC, wedge liver biopsy;  Surgeon: Ana Zheng MD;  Location: [facility];  Service: General    COLONOSCOPY  05/2011    COLONOSCOPY  05/2016    COLONOSCOPY      EGD  01/2019    Esophagitis, esophageal varices    GALLBLADDER SURGERY      INCISION AND DRAINAGE OF WOUND N/A 03/01/2018    SEBACEOUS CYST ABSCESS OF BACK-VIJAYA MONZON MD    HI ARTHRODESIS POSTERIOR/POSTERIORLATERAL CERVICAL BELOW C2 N/A 5/24/2021    Procedure: Posterior cervical decompressive laminectomy and lateral mass fixation fusion C3-5;  Surgeon: Kayy Cerda MD;  [facility];  Service: Neurosurgery    HI EXC SKIN BENIG 1 1-2 CM TRUNK,ARM,LEG N/A 8/22/2018    Procedure: EXCISION  BIOPSY LESION/MASS BACK X4 NECK X1;  Surgeon: Ana Zheng MD;  Location: [facility];  Service: General      Family History:     Family History   Problem Relation Age of Onset    Alzheimer's disease Mother     Valvular heart disease Father     Stroke Brother         Mini    Valvular heart disease Brother     Colon cancer Neg Hx     Colon polyps Neg Hx       Social History:     Social History     Socioeconomic History    Marital status: /Civil Union     Spouse name: None    Number of children: None    Years of education: None    Highest education level: None   Occupational History    None   Tobacco Use    Smoking status: Never Smoker    Smokeless tobacco: Never Used   Vaping Use    Vaping Use: Never used   Substance and Sexual Activity    Alcohol use: Not Currently     Comment: 7/19/19-last drink 12/2018- Occasionally/1-2 drinks per weekend    Drug use: No    Sexual activity: None   Other Topics Concern    None   Social History Narrative    Always uses seatbelt     Social Determinants of Health     Financial Resource Strain:     Difficulty of Paying Living Expenses:    Food Insecurity:     Worried About Running Out of Food in the Last Year:     Ran Out of Food in the Last Year:    Transportation Needs:     Lack of Transportation (Medical):      Lack of Transportation (Non-Medical):    Physical Activity:     Days of Exercise per Week:     Minutes of Exercise per Session:    Stress:     Feeling of Stress :    Social Connections:     Frequency of Communication with Friends and Family:     Frequency of Social Gatherings with Friends and Family:     Attends Synagogue Services:     Active Member of Clubs or Organizations:     Attends Club or Organization Meetings:     Marital Status:    Intimate Partner Violence:     Fear of Current or Ex-Partner:     Emotionally Abused:     Physically Abused:     Sexually Abused:       Medications and Allergies:     Current Outpatient Medications   Medication Sig Dispense Refill    b complex vitamins tablet Take 1 tablet by mouth daily      buPROPion (WELLBUTRIN XL) 150 mg 24 hr tablet Take 1 tablet (150 mg total) by mouth daily 90 tablet 2    multivitamin (THERAGRAN) TABS Take 1 tablet by mouth daily      nadolol (CORGARD) 20 mg tablet Take 1 tablet (20 mg total) by mouth daily 90 tablet 3    acetaminophen (TYLENOL) 325 mg tablet Take 3 tablets (975 mg total) by mouth every 8 (eight) hours (Patient not taking: Reported on 7/6/2021)  0    Calcium 600-200 MG-UNIT per tablet Take 1 tablet by mouth daily (Patient not taking: Reported on 7/6/2021)      Cholecalciferol ([employer] VITAMIN D3) 1000 units capsule Take 1,000 Units by mouth daily  (Patient not taking: Reported on 7/6/2021)      docusate sodium (COLACE) 100 mg capsule Take 1 capsule (100 mg total) by mouth 2 (two) times a day (Patient not taking: Reported on 6/7/2021) 10 capsule 0    Flaxseed, Linseed, (FLAX SEED OIL PO) Take by mouth daily  (Patient not taking: Reported on 7/6/2021)      hydrochlorothiazide (HYDRODIURIL) 12 5 mg tablet Take 1 tablet (12 5 mg total) by mouth daily As needed for leg swelling 30 tablet 1    methocarbamol (ROBAXIN) 500 mg tablet Take 1 tablet (500 mg total) by mouth every 6 (six) hours (Patient not taking: Reported on 6/7/2021) 28 tablet 0    senna (SENOKOT) 8 6 mg Take 1 tablet (8 6 mg total) by mouth daily (Patient not taking: Reported on 6/7/2021)  0     No current facility-administered medications for this visit  No Known Allergies   Immunizations:     Immunization History   Administered Date(s) Administered    Hep A, adult 04/03/2019, 10/03/2019    Hep B, adult 04/03/2019, 05/02/2019, 10/03/2019    INFLUENZA 10/20/2014, 10/07/2015, 01/11/2018, 11/22/2018, 10/23/2019, 09/04/2020, 09/04/2020    Influenza Quadrivalent, 6-35 Months IM 01/11/2018    Influenza, high dose seasonal 0 7 mL 11/22/2018    Influenza, seasonal, injectable 10/20/2014, 10/07/2015    Pneumococcal Conjugate 13-Valent 11/22/2018, 07/24/2020, 07/24/2020    Pneumococcal Polysaccharide PPV23 08/09/2021    SARS-CoV-2 / COVID-19 mRNA IM (Pfizer-BioNTech) 03/17/2021, 04/08/2021    Td (adult), adsorbed 12/02/2011      Health Maintenance:         Topic Date Due    Colorectal Cancer Screening  05/26/2021    Hepatitis C Screening  Completed         Topic Date Due    DTaP,Tdap,and Td Vaccines (1 - Tdap) 07/28/1974    Influenza Vaccine (1) 09/01/2021      Medicare Health Risk Assessment:     /84 (Patient Position: Sitting, Cuff Size: Standard)   Pulse 102   Temp 98 7 °F (37 1 °C) (Tympanic)   Ht 6' 1" (1 854 m)   Wt 119 kg (262 lb 9 6 oz)   SpO2 98%   BMI 34 65 kg/m²      Marixa Roldan is here for his Subsequent Wellness visit  Last Medicare Wellness visit information reviewed, patient interviewed and updates made to the record today  Health Risk Assessment:   Patient rates overall health as good  Patient feels that their physical health rating is slightly worse  Patient is satisfied with their life  Eyesight was rated as slightly worse  Hearing was rated as slightly worse   Patient feels that their emotional and mental health rating is slightly better  Patients states they are never, rarely angry  Patient states they are sometimes unusually tired/fatigued  Pain experienced in the last 7 days has been none  Patient states that he has experienced no weight loss or gain in last 6 months  Depression Screening:   PHQ-2 Score: 0  PHQ-9 Score: 2      Fall Risk Screening: In the past year, patient has experienced: no history of falling in past year      Home Safety:  Patient does not have trouble with stairs inside or outside of their home  Patient has working smoke alarms and has working carbon monoxide detector  Home safety hazards include: none  Nutrition:   Current diet is Regular and Limited junk food  Medications:   Patient is currently taking over-the-counter supplements  OTC medications include: see medication list  Patient is able to manage medications  Activities of Daily Living (ADLs)/Instrumental Activities of Daily Living (IADLs):   Walk and transfer into and out of bed and chair?: Yes  Dress and groom yourself?: Yes    Bathe or shower yourself?: Yes    Feed yourself? Yes  Do your laundry/housekeeping?: Yes  Manage your money, pay your bills and track your expenses?: Yes  Make your own meals?: Yes    Do your own shopping?: Yes    Previous Hospitalizations:   Any hospitalizations or ED visits within the last 12 months?: No      Advance Care Planning:   Living will: Yes    Durable POA for healthcare:  Yes    Advanced directive: Yes    Five wishes given: Yes      PREVENTIVE SCREENINGS      Cardiovascular Screening:    General: Screening Not Indicated and History Lipid Disorder      Diabetes Screening:     General: Screening Current      Colorectal Cancer Screening:     General: Screening Current      Prostate Cancer Screening:    General: Screening Current      Osteoporosis Screening:      Due for: DXA Axial      Abdominal Aortic Aneurysm (AAA) Screening:    Risk factors include: age between 73-69 yo        Lung Cancer Screening:     General: Screening Not Indicated      Hepatitis C Screening:    General: Screening Current    Screening, Brief Intervention, and Referral to Treatment (SBIRT)    Screening    Typical number of drinks in a week: 0    Single Item Drug Screening:  How often have you used an illegal drug (including marijuana) or a prescription medication for non-medical reasons in the past year? never    Single Item Drug Screen Score: 0  Interpretation: Negative screen for possible drug use disorder    Other Counseling Topics:   Regular weightbearing exercise         FADUMO Pereyra

## 2021-09-16 ENCOUNTER — OFFICE VISIT (OUTPATIENT)
Dept: PHYSICAL THERAPY | Facility: CLINIC | Age: 66
End: 2021-09-16
Payer: MEDICARE

## 2021-09-16 DIAGNOSIS — R26.9 UNSPECIFIED ABNORMALITIES OF GAIT AND MOBILITY: ICD-10-CM

## 2021-09-16 DIAGNOSIS — Z09 POSTOPERATIVE EXAMINATION: Primary | ICD-10-CM

## 2021-09-16 PROCEDURE — 97112 NEUROMUSCULAR REEDUCATION: CPT | Performed by: PHYSICAL THERAPIST

## 2021-09-16 PROCEDURE — 97110 THERAPEUTIC EXERCISES: CPT | Performed by: PHYSICAL THERAPIST

## 2021-09-16 NOTE — PROGRESS NOTES
Daily Note     Today's date: 2021  Patient name: Carl Mackey  : 1955  MRN: [id number]  Referring provider: FADUMO Kiran  Dx:   Encounter Diagnosis     ICD-10-CM    1  Postoperative examination  Z09    2  Unspecified abnormalities of gait and mobility  R26 9                   Subjective: Notes feeling a little "foggy" today  Denies any allergies  Objective: See treatment diary below      Assessment: Improved ability to perform static stepping AP and ML  No LOB with performance of toe taps anterior as well as no LOB with performance of lateral step down  Still with limited ability to perform force production for HR  Plan: Continue per plan of care  Precautions: C spine fusion C3-C5, fall risk  HEP: ride stationary bike at pt home, increase walking      Manuals    Re-assessment  PF 25'                                                  Neuro Re-Ed             Tandem stance 2x30" 2x30" 2x30" 2x30" 3x30 3x30" 3x30" 3x30"     Standing on foam  2x30" 2x30" 2x30" 2x30" 2x30" 2x30 2x20" 2x20" 2x20" 2x20   NBOS w/ pertubations NV        x20 x20   NBOS EC 2x20"  2x20" 2x20" 2x20" 2x20" 2x20" 2x20" 30x2 30x2   Tandem amb       3 x 30ft        Walking on foam beams         NV 3 laps   Rockerboard taps 20 AP  20x 20x 20x  20x 20x 20x NV   Rockerboard balance (static) NV    AP 2x20" AP 2x20" AP 2x20 AP 2x20 2x30" NV   Rockerboard (pertubations) NV        x30 NV   NSP marching BP cuff feedback     30x        NSP heel slide with BP cuff feedback     30x        Biodex      15 mins 10 mins 10 mins                  Ther Ex             6MWT             HEP                                                    RDL    35 lbs 2x10 35 lbs 2x10  35 lbs 2x10 2x10     Side stepping    3 laps 3 laps  3 laps 3 laps     STD hip 3 way    OTB 10x ea OTB 10x ea   NV     Ther Activity             NuStep             Step-ups (for/lat) x20 fwd and lat  20x fwd and lat 20x fwd lat  20x fwd NV 20x x20 ea x30 ea   Picking up objects from floor  PF x3 PF x 3 NV         Amb w/ HT/ ft        3 laps 3 laps   Backwards walking  TM decline 2 min  35 lbs walkback 2x10 35 lbs TKE 2x10   55 lbs TKE 2x10 55 lbs tke 2x10  TM 2 min   Obstacle course             STS repeats 2x10 10x 20x 20x   10x 10x 2x10 2x10   Hurdles    10x     NV NV   Leg press HR      45 lbs 2x30 105 lbs 2x20 105 lbs 2x20     Gait Training             TM  5 mins incline 2 mph 10 mins today 10 mins 10 mins  2 mph 8 mins 7 mins 2 1 mph 6 mins  1 5 mph x6' 2 0 mph x 6 min   Amb outside (over stones/grass)             Modalities             NMES ankles     10 min        EDUCATION

## 2021-09-21 ENCOUNTER — OFFICE VISIT (OUTPATIENT)
Dept: PHYSICAL THERAPY | Facility: CLINIC | Age: 66
End: 2021-09-21
Payer: MEDICARE

## 2021-09-21 DIAGNOSIS — R26.9 UNSPECIFIED ABNORMALITIES OF GAIT AND MOBILITY: ICD-10-CM

## 2021-09-21 DIAGNOSIS — Z09 POSTOPERATIVE EXAMINATION: Primary | ICD-10-CM

## 2021-09-21 PROCEDURE — 97110 THERAPEUTIC EXERCISES: CPT | Performed by: PHYSICAL THERAPIST

## 2021-09-21 PROCEDURE — 97112 NEUROMUSCULAR REEDUCATION: CPT | Performed by: PHYSICAL THERAPIST

## 2021-09-21 NOTE — PROGRESS NOTES
Daily Note     Today's date: 2021  Patient name: Lizz Haley  : 1955  MRN: [id number]  Referring provider: FADUMO Ratliff  Dx:   Encounter Diagnosis     ICD-10-CM    1  Postoperative examination  Z09    2  Unspecified abnormalities of gait and mobility  R26 9                   Subjective: Reports feeling better overall, continues to have ankle weakness  Objective: See treatment diary below      Assessment: Demonstrates improved ability to perform PF against gravity and resistance with SL leg press up to 45 lbs as well as decline HR  Still with limited ability to perform SLS or tandem balance with ML LOB but improved ability to avoid Ant/post LOB with increased ankle strategies  Making progress with overall function and strength  Plan: Continue per plan of care  Precautions: C spine fusion C3-C5, fall risk  HEP: ride stationary bike at pt home, increase walking      Manuals    Re-assessment  PF 25'                                                  Neuro Re-Ed             Tandem stance 2x30" 2x30" 2x30" 2x30" 3x30 3x30" 3x30" 3x30" 3x30"    Standing on foam  2x30" 2x30" 2x30" 2x30" 2x30" 2x30 2x20" 2x20" 2x20" 2x20   NBOS w/ pertubations NV         x20   NBOS EC 2x20"  2x20" 2x20" 2x20" 2x20" 2x20" 2x20"  30x2   Tandem amb       3 x 30ft        Walking on foam beams          3 laps   Rockerboard taps 20 AP  20x 20x 20x  20x 20x 20x NV   Rockerboard balance (static) NV    AP 2x20" AP 2x20" AP 2x20 AP 2x20  NV   Rockerboard (pertubations) NV         NV   NSP marching BP cuff feedback     30x        NSP heel slide with BP cuff feedback     30x        Biodex      15 mins 10 mins 10 mins 15 min                 Ther Ex             6MWT             HEP                                                    RDL    35 lbs 2x10 35 lbs 2x10  35 lbs 2x10 2x10     Side stepping    3 laps 3 laps  3 laps 3 laps 3 laps    STD hip 3 way    OTB 10x ea OTB 10x

## 2021-09-28 ENCOUNTER — HOSPITAL ENCOUNTER (OUTPATIENT)
Dept: RADIOLOGY | Facility: HOSPITAL | Age: 66
Discharge: HOME | End: 2021-09-28
Attending: NEUROLOGICAL SURGERY
Payer: MEDICARE

## 2021-09-28 ENCOUNTER — OFFICE VISIT (OUTPATIENT)
Dept: PHYSICAL THERAPY | Facility: CLINIC | Age: 66
End: 2021-09-28
Payer: MEDICARE

## 2021-09-28 DIAGNOSIS — Z09 POSTOPERATIVE EXAMINATION: ICD-10-CM

## 2021-09-28 DIAGNOSIS — R26.9 UNSPECIFIED ABNORMALITIES OF GAIT AND MOBILITY: ICD-10-CM

## 2021-09-28 DIAGNOSIS — Z09 POSTOPERATIVE EXAMINATION: Primary | ICD-10-CM

## 2021-09-28 PROCEDURE — 72050 X-RAY EXAM NECK SPINE 4/5VWS: CPT

## 2021-09-28 PROCEDURE — 97112 NEUROMUSCULAR REEDUCATION: CPT | Performed by: PHYSICAL THERAPIST

## 2021-09-28 PROCEDURE — 97110 THERAPEUTIC EXERCISES: CPT | Performed by: PHYSICAL THERAPIST

## 2021-09-28 NOTE — PROGRESS NOTES
Daily Note     Today's date: 2021  Patient name: Tanisha Aiken  : 1955  MRN: [id number]  Referring provider: FADUMO Moscoso  Dx:   Encounter Diagnosis     ICD-10-CM    1  Postoperative examination  Z09    2  Unspecified abnormalities of gait and mobility  R26 9                   Subjective: Notes being more steady over the weekend  Was able to set up a [employer] stand  Objective: See treatment diary below      Assessment: Improved ability to perform B/L HR without knee flexion  Improved ML stability with tandem balance  Continues to have limited PF with ankle strategy movements  Will re-assess next session  Plan: Continue per plan of care  Precautions: C spine fusion C3-C5, fall risk  HEP: ride stationary bike at pt home, increase walking      Manuals    Re-assessment  PF 25'                                                  Neuro Re-Ed             Tandem stance 2x30" 2x30" 2x30" 2x30" 3x30 3x30" 3x30" 3x30" 3x30" 3x30"   Standing on foam  2x30" 2x30" 2x30" 2x30" 2x30" 2x30 2x20" 2x20" 2x20" 2x20"   NBOS w/ pertubations NV            NBOS EC 2x20"  2x20" 2x20" 2x20" 2x20" 2x20" 2x20"  2x20"   Tandem amb       3 x 30ft        Walking on foam beams             Rockerboard taps 20 AP  20x 20x 20x  20x 20x 20x 20x   Rockerboard balance (static) NV    AP 2x20" AP 2x20" AP 2x20 AP 2x20     Rockerboard (pertubations) NV            NSP marching BP cuff feedback     30x        NSP heel slide with BP cuff feedback     30x        Biodex      15 mins 10 mins 10 mins 15 min 15 min                Ther Ex             6MWT             HEP                                                    RDL    35 lbs 2x10 35 lbs 2x10  35 lbs 2x10 2x10  2x10   Side stepping    3 laps 3 laps  3 laps 3 laps 3 laps 3 laps   STD hip 3 way    OTB 10x ea OTB 10x ea   NV     Ther Activity             NuStep             Step-ups (for/lat) x20 fwd and lat  20x fwd and lat 20x fwd lat  20x fwd NV 20x  20x   Picking up objects from floor  PF x3 PF x 3 NV         Amb w/ HT/ ft            Backwards walking  TM decline 2 min  35 lbs walkback 2x10 35 lbs TKE 2x10   55 lbs TKE 2x10 55 lbs tke 2x10  55 lbs TKE 2x10   Obstacle course             STS repeats 2x10 10x 20x 20x   10x 10x     Hurdles    10x         Leg press HR      45 lbs 2x30 105 lbs 2x20 105 lbs 2x20 45 lbs SL HR 45 lbs SL HR   Gait Training             TM  5 mins incline 2 mph 10 mins today 10 mins 10 mins  2 mph 8 mins 7 mins 2 1 mph 6 mins  8 mins 8 min   Amb outside (over stones/grass)             Modalities             NMES ankles     10 min        EDUCATION

## 2021-09-30 ENCOUNTER — EVALUATION (OUTPATIENT)
Dept: PHYSICAL THERAPY | Facility: CLINIC | Age: 66
End: 2021-09-30
Payer: MEDICARE

## 2021-09-30 DIAGNOSIS — R26.9 UNSPECIFIED ABNORMALITIES OF GAIT AND MOBILITY: ICD-10-CM

## 2021-09-30 DIAGNOSIS — Z09 POSTOPERATIVE EXAMINATION: Primary | ICD-10-CM

## 2021-09-30 PROCEDURE — 97110 THERAPEUTIC EXERCISES: CPT | Performed by: PHYSICAL THERAPIST

## 2021-09-30 PROCEDURE — 97140 MANUAL THERAPY 1/> REGIONS: CPT | Performed by: PHYSICAL THERAPIST

## 2021-09-30 NOTE — PROGRESS NOTES
PT Re-Evaluation     Today's date: 2021  Patient name: Beryl Chaney  : 1955  MRN: [id number]  Referring provider: FADUMO Gutierrez  Dx:   Encounter Diagnosis     ICD-10-CM    1  Postoperative examination  Z09    2  Unspecified abnormalities of gait and mobility  R26 9                   Assessment  Assessment details: Pt is a 65y o  male who presents to OP PT for IE s/p decompressive cervical laminectomy and fixation fusion C3-5 for myelopathy 21  Pt has been cleared for initiation of PT and removal of C collar by surgeon  Pt currently c/o of balance and gait deficits  BLE strength testing reveals no overt weakness  Noted gait deviations include B knee flexion t/o stance phase, WBOS, decreased B arm swing, decreased gait speed, and decreased B stride length  Pt is able to ambulate w/ and w/o SPC; gait deviations present w/ and w/o use of AD  6MWT to be completed NV  Functionally pt is able to complete STS transfers w/o use of UEs  Pt does keep B knees flexed following transfers and with majority of mobility as pt feels unsteady and reports this helps with his balance  Pt with + balance deficits and is at an elevated risk for falls as indicated by BBS score 40/56 and FGA 20/30  Given these findings pt is recommended for skilled therapy intervention 2x week  Pt and wife agreeable to this plan  Re-assessment 21:  Bonnie Lomeli has attended 12 visits to date and reports a 30% GROC since the initiation of PT  Clinically demonstrates improved ambulation capacity and efficiency with performance of TUG less than 15 seconds without AD- continues to have a high falls risk however with a score of 40/56  He continues to have limited balance and control of hip/ankle strategy as well as limited walking endurance due to fatigue and potential foot drop despite improved overall ankle strength- still limited with DF and EV    These findings suggest overall that he is in continued need for skilled OPPT to address his remaining deficits, achieve established goals and minimize his risk for falls  Re-assessment 9/30/21:  Simeon Ott has attended 20 visits since the initiation of PT and reports a 65% GROC  Clinically demonstrates improved overall ankle and hip strength as well as improved balance illustrated by improved BBS score to 48/56 showing moderate falls risk and improved TUG and 5 time sit to stand showing major improvements in function  Simeon Ott continues to have limited ability to perform SLS and tandem balancing with increased knee strategy to compensate for hip and ankle deficits as a result of continued myotomal weakness  This suggests the need for continued skilled OPPT to address his remaining impairments and maximize functional mobility/balance  Impairments: abnormal gait, activity intolerance and impaired balance  Understanding of Dx/Px/POC: excellent  Goals  STGs (to be achieved within 2 weeks)  1  Pt will report ability to ride stationary bike at home for at least 15 minutes 3-5x week  - MET  2  Pt will complete 6MWT and goals will be set accordingly  - MET  Pt stated goal: walk w/o SPC- Partially MET    LTGs (to be achieved within 8-10 weeks)   1  Pt will be I with HEP at d/c to promote PT carry-over  - MET   2  Pt will meet FOTO predicted score  - Partially MET  3  Pt will improve BBS score to 45/56 or greater indicating that he is at a reduced risk for falls  -  MET- 48  4  Pt will improve FGA score to 24/30 or greater indicating that he is at a reduced risk for falls  - Partially MET  5 ) Able to perform SLS > 10 seconds showing good ankle and hip strategy- new  6 ) Improve SL HR >5 reps allowing for improved ankle strategy- new    Plan  Plan details: Continue POC for ankle stability, balance and walking endurance; minimize falls risk and return to PLOF     Patient would benefit from: skilled physical therapy  Planned modality interventions: unattended electrical stimulation, cryotherapy and thermotherapy: hydrocollator packs  Planned therapy interventions: abdominal trunk stabilization, manual therapy, balance, strengthening, stretching, therapeutic activities, patient education, neuromuscular re-education, therapeutic exercise, therapeutic training, transfer training, gait training, functional ROM exercises, graded activity, flexibility, graded exercise and home exercise program  Frequency: 2x week  Duration in visits: 16  Duration in weeks: 8  Plan of Care beginning date: 9/30/2021  Plan of Care expiration date: 11/25/2021  Treatment plan discussed with: patient (wife)        Subjective Evaluation    History of Present Illness  Date of surgery: 5/24/21  Mechanism of injury: surgery  Mechanism of injury: Pt presents s/p decompressive cervical laminectomy and fixation fusion C3-5 for myelopathy on 5/24/21  Physician cleared pt for removal of C-collar and initiation of skilled PT approx  2 weeks ago  Since surgery pt has no pain, he does endorse mild "numbess" in feet, which has been improving since surgery  Pt ambulates with SPC in community  Does not use AD at home, but wife endorses "furniture walking"  He does endorse continued feelings of unsteadiness and imbalance since surgery although this has improved since surgical intervention  Pt does note that he "tries to keep lower to the ground" with B knee flexion due to these feelings of unsteadiness  Pt has full flight of stairs in home, reports he has been completing w/o problem w/ use of handrail  Has stationary bike, would like to return to riding  Is able to walk for approx  10min continuously prior to needing rest break  Pt and wife like to go to flea markets, they have been able to since surgery, however outings are limited by distance pt can ambulate  Re-assessment 8/26/21:  Ruby Bowen has attended 12 visits and reports a 30% GROC since the initiation of PT    Reports improved walking without the cane around the house as well as at the [employer]  Plans on beginning walking around the block  Still having R sided LOB with stance  Reports improved ability to perform things when he "takes his time"  Notes still feeling fatigue with going up and down stairs at times  Has been biking up to 30 mins on a recumbant  Notes improved sensation in B/L feet and calves  Has F/U with MD in October  Notes slightly disturbed sleep  Denies trouble getting to the bathroom in the middle of the night  Wishes to continue PT in order to walk better  Re-assessment 9/30/21:  Bradley Gamez has attended 20 visits to date and reports a 65% GROC  Reports improved function overall since last assessment- able to get up and down steps without using a railing going into the house  Reports feeling improved balance- not as much side to side sway with ambulation  Less overall effusion in LEs as well  Still reporting numbness in feet, no longer getting numbness into his calves  No relying on [facility] for house negotiation- still use SPC if having walk long distances to [employer]  Still not able to go around the block  Still unable to cut the grass with his lawnmower  Wishes to continue PT in order to 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 up to 2-3 hours of walking  F/U with MD in 1 week       Pain  No pain reported    Patient Goals  Patient goal: "get walking without the cane"        Objective     Strength/Myotome Testing     Left Hip   Planes of Motion   Flexion: 5  Extension: 4+  Abduction: 4  External rotation: 5  Internal rotation: 5    Right Hip   Planes of Motion   Flexion: 5  Extension: 4+  Abduction: 4  External rotation: 4+  Internal rotation: 5    Left Knee   Flexion: 5  Extension: 5    Right Knee   Flexion: 5  Extension: 5    Left Ankle/Foot   Dorsiflexion: 4+  Plantar flexion: 5  Inversion: 4  Eversion: 4+    Right Ankle/Foot   Dorsiflexion: 4+  Plantar flexion: 5  Inversion: 4  Eversion: 4+    Additional Strength Details  155 lbs L HR; 45 lbs R with leg press    Functional Assessment      Squat    Left within functional limits and right within functional limits  Single Leg Stance   Left: 3 seconds  Right: 2 seconds  Neuro Exam:     Functional outcomes   6 minute walk test: 1074  5x sit to stand: 15 30 (seconds)  TUG: 10 73 (seconds)  Functional outcome comment: Hernandez Balance Scale: 40/56  FGA: 20/30  Functional outcome gait comment: Ambulates w/ and w/o AD, gait deviations noted with both including: B knee flexion t/o stance phase, WBOS, decreased B arm swing, decreased gait speed, decreased B stride length             Precautions: C spine fusion C3-C5, fall risk  HEP: ride stationary bike at pt home, increase walking      Manuals 9/30 8/26 8/31 9/2 9/7 9/9 9/14 9/16 9/21 9/28   Re-assessment PF' 25' PF 25'                                                  Neuro Re-Ed             Tandem stance 2x30" 2x30" 2x30" 2x30" 3x30 3x30" 3x30" 3x30" 3x30" 3x30"   Standing on foam  2x30" 2x30" 2x30" 2x30" 2x30" 2x30 2x20" 2x20" 2x20" 2x20"   NBOS w/ pertubations             NBOS EC   2x20" 2x20" 2x20" 2x20" 2x20" 2x20"  2x20"   Tandem amb       3 x 30ft        Walking on foam beams             Rockerboard taps   20x 20x 20x  20x 20x 20x 20x   Rockerboard balance (static)     AP 2x20" AP 2x20" AP 2x20 AP 2x20     Rockerboard (pertubations)             NSP marching BP cuff feedback     30x        NSP heel slide with BP cuff feedback     30x        Biodex      15 mins 10 mins 10 mins 15 min 15 min                Ther Ex             6MWT PF 6 min            HEP                                                    RDL    35 lbs 2x10 35 lbs 2x10  35 lbs 2x10 2x10  2x10   Side stepping    3 laps 3 laps  3 laps 3 laps 3 laps 3 laps   STD hip 3 way    OTB 10x ea OTB 10x ea   NV     Ther Activity             NuStep             Step-ups (for/lat)   20x fwd and lat 20x fwd lat  20x fwd NV 20x  20x   Picking up objects from floor  PF x3 PF x 3 NV         Amb w/ HT/HN Backwards walking    35 lbs walkback 2x10 35 lbs TKE 2x10   55 lbs TKE 2x10 55 lbs tke 2x10  55 lbs TKE 2x10   Obstacle course             STS repeats 10x 10x 20x 20x   10x 10x     Hurdles    10x         Leg press  lbs L HR; 45 lbs R     45 lbs 2x30 105 lbs 2x20 105 lbs 2x20 45 lbs SL HR 45 lbs SL HR   Gait Training             TM  10 min 10 mins today 10 mins 10 mins  2 mph 8 mins 7 mins 2 1 mph 6 mins  8 mins 8 min   Amb outside (over stones/grass)             Modalities             NMES ankles     10 min        EDUCATION

## 2021-10-05 ENCOUNTER — TELEPHONE (OUTPATIENT)
Dept: GASTROENTEROLOGY | Facility: CLINIC | Age: 66
End: 2021-10-05

## 2021-10-05 VITALS — BODY MASS INDEX: 34.46 KG/M2 | WEIGHT: 260 LBS | HEIGHT: 73 IN

## 2021-10-07 ENCOUNTER — OFFICE VISIT (OUTPATIENT)
Dept: NEUROSURGERY | Facility: CLINIC | Age: 66
End: 2021-10-07
Payer: MEDICARE

## 2021-10-07 VITALS
BODY MASS INDEX: 34.46 KG/M2 | SYSTOLIC BLOOD PRESSURE: 117 MMHG | DIASTOLIC BLOOD PRESSURE: 72 MMHG | TEMPERATURE: 98.7 F | WEIGHT: 260 LBS | HEART RATE: 78 BPM | HEIGHT: 73 IN | RESPIRATION RATE: 16 BRPM

## 2021-10-07 DIAGNOSIS — Z98.1 ENCOUNTER FOR POSTOPERATIVE CARE RELATED TO SURGICAL JOINT FUSION: ICD-10-CM

## 2021-10-07 DIAGNOSIS — M48.02 CERVICAL SPINAL STENOSIS: ICD-10-CM

## 2021-10-07 DIAGNOSIS — R26.9 UNSPECIFIED ABNORMALITIES OF GAIT AND MOBILITY: Primary | ICD-10-CM

## 2021-10-07 DIAGNOSIS — G95.9 MYELOPATHY (HCC): ICD-10-CM

## 2021-10-07 DIAGNOSIS — I85.00 ESOPHAGEAL VARICES DETERMINED BY ENDOSCOPY (HCC): ICD-10-CM

## 2021-10-07 DIAGNOSIS — Z48.89 ENCOUNTER FOR POSTOPERATIVE CARE RELATED TO SURGICAL JOINT FUSION: ICD-10-CM

## 2021-10-07 PROCEDURE — 99213 OFFICE O/P EST LOW 20 MIN: CPT | Performed by: NEUROLOGICAL SURGERY

## 2021-10-07 RX ORDER — NADOLOL 20 MG/1
TABLET ORAL
Qty: 90 TABLET | Refills: 3 | Status: SHIPPED | OUTPATIENT
Start: 2021-10-07 | End: 2021-10-11 | Stop reason: SDUPTHER

## 2021-10-10 ENCOUNTER — ANESTHESIA EVENT (OUTPATIENT)
Dept: GASTROENTEROLOGY | Facility: AMBULATORY SURGERY CENTER | Age: 66
End: 2021-10-10

## 2021-10-11 ENCOUNTER — ANESTHESIA (OUTPATIENT)
Dept: GASTROENTEROLOGY | Facility: AMBULATORY SURGERY CENTER | Age: 66
End: 2021-10-11

## 2021-10-11 ENCOUNTER — HOSPITAL ENCOUNTER (OUTPATIENT)
Dept: GASTROENTEROLOGY | Facility: AMBULATORY SURGERY CENTER | Age: 66
Discharge: HOME | End: 2021-10-11
Payer: MEDICARE

## 2021-10-11 VITALS
OXYGEN SATURATION: 97 % | DIASTOLIC BLOOD PRESSURE: 72 MMHG | TEMPERATURE: 99.2 F | HEART RATE: 86 BPM | RESPIRATION RATE: 15 BRPM | SYSTOLIC BLOOD PRESSURE: 121 MMHG

## 2021-10-11 DIAGNOSIS — Z86.010 PERSONAL HISTORY OF COLONIC POLYPS: ICD-10-CM

## 2021-10-11 DIAGNOSIS — I85.00 ESOPHAGEAL VARICES DETERMINED BY ENDOSCOPY (HCC): ICD-10-CM

## 2021-10-11 PROCEDURE — 45380 COLONOSCOPY AND BIOPSY: CPT | Performed by: INTERNAL MEDICINE

## 2021-10-11 PROCEDURE — 45385 COLONOSCOPY W/LESION REMOVAL: CPT | Performed by: INTERNAL MEDICINE

## 2021-10-11 PROCEDURE — 43239 EGD BIOPSY SINGLE/MULTIPLE: CPT | Performed by: INTERNAL MEDICINE

## 2021-10-11 PROCEDURE — 88305 TISSUE EXAM BY PATHOLOGIST: CPT | Performed by: PATHOLOGY

## 2021-10-11 RX ORDER — SODIUM CHLORIDE, SODIUM LACTATE, POTASSIUM CHLORIDE, CALCIUM CHLORIDE 600; 310; 30; 20 MG/100ML; MG/100ML; MG/100ML; MG/100ML
INJECTION, SOLUTION INTRAVENOUS CONTINUOUS PRN
Status: DISCONTINUED | OUTPATIENT
Start: 2021-10-11 | End: 2021-10-11

## 2021-10-11 RX ORDER — EPHEDRINE SULFATE 50 MG/ML
INJECTION INTRAVENOUS AS NEEDED
Status: DISCONTINUED | OUTPATIENT
Start: 2021-10-11 | End: 2021-10-11

## 2021-10-11 RX ORDER — LIDOCAINE HYDROCHLORIDE 10 MG/ML
INJECTION, SOLUTION EPIDURAL; INFILTRATION; INTRACAUDAL; PERINEURAL AS NEEDED
Status: DISCONTINUED | OUTPATIENT
Start: 2021-10-11 | End: 2021-10-11

## 2021-10-11 RX ORDER — PROPOFOL 10 MG/ML
INJECTION, EMULSION INTRAVENOUS AS NEEDED
Status: DISCONTINUED | OUTPATIENT
Start: 2021-10-11 | End: 2021-10-11

## 2021-10-11 RX ORDER — NADOLOL 40 MG/1
40 TABLET ORAL DAILY
Qty: 90 TABLET | Refills: 3 | Status: SHIPPED | OUTPATIENT
Start: 2021-10-11

## 2021-10-11 RX ORDER — SODIUM CHLORIDE, SODIUM LACTATE, POTASSIUM CHLORIDE, CALCIUM CHLORIDE 600; 310; 30; 20 MG/100ML; MG/100ML; MG/100ML; MG/100ML
50 INJECTION, SOLUTION INTRAVENOUS CONTINUOUS
Status: DISCONTINUED | OUTPATIENT
Start: 2021-10-11 | End: 2021-10-15 | Stop reason: HOSPADM

## 2021-10-11 RX ADMIN — EPHEDRINE SULFATE 10 MG: 50 INJECTION INTRAVENOUS at 09:19

## 2021-10-11 RX ADMIN — PROPOFOL 20 MG: 10 INJECTION, EMULSION INTRAVENOUS at 09:15

## 2021-10-11 RX ADMIN — EPHEDRINE SULFATE 10 MG: 50 INJECTION INTRAVENOUS at 09:24

## 2021-10-11 RX ADMIN — PROPOFOL 120 MG: 10 INJECTION, EMULSION INTRAVENOUS at 09:10

## 2021-10-11 RX ADMIN — SODIUM CHLORIDE, SODIUM LACTATE, POTASSIUM CHLORIDE, CALCIUM CHLORIDE: 600; 310; 30; 20 INJECTION, SOLUTION INTRAVENOUS at 09:07

## 2021-10-11 RX ADMIN — PROPOFOL 20 MG: 10 INJECTION, EMULSION INTRAVENOUS at 09:17

## 2021-10-11 RX ADMIN — LIDOCAINE HYDROCHLORIDE 50 MG: 10 INJECTION, SOLUTION EPIDURAL; INFILTRATION; INTRACAUDAL; PERINEURAL at 09:10

## 2021-10-18 ENCOUNTER — OFFICE VISIT (OUTPATIENT)
Dept: PHYSICAL THERAPY | Facility: CLINIC | Age: 66
End: 2021-10-18
Payer: MEDICARE

## 2021-10-18 DIAGNOSIS — Z09 POSTOPERATIVE EXAMINATION: Primary | ICD-10-CM

## 2021-10-18 DIAGNOSIS — R26.9 UNSPECIFIED ABNORMALITIES OF GAIT AND MOBILITY: ICD-10-CM

## 2021-10-18 PROCEDURE — 97110 THERAPEUTIC EXERCISES: CPT | Performed by: PHYSICAL THERAPIST

## 2021-10-18 PROCEDURE — 97112 NEUROMUSCULAR REEDUCATION: CPT | Performed by: PHYSICAL THERAPIST

## 2021-10-20 ENCOUNTER — OFFICE VISIT (OUTPATIENT)
Dept: PHYSICAL THERAPY | Facility: CLINIC | Age: 66
End: 2021-10-20
Payer: MEDICARE

## 2021-10-20 DIAGNOSIS — R26.9 UNSPECIFIED ABNORMALITIES OF GAIT AND MOBILITY: ICD-10-CM

## 2021-10-20 DIAGNOSIS — Z09 POSTOPERATIVE EXAMINATION: Primary | ICD-10-CM

## 2021-10-20 PROCEDURE — 97110 THERAPEUTIC EXERCISES: CPT | Performed by: PHYSICAL THERAPIST

## 2021-10-20 PROCEDURE — 97112 NEUROMUSCULAR REEDUCATION: CPT | Performed by: PHYSICAL THERAPIST

## 2021-10-25 ENCOUNTER — OFFICE VISIT (OUTPATIENT)
Dept: PHYSICAL THERAPY | Facility: CLINIC | Age: 66
End: 2021-10-25
Payer: MEDICARE

## 2021-10-25 DIAGNOSIS — Z09 POSTOPERATIVE EXAMINATION: Primary | ICD-10-CM

## 2021-10-25 DIAGNOSIS — R26.9 UNSPECIFIED ABNORMALITIES OF GAIT AND MOBILITY: ICD-10-CM

## 2021-10-25 PROCEDURE — 97110 THERAPEUTIC EXERCISES: CPT | Performed by: PHYSICAL THERAPIST

## 2021-10-25 PROCEDURE — 97112 NEUROMUSCULAR REEDUCATION: CPT | Performed by: PHYSICAL THERAPIST

## 2021-10-27 ENCOUNTER — OFFICE VISIT (OUTPATIENT)
Dept: PHYSICAL THERAPY | Facility: CLINIC | Age: 66
End: 2021-10-27
Payer: MEDICARE

## 2021-10-27 DIAGNOSIS — Z09 POSTOPERATIVE EXAMINATION: ICD-10-CM

## 2021-10-27 DIAGNOSIS — R26.9 UNSPECIFIED ABNORMALITIES OF GAIT AND MOBILITY: Primary | ICD-10-CM

## 2021-10-27 PROCEDURE — 97110 THERAPEUTIC EXERCISES: CPT | Performed by: PHYSICAL THERAPIST

## 2021-10-27 PROCEDURE — 97112 NEUROMUSCULAR REEDUCATION: CPT | Performed by: PHYSICAL THERAPIST

## 2021-11-01 ENCOUNTER — OFFICE VISIT (OUTPATIENT)
Dept: PHYSICAL THERAPY | Facility: CLINIC | Age: 66
End: 2021-11-01
Payer: MEDICARE

## 2021-11-01 DIAGNOSIS — Z09 POSTOPERATIVE EXAMINATION: ICD-10-CM

## 2021-11-01 DIAGNOSIS — R26.9 UNSPECIFIED ABNORMALITIES OF GAIT AND MOBILITY: Primary | ICD-10-CM

## 2021-11-01 PROCEDURE — 97116 GAIT TRAINING THERAPY: CPT

## 2021-11-01 PROCEDURE — 97112 NEUROMUSCULAR REEDUCATION: CPT

## 2021-11-01 PROCEDURE — 97110 THERAPEUTIC EXERCISES: CPT

## 2021-11-03 ENCOUNTER — OFFICE VISIT (OUTPATIENT)
Dept: PHYSICAL THERAPY | Facility: CLINIC | Age: 66
End: 2021-11-03
Payer: MEDICARE

## 2021-11-03 DIAGNOSIS — Z09 POSTOPERATIVE EXAMINATION: ICD-10-CM

## 2021-11-03 DIAGNOSIS — R26.9 UNSPECIFIED ABNORMALITIES OF GAIT AND MOBILITY: Primary | ICD-10-CM

## 2021-11-03 PROCEDURE — 97110 THERAPEUTIC EXERCISES: CPT | Performed by: PHYSICAL THERAPIST

## 2021-11-03 PROCEDURE — 97112 NEUROMUSCULAR REEDUCATION: CPT | Performed by: PHYSICAL THERAPIST

## 2021-11-04 ENCOUNTER — TELEPHONE (OUTPATIENT)
Dept: FAMILY MEDICINE CLINIC | Facility: HOSPITAL | Age: 66
End: 2021-11-04

## 2021-11-08 ENCOUNTER — OFFICE VISIT (OUTPATIENT)
Dept: PHYSICAL THERAPY | Facility: CLINIC | Age: 66
End: 2021-11-08
Payer: MEDICARE

## 2021-11-08 DIAGNOSIS — Z09 POSTOPERATIVE EXAMINATION: ICD-10-CM

## 2021-11-08 DIAGNOSIS — R26.9 UNSPECIFIED ABNORMALITIES OF GAIT AND MOBILITY: Primary | ICD-10-CM

## 2021-11-08 PROCEDURE — 97110 THERAPEUTIC EXERCISES: CPT | Performed by: PHYSICAL THERAPIST

## 2021-11-08 PROCEDURE — 97112 NEUROMUSCULAR REEDUCATION: CPT | Performed by: PHYSICAL THERAPIST

## 2021-11-10 ENCOUNTER — OFFICE VISIT (OUTPATIENT)
Dept: PHYSICAL THERAPY | Facility: CLINIC | Age: 66
End: 2021-11-10
Payer: MEDICARE

## 2021-11-10 DIAGNOSIS — R26.9 UNSPECIFIED ABNORMALITIES OF GAIT AND MOBILITY: Primary | ICD-10-CM

## 2021-11-10 DIAGNOSIS — Z09 POSTOPERATIVE EXAMINATION: ICD-10-CM

## 2021-11-10 PROCEDURE — 97110 THERAPEUTIC EXERCISES: CPT | Performed by: PHYSICAL THERAPIST

## 2021-11-10 PROCEDURE — 97014 ELECTRIC STIMULATION THERAPY: CPT | Performed by: PHYSICAL THERAPIST

## 2021-11-10 PROCEDURE — 97112 NEUROMUSCULAR REEDUCATION: CPT | Performed by: PHYSICAL THERAPIST

## 2021-11-15 ENCOUNTER — OFFICE VISIT (OUTPATIENT)
Dept: PHYSICAL THERAPY | Facility: CLINIC | Age: 66
End: 2021-11-15
Payer: MEDICARE

## 2021-11-15 DIAGNOSIS — R26.9 UNSPECIFIED ABNORMALITIES OF GAIT AND MOBILITY: Primary | ICD-10-CM

## 2021-11-15 DIAGNOSIS — Z09 POSTOPERATIVE EXAMINATION: ICD-10-CM

## 2021-11-15 PROCEDURE — 97112 NEUROMUSCULAR REEDUCATION: CPT

## 2021-11-15 PROCEDURE — 97014 ELECTRIC STIMULATION THERAPY: CPT

## 2021-11-15 PROCEDURE — 97110 THERAPEUTIC EXERCISES: CPT

## 2021-11-16 DIAGNOSIS — F32.A DEPRESSION, UNSPECIFIED DEPRESSION TYPE: ICD-10-CM

## 2021-11-16 RX ORDER — BUPROPION HYDROCHLORIDE 150 MG/1
150 TABLET ORAL DAILY
Qty: 90 TABLET | Refills: 1 | Status: SHIPPED | OUTPATIENT
Start: 2021-11-16 | End: 2022-06-03 | Stop reason: SDUPTHER

## 2021-11-17 ENCOUNTER — OFFICE VISIT (OUTPATIENT)
Dept: PHYSICAL THERAPY | Facility: CLINIC | Age: 66
End: 2021-11-17
Payer: MEDICARE

## 2021-11-17 DIAGNOSIS — R26.9 UNSPECIFIED ABNORMALITIES OF GAIT AND MOBILITY: Primary | ICD-10-CM

## 2021-11-17 DIAGNOSIS — Z09 POSTOPERATIVE EXAMINATION: ICD-10-CM

## 2021-11-17 PROCEDURE — 97110 THERAPEUTIC EXERCISES: CPT | Performed by: PHYSICAL THERAPIST

## 2021-11-17 PROCEDURE — 97530 THERAPEUTIC ACTIVITIES: CPT | Performed by: PHYSICAL THERAPIST

## 2021-11-22 ENCOUNTER — OFFICE VISIT (OUTPATIENT)
Dept: PHYSICAL THERAPY | Facility: CLINIC | Age: 66
End: 2021-11-22
Payer: MEDICARE

## 2021-11-22 DIAGNOSIS — Z09 POSTOPERATIVE EXAMINATION: ICD-10-CM

## 2021-11-22 DIAGNOSIS — R26.9 UNSPECIFIED ABNORMALITIES OF GAIT AND MOBILITY: Primary | ICD-10-CM

## 2021-11-22 PROCEDURE — 97112 NEUROMUSCULAR REEDUCATION: CPT

## 2021-11-22 PROCEDURE — 97110 THERAPEUTIC EXERCISES: CPT

## 2021-11-24 ENCOUNTER — EVALUATION (OUTPATIENT)
Dept: PHYSICAL THERAPY | Facility: CLINIC | Age: 66
End: 2021-11-24
Payer: MEDICARE

## 2021-11-24 ENCOUNTER — TELEPHONE (OUTPATIENT)
Dept: NEUROSURGERY | Facility: CLINIC | Age: 66
End: 2021-11-24

## 2021-11-24 DIAGNOSIS — M48.062 SPINAL STENOSIS OF LUMBAR REGION WITH NEUROGENIC CLAUDICATION: ICD-10-CM

## 2021-11-24 DIAGNOSIS — M54.16 LUMBAR RADICULOPATHY: ICD-10-CM

## 2021-11-24 DIAGNOSIS — R26.9 UNSPECIFIED ABNORMALITIES OF GAIT AND MOBILITY: Primary | ICD-10-CM

## 2021-11-24 DIAGNOSIS — M48.02 CERVICAL SPINAL STENOSIS: ICD-10-CM

## 2021-11-24 DIAGNOSIS — Z09 POSTOPERATIVE EXAMINATION: ICD-10-CM

## 2021-11-24 DIAGNOSIS — G95.9 MYELOPATHY (HCC): Primary | ICD-10-CM

## 2021-11-24 PROCEDURE — 97140 MANUAL THERAPY 1/> REGIONS: CPT | Performed by: PHYSICAL THERAPIST

## 2021-11-24 PROCEDURE — 97110 THERAPEUTIC EXERCISES: CPT | Performed by: PHYSICAL THERAPIST

## 2021-11-29 ENCOUNTER — OFFICE VISIT (OUTPATIENT)
Dept: PHYSICAL THERAPY | Facility: CLINIC | Age: 66
End: 2021-11-29
Payer: MEDICARE

## 2021-11-29 DIAGNOSIS — R26.9 UNSPECIFIED ABNORMALITIES OF GAIT AND MOBILITY: Primary | ICD-10-CM

## 2021-11-29 DIAGNOSIS — Z09 POSTOPERATIVE EXAMINATION: ICD-10-CM

## 2021-11-29 PROCEDURE — 97110 THERAPEUTIC EXERCISES: CPT

## 2021-11-29 PROCEDURE — 97530 THERAPEUTIC ACTIVITIES: CPT

## 2021-12-01 ENCOUNTER — OFFICE VISIT (OUTPATIENT)
Dept: PHYSICAL THERAPY | Facility: CLINIC | Age: 66
End: 2021-12-01
Payer: MEDICARE

## 2021-12-01 DIAGNOSIS — R26.9 UNSPECIFIED ABNORMALITIES OF GAIT AND MOBILITY: Primary | ICD-10-CM

## 2021-12-01 PROCEDURE — 97110 THERAPEUTIC EXERCISES: CPT

## 2021-12-01 PROCEDURE — 97530 THERAPEUTIC ACTIVITIES: CPT

## 2021-12-14 ENCOUNTER — OFFICE VISIT (OUTPATIENT)
Dept: FAMILY MEDICINE CLINIC | Facility: HOSPITAL | Age: 66
End: 2021-12-14
Payer: MEDICARE

## 2021-12-14 VITALS
OXYGEN SATURATION: 98 % | WEIGHT: 269 LBS | HEIGHT: 73 IN | SYSTOLIC BLOOD PRESSURE: 110 MMHG | HEART RATE: 92 BPM | TEMPERATURE: 96.7 F | DIASTOLIC BLOOD PRESSURE: 68 MMHG | BODY MASS INDEX: 35.65 KG/M2

## 2021-12-14 DIAGNOSIS — M79.89 LEG SWELLING: Primary | ICD-10-CM

## 2021-12-14 DIAGNOSIS — Z12.5 PROSTATE CANCER SCREENING: ICD-10-CM

## 2021-12-14 DIAGNOSIS — E78.2 MIXED HYPERLIPIDEMIA: ICD-10-CM

## 2021-12-14 DIAGNOSIS — G95.9 MYELOPATHY (HCC): ICD-10-CM

## 2021-12-14 DIAGNOSIS — F32.9 MAJOR DEPRESSION, CHRONIC: ICD-10-CM

## 2021-12-14 DIAGNOSIS — I85.00 ESOPHAGEAL VARICES DETERMINED BY ENDOSCOPY (HCC): ICD-10-CM

## 2021-12-14 PROCEDURE — 99214 OFFICE O/P EST MOD 30 MIN: CPT | Performed by: NURSE PRACTITIONER

## 2021-12-14 RX ORDER — HYDROCHLOROTHIAZIDE 25 MG/1
25 TABLET ORAL DAILY
Qty: 30 TABLET | Refills: 5 | Status: SHIPPED | OUTPATIENT
Start: 2021-12-14

## 2021-12-15 ENCOUNTER — EVALUATION (OUTPATIENT)
Dept: PHYSICAL THERAPY | Facility: CLINIC | Age: 66
End: 2021-12-15
Payer: MEDICARE

## 2021-12-15 DIAGNOSIS — R26.9 UNSPECIFIED ABNORMALITIES OF GAIT AND MOBILITY: Primary | ICD-10-CM

## 2021-12-15 DIAGNOSIS — Z09 POSTOPERATIVE EXAMINATION: ICD-10-CM

## 2021-12-15 PROCEDURE — 97140 MANUAL THERAPY 1/> REGIONS: CPT | Performed by: PHYSICAL THERAPIST

## 2021-12-15 PROCEDURE — 97110 THERAPEUTIC EXERCISES: CPT | Performed by: PHYSICAL THERAPIST

## 2021-12-20 ENCOUNTER — OFFICE VISIT (OUTPATIENT)
Dept: PHYSICAL THERAPY | Facility: CLINIC | Age: 66
End: 2021-12-20
Payer: MEDICARE

## 2021-12-20 DIAGNOSIS — R26.9 UNSPECIFIED ABNORMALITIES OF GAIT AND MOBILITY: Primary | ICD-10-CM

## 2021-12-20 DIAGNOSIS — Z09 POSTOPERATIVE EXAMINATION: ICD-10-CM

## 2021-12-20 PROCEDURE — 97110 THERAPEUTIC EXERCISES: CPT | Performed by: PHYSICAL THERAPIST

## 2021-12-20 PROCEDURE — 97140 MANUAL THERAPY 1/> REGIONS: CPT | Performed by: PHYSICAL THERAPIST

## 2021-12-22 ENCOUNTER — OFFICE VISIT (OUTPATIENT)
Dept: PHYSICAL THERAPY | Facility: CLINIC | Age: 66
End: 2021-12-22
Payer: MEDICARE

## 2021-12-22 DIAGNOSIS — R26.9 UNSPECIFIED ABNORMALITIES OF GAIT AND MOBILITY: Primary | ICD-10-CM

## 2021-12-22 PROCEDURE — 97140 MANUAL THERAPY 1/> REGIONS: CPT | Performed by: PHYSICAL THERAPIST

## 2021-12-22 PROCEDURE — 97110 THERAPEUTIC EXERCISES: CPT | Performed by: PHYSICAL THERAPIST

## 2021-12-27 ENCOUNTER — OFFICE VISIT (OUTPATIENT)
Dept: PHYSICAL THERAPY | Facility: CLINIC | Age: 66
End: 2021-12-27
Payer: MEDICARE

## 2021-12-27 DIAGNOSIS — Z09 POSTOPERATIVE EXAMINATION: ICD-10-CM

## 2021-12-27 DIAGNOSIS — R26.9 UNSPECIFIED ABNORMALITIES OF GAIT AND MOBILITY: Primary | ICD-10-CM

## 2021-12-27 PROCEDURE — 97140 MANUAL THERAPY 1/> REGIONS: CPT

## 2021-12-27 PROCEDURE — 97110 THERAPEUTIC EXERCISES: CPT

## 2021-12-27 PROCEDURE — 97112 NEUROMUSCULAR REEDUCATION: CPT

## 2021-12-29 ENCOUNTER — OFFICE VISIT (OUTPATIENT)
Dept: PHYSICAL THERAPY | Facility: CLINIC | Age: 66
End: 2021-12-29
Payer: MEDICARE

## 2021-12-29 DIAGNOSIS — Z09 POSTOPERATIVE EXAMINATION: ICD-10-CM

## 2021-12-29 DIAGNOSIS — R26.9 UNSPECIFIED ABNORMALITIES OF GAIT AND MOBILITY: Primary | ICD-10-CM

## 2021-12-29 PROCEDURE — 97110 THERAPEUTIC EXERCISES: CPT | Performed by: PHYSICAL THERAPIST

## 2021-12-29 PROCEDURE — 97140 MANUAL THERAPY 1/> REGIONS: CPT | Performed by: PHYSICAL THERAPIST

## 2022-01-05 ENCOUNTER — OFFICE VISIT (OUTPATIENT)
Dept: PHYSICAL THERAPY | Facility: CLINIC | Age: 67
End: 2022-01-05
Payer: MEDICARE

## 2022-01-05 DIAGNOSIS — Z09 POSTOPERATIVE EXAMINATION: ICD-10-CM

## 2022-01-05 DIAGNOSIS — R26.9 UNSPECIFIED ABNORMALITIES OF GAIT AND MOBILITY: Primary | ICD-10-CM

## 2022-01-05 DIAGNOSIS — K74.69 OTHER CIRRHOSIS OF LIVER (HCC): Primary | ICD-10-CM

## 2022-01-05 PROCEDURE — 97110 THERAPEUTIC EXERCISES: CPT | Performed by: PHYSICAL THERAPIST

## 2022-01-05 PROCEDURE — 97140 MANUAL THERAPY 1/> REGIONS: CPT | Performed by: PHYSICAL THERAPIST

## 2022-01-05 NOTE — PROGRESS NOTES
Daily Note     Today's date: 2022  Patient name: Joselin Boo  : 1955  MRN: [id number]  Referring provider: FADUMO Munoz  Dx:   Encounter Diagnosis     ICD-10-CM    1  Unspecified abnormalities of gait and mobility  R26 9    2  Postoperative examination  Z09                   Subjective: Notes still getting occasional stiffness and shooting pain down at central L/S at times  Frustrated with continued lack of progress with balance  MD feels he will not perform any intervention for L/S until 1 year post op herbert  Objective: See treatment diary below      Assessment:  No pain with CPA to sacrum or L/S  No referral with ANDREA or REIL  Increased L/S pain with B/L LE flexion and seated flexion  Trial of lumbar traction with fair response and slight onset of LE aching  Will trial mechanical traction next session  Discussed with pt about potential attainment of EMG or further testing for L/S and continued lack of ankle DF and PF strength R>L  Still with major falls risk  Plan: Continue per plan of care  Precautions: C spine fusion C3-C5, fall risk  HEP: ride stationary bike at pt home, increase walking      Manuals 11/17 11/22 11/24 12/15 12/20 12/22 12/27 12/29 1/5    Re-assessment    PF             35'    PF     LE PROM HS and piriformis     PF ML  PF PF    STM to L/S     PF ML WE PF PF    Neuro Re-Ed     30' 20' 12' 30' 30'    Tandem stance   3x30"  3x30   3x30"     Standing on foam      2x30"        NBOS w/ pertubations             NBOS EC     2x20"   2x20"     Tandem amb               Walking on foam beams             Rockerboard taps             Rockerboard balance (static)             Rockerboard (pertubations)             NSP marching BP cuff feedback             NSP heel slide with BP cuff feedback             Biodex             STD hip ext, prone hip ext and kendy stretch for L/S    2x10 ea 2x10 2x10  10x2 ea  10x2 ea    Ther Ex             6MWT   1047 ft          HEP Step up taps AP and ML   20x          Paloff press 3x10 GTB 3x10 GTB           Prone superman     5x10"  5x10"  5"x20 NV 10x 5"    RDL On wedge 2x10 Slat  10x2   10 lbs 2x10 10 lbs x10  10#  x10 NV     SL bridge     2x10 ea        Side stepping and added carioca     3 laps        boxing PF 5 min           Walking lunges  2x10 10x2           Standing glute kickbacks       x10  x20      STD hip 3 way     OTB 10x ea        Ther Activity             NuStep             Step-ups (for/lat)       8"x10 ea      Picking up objects from floor             Amb w/ HT/HN             Backwards walking        15#  5 laps      Obstacle course With agility ladder and hurdles With agility ladder and hurdles    W/ agility ladder and hurdles        STS repeats   10x          Hurdles             Leg press  lbs; 305 lbs R 205 lbs; 305 lbs R  x10 ea     205#  x40 15" SL HR 30x     Seated lumbar flex stretch         10x5" ea    Gait Training             TM  5 min 5 min 5 min   5 min 2 mph  5 min  2 mph 5 min 2 mph 5 min 2 mph    Amb outside (over stones/grass)             Modalities             NMES ankles     10 min        EDUCATION

## 2022-01-07 ENCOUNTER — OFFICE VISIT (OUTPATIENT)
Dept: PHYSICAL THERAPY | Facility: CLINIC | Age: 67
End: 2022-01-07
Payer: MEDICARE

## 2022-01-07 DIAGNOSIS — R26.9 UNSPECIFIED ABNORMALITIES OF GAIT AND MOBILITY: Primary | ICD-10-CM

## 2022-01-07 DIAGNOSIS — Z09 POSTOPERATIVE EXAMINATION: ICD-10-CM

## 2022-01-07 PROCEDURE — 97110 THERAPEUTIC EXERCISES: CPT | Performed by: PHYSICAL THERAPIST

## 2022-01-07 PROCEDURE — 97012 MECHANICAL TRACTION THERAPY: CPT | Performed by: PHYSICAL THERAPIST

## 2022-01-07 PROCEDURE — 97140 MANUAL THERAPY 1/> REGIONS: CPT | Performed by: PHYSICAL THERAPIST

## 2022-01-07 NOTE — PROGRESS NOTES
Daily Note     Today's date: 2022  Patient name: Ryne Acuña  : 1955  MRN: [id number]  Referring provider: FADUMO Hagan  Dx:   Encounter Diagnosis     ICD-10-CM    1  Unspecified abnormalities of gait and mobility  R26 9    2  Postoperative examination  Z09                   Subjective: Notes continued back stiffness and soreness overall  Objective: See treatment diary below      Assessment: Noting fair response to performance of added front squat and RDL with weight as well as firemans carry walk with improved balance  Trial of walking lunge with 30 lbs added weight with fair response  Will continue to work on gluteal strength as able  Trial of mechanical traction today with fair response  No change in LE sxs with traction but will continue trials as able  Plan: Continue per plan of care  Precautions: C spine fusion C3-C5, fall risk  HEP: ride stationary bike at pt home, increase walking      Manuals 11/17 11/22 11/24 12/15 12/20 12/22 12/27 12/29 1/5 1/7   Re-assessment    PF      PF       35'    PF     LE PROM HS and piriformis     PF ML  PF PF PF   STM to L/S     PF ML WE PF PF PF   Neuro Re-Ed     30' 20' 12' 30' 30' 15'   Tandem stance   3x30"  3x30   3x30"     Standing on foam      2x30"        NBOS w/ pertubations             NBOS EC     2x20"   2x20"     Tandem amb               Walking on foam beams             Rockerboard taps             Rockerboard balance (static)             Rockerboard (pertubations)             NSP marching BP cuff feedback             NSP heel slide with BP cuff feedback             Biodex             STD hip ext, prone hip ext and kendy stretch for L/S    2x10 ea 2x10 2x10  10x2 ea  10x2 ea 10x2   Ther Ex             6MWT   1047 ft          HEP                          Step up taps AP and ML   20x          Paloff press 3x10 GTB 3x10 GTB           Prone superman     5x10"  5x10"  5"x20 NV 10x 5" 10x5"   RDL On wedge 2x10 Slat  10x2   10 lbs 2x10 10 lbs x10  10#  x10 NV  20# 2x10   SL bridge     2x10 ea        Side stepping and added carioca     3 laps     3 laps   boxing PF 5 min           Walking lunges  2x10 10x2        20# 3 laps   Standing glute kickbacks       x10  x20   x20   STD hip 3 way     OTB 10x ea        Ther Activity             NuStep             Step-ups (for/lat)       8"x10 ea      Picking up objects from floor             Amb w/ HT/HN             Backwards walking        15#  5 laps      Obstacle course With agility ladder and hurdles With agility ladder and hurdles    W/ agility ladder and hurdles        STS repeats   10x          Hurdles             Leg press  lbs; 305 lbs R 205 lbs; 305 lbs R  x10 ea     205#  x40 15" SL HR 30x     Seated lumbar flex stretch         10x5" ea 10x5" ea   Gait Training             TM  5 min 5 min 5 min   5 min 2 mph  5 min  2 mph 5 min 2 mph 5 min 2 mph 5 min 2 mph   Amb outside (over stones/grass)             Modalities             NMES ankles     10 min        EDUCATION

## 2022-01-10 ENCOUNTER — OFFICE VISIT (OUTPATIENT)
Dept: PHYSICAL THERAPY | Facility: CLINIC | Age: 67
End: 2022-01-10
Payer: MEDICARE

## 2022-01-10 DIAGNOSIS — R26.9 UNSPECIFIED ABNORMALITIES OF GAIT AND MOBILITY: Primary | ICD-10-CM

## 2022-01-10 DIAGNOSIS — Z09 POSTOPERATIVE EXAMINATION: ICD-10-CM

## 2022-01-10 PROCEDURE — 97140 MANUAL THERAPY 1/> REGIONS: CPT

## 2022-01-10 PROCEDURE — 97110 THERAPEUTIC EXERCISES: CPT

## 2022-01-10 NOTE — PROGRESS NOTES
Daily Note     Today's date: 1/10/2022  Patient name: Matt Haynes  : 1955  MRN: [id number]  Referring provider: FADUMO Berry  Dx:   Encounter Diagnosis     ICD-10-CM    1  Unspecified abnormalities of gait and mobility  R26 9    2  Postoperative examination  Z09        Subjective: Patient noted that he did not sleep well last night  Objective: See treatment diary below      Assessment:   Patient presented with increased unsteadiness today performed exercises to patient tolerance  Due to patient presenting with increased unsteadiness and slight LOB self corrected with stepping technique only performed 5 reps for deadlifts today  Instead of performing walking lunges patient performed standing fwd lunges in // bars 2x10 some unsteadiness present  Patient unable to perform carioca due to unsteadiness and slight LOB instead had patient trial marching with walking  Patient declined Mechanical lumbar traction today due to noting post performing last treatment he was okay after treatment then later on Friday night all through Saturday he felt worse  Patient noted feeling ok on   Plan: Continue per plan of care  Precautions: C spine fusion C3-C5, fall risk  HEP: ride stationary bike at pt home, increase walking      Manuals 1/10  11/24 12/15 12/20 12/22 12/27 12/29 1/5 1/7   Re-assessment    PF      PF       35'    PF     LE PROM HS and piriformis AF    PF ML  PF PF PF   STM to L/S AF    PF ML WE PF PF PF   Neuro Re-Ed 10'    30' 20' 12' 30' 30' 15'   Tandem stance   3x30"  3x30   3x30"     Standing on foam      2x30"        NBOS w/ pertubations             NBOS EC     2x20"   2x20"     Tandem amb               Walking on foam beams             Rockerboard taps             Rockerboard balance (static)             Rockerboard (pertubations)             NSP marching BP cuff feedback             NSP heel slide with BP cuff feedback             Biodex             STD hip ext, prone hip ext and kendy stretch for L/S 10x2   2x10 ea 2x10 2x10  10x2 ea  10x2 ea 10x2   Ther Ex             6MWT   1047 ft          HEP                          Step up taps AP and ML   20x          Paloff press             Prone superman 10x 5"    5x10"  5x10"  5"x20 NV 10x 5" 10x5"   RDL 20# x5    10 lbs 2x10 10 lbs x10  10#  x10 NV  20# 2x10   SL bridge     2x10 ea        Side stepping and added carioca 3 laps side stepping /marching    3 laps     3 laps   boxing             Walking lunges  20# 2x10 // bars         20# 3 laps   Standing glute kickbacks  20x     x10  x20   x20   STD hip 3 way     OTB 10x ea        Ther Activity             NuStep             Step-ups (for/lat)       8"x10 ea      Picking up objects from floor             Amb w/ HT/HN             Backwards walking        15#  5 laps      Obstacle course      W/ agility ladder and hurdles        STS repeats   10x          Hurdles             Leg press HR       205#  x40 15" SL HR 30x     Seated lumbar flex stretch 10x5" ea        10x5" ea 10x5" ea   Gait Training             TM  5 min 2mph  5 min   5 min 2 mph  5 min  2 mph 5 min 2 mph 5 min 2 mph 5 min 2 mph   Amb outside (over stones/grass)             Modalities             NMES ankles     10 min        EDUCATION

## 2022-01-12 ENCOUNTER — OFFICE VISIT (OUTPATIENT)
Dept: PHYSICAL THERAPY | Facility: CLINIC | Age: 67
End: 2022-01-12
Payer: MEDICARE

## 2022-01-12 DIAGNOSIS — Z09 POSTOPERATIVE EXAMINATION: ICD-10-CM

## 2022-01-12 DIAGNOSIS — R26.9 UNSPECIFIED ABNORMALITIES OF GAIT AND MOBILITY: Primary | ICD-10-CM

## 2022-01-12 PROCEDURE — 97112 NEUROMUSCULAR REEDUCATION: CPT | Performed by: PHYSICAL THERAPIST

## 2022-01-12 PROCEDURE — 97110 THERAPEUTIC EXERCISES: CPT | Performed by: PHYSICAL THERAPIST

## 2022-01-12 NOTE — PROGRESS NOTES
Daily Note     Today's date: 2022  Patient name: Red Litten  : 1955  MRN: [id number]  Referring provider: FADUMO Allison  Dx:   Encounter Diagnosis     ICD-10-CM    1  Unspecified abnormalities of gait and mobility  R26 9    2  Postoperative examination  Z09                   Subjective: Still with constant calf tightness in the AM and R sided back pain with getting up in the morning  Conitnues to have balance deficits with negotiating up and down the steps with path deviation  Objective: See treatment diary below      Assessment: No onset of R sided gluteal pain with any CKC stepping or lunge  Continued excessive stepping strategy with any LOB vs ankle  Continues to have posterior wt shift with sit to stand and front squat position  Noting limited balance on R LE vs L  Cues for marching with walking with initial contact on toes first will be effective  Plan: Continue per plan of care  Precautions: C spine fusion C3-C5, fall risk  HEP: ride stationary bike at pt home, increase walking      Manuals 1/10 1/12 11/24 12/15 12/20 12/22 12/27 12/29 1/5 1/7   Re-assessment    PF      PF       35'    PF     LE PROM HS and piriformis AF    PF ML  PF PF PF   STM to L/S AF    PF ML WE PF PF PF   Neuro Re-Ed 10'    30' 20' 12' 30' 30' 15'   Tandem stance  3x30" 3x30"  3x30   3x30"     Standing on foam   2x20"   2x30"        NBOS w/ pertubations             NBOS EC     2x20"   2x20"     Tandem amb               Walking on foam beams             Rockerboard taps             Rockerboard balance (static)             Rockerboard (pertubations)             NSP marching BP cuff feedback             NSP heel slide with BP cuff feedback             Biodex             STD hip ext, prone hip ext and kendy stretch for L/S 10x2   2x10 ea 2x10 2x10  10x2 ea  10x2 ea 10x2   Ther Ex             6MWT   1047 ft          HEP                          Step up taps AP and ML  20x 8 inch step 20x Paloff press             Prone superman 10x 5"    5x10"  5x10"  5"x20 NV 10x 5" 10x5"   RDL 20# x5 20# x10   10 lbs 2x10 10 lbs x10  10#  x10 NV  20# 2x10   SL bridge     2x10 ea        Side stepping and added carioca 3 laps side stepping /marching 3 laps with 10lbs wieght in // bars   3 laps     3 laps   boxing             Walking lunges  20# 2x10 // bars         20# 3 laps   Standing glute kickbacks  20x     x10  x20   x20   STD hip 3 way     OTB 10x ea        Ther Activity             NuStep             Step-ups (for/lat)  8" 20x ea     8"x10 ea      Picking up objects from floor             Amb w/ HT/HN             Backwards walking        15#  5 laps      Obstacle course      W/ agility ladder and hurdles        STS repeats  20 lbs with goblet squat 10x          Hurdles             Leg press HR  15# 20x     205#  x40 15" SL HR 30x     Seated lumbar flex stretch 10x5" ea 10x5" ea       10x5" ea 10x5" ea   Gait Training             TM  5 min 2mph 5 min 2 2 mph 5 min   5 min 2 mph  5 min  2 mph 5 min 2 mph 5 min 2 mph 5 min 2 mph   Amb outside (over stones/grass)             Modalities             NMES ankles     10 min        EDUCATION

## 2022-01-17 ENCOUNTER — OFFICE VISIT (OUTPATIENT)
Dept: PHYSICAL THERAPY | Facility: CLINIC | Age: 67
End: 2022-01-17
Payer: MEDICARE

## 2022-01-17 ENCOUNTER — TELEPHONE (OUTPATIENT)
Dept: GASTROENTEROLOGY | Facility: CLINIC | Age: 67
End: 2022-01-17

## 2022-01-17 DIAGNOSIS — Z09 POSTOPERATIVE EXAMINATION: ICD-10-CM

## 2022-01-17 DIAGNOSIS — R26.9 UNSPECIFIED ABNORMALITIES OF GAIT AND MOBILITY: Primary | ICD-10-CM

## 2022-01-17 PROCEDURE — 97112 NEUROMUSCULAR REEDUCATION: CPT | Performed by: PHYSICAL THERAPIST

## 2022-01-17 PROCEDURE — 97110 THERAPEUTIC EXERCISES: CPT | Performed by: PHYSICAL THERAPIST

## 2022-01-17 NOTE — TELEPHONE ENCOUNTER
[place] [facility] testing-blood work and thinks an [address]; needs more info/is unsure when he needs to complete testing  # [phone]

## 2022-01-17 NOTE — PROGRESS NOTES
Daily Note     Today's date: 2022  Patient name: Elo Stevens  : 1955  MRN: [id number]  Referring provider: FADUMO Lopez  Dx:   Encounter Diagnosis     ICD-10-CM    1  Unspecified abnormalities of gait and mobility  R26 9    2  Postoperative examination  Z09                   Subjective: Reports less back pain and discomfort as well as improved overall back stiffness  Has been able to initiate shuffling and agility type movements recently  Objective: See treatment diary below      Assessment: Continues to have improved ability to stop and go with exercise AP with agility and box stepping  Able to perform boxing step and move  As well as perform ball toss with anterior lunge movement  Able to perform marching walks without difficulty  Plan: Continue per plan of care  Precautions: C spine fusion C3-C5, fall risk  HEP: ride stationary bike at pt home, increase walking      Manuals 1/10 1/12 1/17 12/15 12/20 12/22 12/27 12/29 1/5 1/7   Re-assessment    PF      PF       35'    PF     LE PROM HS and piriformis AF  PF  PF ML  PF PF PF   STM to L/S AF    PF ML WE PF PF PF   Neuro Re-Ed 10'    30' 20' 12' 30' 30' 15'   Tandem stance  3x30" 3x30"  3x30   3x30"     Standing on foam   2x20"   2x30"        NBOS w/ pertubations             NBOS EC     2x20"   2x20"     Tandem amb               Walking on foam beams             Rockerboard taps             Rockerboard balance (static)             Rockerboard (pertubations)             NSP marching BP cuff feedback             NSP heel slide with BP cuff feedback             Biodex             STD hip ext, prone hip ext and kendy stretch for L/S 10x2   2x10 ea 2x10 2x10  10x2 ea  10x2 ea 10x2   Ther Ex             6MWT             HEP                          Step up taps AP and ML  20x 8 inch step 20x          Paloff press             Prone superman 10x 5"    5x10"  5x10"  5"x20 NV 10x 5" 10x5"   RDL 20# x5 20# x10 20# x10  10 lbs 2x10 10 lbs x10  10#  x10 NV  20# 2x10   SL bridge     2x10 ea        Side stepping and added carioca 3 laps side stepping /marching 3 laps with 10lbs wieght in // bars 3 laps with 10 lbs in //bars  3 laps     3 laps   boxing   5 min          Walking lunges  20# 2x10 // bars  2x10       20# 3 laps   Standing glute kickbacks  20x     x10  x20   x20   STD hip 3 way     OTB 10x ea        Ther Activity             NuStep             Step-ups (for/lat)  8" 20x ea     8"x10 ea      Picking up objects from floor             Amb w/ HT/HN   3 laps          Backwards walking    3 laps    15#  5 laps      Obstacle course      W/ agility ladder and hurdles        STS repeats  20 lbs with goblet squat 10x          Hurdles             Leg press HR  15# 20x 205# x40    205#  x40 15" SL HR 30x     Seated lumbar flex stretch 10x5" ea 10x5" ea       10x5" ea 10x5" ea   Gait Training             TM  5 min 2mph 5 min 2 2 mph 5 min   5 min 2 mph  5 min  2 mph 5 min 2 mph 5 min 2 mph 5 min 2 mph   Amb outside (over stones/grass)             Modalities             NMES ankles     10 min        EDUCATION

## 2022-01-19 ENCOUNTER — APPOINTMENT (OUTPATIENT)
Dept: PHYSICAL THERAPY | Facility: CLINIC | Age: 67
End: 2022-01-19
Payer: MEDICARE

## 2022-01-20 ENCOUNTER — EVALUATION (OUTPATIENT)
Dept: PHYSICAL THERAPY | Facility: CLINIC | Age: 67
End: 2022-01-20
Payer: MEDICARE

## 2022-01-20 DIAGNOSIS — Z09 POSTOPERATIVE EXAMINATION: ICD-10-CM

## 2022-01-20 DIAGNOSIS — R26.9 UNSPECIFIED ABNORMALITIES OF GAIT AND MOBILITY: Primary | ICD-10-CM

## 2022-01-20 DIAGNOSIS — M51.36 LUMBAR DEGENERATIVE DISC DISEASE: ICD-10-CM

## 2022-01-20 PROCEDURE — 97112 NEUROMUSCULAR REEDUCATION: CPT | Performed by: PHYSICAL THERAPIST

## 2022-01-20 PROCEDURE — 97140 MANUAL THERAPY 1/> REGIONS: CPT | Performed by: PHYSICAL THERAPIST

## 2022-01-20 PROCEDURE — 97110 THERAPEUTIC EXERCISES: CPT | Performed by: PHYSICAL THERAPIST

## 2022-01-20 NOTE — PROGRESS NOTES
PT Re-Evaluation     Today's date: 2022  Patient name: Jaziel Mccray  : 1955  MRN: [id number]  Referring provider: FADUMO Sharif  Dx:   Encounter Diagnosis     ICD-10-CM    1  Unspecified abnormalities of gait and mobility  R26 9    2  Postoperative examination  Z09    3  Lumbar degenerative disc disease  M51 36                   Assessment  Assessment details: Pt is a 64y o  male who presents to OP PT for IE s/p decompressive cervical laminectomy and fixation fusion C3-5 for myelopathy 21  Pt has been cleared for initiation of PT and removal of C collar by surgeon  Pt currently c/o of balance and gait deficits  BLE strength testing reveals no overt weakness  Noted gait deviations include B knee flexion t/o stance phase, WBOS, decreased B arm swing, decreased gait speed, and decreased B stride length  Pt is able to ambulate w/ and w/o SPC; gait deviations present w/ and w/o use of AD  6MWT to be completed NV  Functionally pt is able to complete STS transfers w/o use of UEs  Pt does keep B knees flexed following transfers and with majority of mobility as pt feels unsteady and reports this helps with his balance  Pt with + balance deficits and is at an elevated risk for falls as indicated by BBS score 40/56 and FGA 20/30  Given these findings pt is recommended for skilled therapy intervention 2x week  Pt and wife agreeable to this plan  Re-assessment 21:  Charo Lennox has attended 12 visits to date and reports a 30% GROC since the initiation of PT  Clinically demonstrates improved ambulation capacity and efficiency with performance of TUG less than 15 seconds without AD- continues to have a high falls risk however with a score of 40/56  He continues to have limited balance and control of hip/ankle strategy as well as limited walking endurance due to fatigue and potential foot drop despite improved overall ankle strength- still limited with DF and EV    These findings suggest overall that he is in continued need for skilled OPPT to address his remaining deficits, achieve established goals and minimize his risk for falls  Re-assessment 9/30/21:  Elis Black has attended 20 visits since the initiation of PT and reports a 65% GROC  Clinically demonstrates improved overall ankle and hip strength as well as improved balance illustrated by improved BBS score to 48/56 showing moderate falls risk and improved TUG and 5 time sit to stand showing major improvements in function  Elis Black continues to have limited ability to perform SLS and tandem balancing with increased knee strategy to compensate for hip and ankle deficits as a result of continued myotomal weakness  This suggests the need for continued skilled OPPT to address his remaining impairments and maximize functional mobility/balance  Re-assessment 11/24/21:  Elis Black has attended 32 visits since the initiation of PT and reports a 70% GROC  Clinically demonstrates improved overall balance with improved BBS to 50/56 showing moderate falls risk- improved ability to perform SLS and alternating marching type movements without as much LOB  Continues to have weakness at his hip ABD and extension as well as continued R sided ankle PF weakness leading to limited ability to perform dynamic movement without LOB with improved hip strategy  This suggests the need for continued skilled OPPT to address her remaining impairments, achieve established goals and return to PLOF pain-free  Initial Evaluation for R LE 12/15/21:  Elis Black returns to [facility]  as a result of F/U with MD on 11/24/21 where script for lumbar spine was provided as well as continued C/S PT  Reports overall since the start of PT reports a 50% improvement overall in his balance and strength but still has days where "things are not connecting" or "just off regarding his movement"    Clinically demonstrates limited lumbar ROM, decreased B/L hip extension mobility as well as limited hip extension and hip ABD strength leading to increased lumbar stiffness and discomfort despite (-) slump or NT signs  He continues to have limited PF strength on R LE as a result of continued myotomal deficits  This suggests the need for continued OPPT to address the above listed impairments for his L/S and return to pain-free ADLs and improve balance/falls risk as able  Re-assessment 1/20/22:  Luz Garza reports has attended 39 visits to date but ~ 10 visits for L/S and reports a 20% GROC regarding L/S and a 70% GROC overall since the initiation of PT  Demonstrates improved balance in SLS and dynamic ability with performance as well as improved lumbar flexibility  Still with slight weakness into hip ABD or extension  Wishes to continue with OPPT with dynamic activity and continued PF strength development on R LE vs L  Will continue to benefit from skilled PT in order to return to pain-free ADLs at PLOF  Impairments: abnormal gait, activity intolerance and impaired balance  Understanding of Dx/Px/POC: excellent  Goals  STGs (to be achieved within 2 weeks)  1  Pt will report ability to ride stationary bike at home for at least 15 minutes 3-5x week  - MET  2  Pt will complete 6MWT and goals will be set accordingly  - MET  Pt stated goal: walk w/o SPC- Partially MET    LTGs (to be achieved within 8-10 weeks)   1  Pt will be I with HEP at d/c to promote PT carry-over  - MET   2  Pt will meet FOTO predicted score  - Partially MET  3  Pt will improve BBS score to 45/56 or greater indicating that he is at a reduced risk for falls  -  MET- 48  4  Pt will improve FGA score to 24/30 or greater indicating that he is at a reduced risk for falls   - Partially MET  5 ) Able to perform SLS > 10 seconds showing good ankle and hip strategy- Not MET  6 ) Improve SL HR >5 reps allowing for improved ankle strategy- Not MET  7 ) Improve B/L hip extension and ABD strength at least 1/2 to 1 MMT grade- Partially MET    Plan  Plan details: Continue POC for ankle stability, balance and walking endurance; minimize falls risk and return to PLOF  Patient would benefit from: skilled physical therapy  Planned modality interventions: unattended electrical stimulation, cryotherapy and thermotherapy: hydrocollator packs  Planned therapy interventions: abdominal trunk stabilization, manual therapy, balance, strengthening, stretching, therapeutic activities, patient education, neuromuscular re-education, therapeutic exercise, therapeutic training, transfer training, gait training, functional ROM exercises, graded activity, flexibility, graded exercise and home exercise program  Frequency: 2x week  Duration in visits: 16  Duration in weeks: 8  Plan of Care beginning date: 1/20/2022  Plan of Care expiration date: 3/17/2022  Treatment plan discussed with: patient (wife)        Subjective Evaluation    History of Present Illness  Date of surgery: 5/24/21  Mechanism of injury: surgery  Mechanism of injury: Pt presents s/p decompressive cervical laminectomy and fixation fusion C3-5 for myelopathy on 5/24/21  Physician cleared pt for removal of C-collar and initiation of skilled PT approx  2 weeks ago  Since surgery pt has no pain, he does endorse mild "numbess" in feet, which has been improving since surgery  Pt ambulates with SPC in community  Does not use AD at home, but wife endorses "furniture walking"  He does endorse continued feelings of unsteadiness and imbalance since surgery although this has improved since surgical intervention  Pt does note that he "tries to keep lower to the ground" with B knee flexion due to these feelings of unsteadiness  Pt has full flight of stairs in home, reports he has been completing w/o problem w/ use of handrail  Has stationary bike, would like to return to riding  Is able to walk for approx  10min continuously prior to needing rest break   Pt and wife like to go to flea markets, they have been able to since surgery, however outings are limited by distance pt can ambulate  Re-assessment 8/26/21:  Harish Cruz has attended 12 visits and reports a 30% GROC since the initiation of PT  Reports improved walking without the cane around the house as well as at the [employer]  Plans on beginning walking around the block  Still having R sided LOB with stance  Reports improved ability to perform things when he "takes his time"  Notes still feeling fatigue with going up and down stairs at times  Has been biking up to 30 mins on a recumbant  Notes improved sensation in B/L feet and calves  Has F/U with MD in October  Notes slightly disturbed sleep  Denies trouble getting to the bathroom in the middle of the night  Wishes to continue PT in order to walk better  Re-assessment 9/30/21:  Harish Cruz has attended 20 visits to date and reports a 65% GROC  Reports improved function overall since last assessment- able to get up and down steps without using a railing going into the house  Reports feeling improved balance- not as much side to side sway with ambulation  Less overall effusion in LEs as well  Still reporting numbness in feet, no longer getting numbness into his calves  No relying on [facility] for house negotiation- still use SPC if having walk long distances to NPS  Still not able to go around the block  Still unable to cut the grass with his lawnmower  Wishes to continue PT in order to 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 up to 2-3 hours of walking  F/U with MD in 1 week  Re-assessment 11/24/21:  Harish Cruz has attended 32 visits since the initiation of PT and reports a slight improvement from last month and reports a 70% GROC  but still having a bit of stumble and instability with ambulation  Reports less fear of falling but still using his knees for strategy  Able to walk up stairs outside without use of UE support but while inside his house still using an UE for support at times    Notes improved ability to negotiate up and down curbs with less hesitation  Still with calf tightness and LE restriction as well  Still weakness in LEs  Wishes to continue with OPPT to address his remaining balance deficits  F/U with MD in 11 months  Initial Evaluation for R LE 12/15/21:  Aracely Reyes returns to [facility]  as a result of F/U with MD on 21 where script for lumbar spine was provided as well as continued C/S PT  Reports overall since the start of PT reports a 50% improvement overall in his balance and strength but still has days where "things are not connecting" or "just off regarding his movement"  Notes continued difficulty with endurance walking for prolonged periods  At recent F/U with MD- surgeon reports not going to perform any surgical procedure for L/S until 1 year post op from C/S fusion  Aracely Reyes wishes to continue work on strength and function of L/S as well as continued balance deficits  Reports "a little stick" R>L at L/S from transitions  Still walking on his heels  Re-assessment 22:  Aracely Reyes has attended 45 visits since the initiation of PT and reports a 20% GROC in his L/S and grossly 70% GROC in overall ability  Reports feeling more strength in his L/S  Still reports weakness with knee bending at times with LOB  Able to walk around the house without needing to get his barings as much  Still with occasional "baby steps" to right himself  Able to stabilize himself at times  Able to correct himself with any LOB now without using  Wishes to continue with OPPT to continue to progress with balance as able  Able to bend forward and lift a case of water without LOB recently         Pain  Current pain ratin  At best pain ratin  At worst pain ratin  Location: L/S R sided  Quality: sharp, dull ache and tight  Relieving factors: relaxation, rest, support and change in position  Aggravating factors: standing, walking and lifting  Progression: improved      Diagnostic Tests  MRI studies: abnormal  Treatments  Previous treatment: medication and physical therapy  Current treatment: injection treatment and physical therapy  Patient Goals  Patient goals for therapy: decreased edema, decreased pain, improved balance, increased strength, independence with ADLs/IADLs, increased motion and return to sport/leisure activities  Patient goal: "get walking without the cane"        Objective     Active Range of Motion   Cervical/Thoracic Spine       Cervical    Flexion: 57 degrees   Extension: 35 degrees      Left lateral flexion: 27 degrees      Right lateral flexion: 26 degrees      Left rotation: 65 degrees  Right rotation: 50 degrees           Lumbar   Flexion: 90 degrees   Extension: 15 degrees   Left lateral flexion: 20 degrees       Right lateral flexion: 20 degrees   Left rotation: 55 degrees   Right rotation: 70 degrees   Left Hip   External rotation (90/90): 50 degrees   Internal rotation (90/90): 35 degrees     Right Hip   External rotation (90/90): 40 degrees   Internal rotation (90/90): 27 degrees     Strength/Myotome Testing     Left Shoulder     Planes of Motion   Flexion: 5   Abduction: 5   External rotation at 0°: 5   Internal rotation at 0°: 5     Right Shoulder     Planes of Motion   Flexion: 5   Abduction: 5   External rotation at 0°: 5   Internal rotation at 0°: 5     Left Elbow   Flexion: 5  Extension: 5    Right Elbow   Flexion: 5  Extension: 5    Left Wrist/Hand   Wrist extension: 5  Wrist flexion: 5  Thumb extension: 5    Right Wrist/Hand   Wrist extension: 5  Wrist flexion: 5  Thumb extension: 5    Left Hip   Planes of Motion   Flexion: 5  Extension: 4+  Abduction: 4+  Adduction: 5  External rotation: 5  Internal rotation: 5    Right Hip   Planes of Motion   Flexion: 5  Extension: 4+  Abduction: 4  Adduction: 5  External rotation: 4+  Internal rotation: 5    Left Knee   Flexion: 5  Extension: 5    Right Knee   Flexion: 4+  Extension: 5    Left Ankle/Foot   Dorsiflexion: 4+  Plantar flexion: 4+  Inversion: 4+  Eversion: 5    Right Ankle/Foot   Dorsiflexion: 4+  Plantar flexion: 3+  Inversion: 4  Eversion: 4+    Additional Strength Details  155 lbs L HR; 45 lbs R with leg press    Tests     Lumbar     Left   Negative crossed SLR, passive SLR, quadrant and slump test      Right   Positive quadrant  Negative crossed SLR, passive SLR and slump test      Functional Assessment      Squat    Left within functional limits and right within functional limits  Single Leg Stance   Left: 6 seconds  Right: 4 seconds  Neuro Exam:     Functional outcomes   6 minute walk test: 1047  5x sit to stand: 16 1 (seconds)  TUG: 10 73 (seconds)  Functional outcome comment: Hernandez Balance Scale: 40/56  FGA: 20/30  Functional outcome gait comment: Ambulates w/ and w/o AD, gait deviations noted with both including: B knee flexion t/o stance phase, WBOS, decreased B arm swing, decreased gait speed, decreased B stride length             Precautions: C spine fusion C3-C5, fall risk  HEP: ride stationary bike at pt home, increase walking      Manuals 1/10 1/12 1/17 1/20 12/20 12/22 12/27 12/29 1/5 1/7   Re-assessment    PF      PF       35'    PF     LE PROM HS and piriformis AF  PF  PF ML  PF PF PF   STM to L/S AF    PF ML WE PF PF PF   Neuro Re-Ed 10'    30' 20' 12' 30' 30' 15'   Tandem stance  3x30" 3x30"  3x30   3x30"     Standing on foam   2x20"   2x30"        NBOS w/ pertubations             NBOS EC     2x20"   2x20"     Tandem amb               Walking on foam beams             Rockerboard taps             Rockerboard balance (static)             Rockerboard (pertubations)             NSP marching BP cuff feedback             NSP heel slide with BP cuff feedback             Biodex             STD hip ext, prone hip ext and kendy stretch for L/S 10x2   2x10 ea 2x10 2x10  10x2 ea  10x2 ea 10x2   Ther Ex             6MWT             HEP                          Step up taps AP and ML  20x 8 inch step 20x Paloff press             Prone superman 10x 5"    5x10"  5x10"  5"x20 NV 10x 5" 10x5"   RDL 20# x5 20# x10 20# x10  10 lbs 2x10 10 lbs x10  10#  x10 NV  20# 2x10   SL bridge     2x10 ea        Side stepping and added carioca 3 laps side stepping /marching 3 laps with 10lbs wieght in // bars 3 laps with 10 lbs in //bars 3 laps  3 laps     3 laps   boxing   5 min 5 min         Walking lunges  20# 2x10 // bars  2x10 2x10      20# 3 laps   Standing glute kickbacks  20x     x10  x20   x20   STD hip 3 way     OTB 10x ea        Ther Activity             NuStep             Step-ups (for/lat)  8" 20x ea     8"x10 ea      Picking up objects from floor             Amb w/ HT/HN   3 laps          Backwards walking    3 laps    15#  5 laps      Obstacle course    W agility ladder  W/ agility ladder and hurdles        STS repeats  20 lbs with goblet squat 10x          Hurdles             Leg press HR  15# 20x 205# x40 205# x40   205#  x40 15" SL HR 30x     Seated lumbar flex stretch 10x5" ea 10x5" ea       10x5" ea 10x5" ea   Gait Training             TM  5 min 2mph 5 min 2 2 mph 5 min 5 min  5 min 2 mph  5 min  2 mph 5 min 2 mph 5 min 2 mph 5 min 2 mph   Amb outside (over stones/grass)             Modalities             NMES ankles     10 min        EDUCATION

## 2022-01-24 ENCOUNTER — OFFICE VISIT (OUTPATIENT)
Dept: PHYSICAL THERAPY | Facility: CLINIC | Age: 67
End: 2022-01-24
Payer: MEDICARE

## 2022-01-24 DIAGNOSIS — R26.9 UNSPECIFIED ABNORMALITIES OF GAIT AND MOBILITY: ICD-10-CM

## 2022-01-24 DIAGNOSIS — Z09 POSTOPERATIVE EXAMINATION: Primary | ICD-10-CM

## 2022-01-24 DIAGNOSIS — M51.36 LUMBAR DEGENERATIVE DISC DISEASE: ICD-10-CM

## 2022-01-24 PROCEDURE — 97110 THERAPEUTIC EXERCISES: CPT | Performed by: PHYSICAL THERAPIST

## 2022-01-24 PROCEDURE — 97140 MANUAL THERAPY 1/> REGIONS: CPT | Performed by: PHYSICAL THERAPIST

## 2022-01-24 PROCEDURE — 97112 NEUROMUSCULAR REEDUCATION: CPT | Performed by: PHYSICAL THERAPIST

## 2022-01-24 NOTE — PROGRESS NOTES
Daily Note     Today's date: 2022  Patient name: Ryne Acuña  : 1955  MRN: [id number]  Referring provider: FADUMO Hagan  Dx:   Encounter Diagnosis     ICD-10-CM    1  Postoperative examination  Z09    2  Unspecified abnormalities of gait and mobility  R26 9    3  Lumbar degenerative disc disease  M51 36                   Subjective: Notes feeling okay overall       Objective: See treatment diary below      Assessment: Demonstrates improved balance with performance ball toss and karthik walking  Able to perform dynamic agility warm up without difficulty as well with high knees and side shuffle  Continued progress with performance of sit to stand without retropulsion and improved PF activation  Plan: Continue per plan of care  Precautions: C spine fusion C3-C5, fall risk  HEP: ride stationary bike at pt home, increase walking      Manuals 1/10 1/12 1/17 1/20 1/24 12/22 12/27 12/29 1/5 1/7   Re-assessment    PF      PF       35'    PF     LE PROM HS and piriformis AF  PF  PF ML  PF PF PF   STM to L/S AF    PF ML WE PF PF PF   Neuro Re-Ed 10'    15'' 20' 12' 30' 30' 15'   Tandem stance  3x30" 3x30"  3x30   3x30"     Standing on foam   2x20"   2x30"        NBOS w/ pertubations             NBOS EC        2x20"     Tandem amb               Walking on foam beams             Rockerboard taps             Rockerboard balance (static)             Rockerboard (pertubations)             NSP marching BP cuff feedback             NSP heel slide with BP cuff feedback             Biodex             STD hip ext, prone hip ext and kendy stretch for L/S 10x2   2x10 ea 2x10 2x10  10x2 ea  10x2 ea 10x2   Ther Ex             6MWT             HEP                          Step up taps AP and ML  20x 8 inch step 20x  20x        Paloff press             Prone superman 10x 5"      5x10"  5"x20 NV 10x 5" 10x5"   RDL 20# x5 20# x10 20# x10   10 lbs x10  10#  x10 NV  20# 2x10   SL bridge     2x10 ea        Side stepping and added carioca 3 laps side stepping /marching 3 laps with 10lbs wieght in // bars 3 laps with 10 lbs in //bars 3 laps  3 laps     3 laps   boxing   5 min 5 min 5 min        Walking lunges  20# 2x10 // bars  2x10 2x10 10# 2x10     20# 3 laps   Standing glute kickbacks  20x     x10  x20   x20   STD hip 3 way             Ther Activity             NuStep             Step-ups (for/lat)  8" 20x ea     8"x10 ea      Picking up objects from floor             Amb w/ HT/HN   3 laps          Backwards walking    3 laps    15#  5 laps      Obstacle course    W agility ladder W agility ladder W/ agility ladder and hurdles        STS repeats  20 lbs with goblet squat 10x  15x 10#        Hurdles     3 laps        Leg press HR  15# 20x 205# x40 205# x40   205#  x40 15" SL HR 30x     Seated lumbar flex stretch 10x5" ea 10x5" ea       10x5" ea 10x5" ea   Gait Training             TM  5 min 2mph 5 min 2 2 mph 5 min 5 min 5 min 5 min 2 mph  5 min  2 mph 5 min 2 mph 5 min 2 mph 5 min 2 mph   Amb outside (over stones/grass)             Modalities             NMES ankles             EDUCATION

## 2022-01-26 ENCOUNTER — OFFICE VISIT (OUTPATIENT)
Dept: PHYSICAL THERAPY | Facility: CLINIC | Age: 67
End: 2022-01-26
Payer: MEDICARE

## 2022-01-26 DIAGNOSIS — R26.9 UNSPECIFIED ABNORMALITIES OF GAIT AND MOBILITY: ICD-10-CM

## 2022-01-26 DIAGNOSIS — M51.36 LUMBAR DEGENERATIVE DISC DISEASE: ICD-10-CM

## 2022-01-26 DIAGNOSIS — Z09 POSTOPERATIVE EXAMINATION: Primary | ICD-10-CM

## 2022-01-26 PROCEDURE — 97110 THERAPEUTIC EXERCISES: CPT | Performed by: PHYSICAL THERAPIST

## 2022-01-26 PROCEDURE — 97140 MANUAL THERAPY 1/> REGIONS: CPT | Performed by: PHYSICAL THERAPIST

## 2022-01-26 PROCEDURE — 97112 NEUROMUSCULAR REEDUCATION: CPT | Performed by: PHYSICAL THERAPIST

## 2022-01-26 NOTE — PROGRESS NOTES
Daily Note     Today's date: 2022  Patient name: Guille Sylvester  : 1955  MRN: [id number]  Referring provider: FADUMO Tejada  Dx:   Encounter Diagnosis     ICD-10-CM    1  Postoperative examination  Z09    2  Unspecified abnormalities of gait and mobility  R26 9    3  Lumbar degenerative disc disease  M51 36                   Subjective: Feeling improved overall pain, just stiffness and "spongy" feeling in his calves  Objective: See treatment diary below      Assessment: Able to perform all TE today with improved ability  Still with limited balance with dynamic step lunge to step up  Able to perform step up today without retropulsion  Improved ability to perform ball toss with large step anteriorly  Will add weighted lifting with dynamic activity  Plan: Continue per plan of care  Precautions: C spine fusion C3-C5, fall risk  HEP: ride stationary bike at pt home, increase walking      Manuals 1/10 1/12 1/17 1/20 1/24 1/26 12/27 12/29 1/5 1/7   Re-assessment    PF      PF       35'    PF     LE PROM HS and piriformis AF  PF  PF PF  PF PF PF   STM to L/S AF    PF PF WE PF PF PF   Neuro Re-Ed 10'    15'' 10' 12' 30' 30' 15'   Tandem stance  3x30" 3x30"  3x30   3x30"     Standing on foam   2x20"   2x30"        NBOS w/ pertubations             NBOS EC        2x20"     Tandem amb               Walking on foam beams             Rockerboard taps             Rockerboard balance (static)             Rockerboard (pertubations)             NSP marching BP cuff feedback             NSP heel slide with BP cuff feedback             Biodex             STD hip ext, prone hip ext and kendy stretch for L/S 10x2   2x10 ea 2x10 2x10  10x2 ea  10x2 ea 10x2   Ther Ex             6MWT             HEP                          Step up taps AP and ML  20x 8 inch step 20x  20x 20x       Paloff press             Prone superman 10x 5"        5"x20 NV 10x 5" 10x5"   RDL 20# x5 20# x10 20# x10     10#  x10 NV 20# 2x10   SL bridge     2x10 ea        Side stepping and added carioca 3 laps side stepping /marching 3 laps with 10lbs wieght in // bars 3 laps with 10 lbs in //bars 3 laps  3 laps 3 laps ea    3 laps   boxing   5 min 5 min 5 min 5 min       Walking lunges  20# 2x10 // bars  2x10 2x10 10# 2x10 10# 4 laps 20 ft    20# 3 laps   Standing glute kickbacks  20x     x10  x20   x20   STD hip 3 way             Ther Activity             NuStep             Step-ups (for/lat)  8" 20x ea    8" x20 8"x10 ea      Picking up objects from floor             Amb w/ HT/HN   3 laps          Backwards walking    3 laps    15#  5 laps      Obstacle course    W agility ladder W agility ladder W/ agility ladder and hurdles        STS repeats  20 lbs with goblet squat 10x  15x 10#        Hurdles     3 laps 3 laps       Leg press HR  15# 20x 205# x40 205# x40   205#  x40 15" SL HR 30x     Seated lumbar flex stretch 10x5" ea 10x5" ea       10x5" ea 10x5" ea   Gait Training             TM  5 min 2mph 5 min 2 2 mph 5 min 5 min 5 min 5 min 2 mph  5 min  2 mph 5 min 2 mph 5 min 2 mph 5 min 2 mph   Amb outside (over stones/grass)             Modalities             NMES ankles             EDUCATION

## 2022-01-31 ENCOUNTER — OFFICE VISIT (OUTPATIENT)
Dept: PHYSICAL THERAPY | Facility: CLINIC | Age: 67
End: 2022-01-31
Payer: MEDICARE

## 2022-01-31 DIAGNOSIS — Z09 POSTOPERATIVE EXAMINATION: ICD-10-CM

## 2022-01-31 DIAGNOSIS — R26.9 UNSPECIFIED ABNORMALITIES OF GAIT AND MOBILITY: Primary | ICD-10-CM

## 2022-01-31 PROCEDURE — 97140 MANUAL THERAPY 1/> REGIONS: CPT | Performed by: PHYSICAL THERAPIST

## 2022-01-31 PROCEDURE — 97110 THERAPEUTIC EXERCISES: CPT | Performed by: PHYSICAL THERAPIST

## 2022-01-31 NOTE — PROGRESS NOTES
Daily Note     Today's date: 2022  Patient name: Santos Nagel  : 1955  MRN: [id number]  Referring provider: FADUMO Hyde  Dx:   Encounter Diagnosis     ICD-10-CM    1  Unspecified abnormalities of gait and mobility  R26 9    2  Postoperative examination  Z09                   Subjective: Doing better yesterday than today  Objective: See treatment diary below      Assessment: Continues to have + response to performance of dynamic exercise  Able to perform weighted carry and squat/lifts from the floor  Able to perform all dynamic exercise without difficulty  Plan: Continue per plan of care  Precautions: C spine fusion C3-C5, fall risk  HEP: ride stationary bike at pt home, increase walking      Manuals 1/10 1/12 1/17 1/20 1/24 1/26 1/31 12/29 1/5 1/7   Re-assessment    PF      PF       35'    PF     LE PROM HS and piriformis AF  PF  PF PF PF PF PF PF   STM to L/S AF    PF PF PF PF PF PF   Neuro Re-Ed 10'    15'' 10' 12' 30' 30' 15'   Tandem stance  3x30" 3x30"  3x30   3x30"     Standing on foam   2x20"   2x30"        NBOS w/ pertubations             NBOS EC        2x20"     Tandem amb               Walking on foam beams             Rockerboard taps             Rockerboard balance (static)             Rockerboard (pertubations)             NSP marching BP cuff feedback             NSP heel slide with BP cuff feedback             Biodex             STD hip ext, prone hip ext and kendy stretch for L/S 10x2   2x10 ea 2x10 2x10  10x2 ea  10x2 ea 10x2   Ther Ex             6MWT             HEP                          Step up taps AP and ML  20x 8 inch step 20x  20x 20x 20x      Paloff press             Prone superman 10x 5"         NV 10x 5" 10x5"   RDL 20# x5 20# x10 20# x10      NV  20# 2x10   SL bridge     2x10 ea        Side stepping and added carioca 3 laps side stepping /marching 3 laps with 10lbs wieght in // bars 3 laps with 10 lbs in //bars 3 laps  3 laps 3 laps ea 3 laps ea   3 laps   boxing   5 min 5 min 5 min 5 min 5 min      Walking lunges  20# 2x10 // bars  2x10 2x10 10# 2x10 10# 4 laps 20 ft 10# 4 laps 20 ft   20# 3 laps   Standing glute kickbacks  20x     x10  x20   x20   STD hip 3 way             Ther Activity             NuStep             Step-ups (for/lat)  8" 20x ea    8" x20 8"x10 ea      Picking up objects from floor             Amb w/ HT/HN   3 laps          Backwards walking    3 laps    15#  5 laps      Obstacle course    W agility ladder W agility ladder W/ agility ladder and hurdles  W agility ladder and hurdles      STS repeats  20 lbs with goblet squat 10x  15x 10#        Hurdles     3 laps 3 laps 3 laps      Leg press HR  15# 20x 205# x40 205# x40    15" SL HR 30x     Seated lumbar flex stretch 10x5" ea 10x5" ea       10x5" ea 10x5" ea   Gait Training             TM  5 min 2mph 5 min 2 2 mph 5 min 5 min 5 min 5 min 2 mph  5 min  2 mph 5 min 2 mph 5 min 2 mph 5 min 2 mph   Amb outside (over stones/grass)             Modalities             NMES ankles             EDUCATION

## 2022-02-02 ENCOUNTER — TELEPHONE (OUTPATIENT)
Dept: NEUROSURGERY | Facility: CLINIC | Age: 67
End: 2022-02-02

## 2022-02-02 ENCOUNTER — OFFICE VISIT (OUTPATIENT)
Dept: PHYSICAL THERAPY | Facility: CLINIC | Age: 67
End: 2022-02-02
Payer: MEDICARE

## 2022-02-02 DIAGNOSIS — M51.36 LUMBAR DEGENERATIVE DISC DISEASE: ICD-10-CM

## 2022-02-02 DIAGNOSIS — R26.9 UNSPECIFIED ABNORMALITIES OF GAIT AND MOBILITY: Primary | ICD-10-CM

## 2022-02-02 DIAGNOSIS — Z09 POSTOPERATIVE EXAMINATION: ICD-10-CM

## 2022-02-02 LAB
AFP-TM SERPL-MCNC: 2.6 NG/ML (ref 0–8.3)
ALBUMIN SERPL-MCNC: 3.6 G/DL (ref 3.8–4.8)
ALP SERPL-CCNC: 105 IU/L (ref 44–121)
ALT SERPL-CCNC: 32 IU/L (ref 0–44)
AST SERPL-CCNC: 50 IU/L (ref 0–40)
BILIRUB DIRECT SERPL-MCNC: 0.49 MG/DL (ref 0–0.4)
BILIRUB SERPL-MCNC: 2 MG/DL (ref 0–1.2)
PROT SERPL-MCNC: 6.2 G/DL (ref 6–8.5)

## 2022-02-02 PROCEDURE — 97110 THERAPEUTIC EXERCISES: CPT | Performed by: PHYSICAL THERAPIST

## 2022-02-02 PROCEDURE — 97140 MANUAL THERAPY 1/> REGIONS: CPT | Performed by: PHYSICAL THERAPIST

## 2022-02-02 NOTE — PROGRESS NOTES
Daily Note     Today's date: 2022  Patient name: Lorraine Ramos  : 1955  MRN: [id number]  Referring provider: FADUMO Fitzgerald  Dx:   Encounter Diagnosis     ICD-10-CM    1  Unspecified abnormalities of gait and mobility  R26 9    2  Postoperative examination  Z09    3  Lumbar degenerative disc disease  M51 36                   Subjective: Notes feeling better today at LEs, no stiffness at calves  Objective: See treatment diary below      Assessment: Challenged with SL star sliders today  Challenged also with performance of deadlift up to 35 lbs  Noting continued LOB posterior but improved post cues for lumbar straightening  Will continue to progress with strength and balance dynamically as able  Plan: Continue per plan of care  Precautions: C spine fusion C3-C5, fall risk  HEP: ride stationary bike at pt home, increase walking      Manuals 1/10 1/12 1/17 1/20 1/24 1/26 1/31 2/2 1/5 1/7   Re-assessment    PF      PF       35'    PF     LE PROM HS and piriformis AF  PF  PF PF PF  PF PF   STM to L/S AF    PF PF PF  PF PF   Neuro Re-Ed 10'    15'' 10' 12' 30' 30' 15'   Tandem stance  3x30" 3x30"  3x30   3x30"     Standing on foam   2x20"   2x30"        NBOS w/ pertubations             NBOS EC        2x20"     Tandem amb               Walking on foam beams             Rockerboard taps             Rockerboard balance (static)             Rockerboard (pertubations)             NSP marching BP cuff feedback             NSP heel slide with BP cuff feedback             Biodex             STD hip ext, prone hip ext and kendy stretch for L/S 10x2   2x10 ea 2x10 2x10  10x2 ea  10x2 ea 10x2   Ther Ex             6MWT             HEP                          Step up taps AP and ML  20x 8 inch step 20x  20x 20x 20x 20x     Paloff press             Prone superman 10x 5"          10x 5" 10x5"   RDL 20# x5 20# x10 20# x10        20# 2x10   SL bridge     2x10 ea        Side stepping and added carioca 3 laps side stepping /marching 3 laps with 10lbs wieght in // bars 3 laps with 10 lbs in //bars 3 laps  3 laps 3 laps ea 3 laps ea 3 laps  3 laps   boxing   5 min 5 min 5 min 5 min 5 min 5 min     Walking lunges  20# 2x10 // bars  2x10 2x10 10# 2x10 10# 4 laps 20 ft 10# 4 laps 20 ft 10# 4 laps  20# 3 laps   Standing glute kickbacks  20x     x10  x20   x20   STD hip 3 way             Ther Activity             NuStep             Step-ups (for/lat)  8" 20x ea    8" x20 8"x10 ea 8" x10 ea     Picking up objects from floor             Amb w/ HT/HN   3 laps          Backwards walking    3 laps    15#  5 laps      Obstacle course    W agility ladder W agility ladder W/ agility ladder and hurdles  W agility ladder and hurdles      STS repeats  20 lbs with goblet squat 10x  15x 10#   15X 10#     Hurdles     3 laps 3 laps 3 laps 3 laps     Leg press HR  15# 20x 205# x40 205# x40    15" SL HR 30x     Seated lumbar flex stretch 10x5" ea 10x5" ea       10x5" ea 10x5" ea   Gait Training             TM  5 min 2mph 5 min 2 2 mph 5 min 5 min 5 min 5 min 2 mph  5 min  2 mph 5 min 2 5 mph 5 min 2 mph 5 min 2 mph   Amb outside (over stones/grass)             Modalities             NMES ankles             EDUCATION

## 2022-02-04 ENCOUNTER — HOSPITAL ENCOUNTER (OUTPATIENT)
Dept: ULTRASOUND IMAGING | Facility: HOSPITAL | Age: 67
Discharge: HOME | End: 2022-02-04
Attending: INTERNAL MEDICINE
Payer: MEDICARE

## 2022-02-04 DIAGNOSIS — K74.69 OTHER CIRRHOSIS OF LIVER (HCC): ICD-10-CM

## 2022-02-04 PROCEDURE — 76705 ECHO EXAM OF ABDOMEN: CPT

## 2022-02-04 NOTE — LETTER
[address] [place] [address]  [address] [facility] [address]      February 14, 2022    MRN: [id number]     Phone: [phone]     Dear Mr Sidney Mejia recently had a(n) Ultrasound performed on 2/4/2022 at  [address] [place] [address] that was requested by Wilman Daley DO  The study was reviewed by a radiologist, which is a physician who specializes in medical imaging  The radiologist issued a report describing his or her findings  In that report there was a finding that the radiologist felt warranted further discussion with your health care provider and that discussion would be beneficial to you  The results were sent to Wilman Daley DO on 02/09/2022  6:52 AM  We recommend that you contact Wilman Daley DO at [phone] or set up an appointment to discuss the results of the imaging test  If you have already heard from Wilman Daley DO regarding the results of your study, you can disregard this letter  This letter is not meant to alarm you, but intended to encourage you to follow-up on your results with the provider that sent you for the imaging study  In addition, we have enclosed answers to frequently asked questions by other patients who have also received a letter to review results with their health care provider (see page two)  Thank you for choosing [address] [place] [address] for your medical imaging needs  FREQUENTLY ASKED QUESTIONS    1  Why am I receiving this letter? [facility] requires us to notify patients who have findings on imaging exams that may require more testing or follow-up with a health professional within the next 3 months          2  How serious is the finding on the imaging test?  This letter is sent to all patients who may need follow-up or more testing within the next 3 months  Receiving this letter does not necessarily mean you have a life-threatening imaging finding or disease  Recommendations in the radiologists imaging report are general in nature and it is up to your healthcare provider to say whether those recommendations make sense for your situation  You are strongly encouraged to talk to your health care provider about the results and ask whether additional steps need to be taken  3  Where can I get a copy of the final report for my recent radiology exam?  To get a full copy of the report you can access your records online at [email or web address] or please contact [address] [place] [address] Records Department at [phone] Monday through Friday between 8 am and 6 pm          4  What do I need to do now? Please contact your health care provider who requested the imaging study to discuss what further actions (if any) are needed  You may have already heard from (your ordering provider) in regard to this test in which case you can disregard this letter  NOTICE IN ACCORDANCE WITH THE [facility] PATIENT TEST RESULT INFORMATION ACT OF 2018    You are receiving this notice as a result of a determination by your diagnostic imaging service that further discussions of your test results are warranted and would be beneficial to you  The complete results of your test or tests have been or will be sent to the health care practitioner that ordered the test or tests  It is recommended that you contact your health care practitioner to discuss your results as soon as possible

## 2022-02-04 NOTE — LETTER
[address] [place] [address]  [address] [facility] [address]      February 14, 2022    MRN: [id number]     Phone: [phone]     Dear Mr Chyna Sanchez recently had a(n) Ultrasound performed on 2/4/2022 at  [address] [place] [address] that was requested by Asha Camara DO  The study was reviewed by a radiologist, which is a physician who specializes in medical imaging  The radiologist issued a report describing his or her findings  In that report there was a finding that the radiologist felt warranted further discussion with your health care provider and that discussion would be beneficial to you  The results were sent to Asha Camara DO on 02/09/2022  6:52 AM  We recommend that you contact Asha Camara DO at [phone] or set up an appointment to discuss the results of the imaging test  If you have already heard from Asha Camara DO regarding the results of your study, you can disregard this letter  This letter is not meant to alarm you, but intended to encourage you to follow-up on your results with the provider that sent you for the imaging study  In addition, we have enclosed answers to frequently asked questions by other patients who have also received a letter to review results with their health care provider (see page two)  Thank you for choosing [address] [place] [address] for your medical imaging needs  FREQUENTLY ASKED QUESTIONS    1  Why am I receiving this letter? [facility] requires us to notify patients who have findings on imaging exams that may require more testing or follow-up with a health professional within the next 3 months          2  How serious is the finding on the imaging test?  This letter is sent to all patients who may need follow-up or more testing within the next 3 months  Receiving this letter does not necessarily mean you have a life-threatening imaging finding or disease  Recommendations in the radiologists imaging report are general in nature and it is up to your healthcare provider to say whether those recommendations make sense for your situation  You are strongly encouraged to talk to your health care provider about the results and ask whether additional steps need to be taken  3  Where can I get a copy of the final report for my recent radiology exam?  To get a full copy of the report you can access your records online at [email or web address] or please contact [address] [place] [address] Records Department at [phone] Monday through Friday between 8 am and 6 pm          4  What do I need to do now? Please contact your health care provider who requested the imaging study to discuss what further actions (if any) are needed  You may have already heard from (your ordering provider) in regard to this test in which case you can disregard this letter  NOTICE IN ACCORDANCE WITH THE [facility] PATIENT TEST RESULT INFORMATION ACT OF 2018    You are receiving this notice as a result of a determination by your diagnostic imaging service that further discussions of your test results are warranted and would be beneficial to you  The complete results of your test or tests have been or will be sent to the health care practitioner that ordered the test or tests  It is recommended that you contact your health care practitioner to discuss your results as soon as possible

## 2022-02-07 ENCOUNTER — VBI (OUTPATIENT)
Dept: ADMINISTRATIVE | Facility: OTHER | Age: 67
End: 2022-02-07

## 2022-02-07 ENCOUNTER — OFFICE VISIT (OUTPATIENT)
Dept: PHYSICAL THERAPY | Facility: CLINIC | Age: 67
End: 2022-02-07
Payer: MEDICARE

## 2022-02-07 DIAGNOSIS — M51.36 LUMBAR DEGENERATIVE DISC DISEASE: ICD-10-CM

## 2022-02-07 DIAGNOSIS — R26.9 UNSPECIFIED ABNORMALITIES OF GAIT AND MOBILITY: Primary | ICD-10-CM

## 2022-02-07 PROCEDURE — 97112 NEUROMUSCULAR REEDUCATION: CPT | Performed by: PHYSICAL THERAPIST

## 2022-02-07 PROCEDURE — 97110 THERAPEUTIC EXERCISES: CPT | Performed by: PHYSICAL THERAPIST

## 2022-02-07 NOTE — PROGRESS NOTES
Daily Note     Today's date: 2022  Patient name: Matt Cortez  : 1955  MRN: [id number]  Referring provider: FADUMO Cabral  Dx:   Encounter Diagnosis     ICD-10-CM    1  Unspecified abnormalities of gait and mobility  R26 9    2  Lumbar degenerative disc disease  M51 36                   Subjective: Notes feeling a little wobbly today  Objective: See treatment diary below      Assessment: Able to perform all TE without difficulty  Still LOB overall with added carioca stepping  Denies any difficulty with performance ball toss  With added ball toss and step over able to perform with need for slowing down and cues for step strategy  Plan: Continue per plan of care  Precautions: C spine fusion C3-C5, fall risk  HEP: ride stationary bike at pt home, increase walking      Manuals 1/10 1/12 1/17 1/20 1/24 1/26 1/31 2/2 2/7 1/7   Re-assessment    PF      PF       35'    PF     LE PROM HS and piriformis AF  PF  PF PF PF   PF   STM to L/S AF    PF PF PF   PF   Neuro Re-Ed 10'    15'' 10' 12' 30' ' 15'   Tandem stance  3x30" 3x30"  3x30   3x30"     Standing on foam   2x20"   2x30"        NBOS w/ pertubations             NBOS EC        2x20"     Tandem amb               Walking on foam beams             Rockerboard taps             Rockerboard balance (static)             Rockerboard (pertubations)             NSP marching BP cuff feedback             NSP heel slide with BP cuff feedback             Biodex             STD hip ext, prone hip ext and kendy stretch for L/S 10x2   2x10 ea 2x10 2x10  10x2 ea  10x2 ea 10x2   Ther Ex             6MWT             HEP                          Step up taps AP and ML  20x 8 inch step 20x  20x 20x 20x 20x 20x    Paloff press             Prone superman 10x 5"          10x 5" 10x5"   RDL 20# x5 20# x10 20# x10        20# 2x10   SL bridge     2x10 ea        Side stepping and added carioca 3 laps side stepping /marching 3 laps with 10lbs wieght in // bars 3 laps with 10 lbs in //bars 3 laps  3 laps 3 laps ea 3 laps ea 3 laps 3 laps 3 laps   boxing   5 min 5 min 5 min 5 min 5 min 5 min 5 min    Walking lunges  20# 2x10 // bars  2x10 2x10 10# 2x10 10# 4 laps 20 ft 10# 4 laps 20 ft 10# 4 laps 10# 4 laps 20# 3 laps   Standing glute kickbacks  20x     x10  x20  x20 x20   STD hip 3 way             Ther Activity             NuStep             Step-ups (for/lat)  8" 20x ea    8" x20 8"x10 ea 8" x10 ea 8" x10 ea    Picking up objects from floor             Amb w/ HT/HN   3 laps          Backwards walking    3 laps    15#  5 laps  15# 5 laps    Obstacle course    W agility ladder W agility ladder W/ agility ladder and hurdles  W agility ladder and hurdles  W agilty ladder     STS repeats  20 lbs with goblet squat 10x  15x 10#   15X 10# 15x 10x    Hurdles     3 laps 3 laps 3 laps 3 laps 3 laps    Leg press HR  15# 20x 205# x40 205# x40    15" SL HR 30x     Seated lumbar flex stretch 10x5" ea 10x5" ea       10x5" ea 10x5" ea   Gait Training             TM  5 min 2mph 5 min 2 2 mph 5 min 5 min 5 min 5 min 2 mph  5 min  2 mph 5 min 2 5 mph 10 min 2 mph 5 min 2 mph   Amb outside (over stones/grass)             Modalities             NMES ankles             EDUCATION

## 2022-02-08 ENCOUNTER — OFFICE VISIT (OUTPATIENT)
Dept: GASTROENTEROLOGY | Facility: CLINIC | Age: 67
End: 2022-02-08
Payer: MEDICARE

## 2022-02-08 VITALS
DIASTOLIC BLOOD PRESSURE: 72 MMHG | WEIGHT: 268.4 LBS | BODY MASS INDEX: 35.57 KG/M2 | HEIGHT: 73 IN | SYSTOLIC BLOOD PRESSURE: 118 MMHG

## 2022-02-08 DIAGNOSIS — I85.00 ESOPHAGEAL VARICES DETERMINED BY ENDOSCOPY (HCC): ICD-10-CM

## 2022-02-08 DIAGNOSIS — K21.9 GASTROESOPHAGEAL REFLUX DISEASE, UNSPECIFIED WHETHER ESOPHAGITIS PRESENT: ICD-10-CM

## 2022-02-08 DIAGNOSIS — K74.69 OTHER CIRRHOSIS OF LIVER (HCC): Primary | ICD-10-CM

## 2022-02-08 DIAGNOSIS — Z86.010 HISTORY OF COLON POLYPS: ICD-10-CM

## 2022-02-08 PROCEDURE — 99213 OFFICE O/P EST LOW 20 MIN: CPT | Performed by: NURSE PRACTITIONER

## 2022-02-08 NOTE — PROGRESS NOTES
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  Discovered incidentally during laparoscopic cholecystectomy  Patient denies any symptoms or complications at this time  RUQ U/S completed on 02/04/2021, official reading is pending     - US right upper quadrant; Future  - CBC and differential; Future  - Comprehensive metabolic panel  - Protime-INR; Future  - AFP tumor marker; Future  - CBC and differential  - Protime-INR  - AFP tumor marker    2  Esophageal varices determined by endoscopy (HCC)  Nadolol was increased to 40 mg daily post EGD 10/11/2021  One year recall for EGD  3  Gastroesophageal reflux disease, unspecified whether esophagitis present  Patient is stable off PPI  Denies any acid reflux symptoms or alarm symptoms  4  History of colon polyps  Colonoscopy 10/2021 5 year recall  Followup Appointment:  6 months  ______________________________________________________________________    Chief Complaint   Patient presents with    Follow up cirrhosis     HPI:  64-year-old male with history of GRACIA cirrhosis accompanied by his wife Steven Rebolledo presents for annual follow-up  Patient states he is continuing to do well  Recent colonoscopy 10/11/2021; polyp x3, grade 2 internal hemorrhoid, 5 year recall  EGD at that time noted two medium grade 2 varices (no red corbin sign) in the esophagus, mild gastritis, normal duodenum repeat surveillance EGD x1 year  Nadolol was increased to 40 mg daily after EGD findings  Patient's most recent lab work T bili 2 0, T bili 0 49, alk-phos 105, AST 50, ALT 32, AFP normal at 2 6, INR 1 26, hemoglobin 14 5, platelets 595  Patient recently had right upper quadrant ultrasound completed at [facility] with results currently pending  Patient denies any GI complications at this time  Historical Information   Past Medical History:   Diagnosis Date    Abscess of back 03/01/2018    Benign essential hypertension     Last Assessed:12/19/16; Pt reports heart and BP has been "fine" as of 4/16/2020    Cirrhosis (HCC)     Colon polyp     Cyst of skin     x6 from neck down back area    Depression     Esophageal varices (HCC)     Liver disease     Macrocytosis     Last Assessed:1/16/17    Major depression, chronic     Last Assessed:12/21/17    Major depression, chronic 6/19/2017    Personal history of colonic polyps     Sleep apnea      Past Surgical History:   Procedure Laterality Date    CHOLECYSTECTOMY  11/2018    CHOLECYSTECTOMY LAPAROSCOPIC N/A 11/21/2018    Procedure: CHOLECYSTECTOMY LAPAROSCOPIC, wedge liver biopsy;  Surgeon: Adalid Martinez MD;  Location: [facility];  Service: General    COLONOSCOPY  05/2011    COLONOSCOPY  05/2016    COLONOSCOPY      EGD  01/2019    Esophagitis, esophageal varices    GALLBLADDER SURGERY      INCISION AND DRAINAGE OF WOUND N/A 03/01/2018    SEBACEOUS CYST ABSCESS OF BACK-VIJAYA MONZON MD    MI ARTHRODESIS POSTERIOR/POSTERIORLATERAL CERVICAL BELOW C2 N/A 5/24/2021    Procedure: Posterior cervical decompressive laminectomy and lateral mass fixation fusion C3-5;  Surgeon: Cathy Negro MD;  Location: [facility];  Service: Neurosurgery    MI EXC SKIN BENIG 1 1-2 CM TRUNK,ARM,LEG N/A 8/22/2018    Procedure: EXCISION  BIOPSY LESION/MASS BACK X4 NECK X1;  Surgeon: Adalid Martinez MD;  Location: [facility];  Service: General     Social History     Substance and Sexual Activity   Alcohol Use Not Currently    Comment: 7/19/19-last drink 12/2018- Occasionally/1-2 drinks per weekend     Social History     Substance and Sexual Activity   Drug Use No     Social History     Tobacco Use   Smoking Status Never Smoker   Smokeless Tobacco Never Used     Family History   Problem Relation Age of Onset    Alzheimer's disease Mother     Valvular heart disease Father     Stroke Brother         Mini    Valvular heart disease Brother     Colon cancer Neg Hx     Colon polyps Neg Hx          Current Outpatient Medications:     b complex vitamins tablet    buPROPion (WELLBUTRIN XL) 150 mg 24 hr tablet    hydrochlorothiazide (HYDRODIURIL) 25 mg tablet    multivitamin (THERAGRAN) TABS    nadolol (CORGARD) 40 mg tablet  No Known Allergies  Reviewed medications and allergies and updated as indicated    PHYSICAL EXAM:    Blood pressure 118/72, height 6' 1" (1 854 m), weight 122 kg (268 lb 6 4 oz)  Body mass index is 35 41 kg/m²  General Appearance: NAD, cooperative, alert  Eyes: Anicteric, PERRLA, EOMI  ENT:  Normocephalic, atraumatic, normal mucosa  Neck:  Supple, symmetrical, trachea midline  Resp:  Clear to auscultation bilaterally; no rales, rhonchi or wheezing; respirations unlabored   CV:  S1 S2, Regular rate and rhythm; no murmur, rub, or gallop  GI:  Large, Soft, non-tender, non-distended; normal bowel sounds; no masses, no organomegaly   Rectal: Deferred  Musculoskeletal: No cyanosis, clubbing or edema  Normal ROM    Skin:  No jaundice, rashes, or lesions   Heme/Lymph: No palpable cervical lymphadenopathy  Psych: Normal affect, good eye contact  Neuro: No gross deficits, AAOx3    Lab Results:   Lab Results   Component Value Date    WBC 4 9 09/01/2021    HGB 14 5 09/01/2021    HCT 41 6 09/01/2021    MCV 97 09/01/2021     (L) 09/01/2021     Lab Results   Component Value Date     12/23/2016    K 4 5 09/01/2021     09/01/2021    CO2 25 09/01/2021    ANIONGAP 8 06/06/2014    BUN 14 09/01/2021    CREATININE 0 83 09/01/2021    GLUCOSE 125 05/24/2021    GLUF 125 (H) 05/24/2021    CALCIUM 8 7 05/25/2021    CORRECTEDCA 10 0 05/10/2021    AST 50 (H) 02/01/2022    ALT 32 02/01/2022    ALKPHOS 107 05/10/2021    PROT 6 3 12/23/2016    BILITOT 0 3 12/23/2016    EGFR 84 05/25/2021     Lab Results   Component Value Date    IRON 103 11/21/2018 TIBC 289 11/21/2018    FERRITIN 234 11/21/2018     Lab Results   Component Value Date    LIPASE 73 11/21/2018       Radiology Results:   No results found

## 2022-02-09 ENCOUNTER — TELEPHONE (OUTPATIENT)
Dept: GASTROENTEROLOGY | Facility: CLINIC | Age: 67
End: 2022-02-09

## 2022-02-09 ENCOUNTER — OFFICE VISIT (OUTPATIENT)
Dept: PHYSICAL THERAPY | Facility: CLINIC | Age: 67
End: 2022-02-09
Payer: MEDICARE

## 2022-02-09 DIAGNOSIS — R26.9 UNSPECIFIED ABNORMALITIES OF GAIT AND MOBILITY: Primary | ICD-10-CM

## 2022-02-09 DIAGNOSIS — M51.36 LUMBAR DEGENERATIVE DISC DISEASE: ICD-10-CM

## 2022-02-09 PROCEDURE — 97110 THERAPEUTIC EXERCISES: CPT | Performed by: PHYSICAL THERAPIST

## 2022-02-09 PROCEDURE — 97112 NEUROMUSCULAR REEDUCATION: CPT | Performed by: PHYSICAL THERAPIST

## 2022-02-09 NOTE — PROGRESS NOTES
Daily Note     Today's date: 2022  Patient name: Aj Quintero  : 1955  MRN: [id number]  Referring provider: FADUMO Nava  Dx:   Encounter Diagnosis     ICD-10-CM    1  Unspecified abnormalities of gait and mobility  R26 9    2  Lumbar degenerative disc disease  M51 36                   Subjective: Notes feeling stiffness in ankles today  Objective: See treatment diary below      Assessment: Able to perform ladder agility today AP and laterally without any LOB and improved stepping reanna and tempo without cues  No medial/lateral LOB noted  Continues to have + response to dynamic warm up and agility exercise  Improved ability to perform ball toss pick ups and sudden change of direction without LOB or need for any assistance  Will re-assess next visit  Plan: Continue per plan of care  Precautions: C spine fusion C3-C5, fall risk  HEP: ride stationary bike at pt home, increase walking      Manuals 1/10 1/12 1/17 1/20 1/24 1/26 1/31 2/2 2/7 2/9   Re-assessment    PF             35'    PF     LE PROM HS and piriformis AF  PF  PF PF PF      STM to L/S AF    PF PF PF      Neuro Re-Ed 10'    15'' 10' 12' 30' ' '   Tandem stance  3x30" 3x30"  3x30   3x30"     Standing on foam   2x20"   2x30"        NBOS w/ pertubations             NBOS EC        2x20"     Tandem amb               Walking on foam beams             Rockerboard taps             Rockerboard balance (static)             Rockerboard (pertubations)             NSP marching BP cuff feedback             NSP heel slide with BP cuff feedback             Biodex             STD hip ext, prone hip ext and kendy stretch for L/S 10x2   2x10 ea 2x10 2x10  10x2 ea  10x2 ea 10x2   Ther Ex             6MWT             HEP                          Step up taps AP and ML  20x 8 inch step 20x  20x 20x 20x 20x 20x    Paloff press             Prone superman 10x 5"          10x 5"    RDL 20# x5 20# x10 20# x10           SL bridge     2x10 ea Side stepping and added carioca 3 laps side stepping /marching 3 laps with 10lbs wieght in // bars 3 laps with 10 lbs in //bars 3 laps  3 laps 3 laps ea 3 laps ea 3 laps 3 laps 3 laps   boxing   5 min 5 min 5 min 5 min 5 min 5 min 5 min 5 min   Walking lunges  20# 2x10 // bars  2x10 2x10 10# 2x10 10# 4 laps 20 ft 10# 4 laps 20 ft 10# 4 laps 10# 4 laps  3 laps   Standing glute kickbacks  20x     x10  x20  x20 x20   STD hip 3 way             Ther Activity             NuStep             Step-ups (for/lat)  8" 20x ea    8" x20 8"x10 ea 8" x10 ea 8" x10 ea    Picking up objects from floor          With ball toss 5 min   Amb w/ HT/HN   3 laps          Backwards walking    3 laps    15#  5 laps  15# 5 laps    Obstacle course    W agility ladder W agility ladder W/ agility ladder and hurdles  W agility ladder and hurdles  W agilty ladder  Agility ladder AP and ML 10 laps   STS repeats  20 lbs with goblet squat 10x  15x 10#   15X 10# 15x 10x 15x to begin   Hurdles     3 laps 3 laps 3 laps 3 laps 3 laps NV   Leg press HR  15# 20x 205# x40 205# x40    15" SL HR 30x     Seated lumbar flex stretch 10x5" ea 10x5" ea       10x5" ea 10x5" ea   Gait Training             TM  5 min 2mph 5 min 2 2 mph 5 min 5 min 5 min 5 min 2 mph  5 min  2 mph 5 min 2 5 mph 10 min 2 mph 5 min 2 mph   Ball toss          5 min   Amb outside (over stones/grass)             Modalities             NMES ankles             EDUCATION

## 2022-02-14 ENCOUNTER — OFFICE VISIT (OUTPATIENT)
Dept: PHYSICAL THERAPY | Facility: CLINIC | Age: 67
End: 2022-02-14
Payer: MEDICARE

## 2022-02-14 DIAGNOSIS — R26.9 UNSPECIFIED ABNORMALITIES OF GAIT AND MOBILITY: Primary | ICD-10-CM

## 2022-02-14 DIAGNOSIS — M51.36 LUMBAR DEGENERATIVE DISC DISEASE: ICD-10-CM

## 2022-02-14 PROCEDURE — 97110 THERAPEUTIC EXERCISES: CPT

## 2022-02-14 PROCEDURE — 97116 GAIT TRAINING THERAPY: CPT

## 2022-02-14 PROCEDURE — 97112 NEUROMUSCULAR REEDUCATION: CPT

## 2022-02-14 NOTE — PROGRESS NOTES
Daily Note     Today's date: 2022  Patient name: Farhad Garber  : 1955  MRN: [id number]  Referring provider: FADUMO Sparks  Dx:   Encounter Diagnosis     ICD-10-CM    1  Unspecified abnormalities of gait and mobility  R26 9    2  Lumbar degenerative disc disease  M51 36                   Subjective: Pt feels so/so today, a little off balanced  Objective: See treatment diary below      Assessment: Pt  Doing much better and gaining confidence in his balance  His lateral movements around punching bag are fluent without shuffle or LOB  Agility ladder still somewhat challenging for pt because he hyper focuses on perfect foot placement in each box- when doing same movements outside agility ladder his movements improve  Also shows improved overall LE strength  Able to push sled with weight while pushing off with his toes  Plan: Continue per plan of care  Precautions: C spine fusion C3-C5, fall risk  HEP: ride stationary bike at pt home, increase walking      Manuals 1/10 1/12 1/17 1/20 1/24 1/26 1/31 2/2 2/7 2/9 2/14   Re-assessment    PF              35'    PF      LE PROM HS and piriformis AF  PF  PF PF PF       STM to L/S AF    PF PF PF       Neuro Re-Ed 10'    15'' 10' 12' 30' ' '    Tandem stance  3x30" 3x30"  3x30   3x30"      Standing on foam   2x20"   2x30"         NBOS w/ pertubations              NBOS EC        2x20"      Tandem amb                Walking on foam beams              Rockerboard taps              Rockerboard balance (static)              Rockerboard (pertubations)              NSP marching BP cuff feedback              NSP heel slide with BP cuff feedback              Biodex              STD hip ext, prone hip ext and kendy stretch for L/S 10x2   2x10 ea 2x10 2x10  10x2 ea  10x2 ea 10x2 10x2   Ther Ex              6MWT              HEP                            Step up taps AP and ML  20x 8 inch step 20x  20x 20x 20x 20x 20x     Paloff press              Prone superman 10x 5"          10x 5"     RDL 20# x5 20# x10 20# x10            SL bridge     2x10 ea         Side stepping and added carioca 3 laps side stepping /marching 3 laps with 10lbs wieght in // bars 3 laps with 10 lbs in //bars 3 laps  3 laps 3 laps ea 3 laps ea 3 laps 3 laps 3 laps 3 laps ea   boxing   5 min 5 min 5 min 5 min 5 min 5 min 5 min 5 min 5 min   Walking lunges  20# 2x10 // bars  2x10 2x10 10# 2x10 10# 4 laps 20 ft 10# 4 laps 20 ft 10# 4 laps 10# 4 laps  3 laps 3 laps   Standing glute kickbacks  20x     x10  x20  x20 x20 x20   STD hip 3 way              Ther Activity              NuStep              Step-ups (for/lat)  8" 20x ea    8" x20 8"x10 ea 8" x10 ea 8" x10 ea     Picking up objects from floor          With ball toss 5 min    Amb w/ HT/HN   3 laps           Backwards walking    3 laps    15#  5 laps  15# 5 laps     Obstacle course    W agility ladder W agility ladder W/ agility ladder and hurdles  W agility ladder and hurdles  W agilty ladder  Agility ladder AP and ML 10 laps Agility ladder   STS repeats  20 lbs with goblet squat 10x  15x 10#   15X 10# 15x 10x 15x to begin x15   Hurdles     3 laps 3 laps 3 laps 3 laps 3 laps NV    Leg press HR  15# 20x 205# x40 205# x40    15" SL HR 30x      Seated lumbar flex stretch 10x5" ea 10x5" ea       10x5" ea 10x5" ea    Gait Training              TM  5 min 2mph 5 min 2 2 mph 5 min 5 min 5 min 5 min 2 mph  5 min  2 mph 5 min 2 5 mph 10 min 2 mph 5 min 2 mph 5 min 2 mph   Ball toss          5 min 5 min   Amb outside (over stones/grass)              Modalities              NMES ankles              EDUCATION

## 2022-02-15 ENCOUNTER — TELEPHONE (OUTPATIENT)
Dept: GASTROENTEROLOGY | Facility: CLINIC | Age: 67
End: 2022-02-15

## 2022-02-15 DIAGNOSIS — R93.5 ABNORMAL FINDINGS ON DIAGNOSTIC IMAGING OF ABDOMEN: Primary | ICD-10-CM

## 2022-02-15 NOTE — RESULT ENCOUNTER NOTE
Discussed with patient  Recent labs normal   Limited views on ultrasound    Plan MRI abdomen/MRCP in 6 months, will need CMP beforehand

## 2022-02-15 NOTE — TELEPHONE ENCOUNTER
Spoke to patient on the phone regarding his right upper quadrant ultrasound  I had discussed the results with Dr Clifton Espino and was instructed that if patient's AFP was normal which it was then we need to do a CT scan of the abdomen pelvis with IV contrast at 6 months  Patient had had an EGD done 10/11/2021 noted to varices, increased patient's nadolol to 40 mg and response  With a 1 year recall for EGD  Reminded patient of follow-up EGD  Order will be placed to [facility] for CT abdomen pelvis with IV contrast for 6 months which is approximately August 2022

## 2022-02-16 ENCOUNTER — OFFICE VISIT (OUTPATIENT)
Dept: PHYSICAL THERAPY | Facility: CLINIC | Age: 67
End: 2022-02-16
Payer: MEDICARE

## 2022-02-16 DIAGNOSIS — M51.36 LUMBAR DEGENERATIVE DISC DISEASE: ICD-10-CM

## 2022-02-16 DIAGNOSIS — R26.9 UNSPECIFIED ABNORMALITIES OF GAIT AND MOBILITY: Primary | ICD-10-CM

## 2022-02-16 PROCEDURE — 97110 THERAPEUTIC EXERCISES: CPT | Performed by: PHYSICAL THERAPIST

## 2022-02-16 PROCEDURE — 97112 NEUROMUSCULAR REEDUCATION: CPT | Performed by: PHYSICAL THERAPIST

## 2022-02-16 NOTE — PROGRESS NOTES
Daily Note     Today's date: 2022  Patient name: Red Litten  : 1955  MRN: [id number]  Referring provider: FADUMO Allison  Dx:   Encounter Diagnosis     ICD-10-CM    1  Unspecified abnormalities of gait and mobility  R26 9    2  Lumbar degenerative disc disease  M51 36                   Subjective: Notes feeling continued stiffness overall  Objective: See treatment diary below      Assessment: Trial of assisted pull up for ecentric control of HR  Fair response  Able to perform obstacle course today with foam pads and uneven surfaces without major LOB  Continues to have inversion instability on R ankle and L ankle  Able to perform RDL to a thruster without LOB using 10 lbs for DB suggesting improved ability to balance in AP  Plan: Continue per plan of care  Precautions: C spine fusion C3-C5, fall risk  HEP: ride stationary bike at pt home, increase walking      Manuals    Re-assessment    PF              35'    PF      LE PROM HS and piriformis PF  PF  PF PF PF       STM to L/S PF    PF PF PF       Neuro Re-Ed 10'    15'' 10' 12' 30' ' '    Tandem stance  3x30" 3x30"  3x30   3x30"      Standing on foam   2x20"   2x30"         NBOS w/ pertubations 3 min             NBOS EC        2x20"      Tandem amb                Walking on foam beams 3 min             Rockerboard taps              Rockerboard balance (static)              Rockerboard (pertubations)              NSP marching BP cuff feedback              NSP heel slide with BP cuff feedback              Biodex              STD hip ext, prone hip ext and kendy stretch for L/S 10x2   2x10 ea 2x10 2x10  10x2 ea  10x2 ea 10x2 10x2   Ther Ex              6MWT              HEP                            Step up taps AP and ML  20x 8 inch step 20x  20x 20x 20x 20x 20x     Paloff press              Prone superman           10x 5"     RDL 20# x5 20# x10 20# x10            SL bridge 2x10 ea         Side stepping and added carioca 3 laps side stepping /marching 3 laps with 10lbs wieght in // bars 3 laps with 10 lbs in //bars 3 laps  3 laps 3 laps ea 3 laps ea 3 laps 3 laps 3 laps 3 laps ea   boxing 5 min  5 min 5 min 5 min 5 min 5 min 5 min 5 min 5 min 5 min   Walking lunges  20# 2x10 // bars  2x10 2x10 10# 2x10 10# 4 laps 20 ft 10# 4 laps 20 ft 10# 4 laps 10# 4 laps  3 laps 3 laps   Standing glute kickbacks       x10  x20  x20 x20 x20   STD hip 3 way              Ther Activity              NuStep              Step-ups (for/lat)  8" 20x ea    8" x20 8"x10 ea 8" x10 ea 8" x10 ea     Picking up objects from floor          With ball toss 5 min    Amb w/ HT/HN   3 laps           Backwards walking    3 laps    15#  5 laps  15# 5 laps     Obstacle course 3 min   W agility ladder W agility ladder W/ agility ladder and hurdles  W agility ladder and hurdles  W agilty ladder  Agility ladder AP and ML 10 laps Agility ladder   STS repeats 10# lbs 20x 20 lbs with goblet squat 10x  15x 10#   15X 10# 15x 10x 15x to begin x15   Hurdles     3 laps 3 laps 3 laps 3 laps 3 laps NV    Leg press HR 75# 20x 15# 20x 205# x40 205# x40    15" SL HR 30x      Seated lumbar flex stretch 10x5" ea 10x5" ea       10x5" ea 10x5" ea    Gait Training              TM  5 min 2mph 5 min 2 2 mph 5 min 5 min 5 min 5 min 2 mph  5 min  2 mph 5 min 2 5 mph 10 min 2 mph 5 min 2 mph 5 min 2 mph   Ball toss          5 min 5 min   Amb outside (over stones/grass)              Modalities              NMES ankles              EDUCATION

## 2022-02-21 ENCOUNTER — OFFICE VISIT (OUTPATIENT)
Dept: PHYSICAL THERAPY | Facility: CLINIC | Age: 67
End: 2022-02-21
Payer: MEDICARE

## 2022-02-21 DIAGNOSIS — M51.36 LUMBAR DEGENERATIVE DISC DISEASE: ICD-10-CM

## 2022-02-21 DIAGNOSIS — R26.9 UNSPECIFIED ABNORMALITIES OF GAIT AND MOBILITY: Primary | ICD-10-CM

## 2022-02-21 PROCEDURE — 97112 NEUROMUSCULAR REEDUCATION: CPT

## 2022-02-21 PROCEDURE — 97110 THERAPEUTIC EXERCISES: CPT

## 2022-02-21 NOTE — PROGRESS NOTES
Daily Note     Today's date: 2022  Patient name: Joselin Boo  : 1955  MRN: [id number]  Referring provider: FADUMO Munoz  Dx:   Encounter Diagnosis     ICD-10-CM    1  Unspecified abnormalities of gait and mobility  R26 9    2  Lumbar degenerative disc disease  M51 36                   Subjective: Pt feels a bit unstable today, knees buckling  Objective: See treatment diary below      Assessment: Pt had more difficulty today at times locking out his knees when standing  This led to more instability balancing today  Has hard time maintaining SLS  Improvement was noted with his sit to stand with weights  Able to move back and forth laterally while boxing with good balance and coordination  Plan: Continue per plan of care  Precautions: C spine fusion C3-C5, fall risk  HEP: ride stationary bike at pt home, increase walking      Manuals    Re-assessment                      PF      LE PROM HS and piriformis PF     PF PF       STM to L/S PF     PF PF       Neuro Re-Ed 10'     10' 12' 30' ' '    Tandem stance        3x30"      Standing on foam               NBOS w/ pertubations 3 min 3 min            NBOS EC        2x20"      Tandem amb                Walking on foam beams 3 min 3 min            Rockerboard taps              Rockerboard balance (static)              Rockerboard (pertubations)              NSP marching BP cuff feedback              NSP heel slide with BP cuff feedback              Biodex              STD hip ext, prone hip ext and kendy stretch for L/S 10x2 10x2    2x10  10x2 ea  10x2 ea 10x2 10x2   Ther Ex              6MWT              HEP                            Step up taps AP and ML      20x 20x 20x 20x     Paloff press              Prone superman          10x 5"     RDL 20# x5 x5 ea             SL bridge              Side stepping and added carioca 3 laps side stepping /marching 3 laps ea    3 laps ea 3 laps ea 3 laps 3 laps 3 laps 3 laps ea   boxing 5 min 5 min    5 min 5 min 5 min 5 min 5 min 5 min   Walking lunges  20# 2x10 // bars 20# 2x10 // bars    10# 4 laps 20 ft 10# 4 laps 20 ft 10# 4 laps 10# 4 laps  3 laps 3 laps   Standing glute kickbacks       x10  x20  x20 x20 x20   STD hip 3 way              Ther Activity              NuStep              Step-ups (for/lat)      8" x20 8"x10 ea 8" x10 ea 8" x10 ea     Picking up objects from floor          With ball toss 5 min    Amb w/ HT/HN              Backwards walking   2 min     15#  5 laps  15# 5 laps     Jogging  2 min            Obstacle course 3 min     W/ agility ladder and hurdles  W agility ladder and hurdles  W agilty ladder  Agility ladder AP and ML 10 laps Agility ladder   STS repeats 10# lbs 20x 10# lbs 20x      15X 10# 15x 10x 15x to begin x15   Hurdles      3 laps 3 laps 3 laps 3 laps NV    Leg press HR 75# 20x 205# 20x      15" SL HR 30x      Seated lumbar flex stretch 10x5" ea        10x5" ea 10x5" ea    Gait Training              TM  5 min 2mph 10 min    5 min 2 mph  5 min  2 mph 5 min 2 5 mph 10 min 2 mph 5 min 2 mph 5 min 2 mph   Ball toss          5 min 5 min   Amb outside (over stones/grass)              Modalities              NMES ankles              EDUCATION

## 2022-02-23 ENCOUNTER — OFFICE VISIT (OUTPATIENT)
Dept: PHYSICAL THERAPY | Facility: CLINIC | Age: 67
End: 2022-02-23
Payer: MEDICARE

## 2022-02-23 DIAGNOSIS — Z09 POSTOPERATIVE EXAMINATION: ICD-10-CM

## 2022-02-23 DIAGNOSIS — R26.9 UNSPECIFIED ABNORMALITIES OF GAIT AND MOBILITY: Primary | ICD-10-CM

## 2022-02-23 DIAGNOSIS — M51.36 LUMBAR DEGENERATIVE DISC DISEASE: ICD-10-CM

## 2022-02-23 PROCEDURE — 97112 NEUROMUSCULAR REEDUCATION: CPT | Performed by: PHYSICAL THERAPIST

## 2022-02-23 PROCEDURE — 97110 THERAPEUTIC EXERCISES: CPT | Performed by: PHYSICAL THERAPIST

## 2022-02-23 NOTE — PROGRESS NOTES
Daily Note     Today's date: 2022  Patient name: Santos Nagel  : 1955  MRN: [id number]  Referring provider: FADUMO Hyde  Dx:   Encounter Diagnosis     ICD-10-CM    1  Unspecified abnormalities of gait and mobility  R26 9    2  Lumbar degenerative disc disease  M51 36    3  Postoperative examination  Z09                   Subjective: Feeling okay today just a little tired as a result of poor sleep recently  Objective: See treatment diary below      Assessment: Improved push/propulsion with sled push and able to negotiate obstacles with sled without major difficulty  Able to  3 8 lbs weights from the floor and carry them then place them in a shelf below knee height  Continues to have improved balance in SLS with STD hip ABD - still limited ankle control on R ankle but making progress overall with stability and coordination of core and able  Plan: Continue per plan of care  Precautions: C spine fusion C3-C5, fall risk  HEP: ride stationary bike at pt home, increase walking      Manuals    Re-assessment                      PF      LE PROM HS and piriformis PF     PF PF       STM to L/S PF     PF PF       Neuro Re-Ed 10'     10' 12' 30' ' '    Tandem stance        3x30"      Standing on foam               NBOS w/ pertubations 3 min 3 min            NBOS EC        2x20"      Tandem amb                Walking on foam beams 3 min 3 min 3 min           Rockerboard taps              Rockerboard balance (static)              Rockerboard (pertubations)              NSP marching BP cuff feedback              NSP heel slide with BP cuff feedback              Biodex              STD hip ext, prone hip ext and kendy stretch for L/S 10x2 10x2 GTB hip ABD/ext and flex 20x ea   2x10  10x2 ea  10x2 ea 10x2 10x2   Ther Ex              6MWT              HEP                            Step up taps AP and ML   Lateral step 10x ea 8 inch   20x 20x 20x 20x     Paloff press              Prone superman          10x 5"     RDL 20# x5 x5 ea 30# 20x            SL bridge              Side stepping and added carioca 3 laps side stepping /marching 3 laps ea GTB 3 laps   3 laps ea 3 laps ea 3 laps 3 laps 3 laps 3 laps ea   boxing 5 min 5 min    5 min 5 min 5 min 5 min 5 min 5 min   Walking lunges  20# 2x10 // bars 20# 2x10 // bars    10# 4 laps 20 ft 10# 4 laps 20 ft 10# 4 laps 10# 4 laps  3 laps 3 laps   Standing glute kickbacks       x10  x20  x20 x20 x20   STD hip 3 way   GTB 20x ea           Ther Activity              NuStep              Step-ups (for/lat)      8" x20 8"x10 ea 8" x10 ea 8" x10 ea     Picking up objects from floor          With ball toss 5 min    Amb w/ HT/HN              Backwards walking   2 min     15#  5 laps  15# 5 laps     Jogging  2 min 2 min            Obstacle course 3 min     W/ agility ladder and hurdles  W agility ladder and hurdles  W agilty ladder  Agility ladder AP and ML 10 laps Agility ladder   STS repeats 10# lbs 20x 10# lbs 20x      15X 10# 15x 10x 15x to begin x15   Sled push   6 laps 20ft 135 lbs            objects and carry   3x 8 lbs each           Hurdles      3 laps 3 laps 3 laps 3 laps NV    Leg press HR 75# 20x 205# 20x      15" SL HR 30x      Seated lumbar flex stretch 10x5" ea        10x5" ea 10x5" ea    Gait Training              TM  5 min 2mph 10 min 10 min warm up   5 min 2 mph  5 min  2 mph 5 min 2 5 mph 10 min 2 mph 5 min 2 mph 5 min 2 mph   Ball toss          5 min 5 min                 Amb outside (over stones/grass)              Modalities              NMES ankles              EDUCATION

## 2022-02-28 ENCOUNTER — OFFICE VISIT (OUTPATIENT)
Dept: PHYSICAL THERAPY | Facility: CLINIC | Age: 67
End: 2022-02-28
Payer: MEDICARE

## 2022-02-28 DIAGNOSIS — M51.36 LUMBAR DEGENERATIVE DISC DISEASE: ICD-10-CM

## 2022-02-28 DIAGNOSIS — R26.9 UNSPECIFIED ABNORMALITIES OF GAIT AND MOBILITY: Primary | ICD-10-CM

## 2022-02-28 DIAGNOSIS — Z09 POSTOPERATIVE EXAMINATION: ICD-10-CM

## 2022-02-28 PROCEDURE — 97110 THERAPEUTIC EXERCISES: CPT | Performed by: PHYSICAL THERAPIST

## 2022-02-28 PROCEDURE — 97140 MANUAL THERAPY 1/> REGIONS: CPT | Performed by: PHYSICAL THERAPIST

## 2022-02-28 PROCEDURE — 97112 NEUROMUSCULAR REEDUCATION: CPT | Performed by: PHYSICAL THERAPIST

## 2022-02-28 NOTE — PROGRESS NOTES
PT Discharge    Today's date: 2022  Patient name: Ngozi Skaggs  : 1955  MRN: [id number]  Referring provider: FADUMO Cruz  Dx:   Encounter Diagnosis     ICD-10-CM    1  Unspecified abnormalities of gait and mobility  R26 9    2  Lumbar degenerative disc disease  M51 36    3  Postoperative examination  Z09                   Assessment  Assessment details: Pt is a 64y o  male who presents to OP PT for IE s/p decompressive cervical laminectomy and fixation fusion C3-5 for myelopathy 21  Pt has been cleared for initiation of PT and removal of C collar by surgeon  Pt currently c/o of balance and gait deficits  BLE strength testing reveals no overt weakness  Noted gait deviations include B knee flexion t/o stance phase, WBOS, decreased B arm swing, decreased gait speed, and decreased B stride length  Pt is able to ambulate w/ and w/o SPC; gait deviations present w/ and w/o use of AD  6MWT to be completed NV  Functionally pt is able to complete STS transfers w/o use of UEs  Pt does keep B knees flexed following transfers and with majority of mobility as pt feels unsteady and reports this helps with his balance  Pt with + balance deficits and is at an elevated risk for falls as indicated by BBS score 40/56 and FGA 20/30  Given these findings pt is recommended for skilled therapy intervention 2x week  Pt and wife agreeable to this plan  Re-assessment 21:  Ernesto Ramos has attended 12 visits to date and reports a 30% GROC since the initiation of PT  Clinically demonstrates improved ambulation capacity and efficiency with performance of TUG less than 15 seconds without AD- continues to have a high falls risk however with a score of 40/56  He continues to have limited balance and control of hip/ankle strategy as well as limited walking endurance due to fatigue and potential foot drop despite improved overall ankle strength- still limited with DF and EV    These findings suggest overall that he is in continued need for skilled OPPT to address his remaining deficits, achieve established goals and minimize his risk for falls  Re-assessment 9/30/21:  Berto Bah has attended 20 visits since the initiation of PT and reports a 65% GROC  Clinically demonstrates improved overall ankle and hip strength as well as improved balance illustrated by improved BBS score to 48/56 showing moderate falls risk and improved TUG and 5 time sit to stand showing major improvements in function  Berto Bah continues to have limited ability to perform SLS and tandem balancing with increased knee strategy to compensate for hip and ankle deficits as a result of continued myotomal weakness  This suggests the need for continued skilled OPPT to address his remaining impairments and maximize functional mobility/balance  Re-assessment 11/24/21:  Berto Bah has attended 32 visits since the initiation of PT and reports a 70% GROC  Clinically demonstrates improved overall balance with improved BBS to 50/56 showing moderate falls risk- improved ability to perform SLS and alternating marching type movements without as much LOB  Continues to have weakness at his hip ABD and extension as well as continued R sided ankle PF weakness leading to limited ability to perform dynamic movement without LOB with improved hip strategy  This suggests the need for continued skilled OPPT to address her remaining impairments, achieve established goals and return to PLOF pain-free  Initial Evaluation for R LE 12/15/21:  Berto Bah returns to [facility] as a result of F/U with MD on 11/24/21 where script for lumbar spine was provided as well as continued C/S PT  Reports overall since the start of PT reports a 50% improvement overall in his balance and strength but still has days where "things are not connecting" or "just off regarding his movement"    Clinically demonstrates limited lumbar ROM, decreased B/L hip extension mobility as well as limited hip extension and hip ABD strength leading to increased lumbar stiffness and discomfort despite (-) slump or NT signs  He continues to have limited PF strength on R LE as a result of continued myotomal deficits  This suggests the need for continued OPPT to address the above listed impairments for his L/S and return to pain-free ADLs and improve balance/falls risk as able  Re-assessment 1/20/22:  Aiden Ruiz reports has attended 39 visits to date but ~ 10 visits for L/S and reports a 20% GROC regarding L/S and a 70% GROC overall since the initiation of PT  Demonstrates improved balance in SLS and dynamic ability with performance as well as improved lumbar flexibility  Still with slight weakness into hip ABD or extension  Wishes to continue with OPPT with dynamic activity and continued PF strength development on R LE vs L  Will continue to benefit from skilled PT in order to return to pain-free ADLs at PLOF  Discharge 2/28/22:  Aiden Ruiz has attended 62 visits to date and reports a 75% GROC  He demonstrates improved lumbar ROM and LE balance, now a low risk for falls based of BBS as well as improved hip strength  He no longer has difficulty with performance of dynamic stepping and able to perform all ADLs without major LOB or need for AD  He still has occasional LOB with stepping strategy for recovery but able to perform all tasks to his liking  Aiden Ruiz has suggested he continued independently with his HEP and will subsequently be D/C'ed  Impairments: abnormal gait, activity intolerance and impaired balance  Understanding of Dx/Px/POC: excellent  Goals  STGs (to be achieved within 2 weeks)  1  Pt will report ability to ride stationary bike at home for at least 15 minutes 3-5x week  - MET  2  Pt will complete 6MWT and goals will be set accordingly  - MET  Pt stated goal: walk w/o SPC- Partially MET    LTGs (to be achieved within 8-10 weeks)   1  Pt will be I with HEP at d/c to promote PT carry-over  - MET   2   Pt will meet FOTO predicted score  - Partially MET  3  Pt will improve BBS score to 45/56 or greater indicating that he is at a reduced risk for falls  -  MET- 48  4  Pt will improve FGA score to 24/30 or greater indicating that he is at a reduced risk for falls  - Partially MET  5 ) Able to perform SLS > 10 seconds showing good ankle and hip strategy- Partially MET- 4 secs  6 ) Improve SL HR >5 reps allowing for improved ankle strategy- Not MET  7 ) Improve B/L hip extension and ABD strength at least 1/2 to 1 MMT grade- Partially MET    Plan  Plan details: Continue POC for ankle stability, balance and walking endurance; minimize falls risk and return to PLOF  Patient would benefit from: skilled physical therapy  Planned modality interventions: unattended electrical stimulation, cryotherapy and thermotherapy: hydrocollator packs  Planned therapy interventions: abdominal trunk stabilization, manual therapy, balance, strengthening, stretching, therapeutic activities, patient education, neuromuscular re-education, therapeutic exercise, therapeutic training, transfer training, gait training, functional ROM exercises, graded activity, flexibility, graded exercise and home exercise program  Frequency: 2x week  Duration in visits: 16  Duration in weeks: 8  Plan of Care beginning date: 1/20/2022  Plan of Care expiration date: 3/17/2022  Treatment plan discussed with: patient (wife)        Subjective Evaluation    History of Present Illness  Date of surgery: 5/24/21  Mechanism of injury: surgery  Mechanism of injury: Pt presents s/p decompressive cervical laminectomy and fixation fusion C3-5 for myelopathy on 5/24/21  Physician cleared pt for removal of C-collar and initiation of skilled PT approx  2 weeks ago  Since surgery pt has no pain, he does endorse mild "numbess" in feet, which has been improving since surgery  Pt ambulates with SPC in community  Does not use AD at home, but wife endorses "furniture walking"    He does endorse continued feelings of unsteadiness and imbalance since surgery although this has improved since surgical intervention  Pt does note that he "tries to keep lower to the ground" with B knee flexion due to these feelings of unsteadiness  Pt has full flight of stairs in home, reports he has been completing w/o problem w/ use of handrail  Has stationary bike, would like to return to riding  Is able to walk for approx  10min continuously prior to needing rest break  Pt and wife like to go to flea markets, they have been able to since surgery, however outings are limited by distance pt can ambulate  Re-assessment 8/26/21:  Valery Senior has attended 12 visits and reports a 30% GROC since the initiation of PT  Reports improved walking without the cane around the house as well as at the [facility]  Plans on beginning walking around the block  Still having R sided LOB with stance  Reports improved ability to perform things when he "takes his time"  Notes still feeling fatigue with going up and down stairs at times  Has been biking up to 30 mins on a recumbant  Notes improved sensation in B/L feet and calves  Has F/U with MD in October  Notes slightly disturbed sleep  Denies trouble getting to the bathroom in the middle of the night  Wishes to continue PT in order to walk better  Re-assessment 9/30/21:  Valery Senior has attended 20 visits to date and reports a 65% GROC  Reports improved function overall since last assessment- able to get up and down steps without using a railing going into the house  Reports feeling improved balance- not as much side to side sway with ambulation  Less overall effusion in LEs as well  Still reporting numbness in feet, no longer getting numbness into his calves  No relying on [facility] for house negotiation- still use SPC if having walk long distances to treadalong  Still not able to go around the block  Still unable to cut the grass with his lawnmower    Wishes to continue PT in order to 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 up to 2-3 hours of walking  F/U with MD in 1 week  Re-assessment 11/24/21:  Moira Lesches has attended 32 visits since the initiation of PT and reports a slight improvement from last month and reports a 70% GROC  but still having a bit of stumble and instability with ambulation  Reports less fear of falling but still using his knees for strategy  Able to walk up stairs outside without use of UE support but while inside his house still using an UE for support at times  Notes improved ability to negotiate up and down curbs with less hesitation  Still with calf tightness and LE restriction as well  Still weakness in LEs  Wishes to continue with OPPT to address his remaining balance deficits  F/U with MD in 11 months  Initial Evaluation for R LE 12/15/21:  Moira Lesches returns to [facility]  as a result of F/U with MD on 11/24/21 where script for lumbar spine was provided as well as continued C/S PT  Reports overall since the start of PT reports a 50% improvement overall in his balance and strength but still has days where "things are not connecting" or "just off regarding his movement"  Notes continued difficulty with endurance walking for prolonged periods  At recent F/U with MD- surgeon reports not going to perform any surgical procedure for L/S until 1 year post op from C/S fusion  Moira Lesches wishes to continue work on strength and function of L/S as well as continued balance deficits  Reports "a little stick" R>L at L/S from transitions  Still walking on his heels  Re-assessment 1/20/22:  Moira Lesches has attended 45 visits since the initiation of PT and reports a 20% GROC in his L/S and grossly 70% GROC in overall ability  Reports feeling more strength in his L/S  Still reports weakness with knee bending at times with LOB  Able to walk around the house without needing to get his barings as much  Still with occasional "baby steps" to right himself    Able to stabilize himself at times  Able to correct himself with any LOB now without using  Wishes to continue with OPPT to continue to progress with balance as able  Able to bend forward and lift a case of water without LOB recently  Discharge 22:  Dionte Hicks has attended 62 visits since the initiation of PT and reports a 75% GROC in his overall function and balance as well as lumbar pain and discomfort  Feels like he has improved ability to perform ADLs, bend forward reach forward and pick objects up from the ground without major LOB now  Feels like his able to continue independently with his HEP as a result of improvement and the change in weather patterns  As a result he will subsequently be D/C'ed from continue PT as a result of self D/C       Pain  Current pain ratin  At best pain ratin  At worst pain ratin  Location: L/S R sided  Quality: sharp, dull ache and tight  Relieving factors: relaxation, rest, support and change in position  Aggravating factors: standing, walking and lifting  Progression: improved      Diagnostic Tests  MRI studies: abnormal  Treatments  Previous treatment: medication and physical therapy  Current treatment: injection treatment and physical therapy  Patient Goals  Patient goals for therapy: decreased edema, decreased pain, improved balance, increased strength, independence with ADLs/IADLs, increased motion and return to sport/leisure activities  Patient goal: "get walking without the cane"        Objective     Active Range of Motion   Cervical/Thoracic Spine       Cervical    Flexion: 57 degrees   Extension: 35 degrees      Left lateral flexion: 27 degrees      Right lateral flexion: 26 degrees      Left rotation: 65 degrees  Right rotation: 50 degrees           Lumbar   Flexion: 110 degrees   Extension: 20 degrees   Left lateral flexion: 35 degrees       Right lateral flexion: 35 degrees   Left rotation: 55 degrees   Right rotation: 70 degrees   Left Hip   External rotation (90/90): 50 degrees   Internal rotation (90/90): 35 degrees     Right Hip   External rotation (90/90): 40 degrees   Internal rotation (90/90): 27 degrees     Strength/Myotome Testing     Left Shoulder     Planes of Motion   Flexion: 5   Abduction: 5   External rotation at 0°: 5   Internal rotation at 0°: 5     Right Shoulder     Planes of Motion   Flexion: 5   Abduction: 5   External rotation at 0°: 5   Internal rotation at 0°: 5     Left Elbow   Flexion: 5  Extension: 5    Right Elbow   Flexion: 5  Extension: 5    Left Wrist/Hand   Wrist extension: 5  Wrist flexion: 5  Thumb extension: 5    Right Wrist/Hand   Wrist extension: 5  Wrist flexion: 5  Thumb extension: 5    Left Hip   Planes of Motion   Flexion: 5  Extension: 4+  Abduction: 4+  Adduction: 5  External rotation: 5  Internal rotation: 5    Right Hip   Planes of Motion   Flexion: 5  Extension: 4+  Abduction: 4+  Adduction: 5  External rotation: 4+  Internal rotation: 5    Left Knee   Flexion: 5  Extension: 5    Right Knee   Flexion: 5  Extension: 5    Left Ankle/Foot   Dorsiflexion: 4+  Plantar flexion: 4+  Inversion: 4+  Eversion: 5    Right Ankle/Foot   Dorsiflexion: 4+  Plantar flexion: 4-  Inversion: 4  Eversion: 4+    Additional Strength Details  155 lbs L HR; 45 lbs R with leg press    Tests     Lumbar     Left   Negative crossed SLR, passive SLR, quadrant and slump test      Right   Positive quadrant  Negative crossed SLR, passive SLR and slump test      Functional Assessment      Squat    Left within functional limits and right within functional limits       Single Leg Stance   Left: 6 seconds  Right: 4 seconds  Neuro Exam:     Functional outcomes   6 minute walk test: 1047  5x sit to stand: 15 12 (seconds)  TU 89 (seconds)  Functional outcome comment: Hernandez Balance Scale: 50/56  FGA: 20/30  Functional outcome gait comment: Ambulates w/ and w/o AD, gait deviations noted with both including: B knee flexion t/o stance phase, WBOS, decreased B arm swing, decreased gait speed, decreased B stride length             Precautions: C spine fusion C3-C5, fall risk  HEP: ride stationary bike at pt home, increase walking      Manuals 2/16 2/21 2/23 2/28 1/26 1/31 2/2 2/7 2/9 2/14   Re-assessment                  PF    PF      LE PROM HS and piriformis PF     PF PF       STM to L/S PF     PF PF       Neuro Re-Ed 10'     10' 12' 30' ' '    Tandem stance        3x30"      Standing on foam               NBOS w/ pertubations 3 min 3 min            NBOS EC        2x20"      Tandem amb                Walking on foam beams 3 min 3 min 3 min           Rockerboard taps              Rockerboard balance (static)              Rockerboard (pertubations)              NSP marching BP cuff feedback              NSP heel slide with BP cuff feedback              Biodex              STD hip ext, prone hip ext and kendy stretch for L/S 10x2 10x2 GTB hip ABD/ext and flex 20x ea GTB hip ABD flex  2x10  10x2 ea  10x2 ea 10x2 10x2   Ther Ex              6MWT              HEP                            Step up taps AP and ML   Lateral step 10x ea 8 inch 10x ea 8 inch  20x 20x 20x 20x     Paloff press              Prone superman          10x 5"     RDL 20# x5 x5 ea 30# 20x 20x ea           SL bridge              Side stepping and added carioca 3 laps side stepping /marching 3 laps ea GTB 3 laps GTB 3 laps  3 laps ea 3 laps ea 3 laps 3 laps 3 laps 3 laps ea   boxing 5 min 5 min  5 min  5 min 5 min 5 min 5 min 5 min 5 min   Walking lunges  20# 2x10 // bars 20# 2x10 // bars    10# 4 laps 20 ft 10# 4 laps 20 ft 10# 4 laps 10# 4 laps  3 laps 3 laps   Standing glute kickbacks       x10  x20  x20 x20 x20   STD hip 3 way   GTB 20x ea 20x ea          Ther Activity              NuStep              Step-ups (for/lat)      8" x20 8"x10 ea 8" x10 ea 8" x10 ea     Picking up objects from floor          With ball toss 5 min    Amb w/ HT/HN              Backwards walking   2 min     15#  5 laps  15# 5 laps Jogging  2 min 2 min            Obstacle course 3 min     W/ agility ladder and hurdles  W agility ladder and hurdles  W agilty ladder  Agility ladder AP and ML 10 laps Agility ladder   STS repeats 10# lbs 20x 10# lbs 20x  20x     15X 10# 15x 10x 15x to begin x15   Sled push   6 laps 20ft 135 lbs 6 laps 20 ft 135           objects and carry   3x 8 lbs each           Hurdles      3 laps 3 laps 3 laps 3 laps NV    Leg press HR 75# 20x 205# 20x      15" SL HR 30x      Seated lumbar flex stretch 10x5" ea        10x5" ea 10x5" ea    Gait Training              TM  5 min 2mph 10 min 10 min warm up 10 min warm up  5 min 2 mph  5 min  2 mph 5 min 2 5 mph 10 min 2 mph 5 min 2 mph 5 min 2 mph   Ball toss          5 min 5 min                 Amb outside (over stones/grass)              Modalities              NMES ankles              EDUCATION

## 2022-03-22 ENCOUNTER — OFFICE VISIT (OUTPATIENT)
Dept: NEUROSURGERY | Facility: CLINIC | Age: 67
End: 2022-03-22
Payer: MEDICARE

## 2022-03-22 VITALS
DIASTOLIC BLOOD PRESSURE: 72 MMHG | HEART RATE: 78 BPM | BODY MASS INDEX: 35.25 KG/M2 | TEMPERATURE: 97.8 F | HEIGHT: 73 IN | SYSTOLIC BLOOD PRESSURE: 106 MMHG | OXYGEN SATURATION: 99 % | WEIGHT: 266 LBS

## 2022-03-22 DIAGNOSIS — M48.061 LUMBAR SPINAL STENOSIS: Primary | ICD-10-CM

## 2022-03-22 DIAGNOSIS — G95.9 MYELOPATHY (HCC): ICD-10-CM

## 2022-03-22 PROCEDURE — 99214 OFFICE O/P EST MOD 30 MIN: CPT | Performed by: NURSE PRACTITIONER

## 2022-03-22 NOTE — PROGRESS NOTES
Neurosurgery Office Note  Edda Nino 74 y o  male MRN: [id number]      Assessment/Plan     Cervical spinal stenosis  S/p PCDF C3-5 with Dr Margot Barrientos on 5/24/2021  Improvement in ability to ambulate and strength, as well as decreased pain to his hips  Spinal stenosis of lumbar region with neurogenic claudication  Presents for evaluation of ongoing symptoms of neurogenic claudication related to lumbar stenosis  · In May of 2021 had PCDF with Dr Margot Barrientos and some symptoms improved  Dr Margot Barrientos wanted to recover from his cervical spine surgery before pursuing lumbar intervention  · He has completed multiple sessions of PT this past February and states his strength has significantly improved, exercises daily  · Continues to endorse numbness from posterior calves to ankle that worsens with ambulation  Ambulates with cane  · Non-focal exam  +neurogenic claudication  Plan:  · Discussed that will obtain updated MRI lumbar spine since none since 2020  · Continue daily exercise to maintain strength and cardiovascular health  · Follow up with Dr Margot Barrientos once MRI has been completed  Diagnoses and all orders for this visit:    Lumbar spinal stenosis  -     MRI lumbar spine without contrast; Future    Myelopathy ([facility] [address] )            CHIEF COMPLAINT    Chief Complaint   Patient presents with    Follow-up       HISTORY    History of Present Illness     74y o  year old male with past medical history of liver cirrhosis, esophageal varices, sleep apnea, hypercholesterolemia, cervical and lumbar spinal stenosis who presents for evaluation of ongoing symptoms of neurogenic claudication  He recently presented to see Dr Margot Barrientos last year for complaints lumbar back pain bilateral hip pain, as well as difficulty ambulating and lower extremity numbness and weakness  On further investigation he was noted with cervical myelopathy and was recommended for the above-mentioned procedure      He states after this procedure some of his symptoms resolved  He states his bilateral hip pain resolved and he did not have as much difficulty walking  He still was very deconditioned after his surgery and so underwent intensive physical therapy for several months  He states now he is in much better cardiovascular shape and exercises daily on a stationary bike  He also walks with his wife several times a week  He continues to walk with a cane secondary to numbness that radiates from his posterior calves down to his ankles  He denies any bowel or bladder incontinence  He denies much pain  He does describe that as he starts to ambulate his legs become more weak and fatigued  He states he was told by Dr Herrera that he should wait until a year and a half from his cervical surgery before he had his lumbar surgery however he feels that the symptoms are worsening and becoming very concerning for him  He does also occasionally describe a jolt that occurs in his lower back when he gets up at times a subsequently loses strength in his legs  He lives at home with his wife Ragena Olszewski Lajean Karl HPI    See Discussion    REVIEW OF SYSTEMS    Review of Systems   Constitutional: Negative  HENT: Positive for hearing loss (slight in b/l ears) and tinnitus (slight b/l )  Eyes: Negative  Respiratory: Negative  Cardiovascular: Negative  Gastrointestinal: Negative  Endocrine: Negative  Genitourinary: Negative  Musculoskeletal: Positive for back pain (center LBP, instant, random) and gait problem (ambulates with a cane, focusing on steps as he walks)  Negative for neck stiffness (limited rotation R>L, has no difficulty with flexion or extension of the neck)  PT for gait, last month of PT focus on LBP   Skin: Negative  Allergic/Immunologic: Negative  Neurological: Positive for weakness (occasional in bilateral legs ) and numbness (bi/leg from calf down into toes)  Hematological: Negative      Psychiatric/Behavioral: Negative  ROS reviewed and edited as needed  Meds/Allergies     Current Outpatient Medications   Medication Sig Dispense Refill    b complex vitamins tablet Take 1 tablet by mouth daily      buPROPion (WELLBUTRIN XL) 150 mg 24 hr tablet Take 1 tablet (150 mg total) by mouth daily 90 tablet 1    hydrochlorothiazide (HYDRODIURIL) 25 mg tablet Take 1 tablet (25 mg total) by mouth daily 30 tablet 5    multivitamin (THERAGRAN) TABS Take 1 tablet by mouth daily      nadolol (CORGARD) 40 mg tablet Take 1 tablet (40 mg total) by mouth daily 90 tablet 3     No current facility-administered medications for this visit         No Known Allergies    PAST HISTORY    Past Medical History:   Diagnosis Date    Abscess of back 03/01/2018    Benign essential hypertension     Last Assessed:12/19/16; Pt reports heart and BP has been "fine" as of 4/16/2020    Cirrhosis (HCC)     Colon polyp     Cyst of skin     x6 from neck down back area    Depression     Esophageal varices (HCC)     Liver disease     Macrocytosis     Last Assessed:1/16/17    Major depression, chronic     Last Assessed:12/21/17    Major depression, chronic 6/19/2017    Personal history of colonic polyps     Sleep apnea        Past Surgical History:   Procedure Laterality Date    CHOLECYSTECTOMY  11/2018    CHOLECYSTECTOMY LAPAROSCOPIC N/A 11/21/2018    Procedure: CHOLECYSTECTOMY LAPAROSCOPIC, wedge liver biopsy;  Surgeon: Maddie Villeda MD;  Location: [facility] OR;  Service: General    COLONOSCOPY  05/2011    COLONOSCOPY  05/2016    COLONOSCOPY      EGD  01/2019    Esophagitis, esophageal varices    GALLBLADDER SURGERY      INCISION AND DRAINAGE OF WOUND N/A 03/01/2018    SEBACEOUS CYST ABSCESS OF BACK-VIJAYA MONZON MD    DC ARTHRODESIS POSTERIOR/POSTERIORLATERAL CERVICAL BELOW C2 N/A 5/24/2021    Procedure: Posterior cervical decompressive laminectomy and lateral mass fixation fusion C3-5;  Surgeon: Sally Pichardo MD;  Location: [facility];  Service: Neurosurgery    [place] EXC SKIN BENIG 1 1-2 CM TRUNK,ARM,LEG N/A 8/22/2018    Procedure: EXCISION  BIOPSY LESION/MASS BACK X4 NECK X1;  Surgeon: Mela Munoz MD;  Location: [facility];  Service: General       Social History     Tobacco Use    Smoking status: Never Smoker    Smokeless tobacco: Never Used   Vaping Use    Vaping Use: Never used   Substance Use Topics    Alcohol use: Not Currently     Comment: 7/19/19-last drink 12/2018- Occasionally/1-2 drinks per weekend    Drug use: No       Family History   Problem Relation Age of Onset    Alzheimer's disease Mother     Valvular heart disease Father     Stroke Brother         Mini    Valvular heart disease Brother     Colon cancer Neg Hx     Colon polyps Neg Hx          Above history personally reviewed  EXAM    Vitals:Blood pressure 106/72, pulse 78, temperature 97 8 °F (36 6 °C), temperature source Tympanic, height 6' 1" (1 854 m), weight 121 kg (266 lb), SpO2 99 %  ,Body mass index is 35 09 kg/m²  Physical Exam  Constitutional:       General: He is not in acute distress  Appearance: He is well-developed  He is not diaphoretic  Eyes:      General:         Right eye: No discharge  Left eye: No discharge  Extraocular Movements: EOM normal       Conjunctiva/sclera: Conjunctivae normal       Pupils: Pupils are equal, round, and reactive to light  Pulmonary:      Effort: Pulmonary effort is normal  No respiratory distress  Abdominal:      General: Bowel sounds are normal  There is no distension  Palpations: Abdomen is soft  Tenderness: There is no abdominal tenderness  Musculoskeletal:         General: Normal range of motion  Cervical back: Normal range of motion and neck supple  Skin:     General: Skin is warm and dry  Neurological:      Mental Status: He is alert and oriented to person, place, and time  Cranial Nerves: No cranial nerve deficit        Sensory: Sensory deficit present  Motor: No weakness  Coordination: Coordination normal  Finger-Nose-Finger Test normal       Gait: Gait abnormal       Deep Tendon Reflexes: Reflexes abnormal       Reflex Scores:       Tricep reflexes are 1+ on the right side and 1+ on the left side  Bicep reflexes are 1+ on the right side and 1+ on the left side  Brachioradialis reflexes are 1+ on the right side and 1+ on the left side  Patellar reflexes are 1+ on the right side and 1+ on the left side  Achilles reflexes are 1+ on the right side and 1+ on the left side  Psychiatric:         Speech: Speech normal          Behavior: Behavior normal          Thought Content: Thought content normal          Judgment: Judgment normal          Neurologic Exam     Mental Status   Oriented to person, place, and time  Oriented to person  Oriented to place  Oriented to time  Oriented to year, month and date  Registration: recalls 3 of 3 objects  Attention: normal  Concentration: normal    Speech: speech is normal   Level of consciousness: alert  Knowledge: good and consistent with education  Able to name object  Cranial Nerves     CN III, IV, VI   Pupils are equal, round, and reactive to light  Extraocular motions are normal    Right pupil: Size: 3 mm  Shape: regular  Reactivity: brisk  Consensual response: intact  Accommodation: intact  Left pupil: Size: 3 mm  Shape: regular  Reactivity: brisk  Consensual response: intact  Accommodation: intact  Nystagmus: none   Diplopia: none  Conjugate gaze: present    CN V   Right facial sensation deficit: none  Left facial sensation deficit: none    CN VII   Facial expression full, symmetric       CN VIII   Hearing: intact    CN IX, X   Palate: symmetric    CN XI   Right sternocleidomastoid strength: normal  Left sternocleidomastoid strength: normal  Right trapezius strength: normal  Left trapezius strength: normal    CN XII   Tongue: not atrophic  Fasciculations: absent  Tongue deviation: none    Motor Exam   Muscle bulk: normal  Overall muscle tone: normal  Right arm pronator drift: absent  Left arm pronator drift: absent    Strength   Right deltoid: 5/5  Left deltoid: 55  Right biceps: 5/5  Left biceps: 5/5  Right triceps: 5/5  Left triceps: 5  Right quadriceps:   Left quadriceps:   Right hamstrin/5  Left hamstrin/5  Right glutei:   Left glutei:   Right anterior tibial:   Left anterior tibial:   Right posterior tibial:   Left posterior tibial:   Right peroneal:   Left peroneal:   Right gastroc:   Left gastroc:     Sensory Exam   Right leg light touch: decreased from toes  Left leg light touch: decreased from toes  Right leg pinprick: decreased from toes  Left leg pinprick: decreased from toes  +neurogenic claudication     Gait, Coordination, and Reflexes     Coordination   Finger to nose coordination: normal    Tremor   Resting tremor: absent  Intention tremor: absent  Action tremor: absent    Reflexes   Right brachioradialis: 1+  Left brachioradialis: 1+  Right biceps: 1+  Left biceps: 1+  Right triceps: 1+  Left triceps: 1+  Right patellar: 1+  Left patellar: 1+  Right achilles: 1+  Left achilles: 1+  Right Fitch: absent  Left Fitch: absent  Right ankle clonus: absent  Left ankle clonus: absent        MEDICAL DECISION MAKING    Imaging Studies:     No results found  I have personally reviewed pertinent reports     and I have personally reviewed pertinent films in PACS

## 2022-03-22 NOTE — ASSESSMENT & PLAN NOTE
Presents for evaluation of ongoing symptoms of neurogenic claudication related to lumbar stenosis  · In May of 2021 had PCDF with Dr Craig Olivarez and some symptoms improved  Dr Craig Olivarez wanted to recover from his cervical spine surgery before pursuing lumbar intervention  · He has completed multiple sessions of PT this past February and states his strength has significantly improved, exercises daily  · Continues to endorse numbness from posterior calves to ankle that worsens with ambulation  Ambulates with cane  · Non-focal exam  +neurogenic claudication  Plan:  · Discussed that will obtain updated MRI lumbar spine since none since 2020  · Continue daily exercise to maintain strength and cardiovascular health  · Follow up with Dr Craig Olivarez once MRI has been completed

## 2022-03-22 NOTE — ASSESSMENT & PLAN NOTE
S/p PCDF C3-5 with Dr Reina Mcgregor on 5/24/2021  Improvement in ability to ambulate and strength, as well as decreased pain to his hips

## 2022-04-16 ENCOUNTER — HOSPITAL ENCOUNTER (OUTPATIENT)
Dept: MRI IMAGING | Facility: HOSPITAL | Age: 67
Discharge: HOME | End: 2022-04-16
Payer: MEDICARE

## 2022-04-16 DIAGNOSIS — M48.061 LUMBAR SPINAL STENOSIS: ICD-10-CM

## 2022-04-16 PROCEDURE — 72148 MRI LUMBAR SPINE W/O DYE: CPT

## 2022-06-02 ENCOUNTER — OFFICE VISIT (OUTPATIENT)
Dept: NEUROSURGERY | Facility: CLINIC | Age: 67
End: 2022-06-02
Payer: MEDICARE

## 2022-06-02 VITALS
SYSTOLIC BLOOD PRESSURE: 121 MMHG | BODY MASS INDEX: 34.19 KG/M2 | WEIGHT: 258 LBS | RESPIRATION RATE: 16 BRPM | HEIGHT: 73 IN | HEART RATE: 75 BPM | DIASTOLIC BLOOD PRESSURE: 83 MMHG | TEMPERATURE: 97.9 F

## 2022-06-02 DIAGNOSIS — G95.9 MYELOPATHY (HCC): ICD-10-CM

## 2022-06-02 DIAGNOSIS — M48.062 SPINAL STENOSIS OF LUMBAR REGION WITH NEUROGENIC CLAUDICATION: Primary | ICD-10-CM

## 2022-06-02 DIAGNOSIS — R26.9 UNSPECIFIED ABNORMALITIES OF GAIT AND MOBILITY: ICD-10-CM

## 2022-06-02 DIAGNOSIS — G47.30 SLEEP APNEA: ICD-10-CM

## 2022-06-02 DIAGNOSIS — M54.16 LUMBAR RADICULOPATHY: ICD-10-CM

## 2022-06-02 DIAGNOSIS — S32.050A COMPRESSION FRACTURE OF FIFTH LUMBAR VERTEBRA (HCC): ICD-10-CM

## 2022-06-02 PROCEDURE — 99214 OFFICE O/P EST MOD 30 MIN: CPT | Performed by: NEUROLOGICAL SURGERY

## 2022-06-03 ENCOUNTER — TELEPHONE (OUTPATIENT)
Dept: FAMILY MEDICINE CLINIC | Facility: HOSPITAL | Age: 67
End: 2022-06-03

## 2022-06-03 ENCOUNTER — HOSPITAL ENCOUNTER (OUTPATIENT)
Dept: RADIOLOGY | Facility: HOSPITAL | Age: 67
Discharge: HOME | End: 2022-06-03
Attending: NEUROLOGICAL SURGERY
Payer: MEDICARE

## 2022-06-03 DIAGNOSIS — F32.A DEPRESSION, UNSPECIFIED DEPRESSION TYPE: ICD-10-CM

## 2022-06-03 DIAGNOSIS — S32.050A COMPRESSION FRACTURE OF FIFTH LUMBAR VERTEBRA (HCC): ICD-10-CM

## 2022-06-03 DIAGNOSIS — G95.9 MYELOPATHY (HCC): ICD-10-CM

## 2022-06-03 DIAGNOSIS — M54.16 LUMBAR RADICULOPATHY: ICD-10-CM

## 2022-06-03 DIAGNOSIS — M48.062 SPINAL STENOSIS OF LUMBAR REGION WITH NEUROGENIC CLAUDICATION: ICD-10-CM

## 2022-06-03 DIAGNOSIS — R26.9 UNSPECIFIED ABNORMALITIES OF GAIT AND MOBILITY: ICD-10-CM

## 2022-06-03 PROCEDURE — 72114 X-RAY EXAM L-S SPINE BENDING: CPT

## 2022-06-03 RX ORDER — BUPROPION HYDROCHLORIDE 150 MG/1
150 TABLET ORAL DAILY
Qty: 90 TABLET | Refills: 1 | Status: SHIPPED | OUTPATIENT
Start: 2022-06-03 | End: 2022-06-03 | Stop reason: SDUPTHER

## 2022-06-03 RX ORDER — BUPROPION HYDROCHLORIDE 150 MG/1
150 TABLET ORAL DAILY
Qty: 90 TABLET | Refills: 1 | Status: SHIPPED | OUTPATIENT
Start: 2022-06-03

## 2022-06-06 ENCOUNTER — HOSPITAL ENCOUNTER (OUTPATIENT)
Dept: CT IMAGING | Facility: HOSPITAL | Age: 67
Discharge: HOME | End: 2022-06-06
Attending: NEUROLOGICAL SURGERY
Payer: MEDICARE

## 2022-06-06 DIAGNOSIS — M48.062 SPINAL STENOSIS OF LUMBAR REGION WITH NEUROGENIC CLAUDICATION: ICD-10-CM

## 2022-06-06 DIAGNOSIS — S32.050A COMPRESSION FRACTURE OF FIFTH LUMBAR VERTEBRA (HCC): ICD-10-CM

## 2022-06-06 DIAGNOSIS — M54.16 LUMBAR RADICULOPATHY: ICD-10-CM

## 2022-06-06 LAB
1,25(OH)2D3 SERPL-MCNC: 37.3 PG/ML (ref 19.9–79.3)
ALBUMIN SERPL-MCNC: 3.6 G/DL (ref 3.8–4.8)
ALBUMIN/GLOB SERPL: 1.4 {RATIO} (ref 1.2–2.2)
ALP SERPL-CCNC: 110 IU/L (ref 44–121)
ALT SERPL-CCNC: 37 IU/L (ref 0–44)
AST SERPL-CCNC: 73 IU/L (ref 0–40)
BILIRUB SERPL-MCNC: 2.1 MG/DL (ref 0–1.2)
BUN SERPL-MCNC: 16 MG/DL (ref 8–27)
BUN/CREAT SERPL: 16 (ref 10–24)
CA-I SERPL ISE-MCNC: 5 MG/DL (ref 4.5–5.6)
CALCIUM SERPL-MCNC: 9.3 MG/DL (ref 8.6–10.2)
CHLORIDE SERPL-SCNC: 105 MMOL/L (ref 96–106)
CO2 SERPL-SCNC: 23 MMOL/L (ref 20–29)
CREAT SERPL-MCNC: 1.02 MG/DL (ref 0.76–1.27)
EGFR: 80 ML/MIN/1.73
GLOBULIN SER-MCNC: 2.5 G/DL (ref 1.5–4.5)
GLUCOSE SERPL-MCNC: 99 MG/DL (ref 65–99)
MAGNESIUM SERPL-MCNC: 2 MG/DL (ref 1.6–2.3)
PHOSPHATE SERPL-MCNC: 3.1 MG/DL (ref 2.8–4.1)
POTASSIUM SERPL-SCNC: 4.2 MMOL/L (ref 3.5–5.2)
PROT SERPL-MCNC: 6.1 G/DL (ref 6–8.5)
PTH-INTACT SERPL-MCNC: 18 PG/ML (ref 15–65)
SODIUM SERPL-SCNC: 141 MMOL/L (ref 134–144)
TSH SERPL DL<=0.005 MIU/L-ACNC: 1.14 UIU/ML (ref 0.45–4.5)

## 2022-06-06 PROCEDURE — 72131 CT LUMBAR SPINE W/O DYE: CPT

## 2022-06-06 PROCEDURE — G1004 CDSM NDSC: HCPCS

## 2022-06-07 ENCOUNTER — OFFICE VISIT (OUTPATIENT)
Dept: SLEEP CENTER | Facility: HOSPITAL | Age: 67
End: 2022-06-07
Payer: MEDICARE

## 2022-06-07 ENCOUNTER — HOSPITAL ENCOUNTER (OUTPATIENT)
Dept: BONE DENSITY | Facility: IMAGING CENTER | Age: 67
Discharge: HOME | End: 2022-06-07
Payer: MEDICARE

## 2022-06-07 VITALS
DIASTOLIC BLOOD PRESSURE: 78 MMHG | HEIGHT: 73 IN | BODY MASS INDEX: 36.31 KG/M2 | SYSTOLIC BLOOD PRESSURE: 116 MMHG | WEIGHT: 274 LBS

## 2022-06-07 DIAGNOSIS — E66.9 OBESITY (BMI 30-39.9): ICD-10-CM

## 2022-06-07 DIAGNOSIS — G47.09 OTHER INSOMNIA: ICD-10-CM

## 2022-06-07 DIAGNOSIS — F32.9 MAJOR DEPRESSION, CHRONIC: ICD-10-CM

## 2022-06-07 DIAGNOSIS — R40.0 DAYTIME SLEEPINESS: ICD-10-CM

## 2022-06-07 DIAGNOSIS — G47.33 OSA (OBSTRUCTIVE SLEEP APNEA): Primary | ICD-10-CM

## 2022-06-07 DIAGNOSIS — F45.8 BRUXISM: ICD-10-CM

## 2022-06-07 DIAGNOSIS — M48.062 SPINAL STENOSIS OF LUMBAR REGION WITH NEUROGENIC CLAUDICATION: ICD-10-CM

## 2022-06-07 DIAGNOSIS — S32.050A COMPRESSION FRACTURE OF FIFTH LUMBAR VERTEBRA (HCC): ICD-10-CM

## 2022-06-07 PROCEDURE — 77080 DXA BONE DENSITY AXIAL: CPT

## 2022-06-07 PROCEDURE — 99214 OFFICE O/P EST MOD 30 MIN: CPT | Performed by: INTERNAL MEDICINE

## 2022-06-07 NOTE — PROGRESS NOTES
Review of Systems      Genitourinary none   Cardiology none   Gastrointestinal none   Neurology muscle weakness, numbness/tingling of an extremity and balance problems   Constitutional fatigue and weight change   Integumentary none   Psychiatry anxiety   Musculoskeletal joint pain and back pain   Pulmonary shortness of breath with activity, frequent cough and snoring   ENT throat clearing and ringing in ears   Endocrine none   Hematological none

## 2022-06-07 NOTE — PATIENT INSTRUCTIONS

## 2022-06-07 NOTE — PROGRESS NOTES
Follow-up Note - Sleep Center   Peterson Closs  74 y o  male  XNH:[id number]  ENU:[id number]  DOS:6/7/2022    I saw Maddie Miller in sleep clinic today for his sleep complaints & comorbidities  Was last seen in 2020 when he was diagnosed with obstructive sleep apnea  He declined CPAP and was interested in upper airway surgery but did not follow through because he needed cervical spine surgery  He now returns with a complaint of problem sleeping   The study demonstrated   obstructive sleep apnea: AHI 13 4 /hour , slightly higher during REM at 19 per hour and considerably higher while supine at 50 per hour  Moderate to loud intensity  snoring  was noted  There were also 191 respiratory effort-related arousals resulting in an RDI of 50 per hour Minimum oxygen saturation 76 %  and 3 9 minutes of total sleep time was spent with saturations less than 90%  There was a severe degree of sleep fragmentation with spontaneous arousals occurring 59 5 times an hour  PFSH, Problem List, Medications & Allergies were reviewed in EMR  Interval changes: none reported  He  has a past medical history of Abscess of back (03/01/2018), Benign essential hypertension, Cirrhosis ([facility] [address] ), Colon polyp, Cyst of skin, Depression, Esophageal varices ([facility] [address] ), Liver disease, Macrocytosis, Major depression, chronic, Major depression, chronic (6/19/2017), Personal history of colonic polyps, and Sleep apnea  He has a current medication list which includes the following prescription(s): b complex vitamins, bupropion, hydrochlorothiazide, multivitamin, and nadolol  HPI:  Wife reports ongoing loud snoring and has witnessed apneas  She is not aware of modifying factors  He is not aware of teeth grinding during sleep  Sleep Routine:  Maddie Miller reports getting 5-6 hrs sleep out of approximately 7 hours in bed; he has no difficulty initiating, but reports diffculty maintaining sleep   He arises spontaneously and is not always refreshed  Maddie Miller reports Excessive Daytime Sleepiness, dozes off when sedentary at home  He rated himself at Total score: 5 /24 on the [place] Sleepiness Scale  Habits:  reports that he has never smoked  He has never used smokeless tobacco  He reports previous alcohol use  He reports that he does not use drugs  ROS: reviewed & as attached  Significant for few lb weight gain since his last visit  He reports postnasal drip  He coughs in association with difficulty swallowing  He has some dyspnea on effort  He reported no cardiac symptoms  He has musculoskeletal aches and pains but not disturbing sleep  He feels anxiety is controlled on current medication  EXAM: /78 (BP Location: Left arm, Patient Position: Sitting, Cuff Size: Large)   Ht 6' 1" (1 854 m)   Wt 124 kg (274 lb)   BMI 36 15 kg/m²     Patient is well groomed; well appearing  CNS: Alert, orientated, clear & coherent speech  Psych: cooperativeand in no distress  Mental state:  Has a constricted affect  H&N: EOMI; NC/AT:no facial pressure marks, no rashes  Skin/Extrem: col & hydration normal; + edema  Resp: Respiratory effort is normal  Physical findings otherwise essentially unchanged from previous  IMPRESSION: Diagnoses, Problem List Items & Comorbidities Addressed this Visit   1  DANIEL (obstructive sleep apnea)  CPAP Study   2  Other insomnia     3  Bruxism     4  Daytime sleepiness     5  Major depression, chronic     6  Obesity (BMI 30-39  9)         PLAN:    1  I reviewed results of the Sleep studies with the patient  2  With respect to above conditions, I counseled on pathophysiology, diagnosis, treatment options, risks and benefits; inter-relationship and effects on symptoms and comorbidities; risks of no treatment; costs and insurance aspects  3  Patient elected positive airway pressure therapy and will be scheduled for a titration study  4  Need for compliance with therapy and weight reduction were emphasized    5  Multi component Cognitive behavioral therapy for Insomnia undertaken - Sleep Restriction, Stimulus control, Relaxation techniques and Sleep hygiene were discussed  6  Follow-up to be scheduled in 2 months to monitor compliance, progress and to adjust therapy  Thank you for allowing me to participate in the care of this patient  I will keep you apprised of developments  Sincerely,    Authenticated electronically by Yumi Rizvi MD on 90/28/88   Board Certified Specialist     Portions of the record may have been created with voice recognition software  Occasional wrong word or "sound a like" substitutions may have occurred due to the inherent limitations of voice recognition software  There may also be notations and random deletions of words or characters from malfunctioning software  Read the chart carefully and recognize, using context, where substitutions/deletions have occurred

## 2022-06-09 ENCOUNTER — HOSPITAL ENCOUNTER (OUTPATIENT)
Dept: SLEEP CENTER | Facility: CLINIC | Age: 67
Discharge: HOME | End: 2022-06-09
Payer: MEDICARE

## 2022-06-09 DIAGNOSIS — G47.33 OSA (OBSTRUCTIVE SLEEP APNEA): ICD-10-CM

## 2022-06-09 PROCEDURE — 95811 POLYSOM 6/>YRS CPAP 4/> PARM: CPT

## 2022-06-10 DIAGNOSIS — G47.33 OSA (OBSTRUCTIVE SLEEP APNEA): Primary | ICD-10-CM

## 2022-06-10 NOTE — PROGRESS NOTES
Sleep Study Documentation    Pre-Sleep Study       Sleep testing procedure explained to patient:YES    Patient napped prior to study:NO    Caffeine:Dayshift worker after 12PM   Caffeine use:NO    Alcohol:Dayshift workers after 5PM: Alcohol use:NO    Typical day for patient:YES       Study Documentation    Sleep Study Indications: DANIEL diagnosed in-lab study    Sleep Study: Treatment   Optimal PAP pressure: [id number]  Leak:Medium  Snore:Eliminated  REM Obtained:yes  Supplemental O2: no    Minimum SaO2 78%  Baseline SaO2 95%  PAP mask tried (list all) AirFit F30   PAP mask choice (final)AirFit F30  PAP mask type:full face  PAP pressure at which snoring was eliminated [id number]  Minimum SaO2 at final PAP pressure 93%  Mode of Therapy:CPAP  ETCO2:No  CPAP changed to BiPAP:No    Mode of Therapy:CPAP    EKG abnormalities: yes:  EPOCH example and comments: PVCs    EEG abnormalities: no    Sleep Study Recorded < 2 hours: N/A    Sleep Study Recorded > 2 hours but incomplete study: N/A    Sleep Study Recorded 6 hours but no sleep obtained: NO    Patient classification: retired       Post-Sleep Study    Medication used at bedtime or during sleep study:NO    Patient reports time it took to fall asleep:30 to 60 minutes    Patient reports waking up during study:3 or more times  Patient reports returning to sleep in 10 to 30 minutes  Patient reports sleeping 6 to 8 hours with dreaming  Patient reports sleep during study:better than usual    Patient rated sleepiness: Not sleepy or tired    PAP treatment:yes: Post PAP treatment patient reports feeling better and  would wear PAP mask at home

## 2022-06-14 ENCOUNTER — OFFICE VISIT (OUTPATIENT)
Dept: NEUROSURGERY | Facility: CLINIC | Age: 67
End: 2022-06-14
Payer: MEDICARE

## 2022-06-14 VITALS
WEIGHT: 273.4 LBS | TEMPERATURE: 98.3 F | DIASTOLIC BLOOD PRESSURE: 67 MMHG | HEIGHT: 73 IN | RESPIRATION RATE: 16 BRPM | SYSTOLIC BLOOD PRESSURE: 122 MMHG | HEART RATE: 80 BPM | BODY MASS INDEX: 36.23 KG/M2

## 2022-06-14 DIAGNOSIS — M48.061 LUMBAR SPINAL STENOSIS: Primary | ICD-10-CM

## 2022-06-14 DIAGNOSIS — G95.19 NEUROGENIC CLAUDICATION (HCC): ICD-10-CM

## 2022-06-14 PROBLEM — R29.818 NEUROGENIC CLAUDICATION: Status: ACTIVE | Noted: 2022-06-14

## 2022-06-14 PROCEDURE — 99213 OFFICE O/P EST LOW 20 MIN: CPT | Performed by: NEUROLOGICAL SURGERY

## 2022-06-14 RX ORDER — CHLORHEXIDINE GLUCONATE 0.12 MG/ML
15 RINSE ORAL ONCE
Status: CANCELLED | OUTPATIENT
Start: 2022-06-14 | End: 2022-06-14

## 2022-06-14 NOTE — PROGRESS NOTES
DISCUSSION SUMMARY  This is a 74 y o  male this is a 58-year-old male with neurogenic claudication and spinal stenosis  I have recommended that he undergo surgical decompression at the following levels  The risks, benefits and complications of surgery were explained in detail to Lorraine Ramos  including hemorrhage, infection, CSF leakage, wound problems, pain, weakness, numbness, dysesthesiae, paralysis, coma, and death  Also, the possibility  of further surgery being required was emphasized  Other potential medical complications were outlined, including deep venous thrombosis, pulmonary embolism, pneumonia, urinary tract infection, myocardial infarction,  and stroke  The need for physical therapy, occupational therapy, and rehabilitation was also mentioned  The alternatives to surgery were discussed  Lorraine Ramos asked relevant questions and asked that we proceed with arrangements for surgery  Return for Surgical intervention  Diagnosis ICD-10-CM Associated Orders   1  Lumbar spinal stenosis  M48 061 UA w Reflex to Microscopic w Reflex to Culture     Type and screen     Comprehensive metabolic panel     CBC and differential     APTT     Protime-INR     HEMOGLOBIN A1C W/ EAG ESTIMATION     EKG 12 lead     Type and screen     Comprehensive metabolic panel     CBC and differential     APTT     Protime-INR     HEMOGLOBIN A1C W/ EAG ESTIMATION     Urine Microscopic   2   Neurogenic claudication (HCC)  G95 19 UA w Reflex to Microscopic w Reflex to Culture     Type and screen     Comprehensive metabolic panel     CBC and differential     APTT     Protime-INR     HEMOGLOBIN A1C W/ EAG ESTIMATION     EKG 12 lead     Type and screen     Comprehensive metabolic panel     CBC and differential     APTT     Protime-INR     HEMOGLOBIN A1C W/ EAG ESTIMATION     Urine Microscopic          Chief Complaint   Patient presents with    Follow-up     2 Weeks F/u for Spinal stenosis of lumbar region with neurogenic claudication, S/p (DKO) Posterior cervical decompressive laminectomy and lateral mass fixation fusion C3-5 [5/24/2021]; CT L-Spine 6/6/22 SL and XR L-Spine 6/3/22 SL       HPI this is a 43-year-old male who reports progressive difficulty in ambulating significant distances  He reports having back pain which is constant in the central low back and nonradiating  He has gait problems and that has to ambulate with a cane because of balance difficulties  He has to focus on a steps when he walks  His pain is 5/10 in his low back but accelerated to a 10/10 when he ambulates  Feels weakness in his legs on the right side greater than the left with numbness bilaterally in his legs from the calf down to his toes  Both of these symptoms worsen the more he ambulates  His pain is relatively well controlled when sitting and worsens with standing and walking  He does take Advil to relieve the back pain  Does use a stationary bike which is not as severe in producing pain as is his walking  He is not tried physical therapy recently but when he does try to exercise or move his pain is worse  Not had recent injections into the LS spine      Review of Systems   Constitutional: Negative  Negative for activity change  HENT: Positive for hearing loss (slight in b/l ears) and tinnitus (slight b/l )  Eyes: Negative  Respiratory: Positive for apnea (sleep)  Cardiovascular: Negative  Negative for leg swelling  Gastrointestinal: Negative  Negative for constipation  Endocrine: Negative  Genitourinary: Negative  Negative for frequency and urgency  Musculoskeletal: Positive for back pain (Constant center lower back pain, non-radiating  ) and gait problem (ambulates with a cane, focusing on steps as he walks )  Negative for joint swelling, myalgias and neck stiffness  Pain is 5/10 in center of lower back  Per patient, symptoms are unchanged since last visit  Takes Advil to relieve back pain      Uses a stationary bike for exercise 1-2 times daily  PT - not recently  INJ - none for lower back    No falls since last visit   Skin: Negative  Allergic/Immunologic: Negative  Neurological: Positive for weakness (occasional in bilateral lower R>L legs  ) and numbness (bi/leg from calf down into toes)  Hematological: Negative  Psychiatric/Behavioral: Negative  Negative for sleep disturbance  I reviewed the ROS        Vitals:    /67 (BP Location: Right arm, Patient Position: Sitting, Cuff Size: Standard)   Pulse 80   Temp 98 3 °F (36 8 °C) (Probe)   Resp 16   Ht 6' 1" (1 854 m)   Wt 124 kg (273 lb 6 4 oz)   BMI 36 07 kg/m²       MEDICAL HISTORY  Past Medical History:   Diagnosis Date    Abscess of back 03/01/2018    Acquired pancytopenia ([facility] [address] ) 01/16/2017    Acquired thrombocytopenia ([facility] [address] ) 01/16/2017    Benign essential hypertension     Last Assessed:12/19/16; Pt reports heart and BP has been "fine" as of 4/16/2020    Cirrhosis (HCC)     Colon polyp     CPAP (continuous positive airway pressure) dependence     Cyst of skin     x6 from neck down back area    Depression     Esophageal varices (HCC)     GERD (gastroesophageal reflux disease)     Liver disease     Macrocytosis     Last Assessed:1/16/17    Major depression, chronic     Last Assessed:12/21/17    Major depression, chronic 06/19/2017    Personal history of colonic polyps     Sleep apnea      Past Surgical History:   Procedure Laterality Date    CHOLECYSTECTOMY  11/2018    CHOLECYSTECTOMY LAPAROSCOPIC N/A 11/21/2018    Procedure: CHOLECYSTECTOMY LAPAROSCOPIC, wedge liver biopsy;  Surgeon: Yves Dodd MD;  Location:  MAIN OR;  Service: General    COLONOSCOPY  05/2011    COLONOSCOPY  05/2016    COLONOSCOPY      EGD  01/2019    Esophagitis, esophageal varices    GALLBLADDER SURGERY      INCISION AND DRAINAGE OF WOUND N/A 03/01/2018    SEBACEOUS CYST ABSCESS OF BACK-VIJAYA MONZON MD    ID ARTHRODESIS POSTERIOR/POSTERIORLATERAL CERVICAL BELOW C2 N/A 5/24/2021    Procedure: Posterior cervical decompressive laminectomy and lateral mass fixation fusion C3-5;  Surgeon: Peri Kevin MD;  [facility];  Service: Neurosurgery    DE EXC SKIN BENIG 1 1-2 CM TRUNK,ARM,LEG N/A 8/22/2018    Procedure: EXCISION  BIOPSY LESION/MASS BACK X4 NECK X1;  Surgeon: Ruben Galeas MD;  [facility];  Service: Gilford Covert DE LAMINEC/FACETECT/FORAMIN,LUMBAR 1 SEG Left 7/8/2022    Procedure: L3-4, L4-5, and L5-S1 metrx decompressive hemilaminectomy with bilateral foraminotomy and discectomy;  Surgeon: Nicolas Singh MD;  [facility];  Service: Neurosurgery     Social History     Tobacco Use    Smoking status: Never Smoker    Smokeless tobacco: Never Used   Vaping Use    Vaping Use: Never used   Substance Use Topics    Alcohol use: Not Currently     Comment: 7/19/19-last drink 12/2018- Occasionally/1-2 drinks per weekend    Drug use: No        Current Outpatient Medications:     b complex vitamins tablet, Take 1 tablet by mouth every morning, Disp: , Rfl:     buPROPion (WELLBUTRIN XL) 150 mg 24 hr tablet, Take 1 tablet (150 mg total) by mouth daily (Patient taking differently: Take 150 mg by mouth every morning), Disp: 90 tablet, Rfl: 1    hydrochlorothiazide (HYDRODIURIL) 25 mg tablet, Take 1 tablet (25 mg total) by mouth daily (Patient not taking: Reported on 7/21/2022), Disp: 30 tablet, Rfl: 5    multivitamin (THERAGRAN) TABS, Take 1 tablet by mouth every morning, Disp: , Rfl:     nadolol (CORGARD) 40 mg tablet, Take 1 tablet (40 mg total) by mouth daily (Patient taking differently: Take 40 mg by mouth every morning), Disp: 90 tablet, Rfl: 3    methocarbamol (Robaxin-750) 750 mg tablet, Take 1 tablet (750 mg total) by mouth every 8 (eight) hours for 7 days, Disp: 21 tablet, Rfl: 0   No Known Allergies     The following portions of the patient's history were updated by MA and reviewed by MD: allergies, current medications, past family history, past medical history, past social history, past surgical history and problem list       Physical Exam  Vitals and nursing note reviewed  Constitutional:       General: He is not in acute distress  Appearance: Normal appearance  He is normal weight  He is not ill-appearing, toxic-appearing or diaphoretic  HENT:      Head: Normocephalic and atraumatic  Nose: Nose normal    Eyes:      Extraocular Movements: Extraocular movements intact  Pupils: Pupils are equal, round, and reactive to light  Cardiovascular:      Rate and Rhythm: Normal rate and regular rhythm  Pulses: Normal pulses  Heart sounds: Normal heart sounds  Pulmonary:      Effort: Pulmonary effort is normal       Breath sounds: Normal breath sounds  Abdominal:      General: Abdomen is flat  Bowel sounds are normal    Musculoskeletal:         General: No swelling, tenderness, deformity or signs of injury  Normal range of motion  Cervical back: Normal range of motion and neck supple  Right lower leg: No edema  Left lower leg: No edema  Skin:     General: Skin is warm and dry  Neurological:      General: No focal deficit present  Mental Status: He is alert and oriented to person, place, and time  Mental status is at baseline  Cranial Nerves: No cranial nerve deficit  Sensory: Sensory deficit ( nonradicular sensory changes) present  Motor: No weakness  Coordination: Coordination normal ( absent in the lower extremities)  Gait: Gait abnormal ( Can not perform tandem gait  Has difficulty in ambulating in a straight line  )  Deep Tendon Reflexes: Reflexes abnormal    Psychiatric:         Mood and Affect: Mood normal          Behavior: Behavior normal          Thought Content:  Thought content normal          Judgment: Judgment normal            RESULTS/DATA  I have personally reviewed pertinent films in PACS     A CT scan of the LS spine is carefully reviewed  This demonstrates multiple levels of spondylo arthritic changes with Schmorl's nodes and posterior projecting osteophytes with facet arthropathy all contributing to severe spinal stenosis and foraminal stenosis  These issues are most severe at the L3-4, L4-5, and L5-S1 regions

## 2022-06-15 ENCOUNTER — TELEPHONE (OUTPATIENT)
Dept: NEUROSURGERY | Facility: CLINIC | Age: 67
End: 2022-06-15

## 2022-06-20 ENCOUNTER — LAB REQUISITION (OUTPATIENT)
Dept: LAB | Facility: HOSPITAL | Age: 67
End: 2022-06-20
Payer: MEDICARE

## 2022-06-20 ENCOUNTER — APPOINTMENT (OUTPATIENT)
Dept: LAB | Facility: HOSPITAL | Age: 67
End: 2022-06-20
Attending: NEUROLOGICAL SURGERY
Payer: MEDICARE

## 2022-06-20 DIAGNOSIS — M48.061 LUMBAR SPINAL STENOSIS: ICD-10-CM

## 2022-06-20 DIAGNOSIS — G95.19 NEUROGENIC CLAUDICATION (HCC): ICD-10-CM

## 2022-06-20 DIAGNOSIS — G95.19 VASCULAR MYELOPATHIES (HCC): ICD-10-CM

## 2022-06-20 DIAGNOSIS — G95.19 OTHER VASCULAR MYELOPATHIES (HCC): ICD-10-CM

## 2022-06-20 DIAGNOSIS — M48.061 SPINAL STENOSIS, LUMBAR REGION, WITHOUT NEUROGENIC CLAUDICATION: ICD-10-CM

## 2022-06-20 DIAGNOSIS — M48.061 SPINAL STENOSIS, LUMBAR REGION WITHOUT NEUROGENIC CLAUDICATION: ICD-10-CM

## 2022-06-20 LAB
ABO GROUP BLD: NORMAL
ALBUMIN SERPL BCP-MCNC: 3.1 G/DL (ref 3.5–5)
ALP SERPL-CCNC: 129 U/L (ref 46–116)
ALT SERPL W P-5'-P-CCNC: 43 U/L (ref 12–78)
ANION GAP SERPL CALCULATED.3IONS-SCNC: 9 MMOL/L (ref 4–13)
APTT PPP: 32 SECONDS (ref 23–37)
AST SERPL W P-5'-P-CCNC: 48 U/L (ref 5–45)
BACTERIA UR QL AUTO: NORMAL /HPF
BASOPHILS # BLD AUTO: 0.01 THOUSANDS/ΜL (ref 0–0.1)
BASOPHILS NFR BLD AUTO: 0 % (ref 0–1)
BILIRUB SERPL-MCNC: 2.3 MG/DL (ref 0.2–1)
BILIRUB UR QL STRIP: NEGATIVE
BLD GP AB SCN SERPL QL: NEGATIVE
BUN SERPL-MCNC: 9 MG/DL (ref 5–25)
CALCIUM ALBUM COR SERPL-MCNC: 9.6 MG/DL (ref 8.3–10.1)
CALCIUM SERPL-MCNC: 8.9 MG/DL (ref 8.3–10.1)
CHLORIDE SERPL-SCNC: 103 MMOL/L (ref 100–108)
CLARITY UR: CLEAR
CO2 SERPL-SCNC: 26 MMOL/L (ref 21–32)
COLOR UR: YELLOW
CREAT SERPL-MCNC: 0.9 MG/DL (ref 0.6–1.3)
EOSINOPHIL # BLD AUTO: 0.26 THOUSAND/ΜL (ref 0–0.61)
EOSINOPHIL NFR BLD AUTO: 5 % (ref 0–6)
ERYTHROCYTE [DISTWIDTH] IN BLOOD BY AUTOMATED COUNT: 13.4 % (ref 11.6–15.1)
GFR SERPL CREATININE-BSD FRML MDRD: 87 ML/MIN/1.73SQ M
GLUCOSE P FAST SERPL-MCNC: 88 MG/DL (ref 65–99)
GLUCOSE UR STRIP-MCNC: NEGATIVE MG/DL
HCT VFR BLD AUTO: 48 % (ref 36.5–49.3)
HGB BLD-MCNC: 16.1 G/DL (ref 12–17)
HGB UR QL STRIP.AUTO: ABNORMAL
IMM GRANULOCYTES # BLD AUTO: 0.02 THOUSAND/UL (ref 0–0.2)
IMM GRANULOCYTES NFR BLD AUTO: 0 % (ref 0–2)
INR PPP: 1.15 (ref 0.84–1.19)
KETONES UR STRIP-MCNC: NEGATIVE MG/DL
LEUKOCYTE ESTERASE UR QL STRIP: NEGATIVE
LYMPHOCYTES # BLD AUTO: 1.57 THOUSANDS/ΜL (ref 0.6–4.47)
LYMPHOCYTES NFR BLD AUTO: 28 % (ref 14–44)
MCH RBC QN AUTO: 34.3 PG (ref 26.8–34.3)
MCHC RBC AUTO-ENTMCNC: 33.5 G/DL (ref 31.4–37.4)
MCV RBC AUTO: 102 FL (ref 82–98)
MONOCYTES # BLD AUTO: 0.72 THOUSAND/ΜL (ref 0.17–1.22)
MONOCYTES NFR BLD AUTO: 13 % (ref 4–12)
NEUTROPHILS # BLD AUTO: 3.1 THOUSANDS/ΜL (ref 1.85–7.62)
NEUTS SEG NFR BLD AUTO: 54 % (ref 43–75)
NITRITE UR QL STRIP: NEGATIVE
NON-SQ EPI CELLS URNS QL MICRO: NORMAL /HPF
NRBC BLD AUTO-RTO: 0 /100 WBCS
PH UR STRIP.AUTO: 6 [PH]
PLATELET # BLD AUTO: 103 THOUSANDS/UL (ref 149–390)
PMV BLD AUTO: 10.9 FL (ref 8.9–12.7)
POTASSIUM SERPL-SCNC: 4 MMOL/L (ref 3.5–5.3)
PROT SERPL-MCNC: 6.7 G/DL (ref 6.4–8.2)
PROT UR STRIP-MCNC: NEGATIVE MG/DL
PROTHROMBIN TIME: 14.6 SECONDS (ref 11.6–14.5)
RBC # BLD AUTO: 4.7 MILLION/UL (ref 3.88–5.62)
RBC #/AREA URNS AUTO: NORMAL /HPF
RH BLD: POSITIVE
SODIUM SERPL-SCNC: 138 MMOL/L (ref 136–145)
SP GR UR STRIP.AUTO: 1.01 (ref 1–1.03)
SPECIMEN EXPIRATION DATE: NORMAL
UROBILINOGEN UR QL STRIP.AUTO: 1 E.U./DL
WBC # BLD AUTO: 5.68 THOUSAND/UL (ref 4.31–10.16)
WBC #/AREA URNS AUTO: NORMAL /HPF

## 2022-06-20 PROCEDURE — 85730 THROMBOPLASTIN TIME PARTIAL: CPT

## 2022-06-20 PROCEDURE — 81001 URINALYSIS AUTO W/SCOPE: CPT | Performed by: NEUROLOGICAL SURGERY

## 2022-06-20 PROCEDURE — 93005 ELECTROCARDIOGRAM TRACING: CPT

## 2022-06-20 PROCEDURE — 86850 RBC ANTIBODY SCREEN: CPT | Performed by: NEUROLOGICAL SURGERY

## 2022-06-20 PROCEDURE — 83036 HEMOGLOBIN GLYCOSYLATED A1C: CPT

## 2022-06-20 PROCEDURE — 86901 BLOOD TYPING SEROLOGIC RH(D): CPT | Performed by: NEUROLOGICAL SURGERY

## 2022-06-20 PROCEDURE — 36415 COLL VENOUS BLD VENIPUNCTURE: CPT

## 2022-06-20 PROCEDURE — 85610 PROTHROMBIN TIME: CPT

## 2022-06-20 PROCEDURE — 85025 COMPLETE CBC W/AUTO DIFF WBC: CPT

## 2022-06-20 PROCEDURE — 80053 COMPREHEN METABOLIC PANEL: CPT

## 2022-06-20 PROCEDURE — 86900 BLOOD TYPING SEROLOGIC ABO: CPT | Performed by: NEUROLOGICAL SURGERY

## 2022-06-21 LAB
EST. AVERAGE GLUCOSE BLD GHB EST-MCNC: 111 MG/DL
HBA1C MFR BLD: 5.5 %

## 2022-06-23 ENCOUNTER — OFFICE VISIT (OUTPATIENT)
Dept: FAMILY MEDICINE CLINIC | Facility: HOSPITAL | Age: 67
End: 2022-06-23
Payer: MEDICARE

## 2022-06-23 VITALS
BODY MASS INDEX: 36.15 KG/M2 | HEART RATE: 89 BPM | DIASTOLIC BLOOD PRESSURE: 70 MMHG | TEMPERATURE: 97.4 F | SYSTOLIC BLOOD PRESSURE: 128 MMHG | WEIGHT: 272.8 LBS | OXYGEN SATURATION: 98 % | HEIGHT: 73 IN

## 2022-06-23 DIAGNOSIS — M51.36 DDD (DEGENERATIVE DISC DISEASE), LUMBAR: ICD-10-CM

## 2022-06-23 DIAGNOSIS — Z01.818 PREOP EXAMINATION: Primary | ICD-10-CM

## 2022-06-23 DIAGNOSIS — M48.062 SPINAL STENOSIS OF LUMBAR REGION WITH NEUROGENIC CLAUDICATION: ICD-10-CM

## 2022-06-23 DIAGNOSIS — I85.00 ESOPHAGEAL VARICES DETERMINED BY ENDOSCOPY (HCC): ICD-10-CM

## 2022-06-23 PROBLEM — D61.818 ACQUIRED PANCYTOPENIA (HCC): Status: RESOLVED | Noted: 2017-01-16 | Resolved: 2022-06-23

## 2022-06-23 PROBLEM — D69.6 ACQUIRED THROMBOCYTOPENIA (HCC): Chronic | Status: RESOLVED | Noted: 2017-01-16 | Resolved: 2022-06-23

## 2022-06-23 LAB
ATRIAL RATE: 78 BPM
P AXIS: 26 DEGREES
PR INTERVAL: 178 MS
QRS AXIS: -24 DEGREES
QRSD INTERVAL: 96 MS
QT INTERVAL: 404 MS
QTC INTERVAL: 460 MS
T WAVE AXIS: 30 DEGREES
VENTRICULAR RATE: 78 BPM

## 2022-06-23 PROCEDURE — 93010 ELECTROCARDIOGRAM REPORT: CPT | Performed by: INTERNAL MEDICINE

## 2022-06-23 PROCEDURE — 99214 OFFICE O/P EST MOD 30 MIN: CPT | Performed by: NURSE PRACTITIONER

## 2022-06-23 NOTE — H&P (VIEW-ONLY)
[address] [facility] [address]     NAME: Cliff Lindsay  AGE: 74 y o  SEX: male  : 1955     DATE: 2022    [facility] Pre-Operative Evaluation      Chief Complaint: Pre-operative Evaluation     Surgery: L3-4, L4-5 and L5-S1 metrix decompressive hemilaminectomy with b/l foraminotomy and diskectomy  Anticipated Date of Surgery: 2022  Referring Provider: No ref  provider found       History of Present Illness:     Cliff Lindsay is a 74 y o  male who presents to the office today for a preoperative consultation at the request of surgeon, Dr Hollice Denver, who plans on performing L3-4, L4-5 and L5-S1 metrix decompressive hemilaminectomy with b/l foraminotomy and diskectomy on 2022  Planned anesthesia is general  Patient has a bleeding risk of: no recent abnormal bleeding, no remote history of abnormal bleeding and no use of Ca-channel blockers  Patient does not have objections to receiving blood products if needed  Current anti-platelet/anti-coagulation medications that the patient is prescribed includes: n/a  Assessment of Chronic Conditions:   Hx of fatty liver/cirrhosis and esophageal varices  Managed by G - stable       Assessment of Cardiac Risk:  · Denies unstable or severe angina or MI in the last 6 weeks or history of stent placement in the last year   · Denies decompensated heart failure (e g  New onset heart failure, NYHA functional class IV heart failure, or worsening existing heart failure)  · Denies significant arrhythmias such as high grade AV block, symptomatic ventricular arrhythmia, newly recognized ventricular tachycardia, supraventricular tachycardia with resting heart rate >100, or symptomatic bradycardia  · Denies severe heart valve disease including aortic stenosis or symptomatic mitral stenosis     Exercise Capacity:  · Able to walk 4 blocks without symptoms?: No, due to LE weakness and numbness  · Able to walk 2 flights without symptoms?: No, due to LE weakness and numbness    Prior Anesthesia Reactions: No     Personal history of venous thromboembolic disease? No    History of steroid use for >2 weeks within last year? No         Review of Systems:     Review of Systems   Constitutional: Negative  HENT: Negative  Respiratory: Negative  Negative for cough, shortness of breath and wheezing  Cardiovascular: Negative  Negative for chest pain, palpitations and leg swelling  Gastrointestinal: Negative  Endocrine: Negative  Genitourinary: Negative  Musculoskeletal: Positive for back pain (mid lower back pain) and gait problem (b/l LE weakness, uses cane to help walk)  Skin: Negative  Neurological: Positive for numbness (to b/l lower calves and feet  )  Negative for dizziness, syncope, weakness, light-headedness and headaches  Hematological: Negative  Psychiatric/Behavioral: Negative          Current Problem List:     Patient Active Problem List   Diagnosis    DDD (degenerative disc disease), lumbar    Hypercholesterolemia    Major depression, chronic    Other cirrhosis of liver (HCC)    Enlarged prostate    Esophageal varices determined by endoscopy ([facility] [address] )    Colon cancer screening    Personal history of colonic polyps    SOB (shortness of breath) on exertion    Bilateral lower extremity edema    Lumbar spinal stenosis    Lumbar radiculopathy    Chronic pain syndrome    Unspecified abnormalities of gait and mobility    Myelopathy (HCC)    Cervical spinal stenosis    Sleep apnea    Body mass index (BMI) 35 0-35 9, adult    Encounter for postoperative care related to surgical joint fusion    Neurogenic claudication (HCC)       Allergies:     No Known Allergies    Current Medications:       Current Outpatient Medications:     b complex vitamins tablet, Take 1 tablet by mouth daily, Disp: , Rfl:     buPROPion (WELLBUTRIN XL) 150 mg 24 hr tablet, Take 1 tablet (150 mg total) by mouth daily, Disp: 90 tablet, Rfl: 1    hydrochlorothiazide (HYDRODIURIL) 25 mg tablet, Take 1 tablet (25 mg total) by mouth daily, Disp: 30 tablet, Rfl: 5    multivitamin (THERAGRAN) TABS, Take 1 tablet by mouth daily, Disp: , Rfl:     nadolol (CORGARD) 40 mg tablet, Take 1 tablet (40 mg total) by mouth daily, Disp: 90 tablet, Rfl: 3    Past Medical History:       Past Medical History:   Diagnosis Date    Abscess of back 03/01/2018    Acquired pancytopenia ([facility] [address] ) 1/16/2017    Acquired thrombocytopenia ([facility] [address] ) 1/16/2017    Benign essential hypertension     Last Assessed:12/19/16; Pt reports heart and BP has been "fine" as of 4/16/2020    Cirrhosis (HCC)     Colon polyp     Cyst of skin     x6 from neck down back area    Depression     Esophageal varices (HCC)     Liver disease     Macrocytosis     Last Assessed:1/16/17    Major depression, chronic     Last Assessed:12/21/17    Major depression, chronic 6/19/2017    Personal history of colonic polyps     Sleep apnea         Past Surgical History:   Procedure Laterality Date    CHOLECYSTECTOMY  11/2018    CHOLECYSTECTOMY LAPAROSCOPIC N/A 11/21/2018    Procedure: CHOLECYSTECTOMY LAPAROSCOPIC, wedge liver biopsy;  Surgeon: Stephanie Huang MD;  [facility];  Service: General    COLONOSCOPY  05/2011    COLONOSCOPY  05/2016    COLONOSCOPY      EGD  01/2019    Esophagitis, esophageal varices    GALLBLADDER SURGERY      INCISION AND DRAINAGE OF WOUND N/A 03/01/2018    SEBACEOUS CYST ABSCESS OF BACK-VIJAYA MONZON MD    [place] ARTHRODESIS POSTERIOR/POSTERIORLATERAL CERVICAL BELOW C2 N/A 5/24/2021    Procedure: Posterior cervical decompressive laminectomy and lateral mass fixation fusion C3-5;  Surgeon: Mickey Moya MD;  [facility];  Service: Neurosurgery    [place] EXC SKIN BENIG 1 1-2 CM TRUNK,ARM,LEG N/A 8/22/2018    Procedure: EXCISION  BIOPSY LESION/MASS BACK X4 NECK X1;  Surgeon: Stephanie Huang MD; Location:  MAIN OR;  Service: General        Family History   Problem Relation Age of Onset    Alzheimer's disease Mother     Valvular heart disease Father     Stroke Brother         Mini    Valvular heart disease Brother     Colon cancer Neg Hx     Colon polyps Neg Hx         Social History     Socioeconomic History    Marital status: /Civil Union     Spouse name: Not on file    Number of children: Not on file    Years of education: Not on file    Highest education level: Not on file   Occupational History    Not on file   Tobacco Use    Smoking status: Never Smoker    Smokeless tobacco: Never Used   Vaping Use    Vaping Use: Never used   Substance and Sexual Activity    Alcohol use: Not Currently     Comment: 7/19/19-last drink 12/2018- Occasionally/1-2 drinks per weekend    Drug use: No    Sexual activity: Not on file   Other Topics Concern    Not on file   Social History Narrative    Always uses seatbelt     Social Determinants of Health     Financial Resource Strain: Not on file   Food Insecurity: Not on file   Transportation Needs: Not on file   Physical Activity: Not on file   Stress: Not on file   Social Connections: Not on file   Intimate Partner Violence: Not on file   Housing Stability: Not on file        Physical Exam:     /70   Pulse 89   Temp (!) 97 4 °F (36 3 °C)   Ht 6' 1" (1 854 m)   Wt 124 kg (272 lb 12 8 oz)   SpO2 98%   BMI 35 99 kg/m²     Physical Exam  Vitals reviewed  Constitutional:       General: He is not in acute distress  Appearance: Normal appearance  He is obese  He is not ill-appearing or diaphoretic  HENT:      Head: Normocephalic and atraumatic  Right Ear: Ear canal and external ear normal       Left Ear: Ear canal and external ear normal       Nose: No congestion or rhinorrhea  Eyes:      General: No scleral icterus  Conjunctiva/sclera: Conjunctivae normal    Neck:      Thyroid: No thyromegaly     Cardiovascular:      Rate and Rhythm: Normal rate and regular rhythm  Pulses: Normal pulses  Heart sounds: Normal heart sounds  No murmur heard  Pulmonary:      Effort: Pulmonary effort is normal  No respiratory distress  Breath sounds: Normal breath sounds  No wheezing  Chest:      Chest wall: No tenderness  Abdominal:      General: Bowel sounds are normal       Palpations: Abdomen is soft  Tenderness: There is no abdominal tenderness  Musculoskeletal:      Cervical back: Normal range of motion and neck supple  Lumbar back: Tenderness present  Back:       Right lower leg: Edema (non pitting edema) present  Left lower leg: Edema (non pitting edema) present  Skin:     General: Skin is warm and dry  Capillary Refill: Capillary refill takes less than 2 seconds  Neurological:      General: No focal deficit present  Mental Status: He is alert and oriented to person, place, and time  Mental status is at baseline  Psychiatric:         Mood and Affect: Mood normal          Behavior: Behavior normal          Thought Content: Thought content normal          Judgment: Judgment normal           Data:     Pre-operative work-up    Laboratory Results: I have personally reviewed the pertinent laboratory results/reports      EKG: EKG completed 6/20, not in chart yet to review  Chest x-ray: N/A      Previous cardiopulmonary studies within the past year:  · Echocardiogram: n/a  · Cardiac Catheterization: n/a  · Stress Test: n/a  · Pulmonary Function Testing: n/a      Assessment & Recommendations:     1  Preop examination      Cleared for surgery 7/1  Will review EKG when available in chart  Return in 9/2022 for medicare visit  2  Spinal stenosis of lumbar region with neurogenic claudication      Maintain f/u with neurosurgery and PT as planned  3  DDD (degenerative disc disease), lumbar     4   Esophageal varices determined by endoscopy [facility])         Pre-Op Evaluation Assessment  74 y o  male with planned surgery: L3-4, L4-5 and L5-S1 metrix decompressive hemilaminectomy with b/l foraminotomy and diskectomy  Known risk factors for perioperative complications: None  Current medications which may produce withdrawal symptoms if withheld perioperatively: n/a  Pre-Op Evaluation Plan  1  Further preoperative workup as follows:   - None; no further preoperative work-up is required    2  Medication Management/Recommendations:   - None, continue medication regimen including morning of surgery, with sip of water    3  Prophylaxis for cardiac events with perioperative beta-blockers: not indicated  4  Patient requires further consultation with: None    Clearance  Patient is CLEARED for surgery without any additional cardiac testing  Will review EKG when available in chart        FADUMO Rowell  [facility] [address]  [address] [facility] [address]  Phone#  [phone]  Fax#  [phone]

## 2022-06-23 NOTE — PROGRESS NOTES
[address] [facility] [address] [address]     NAME: Radha Noyola  AGE: 74 y o  SEX: male  : 1955     DATE: 2022    [facility] Pre-Operative Evaluation      Chief Complaint: Pre-operative Evaluation     Surgery: L3-4, L4-5 and L5-S1 metrix decompressive hemilaminectomy with b/l foraminotomy and diskectomy  Anticipated Date of Surgery: 2022  Referring Provider: No ref  provider found       History of Present Illness:     Radha Noyola is a 74 y o  male who presents to the office today for a preoperative consultation at the request of surgeon, Dr Emile Garsia, who plans on performing L3-4, L4-5 and L5-S1 metrix decompressive hemilaminectomy with b/l foraminotomy and diskectomy on 2022  Planned anesthesia is general  Patient has a bleeding risk of: no recent abnormal bleeding, no remote history of abnormal bleeding and no use of Ca-channel blockers  Patient does not have objections to receiving blood products if needed  Current anti-platelet/anti-coagulation medications that the patient is prescribed includes: n/a  Assessment of Chronic Conditions:   Hx of fatty liver/cirrhosis and esophageal varices  Managed by G - stable       Assessment of Cardiac Risk:  · Denies unstable or severe angina or MI in the last 6 weeks or history of stent placement in the last year   · Denies decompensated heart failure (e g  New onset heart failure, NYHA functional class IV heart failure, or worsening existing heart failure)  · Denies significant arrhythmias such as high grade AV block, symptomatic ventricular arrhythmia, newly recognized ventricular tachycardia, supraventricular tachycardia with resting heart rate >100, or symptomatic bradycardia  · Denies severe heart valve disease including aortic stenosis or symptomatic mitral stenosis     Exercise Capacity:  · Able to walk 4 blocks without symptoms?: No, due to LE weakness and numbness  · Able to walk 2 flights without symptoms?: No, due to LE weakness and numbness    Prior Anesthesia Reactions: No     Personal history of venous thromboembolic disease? No    History of steroid use for >2 weeks within last year? No         Review of Systems:     Review of Systems   Constitutional: Negative  HENT: Negative  Respiratory: Negative  Negative for cough, shortness of breath and wheezing  Cardiovascular: Negative  Negative for chest pain, palpitations and leg swelling  Gastrointestinal: Negative  Endocrine: Negative  Genitourinary: Negative  Musculoskeletal: Positive for back pain (mid lower back pain) and gait problem (b/l LE weakness, uses cane to help walk)  Skin: Negative  Neurological: Positive for numbness (to b/l lower calves and feet  )  Negative for dizziness, syncope, weakness, light-headedness and headaches  Hematological: Negative  Psychiatric/Behavioral: Negative          Current Problem List:     Patient Active Problem List   Diagnosis    DDD (degenerative disc disease), lumbar    Hypercholesterolemia    Major depression, chronic    Other cirrhosis of liver (HCC)    Enlarged prostate    Esophageal varices determined by endoscopy ([facility] [address] )    Colon cancer screening    Personal history of colonic polyps    SOB (shortness of breath) on exertion    Bilateral lower extremity edema    Lumbar spinal stenosis    Lumbar radiculopathy    Chronic pain syndrome    Unspecified abnormalities of gait and mobility    Myelopathy (HCC)    Cervical spinal stenosis    Sleep apnea    Body mass index (BMI) 35 0-35 9, adult    Encounter for postoperative care related to surgical joint fusion    Neurogenic claudication (HCC)       Allergies:     No Known Allergies    Current Medications:       Current Outpatient Medications:     b complex vitamins tablet, Take 1 tablet by mouth daily, Disp: , Rfl:     buPROPion (WELLBUTRIN XL) 150 mg 24 hr tablet, Take 1 tablet (150 mg total) by mouth daily, Disp: 90 tablet, Rfl: 1    hydrochlorothiazide (HYDRODIURIL) 25 mg tablet, Take 1 tablet (25 mg total) by mouth daily, Disp: 30 tablet, Rfl: 5    multivitamin (THERAGRAN) TABS, Take 1 tablet by mouth daily, Disp: , Rfl:     nadolol (CORGARD) 40 mg tablet, Take 1 tablet (40 mg total) by mouth daily, Disp: 90 tablet, Rfl: 3    Past Medical History:       Past Medical History:   Diagnosis Date    Abscess of back 03/01/2018    Acquired pancytopenia ([facility] [address] ) 1/16/2017    Acquired thrombocytopenia ([facility] [address] ) 1/16/2017    Benign essential hypertension     Last Assessed:12/19/16; Pt reports heart and BP has been "fine" as of 4/16/2020    Cirrhosis (HCC)     Colon polyp     Cyst of skin     x6 from neck down back area    Depression     Esophageal varices (HCC)     Liver disease     Macrocytosis     Last Assessed:1/16/17    Major depression, chronic     Last Assessed:12/21/17    Major depression, chronic 6/19/2017    Personal history of colonic polyps     Sleep apnea         Past Surgical History:   Procedure Laterality Date    CHOLECYSTECTOMY  11/2018    CHOLECYSTECTOMY LAPAROSCOPIC N/A 11/21/2018    Procedure: CHOLECYSTECTOMY LAPAROSCOPIC, wedge liver biopsy;  Surgeon: Rosanne Doll MD;  [facility];  Service: General    COLONOSCOPY  05/2011    COLONOSCOPY  05/2016    COLONOSCOPY      EGD  01/2019    Esophagitis, esophageal varices    GALLBLADDER SURGERY      INCISION AND DRAINAGE OF WOUND N/A 03/01/2018    SEBACEOUS CYST ABSCESS OF BACK-VIJAYA MONZON MD    [place] ARTHRODESIS POSTERIOR/POSTERIORLATERAL CERVICAL BELOW C2 N/A 5/24/2021    Procedure: Posterior cervical decompressive laminectomy and lateral mass fixation fusion C3-5;  Surgeon: Gisselle Fregoso MD;  [facility];  Service: Neurosurgery    [place] EXC SKIN BENIG 1 1-2 CM TRUNK,ARM,LEG N/A 8/22/2018    Procedure: EXCISION  BIOPSY LESION/MASS BACK X4 NECK X1;  Surgeon: Rosanne Doll MD; Location:  MAIN OR;  Service: General        Family History   Problem Relation Age of Onset    Alzheimer's disease Mother     Valvular heart disease Father     Stroke Brother         Mini    Valvular heart disease Brother     Colon cancer Neg Hx     Colon polyps Neg Hx         Social History     Socioeconomic History    Marital status: /Civil Union     Spouse name: Not on file    Number of children: Not on file    Years of education: Not on file    Highest education level: Not on file   Occupational History    Not on file   Tobacco Use    Smoking status: Never Smoker    Smokeless tobacco: Never Used   Vaping Use    Vaping Use: Never used   Substance and Sexual Activity    Alcohol use: Not Currently     Comment: 7/19/19-last drink 12/2018- Occasionally/1-2 drinks per weekend    Drug use: No    Sexual activity: Not on file   Other Topics Concern    Not on file   Social History Narrative    Always uses seatbelt     Social Determinants of Health     Financial Resource Strain: Not on file   Food Insecurity: Not on file   Transportation Needs: Not on file   Physical Activity: Not on file   Stress: Not on file   Social Connections: Not on file   Intimate Partner Violence: Not on file   Housing Stability: Not on file        Physical Exam:     /70   Pulse 89   Temp (!) 97 4 °F (36 3 °C)   Ht 6' 1" (1 854 m)   Wt 124 kg (272 lb 12 8 oz)   SpO2 98%   BMI 35 99 kg/m²     Physical Exam  Vitals reviewed  Constitutional:       General: He is not in acute distress  Appearance: Normal appearance  He is obese  He is not ill-appearing or diaphoretic  HENT:      Head: Normocephalic and atraumatic  Right Ear: Ear canal and external ear normal       Left Ear: Ear canal and external ear normal       Nose: No congestion or rhinorrhea  Eyes:      General: No scleral icterus  Conjunctiva/sclera: Conjunctivae normal    Neck:      Thyroid: No thyromegaly     Cardiovascular:      Rate and Rhythm: Normal rate and regular rhythm  Pulses: Normal pulses  Heart sounds: Normal heart sounds  No murmur heard  Pulmonary:      Effort: Pulmonary effort is normal  No respiratory distress  Breath sounds: Normal breath sounds  No wheezing  Chest:      Chest wall: No tenderness  Abdominal:      General: Bowel sounds are normal       Palpations: Abdomen is soft  Tenderness: There is no abdominal tenderness  Musculoskeletal:      Cervical back: Normal range of motion and neck supple  Lumbar back: Tenderness present  Back:       Right lower leg: Edema (non pitting edema) present  Left lower leg: Edema (non pitting edema) present  Skin:     General: Skin is warm and dry  Capillary Refill: Capillary refill takes less than 2 seconds  Neurological:      General: No focal deficit present  Mental Status: He is alert and oriented to person, place, and time  Mental status is at baseline  Psychiatric:         Mood and Affect: Mood normal          Behavior: Behavior normal          Thought Content: Thought content normal          Judgment: Judgment normal           Data:     Pre-operative work-up    Laboratory Results: I have personally reviewed the pertinent laboratory results/reports      EKG: EKG completed 6/20, not in chart yet to review  Chest x-ray: N/A      Previous cardiopulmonary studies within the past year:  · Echocardiogram: n/a  · Cardiac Catheterization: n/a  · Stress Test: n/a  · Pulmonary Function Testing: n/a      Assessment & Recommendations:     1  Preop examination      Cleared for surgery 7/1  Will review EKG when available in chart  Return in 9/2022 for medicare visit  2  Spinal stenosis of lumbar region with neurogenic claudication      Maintain f/u with neurosurgery and PT as planned  3  DDD (degenerative disc disease), lumbar     4   Esophageal varices determined by endoscopy [facility])         Pre-Op Evaluation Assessment  74 y o  male with planned surgery: L3-4, L4-5 and L5-S1 metrix decompressive hemilaminectomy with b/l foraminotomy and diskectomy  Known risk factors for perioperative complications: None  Current medications which may produce withdrawal symptoms if withheld perioperatively: n/a  Pre-Op Evaluation Plan  1  Further preoperative workup as follows:   - None; no further preoperative work-up is required    2  Medication Management/Recommendations:   - None, continue medication regimen including morning of surgery, with sip of water    3  Prophylaxis for cardiac events with perioperative beta-blockers: not indicated  4  Patient requires further consultation with: None    Clearance  Patient is CLEARED for surgery without any additional cardiac testing  Will review EKG when available in chart        FADUMO Burden  Counts include 234 beds at the [facility] PRIMARY CARE [address] [facility] [address]  Phone#  [phone]  Fax#  [phone]

## 2022-06-24 ENCOUNTER — TELEPHONE (OUTPATIENT)
Dept: SLEEP CENTER | Facility: CLINIC | Age: 67
End: 2022-06-24

## 2022-06-24 NOTE — TELEPHONE ENCOUNTER
Advised patient CPAP study resulted and Dr Javi Isaacs ordered CPAP  Scheduled DME set up    RX for DME set up and clinicals sent to [facility] via [place]

## 2022-06-30 ENCOUNTER — DOCUMENTATION (OUTPATIENT)
Dept: NEUROSURGERY | Facility: CLINIC | Age: 67
End: 2022-06-30

## 2022-06-30 NOTE — PROGRESS NOTES
[place] PDMP including surrounding states were searched for patient that is scheduled for surgery tomorrow  No current record exists

## 2022-07-01 ENCOUNTER — HOSPITAL ENCOUNTER (OUTPATIENT)
Dept: RADIOLOGY | Facility: HOSPITAL | Age: 67
Setting detail: OUTPATIENT SURGERY
Discharge: HOME | End: 2022-07-01

## 2022-07-01 DIAGNOSIS — M48.061 DEGENERATIVE LUMBAR SPINAL STENOSIS: ICD-10-CM

## 2022-07-05 NOTE — PRE-PROCEDURE INSTRUCTIONS
Pre-Surgery Instructions:   Medication Instructions    b complex vitamins tablet Stop taking 7 days prior to surgery   buPROPion (WELLBUTRIN XL) 150 mg 24 hr tablet Take day of surgery   hydrochlorothiazide (HYDRODIURIL) 25 mg tablet Hold day of surgery   multivitamin (THERAGRAN) TABS Stop taking 7 days prior to surgery   nadolol (CORGARD) 40 mg tablet Stop taking 7 days prior to surgery  Reviewed with patient, in detail, instructions from "My Surgical Experience"  Instructed to avoid all  OTC vitamins/supplements and NSAIDS from now until after surgery per anesthesia guidelines  Tylenol ok to take PRN  Advised patient that Usman King will call with surgery arrival time and hospital directions the business day prior to surgery  Advised patient nothing eat or drink after midnight prior to surgery  Instructed to call surgeon's office in meantime with any new illnesses/exposure  Patient verbalized understanding of current visitor restrictions/masking guidelines and advised that he/she can confirm these at time of arrival call with Usman King  Patient verbalized understanding and knows to call surgeon's office with any additional questions prior to surgery

## 2022-07-07 ENCOUNTER — TELEPHONE (OUTPATIENT)
Dept: NEUROSURGERY | Facility: CLINIC | Age: 67
End: 2022-07-07

## 2022-07-07 ENCOUNTER — ANESTHESIA EVENT (OUTPATIENT)
Dept: PERIOP | Facility: HOSPITAL | Age: 67
End: 2022-07-07
Payer: MEDICARE

## 2022-07-07 ENCOUNTER — DOCUMENTATION (OUTPATIENT)
Dept: NEUROSURGERY | Facility: CLINIC | Age: 67
End: 2022-07-07

## 2022-07-08 ENCOUNTER — ANESTHESIA (OUTPATIENT)
Dept: PERIOP | Facility: HOSPITAL | Age: 67
End: 2022-07-08
Payer: MEDICARE

## 2022-07-08 ENCOUNTER — HOSPITAL ENCOUNTER (OUTPATIENT)
Dept: RADIOLOGY | Facility: HOSPITAL | Age: 67
Setting detail: OUTPATIENT SURGERY
Discharge: HOME | End: 2022-07-08
Payer: MEDICARE

## 2022-07-08 ENCOUNTER — HOSPITAL ENCOUNTER (OUTPATIENT)
Facility: HOSPITAL | Age: 67
Setting detail: OUTPATIENT SURGERY
Discharge: HOME | End: 2022-07-08
Attending: NEUROLOGICAL SURGERY | Admitting: NEUROLOGICAL SURGERY
Payer: MEDICARE

## 2022-07-08 VITALS
DIASTOLIC BLOOD PRESSURE: 83 MMHG | WEIGHT: 272 LBS | BODY MASS INDEX: 36.05 KG/M2 | HEIGHT: 73 IN | HEART RATE: 88 BPM | SYSTOLIC BLOOD PRESSURE: 126 MMHG | TEMPERATURE: 97.1 F | OXYGEN SATURATION: 95 % | RESPIRATION RATE: 16 BRPM

## 2022-07-08 DIAGNOSIS — G95.19 NEUROGENIC CLAUDICATION (HCC): Primary | ICD-10-CM

## 2022-07-08 DIAGNOSIS — M48.061 DEGENERATIVE LUMBAR SPINAL STENOSIS: ICD-10-CM

## 2022-07-08 DIAGNOSIS — M51.36 DDD (DEGENERATIVE DISC DISEASE), LUMBAR: ICD-10-CM

## 2022-07-08 PROCEDURE — 63047 LAM FACETEC & FORAMOT LUMBAR: CPT | Performed by: NEUROLOGICAL SURGERY

## 2022-07-08 PROCEDURE — 72100 X-RAY EXAM L-S SPINE 2/3 VWS: CPT

## 2022-07-08 PROCEDURE — 63048 LAM FACETEC &FORAMOT EA ADDL: CPT | Performed by: NEUROLOGICAL SURGERY

## 2022-07-08 RX ORDER — DEXAMETHASONE SODIUM PHOSPHATE 10 MG/ML
INJECTION, SOLUTION INTRAMUSCULAR; INTRAVENOUS AS NEEDED
Status: DISCONTINUED | OUTPATIENT
Start: 2022-07-08 | End: 2022-07-08

## 2022-07-08 RX ORDER — NEOSTIGMINE METHYLSULFATE 1 MG/ML
INJECTION INTRAVENOUS AS NEEDED
Status: DISCONTINUED | OUTPATIENT
Start: 2022-07-08 | End: 2022-07-08

## 2022-07-08 RX ORDER — OXYCODONE HYDROCHLORIDE AND ACETAMINOPHEN 5; 325 MG/1; MG/1
1 TABLET ORAL EVERY 4 HOURS PRN
Status: DISCONTINUED | OUTPATIENT
Start: 2022-07-08 | End: 2022-07-08 | Stop reason: HOSPADM

## 2022-07-08 RX ORDER — GLYCOPYRROLATE 0.2 MG/ML
INJECTION INTRAMUSCULAR; INTRAVENOUS AS NEEDED
Status: DISCONTINUED | OUTPATIENT
Start: 2022-07-08 | End: 2022-07-08

## 2022-07-08 RX ORDER — ONDANSETRON 4 MG/1
4 TABLET, ORALLY DISINTEGRATING ORAL EVERY 6 HOURS PRN
Status: DISCONTINUED | OUTPATIENT
Start: 2022-07-08 | End: 2022-07-08 | Stop reason: HOSPADM

## 2022-07-08 RX ORDER — ALBUMIN, HUMAN INJ 5% 5 %
SOLUTION INTRAVENOUS CONTINUOUS PRN
Status: DISCONTINUED | OUTPATIENT
Start: 2022-07-08 | End: 2022-07-08

## 2022-07-08 RX ORDER — KETAMINE HCL IN NACL, ISO-OSM 100MG/10ML
SYRINGE (ML) INJECTION AS NEEDED
Status: DISCONTINUED | OUTPATIENT
Start: 2022-07-08 | End: 2022-07-08

## 2022-07-08 RX ORDER — METHYLPREDNISOLONE ACETATE 40 MG/ML
INJECTION, SUSPENSION INTRA-ARTICULAR; INTRALESIONAL; INTRAMUSCULAR; SOFT TISSUE AS NEEDED
Status: DISCONTINUED | OUTPATIENT
Start: 2022-07-08 | End: 2022-07-08 | Stop reason: HOSPADM

## 2022-07-08 RX ORDER — LIDOCAINE HYDROCHLORIDE AND EPINEPHRINE 10; 10 MG/ML; UG/ML
INJECTION, SOLUTION INFILTRATION; PERINEURAL AS NEEDED
Status: DISCONTINUED | OUTPATIENT
Start: 2022-07-08 | End: 2022-07-08 | Stop reason: HOSPADM

## 2022-07-08 RX ORDER — MAGNESIUM HYDROXIDE 1200 MG/15ML
LIQUID ORAL AS NEEDED
Status: DISCONTINUED | OUTPATIENT
Start: 2022-07-08 | End: 2022-07-08 | Stop reason: HOSPADM

## 2022-07-08 RX ORDER — MIDAZOLAM HYDROCHLORIDE 2 MG/2ML
INJECTION, SOLUTION INTRAMUSCULAR; INTRAVENOUS AS NEEDED
Status: DISCONTINUED | OUTPATIENT
Start: 2022-07-08 | End: 2022-07-08

## 2022-07-08 RX ORDER — ONDANSETRON 2 MG/ML
4 INJECTION INTRAMUSCULAR; INTRAVENOUS ONCE AS NEEDED
Status: DISCONTINUED | OUTPATIENT
Start: 2022-07-08 | End: 2022-07-08 | Stop reason: HOSPADM

## 2022-07-08 RX ORDER — EPHEDRINE SULFATE 50 MG/ML
INJECTION INTRAVENOUS AS NEEDED
Status: DISCONTINUED | OUTPATIENT
Start: 2022-07-08 | End: 2022-07-08

## 2022-07-08 RX ORDER — ROCURONIUM BROMIDE 10 MG/ML
INJECTION, SOLUTION INTRAVENOUS AS NEEDED
Status: DISCONTINUED | OUTPATIENT
Start: 2022-07-08 | End: 2022-07-08

## 2022-07-08 RX ORDER — METHOCARBAMOL 750 MG/1
750 TABLET, FILM COATED ORAL EVERY 8 HOURS SCHEDULED
Qty: 21 TABLET | Refills: 0 | Status: SHIPPED | OUTPATIENT
Start: 2022-07-08 | End: 2022-09-15 | Stop reason: ALTCHOICE

## 2022-07-08 RX ORDER — HYDROMORPHONE HCL/PF 1 MG/ML
SYRINGE (ML) INJECTION AS NEEDED
Status: DISCONTINUED | OUTPATIENT
Start: 2022-07-08 | End: 2022-07-08

## 2022-07-08 RX ORDER — CHLORHEXIDINE GLUCONATE 0.12 MG/ML
15 RINSE ORAL ONCE
Status: COMPLETED | OUTPATIENT
Start: 2022-07-08 | End: 2022-07-08

## 2022-07-08 RX ORDER — FENTANYL CITRATE/PF 50 MCG/ML
50 SYRINGE (ML) INJECTION
Status: DISCONTINUED | OUTPATIENT
Start: 2022-07-08 | End: 2022-07-08 | Stop reason: HOSPADM

## 2022-07-08 RX ORDER — LIDOCAINE HYDROCHLORIDE 20 MG/ML
INJECTION, SOLUTION EPIDURAL; INFILTRATION; INTRACAUDAL; PERINEURAL AS NEEDED
Status: DISCONTINUED | OUTPATIENT
Start: 2022-07-08 | End: 2022-07-08

## 2022-07-08 RX ORDER — SODIUM CHLORIDE, SODIUM LACTATE, POTASSIUM CHLORIDE, CALCIUM CHLORIDE 600; 310; 30; 20 MG/100ML; MG/100ML; MG/100ML; MG/100ML
50 INJECTION, SOLUTION INTRAVENOUS CONTINUOUS
Status: DISCONTINUED | OUTPATIENT
Start: 2022-07-08 | End: 2022-07-08 | Stop reason: HOSPADM

## 2022-07-08 RX ORDER — BUPIVACAINE HYDROCHLORIDE AND EPINEPHRINE 5; 5 MG/ML; UG/ML
INJECTION, SOLUTION EPIDURAL; INTRACAUDAL; PERINEURAL AS NEEDED
Status: DISCONTINUED | OUTPATIENT
Start: 2022-07-08 | End: 2022-07-08 | Stop reason: HOSPADM

## 2022-07-08 RX ORDER — OXYCODONE HYDROCHLORIDE 5 MG/1
5 TABLET ORAL EVERY 4 HOURS PRN
Qty: 30 TABLET | Refills: 0 | Status: SHIPPED | OUTPATIENT
Start: 2022-07-08 | End: 2022-07-13

## 2022-07-08 RX ORDER — ONDANSETRON 2 MG/ML
INJECTION INTRAMUSCULAR; INTRAVENOUS AS NEEDED
Status: DISCONTINUED | OUTPATIENT
Start: 2022-07-08 | End: 2022-07-08

## 2022-07-08 RX ORDER — CEFAZOLIN SODIUM 1 G/3ML
INJECTION, POWDER, FOR SOLUTION INTRAMUSCULAR; INTRAVENOUS AS NEEDED
Status: DISCONTINUED | OUTPATIENT
Start: 2022-07-08 | End: 2022-07-08

## 2022-07-08 RX ORDER — PROPOFOL 10 MG/ML
INJECTION, EMULSION INTRAVENOUS AS NEEDED
Status: DISCONTINUED | OUTPATIENT
Start: 2022-07-08 | End: 2022-07-08

## 2022-07-08 RX ORDER — SODIUM CHLORIDE, SODIUM LACTATE, POTASSIUM CHLORIDE, CALCIUM CHLORIDE 600; 310; 30; 20 MG/100ML; MG/100ML; MG/100ML; MG/100ML
INJECTION, SOLUTION INTRAVENOUS CONTINUOUS PRN
Status: DISCONTINUED | OUTPATIENT
Start: 2022-07-08 | End: 2022-07-08

## 2022-07-08 RX ORDER — SODIUM CHLORIDE 9 MG/ML
INJECTION, SOLUTION INTRAVENOUS CONTINUOUS PRN
Status: DISCONTINUED | OUTPATIENT
Start: 2022-07-08 | End: 2022-07-08

## 2022-07-08 RX ORDER — FENTANYL CITRATE 50 UG/ML
INJECTION, SOLUTION INTRAMUSCULAR; INTRAVENOUS AS NEEDED
Status: DISCONTINUED | OUTPATIENT
Start: 2022-07-08 | End: 2022-07-08

## 2022-07-08 RX ADMIN — GLYCOPYRROLATE 0.4 MG: 0.2 INJECTION INTRAMUSCULAR; INTRAVENOUS at 13:49

## 2022-07-08 RX ADMIN — SODIUM CHLORIDE, SODIUM LACTATE, POTASSIUM CHLORIDE, AND CALCIUM CHLORIDE 50 ML/HR: .6; .31; .03; .02 INJECTION, SOLUTION INTRAVENOUS at 10:09

## 2022-07-08 RX ADMIN — EPHEDRINE SULFATE 5 MG: 50 INJECTION INTRAVENOUS at 10:50

## 2022-07-08 RX ADMIN — HYDROMORPHONE HYDROCHLORIDE 0.5 MG: 1 INJECTION, SOLUTION INTRAMUSCULAR; INTRAVENOUS; SUBCUTANEOUS at 13:09

## 2022-07-08 RX ADMIN — ALBUMIN (HUMAN): 12.5 INJECTION, SOLUTION INTRAVENOUS at 11:00

## 2022-07-08 RX ADMIN — LIDOCAINE HYDROCHLORIDE 100 MG: 20 INJECTION, SOLUTION EPIDURAL; INFILTRATION; INTRACAUDAL; PERINEURAL at 10:39

## 2022-07-08 RX ADMIN — HYDROMORPHONE HYDROCHLORIDE 0.5 MG: 1 INJECTION, SOLUTION INTRAMUSCULAR; INTRAVENOUS; SUBCUTANEOUS at 13:39

## 2022-07-08 RX ADMIN — NEOSTIGMINE METHYLSULFATE 3 MG: 1 INJECTION INTRAVENOUS at 13:49

## 2022-07-08 RX ADMIN — MIDAZOLAM 2 MG: 1 INJECTION INTRAMUSCULAR; INTRAVENOUS at 10:34

## 2022-07-08 RX ADMIN — DEXAMETHASONE SODIUM PHOSPHATE 10 MG: 10 INJECTION, SOLUTION INTRAMUSCULAR; INTRAVENOUS at 11:05

## 2022-07-08 RX ADMIN — SODIUM CHLORIDE: 0.9 INJECTION, SOLUTION INTRAVENOUS at 10:42

## 2022-07-08 RX ADMIN — ROCURONIUM BROMIDE 50 MG: 10 INJECTION INTRAVENOUS at 10:40

## 2022-07-08 RX ADMIN — PROPOFOL 200 MG: 10 INJECTION, EMULSION INTRAVENOUS at 10:39

## 2022-07-08 RX ADMIN — ALBUMIN (HUMAN): 12.5 INJECTION, SOLUTION INTRAVENOUS at 11:18

## 2022-07-08 RX ADMIN — EPHEDRINE SULFATE 10 MG: 50 INJECTION INTRAVENOUS at 12:00

## 2022-07-08 RX ADMIN — ONDANSETRON 4 MG: 2 INJECTION INTRAMUSCULAR; INTRAVENOUS at 13:46

## 2022-07-08 RX ADMIN — SODIUM CHLORIDE, SODIUM LACTATE, POTASSIUM CHLORIDE, AND CALCIUM CHLORIDE: .6; .31; .03; .02 INJECTION, SOLUTION INTRAVENOUS at 12:05

## 2022-07-08 RX ADMIN — Medication 25 MG: at 13:45

## 2022-07-08 RX ADMIN — PHENYLEPHRINE HYDROCHLORIDE 40 MCG/MIN: 10 INJECTION INTRAVENOUS at 10:51

## 2022-07-08 RX ADMIN — CEFAZOLIN SODIUM 3000 MG: 1 INJECTION, POWDER, FOR SOLUTION INTRAMUSCULAR; INTRAVENOUS at 11:03

## 2022-07-08 RX ADMIN — FENTANYL CITRATE 100 MCG: 50 INJECTION INTRAMUSCULAR; INTRAVENOUS at 10:39

## 2022-07-08 RX ADMIN — CHLORHEXIDINE GLUCONATE 15 ML: 1.2 SOLUTION ORAL at 09:36

## 2022-07-08 NOTE — INTERVAL H&P NOTE
H&P reviewed  After examining the patient I find no changes in the patients condition since the H&P had been written      Vitals:    07/08/22 0953   BP: 150/80   Pulse: 94   Resp: 20   Temp: (!) 97 °F (36 1 °C)   SpO2: 96%

## 2022-07-08 NOTE — OP NOTE
OPERATIVE REPORT  PATIENT NAME: Michael Marie    :  1955  MRN: [id number]  Pt Location: [facility] OR ROOM 09    SURGERY DATE: 2022    Surgeon(s) and Role:     * Cristina Valladares MD - Primary    Preop Diagnosis:  Lumbar spinal stenosis [M48 061]  Neurogenic claudication ([address] ) [G95 19]    Post-Op Diagnosis Codes:     * Lumbar spinal stenosis [M48 061]     * Neurogenic claudication ([address] ) [G95 19]    Procedure(s) (LRB):  L3-4, L4-5, and L5-S1 metrx decompressive hemilaminectomy with bilateral foraminotomy and discectomy (Left)    Specimen(s):  * No specimens in log *    Estimated Blood Loss:   Minimal    Drains:  * No LDAs found *    Anesthesia Type:   General    Operative Indications:  Lumbar spinal stenosis [M48 061]  Neurogenic claudication ([address] ) [G95 19]    Operative Findings:  Facet hypertrophy  Ligamentous hypertrophy    Complications:   None    Procedure and Technique:  After adequate general endotracheal anesthesia the patient was placed prone on the operating room table  The back was prepped with Betadine soap and then DuraPrep double layer drapes placed normal fashion and 3 and Betadine impregnated sticky drape was placed over these  A timeout was called all parameters the timeout were followed  The K wire was placed to the facet overlying the L3-4 interspace on the left side  Intraoperative fluoroscopy was used throughout the case to either identification of level  There was radiology over-read for level verification  The skin was then infiltrated with 1% lidocaine with 1 100,000 epinephrine  A #15 blade was used to incise the skin  Bovie and bipolar were used throughout the procedure to maintain operative exposure  Intraoperative microscope was used at various degrees of magnification to aid in the Identification, illumination, and microdissection necessary to perform this procedure  Progressive dilators were placed, the sheath was placed and this was affixed to the bedside connector  The Mid[place] Douglas drill was utilized to perform a hemilaminotomy  A medial facetectomy and decompressive foraminotomy were performed with the use of Kerrison rongeurs, foraminotomy rongeurs and curved curettes  A complete decompressive foraminotomy was performed bilaterally utilizing the foraminotomy rongeurs and Kerrison rongeurs  A small annulotomy was placed in the disc and disc material was removed the subligamentous space  Copious quantities of irrigation were used to cleanse out the region  There were no CSF leaks  Twenty-six mg of Depo-Medrol was placed a small piece of Gelfoam was placed over this  The retractors were removed  Next, the K wire was placed to the facet overlying the L4-5 interspace on the left side  Intraoperative fluoroscopy was used throughout the case to either identification of level  There was radiology over-read for level verification  The skin was then infiltrated with 1% lidocaine with 1 100,000 epinephrine  A #15 blade was used to incise the skin  Bovie and bipolar were used throughout the procedure to maintain operative exposure  Intraoperative microscope was used at various degrees of magnification to aid in the Identification, illumination, and microdissection necessary to perform this procedure  Progressive dilators were placed, the sheath was placed and this was affixed to the bedside connector  The [place] Douglas drill was utilized to perform a hemilaminotomy  A medial facetectomy and decompressive foraminotomy were performed with the use of Kerrison rongeurs, foraminotomy rongeurs and curved curettes  A complete decompressive foraminotomy was performed bilaterally utilizing the foraminotomy rongeurs and Kerrison rongeurs  He disc bulge/herniation was identified in this region  It was in the subligamentous space  A small incision was made in the annulus and disc fragments were removed from the subligamentous space    Copious quantities of irrigation were used to cleanse out the region  There were no CSF leaks  Twenty-six mg of Depo-Medrol was placed a small piece of Gelfoam was placed over this  The retractors were removed  Last   the K wire was placed to the facet overlying the L5-S1 interspace on the left side  Intraoperative fluoroscopy was used throughout the case to either identification of level  There was radiology over-read for level verification  The skin was then infiltrated with 1% lidocaine with 1 100,000 epinephrine  A #15 blade was used to incise the skin  Bovie and bipolar were used throughout the procedure to maintain operative exposure  Intraoperative microscope was used at various degrees of magnification to aid in the Identification, illumination, and microdissection necessary to perform this procedure  Progressive dilators were placed, the sheath was placed and this was affixed to the bedside connector  The Midas Douglas drill was utilized to perform a hemilaminotomy  A medial facetectomy and decompressive foraminotomy were performed with the use of Kerrison rongeurs, foraminotomy rongeurs and curved curettes  A complete decompressive foraminotomy was performed on the left side utilizing the foraminotomy rongeurs and Kerrison rongeurs  Copious quantities of irrigation were used to cleanse out the region  There were no CSF leaks  Twenty-six mg of Depo-Medrol was placed a small piece of Gelfoam was placed over this  The retractors were removed  The fascia was approximated with interrupted 3-0 Vicryl suture  The skin was closed with interrupted inverted 3-0 Vicryl suture  0 5% bupivacaine with 1: 200,000 epinephrine was infiltrated into the surrounding tissues  Benzoin and Steri-Strips are placed and sterile dressing was placed    The patient was taken to the recovery room having tolerated procedure well   I was present for the entire procedure    Patient Disposition:  PACU       SIGNATURE: Irene Stewart MD  DATE: July 8, 2022  TIME: 2:07 PM

## 2022-07-08 NOTE — ANESTHESIA POSTPROCEDURE EVALUATION
Post-Op Assessment Note    CV Status:  Stable    Pain management: adequate     Mental Status:  Sleepy   Hydration Status:  Euvolemic   PONV Controlled:  Controlled   Airway Patency:  Patent      Post Op Vitals Reviewed: Yes      Staff: CRNA         No complications documented      /65 (07/08/22 1408)    Temp 97 5 °F (36 4 °C) (07/08/22 1408)    Pulse 96 (07/08/22 1408)   Resp 18 (07/08/22 1408)    SpO2 97 % (07/08/22 1408)

## 2022-07-08 NOTE — ANESTHESIA PREPROCEDURE EVALUATION
Procedure:  L3-4, L4-5, and L5-S1 metrx decompressive hemilaminectomy with bilateral foraminotomy and discectomy (Left Spine Lumbar)    Relevant Problems   CARDIO   (+) Hypercholesterolemia   (+) SOB (shortness of breath) on exertion      GI/HEPATIC   (+) Other cirrhosis of liver (HCC)      MUSCULOSKELETAL   (+) DDD (degenerative disc disease), lumbar   (+) Neurogenic claudication (HCC)      NEURO/PSYCH   (+) Chronic pain syndrome   (+) Major depression, chronic   (+) Myelopathy (HCC)   (+) Neurogenic claudication (HCC)   (+) Personal history of colonic polyps      PULMONARY   (+) SOB (shortness of breath) on exertion   (+) Sleep apnea      STRESS RESULTS:  Sept 2020  Duration of exercise was 6 min and 1 sec  Maximal work rate was 7 METs  Maximal heart rate during stress was 142 bpm ( 92 % of maximal predicted heart rate)  Target heart rate was achieved  There was no chest pain during stress      ECG CONCLUSIONS:  The stress ECG was negative for ischemia      BASELINE:  There were no regional wall motion abnormalities  Overall left ventricular systolic function was normal      PEAK STRESS:  There were no regional wall motion abnormalities  There was an appropriate augmentation in LV function      ECHO CONCLUSIONS:  There was no echocardiographic evidence for stress-induced ischemia  Physical Exam    Airway    Mallampati score: II  TM Distance: >3 FB  Neck ROM: full     Dental       Cardiovascular      Pulmonary      Other Findings        Anesthesia Plan  ASA Score- 3     Anesthesia Type- general with ASA Monitors  Additional Monitors:   Airway Plan:           Plan Factors-    Chart reviewed  EKG reviewed  Imaging results reviewed  Existing labs reviewed  Patient summary reviewed  Patient is not a current smoker  Patient did not smoke on day of surgery  Induction- intravenous  Postoperative Plan- Plan for postoperative opioid use   Planned trial extubation    Informed Consent- Anesthetic plan and risks discussed with patient  I personally reviewed this patient with the CRNA  Discussed and agreed on the Anesthesia Plan with the MELVA Acevedo

## 2022-07-08 NOTE — DISCHARGE INSTRUCTIONS
Discharge Instructions  Lumbar decompression  (laminectomy/laminotomy/foraminotomy/discectomy)      Surgical incisional care:  Maintain dressing in place for 3 days  On postoperative day 3, dressing may be removed and incision may be left open to air  Keep incision clean and dry  Avoid applying creams, lotion or antiseptic to incision area  Check the wound daily  If the incision becomes red, swollen, tender, warm, or has increased drainage please notify physician immediately  If steri-strips are in place, allow them to fall off  If still in place at two week postoperative visit, we will remove them  May shower 3 days after surgery, but do not soak in a tub and no swimming  Incision may be cleaned with water and a mild antimicrobial soap using a clean washcloth  Incision is to be gently patted dry with a clean towel  Continue to change bed linens and pajamas more frequently  Wear clean clothes daily  Activity Restrictions:  No heavy lifting greater than 10lbs and no strenuous activities until cleared  No bending or twisting  No BLTs (bending, lifting, twisting)  Avoid pushing/pulling movements  May walk as tolerated  Encourage at least 4 short walks per day  No driving for at least 2 weeks or until cleared by physician  Postoperative medication:  Take pain medications to relieve incision pain, and muscle relaxants to prevent spasms as directed  Please see after visit summary (AVS) for details  Do not operate heavy machinery or vehicles while taking sedating medications  Use a bowel regimen while on opioids as they induce constipation  Ie  Senokot-S, Miralax, Colace, etc  Increase fiber and water intake  Do not take ibuprofen, Naproxen/Aleve or any NSAID until cleared by surgeon  May take Tylenol instead  If taking Coumadin, Aspirin, or Plavix, you may resume these medications when cleared by Neurosurgery  Follow-up as scheduled for a 2 week incision check   Follow-up as scheduled for 6 week postoperative visit       **Please notify MD if you experience a fever 101F, chills or have increased pain, numbness, tingling, or weakness in your legs and/or bowel/bladder dysfunction/incontinence**

## 2022-07-11 ENCOUNTER — TELEPHONE (OUTPATIENT)
Dept: NEUROSURGERY | Facility: CLINIC | Age: 67
End: 2022-07-11

## 2022-07-13 NOTE — TELEPHONE ENCOUNTER
Called patient to see how he is doing after surgery  Patient reports he is doing well overall and denies any incisional issues or fevers  Patient is able to ambulate around the house and complete ADLs  Educated the patient about the importance of preventing blood clots and provided measures how to prevent them  Patient has moved his bowels since the surgery  Encouraged patient to take an over the counter stool softener, if he is taking narcotic pain medication  Encouraged fiber intake and fluids  Reviewed incision care with the patient  Reinforced that at this point should be showering normally and gently washing the surgical site with soap and water  Use clean wash cloth, towels, and clothing  Do not submerge in water until cleared by the surgeon  Do not apply any creams, ointments, or lotions to the site  Patient is aware to call the office if any redness, swelling, drainage, dehiscence of incision, or fever >100 F occurs  Patient is aware to call the office if any concerns or questions may arise  Reminded patient of his upcoming appointments with the date/time/location  Patient was appreciative for the call

## 2022-07-15 DIAGNOSIS — R93.89 ABNORMAL FINDING ON RADIOLOGY EXAM: ICD-10-CM

## 2022-07-15 DIAGNOSIS — K74.69 OTHER CIRRHOSIS OF LIVER (HCC): Primary | ICD-10-CM

## 2022-07-19 LAB
DME PARACHUTE DELIVERY DATE ACTUAL: NORMAL
DME PARACHUTE DELIVERY DATE EXPECTED: NORMAL
DME PARACHUTE DELIVERY DATE REQUESTED: NORMAL
DME PARACHUTE DELIVERY NOTE: NORMAL
DME PARACHUTE ITEM DESCRIPTION: NORMAL
DME PARACHUTE ORDER STATUS: NORMAL
DME PARACHUTE SUPPLIER NAME: NORMAL
DME PARACHUTE SUPPLIER PHONE: NORMAL

## 2022-07-21 ENCOUNTER — CLINICAL SUPPORT (OUTPATIENT)
Dept: NEUROSURGERY | Facility: CLINIC | Age: 67
End: 2022-07-21

## 2022-07-21 VITALS
BODY MASS INDEX: 35.97 KG/M2 | DIASTOLIC BLOOD PRESSURE: 78 MMHG | WEIGHT: 271.4 LBS | TEMPERATURE: 97.8 F | HEIGHT: 73 IN | SYSTOLIC BLOOD PRESSURE: 118 MMHG

## 2022-07-21 DIAGNOSIS — Z98.890 POST-OPERATIVE STATE: Primary | ICD-10-CM

## 2022-07-21 DIAGNOSIS — Z98.890 STATUS POST HEMILAMINOTOMY: ICD-10-CM

## 2022-07-21 PROCEDURE — 1123F ACP DISCUSS/DSCN MKR DOCD: CPT

## 2022-07-21 PROCEDURE — 99024 POSTOP FOLLOW-UP VISIT: CPT

## 2022-07-21 NOTE — PROGRESS NOTES
Post-Op Visit- Neurosurgery    Laura Deras 74 y o  male MRN: [id number]    Chief Complaint:  Patient presents post: L3-4, L4-5, and L5-S1 metrx decompressive hemilaminectomy with bilateral foraminotomy and discectomy - Left    History of Present Illness:  Patient presents for 2 week POV for incision check accompanied by spouse and ambulating with an assistive device  Patient reports he is doing well overall and denies any incisional issues or fevers  he denies any new weakness, numbness or tingling since the surgery  Patient denies surgical pain at this time and rates their pain as a Pain Score:   2/10 in his left hip  Assessment:   Vitals:    07/21/22 0856   BP: 118/78   Temp: 97 8 °F (36 6 °C)       Wound Exam: Incision well approximated  No erythema, edema or drainage present  Location: lumbar back  See image below           Procedure:  Staple/suture removal    Procedure Note: steri strips were removed  Cleansed area with hydrogen peroxide  Patient Status: the patient tolerated the procedure well  Complications: None  Discussion/Summary:  Doing well postoperatively  Reviewed incision care with patient including daily observation for s/s infection including: increased erythema, edema, drainage, dehiscence of incision or fever >101  Should these be observed, he understands that he is to call and/or return immediately for reassessment  Advised patient to continue cleansing area with mild soap and water and pat dry  Not to apply any lotions, creams, or ointments, & not to submerge in any water for 4 more weeks  He is to maintain activity restrictions until cleared by the surgeon  Activity levels were also reviewed with the patient in detail, he is to lift no greater than 10 pounds and ambulation is encouraged as tolerated   patient is inclined to start PT, this RN will place the referral     Verified date/time/location of upcoming POV  He is to call the office with any further questions or concerns, or if any incisional issues or fevers would arise

## 2022-07-28 ENCOUNTER — TELEPHONE (OUTPATIENT)
Dept: NEUROSURGERY | Facility: CLINIC | Age: 67
End: 2022-07-28

## 2022-07-29 NOTE — TELEPHONE ENCOUNTER
PATIENT'S WIFE CALLED AND LM AGAIN  I CALLED HER BACK AND SHE STATES THAT THE BILL IS FOR BW THAT WAS ORDERED BY DR CHENG AND MEDICARE DENIED STATING IT WAS NOT MEDICALLY NECESSARY  I ADVISED HER THAT PER SX  WE WOULD HAVE RECEIVED A FORM TO FILL OUT BUT NOTHING HAS BEEN RECEIVED  SHE STATES THAT THE BW WAS DONE AT [facility] AND THEY WERE THE ONES WHO ADVISED TO CALL THE DOCTOR'S OFFICE  I RECOMMENDED HER CALLING THEM AND ADVISING THEM TO SEND THE FORM TO US BUT SHE ASKED TO SPEAK WITH [facility]  TRANSFERRED CALL

## 2022-08-01 ENCOUNTER — EVALUATION (OUTPATIENT)
Dept: PHYSICAL THERAPY | Facility: CLINIC | Age: 67
End: 2022-08-01
Payer: MEDICARE

## 2022-08-01 DIAGNOSIS — Z98.890 H/O OF HEMILAMINECTOMY: Primary | ICD-10-CM

## 2022-08-01 DIAGNOSIS — Z98.890 POST-OPERATIVE STATE: ICD-10-CM

## 2022-08-01 DIAGNOSIS — Z98.890 STATUS POST HEMILAMINOTOMY: ICD-10-CM

## 2022-08-01 PROCEDURE — 97162 PT EVAL MOD COMPLEX 30 MIN: CPT | Performed by: PHYSICAL THERAPIST

## 2022-08-01 PROCEDURE — 97112 NEUROMUSCULAR REEDUCATION: CPT | Performed by: PHYSICAL THERAPIST

## 2022-08-01 NOTE — PROGRESS NOTES
PT Evaluation     Today's date: 2022  Patient name: Aj Quintero  : 1955  MRN: [id number]  Referring provider: Crow Odonnell  Dx:   Encounter Diagnosis     ICD-10-CM    1  H/O of hemilaminectomy  Z98 890                   Assessment  Assessment details: Aj Quintero is a 69 y o  male who presents with increased low back pain S/P Lumbar hemilaminectomy consistent with referring diagnosis of H/O of hemilaminectomy  (primary encounter diagnosis) that is complex secondary to chronic and insidious onset, moderate fear avoidance and moderate pain levels  Clinically demonstrates decreased lumbar ROM, decreased core and hip strength, decreased LE proprioception leading to pain with ADLs and exercise  This suggests the need for skilled OPPT to address the above listed impairments, achieve established goals and return to PLOF pain-free  If you have any questions or concerns please contact me at [phone]  Thank you!   Impairments: abnormal coordination, abnormal gait, abnormal muscle firing, abnormal or restricted ROM, abnormal movement, activity intolerance, impaired balance, impaired physical strength, lacks appropriate home exercise program, pain with function, safety issue, poor posture  and poor body mechanics    Symptom irritability: moderateUnderstanding of Dx/Px/POC: good   Prognosis: good    Goals  Short Term Goals (4 weeks)  1 ) Establish independence with HEP  2 ) Decrease subjective pain levels from NPRS at least to 2-5/10 at rest and with activity  3 ) Improve lumbar ROM at least 5-10 degrees into all planes to allow for improved ease of movement with less guarding    Long Term Goals (8 weeks)  1 ) Improve lumbar ROM to WNL in all planes to restore normal movement with ADLs and function  2 ) Improve hip ABD, ER strength to 5/5 in all planes in order to return to pain-free ADLs and function  3 ) Improve FOTO score at least to 75 points showing improved self reported disability Plan  Plan details: Initiate POC for L/S ROM, strength and stability; monitor sxs and progress as able   Patient would benefit from: PT eval and skilled physical therapy  Planned modality interventions: biofeedback, cryotherapy, electrical stimulation/Russian stimulation and TENS  Planned therapy interventions: abdominal trunk stabilization, activity modification, ADL retraining, ADL training, balance, behavior modification, coordination, dry needling, flexibility, functional ROM exercises, gait training, graded activity, graded exercise, graded motor, home exercise program, therapeutic training, therapeutic exercise, therapeutic activities, stretching, strengthening, sensory integrative techniques, self care, patient education, postural training, neuromuscular re-education, IADL retraining, joint mobilization, manual therapy and massage  Frequency: 2x week  Duration in visits: 16  Duration in weeks: 8  Plan of Care beginning date: 8/1/2022  Plan of Care expiration date: 10/3/2022  Treatment plan discussed with: patient        Subjective Evaluation    History of Present Illness  Date of onset: 5/1/2022  Date of surgery: 7/8/2022  Mechanism of injury: Keysha Loo is a 69 y o  male who presents with increased LBP S/P hemilamectomy starting ~ 3 weeks ago  Had a long complicated history of balance deficits related to cervical myelopathy  Notes after having 6 months of OPPT rehab for C/S still had deficits with balance  Decided to seek out MD consult where MRI and X-rays were (-) for fx but + for L4-L5 HNP and stenosis until referral for hemilaminectomy was scheduled  At current status using SPC and not using a clamshell brace  At F/U script for OPPT provided  Denies night pain- using a CPAP for or changes in bowel or bladder function  Reports continued LE NT numbness and peripheral weakness signs or sxs  F/U with MD in 2-3 weeks    Wishes to return to PLOF pain-free- improve strength and return to normal exercise, improve balance and normalize his ability to exercise and walk              Not a recurrent problem   Quality of life: good    Pain  Current pain ratin  At best pain ratin  At worst pain rating: 3  Location: L/S and LEs   Quality: burning and throbbing  Relieving factors: relaxation, medications, rest and support  Aggravating factors: stair climbing, standing and walking  Progression: improved      Diagnostic Tests  X-ray: abnormal  MRI studies: abnormal  Treatments  Previous treatment: physical therapy  Current treatment: immobilization and physical therapy  Patient Goals  Patient goals for therapy: decreased edema, decreased pain, improved balance, increased motion, increased strength, independence with ADLs/IADLs and return to sport/leisure activities  Patient goal: Get stabilized         Objective     Active Range of Motion     Lumbar   Flexion: 55 degrees   Extension: 10 degrees   Left lateral flexion: 30 degrees       Right lateral flexion: 40 degrees   Left rotation: 55 degrees   Right rotation: 50 degrees   Left Hip   External rotation (90/90): 40 degrees   Internal rotation (90/90): 35 degrees     Right Hip   External rotation (90/90): 40 degrees   Internal rotation (90/90): 30 degrees     Strength/Myotome Testing     Left Hip   Planes of Motion   Flexion: 5  Extension: 3+  Abduction: 4+  Adduction: 5  External rotation: 5  Internal rotation: 5    Right Hip   Planes of Motion   Flexion: 5  Extension: 3+  Abduction: 4-  Adduction: 5  Internal rotation: 5    Left Knee   Flexion: 5  Extension: 5    Right Knee   Flexion: 4+  Extension: 5    Left Ankle/Foot   Dorsiflexion: 4+  Plantar flexion: 4+    Right Ankle/Foot   Dorsiflexion: 4+  Plantar flexion: 3+    Tests     Lumbar     Left   Negative crossed SLR, quadrant and slump test      Right   Negative crossed SLR, quadrant and slump test      Left Pelvic Girdle/Sacrum   Positive: active SLR test      Right Pelvic Girdle/Sacrum   Negative: active SLR test      Functional Assessment      Squat    Left within functional limits and right within functional limits       Single Leg Stance   Left: 3 seconds  Right: 2 seconds  Neuro Exam:     Functional outcomes   5x sit to stand: 18 3 (seconds)  TU 6  (seconds)             Precautions: Prior fusion; balance/falls risk  EPOC: 10/3/22  HEP: seated HR    Manuals             LE PROM                                                    Neuro Re-Ed             NSP             NSP march             NSP heel slide                                       Hernandez testing 10 min                         Ther Ex             Bridge             Iso ADD/ABD             SLR and H ABD             Clamshells                                                                 Ther Activity             TM                          Gait Training                                       Modalities

## 2022-08-03 ENCOUNTER — OFFICE VISIT (OUTPATIENT)
Dept: PHYSICAL THERAPY | Facility: CLINIC | Age: 67
End: 2022-08-03
Payer: MEDICARE

## 2022-08-03 DIAGNOSIS — Z98.890 H/O OF HEMILAMINECTOMY: Primary | ICD-10-CM

## 2022-08-03 DIAGNOSIS — Z98.890 STATUS POST HEMILAMINOTOMY: ICD-10-CM

## 2022-08-03 DIAGNOSIS — Z98.890 POST-OPERATIVE STATE: ICD-10-CM

## 2022-08-03 PROCEDURE — 97112 NEUROMUSCULAR REEDUCATION: CPT | Performed by: PHYSICAL THERAPIST

## 2022-08-03 PROCEDURE — 97110 THERAPEUTIC EXERCISES: CPT | Performed by: PHYSICAL THERAPIST

## 2022-08-03 NOTE — PROGRESS NOTES
Daily Note     Today's date: 8/3/2022  Patient name: Jannie Blackwell  : 1955  MRN: [id number]  Referring provider: Carlo Huang  Dx:   Encounter Diagnosis     ICD-10-CM    1  H/O of hemilaminectomy  Z98 890    2  Post-operative state  Z98 890    3  Status post hemilaminotomy  Z98 890                   Subjective: Feeling okay, just a hair unsteady  Objective: See treatment diary below      Assessment: Notes feeling progress overall with performance of NSP with marching and heel slider performance  Good balance with STD hip 3 way  Trial of NMES for PF strength  Will continue to benefit from core and gastroc strength as able  Plan: Continue per plan of care        Precautions: Prior fusion; balance/falls risk  EPOC: 10/3/22  HEP: seated HR    Manuals 8/1 8/3           LE PROM                                                    Neuro Re-Ed             NSP  10x5           NSP march  20x2           NSP heel slide  20x2           NMES with PF  10 min                        Hernandez testing 10 min                         Ther Ex             Bridge  20x           Iso ADD/ABD  10x5"           SLR and H ABD  10x2 ea           Clamshells  20x           Iso PF with strap  10x5" ea           Seated PF  30x2                                     Ther Activity             TM  8 min                        Gait Training                                       Modalities

## 2022-08-08 ENCOUNTER — OFFICE VISIT (OUTPATIENT)
Dept: PHYSICAL THERAPY | Facility: CLINIC | Age: 67
End: 2022-08-08
Payer: MEDICARE

## 2022-08-08 DIAGNOSIS — Z98.890 H/O OF HEMILAMINECTOMY: Primary | ICD-10-CM

## 2022-08-08 DIAGNOSIS — Z98.890 POST-OPERATIVE STATE: ICD-10-CM

## 2022-08-08 DIAGNOSIS — Z98.890 STATUS POST HEMILAMINOTOMY: ICD-10-CM

## 2022-08-08 PROCEDURE — 97112 NEUROMUSCULAR REEDUCATION: CPT | Performed by: PHYSICAL THERAPIST

## 2022-08-08 PROCEDURE — 97110 THERAPEUTIC EXERCISES: CPT | Performed by: PHYSICAL THERAPIST

## 2022-08-08 NOTE — PROGRESS NOTES
Daily Note     Today's date: 2022  Patient name: Elo Stevens  : 1955  MRN: [id number]  Referring provider: Greg Wong  Dx:   Encounter Diagnosis     ICD-10-CM    1  H/O of hemilaminectomy  Z98 890    2  Post-operative state  Z98 890    3  Status post hemilaminotomy  Z98 890                   Subjective: Notes feeling progress with performance of walking, and getting more calf strength  Objective: See treatment diary below      Assessment: Continues to have limited PF strength R>L but improved since the initiation of PT  Improved force production with isometric PF  Will continue to progress with STD PF strength as able  Plan: Continue per plan of care        Precautions: Prior fusion; balance/falls risk  EPOC: 10/3/22  HEP: seated HR    Manuals 8/1 8/3 8/8          LE PROM                                                    Neuro Re-Ed             NSP  10x5 10x5"          NSP march  20x2 20x2          NSP heel slide  20x2 20x2          NMES with PF  10 min NV                       Hernandez testing 10 min                         Ther Ex             Bridge  20x 20x          Iso ADD/ABD  10x5" 10x5"          SLR and H ABD  10x2 ea 10x2          Clamshells  20x 20x          Iso PF with strap  10x5" ea 10x5"          Seated PF  30x2 30x2                                    Ther Activity             TM  8 min 10 min                        Gait Training                                       Modalities

## 2022-08-10 ENCOUNTER — OFFICE VISIT (OUTPATIENT)
Dept: PHYSICAL THERAPY | Facility: CLINIC | Age: 67
End: 2022-08-10
Payer: MEDICARE

## 2022-08-10 DIAGNOSIS — Z98.890 H/O OF HEMILAMINECTOMY: Primary | ICD-10-CM

## 2022-08-10 DIAGNOSIS — Z98.890 STATUS POST HEMILAMINOTOMY: ICD-10-CM

## 2022-08-10 DIAGNOSIS — Z98.890 POST-OPERATIVE STATE: ICD-10-CM

## 2022-08-10 PROCEDURE — 97110 THERAPEUTIC EXERCISES: CPT | Performed by: PHYSICAL THERAPIST

## 2022-08-10 PROCEDURE — 97112 NEUROMUSCULAR REEDUCATION: CPT | Performed by: PHYSICAL THERAPIST

## 2022-08-10 NOTE — PROGRESS NOTES
Daily Note     Today's date: 8/10/2022  Patient name: Matt Haynes  : 1955  MRN: [id number]  Referring provider: Irina Kaba  Dx:   Encounter Diagnosis     ICD-10-CM    1  H/O of hemilaminectomy  Z98 890    2  Post-operative state  Z98 890    3  Status post hemilaminotomy  Z98 890                   Subjective: Notes continued calf tightness and weakness  Denies much back discomfort  Objective: See treatment diary below      Assessment: Continues to have weakness with performance of bosu HR isomerically  Plan: Continue per plan of care        Precautions: Prior fusion; balance/falls risk  EPOC: 10/3/22  HEP: seated HR    Manuals 8/1 8/3 8/8 8/10         LE PROM                                                    Neuro Re-Ed             NSP  10x5 10x5" 10x5'         NSP march  20x2 20x2 20x2         NSP heel slide  20x2 20x2 20x2         NMES with PF  10 min NV 10 min                      Hernandez testing 10 min                         Ther Ex             Bridge  20x 20x 20x         Iso ADD/ABD  10x5" 10x5" 10x5"         SLR and H ABD  10x2 ea 10x2 10x2         Clamshells  20x 20x 20x         Iso PF with strap  10x5" ea 10x5" 10x5"         Seated PF  30x2 30x2 30x2                                   Ther Activity             TM  8 min 10 min  10 min                       Gait Training                                       Modalities

## 2022-08-15 ENCOUNTER — OFFICE VISIT (OUTPATIENT)
Dept: PHYSICAL THERAPY | Facility: CLINIC | Age: 67
End: 2022-08-15
Payer: MEDICARE

## 2022-08-15 DIAGNOSIS — Z98.890 POST-OPERATIVE STATE: ICD-10-CM

## 2022-08-15 DIAGNOSIS — Z98.890 H/O OF HEMILAMINECTOMY: Primary | ICD-10-CM

## 2022-08-15 PROCEDURE — 97112 NEUROMUSCULAR REEDUCATION: CPT

## 2022-08-15 PROCEDURE — 97110 THERAPEUTIC EXERCISES: CPT

## 2022-08-15 NOTE — PROGRESS NOTES
Daily Note     Today's date: 8/15/2022  Patient name: Matt Haynes  : 1955  MRN: [id number]  Referring provider: Irina Kaba  Dx:   Encounter Diagnosis     ICD-10-CM    1  H/O of hemilaminectomy  Z98 890    2  Post-operative state  Z98 890                   Subjective: Pt doing well  No complaints today  Objective: See treatment diary below      Assessment: Pt shows much improved core stability and control  Improved endurance with TE's as he works to build back strength  His balance is also much improved but today shows a little more times of being off balanced  Would continue to benefit from core stability and strengthening  Able to PF in seated position but not standing- continued with NMES for muscle re-education and strengthening  Plan: Continue per plan of care        Precautions: Prior fusion; balance/falls risk  EPOC: 10/3/22  HEP: seated HR    Manuals 8/1 8/3 8/8 8/10 8/15        LE PROM                                                    Neuro Re-Ed             NSP  10x5 10x5" 10x5' 5"x20        NSP march  20x2 20x2 20x2 20x2        NSP heel slide  20x2 20x2 20x2 20x2        NMES with PF  10 min NV 10 min 10 min                     Hernandez testing 10 min                         Ther Ex             Bridge  20x 20x 20x 20x        Iso ADD/ABD  10x5" 10x5" 10x5" 5"x20        SLR and H ABD  10x2 ea 10x2 10x2 x20 ea        Clamshells  20x 20x 20x x20        Iso PF with strap  10x5" ea 10x5" 10x5" 5"x10        Seated PF  30x2 30x2 30x2 30x2                                  Ther Activity             TM  8 min 10 min  10 min  10 min                     Gait Training                                       Modalities

## 2022-08-16 ENCOUNTER — OFFICE VISIT (OUTPATIENT)
Dept: NEUROSURGERY | Facility: CLINIC | Age: 67
End: 2022-08-16

## 2022-08-16 VITALS
TEMPERATURE: 98.4 F | HEIGHT: 73 IN | SYSTOLIC BLOOD PRESSURE: 111 MMHG | RESPIRATION RATE: 16 BRPM | BODY MASS INDEX: 35.78 KG/M2 | DIASTOLIC BLOOD PRESSURE: 69 MMHG | WEIGHT: 270 LBS | HEART RATE: 76 BPM

## 2022-08-16 DIAGNOSIS — Z98.890 POST-OPERATIVE STATE: Primary | ICD-10-CM

## 2022-08-16 PROCEDURE — 99024 POSTOP FOLLOW-UP VISIT: CPT | Performed by: NEUROLOGICAL SURGERY

## 2022-08-16 NOTE — PROGRESS NOTES
DISCUSSION SUMMARY  This is a 69 y o  male status post a multilevel lumbar laminectomy  His physical therapy notes report that he is much improved and he does admit that he is much improved in comparison to his preoperative state  Will continue physical therapy at this point and will transition to his own therapy at the [facility] following this  We talked about a very gentle weight reduction program which does not involve starvation or cycling of his weight  Return if symptoms worsen or fail to improve, for as scheduled  Diagnosis ICD-10-CM Associated Orders   1  Post-operative state  Z98 890           Chief Complaint   Patient presents with    Post-op     6 Weeks POV F/u for Lumbar spinal stenosis, S/p (DKO) L3-4, L4-5, and L5-S1 metrx decompressive hemilaminectomy with bilateral foraminotomy and discectomy; NO NEW IMAGING       HPI patient is much improved  His physical therapy notes were reviewed in when he was confronted with the discrepancy between his report to the staff here versus this physical therapy reviews he consented that he is much improved  He does feel that he has much reduced tendency for falling  His balance is much improved  His wife says that he is much easier to walk with  Some of the weakness in his legs was related to the manner in which he was using his own equipment which was not adjusted properly  He feels that physical therapy is very beneficial to him  Overall he is happy with his results thus far  Review of Systems   Constitutional: Negative  Negative for activity change  HENT: Positive for hearing loss (slight in b/l ears) and tinnitus (occasional and slight b/l  )  Eyes: Negative  Respiratory: Positive for apnea (sleep)  Cardiovascular: Negative  Negative for leg swelling  Gastrointestinal: Negative  Negative for constipation  Endocrine: Negative  Genitourinary: Negative  Negative for frequency and urgency     Musculoskeletal: Positive for back pain (Constant center lower back pain, non-radiating  ) and gait problem (ambulates with a cane (less frequently)  )  Negative for joint swelling, myalgias and neck stiffness  Pain is 3/10 in center of lower back  Per patient, symptoms are unchanged since last visit  Takes Advil to relieve back pain  PT - 2x weekly -- helpful  INJ - no  PM - no    No falls since last visit   Skin: Negative  Allergic/Immunologic: Negative  Neurological: Positive for weakness (occasional in bilateral lower R>L legs  ) and numbness (bi/leg from calf down into toes)  Hematological: Negative  Psychiatric/Behavioral: Negative  Negative for sleep disturbance     I reviewed the ROS but there are some discrepancies in the patient's reports here versus his physical therapy evaluations      Vitals:    /69 (BP Location: Right arm, Patient Position: Sitting, Cuff Size: Standard)   Pulse 76   Temp 98 4 °F (36 9 °C) (Probe)   Resp 16   Ht 6' 1" (1 854 m)   Wt 122 kg (270 lb)   BMI 35 62 kg/m²       MEDICAL HISTORY  Past Medical History:   Diagnosis Date    Abscess of back 03/01/2018    Acquired pancytopenia ([facility] [address] ) 01/16/2017    Acquired thrombocytopenia ([facility] [address] ) 01/16/2017    Benign essential hypertension     Last Assessed:12/19/16; Pt reports heart and BP has been "fine" as of 4/16/2020    Cirrhosis (HCC)     Colon polyp     CPAP (continuous positive airway pressure) dependence     Cyst of skin     x6 from neck down back area    Depression     Esophageal varices (HCC)     GERD (gastroesophageal reflux disease)     Liver disease     Macrocytosis     Last Assessed:1/16/17    Major depression, chronic     Last Assessed:12/21/17    Major depression, chronic 06/19/2017    Personal history of colonic polyps     Sleep apnea      Past Surgical History:   Procedure Laterality Date    CHOLECYSTECTOMY  11/2018    CHOLECYSTECTOMY LAPAROSCOPIC N/A 11/21/2018    Procedure: CHOLECYSTECTOMY LAPAROSCOPIC, wedge liver biopsy;  Surgeon: Patria Fajardo MD;  Location: [facility];  Service: General    COLONOSCOPY  05/2011    COLONOSCOPY  05/2016    COLONOSCOPY      EGD  01/2019    Esophagitis, esophageal varices    GALLBLADDER SURGERY      INCISION AND DRAINAGE OF WOUND N/A 03/01/2018    SEBACEOUS CYST ABSCESS OF BACK-VIJAYA MONZON MD    NV ARTHRODESIS POSTERIOR/POSTERIORLATERAL CERVICAL BELOW C2 N/A 5/24/2021    Procedure: Posterior cervical decompressive laminectomy and lateral mass fixation fusion C3-5;  Surgeon: Chito Ireland MD;  [facility];  Service: Neurosurgery    NV EXC SKIN BENIG 1 1-2 CM TRUNK,ARM,LEG N/A 8/22/2018    Procedure: EXCISION  BIOPSY LESION/MASS BACK X4 NECK X1;  Surgeon: Patria Fajardo MD;  Location: [facility];  Service: [place] LAMINEC/FACETECT/FORAMIN,LUMBAR 1 SEG Left 7/8/2022    Procedure: L3-4, L4-5, and L5-S1 metrx decompressive hemilaminectomy with bilateral foraminotomy and discectomy;  Surgeon: Kate Langford MD;  [facility];  Service: Neurosurgery     Social History     Tobacco Use    Smoking status: Never Smoker    Smokeless tobacco: Never Used   Vaping Use    Vaping Use: Never used   Substance Use Topics    Alcohol use: Not Currently     Comment: 7/19/19-last drink 12/2018- Occasionally/1-2 drinks per weekend    Drug use: No        Current Outpatient Medications:     b complex vitamins tablet, Take 1 tablet by mouth every morning, Disp: , Rfl:     buPROPion (WELLBUTRIN XL) 150 mg 24 hr tablet, Take 1 tablet (150 mg total) by mouth daily (Patient taking differently: Take 150 mg by mouth every morning), Disp: 90 tablet, Rfl: 1    multivitamin (THERAGRAN) TABS, Take 1 tablet by mouth every morning, Disp: , Rfl:     nadolol (CORGARD) 40 mg tablet, Take 1 tablet (40 mg total) by mouth daily (Patient taking differently: Take 40 mg by mouth every morning), Disp: 90 tablet, Rfl: 3    methocarbamol (Robaxin-750) 750 mg tablet, Take 1 tablet (750 mg total) by mouth every 8 (eight) hours for 7 days (Patient not taking: Reported on 8/16/2022), Disp: 21 tablet, Rfl: 0   No Known Allergies     The following portions of the patient's history were updated by MA and reviewed by MD: allergies, current medications, past family history, past medical history, past social history, past surgical history and problem list       Physical Exam  Ambulation station and gait are much improved  His inter heel distance is reduced to 6 in at this point  He does have somewhat of a wondering baseline but this is also improved in comparison to the excursions he had previously    His power is 5/5  His transitions from sitting to standing are improved    RESULTS/DATA  Physical therapy notes reviewed in detail

## 2022-08-17 ENCOUNTER — OFFICE VISIT (OUTPATIENT)
Dept: PHYSICAL THERAPY | Facility: CLINIC | Age: 67
End: 2022-08-17
Payer: MEDICARE

## 2022-08-17 DIAGNOSIS — Z98.890 H/O OF HEMILAMINECTOMY: Primary | ICD-10-CM

## 2022-08-17 DIAGNOSIS — Z98.890 STATUS POST HEMILAMINOTOMY: ICD-10-CM

## 2022-08-17 DIAGNOSIS — Z98.890 POST-OPERATIVE STATE: ICD-10-CM

## 2022-08-17 PROCEDURE — 97110 THERAPEUTIC EXERCISES: CPT | Performed by: PHYSICAL THERAPIST

## 2022-08-17 PROCEDURE — 97112 NEUROMUSCULAR REEDUCATION: CPT | Performed by: PHYSICAL THERAPIST

## 2022-08-17 NOTE — PROGRESS NOTES
Daily Note     Today's date: 2022  Patient name: Laura Deras  : 1955  MRN: [id number]  Referring provider: Kev Catherine  Dx:   Encounter Diagnosis     ICD-10-CM    1  H/O of hemilaminectomy  Z98 890    2  Post-operative state  Z98 890    3  Status post hemilaminotomy  Z98 890                   Subjective: Notes feeling progression with LE strength and activation  Denies any back pain today  Had F/U with MD yesterday- happy with progress and wishes for him to continue with PT        Objective: See treatment diary below      Assessment: Progression with performance of spring PF; toe walking in // bars and retro walking in // bars with fair ability  Denies any difficulty with performance seated PF today with TBand resistance  Continues to have fair ability to balance  Plan: Continue per plan of care        Precautions: Prior fusion; balance/falls risk  EPOC: 10/3/22  HEP: seated HR    Manuals 8/1 8/3 8/8 8/10 8/15 8/17       LE PROM                                                    Neuro Re-Ed             NSP  10x5 10x5" 10x5' 5"x20 NV       NSP march  20x2 20x2 20x2 20x2 NV       NSP heel slide  20x2 20x2 20x2 20x2 NV       NMES with PF  10 min NV 10 min 10 min NV                    Hernandez testing 10 min                         Ther Ex             Bridge  20x 20x 20x 20x 20x       Iso ADD/ABD  10x5" 10x5" 10x5" 5"x20 5"x20       SLR and H ABD  10x2 ea 10x2 10x2 x20 ea NV       Clamshells  20x 20x 20x x20 NV       Iso PF with strap  10x5" ea 10x5" 10x5" 5"x10 5x10       Seated PF  30x2 30x2 30x2 30x2 30x2 BLK       Toe walk and retro walk      // bars 5 laps ea       BAPS board PF STD      30x ea       Spring reformer PF      30x 2 ea       Bosu HR       30x       Supine leg press HR      B/L 25# 30x       Ther Activity             TM  8 min 10 min  10 min  10 min 10 min                     Gait Training                                       Modalities

## 2022-08-22 ENCOUNTER — OFFICE VISIT (OUTPATIENT)
Dept: PHYSICAL THERAPY | Facility: CLINIC | Age: 67
End: 2022-08-22
Payer: MEDICARE

## 2022-08-22 DIAGNOSIS — Z98.890 POST-OPERATIVE STATE: ICD-10-CM

## 2022-08-22 DIAGNOSIS — Z98.890 H/O OF HEMILAMINECTOMY: Primary | ICD-10-CM

## 2022-08-22 DIAGNOSIS — Z98.890 STATUS POST HEMILAMINOTOMY: ICD-10-CM

## 2022-08-22 PROCEDURE — 97112 NEUROMUSCULAR REEDUCATION: CPT

## 2022-08-22 PROCEDURE — 97110 THERAPEUTIC EXERCISES: CPT

## 2022-08-22 PROCEDURE — 97014 ELECTRIC STIMULATION THERAPY: CPT

## 2022-08-22 NOTE — PROGRESS NOTES
Daily Note     Today's date: 2022  Patient name: Ngozi Skaggs  : 1955  MRN: [id number]  Referring provider: Clemente Flores  Dx:   Encounter Diagnosis     ICD-10-CM    1  H/O of hemilaminectomy  Z98 890    2  Post-operative state  Z98 890    3  Status post hemilaminotomy  Z98 890        Start Time: 806  Stop Time: 906  Total time in clinic (min): 60 minutes    Subjective: Patient denies back pain prior to start of session  He states his legs feel puffy today which is a change from LV  He states that LV standing TE in  bars and LP caused increased pain in LB  Patient reports post visit that his LEs feel better  Objective: See treatment diary below      Assessment: Patient has slight bilateral swelling in LEs today, but skin normal color and normal temp  Patient denies pain in calf  Patient declined standing TE and LP this visit  Resume NV  Improved performance of PF post NMES  Increased challenge with SLR this visit  Patient would benefit from continued PT  Plan: Continue per plan of care        Precautions: Prior fusion; balance/falls risk  EPOC: 10/3/22  HEP: seated HR    Manuals 8/1 8/3 8/8 8/10 8/15 8/17 8/22      LE PROM                                                    Neuro Re-Ed             NSP  10x5 10x5" 10x5' 5"x20 NV 5"x20      NSP march  20x2 20x2 20x2 20x2 NV 20x2      NSP heel slide  20x2 20x2 20x2 20x2 NV 20x2      NMES with PF  10 min NV 10 min 10 min NV 10 min                   Hernandez testing 10 min                         Ther Ex             Bridge  20x 20x 20x 20x 20x NV      Iso ADD/ABD  10x5" 10x5" 10x5" 5"x20 5"x20 5"x20      SLR and H ABD  10x2 ea 10x2 10x2 x20 ea NV 2x10ea      Clamshells  20x 20x 20x x20 NV x20      Iso PF with strap  10x5" ea 10x5" 10x5" 5"x10 5x10 5x10      Seated PF  30x2 30x2 30x2 30x2 30x2 BLK 30x2 BLK      Toe walk and retro walk      // bars 5 laps ea declined,NV      BAPS board PF STD      30x ea declined,NV      Spring reformer PF 30x 2 ea declined,NV      Bosu HR       30x declined,NV      Supine leg press HR      B/L 25# 30x declined,NV      Ther Activity             TM  8 min 10 min  10 min  10 min 10 min  10 min                   Gait Training                                       Modalities

## 2022-08-24 ENCOUNTER — OFFICE VISIT (OUTPATIENT)
Dept: PHYSICAL THERAPY | Facility: CLINIC | Age: 67
End: 2022-08-24
Payer: MEDICARE

## 2022-08-24 ENCOUNTER — TELEPHONE (OUTPATIENT)
Dept: FAMILY MEDICINE CLINIC | Facility: HOSPITAL | Age: 67
End: 2022-08-24

## 2022-08-24 DIAGNOSIS — Z98.890 H/O OF HEMILAMINECTOMY: Primary | ICD-10-CM

## 2022-08-24 DIAGNOSIS — Z98.890 STATUS POST HEMILAMINOTOMY: ICD-10-CM

## 2022-08-24 DIAGNOSIS — Z98.890 POST-OPERATIVE STATE: ICD-10-CM

## 2022-08-24 PROCEDURE — 97014 ELECTRIC STIMULATION THERAPY: CPT

## 2022-08-24 PROCEDURE — 97110 THERAPEUTIC EXERCISES: CPT

## 2022-08-24 PROCEDURE — 97530 THERAPEUTIC ACTIVITIES: CPT

## 2022-08-24 NOTE — PROGRESS NOTES
Daily Note     Today's date: 2022  Patient name: Edda Nino  : 1955  MRN: [id number]  Referring provider: Sumi Mittal  Dx:   Encounter Diagnosis     ICD-10-CM    1  H/O of hemilaminectomy  Z98 890    2  Post-operative state  Z98 890    3  Status post hemilaminotomy  Z98 890        Start Time: 0803  Stop Time: 0900  Total time in clinic (min): 57 minutes    Subjective: Patient states that (B) LEs feel "puffy" today and stiff  Patient states that he takes a tour of the [employer] today  Patient states he feels better post visit  Objective: See treatment diary below      Assessment: Session initiated on TM and progressed to NMS to improve PF, but also to address (B)LE swelling  Focused placed on standing TE this session  Monitor response to standing TE NV  Patient would benefit from continued PT to improve strength and function  Plan: Continue per plan of care        Precautions: Prior fusion; balance/falls risk  EPOC: 10/3/22  HEP: seated HR    Manuals 8/1 8/3 8/8 8/10 8/15 8/17 8/22 8/24     LE PROM                                                    Neuro Re-Ed             NSP  10x5 10x5" 10x5' 5"x20 NV 5"x20 NV     NSP march  20x2 20x2 20x2 20x2 NV 20x2 NV     NSP heel slide  20x2 20x2 20x2 20x2 NV 20x2 NV     NMES with PF  10 min NV 10 min 10 min NV 10 min 10 min                  Hernandez testing 10 min                         Ther Ex             Bridge  20x 20x 20x 20x 20x NV x20     Iso ADD/ABD  10x5" 10x5" 10x5" 5"x20 5"x20 5"x20 5"x20     SLR and H ABD  10x2 ea 10x2 10x2 x20 ea NV 2x10ea 2x10ea     Clamshells  20x 20x 20x x20 NV x20 x20     Iso PF with strap  10x5" ea 10x5" 10x5" 5"x10 5x10 5x10 NV     Seated PF  30x2 30x2 30x2 30x2 30x2 BLK 30x2 BLK NV     Toe walk and retro walk      // bars 5 laps ea declined,NV // bars 5 laps ea     BAPS board PF STD      30x ea declined,NV 30x ea     Spring reformer PF      30x 2 ea declined,NV 30x 2 ea     Bosu HR       30x declined,NV 30x Supine leg press HR      B/L 25# 30x declined,NV B/L 25# 3x10     Ther Activity             TM  8 min 10 min  10 min  10 min 10 min  10 min 10 min                  Gait Training                                       Modalities

## 2022-08-24 NOTE — TELEPHONE ENCOUNTER
Pt called asking for hydrochlorothiazide 25mg   Sent to [facility]   [place]   Please advise  thanks

## 2022-08-25 DIAGNOSIS — M79.89 LEG SWELLING: Primary | ICD-10-CM

## 2022-08-25 RX ORDER — HYDROCHLOROTHIAZIDE 25 MG/1
25 TABLET ORAL DAILY
Qty: 30 TABLET | Refills: 2 | Status: SHIPPED | OUTPATIENT
Start: 2022-08-25 | End: 2022-08-25 | Stop reason: SDUPTHER

## 2022-08-25 RX ORDER — HYDROCHLOROTHIAZIDE 25 MG/1
25 TABLET ORAL DAILY
Qty: 30 TABLET | Refills: 2 | Status: SHIPPED | OUTPATIENT
Start: 2022-08-25 | End: 2023-03-21 | Stop reason: ALTCHOICE

## 2022-08-25 NOTE — TELEPHONE ENCOUNTER
HCTZ rx sent to [facility] but may need to be changed to [facility] as requested in message below - please clarify pharmacy and change in chart if needed

## 2022-08-25 NOTE — TELEPHONE ENCOUNTER
This was last sent 12/2021 by you  It was d/c in his chart at his neuro appt 8/16/22  It was prescribed for leg swelling originally  He hasn't taken it for about a month  He is now having foot swelling  Can you send to pharm?

## 2022-08-25 NOTE — TELEPHONE ENCOUNTER
This med is not on his med list  Please call him and see why he takes so I have a diagnosis to prescribe it

## 2022-08-29 ENCOUNTER — OFFICE VISIT (OUTPATIENT)
Dept: PHYSICAL THERAPY | Facility: CLINIC | Age: 67
End: 2022-08-29
Payer: MEDICARE

## 2022-08-29 DIAGNOSIS — Z98.890 POST-OPERATIVE STATE: ICD-10-CM

## 2022-08-29 DIAGNOSIS — Z98.890 STATUS POST HEMILAMINOTOMY: ICD-10-CM

## 2022-08-29 DIAGNOSIS — Z98.890 H/O OF HEMILAMINECTOMY: Primary | ICD-10-CM

## 2022-08-29 PROCEDURE — 97110 THERAPEUTIC EXERCISES: CPT | Performed by: PHYSICAL THERAPIST

## 2022-08-29 PROCEDURE — 97112 NEUROMUSCULAR REEDUCATION: CPT | Performed by: PHYSICAL THERAPIST

## 2022-08-29 NOTE — PROGRESS NOTES
Daily Note     Today's date: 2022  Patient name: Jaime Roth  : 1955  MRN: [id number]  Referring provider: Patricia Lucas  Dx:   Encounter Diagnosis     ICD-10-CM    1  H/O of hemilaminectomy  Z98 890    2  Post-operative state  Z98 890    3  Status post hemilaminotomy  Z98 890                   Subjective: Notes feeling okay, getting improvement in balance at home  Objective: See treatment diary below      Assessment: Noting improved ability to perform HR/TR on leg press up to 35 lbs B/L  Denies any trouble with performance of SL baps board  Focus more on balance on SLS and static standing  Plan: Continue per plan of care        Precautions: Prior fusion; balance/falls risk  EPOC: 10/3/22  HEP: seated HR    Manuals 8/1 8/3 8/8 8/10 8/15 8/17 8/22 8/24 8/29    LE PROM                                                    Neuro Re-Ed             NSP  10x5 10x5" 10x5' 5"x20 NV 5"x20 NV     NSP march  20x2 20x2 20x2 20x2 NV 20x2 NV     NSP heel slide  20x2 20x2 20x2 20x2 NV 20x2 NV     NMES with PF  10 min NV 10 min 10 min NV 10 min 10 min NV                 Hernandez testing 10 min                         Ther Ex             Bridge  20x 20x 20x 20x 20x NV x20 20x    Iso ADD/ABD  10x5" 10x5" 10x5" 5"x20 5"x20 5"x20 5"x20     SLR and H ABD  10x2 ea 10x2 10x2 x20 ea NV 2x10ea 2x10ea     Clamshells  20x 20x 20x x20 NV x20 x20 20x    Iso PF with strap  10x5" ea 10x5" 10x5" 5"x10 5x10 5x10 NV 10x5"    Seated PF  30x2 30x2 30x2 30x2 30x2 BLK 30x2 BLK NV 20x BTB    Toe walk and retro walk      // bars 5 laps ea declined,NV // bars 5 laps ea // 5 laps ea    BAPS board PF STD      30x ea declined,NV 30x ea 30x ea    Spring reformer PF      30x 2 ea declined,NV 30x 2 ea 30x2    Bosu HR       30x declined,NV 30x 30x    Supine leg press HR      B/L 25# 30x declined,NV B/L 25# 3x10 B/L 25# 3x10    Ther Activity             TM  8 min 10 min  10 min  10 min 10 min  10 min 10 min 10 min                 Gait Training                                       Modalities Home

## 2022-08-31 ENCOUNTER — HOSPITAL ENCOUNTER (OUTPATIENT)
Dept: MRI IMAGING | Facility: HOSPITAL | Age: 67
Discharge: HOME | End: 2022-08-31
Attending: INTERNAL MEDICINE
Payer: MEDICARE

## 2022-08-31 DIAGNOSIS — K74.69 OTHER CIRRHOSIS OF LIVER (HCC): ICD-10-CM

## 2022-08-31 DIAGNOSIS — R93.89 ABNORMAL FINDING ON RADIOLOGY EXAM: ICD-10-CM

## 2022-08-31 PROCEDURE — A9585 GADOBUTROL INJECTION: HCPCS | Performed by: INTERNAL MEDICINE

## 2022-08-31 PROCEDURE — 74183 MRI ABD W/O CNTR FLWD CNTR: CPT

## 2022-08-31 PROCEDURE — G1004 CDSM NDSC: HCPCS

## 2022-08-31 RX ADMIN — GADOBUTROL 12 ML: 604.72 INJECTION INTRAVENOUS at 09:01

## 2022-09-02 ENCOUNTER — APPOINTMENT (OUTPATIENT)
Dept: LAB | Facility: HOSPITAL | Age: 67
End: 2022-09-02
Attending: INTERNAL MEDICINE
Payer: MEDICARE

## 2022-09-02 DIAGNOSIS — K74.69 FLORID CIRRHOSIS (HCC): ICD-10-CM

## 2022-09-02 DIAGNOSIS — E78.5 HYPERLIPIDEMIA, UNSPECIFIED HYPERLIPIDEMIA TYPE: ICD-10-CM

## 2022-09-02 DIAGNOSIS — F32.9 MAJOR DEPRESSIVE DISORDER WITH SINGLE EPISODE, REMISSION STATUS UNSPECIFIED: ICD-10-CM

## 2022-09-02 DIAGNOSIS — M79.89 SWELLING OF LIMB: ICD-10-CM

## 2022-09-02 DIAGNOSIS — Z12.5 SPECIAL SCREENING FOR MALIGNANT NEOPLASM OF PROSTATE: ICD-10-CM

## 2022-09-02 LAB
ALBUMIN SERPL BCP-MCNC: 3.3 G/DL (ref 3.5–5)
ALP SERPL-CCNC: 143 U/L (ref 46–116)
ALT SERPL W P-5'-P-CCNC: 38 U/L (ref 12–78)
ANION GAP SERPL CALCULATED.3IONS-SCNC: 6 MMOL/L (ref 4–13)
AST SERPL W P-5'-P-CCNC: 45 U/L (ref 5–45)
BASOPHILS # BLD AUTO: 0.01 THOUSANDS/ΜL (ref 0–0.1)
BASOPHILS NFR BLD AUTO: 0 % (ref 0–1)
BILIRUB SERPL-MCNC: 1.5 MG/DL (ref 0.2–1)
BUN SERPL-MCNC: 20 MG/DL (ref 5–25)
CALCIUM ALBUM COR SERPL-MCNC: 9.7 MG/DL (ref 8.3–10.1)
CALCIUM SERPL-MCNC: 9.1 MG/DL (ref 8.3–10.1)
CHLORIDE SERPL-SCNC: 104 MMOL/L (ref 96–108)
CHOLEST SERPL-MCNC: 146 MG/DL
CO2 SERPL-SCNC: 31 MMOL/L (ref 21–32)
CREAT SERPL-MCNC: 1.05 MG/DL (ref 0.6–1.3)
EOSINOPHIL # BLD AUTO: 0.14 THOUSAND/ΜL (ref 0–0.61)
EOSINOPHIL NFR BLD AUTO: 3 % (ref 0–6)
ERYTHROCYTE [DISTWIDTH] IN BLOOD BY AUTOMATED COUNT: 13 % (ref 11.6–15.1)
GFR SERPL CREATININE-BSD FRML MDRD: 72 ML/MIN/1.73SQ M
GLUCOSE P FAST SERPL-MCNC: 90 MG/DL (ref 65–99)
HCT VFR BLD AUTO: 44.6 % (ref 36.5–49.3)
HDLC SERPL-MCNC: 65 MG/DL
HGB BLD-MCNC: 15.1 G/DL (ref 12–17)
IMM GRANULOCYTES # BLD AUTO: 0.02 THOUSAND/UL (ref 0–0.2)
IMM GRANULOCYTES NFR BLD AUTO: 1 % (ref 0–2)
LDLC SERPL CALC-MCNC: 67 MG/DL (ref 0–100)
LYMPHOCYTES # BLD AUTO: 1.36 THOUSANDS/ΜL (ref 0.6–4.47)
LYMPHOCYTES NFR BLD AUTO: 31 % (ref 14–44)
MCH RBC QN AUTO: 34.6 PG (ref 26.8–34.3)
MCHC RBC AUTO-ENTMCNC: 33.9 G/DL (ref 31.4–37.4)
MCV RBC AUTO: 102 FL (ref 82–98)
MONOCYTES # BLD AUTO: 0.63 THOUSAND/ΜL (ref 0.17–1.22)
MONOCYTES NFR BLD AUTO: 15 % (ref 4–12)
NEUTROPHILS # BLD AUTO: 2.18 THOUSANDS/ΜL (ref 1.85–7.62)
NEUTS SEG NFR BLD AUTO: 50 % (ref 43–75)
NONHDLC SERPL-MCNC: 81 MG/DL
NRBC BLD AUTO-RTO: 0 /100 WBCS
PLATELET # BLD AUTO: 99 THOUSANDS/UL (ref 149–390)
PMV BLD AUTO: 10.9 FL (ref 8.9–12.7)
POTASSIUM SERPL-SCNC: 4.2 MMOL/L (ref 3.5–5.3)
PROT SERPL-MCNC: 6.5 G/DL (ref 6.4–8.4)
PSA SERPL-MCNC: 0.5 NG/ML (ref 0–4)
RBC # BLD AUTO: 4.36 MILLION/UL (ref 3.88–5.62)
SODIUM SERPL-SCNC: 141 MMOL/L (ref 135–147)
TRIGL SERPL-MCNC: 68 MG/DL
TSH SERPL DL<=0.05 MIU/L-ACNC: 1.04 UIU/ML (ref 0.45–4.5)
WBC # BLD AUTO: 4.34 THOUSAND/UL (ref 4.31–10.16)

## 2022-09-02 PROCEDURE — 36415 COLL VENOUS BLD VENIPUNCTURE: CPT

## 2022-09-02 PROCEDURE — 84443 ASSAY THYROID STIM HORMONE: CPT

## 2022-09-02 PROCEDURE — 80061 LIPID PANEL: CPT

## 2022-09-02 PROCEDURE — 80053 COMPREHEN METABOLIC PANEL: CPT

## 2022-09-02 PROCEDURE — G0103 PSA SCREENING: HCPCS

## 2022-09-02 PROCEDURE — 85025 COMPLETE CBC W/AUTO DIFF WBC: CPT

## 2022-09-06 ENCOUNTER — OFFICE VISIT (OUTPATIENT)
Dept: PHYSICAL THERAPY | Facility: CLINIC | Age: 67
End: 2022-09-06
Payer: MEDICARE

## 2022-09-06 DIAGNOSIS — Z98.890 STATUS POST HEMILAMINOTOMY: ICD-10-CM

## 2022-09-06 DIAGNOSIS — Z98.890 H/O OF HEMILAMINECTOMY: Primary | ICD-10-CM

## 2022-09-06 DIAGNOSIS — Z98.890 POST-OPERATIVE STATE: ICD-10-CM

## 2022-09-06 PROCEDURE — 97110 THERAPEUTIC EXERCISES: CPT | Performed by: PHYSICAL THERAPIST

## 2022-09-06 PROCEDURE — 97530 THERAPEUTIC ACTIVITIES: CPT | Performed by: PHYSICAL THERAPIST

## 2022-09-06 NOTE — PROGRESS NOTES
Daily Note     Today's date: 2022  Patient name: Cliff Lindsay  : 1955  MRN: [id number]  Referring provider: Pamella Hurst  Dx:   Encounter Diagnosis     ICD-10-CM    1  H/O of hemilaminectomy  Z98 890    2  Post-operative state  Z98 890    3  Status post hemilaminotomy  Z98 890                   Subjective: Notes doing a lot more walking the last few days  Happy with progress, less numbness in his legs  Objective: See treatment diary below      Assessment: Continues to have improvement in gastroc recruitment in [place]  Able to perform HR with assistance of B/L Ues  Continues to have limited ability to perform SL HR with leg press  Able to perform bosu balance today B/L without major difficulty- still needing UE support to control  Will re-assess next session and consider continued addition of agility  Plan: Continue per plan of care        Precautions: Prior fusion; balance/falls risk  EPOC: 10/3/22  HEP: seated HR    Manuals 8/1 8/3 8/8 8/10 8/15 8/17 8/22 8/24 8/29 9/6   LE PROM                                                    Neuro Re-Ed             NSP  10x5 10x5" 10x5' 5"x20 NV 5"x20 NV     NSP march  20x2 20x2 20x2 20x2 NV 20x2 NV     NSP heel slide  20x2 20x2 20x2 20x2 NV 20x2 NV     NMES with PF  10 min NV 10 min 10 min NV 10 min 10 min NV                 Hernandez testing 10 min                         Ther Ex             Bridge  20x 20x 20x 20x 20x NV x20 20x    Iso ADD/ABD  10x5" 10x5" 10x5" 5"x20 5"x20 5"x20 5"x20     SLR and H ABD  10x2 ea 10x2 10x2 x20 ea NV 2x10ea 2x10ea     Clamshells  20x 20x 20x x20 NV x20 x20 20x    Iso PF with strap  10x5" ea 10x5" 10x5" 5"x10 5x10 5x10 NV 10x5" 10x5"   Seated PF  30x2 30x2 30x2 30x2 30x2 BLK 30x2 BLK NV 20x BTB 20x BTB   Toe walk and retro walk      // bars 5 laps ea declined,NV // bars 5 laps ea // 5 laps ea 5 lasp // bars   BAPS board PF STD      30x ea declined,NV 30x ea 30x ea Bosu 20x   Spring reformer PF      30x 2 ea declined,NV 30x 2 ea 30x2 30x2    Bosu HR       30x declined,NV 30x 30x 30x   Supine leg press HR      B/L 25# 30x declined,NV B/L 25# 3x10 B/L 25# 3x10 55# 2x10   Ther Activity             TM  8 min 10 min  10 min  10 min 10 min  10 min 10 min 10 min 10 min                 Gait Training                                       Modalities

## 2022-09-08 ENCOUNTER — EVALUATION (OUTPATIENT)
Dept: PHYSICAL THERAPY | Facility: CLINIC | Age: 67
End: 2022-09-08
Payer: MEDICARE

## 2022-09-08 DIAGNOSIS — Z98.890 STATUS POST HEMILAMINOTOMY: ICD-10-CM

## 2022-09-08 DIAGNOSIS — Z98.890 POST-OPERATIVE STATE: ICD-10-CM

## 2022-09-08 DIAGNOSIS — Z98.890 H/O OF HEMILAMINECTOMY: Primary | ICD-10-CM

## 2022-09-08 PROCEDURE — 97140 MANUAL THERAPY 1/> REGIONS: CPT | Performed by: PHYSICAL THERAPIST

## 2022-09-08 PROCEDURE — 97110 THERAPEUTIC EXERCISES: CPT | Performed by: PHYSICAL THERAPIST

## 2022-09-08 NOTE — PROGRESS NOTES
PT Re-Evaluation     Today's date: 2022  Patient name: Farhad Garber  : 1955  MRN: [id number]  Referring provider: Curtis Valladares  Dx:   Encounter Diagnosis     ICD-10-CM    1  H/O of hemilaminectomy  Z98 890    2  Post-operative state  Z98 890    3  Status post hemilaminotomy  Z98 890                   Assessment  Assessment details: Farhad Garber is a 69 y o  male who presents with increased low back pain S/P Lumbar hemilaminectomy consistent with referring diagnosis of H/O of hemilaminectomy  (primary encounter diagnosis) that is complex secondary to chronic and insidious onset, moderate fear avoidance and moderate pain levels  Clinically demonstrates decreased lumbar ROM, decreased core and hip strength, decreased LE proprioception leading to pain with ADLs and exercise  This suggests the need for skilled OPPT to address the above listed impairments, achieve established goals and return to PLOF pain-free  If you have any questions or concerns please contact me at [phone]  Thank you! Re-assessment 22:  Jonathan Anguiano has attended 11 visits since the initiation of OPPT and reports a 75% GROC  Clinically demonstrates improved lumbar ROM and core strength, improved hip control as well as improved balance  Jonathan Anguiano continues to have limited S1 myotomal strength limiting his balance and endurance with performance of ADLs/exercise and activity around his home  This suggests the need for continued skilled OPPT to address his remaining impairments, achieve established goals and return to PLOF pain-free     Impairments: abnormal coordination, abnormal gait, abnormal muscle firing, abnormal or restricted ROM, abnormal movement, activity intolerance, impaired balance, impaired physical strength, lacks appropriate home exercise program, pain with function, safety issue, poor posture  and poor body mechanics    Symptom irritability: moderateUnderstanding of Dx/Px/POC: good   Prognosis: good    Goals  Short Term Goals (4 weeks)  1 ) Establish independence with HEP- MET  2 ) Decrease subjective pain levels from NPRS at least to 2-5/10 at rest and with activity- partially met  3 ) Improve lumbar ROM at least 5-10 degrees into all planes to allow for improved ease of movement with less guarding- not met    Long Term Goals (8 weeks)  1 ) Improve lumbar ROM to WNL in all planes to restore normal movement with ADLs and function- not met  2 ) Improve hip ABD, ER strength to 5/5 in all planes in order to return to pain-free ADLs and function- not met  3 ) Improve FOTO score at least to 75 points showing improved self reported disability - not met  4 ) Improve SLS to > 5 seconds B/L limiting falls risk        Plan  Plan details: Continue POC for L/S ROM, strength and stability; monitor sxs and progress as able   Patient would benefit from: PT eval and skilled physical therapy  Planned modality interventions: biofeedback, cryotherapy, electrical stimulation/Russian stimulation and TENS  Planned therapy interventions: abdominal trunk stabilization, activity modification, ADL retraining, ADL training, balance, behavior modification, coordination, dry needling, flexibility, functional ROM exercises, gait training, graded activity, graded exercise, graded motor, home exercise program, therapeutic training, therapeutic exercise, therapeutic activities, stretching, strengthening, sensory integrative techniques, self care, patient education, postural training, neuromuscular re-education, IADL retraining, joint mobilization, manual therapy and massage  Frequency: 2x week  Duration in visits: 16  Duration in weeks: 8  Plan of Care beginning date: 9/8/2022  Plan of Care expiration date: 11/10/2022  Treatment plan discussed with: patient        Subjective Evaluation    History of Present Illness  Date of onset: 5/1/2022  Date of surgery: 7/8/2022  Mechanism of injury: Red Litten is a 69 y o  male who presents with increased LBP S/P hemilamectomy starting ~ 3 weeks ago  Had a long complicated history of balance deficits related to cervical myelopathy  Notes after having 6 months of OPPT rehab for C/S still had deficits with balance  Decided to seek out MD consult where MRI and X-rays were (-) for fx but + for L4-L5 HNP and stenosis until referral for hemilaminectomy was scheduled  At current status using SPC and not using a clamshell brace  At F/U script for OPPT provided  Denies night pain- using a CPAP for or changes in bowel or bladder function  Reports continued LE NT numbness and peripheral weakness signs or sxs  F/U with MD in 2-3 weeks  Wishes to return to PLOF pain-free- improve strength and return to normal exercise, improve balance and normalize his ability to exercise and walk  Re-assessment 22:  Griselda Birchwood has attended 11 visits since the initiation of OPPT and reports a 75% GROC  Reports compliance with HEP; notes improved ability to go and down stairs- still needs to use a rail  Able to 7-8 steps without use of a railing at times  Notes getting improved power at times  Wishes to continue to work on balance and strength of his feet  F/U with MD in October  Still takes his [facility] with him at times  Notes still having a little back stiffness- less and less pain overall              Not a recurrent problem   Quality of life: good    Pain  Current pain ratin  At best pain ratin  At worst pain rating: 3  Location: L/S and LEs   Quality: burning and throbbing  Relieving factors: relaxation, medications, rest and support  Aggravating factors: stair climbing, standing and walking  Progression: improved      Diagnostic Tests  X-ray: abnormal  MRI studies: abnormal  Treatments  Previous treatment: physical therapy  Current treatment: immobilization and physical therapy  Patient Goals  Patient goals for therapy: decreased edema, decreased pain, improved balance, increased motion, increased strength, independence with ADLs/IADLs and return to sport/leisure activities  Patient goal: Get stabilized         Objective     Active Range of Motion     Lumbar   Flexion: 105 degrees   Extension: 25 degrees   Left lateral flexion: 40 degrees       Right lateral flexion: 40 degrees   Left rotation: 60 degrees   Right rotation: 65 degrees   Left Hip   External rotation (90/90): 40 degrees   Internal rotation (90/90): 35 degrees     Right Hip   External rotation (90/90): 40 degrees   Internal rotation (90/90): 30 degrees     Strength/Myotome Testing     Left Hip   Planes of Motion   Flexion: 5  Extension: 4  Abduction: 5  Adduction: 5  External rotation: 5  Internal rotation: 5    Right Hip   Planes of Motion   Flexion: 5  Extension: 5  Abduction: 4+  Adduction: 5  Internal rotation: 5    Left Knee   Flexion: 5  Extension: 5    Right Knee   Flexion: 5  Extension: 5    Left Ankle/Foot   Dorsiflexion: 4+  Plantar flexion: 4+    Right Ankle/Foot   Dorsiflexion: 4+  Plantar flexion: 4-    Tests     Lumbar     Left   Negative crossed SLR, quadrant and slump test      Right   Negative crossed SLR, quadrant and slump test      Left Pelvic Girdle/Sacrum   Positive: active SLR test      Right Pelvic Girdle/Sacrum   Negative: active SLR test      Functional Assessment      Squat    Left within functional limits and right within functional limits       Single Leg Stance   Left: 4 seconds  Right: 3 seconds  Neuro Exam:     Functional outcomes   5x sit to stand: 18 3 (seconds)  TU 6  (seconds)             Precautions: Prior fusion; balance/falls risk  EPOC: 10/3/22  HEP: seated HR    Manuals 9/8 8/3 8/8 8/10 8/15 8/17 8/22 8/24 8/29 9   LE PROM             Re-assess PF                                      Neuro Re-Ed             NSP  10x5 10x5" 10x5' 5"x20 NV 5"x20 NV     NSP march  20x2 20x2 20x2 20x2 NV 20x2 NV     NSP heel slide  20x2 20x2 20x2 20x2 NV 20x2 NV     NMES with PF  10 min NV 10 min 10 min NV 10 min 10 min NV Hernandez testing 10 min                         Ther Ex             Bridge  20x 20x 20x 20x 20x NV x20 20x    Iso ADD/ABD  10x5" 10x5" 10x5" 5"x20 5"x20 5"x20 5"x20     SLR and H ABD  10x2 ea 10x2 10x2 x20 ea NV 2x10ea 2x10ea     Clamshells  20x 20x 20x x20 NV x20 x20 20x    Iso PF with strap  10x5" ea 10x5" 10x5" 5"x10 5x10 5x10 NV 10x5" 10x5"   Seated PF  30x2 30x2 30x2 30x2 30x2 BLK 30x2 BLK NV 20x BTB 20x BTB   Toe walk and retro walk      // bars 5 laps ea declined,NV // bars 5 laps ea // 5 laps ea 5 lasp // bars   BAPS board PF STD      30x ea declined,NV 30x ea 30x ea Bosu 20x   Spring reformer PF      30x 2 ea declined,NV 30x 2 ea 30x2 30x2    Bosu HR       30x declined,NV 30x 30x 30x   Step lunge 4" 2x10 ea            Lat step down 4" 2x10 ea            Supine leg press HR 55# 2x10     B/L 25# 30x declined,NV B/L 25# 3x10 B/L 25# 3x10 55# 2x10   Ther Activity             TM 10 min  8 min 10 min  10 min  10 min 10 min  10 min 10 min 10 min 10 min                 Gait Training                                       Modalities

## 2022-09-09 ENCOUNTER — OFFICE VISIT (OUTPATIENT)
Dept: SLEEP CENTER | Facility: CLINIC | Age: 67
End: 2022-09-09
Payer: MEDICARE

## 2022-09-09 VITALS
BODY MASS INDEX: 36.31 KG/M2 | HEART RATE: 89 BPM | SYSTOLIC BLOOD PRESSURE: 141 MMHG | WEIGHT: 274 LBS | DIASTOLIC BLOOD PRESSURE: 65 MMHG | HEIGHT: 73 IN

## 2022-09-09 DIAGNOSIS — F32.9 MAJOR DEPRESSION, CHRONIC: ICD-10-CM

## 2022-09-09 DIAGNOSIS — F45.8 BRUXISM: ICD-10-CM

## 2022-09-09 DIAGNOSIS — E66.9 OBESITY (BMI 30-39.9): ICD-10-CM

## 2022-09-09 DIAGNOSIS — G47.33 OSA (OBSTRUCTIVE SLEEP APNEA): Primary | ICD-10-CM

## 2022-09-09 DIAGNOSIS — R40.0 DAYTIME SLEEPINESS: ICD-10-CM

## 2022-09-09 DIAGNOSIS — R68.2 DRY MOUTH: ICD-10-CM

## 2022-09-09 DIAGNOSIS — G47.09 OTHER INSOMNIA: ICD-10-CM

## 2022-09-09 PROCEDURE — 99214 OFFICE O/P EST MOD 30 MIN: CPT | Performed by: INTERNAL MEDICINE

## 2022-09-09 NOTE — PROGRESS NOTES
Review of Systems      Genitourinary none   Cardiology ankle/leg swelling   Gastrointestinal none   Neurology muscle weakness, numbness/tingling of an extremity and balance problems   Constitutional none   Integumentary none   Psychiatry none   Musculoskeletal none   Pulmonary shortness of breath with activity and snoring   ENT ringing in ears   Endocrine none   Hematological none

## 2022-09-09 NOTE — PROGRESS NOTES
Follow-Up Note - [address]  69 y o  male  SHP:0/43/8567  ZON:[id number]  DOS:9/9/2022    CC: I saw this patient for follow-up in clinic today for Sleep disordered breathing, Coexisting Sleep and Medical Problems  A sleep study to titrate Positive airway pressure (PAP) therapy was undertaken  Patient is here to review results and to initiate therapy  The study demonstrated sleep disordered breathing was successfully remediated with PAP at 13 cm H2O  He had difficulty maintaining sleep  Sleep efficiency was reduced at 71%  The preceding diagnostic study demonstrated   obstructive sleep apnea: AHI 13 4 /hour , slightly higher during REM at 19 per hour and considerably higher while supine at 50 per hour  Moderate to loud intensity  snoring  was noted  There were also 191 respiratory effort-related arousals resulting in an RDI of 50 per hour Minimum oxygen saturation 76 %  and 3 9 minutes of total sleep time was spent with saturations less than 90%  There was a severe degree of sleep fragmentation with spontaneous arousals occurring 59 5 times an hour  PFSH, Problem List, Medications & Allergies were reviewed in EMR  Interval changes: none reported  He  has a past medical history of Abscess of back (03/01/2018), Acquired pancytopenia ([facility] [address] ) (01/16/2017), Acquired thrombocytopenia ([facility] [address] ) (01/16/2017), Benign essential hypertension, Cirrhosis ([facility] ), Colon polyp, CPAP (continuous positive airway pressure) dependence, Cyst of skin, Depression, [facility] varices ([facility] ), GERD (gastroesophageal reflux disease), Liver disease, Macrocytosis, Major depression, chronic, Major depression, chronic (06/19/2017), Personal history of colonic polyps, and Sleep apnea  He has a current medication list which includes the following prescription(s): b complex vitamins, hydrochlorothiazide, multivitamin, nadolol, bupropion, and methocarbamol      PHYSIOLOGICAL DATA REVIEW AND INTERPRETATION: using PAP > 4 hours/night 80%  Estimated YOSELYN 3 6/hour with pressure of 13cm H2O ; Patient has not been using ozone based devices to sanitize the machine  SUBJECTIVE: Regarding use of PAP, Berto Bah reports:   · some adverse effects: dry mouth/throat; has not noticed any fibres or foreign material in air line  · He is benefiting from use: sleeping better   · He grinds his teeth during sleep  Sleep Routine: Berto Bah reports getting 5 hrs sleep  ; he has less difficulty initiating and maintaining sleep   He arises spontaneously and is not always refreshed  Berto Bah reports Excessive Daytime Sleepiness, dozes off when sedentary at home  He rated himself at Total score: 6 /24 on the [place] Sleepiness Scale  Habits: reports that he has never smoked  He has never used smokeless tobacco ,  reports previous alcohol use ,  reports no history of drug use , Caffeine use:very little ; Exercise routine: regular    ROS: reviewed & as attached  Significant for few pounds weight gain  He reported no nasal symptoms  He has some dyspnea on effort  He feels mood is stable on current medication      EXAM: /65 (BP Location: Left arm, Patient Position: Sitting, Cuff Size: Large)   Pulse 89   Ht 6' 1" (1 854 m)   Wt 124 kg (274 lb)   BMI 36 15 kg/m²     Wt Readings from Last 3 Encounters:   09/09/22 124 kg (274 lb)   08/16/22 122 kg (270 lb)   07/21/22 123 kg (271 lb 6 4 oz)      Patient is well groomed; well appearing  CNS: Alert, orientated, clear & coherent speech  Psych: cooperativeand in no distress  Mental state:  Has a constricted  H&N: EOMI; NC/AT:no facial pressure marks, no rashes  Skin/Extrem: col & hydration normal; + pedal edema  Resp: Respiratory effort is normal  Physical findings otherwise essentially unchanged from previous  IMPRESSION: Problem List Items & Comorbidities Addressed this Visit    1  DANIEL (obstructive sleep apnea)  PAP DME Resupply/Reorder   2  Other insomnia     3  Bruxism     4  Dry mouth     5  Daytime sleepiness     6  Major depression, chronic     7  Obesity (BMI 30-39  9)         PLAN:  1  I reviewed results of prior studies and physiologic data with the patient  2  I discussed treatment options with risks and benefits  3  Treatment with  PAP is medically necessary and Emaline Reins is agreable to continue use  4  Care of equipment, methods to improve comfort using PAP and importance of compliance with therapy were discussed  5  Pressure setting: continue 13 cmH2O     6  Rx provided to replace  supplies and Care coordinated with DME provider  7  Multi component Cognitive behavioral therapy for Insomnia undertaken - Sleep Restriction, Stimulus control, Relaxation techniques and Sleep hygiene were discussed  8  Discussed strategies for weight reduction  9  Follow-up is advised in 1 year or sooner if needed to monitor progress, compliance and to adjust therapy  Thank you for allowing me to participate in the care of this patient  Sincerely,     Authenticated electronically on 05/96/84   Board Certified Specialist     Portions of the record may have been created with voice recognition software  Occasional wrong word or "sound a like" substitutions may have occurred due to the inherent limitations of voice recognition software  There may also be notations and random deletions of words or characters from malfunctioning software  Read the chart carefully and recognize, using context, where substitutions/deletions have occurred

## 2022-09-09 NOTE — PATIENT INSTRUCTIONS

## 2022-09-12 ENCOUNTER — OFFICE VISIT (OUTPATIENT)
Dept: PHYSICAL THERAPY | Facility: CLINIC | Age: 67
End: 2022-09-12
Payer: MEDICARE

## 2022-09-12 ENCOUNTER — TELEPHONE (OUTPATIENT)
Dept: SLEEP CENTER | Facility: CLINIC | Age: 67
End: 2022-09-12

## 2022-09-12 DIAGNOSIS — Z98.890 H/O OF HEMILAMINECTOMY: Primary | ICD-10-CM

## 2022-09-12 DIAGNOSIS — Z98.890 POST-OPERATIVE STATE: ICD-10-CM

## 2022-09-12 PROCEDURE — 97110 THERAPEUTIC EXERCISES: CPT

## 2022-09-12 PROCEDURE — 97112 NEUROMUSCULAR REEDUCATION: CPT

## 2022-09-12 NOTE — PROGRESS NOTES
Daily Note     Today's date: 2022  Patient name: Ella Matthews  : 1955  MRN: [id number]  Referring provider: Renzo Lung  Dx:   Encounter Diagnosis     ICD-10-CM    1  H/O of hemilaminectomy  Z98 890    2  Post-operative state  Z98 890                   Subjective: Pt reports that he was feeling good after LV but feels a little more unstable today  Objective: See treatment diary below      Assessment: Continued to focus on LE strengthening- specifically focused on calf strength  Also worked on balance on elevations and uneven surfaces  When he starts to fatigue balance becomes worse and knees give out  Tolerated treatment well  Patient demonstrated fatigue post treatment, exhibited good technique with therapeutic exercises and would benefit from continued PT      Plan: Continue per plan of care  Progress treatment as tolerated         Precautions: Prior fusion; balance/falls risk  EPOC: 10/3/22  HEP: seated HR    Manuals    LE PROM             Re-assess PF                                      Neuro Re-Ed             NSP       5"x20 NV     NSP march       20x2 NV     NSP heel slide       20x2 NV     NMES with PF       10 min 10 min NV                 Hernandez testing 10 min            Bosu Balance  30"x3           SLS  30"x3           Tandem stance  30"x3                        Ther Ex             Bridge       NV x20 20x    Iso ADD/ABD       5"x20 5"x20     SLR and H ABD       2x10ea 2x10ea     Clamshells       x20 x20 20x    Iso PF with strap       5x10 NV 10x5" 10x5"   Seated PF       30x2 BLK NV 20x BTB 20x BTB   Toe walk and retro walk       declined,NV // bars 5 laps ea // 5 laps ea 5 lasp // bars   BAPS board PF STD       declined,NV 30x ea 30x ea Bosu 20x   Spring reformer PF       declined,NV 30x 2 ea 30x2 30x2    Bosu HR        declined,NV 30x 30x 30x   Step lunge 4" 2x10 ea 4" 2x10 ea           Lat step down 4" 2x10 ea 4" 2x10 ea           Supine leg press HR 55# 2x10 55# 2x10     declined,NV B/L 25# 3x10 B/L 25# 3x10 55# 2x10   Ther Activity             TM 10 min  10 min     10 min 10 min 10 min 10 min                 Gait Training                                       Modalities

## 2022-09-14 ENCOUNTER — OFFICE VISIT (OUTPATIENT)
Dept: PHYSICAL THERAPY | Facility: CLINIC | Age: 67
End: 2022-09-14
Payer: MEDICARE

## 2022-09-14 DIAGNOSIS — Z98.890 STATUS POST HEMILAMINOTOMY: ICD-10-CM

## 2022-09-14 DIAGNOSIS — Z98.890 POST-OPERATIVE STATE: ICD-10-CM

## 2022-09-14 DIAGNOSIS — Z98.890 H/O OF HEMILAMINECTOMY: Primary | ICD-10-CM

## 2022-09-14 PROCEDURE — 97110 THERAPEUTIC EXERCISES: CPT | Performed by: PHYSICAL THERAPIST

## 2022-09-14 PROCEDURE — 97112 NEUROMUSCULAR REEDUCATION: CPT | Performed by: PHYSICAL THERAPIST

## 2022-09-14 NOTE — PROGRESS NOTES
Daily Note     Today's date: 2022  Patient name: Elo Stevens  : 1955  MRN: [id number]  Referring provider: Greg Wong  Dx:   Encounter Diagnosis     ICD-10-CM    1  H/O of hemilaminectomy  Z98 890    2  Post-operative state  Z98 890    3  Status post hemilaminotomy  Z98 890                   Subjective: Reports feeling stronger and more balance with basic tasks  Notes his L knee is now giving slight discomfort with stairs- "I have probably been doing more"        Objective: See treatment diary below      Assessment: Improved balance with SLS and performance of lateral step down  Able to initiate ladder agility and sled push  No difficulty with toe tapping today without need for UE support  Still limited HR range with SL leg press  Plan: Continue per plan of care        Precautions: Prior fusion; balance/falls risk  EPOC: 10/3/22  HEP: seated HR    Manuals    LE PROM             Re-assess PF                                      Neuro Re-Ed             NSP       5"x20 NV     NSP march       20x2 NV     NSP heel slide       20x2 NV     NMES with PF       10 min 10 min NV                 Hernandez testing 10 min            Bosu Balance  30"x3           SLS  30"x3 30x2"          Tandem stance  30"x3                        Ther Ex             Bridge       NV x20 20x    Iso ADD/ABD       5"x20 5"x20     SLR and H ABD       2x10ea 2x10ea     Clamshells       x20 x20 20x    Iso PF with strap  5x10" ea 5x10" ea    5x10 NV 10x5" 10x5"   Seated PF  20x 20x    30x2 BLK NV 20x BTB 20x BTB   Toe walk and retro walk       declined,NV // bars 5 laps ea // 5 laps ea 5 lasp // bars   BAPS board PF STD       declined,NV 30x ea 30x ea Bosu 20x   Spring reformer PF       declined,NV 30x 2 ea 30x2 30x2    Bosu HR        declined,NV 30x 30x 30x   Step lunge 4" 2x10 ea 4" 2x10 ea 4" 2x10          Lat step down 4" 2x10 ea 4" 2x10 ea 4" 2x10          Supine leg press HR 55# 2x10 55# 2x10 55# 2x10    declined,NV B/L 25# 3x10 B/L 25# 3x10 55# 2x10   Ther Activity             TM 10 min  10 min 10 min     10 min 10 min 10 min 10 min    Sled push   3 laps          Gait Training                                       Modalities

## 2022-09-15 ENCOUNTER — OFFICE VISIT (OUTPATIENT)
Dept: FAMILY MEDICINE CLINIC | Facility: HOSPITAL | Age: 67
End: 2022-09-15
Payer: MEDICARE

## 2022-09-15 VITALS
OXYGEN SATURATION: 98 % | BODY MASS INDEX: 36.5 KG/M2 | TEMPERATURE: 97.5 F | WEIGHT: 275.4 LBS | HEART RATE: 85 BPM | DIASTOLIC BLOOD PRESSURE: 72 MMHG | HEIGHT: 73 IN | SYSTOLIC BLOOD PRESSURE: 130 MMHG

## 2022-09-15 DIAGNOSIS — Z00.00 ENCOUNTER FOR SUBSEQUENT ANNUAL WELLNESS VISIT (AWV) IN MEDICARE PATIENT: Primary | ICD-10-CM

## 2022-09-15 DIAGNOSIS — F32.9 MAJOR DEPRESSION, CHRONIC: ICD-10-CM

## 2022-09-15 DIAGNOSIS — E78.00 HYPERCHOLESTEROLEMIA: ICD-10-CM

## 2022-09-15 DIAGNOSIS — I85.00 ESOPHAGEAL VARICES DETERMINED BY ENDOSCOPY (HCC): ICD-10-CM

## 2022-09-15 DIAGNOSIS — M51.36 DDD (DEGENERATIVE DISC DISEASE), LUMBAR: ICD-10-CM

## 2022-09-15 DIAGNOSIS — G47.33 OBSTRUCTIVE SLEEP APNEA SYNDROME: ICD-10-CM

## 2022-09-15 PROBLEM — G95.9 MYELOPATHY (HCC): Status: RESOLVED | Noted: 2021-04-27 | Resolved: 2022-09-15

## 2022-09-15 PROCEDURE — 99214 OFFICE O/P EST MOD 30 MIN: CPT | Performed by: NURSE PRACTITIONER

## 2022-09-15 PROCEDURE — G0439 PPPS, SUBSEQ VISIT: HCPCS | Performed by: NURSE PRACTITIONER

## 2022-09-15 NOTE — ASSESSMENT & PLAN NOTE
Acceptable FLP managing w/lifestyle  Continue to maximize lifestyle efforts w/diet, exercise, and weight loss  Plan to recheck next in 1 year

## 2022-09-15 NOTE — PATIENT INSTRUCTIONS
Medicare Preventive Visit Patient Instructions  Thank you for completing your Welcome to Medicare Visit or Medicare Annual Wellness Visit today  Your next wellness visit will be due in one year (9/16/2023)  The screening/preventive services that you may require over the next 5-10 years are detailed below  Some tests may not apply to you based off risk factors and/or age  Screening tests ordered at today's visit but not completed yet may show as past due  Also, please note that scanned in results may not display below  Preventive Screenings:  Service Recommendations Previous Testing/Comments   Colorectal Cancer Screening  · Colonoscopy    · Fecal Occult Blood Test (FOBT)/Fecal Immunochemical Test (FIT)  · Fecal DNA/Cologuard Test  · Flexible Sigmoidoscopy Age: 39-70 years old   Colonoscopy: every 10 years (May be performed more frequently if at higher risk)  OR  FOBT/FIT: every 1 year  OR  Cologuard: every 3 years  OR  Sigmoidoscopy: every 5 years  Screening may be recommended earlier than age 39 if at higher risk for colorectal cancer  Also, an individualized decision between you and your healthcare provider will decide whether screening between the ages of 74-80 would be appropriate   Colonoscopy: 10/11/2021  FOBT/FIT: Not on file  Cologuard: Not on file  Sigmoidoscopy: Not on file    Screening Current     Prostate Cancer Screening Individualized decision between patient and health care provider in men between ages of 53-78   Medicare will cover every 12 months beginning on the day after your 50th birthday PSA: 0 5 ng/mL     Screening Current     Hepatitis C Screening Once for adults born between 1945 and 1965  More frequently in patients at high risk for Hepatitis C Hep C Antibody: 11/21/2018    Screening Current   Diabetes Screening 1-2 times per year if you're at risk for diabetes or have pre-diabetes Fasting glucose: 90 mg/dL (9/2/2022)  A1C: 5 5 % (6/20/2022)  Screening Current   Cholesterol Screening Once every 5 years if you don't have a lipid disorder  May order more often based on risk factors  Lipid panel: 09/02/2022  Screening Not Indicated  History Lipid Disorder      Other Preventive Screenings Covered by Medicare:  1  Abdominal Aortic Aneurysm (AAA) Screening: covered once if your at risk  You're considered to be at risk if you have a family history of AAA or a male between the age of 73-68 who smoking at least 100 cigarettes in your lifetime  2  Lung Cancer Screening: covers low dose CT scan once per year if you meet all of the following conditions: (1) Age 50-69; (2) No signs or symptoms of lung cancer; (3) Current smoker or have quit smoking within the last 15 years; (4) You have a tobacco smoking history of at least 20 pack years (packs per day x number of years you smoked); (5) You get a written order from a healthcare provider  3  Glaucoma Screening: covered annually if you're considered high risk: (1) You have diabetes OR (2) Family history of glaucoma OR (3)  aged 48 and older OR (3)  American aged 72 and older  3  Osteoporosis Screening: covered every 2 years if you meet one of the following conditions: (1) Have a vertebral abnormality; (2) On glucocorticoid therapy for more than 3 months; (3) Have primary hyperparathyroidism; (4) On osteoporosis medications and need to assess response to drug therapy  5  HIV Screening: covered annually if you're between the age of 12-76  Also covered annually if you are younger than 13 and older than 72 with risk factors for HIV infection  For pregnant patients, it is covered up to 3 times per pregnancy      Immunizations:  Immunization Recommendations   Influenza Vaccine Annual influenza vaccination during flu season is recommended for all persons aged >= 6 months who do not have contraindications   Pneumococcal Vaccine   * Pneumococcal conjugate vaccine = PCV13 (Prevnar 13), PCV15 (Vaxneuvance), PCV20 (Prevnar 20)  * Pneumococcal polysaccharide vaccine = PPSV23 (Pneumovax) Adults 2364 years old: 1-3 doses may be recommended based on certain risk factors  Adults 72 years old: 1-2 doses may be recommended based off what pneumonia vaccine you previously received   Hepatitis B Vaccine 3 dose series if at intermediate or high risk (ex: diabetes, end stage renal disease, liver disease)   Tetanus (Td) Vaccine - COST NOT COVERED BY MEDICARE PART B Following completion of primary series, a booster dose should be given every 10 years to maintain immunity against tetanus  Td may also be given as tetanus wound prophylaxis  Tdap Vaccine - COST NOT COVERED BY MEDICARE PART B Recommended at least once for all adults  For pregnant patients, recommended with each pregnancy  Shingles Vaccine (Shingrix) - COST NOT COVERED BY MEDICARE PART B  2 shot series recommended in those aged 48 and above     Health Maintenance Due:      Topic Date Due    Colorectal Cancer Screening  10/10/2026    Hepatitis C Screening  Completed     Immunizations Due:      Topic Date Due    COVID-19 Vaccine (4 - Booster for Pfizer series) 04/19/2022    Influenza Vaccine (1) 09/01/2022     Advance Directives   What are advance directives? Advance directives are legal documents that state your wishes and plans for medical care  These plans are made ahead of time in case you lose your ability to make decisions for yourself  Advance directives can apply to any medical decision, such as the treatments you want, and if you want to donate organs  What are the types of advance directives? There are many types of advance directives, and each state has rules about how to use them  You may choose a combination of any of the following:  · Living will: This is a written record of the treatment you want  You can also choose which treatments you do not want, which to limit, and which to stop at a certain time  This includes surgery, medicine, IV fluid, and tube feedings     · Durable power of  for healthcare [facility]): This is a written record that states who you want to make healthcare choices for you when you are unable to make them for yourself  This person, called a proxy, is usually a family member or a friend  You may choose more than 1 proxy  · Do not resuscitate (DNR) order:  A DNR order is used in case your heart stops beating or you stop breathing  It is a request not to have certain forms of treatment, such as CPR  A DNR order may be included in other types of advance directives  · Medical directive: This covers the care that you want if you are in a coma, near death, or unable to make decisions for yourself  You can list the treatments you want for each condition  Treatment may include pain medicine, surgery, blood transfusions, dialysis, IV or tube feedings, and a ventilator (breathing machine)  · Values history: This document has questions about your views, beliefs, and how you feel and think about life  This information can help others choose the care that you would choose  Why are advance directives important? An advance directive helps you control your care  Although spoken wishes may be used, it is better to have your wishes written down  Spoken wishes can be misunderstood, or not followed  Treatments may be given even if you do not want them  An advance directive may make it easier for your family to make difficult choices about your care  Weight Management   Why it is important to manage your weight:  Being overweight increases your risk of health conditions such as heart disease, high blood pressure, type 2 diabetes, and certain types of cancer  It can also increase your risk for osteoarthritis, sleep apnea, and other respiratory problems  Aim for a slow, steady weight loss  Even a small amount of weight loss can lower your risk of health problems    How to lose weight safely:  A safe and healthy way to lose weight is to eat fewer calories and get regular exercise  You can lose up about 1 pound a week by decreasing the number of calories you eat by 500 calories each day  Healthy meal plan for weight management:  A healthy meal plan includes a variety of foods, contains fewer calories, and helps you stay healthy  A healthy meal plan includes the following:  · Eat whole-grain foods more often  A healthy meal plan should contain fiber  Fiber is the part of grains, fruits, and vegetables that is not broken down by your body  Whole-grain foods are healthy and provide extra fiber in your diet  Some examples of whole-grain foods are whole-wheat breads and pastas, oatmeal, brown rice, and bulgur  · Eat a variety of vegetables every day  Include dark, leafy greens such as spinach, kale, neli greens, and mustard greens  Eat yellow and orange vegetables such as carrots, sweet potatoes, and winter squash  · Eat a variety of fruits every day  Choose fresh or canned fruit (canned in its own juice or light syrup) instead of juice  Fruit juice has very little or no fiber  · Eat low-fat dairy foods  Drink fat-free (skim) milk or 1% milk  Eat fat-free yogurt and low-fat cottage cheese  Try low-fat cheeses such as mozzarella and other reduced-fat cheeses  · Choose meat and other protein foods that are low in fat  Choose beans or other legumes such as split peas or lentils  Choose fish, skinless poultry (chicken or turkey), or lean cuts of red meat (beef or pork)  Before you cook meat or poultry, cut off any visible fat  · Use less fat and oil  Try baking foods instead of frying them  Add less fat, such as margarine, sour cream, regular salad dressing and mayonnaise to foods  Eat fewer high-fat foods  Some examples of high-fat foods include french fries, doughnuts, ice cream, and cakes  · Eat fewer sweets  Limit foods and drinks that are high in sugar  This includes candy, cookies, regular soda, and sweetened drinks    Exercise:  Exercise at least 30 minutes per day on most days of the week  Some examples of exercise include walking, biking, dancing, and swimming  You can also fit in more physical activity by taking the stairs instead of the elevator or parking farther away from stores  Ask your healthcare provider about the best exercise plan for you  © Copyright [employer] 2018 Information is for End User's use only and may not be sold, redistributed or otherwise used for commercial purposes  All illustrations and images included in CareNotes® are the copyrighted property of [employer]  or [address] [place] [address]  Obesity   AMBULATORY CARE:   Obesity  means your body mass index (BMI) is greater than 30  Your healthcare provider will use your height and weight to measure your BMI  The risks of obesity include  many health problems, including injuries or physical disability  · Diabetes (high blood sugar level)    · High blood pressure or high cholesterol    · Heart disease    · Stroke    · Gallbladder or liver disease    · Cancer of the colon, breast, prostate, liver, or kidney    · Sleep apnea    · Arthritis or gout    Screening  is done to check for health conditions before you have signs or symptoms  If you are 28to 79years old, your blood sugar level may be checked every 3 years for signs of prediabetes or diabetes  Your healthcare provider will check your blood pressure at each visit  High blood pressure can lead to a stroke or other problems  Your provider may check for signs of heart disease, cancer, or other health problems  Seek care immediately if:   · You have a severe headache, confusion, or difficulty speaking  · You have weakness on one side of your body  · You have chest pain, sweating, or shortness of breath  Call your doctor if:   · You have symptoms of gallbladder or liver disease, such as pain in your upper abdomen  · You have knee or hip pain and discomfort while walking      · You have symptoms of diabetes, such as intense hunger and thirst, and frequent urination  · You have symptoms of sleep apnea, such as snoring or daytime sleepiness  · You have questions or concerns about your condition or care  Treatment for obesity  focuses on helping you lose weight to improve your health  Even a small decrease in BMI can reduce the risk for many health problems  Your healthcare provider will help you set a weight-loss goal   · Lifestyle changes  are the first step in treating obesity  These include making healthy food choices and getting regular physical activity  Your healthcare provider may suggest a weight-loss program that involves coaching, education, and therapy  · Medicine  may help you lose weight when it is used with a healthy foods and physical activity  · Surgery  can help you lose weight if you are very obese and have other health problems  There are several types of weight-loss surgery  Ask your healthcare provider for more information  Tips for safe weight loss:   · Set small, realistic goals  An example of a small goal is to walk for 20 minutes 5 days a week  Anther goal is to lose 5% of your body weight  · Tell friends, family members, and coworkers about your goals  and ask for their support  Ask a friend to lose weight with you, or join a weight-loss support group  · Identify foods or triggers that may cause you to overeat , and find ways to avoid them  Remove tempting high-calorie foods from your home and workplace  Place a bowl of fresh fruit on your kitchen counter  If stress causes you to eat, then find other ways to cope with stress  A counselor or therapist may be able to help you  · Keep a diary to track what you eat and drink  Also write down how many minutes of physical activity you do each day  Weigh yourself once a week and record it in your diary  Eating changes: You will need to eat 500 to 1,000 fewer calories each day than you currently eat to lose 1 to 2 pounds a week   The following changes will help you cut calories:  · Eat smaller portions  Use small plates, no larger than 9 inches in diameter  Fill your plate half full of fruits and vegetables  Measure your food using measuring cups until you know what a serving size looks like  · Eat 3 meals and 1 or 2 snacks each day  Plan your meals in advance  Knute Bernheim and eat at home most of the time  Eat slowly  Do not skip meals  Skipping meals can lead to overeating later in the day  This can make it harder for you to lose weight  Talk with a dietitian to help you make a meal plan and schedule that is right for you  · Eat fruits and vegetables at every meal   They are low in calories and high in fiber, which makes you feel full  Do not add butter, margarine, or cream sauce to vegetables  Use herbs to season steamed vegetables  · Eat less fat and fewer fried foods  Eat more baked or grilled chicken and fish  These protein sources are lower in calories and fat than red meat  Limit fast food  Dress your salads with olive oil and vinegar instead of bottled dressing  · Limit the amount of sugar you eat  Do not drink sugary beverages  Limit alcohol  Activity changes:  Physical activity is good for your body in many ways  It helps you burn calories and build strong muscles  It decreases stress and depression, and improves your mood  It can also help you sleep better  Talk to your healthcare provider before you begin an exercise program   · Exercise for at least 30 minutes 5 days a week  Start slowly  Set aside time each day for physical activity that you enjoy and that is convenient for you  It is best to do both weight training and an activity that increases your heart rate, such as walking, bicycling, or swimming  · Find ways to be more active  Do yard work and housecleaning  Walk up the stairs instead of using elevators  Spend your leisure time going to events that require walking, such as outdoor festivals or fairs  This extra physical activity can help you lose weight and keep it off  Follow up with your doctor as directed: You may need to meet with a dietitian  Write down your questions so you remember to ask them during your visits  © Copyright [employer] 2022 Information is for End User's use only and may not be sold, redistributed or otherwise used for commercial purposes  All illustrations and images included in CareNotes® are the copyrighted property of A [employer] A M [employer] Inc  or Hiwot Ardon   The above information is an  only  It is not intended as medical advice for individual conditions or treatments  Talk to your doctor, nurse or pharmacist before following any medical regimen to see if it is safe and effective for you

## 2022-09-19 ENCOUNTER — OFFICE VISIT (OUTPATIENT)
Dept: PHYSICAL THERAPY | Facility: CLINIC | Age: 67
End: 2022-09-19
Payer: MEDICARE

## 2022-09-19 DIAGNOSIS — Z98.890 H/O OF HEMILAMINECTOMY: Primary | ICD-10-CM

## 2022-09-19 DIAGNOSIS — Z98.890 POST-OPERATIVE STATE: ICD-10-CM

## 2022-09-19 PROCEDURE — 97112 NEUROMUSCULAR REEDUCATION: CPT

## 2022-09-19 PROCEDURE — 97110 THERAPEUTIC EXERCISES: CPT

## 2022-09-19 NOTE — PROGRESS NOTES
Daily Note     Today's date: 2022  Patient name: Laura Deras  : 1955  MRN: [id number]  Referring provider: Kev Catherine  Dx:   Encounter Diagnosis     ICD-10-CM    1  H/O of hemilaminectomy  Z98 890    2  Post-operative state  Z98 890                   Subjective: Pt notes he continues to see improvements in his balance at home       Objective: See treatment diary below      Assessment: Pt showing improved balance and endurance  He is usually fatigued post 10 min TM warm up and today he didn't feel tired post  He showed improvement in balance on Bosu ball noting improved LE strength and stability  Would continue to benefit from skilled therapy to maximize function and balance    Plan: Continue per plan of care        Precautions: Prior fusion; balance/falls risk  EPOC: 10/3/22  HEP: seated HR    Manuals  9   LE PROM             Re-assess PF                                      Neuro Re-Ed             NSP       5"x20 NV     NSP march       20x2 NV     NSP heel slide       20x2 NV     NMES with PF       10 min 10 min NV                 Hernandez testing 10 min            Bosu Balance  30"x3  30"x3         SLS  30"x3 30x2" 30"x3         Tandem stance  30"x3  30"x3 ea                      Ther Ex             Bridge       NV x20 20x    Iso ADD/ABD       5"x20 5"x20     SLR and H ABD       2x10ea 2x10ea     Clamshells       x20 x20 20x    Iso PF with strap  5x10" ea 5x10" ea 10"x5   5x10 NV 10x5" 10x5"   Seated PF  20x 20x 20   30x2 BLK NV 20x BTB 20x BTB   Toe walk and retro walk       declined,NV // bars 5 laps ea // 5 laps ea 5 lasp // bars   BAPS board PF STD       declined,NV 30x ea 30x ea Bosu 20x   Spring reformer PF       declined,NV 30x 2 ea 30x2 30x2    Bosu HR        declined,NV 30x 30x 30x   Step lunge 4" 2x10 ea 4" 2x10 ea 4" 2x10 6"x20         Lat step down 4" 2x10 ea 4" 2x10 ea 4" 2x10 6"x20         Supine leg press HR 55# 2x10 55# 2x10 55# 2x10 55#  10x2   declined,NV B/L 25# 3x10 B/L 25# 3x10 55# 2x10   Ther Activity             TM 10 min  10 min 10 min  10 min     10 min 10 min 10 min 10 min    Sled push   3 laps 3 laps         Gait Training                                       Modalities

## 2022-09-21 ENCOUNTER — OFFICE VISIT (OUTPATIENT)
Dept: PHYSICAL THERAPY | Facility: CLINIC | Age: 67
End: 2022-09-21
Payer: MEDICARE

## 2022-09-21 DIAGNOSIS — Z98.890 STATUS POST HEMILAMINOTOMY: ICD-10-CM

## 2022-09-21 DIAGNOSIS — Z98.890 H/O OF HEMILAMINECTOMY: Primary | ICD-10-CM

## 2022-09-21 DIAGNOSIS — Z98.890 POST-OPERATIVE STATE: ICD-10-CM

## 2022-09-21 PROCEDURE — 97112 NEUROMUSCULAR REEDUCATION: CPT | Performed by: PHYSICAL THERAPIST

## 2022-09-21 PROCEDURE — 97110 THERAPEUTIC EXERCISES: CPT | Performed by: PHYSICAL THERAPIST

## 2022-09-21 NOTE — PROGRESS NOTES
Daily Note     Today's date: 2022  Patient name: Guille Sylvester  : 1955  MRN: [id number]  Referring provider: Dragan Kumar  Dx:   Encounter Diagnosis     ICD-10-CM    1  H/O of hemilaminectomy  Z98 890    2  Post-operative state  Z98 890    3  Status post hemilaminotomy  Z98 890                   Subjective:  Notes having continued progress with performance of walking and ambulation  Objective: See treatment diary below      Assessment: Noting progress of performance manual resisted HR and gastroc activation  Poor ability to perform tandem ambulation  Will continue to work on active PF strength moving forward in dynamic situations  Plan: Continue per plan of care        Precautions: Prior fusion; balance/falls risk  EPOC: 10/3/22  HEP: seated HR    Manuals    LE PROM     PF        Re-assess PF                                      Neuro Re-Ed             NSP       5"x20 NV     NSP march       20x2 NV     NSP heel slide       20x2 NV     NMES with PF       10 min 10 min NV                 Hernandez testing 10 min            Bosu Balance  30"x3  30"x3         SLS  30"x3 30x2" 30"x3         Tandem stance  30"x3  30"x3 ea Tandem walking 4 laps in //  bars                     Ther Ex             Bridge       NV x20 20x    Iso ADD/ABD       5"x20 5"x20     SLR and H ABD       2x10ea 2x10ea     Clamshells       x20 x20 20x    Iso PF with strap  5x10" ea 5x10" ea 10"x5 10x5" ea  5x10 NV 10x5" 10x5"   Seated PF  20x 20x 20 30x2  30x2 BLK NV 20x BTB 20x BTB   Toe walk and retro walk     55# jeana 10 laps  declined,NV // bars 5 laps ea // 5 laps ea 5 lasp // bars   BAPS board PF STD       declined,NV 30x ea 30x ea Bosu 20x   Spring reformer PF       declined,NV 30x 2 ea 30x2 30x2    Bosu HR        declined,NV 30x 30x 30x   Step lunge 4" 2x10 ea 4" 2x10 ea 4" 2x10 6"x20 6"20x        Lat step down 4" 2x10 ea 4" 2x10 ea 4" 2x10 6"x20 4" 2x10        Supine leg press HR 55# 2x10 55# 2x10 55# 2x10 55#  10x2   declined,NV B/L 25# 3x10 B/L 25# 3x10 55# 2x10   Ther Activity             TM 10 min  10 min 10 min  10 min   10 min   10 min 10 min 10 min 10 min    Sled push   3 laps 3 laps 3 laps        Gait Training                                       Modalities

## 2022-09-23 ENCOUNTER — TELEPHONE (OUTPATIENT)
Dept: GASTROENTEROLOGY | Facility: CLINIC | Age: 67
End: 2022-09-23

## 2022-09-23 NOTE — TELEPHONE ENCOUNTER
Patients GI provider:  Dr Tristian Perdomo    Number to return call: [phone]    Reason for call: Pt called in to schedule his EGD  Received a letter that he is due  Above is his number       Scheduled procedure/appointment date if applicable: Appt 37/60/80

## 2022-09-26 ENCOUNTER — OFFICE VISIT (OUTPATIENT)
Dept: PHYSICAL THERAPY | Facility: CLINIC | Age: 67
End: 2022-09-26
Payer: MEDICARE

## 2022-09-26 ENCOUNTER — TELEPHONE (OUTPATIENT)
Dept: GASTROENTEROLOGY | Facility: CLINIC | Age: 67
End: 2022-09-26

## 2022-09-26 DIAGNOSIS — Z98.890 POST-OPERATIVE STATE: ICD-10-CM

## 2022-09-26 DIAGNOSIS — Z98.890 H/O OF HEMILAMINECTOMY: Primary | ICD-10-CM

## 2022-09-26 PROCEDURE — 97112 NEUROMUSCULAR REEDUCATION: CPT

## 2022-09-26 PROCEDURE — 97110 THERAPEUTIC EXERCISES: CPT

## 2022-09-26 PROCEDURE — 97530 THERAPEUTIC ACTIVITIES: CPT

## 2022-09-26 NOTE — PROGRESS NOTES
Daily Note     Today's date: 2022  Patient name: Tona Palumbo  : 1955  MRN: [id number]  Referring provider: Opal Jauregui  Dx:   Encounter Diagnosis     ICD-10-CM    1  H/O of hemilaminectomy  Z98 890    2  Post-operative state  Z98 890                   Subjective:  Pt continues to note improved balance and muscle endurance  Objective: See treatment diary below      Assessment: Continued to progress and challenge balance and overall endurance  He continues to show progress with dynamic balance like walking march where he pauses and isolates a SLS unsupported  Was able to perform a KB  resisted HR seated for 20 reps with also shows improvement in strength  He would continue to benefit from skilled therapy to continue to maximize function  Plan: Continue per plan of care        Precautions: Prior fusion; balance/falls risk  EPOC: 10/3/22  HEP: seated HR    Manuals        LE PROM     PF        Re-assess PF                                      Neuro Re-Ed             NSP             NSP march             NSP heel slide             NMES with PF                          Hernandez testing 10 min            Bosu Balance  30"x3  30"x3         SLS  30"x3 30x2" 30"x3         Tandem stance  30"x3  30"x3 ea Tandem walking 4 laps in //  bars                     Ther Ex             Bridge             Iso ADD/ABD             SLR and H ABD             Clamshells             Iso PF with strap  5x10" ea 5x10" ea 10"x5 10x5" ea        Seated PF  20x 20x 20 30x2 7 5# KB  x20 ea       Toe walk and retro walk     55# jeana 10 laps        Walking  in //bars      4 laps       [employer] board PF STD             Spring reformer PF             Bosu HR              Step lunge 4" 2x10 ea 4" 2x10 ea 4" 2x10 6"x20 6"20x 6"x20       Lat step down 4" 2x10 ea 4" 2x10 ea 4" 2x10 6"x20 4" 2x10 4" x20 ea       Supine leg press HR 55# 2x10 55# 2x10 55# 2x10 55#  10x2  Reg leg press  205#  x50 Ther Activity             TM 10 min  10 min 10 min  10 min   10 min  10 min       Sled push   3 laps 3 laps 3 laps 5 laps       U band - pull/hold      2 laps ea       Gait Training                                       Modalities

## 2022-09-28 ENCOUNTER — APPOINTMENT (OUTPATIENT)
Dept: PHYSICAL THERAPY | Facility: CLINIC | Age: 67
End: 2022-09-28
Payer: MEDICARE

## 2022-09-30 ENCOUNTER — OFFICE VISIT (OUTPATIENT)
Dept: PHYSICAL THERAPY | Facility: CLINIC | Age: 67
End: 2022-09-30
Payer: MEDICARE

## 2022-09-30 DIAGNOSIS — Z98.890 H/O OF HEMILAMINECTOMY: Primary | ICD-10-CM

## 2022-09-30 DIAGNOSIS — Z98.890 POST-OPERATIVE STATE: ICD-10-CM

## 2022-09-30 DIAGNOSIS — Z98.890 STATUS POST HEMILAMINOTOMY: ICD-10-CM

## 2022-09-30 PROCEDURE — 97112 NEUROMUSCULAR REEDUCATION: CPT

## 2022-09-30 PROCEDURE — 97110 THERAPEUTIC EXERCISES: CPT

## 2022-09-30 PROCEDURE — 97530 THERAPEUTIC ACTIVITIES: CPT

## 2022-09-30 NOTE — PROGRESS NOTES
Daily Note     Today's date: 2022  Patient name: Aren Rodriguez  : 1955  MRN: [id number]  Referring provider: Yanni North  Dx:   Encounter Diagnosis     ICD-10-CM    1  H/O of hemilaminectomy  Z98 890    2  Post-operative state  Z98 890    3  Status post hemilaminotomy  Z98 890                   Subjective: Patient noted that he feels " burnt out " Patient noted about" two days ago he went to the park by [employer] I walked half way took a break  Then I  walked 150 yards without the cane then used the cane to get up to go and I didn't use the cane  I am trying to wean off the cane " Patient noted that he was tired after walking  Objective: See treatment diary below      Assessment: Tolerated treatment fair  Added SLS back into treatment, patient was challenged with performing SLS but able to perform  Patient was able to perform listed exercises  Patient would benefit from continued PT      Plan: Continue per plan of care        Precautions: Prior fusion; balance/falls risk  EPOC: 10/3/22  HEP: seated HR    Manuals        LE PROM     PF        Re-assess PF                                      Neuro Re-Ed             NSP             NSP march             NSP heel slide             NMES with PF                          Hernandez testing 10 min            Bosu Balance  30"x3  30"x3         SLS  30"x3 30x2" 30"x3   3 x to fatigue       Tandem stance  30"x3  30"x3 ea Tandem walking 4 laps in //  bars  Tandem walking 4 laps in //  bars                   Ther Ex             Bridge             Iso ADD/ABD             SLR and H ABD             Clamshells             Iso PF with strap  5x10" ea 5x10" ea 10"x5 10x5" ea        Seated PF  20x 20x 20 30x2 7 5# KB  x20 ea 7 5# KB  x20 ea      Toe walk and retro walk     55# jeana 10 laps        Walking march in //bars      4 laps 4 laps       BAPS board PF STD             Spring reformer PF             Bosu HR Step lunge 4" 2x10 ea 4" 2x10 ea 4" 2x10 6"x20 6"20x 6"x20 6" x 20      Lat step down 4" 2x10 ea 4" 2x10 ea 4" 2x10 6"x20 4" 2x10 4" x20 ea 4" x 20 ea      Supine leg press HR 55# 2x10 55# 2x10 55# 2x10 55#  10x2  Reg leg press  205#  x50 Reg leg press  205#  x50      Ther Activity             TM 10 min  10 min 10 min  10 min   10 min  10 min 10min       Sled push   3 laps 3 laps 3 laps 5 laps 5 laps       U band - pull/hold      2 laps ea 4 laps grn tape      Gait Training                                       Modalities

## 2022-10-04 ENCOUNTER — EVALUATION (OUTPATIENT)
Dept: PHYSICAL THERAPY | Facility: CLINIC | Age: 67
End: 2022-10-04
Payer: MEDICARE

## 2022-10-04 ENCOUNTER — TELEPHONE (OUTPATIENT)
Dept: NEUROSURGERY | Facility: CLINIC | Age: 67
End: 2022-10-04

## 2022-10-04 DIAGNOSIS — Z98.890 POST-OPERATIVE STATE: ICD-10-CM

## 2022-10-04 DIAGNOSIS — Z98.890 H/O OF HEMILAMINECTOMY: Primary | ICD-10-CM

## 2022-10-04 DIAGNOSIS — Z98.890 STATUS POST HEMILAMINOTOMY: ICD-10-CM

## 2022-10-04 PROCEDURE — 97112 NEUROMUSCULAR REEDUCATION: CPT | Performed by: PHYSICAL THERAPIST

## 2022-10-04 PROCEDURE — 97110 THERAPEUTIC EXERCISES: CPT | Performed by: PHYSICAL THERAPIST

## 2022-10-04 NOTE — PROGRESS NOTES
Daily Note     Today's date: 10/4/2022  Patient name: Jaziel Mccray  : 1955  MRN: [id number]  Referring provider: Rodríguez Watkins  Dx:   Encounter Diagnosis     ICD-10-CM    1  H/O of hemilaminectomy  Z98 890    2  Post-operative state  Z98 890    3  Status post hemilaminotomy  Z98 890        Start Time: 46  Stop Time: 0830  Total time in clinic (min): 45 minutes    Subjective: Notes feeling progress with balance - not requiring as much stepping strategy for walking or balance recovery  Objective: See treatment diary below      Assessment: Noting no difficulty with performance SL leg press  Performance of step up/downs without difficulty with quick movement and no longer requiring stepping strategy to recover  Will continue with strength as able and re-assess in 1-2 visits  Plan: Continue per plan of care        Precautions: Prior fusion; balance/falls risk  EPOC: 10/3/22  HEP: seated HR    Manuals 9/8 9/12 9/14 9/19 9/21 9/26 9/30  10/4     LE PROM     PF        Re-assess PF                                      Neuro Re-Ed             NSP             NSP march             NSP heel slide             NMES with PF                          Hernandez testing 10 min            Bosu Balance  30"x3  30"x3         SLS  30"x3 30x2" 30"x3   3 x to fatigue  3x level     Tandem stance  30"x3  30"x3 ea Tandem walking 4 laps in //  bars  Tandem walking 4 laps in //  bars 3 laps                  Ther Ex             Bridge             Iso ADD/ABD             SLR and H ABD             Clamshells             Iso PF with strap  5x10" ea 5x10" ea 10"x5 10x5" ea        Seated PF  20x 20x 20 30x2 7 5# KB  x20 ea 7 5# KB  x20 ea 7 5# KB 20x ea     Toe walk and retro walk     55# jeana 10 laps        Walking marches in //bars      4 laps 4 laps  4 laps     BAPS board PF STD             Spring reformer PF             Bosu HR              Step lunge 4" 2x10 ea 4" 2x10 ea 4" 2x10 6"x20 6"20x 6"x20 6" x 20 6" 20x ea     Lat step down 4" 2x10 ea 4" 2x10 ea 4" 2x10 6"x20 4" 2x10 4" x20 ea 4" x 20 ea 4" 20x ea     Supine leg press HR 55# 2x10 55# 2x10 55# 2x10 55#  10x2  Reg leg press  205#  x50 Reg leg press  205#  x50 205# x50     Ther Activity             TM 10 min  10 min 10 min  10 min   10 min  10 min 10min  10 min     Sled push   3 laps 3 laps 3 laps 5 laps 5 laps  5 laps     U band - pull/hold      2 laps ea 4 laps grn tape      Gait Training                                       Modalities

## 2022-10-04 NOTE — TELEPHONE ENCOUNTER
Per telephone call from Dr Mariah Wagner wife emailed sent to [facility]  :      Good morning,     I know you are not in the office today, but I received a call from Cassi Bennett wife  1955  Phone number: [phone]  In regards to claim on blood work completed on 6/3/2022  She  needs assistance on this matter  She does not want to be charged for this service  Received bill stating this was not covered because it is not medically necessary  Can you please contact patients wife when you have the chance       Thank Russ Laboy

## 2022-10-05 ENCOUNTER — OFFICE VISIT (OUTPATIENT)
Dept: PHYSICAL THERAPY | Facility: CLINIC | Age: 67
End: 2022-10-05
Payer: MEDICARE

## 2022-10-05 DIAGNOSIS — Z98.890 H/O OF HEMILAMINECTOMY: Primary | ICD-10-CM

## 2022-10-05 DIAGNOSIS — Z98.890 STATUS POST HEMILAMINOTOMY: ICD-10-CM

## 2022-10-05 DIAGNOSIS — Z98.890 POST-OPERATIVE STATE: ICD-10-CM

## 2022-10-05 PROCEDURE — 97112 NEUROMUSCULAR REEDUCATION: CPT | Performed by: PHYSICAL THERAPIST

## 2022-10-05 PROCEDURE — 97110 THERAPEUTIC EXERCISES: CPT | Performed by: PHYSICAL THERAPIST

## 2022-10-05 NOTE — PROGRESS NOTES
Daily Note     Today's date: 10/5/2022  Patient name: Tona Palumbo  : 1955  MRN: [id number]  Referring provider: Tamera Goodson  Dx:   Encounter Diagnosis     ICD-10-CM    1  H/O of hemilaminectomy  Z98 890    2  Post-operative state  Z98 890    3  Status post hemilaminotomy  Z98 890        Start Time: 1615  Stop Time: 1700  Total time in clinic (min): 45 minutes    Subjective: Noting no difficulty with ADLs, still slightly unsteady overall  Objective: See treatment diary below      Assessment: Continues to have minimal stepping strategy with basic balance and performance of added bosu step up/overs and bosu balance  Noting slight increase in hip strategy for balance control vs knee flexed patterns  Able to perform dynamic ball tossing with movement and shuffling without difficulty or LOB  Will re-assess in 1-2 visits  Still with most weakness with PF  Trial of bent over rows and upright rows without difficulty  Plan: Continue per plan of care        Precautions: Prior fusion; balance/falls risk  EPOC: 10/3/22  HEP: seated HR    Manuals 9/8 9/12 9/14 9/19 9/21 9/26 9/30  10/4 10/5    LE PROM     PF        Re-assess PF                                        Neuro Re-Ed             NSP             NSP march             NSP heel slide             NMES with PF                          Hernandez testing 10 min            Bosu Balance  30"x3  30"x3         SLS  30"x3 30x2" 30"x3   3 x to fatigue  3x level 3x    Tandem stance  30"x3  30"x3 ea Tandem walking 4 laps in //  bars  Tandem walking 4 laps in //  bars 3 laps 3 laps                 Ther Ex             Bridge             Iso ADD/ABD             SLR and H ABD             Clamshells             Iso PF with strap  5x10" ea 5x10" ea 10"x5 10x5" ea        Seated PF  20x 20x 20 30x2 7 5# KB  x20 ea 7 5# KB  x20 ea 7 5# KB 20x ea 7 5 KB 20x     Toe walk and retro walk     55# jeana 10 laps        Walking marches in //bars      4 laps 4 laps  4 laps 4 laps    BAPS board PF STD             Spring reformer PF             Bosu HR              Step lunge 4" 2x10 ea 4" 2x10 ea 4" 2x10 6"x20 6"20x 6"x20 6" x 20 6" 20x ea 6" 20x ea    Lat step down 4" 2x10 ea 4" 2x10 ea 4" 2x10 6"x20 4" 2x10 4" x20 ea 4" x 20 ea 4" 20x ea 4" 20x ea    Supine leg press HR 55# 2x10 55# 2x10 55# 2x10 55#  10x2  Reg leg press  205#  x50 Reg leg press  205#  x50 205# x50 205# x50    Ther Activity             TM 10 min  10 min 10 min  10 min   10 min  10 min 10min  10 min 10 min     Sled push   3 laps 3 laps 3 laps 5 laps 5 laps  5 laps 5 laps    U band - pull/hold      2 laps ea 4 laps grn tape  4 laps    Gait Training                                       Modalities

## 2022-10-07 ENCOUNTER — HOSPITAL ENCOUNTER (OUTPATIENT)
Dept: MRI IMAGING | Facility: HOSPITAL | Age: 67
End: 2022-10-07
Attending: NEUROLOGICAL SURGERY
Payer: MEDICARE

## 2022-10-07 DIAGNOSIS — G95.9 MYELOPATHY (HCC): ICD-10-CM

## 2022-10-07 DIAGNOSIS — Z48.89 ENCOUNTER FOR POSTOPERATIVE CARE RELATED TO SURGICAL JOINT FUSION: ICD-10-CM

## 2022-10-07 DIAGNOSIS — M48.02 CERVICAL SPINAL STENOSIS: ICD-10-CM

## 2022-10-07 DIAGNOSIS — R26.9 UNSPECIFIED ABNORMALITIES OF GAIT AND MOBILITY: ICD-10-CM

## 2022-10-07 DIAGNOSIS — Z98.1 ENCOUNTER FOR POSTOPERATIVE CARE RELATED TO SURGICAL JOINT FUSION: ICD-10-CM

## 2022-10-07 PROCEDURE — 72141 MRI NECK SPINE W/O DYE: CPT

## 2022-10-07 PROCEDURE — G1004 CDSM NDSC: HCPCS

## 2022-10-10 ENCOUNTER — OFFICE VISIT (OUTPATIENT)
Dept: PHYSICAL THERAPY | Facility: CLINIC | Age: 67
End: 2022-10-10
Payer: MEDICARE

## 2022-10-10 DIAGNOSIS — Z98.890 POST-OPERATIVE STATE: ICD-10-CM

## 2022-10-10 DIAGNOSIS — Z98.890 H/O OF HEMILAMINECTOMY: Primary | ICD-10-CM

## 2022-10-10 PROCEDURE — 97110 THERAPEUTIC EXERCISES: CPT

## 2022-10-10 PROCEDURE — 97112 NEUROMUSCULAR REEDUCATION: CPT

## 2022-10-10 NOTE — PROGRESS NOTES
Daily Note     Today's date: 10/10/2022  Patient name: Red Litten  : 1955  MRN: [id number]  Referring provider: Olga Guzman  Dx:   Encounter Diagnosis     ICD-10-CM    1  H/O of hemilaminectomy  Z98 890    2  Post-operative state  Z98 890                   Subjective: Pt reports that he is feeling more and more steady but still has his bad days but they are less frequent   Objective: See treatment diary below      Assessment: Continued to focus on balance and strengthening in dynamic fashion  He has continued to be increasingly challenged with progressions to his balance program  Pt has difficulty walking on his toes, his knees continue to flex to compensate for weakness of the gastrocs  Plan: Continue per plan of care        Precautions: Prior fusion; balance/falls risk  EPOC: 10/3/22  HEP: seated HR    Manuals 9/8 9/12 9/14 9/19 9/21 9/26 9/30  10/4 10/5 10/10   LE PROM     PF        Re-assess PF                                        Neuro Re-Ed             NSP             NSP march             NSP heel slide             NMES with PF                          Hernandez testing 10 min            Bosu Balance  30"x3  30"x3         SLS  30"x3 30x2" 30"x3   3 x to fatigue  3x level 3x 30"x   Tandem stance  30"x3  30"x3 ea Tandem walking 4 laps in //  bars  Tandem walking 4 laps in //  bars 3 laps 3 laps 3 laps   Heel walking toe walking  //bars          2 laps ea   Ther Ex             Bridge             Iso ADD/ABD             SLR and H ABD             Clamshells             Iso PF with strap  5x10" ea 5x10" ea 10"x5 10x5" ea        Seated PF  20x 20x 20 30x2 7 5# KB  x20 ea 7 5# KB  x20 ea 7 5# KB 20x ea 7 5 KB 20x  20# KB   Toe walk and retro walk     55# jeana 10 laps        Walking marches in //bars      4 laps 4 laps  4 laps 4 laps 4 laps   BAPS board PF STD             Spring reformer PF             Bosu HR              Step lunge 4" 2x10 ea 4" 2x10 ea 4" 2x10 6"x20 6"20x 6"x20 6" x 20 RN s/w pt  Pt states she is currently out of medication  Script was 4-5 Norco per day ordered on 3/1 130 tabs  Pt states there were about 15 days that she took 5 tabs        Please advise- 6" 20x ea 6" 20x ea 8  "x20 ea   Lat step down 4" 2x10 ea 4" 2x10 ea 4" 2x10 6"x20 4" 2x10 4" x20 ea 4" x 20 ea 4" 20x ea 4" 20x ea 4"x20 ea   Supine leg press HR 55# 2x10 55# 2x10 55# 2x10 55#  10x2  Reg leg press  205#  x50 Reg leg press  205#  x50 205# x50 205# x50 205#  x20  305#  x20   Ther Activity             TM 10 min  10 min 10 min  10 min   10 min  10 min 10min  10 min 10 min  10 min   Sled push   3 laps 3 laps 3 laps 5 laps 5 laps  5 laps 5 laps 5 laps   U band - pull/hold      2 laps ea 4 laps grn tape  4 laps 4 laps   Gait Training                                       Modalities

## 2022-10-12 ENCOUNTER — OFFICE VISIT (OUTPATIENT)
Dept: PHYSICAL THERAPY | Facility: CLINIC | Age: 67
End: 2022-10-12
Payer: MEDICARE

## 2022-10-12 DIAGNOSIS — Z98.890 H/O OF HEMILAMINECTOMY: Primary | ICD-10-CM

## 2022-10-12 DIAGNOSIS — Z98.890 POST-OPERATIVE STATE: ICD-10-CM

## 2022-10-12 DIAGNOSIS — Z98.890 STATUS POST HEMILAMINOTOMY: ICD-10-CM

## 2022-10-12 PROCEDURE — 97112 NEUROMUSCULAR REEDUCATION: CPT | Performed by: PHYSICAL THERAPIST

## 2022-10-12 PROCEDURE — 97140 MANUAL THERAPY 1/> REGIONS: CPT | Performed by: PHYSICAL THERAPIST

## 2022-10-12 PROCEDURE — 97110 THERAPEUTIC EXERCISES: CPT | Performed by: PHYSICAL THERAPIST

## 2022-10-12 NOTE — PROGRESS NOTES
PT Re-Evaluation     Today's date: 10/12/2022  Patient name: Leo Hale  : 1955  MRN: [id number]  Referring provider: Lakshmi Royal  Dx:   Encounter Diagnosis     ICD-10-CM    1  H/O of hemilaminectomy  X12 205 PT plan of care cert/re-cert   2  Post-operative state  Z98 890 PT plan of care cert/re-cert   3  Status post hemilaminotomy  Z98 890 PT plan of care cert/re-cert       Start Time: 845  Stop Time: 930  Total time in clinic (min): 45 minutes    Assessment  Assessment details: Leo Hale is a 69 y o  male who presents with increased low back pain S/P Lumbar hemilaminectomy consistent with referring diagnosis of H/O of hemilaminectomy  (primary encounter diagnosis) that is complex secondary to chronic and insidious onset, moderate fear avoidance and moderate pain levels  Clinically demonstrates decreased lumbar ROM, decreased core and hip strength, decreased LE proprioception leading to pain with ADLs and exercise  This suggests the need for skilled OPPT to address the above listed impairments, achieve established goals and return to PLOF pain-free  If you have any questions or concerns please contact me at [phone]  Thank you! Re-assessment 22:  Beryl Pro has attended 11 visits since the initiation of OPPT and reports a 75% GROC  Clinically demonstrates improved lumbar ROM and core strength, improved hip control as well as improved balance  Beryl Pro continues to have limited S1 myotomal strength limiting his balance and endurance with performance of ADLs/exercise and activity around his home  This suggests the need for continued skilled OPPT to address his remaining impairments, achieve established goals and return to PLOF pain-free  Re-assessment 10/12/22:  Beryl Pro has attended 21 visits since the initiation of OPPT and reports a 80% GROC    Clinically demonstrates proper lumbar ROM into all planes as well as good core strength; good hip strength but continued S1 myotomal weakness leading to continued difficulty with any anterior translation or anterior wt shifting leading to knee collapse and hip strategies for compensation  Héctor Valdivia continues to have shown progress with lateral step down performance no longer needing UE support for performance but still with instability in ML planes  Héctor Valdivia is also at risk for falls based on his HECTOR score of 38/56 showing he is still a high risk for falls  This suggests the need for continued skilled OPPT to address his remaining impairments, achieve established goals and return to PLOF pain-free  Impairments: abnormal coordination, abnormal gait, abnormal muscle firing, abnormal or restricted ROM, abnormal movement, activity intolerance, impaired balance, impaired physical strength, lacks appropriate home exercise program, pain with function, safety issue, poor posture  and poor body mechanics    Symptom irritability: moderateUnderstanding of Dx/Px/POC: good   Prognosis: good    Goals  Short Term Goals (4 weeks)  1 ) Establish independence with HEP- MET  2 ) Decrease subjective pain levels from NPRS at least to 2-5/10 at rest and with activity- met  3 ) Improve lumbar ROM at least 5-10 degrees into all planes to allow for improved ease of movement with less guarding- met    Long Term Goals (8 weeks)  1 ) Improve lumbar ROM to WNL in all planes to restore normal movement with ADLs and function- not met  2 ) Improve hip ABD, ER strength to 5/5 in all planes in order to return to pain-free ADLs and function- not met  3 ) Improve FOTO score at least to 75 points showing improved self reported disability - not met  4 ) Improve SLS to > 5 seconds B/L limiting falls risk    - partially met  5 ) Improve HECTOR score to > 46/56 showing moderate falls risk - new    Plan  Plan details: Continue POC for L/S ROM, strength and stability; monitor sxs and progress as able   Patient would benefit from: PT eval and skilled physical therapy  Planned modality interventions: biofeedback, cryotherapy, electrical stimulation/Russian stimulation and TENS  Planned therapy interventions: abdominal trunk stabilization, activity modification, ADL retraining, ADL training, balance, behavior modification, coordination, dry needling, flexibility, functional ROM exercises, gait training, graded activity, graded exercise, graded motor, home exercise program, therapeutic training, therapeutic exercise, therapeutic activities, stretching, strengthening, sensory integrative techniques, self care, patient education, postural training, neuromuscular re-education, IADL retraining, joint mobilization, manual therapy and massage  Frequency: 2x week  Duration in visits: 16  Duration in weeks: 8  Plan of Care beginning date: 10/12/2022  Plan of Care expiration date: 12/14/2022  Treatment plan discussed with: patient        Subjective Evaluation    History of Present Illness  Date of onset: 5/1/2022  Date of surgery: 7/8/2022  Mechanism of injury: Ella Matthews is a 69 y o  male who presents with increased LBP S/P hemilamectomy starting ~ 3 weeks ago  Had a long complicated history of balance deficits related to cervical myelopathy  Notes after having 6 months of OPPT rehab for C/S still had deficits with balance  Decided to seek out MD consult where MRI and X-rays were (-) for fx but + for L4-L5 HNP and stenosis until referral for hemilaminectomy was scheduled  At current status using SPC and not using a clamshell brace  At F/U script for OPPT provided  Denies night pain- using a CPAP for or changes in bowel or bladder function  Reports continued LE NT numbness and peripheral weakness signs or sxs  F/U with MD in 2-3 weeks  Wishes to return to PLOF pain-free- improve strength and return to normal exercise, improve balance and normalize his ability to exercise and walk  Re-assessment 9/8/22:  Tracy Mitchell has attended 11 visits since the initiation of OPPT and reports a 75% GROC  Reports compliance with HEP; notes improved ability to go and down stairs- still needs to use a rail  Able to 7-8 steps without use of a railing at times  Notes getting improved power at times  Wishes to continue to work on balance and strength of his feet  F/U with MD in October  Still takes his [facility] with him at times  Notes still having a little back stiffness- less and less pain overall  Re-assessment 10/12/22:  Valery Senior has attended 21 visits since the initiation of OPPT and reports a 80% GROC  Reports improved ability to walk without a SPC for prolonged periods up to 1/2 mile  Reports improved strength effectively in his LEs  Notes less numbness or tired sensations in his LEs  Denies any back stiffness  Reports being able to walk up 12 steps without a railing  Still reports having difficulty with pushing up on toes  F/U with MD in 2 weeks  Wishes to continue with OPPT to work on remaining strength until his transition to the gym              Not a recurrent problem   Quality of life: good    Pain  Current pain ratin  At best pain ratin  At worst pain ratin  Location: L/S and LEs   Quality: burning and throbbing  Relieving factors: relaxation, medications, rest and support  Aggravating factors: stair climbing, standing and walking  Progression: improved      Diagnostic Tests  X-ray: abnormal  MRI studies: abnormal  Treatments  Previous treatment: physical therapy  Current treatment: immobilization and physical therapy  Patient Goals  Patient goals for therapy: decreased edema, decreased pain, improved balance, increased motion, increased strength, independence with ADLs/IADLs and return to sport/leisure activities  Patient goal: Get stabilized         Objective     Active Range of Motion     Lumbar   Flexion: 105 degrees   Extension: 35 degrees   Left lateral flexion: 40 degrees       Right lateral flexion: 40 degrees   Left rotation: 80 degrees   Right rotation: 87 degrees   Left Hip External rotation (90/90): 40 degrees   Internal rotation (90/90): 35 degrees     Right Hip   External rotation (90/90): 40 degrees   Internal rotation (90/90): 30 degrees     Strength/Myotome Testing     Left Hip   Planes of Motion   Flexion: 5  Extension: 4  Abduction: 5  Adduction: 5  External rotation: 5  Internal rotation: 5    Right Hip   Planes of Motion   Flexion: 5  Extension: 5  Abduction: 4+  Adduction: 5  Internal rotation: 5    Left Knee   Flexion: 5  Extension: 5    Right Knee   Flexion: 5  Extension: 5    Left Ankle/Foot   Dorsiflexion: 4+  Plantar flexion: 4+    Right Ankle/Foot   Dorsiflexion: 4+  Plantar flexion: 4-    Tests     Lumbar     Left   Negative crossed SLR, quadrant and slump test      Right   Negative crossed SLR, quadrant and slump test      Left Pelvic Girdle/Sacrum   Positive: active SLR test      Right Pelvic Girdle/Sacrum   Negative: active SLR test      Functional Assessment      Squat    Left within functional limits and right within functional limits       Single Leg Stance   Left: 6 seconds  Right: 3 seconds  Neuro Exam:     Functional outcomes   5x sit to stand: 12 4 (seconds)  TU 25 (seconds)      Flowsheet Rows    Flowsheet Row Most Recent Value   PT/OT G-Codes    Current Score 65   Projected Score 70             Precautions: Prior fusion; balance/falls risk  EPOC: 10/3/22  HEP: seated HR    Manuals 10/12 9/12 9/14 9/19 9/21 9/26 9/30  10/4 10/5 10/10   LE PROM     PF        Re-assess PF                                        Neuro Re-Ed             NSP                          NSP heel slide             NMES with PF                          Hernandez testing 10 min            Bosu Balance  30"x3  30"x3         SLS 3x15" ea 30"x3 30x2" 30"x3   3 x to fatigue  3x level 3x 30"x   Tandem stance  30"x3  30"x3 ea Tandem walking 4 laps in //  bars  Tandem walking 4 laps in //  bars 3 laps 3 laps 3 laps   Heel walking toe walking  //bars          2 laps ea   Ther Ex Bridge             Iso ADD/ABD             SLR and H ABD             Clamshells             Iso PF with strap  5x10" ea 5x10" ea 10"x5 10x5" ea        Seated PF 20x 20x 20x 20 30x2 7 5# KB  x20 ea 7 5# KB  x20 ea 7 5# KB 20x ea 7 5 KB 20x  20# KB   Toe walk and retro walk     55# jeana 10 laps        Walking marches in //bars      4 laps 4 laps  4 laps 4 laps 4 laps   BAPS board PF STD             Spring reformer PF             Bosu HR              Step lunge  4" 2x10 ea 4" 2x10 6"x20 6"20x 6"x20 6" x 20 6" 20x ea 6" 20x ea 8  "x20 ea   Lat step down 6" 2x10 ea 4" 2x10 ea 4" 2x10 6"x20 4" 2x10 4" x20 ea 4" x 20 ea 4" 20x ea 4" 20x ea 4"x20 ea   Supine leg press HR 55# 2x10 55# 2x10 55# 2x10 55#  10x2  Reg leg press  205#  x50 Reg leg press  205#  x50 205# x50 205# x50 205#  x20  305#  x20   Ther Activity             TM 10 min  10 min 10 min  10 min   10 min  10 min 10min  10 min 10 min  10 min   Sled push   3 laps 3 laps 3 laps 5 laps 5 laps  5 laps 5 laps 5 laps   U band - pull/hold      2 laps ea 4 laps grn tape  4 laps 4 laps   Gait Training                                       Modalities

## 2022-10-14 ENCOUNTER — NURSE TRIAGE (OUTPATIENT)
Dept: NEUROSURGERY | Facility: CLINIC | Age: 67
End: 2022-10-14

## 2022-10-14 NOTE — TELEPHONE ENCOUNTER
Received a call from Elroy Fregoso, patients wife about a bill she/ Marsha Harding received from [employer] from labs he had done back in June  She has been working with [facility] and they have not had success with getting these tests covered by Medicare  I called [facility] at [phone] and was advised that the TSH and Calcitriol  (1,25 di-OH Vit D) were the 2 tests not covered by Medicare  She explained that when I call back and they will accept verbal diagnosis codes to be added and then request the claim to be re-submitted to the insurance  I reviewed the 2 codes TSH (28771) and Calcitriol (86338 or 33803)  I was able to locate the TSH under the section 190 22 of the Medicare Manual   E78 00 and F32 9 are listed as covered ICD10 codes for this test   I was not able to located Calcitriol  I was however able to the Medicare LCD for Vitamin D Assays (A82557)  After reviewing the patients chart with Ute BEST  I called [facility] back at [phone] spoke with Sudarshan  I provided her with the following ICD 10 codes K76 9, D61 818, E78 00 and F32 9  She advised these codes have been added, the claim has been resubmitted to Medicare  I can tell the patient to disregard the invoice if anything is still not covered they will receive a new invoice in 30-45 days  I called the patients wife, spoke with Libby she was extremely appreciative of my call  I explained all of the above information  I told her that if in 45 days or so if they were to receive a new invoice she is to call me back  I would continue to work on finding a appropriate Dx to cover any remaining tests  Bala Abrams was appreciative and advised to call the office if she had any questions or concerns

## 2022-10-17 ENCOUNTER — OFFICE VISIT (OUTPATIENT)
Dept: PHYSICAL THERAPY | Facility: CLINIC | Age: 67
End: 2022-10-17
Payer: MEDICARE

## 2022-10-17 DIAGNOSIS — Z98.890 POST-OPERATIVE STATE: ICD-10-CM

## 2022-10-17 DIAGNOSIS — Z98.890 H/O OF HEMILAMINECTOMY: Primary | ICD-10-CM

## 2022-10-17 PROCEDURE — 97112 NEUROMUSCULAR REEDUCATION: CPT

## 2022-10-17 PROCEDURE — 97530 THERAPEUTIC ACTIVITIES: CPT

## 2022-10-17 NOTE — PROGRESS NOTES
Daily Note     Today's date: 10/17/2022  Patient name: Mike Bettencourt  : 1955  MRN: [id number]  Referring provider: Yulissa Greene  Dx:   Encounter Diagnosis     ICD-10-CM    1  H/O of hemilaminectomy  Z98 890    2  Post-operative state  Z98 890                   Subjective: Pt reported that he's feeling pretty good today and happy with results of his RA LV  Objective: See treatment diary below      Assessment: Tolerated treatment well  Worked on step strategies with forces applied at all angles and with different levels of force  also worked on reaching balance, with emphasis on distributing more weight to his toes  Also working on stepping over objects while ambulating with large improvement noted in all of these categories  Patient demonstrated fatigue post treatment, exhibited good technique with therapeutic exercises and would benefit from continued PT      Plan: Continue per plan of care  Progress treatment as tolerated  Precautions: Prior fusion; balance/falls risk  EPOC: 10/3/22  HEP: seated HR    Manuals 10/12 10/17     9/30  10/4 10/5 10/10   LE PROM             Re-assess PF                                      Neuro Re-Ed             NSP             NSP march             NSP heel slide             NMES with PF                          Hernandez testing 10 min            Bosu Balance             SLS 3x15" ea 20"x5 ea     3 x to fatigue  3x level 3x 30"x   Tandem stance       Tandem walking 4 laps in //  bars 3 laps 3 laps 3 laps   Heel walking toe walking  //bars          2 laps ea   Hurdles fwd/lat   5 laps ea           Fwd & cross reaching 3 way  x20 ea           Step strategies with forces  6 mins              Ther Ex             Bridge             Iso ADD/ABD             SLR and H ABD             Clamshells             Iso PF with strap             Seated PF 20x 20# KB  x50 ea     7 5# KB  x20 ea 7 5# KB 20x ea 7 5 KB 20x  20# KB   Toe walk and retro walk             Walking marches in //bars  5 laps     4 laps  4 laps 4 laps 4 laps   BAPS board PF STD             Spring reformer PF             Bosu HR              Step lunge       6" x 20 6" 20x ea 6" 20x ea 8  "x20 ea   Lat step down 6" 2x10 ea      4" x 20 ea 4" 20x ea 4" 20x ea 4"x20 ea   Supine leg press HR 55# 2x10 205#  x40  305#  x20     Reg leg press  205#  x50 205# x50 205# x50 205#  x20  305#  x20   Ther Activity             TM 10 min  10 min     10min  10 min 10 min  10 min   Sled push  5 laps  150#     5 laps  5 laps 5 laps 5 laps   U band - pull/hold       4 laps grn tape  4 laps 4 laps   Gait Training                                       Modalities

## 2022-10-19 ENCOUNTER — TELEPHONE (OUTPATIENT)
Dept: GASTROENTEROLOGY | Facility: CLINIC | Age: 67
End: 2022-10-19

## 2022-10-19 ENCOUNTER — OFFICE VISIT (OUTPATIENT)
Dept: PHYSICAL THERAPY | Facility: CLINIC | Age: 67
End: 2022-10-19
Payer: MEDICARE

## 2022-10-19 ENCOUNTER — OFFICE VISIT (OUTPATIENT)
Dept: GASTROENTEROLOGY | Facility: CLINIC | Age: 67
End: 2022-10-19
Payer: MEDICARE

## 2022-10-19 VITALS
WEIGHT: 276 LBS | SYSTOLIC BLOOD PRESSURE: 122 MMHG | BODY MASS INDEX: 36.58 KG/M2 | HEIGHT: 73 IN | DIASTOLIC BLOOD PRESSURE: 80 MMHG

## 2022-10-19 DIAGNOSIS — I85.00 ESOPHAGEAL VARICES DETERMINED BY ENDOSCOPY (HCC): ICD-10-CM

## 2022-10-19 DIAGNOSIS — Z98.890 STATUS POST HEMILAMINOTOMY: ICD-10-CM

## 2022-10-19 DIAGNOSIS — K74.69 OTHER CIRRHOSIS OF LIVER (HCC): Primary | ICD-10-CM

## 2022-10-19 DIAGNOSIS — Z98.890 H/O OF HEMILAMINECTOMY: Primary | ICD-10-CM

## 2022-10-19 DIAGNOSIS — Z98.890 POST-OPERATIVE STATE: ICD-10-CM

## 2022-10-19 PROCEDURE — 97112 NEUROMUSCULAR REEDUCATION: CPT | Performed by: PHYSICAL THERAPIST

## 2022-10-19 PROCEDURE — 97530 THERAPEUTIC ACTIVITIES: CPT | Performed by: PHYSICAL THERAPIST

## 2022-10-19 PROCEDURE — 97110 THERAPEUTIC EXERCISES: CPT | Performed by: PHYSICAL THERAPIST

## 2022-10-19 PROCEDURE — 99214 OFFICE O/P EST MOD 30 MIN: CPT | Performed by: INTERNAL MEDICINE

## 2022-10-19 NOTE — PROGRESS NOTES
[id number] - Outpatient Follow-up Note  Nicola Liang 69 y o  male MRN: [id number]  Encounter: [id number]    ASSESSMENT AND PLAN:      1  Other cirrhosis of liver ([facility] )  Due to GRACIA  Decompensated on basis of varices  No ascites or encephalopathy   Discovered incidentally during laparoscopic cholecystectomy  No jaundice, icterus, pruritus or other liver symptoms       transaminases stable  MRI showed cirrhosis without mass  Plan surveillance labs/imaging in 1 year      2  Esophageal varices determined by endoscopy ([place] [id number] )  Grade 2 esophageal varices 2021, due for recall EGD  Continue nadolol     3  Personal history of colonic polyps  Most recent colonoscopy 10/2021, 5 year recall     4  Gastroesophageal reflux disease without esophagitis    asymptomatic off PPI      Followup Appointment:  EGD now, office 1 year  ______________________________________________________________________    No chief complaint on file  Chief complaint-cirrhosis  HPI:  The patient presents for follow-up on cirrhosis  He is accompanied by his wife  He was last seen in the office in February 2022  He remains asymptomatic since then  He had lumbar spine surgery in July and did well  He has gained some weight due to decreased activity  He is working with physical therapy and plans to join "[employer]  He was started on CPAP in August   He denies any GI complaints  He specifically denies any issues with jaundice, icterus, pruritus or ascites/edema  He denies any dysphagia, reflux or other esophageal complaints      Historical Information   Past Medical History:   Diagnosis Date   • Abscess of back 03/01/2018   • Acquired pancytopenia ([facility] ) 01/16/2017   • Acquired thrombocytopenia ([facility] ) 01/16/2017   • Benign essential hypertension     Last Assessed:12/19/16; Pt reports heart and BP has been "fine" as of 4/16/2020   • Cirrhosis (HCC)    • Colon polyp    • CPAP (continuous positive airway pressure) dependence    • Cyst of skin     x6 from neck down back area   • Depression    • Esophageal varices (HCC)    • GERD (gastroesophageal reflux disease)    • Liver disease    • Macrocytosis     Last Assessed:1/16/17   • Major depression, chronic     Last Assessed:12/21/17   • Major depression, chronic 06/19/2017   • Myelopathy ([address] ) 4/27/2021   • Personal history of colonic polyps    • Sleep apnea    • SOB (shortness of breath) on exertion      Past Surgical History:   Procedure Laterality Date   • CHOLECYSTECTOMY  11/2018   • CHOLECYSTECTOMY LAPAROSCOPIC N/A 11/21/2018    Procedure: CHOLECYSTECTOMY LAPAROSCOPIC, wedge liver biopsy;  Surgeon: Lilia Moyer MD;  Location: [facility];  Service: General   • COLONOSCOPY  05/2011   • COLONOSCOPY  05/2016   • COLONOSCOPY     • EGD  01/2019    Esophagitis, esophageal varices   • GALLBLADDER SURGERY     • INCISION AND DRAINAGE OF WOUND N/A 03/01/2018    SEBACEOUS CYST ABSCESS OF BACK-VIJAYA MONZON MD   • NC ARTHRODESIS POSTERIOR/POSTERIORLATERAL CERVICAL BELOW C2 N/A 5/24/2021    Procedure: Posterior cervical decompressive laminectomy and lateral mass fixation fusion C3-5;  Surgeon: Nata Dawn MD;  Location: [facility];  Service: Neurosurgery   • NC EXC SKIN BENIG 1 1-2 CM TRUNK,ARM,LEG N/A 8/22/2018    Procedure: EXCISION  BIOPSY LESION/MASS BACK X4 NECK X1;  Surgeon: Lilia Moyer MD;  Location: [facility];  Service: General   • NC LAMINEC/FACETECT/FORAMIN,LUMBAR 1 SEG Left 7/8/2022    Procedure: L3-4, L4-5, and L5-S1 metrx decompressive hemilaminectomy with bilateral foraminotomy and discectomy;  Surgeon: Wei Ortega MD;  Location: [facility];  Service: Neurosurgery     Social History     Substance and Sexual Activity   Alcohol Use Not Currently    Comment: 7/19/19-last drink 12/2018- Occasionally/1-2 drinks per weekend     Social History     Substance and Sexual Activity   Drug Use No     Social History     Tobacco Use   Smoking Status Never Smoker   Smokeless Tobacco Never Used     Family History   Problem Relation Age of Onset   • Alzheimer's disease Mother    • Valvular heart disease Father    • Stroke Brother         Mini   • Valvular heart disease Brother    • Colon cancer Neg Hx    • Colon polyps Neg Hx          Current Outpatient Medications:   •  b complex vitamins tablet  •  buPROPion (WELLBUTRIN XL) 150 mg 24 hr tablet  •  hydrochlorothiazide (HYDRODIURIL) 25 mg tablet  •  multivitamin (THERAGRAN) TABS  •  nadolol (CORGARD) 40 mg tablet  No Known Allergies  Reviewed medications and allergies and updated as indicated    PHYSICAL EXAM:    Blood pressure 122/80, height 6' 1" (1 854 m), weight 125 kg (276 lb)  Body mass index is 36 41 kg/m²  General Appearance: NAD, cooperative, alert  Eyes: Anicteric, PERRLA, EOMI  ENT:  Normocephalic, atraumatic, normal mucosa  Neck:  Supple, symmetrical, trachea midline  Resp:  Clear to auscultation bilaterally; no rales, rhonchi or wheezing; respirations unlabored   CV:  S1 S2, Regular rate and rhythm; no murmur, rub, or gallop  GI:  Soft, non-tender, non-distended; normal bowel sounds; no masses, no organomegaly   Rectal: Deferred  Musculoskeletal: No cyanosis, clubbing or edema  Normal ROM    Skin:  No jaundice, rashes, or lesions   Heme/Lymph: No palpable cervical lymphadenopathy  Psych: Normal affect, good eye contact  Neuro: No gross deficits, AAOx3    Lab Results:   Lab Results   Component Value Date    WBC 4 34 09/02/2022    HGB 15 1 09/02/2022    HCT 44 6 09/02/2022     (H) 09/02/2022    PLT 99 (L) 09/02/2022     Lab Results   Component Value Date     12/23/2016    K 4 2 09/02/2022     09/02/2022    CO2 31 09/02/2022    ANIONGAP 8 06/06/2014    BUN 20 09/02/2022    CREATININE 1 05 09/02/2022    GLUCOSE 125 05/24/2021    GLUF 90 09/02/2022    CALCIUM 9 1 09/02/2022    CORRECTEDCA 9 7 09/02/2022    AST 45 09/02/2022    ALT 38 09/02/2022    ALKPHOS 143 (H) 09/02/2022    PROT 6 3 12/23/2016    BILITOT 0 3 12/23/2016    EGFR 72 09/02/2022     Lab Results   Component Value Date    IRON 103 11/21/2018    TIBC 289 11/21/2018    FERRITIN 234 11/21/2018     Lab Results   Component Value Date    LIPASE 73 11/21/2018       Radiology Results:   MRI cervical spine without contrast    Result Date: 10/12/2022  Narrative: MRI CERVICAL SPINE WITHOUT CONTRAST INDICATION: Prior surgery COMPARISON:  MR cervical spine 5/20/2021 TECHNIQUE:  Sagittal T1, sagittal T2, sagittal inversion recovery, axial T2, axial  2D merge IMAGE QUALITY:  Diagnostic FINDINGS: ALIGNMENT:  Mild straightening of cervical lordosis  Minimal anterolisthesis of C7 on T1  New postoperative changes status post C3-C5 laminectomies and posterior instrumented fusion  MARROW SIGNAL:  Normal marrow signal is identified within the visualized bony structures  No discrete marrow lesion  CERVICAL AND VISUALIZED THORACIC CORD:  Normal signal within the visualized cord  PREVERTEBRAL AND PARASPINAL SOFT TISSUES:  Normal  Subcentimeter thyroid nodules  According to guidelines published in the February 2015 white paper on incidental thyroid nodules in the Journal of the [employer] of Radiology Blossom Ferrer), because the nodules are less than 1 5 cm in size and without suspicious feature, no further evaluation is recommended  VISUALIZED POSTERIOR FOSSA:  The visualized posterior fossa demonstrates no abnormal signal  CERVICAL DISC SPACES: C2-C3:  No disc bulge  Left greater than right uncovertebral spurring  No canal stenosis  Mild left foraminal narrowing  C3-C4:  Postoperative change  Surgically patent canal   Mild bilateral uncovertebral spurring  Mild bilateral foraminal narrowing  C4-C5:  Postoperative change  Surgically patent canal   Bilateral uncovertebral spurring  Moderate right and mild left foraminal narrowing  C5-C6:  Postoperative change  Central disc protrusion  Surgically patent canal   Minimal uncovertebral spurring    No significant foraminal stenosis  C6-C7:  Small disc osteophyte complex  Bilateral uncovertebral spurring  Mild canal narrowing with mild bilateral foraminal narrowing  C7-T1:  Minimal anterolisthesis uncovering posterior disc margin  Mild canal narrowing  No significant foraminal stenosis  UPPER THORACIC DISC SPACES:  Normal      Impression: 1  New postoperative change status post C3-C5 laminectomies and posterior instrumented fusion  2   Mild degenerative change without high-grade canal stenosis  Moderate right foraminal narrowing at level C4-5   Workstation performed: [id number]

## 2022-10-19 NOTE — PROGRESS NOTES
Daily Note     Today's date: 10/19/2022  Patient name: Santos Nagel  : 1955  MRN: [id number]  Referring provider: Nury De Luna  Dx:   Encounter Diagnosis     ICD-10-CM    1  H/O of hemilaminectomy  Z98 890    2  Post-operative state  Z98 890    3  Status post hemilaminotomy  Z98 890        Start Time: 46  Stop Time: 0930  Total time in clinic (min): 45 minutes    Subjective: Héctor Valdivia feels as if he is making steady progress  Stiffness at L/S today  Objective: See treatment diary below      Assessment: Challenged with performance of straight leg RDL with anterior LOB and need for knee collapse for righting reaction  Continues to have limited ability to perform stair negotiation while holding an object due to path deviation and fatigue  With addition of TBand ITYs had LOB also as continued challenge to anterior wt shift remains his greatest impairment  Plan: Continue per plan of care  Precautions: Prior fusion; balance/falls risk  EPOC: 10/3/22  HEP: seated HR    Manuals 10/12 10/17 10/19    9/30  10/4 10/5 10/10   LE PROM             Re-assess PF                                      Neuro Re-Ed             NSP             NSP march             NSP heel slide             NMES with PF                          Hernandez testing 10 min            Bosu Balance             SLS 3x15" ea 20"x5 ea     3 x to fatigue  3x level 3x 30"x   Tandem stance       Tandem walking 4 laps in //  bars 3 laps 3 laps 3 laps   Heel walking toe walking  //bars          2 laps ea   Hurdles fwd/lat   5 laps ea           Fwd & cross reaching 3 way  x20 ea 20# KB 20x          Step strategies with forces  6 mins              Ther Ex             Bridge             Iso ADD/ABD             SLR and H ABD             Clamshells             Iso PF with strap             Seated PF 20x 20# KB  x50 ea 20# KB 50x ea    7 5# KB  x20 ea 7 5# KB 20x ea 7 5 KB 20x  20# KB   Toe walk and retro walk             Walking marches in //bars  5 laps     4 laps  4 laps 4 laps 4 laps   TBand YTIs   GTB 2x10 ea          Straight leg RDL   25# 30x          BAPS board PF STD             Spring reformer PF             Bosu HR              Step lunge       6" x 20 6" 20x ea 6" 20x ea 8  "x20 ea   Lat step down 6" 2x10 ea      4" x 20 ea 4" 20x ea 4" 20x ea 4"x20 ea   Supine leg press HR 55# 2x10 205#  x40  305#  x20 205# 3x10    Reg leg press  205#  x50 205# x50 205# x50 205#  x20  305#  x20   Ther Activity             TM 10 min  10 min 10 min     10min  10 min 10 min  10 min   Stair climbing   2 flights with 20# KB 1 rail assist          Sled push  5 laps  150#     5 laps  5 laps 5 laps 5 laps   U band - pull/hold       4 laps grn tape  4 laps 4 laps   Gait Training                                       Modalities

## 2022-10-19 NOTE — TELEPHONE ENCOUNTER
Scheduled date of EGD(as of today):12/6/22  Physician performing EGD:Dr Dagoberto Martinez  Location of EGD:[facility]  Instructions reviewed with patient by:Margie CREWS  Clearances: N

## 2022-10-31 ENCOUNTER — OFFICE VISIT (OUTPATIENT)
Dept: PHYSICAL THERAPY | Facility: CLINIC | Age: 67
End: 2022-10-31

## 2022-10-31 DIAGNOSIS — Z98.890 STATUS POST HEMILAMINOTOMY: ICD-10-CM

## 2022-10-31 DIAGNOSIS — Z98.890 POST-OPERATIVE STATE: ICD-10-CM

## 2022-10-31 DIAGNOSIS — Z98.890 H/O OF HEMILAMINECTOMY: Primary | ICD-10-CM

## 2022-10-31 NOTE — PROGRESS NOTES
Daily Note     Today's date: 10/31/2022  Patient name: Giorgio Garcia  : 1955  MRN: [id number]  Referring provider: Buck Marquez  Dx:   Encounter Diagnosis     ICD-10-CM    1  H/O of hemilaminectomy  Z98 890    2  Post-operative state  Z98 890    3  Status post hemilaminotomy  Z98 890                   Subjective: Continues to have unsteadiness with walking  Has F/U with MD next week  Objective: See treatment diary below      Assessment: Continues to have a good response to performance of weighted warm ups with sled push/pull  Able to perform added  press and barbell clean and press  Still with LOB with performance of stairs up/down with 10 lbs KB with occasional path deviation  Continued poor ability to balance in SLS with star tapping  Overall has improved functional coordination but still with LOB with any anterior wt shift or LOB  Plan: Continue per plan of care  Precautions: Prior fusion; balance/falls risk  EPOC: 10/3/22  HEP: seated HR    Manuals 10/12 10/17 10/19 10/31   9/30  10/4 10/5 10/10   LE PROM             Re-assess PF                                      Neuro Re-Ed             NSP             NSP march             NSP heel slide             NMES with PF                          Hernandez testing 10 min            Bosu Balance             SLS 3x15" ea 20"x5 ea  2x10 star   3 x to fatigue  3x level 3x 30"x   Tandem stance       Tandem walking 4 laps in //  bars 3 laps 3 laps 3 laps   Heel walking toe walking  //bars          2 laps ea   Hurdles fwd/lat   5 laps ea  5 laps         Fwd & cross reaching 3 way  x20 ea 20# KB 20x 20 KB 20x         Step strategies with forces  6 mins     5 min          Ther Ex             Bridge             Iso ADD/ABD             SLR and H ABD             Clamshells             Iso PF with strap             Seated PF 20x 20# KB  x50 ea 20# KB 50x ea    7 5# KB  x20 ea 7 5# KB 20x ea 7 5 KB 20x  20# KB   Toe walk and retro walk Walking marches in //bars  5 laps  5 laps   4 laps  4 laps 4 laps 4 laps   TBand YTIs   GTB 2x10 ea          Straight leg RDL   25# 30x 25# 20x         BAPS board PF STD             Spring reformer PF             Bosu HR              Step lunge       6" x 20 6" 20x ea 6" 20x ea 8  "x20 ea   Lat step down 6" 2x10 ea      4" x 20 ea 4" 20x ea 4" 20x ea 4"x20 ea   Supine leg press HR 55# 2x10 205#  x40  305#  x20 205# 3x10    Reg leg press  205#  x50 205# x50 205# x50 205#  x20  305#  x20   Ther Activity             TM 10 min  10 min 10 min  10 min    10min  10 min 10 min  10 min   Stair climbing   2 flights with 20# KB 1 rail assist 2 flights 10# KB          Sled push  5 laps  150#  5 laps 150#    5 laps  5 laps 5 laps 5 laps   U band - pull/hold       4 laps grn tape  4 laps 4 laps   Gait Training                                       Modalities

## 2022-11-02 ENCOUNTER — OFFICE VISIT (OUTPATIENT)
Dept: PHYSICAL THERAPY | Facility: CLINIC | Age: 67
End: 2022-11-02

## 2022-11-02 DIAGNOSIS — Z98.890 H/O OF HEMILAMINECTOMY: Primary | ICD-10-CM

## 2022-11-02 DIAGNOSIS — Z98.890 POST-OPERATIVE STATE: ICD-10-CM

## 2022-11-02 DIAGNOSIS — Z98.890 STATUS POST HEMILAMINOTOMY: ICD-10-CM

## 2022-11-02 NOTE — PROGRESS NOTES
Daily Note     Today's date: 2022  Patient name: Giovanny Mcallister  : 1955  MRN: [id number]  Referring provider: Acacia Coto  Dx:   Encounter Diagnosis     ICD-10-CM    1  H/O of hemilaminectomy  Z98 890    2  Post-operative state  Z98 890    3  Status post hemilaminotomy  Z98 890            unbilled 8:45-9:00  Subjective: Denies any recent falls  Follow up with MD next week  Objective: See treatment diary below      Assessment: Frequent LOB and difficulty with foot clearance, specifically with hurdles, which made patient extremely frustrated  Poor performance with all balance exercises which only increased patient frustration therefore focused on LE strength  Plan: Continue per plan of care  Precautions: Prior fusion; balance/falls risk  EPOC: 10/3/22  HEP: seated HR    Manuals 10/12 10/17 10/19 10/31 11/2  9/30  10/4 10/5 10/10   LE PROM             Re-assess PF                                      Neuro Re-Ed             NSP             NSP march             NSP heel slide             NMES with PF                          Hernandez testing 10 min            Bosu Balance             SLS 3x15" ea 20"x5 ea  2x10 star   3 x to fatigue  3x level 3x 30"x   Tandem stance       Tandem walking 4 laps in //  bars 3 laps 3 laps 3 laps   Heel walking toe walking  //bars          2 laps ea   Hurdles fwd/lat   5 laps ea  5 laps 5 laps ea  Fwd & cross reaching 3 way  x20 ea 20# KB 20x 20 KB 20x         Step strategies with forces  6 mins     5 min          Ther Ex             Bridge             Iso ADD/ABD             SLR and H ABD             Clamshells             Iso PF with strap             Seated PF 20x 20# KB  x50 ea 20# KB 50x ea    7 5# KB  x20 ea 7 5# KB 20x ea 7 5 KB 20x  20# KB   Toe walk and retro walk             Walking marches in //bars  5 laps  5 laps 5 laps  4 laps  4 laps 4 laps 4 laps   TBand YTIs   GTB 2x10 ea          Straight leg RDL   25# 30x 25# 20x BAPS board PF STD             Spring reformer PF             Bosu HR              Step lunge       6" x 20 6" 20x ea 6" 20x ea 8  "x20 ea   Lat step down 6" 2x10 ea      4" x 20 ea 4" 20x ea 4" 20x ea 4"x20 ea   Supine leg press HR 55# 2x10 205#  x40  305#  x20 205# 3x10  205#  3x10  Reg leg press  205#  x50 205# x50 205# x50 205#  x20  305#  x20   Ther Activity     11/2        TM 10 min  10 min 10 min  10 min  10 min  10min  10 min 10 min  10 min   Stair climbing   2 flights with 20# KB 1 rail assist 2 flights 10# KB  2 flights  10# KB        Sled push  5 laps  150#  5 laps 150#  5 laps  150#  5 laps  5 laps 5 laps 5 laps   U band - pull/hold       4 laps grn tape  4 laps 4 laps   Gait Training                                       Modalities

## 2022-11-07 ENCOUNTER — OFFICE VISIT (OUTPATIENT)
Dept: PHYSICAL THERAPY | Facility: CLINIC | Age: 67
End: 2022-11-07

## 2022-11-07 DIAGNOSIS — Z98.890 H/O OF HEMILAMINECTOMY: Primary | ICD-10-CM

## 2022-11-07 DIAGNOSIS — Z98.890 POST-OPERATIVE STATE: ICD-10-CM

## 2022-11-07 NOTE — PROGRESS NOTES
Daily Note     Today's date: 2022  Patient name: Lynelle Schilder  : 1955  MRN: [id number]  Referring provider: Zayra Donaldson  Dx:   Encounter Diagnosis     ICD-10-CM    1  H/O of hemilaminectomy  Z98 890    2  Post-operative state  Z98 890                   Subjective: Pt reports he was having a shaky day on his LV but since then has been better  Objective: See treatment diary below      Assessment: Pt did well with functional balance challenges  Continued to challenge pt outside of his comfortable LELA with reaching, lifting and forces applied to work on step strategies  Also shows large improvement in stability when maintaining a SLS to tap cone with opposite LE  Up and down steps carrying resistance with no issues and no LOB  Plan: Continue per plan of care  Precautions: Prior fusion; balance/falls risk  EPOC: 10/3/22  HEP: seated HR    Manuals 10/12 10/17 10/19 10/31 11/2 11/7       LE PROM             Re-assess PF                                      Neuro Re-Ed             NSP             NSP march             NSP heel slide             NMES with PF                          Hernandez testing 10 min            Bosu Balance             SLS 3x15" ea 20"x5 ea  2x10 star  Cone taps  x10 ea       Tandem stance             Heel walking toe walking  //bars             Hurdles fwd/lat   5 laps ea  5 laps 5 laps ea  Fwd & cross reaching 3 way  x20 ea 20# KB 20x 20 KB 20x  20#  x20       Step strategies with forces  6 mins     5 min   5 min       Ther Ex             Bridge             Iso ADD/ABD             SLR and H ABD             Clamshells             Iso PF with strap             Seated PF 20x 20# KB  x50 ea 20# KB 50x ea          Toe walk and retro walk             Walking marches in //bars  5 laps  5 laps 5 laps        TBand YTIs   GTB 2x10 ea          Straight leg RDL   25# 30x 25# 20x         BAPS board PF STD             Spring reformer PF             Bosu HR Step lunge             Lat step down 6" 2x10 ea            Supine leg press HR 55# 2x10 205#  x40  305#  x20 205# 3x10  205#  3x10 205#  x20  305#  x20       Ther Activity     11/2        TM 10 min  10 min 10 min  10 min  10 min 10 min       Stair climbing   2 flights with 20# KB 1 rail assist 2 flights 10# KB  2 flights  10# KB 3 flights  10# KB       Sled push  5 laps  150#  5 laps 150#  5 laps  150# 5 laps  150#       U band - pull/hold             Gait Training                                       Modalities

## 2022-11-09 ENCOUNTER — OFFICE VISIT (OUTPATIENT)
Dept: NEUROSURGERY | Facility: CLINIC | Age: 67
End: 2022-11-09

## 2022-11-09 VITALS
HEART RATE: 80 BPM | WEIGHT: 272 LBS | BODY MASS INDEX: 36.05 KG/M2 | DIASTOLIC BLOOD PRESSURE: 76 MMHG | RESPIRATION RATE: 16 BRPM | SYSTOLIC BLOOD PRESSURE: 120 MMHG | HEIGHT: 73 IN

## 2022-11-09 DIAGNOSIS — Z98.1 S/P CERVICAL SPINAL FUSION: Primary | ICD-10-CM

## 2022-11-09 NOTE — PROGRESS NOTES
Neurosurgery Office Note  Epi Dale 69 y o  male MRN: [id number]      Assessment/Plan     S/P cervical spinal fusion  Patient seen outpatient office today for 1 year follow-up  · S/p posterior cervical decompressive laminectomy and lateral mass fixation fusion C3-5 on 05/24/2021 by Dr Ryan  · He states his balance has greatly improved but he still loses his balance at times  · He also states he is continues to be able to walk but does have to take breaks because his legs feel fatigued  Continues to have some leg weakness  · He is currently seeing physical therapy twice a week and will transition to his own therapy at the [facility] following this    Imaging:    MRI cervical spine 10/7/22:New postoperative change status post C3-C5 laminectomies and posterior instrumented fusion  Mild degenerative change without high-grade canal stenosis  Moderate right foraminal narrowing at level C4-5  Plan:  · Reviewed imaging with patient   · Recommend continue follow-up with physical therapy   · Patient will follow up p r n  or if symptoms worsen   · Patient made aware to seek care sooner if he develops any new or worsening neurological changes or red flag signs   · Patient made aware to contact Neurosurgery with any further questions or concerns           There are no diagnoses linked to this encounter  CHIEF COMPLAINT    Chief Complaint   Patient presents with   • Follow-up       HISTORY    History of Present Illness     69y o  year old male with past medical history significant for cirrhosis of liver, esophageal varices, sleep apnea, enlarged prostate, hypercholesterolemia, depression, chronic pain syndrome, and neurogenic claudication  Patient seen outpatient office today for 1 year follow-up    Patient was originally seen in consult in April 2021 with back pain, bilateral hip pain as well as complaints of balance difficulties and loss of position sense    On further investigation iImaging demonstrated cervical stenosis and a region of myelomalacia, he ultimately underwent a posterior cervical decompressive laminectomy and lateral mass fixation fusion C3-5 on 05/24/2021 by Dr Ryan  Patient's gait improved however he was still having major deficits postoperatively and continue to need a cane for ambulation at times  Patient had weakness in his bilateral legs and does have spinal stenosis in his lumbar spine however patient needed to heal from cervical spine surgery before pursuing lumbar spine  Patient continued to have numbness from posterior calf to ankle that worsened with ambulation and was positive for neurogenic claudication  He ultimately underwent L3-4, L4-5, and L5-S1 metrx decompressive hemilaminectomy with bilateral foraminotomy and discectomy (Left) on 7/8/22 by Dr Ryan  Both patient and wife state he has made much improvement since prior to surgery he continues to do physical therapy twice a week and will transition to his own therapy at the [facility] following this  He continues to have some weakness in his legs  He also can walk but needs to take breaks because his legs feel fatigued  Patient states with his leg weakness some days are better than others  He states his balance has greatly improved but he still loses his balance at times  He also endorses some limited movement in his neck since his cervical spine surgery  He denies any neck or back pain  Denies any recent falls or traumas  He denies any headaches, dizziness, blurry vision, chest pain, shortness of breath, abdominal pain, nausea, vomiting, diarrhea, no problems with bowel or bladder, no new weakness or numbness/tingling  HPI    See Discussion    REVIEW OF SYSTEMS    Review of Systems   Constitutional: Negative  Negative for activity change  HENT: Positive for hearing loss (slight in b/l ears) and tinnitus (occasional and slight b/l  )  Eyes: Negative  Respiratory: Positive for apnea (sleep)  Cardiovascular: Negative  Negative for leg swelling  Gastrointestinal: Negative  Negative for constipation  Endocrine: Negative  Genitourinary: Negative  Negative for frequency and urgency  Musculoskeletal: Positive for gait problem (ambulates with a cane (less frequently)  )  Negative for back pain (Constant center lower back pain, subsided  ), joint swelling, myalgias and neck stiffness  PT- twice a week   Skin: Negative  Allergic/Immunologic: Negative  Neurological: Positive for weakness (occasional in bilateral lower R>L legs  ) and numbness (bi/leg from calf down into toes)  Hematological: Negative  Psychiatric/Behavioral: Negative  Negative for sleep disturbance  ROS reviewed with patient and agree and changes were made as needed    Meds/Allergies     Current Outpatient Medications   Medication Sig Dispense Refill   • b complex vitamins tablet Take 1 tablet by mouth every morning     • buPROPion (WELLBUTRIN XL) 150 mg 24 hr tablet Take 1 tablet (150 mg total) by mouth daily (Patient taking differently: Take 150 mg by mouth every morning) 90 tablet 1   • hydrochlorothiazide (HYDRODIURIL) 25 mg tablet Take 1 tablet (25 mg total) by mouth daily 30 tablet 2   • multivitamin (THERAGRAN) TABS Take 1 tablet by mouth every morning     • nadolol (CORGARD) 40 mg tablet Take 1 tablet (40 mg total) by mouth daily (Patient taking differently: Take 40 mg by mouth every morning) 90 tablet 3     No current facility-administered medications for this visit         No Known Allergies    PAST HISTORY    Past Medical History:   Diagnosis Date   • Abscess of back 03/01/2018   • Acquired pancytopenia ([facility] [address] ) 01/16/2017   • Acquired thrombocytopenia ([facility] [address] ) 01/16/2017   • Benign essential hypertension     Last Assessed:12/19/16; Pt reports heart and BP has been "fine" as of 4/16/2020   • Cirrhosis (HCC)    • Colon polyp    • CPAP (continuous positive airway pressure) dependence    • Cyst of skin     x6 from neck down back area   • Depression    • Esophageal varices (HCC)    • GERD (gastroesophageal reflux disease)    • Liver disease    • Macrocytosis     Last Assessed:1/16/17   • Major depression, chronic     Last Assessed:12/21/17   • Major depression, chronic 06/19/2017   • Myelopathy ([facility] [address] ) 4/27/2021   • Personal history of colonic polyps    • Sleep apnea    • SOB (shortness of breath) on exertion        Past Surgical History:   Procedure Laterality Date   • CHOLECYSTECTOMY  11/2018   • CHOLECYSTECTOMY LAPAROSCOPIC N/A 11/21/2018    Procedure: CHOLECYSTECTOMY LAPAROSCOPIC, wedge liver biopsy;  Surgeon: Chilo Wagner MD;  Location: [facility];  Service: General   • COLONOSCOPY  05/2011   • COLONOSCOPY  05/2016   • COLONOSCOPY     • EGD  01/2019    Esophagitis, esophageal varices   • GALLBLADDER SURGERY     • INCISION AND DRAINAGE OF WOUND N/A 03/01/2018    SEBACEOUS CYST ABSCESS OF BACK-VIJAYA MONZON MD   • WA ARTHRODESIS POSTERIOR/POSTERIORLATERAL CERVICAL BELOW C2 N/A 5/24/2021    Procedure: Posterior cervical decompressive laminectomy and lateral mass fixation fusion C3-5;  Surgeon: Alber Pedroza MD;  Location: [facility];  Service: Neurosurgery   • WA EXC SKIN BENIG 1 1-2 CM TRUNK,ARM,LEG N/A 8/22/2018    Procedure: EXCISION  BIOPSY LESION/MASS BACK X4 NECK X1;  Surgeon: Chilo Wagner MD;  Location: [facility];  Service: General   • WA LAMINEC/FACETECT/FORAMIN,LUMBAR 1 SEG Left 7/8/2022    Procedure: L3-4, L4-5, and L5-S1 metrx decompressive hemilaminectomy with bilateral foraminotomy and discectomy;  Surgeon: Otis Deleon MD;  Location: [facility];  Service: Neurosurgery       Social History     Tobacco Use   • Smoking status: Never Smoker   • Smokeless tobacco: Never Used   Vaping Use   • Vaping Use: Never used   Substance Use Topics   • Alcohol use: Not Currently     Comment: 7/19/19-last drink 12/2018- Occasionally/1-2 drinks per weekend   • Drug use: No       Family History Problem Relation Age of Onset   • Alzheimer's disease Mother    • Valvular heart disease Father    • Stroke Brother         Mini   • Valvular heart disease Brother    • Colon cancer Neg Hx    • Colon polyps Neg Hx          Above history personally reviewed  EXAM    Vitals:Blood pressure 120/76, pulse 80, resp  rate 16, height 6' 1" (1 854 m), weight 123 kg (272 lb)  ,Body mass index is 35 89 kg/m²  Physical Exam  Vitals reviewed  Constitutional:       General: He is awake  He is not in acute distress  Appearance: Normal appearance  He is not ill-appearing  HENT:      Head: Normocephalic and atraumatic  Eyes:      Extraocular Movements: Extraocular movements intact and EOM normal       Conjunctiva/sclera: Conjunctivae normal       Pupils: Pupils are equal, round, and reactive to light  Neck:      Comments: Posterior cervical incision well healed, no TTP  Cardiovascular:      Rate and Rhythm: Normal rate  Pulmonary:      Effort: Pulmonary effort is normal  No respiratory distress  Chest:      Chest wall: No tenderness  Abdominal:      General: There is no distension  Palpations: Abdomen is soft  Tenderness: There is no abdominal tenderness  Musculoskeletal:         General: Normal range of motion  Cervical back: Normal range of motion and neck supple  No tenderness  No spinous process tenderness  Thoracic back: No tenderness  Lumbar back: No tenderness  Skin:     General: Skin is warm and dry  Neurological:      Mental Status: He is alert and oriented to person, place, and time  Coordination: Finger-Nose-Finger Test normal       Deep Tendon Reflexes: Strength normal       Reflex Scores:       Bicep reflexes are 2+ on the right side and 2+ on the left side  Patellar reflexes are 2+ on the right side and 2+ on the left side    Psychiatric:         Attention and Perception: Attention and perception normal          Mood and Affect: Mood and affect normal          Speech: Speech normal          Behavior: Behavior normal  Behavior is cooperative  Thought Content: Thought content normal          Cognition and Memory: Cognition and memory normal          Judgment: Judgment normal          Neurologic Exam     Mental Status   Oriented to person, place, and time  Follows 2 step commands  Attention: normal  Concentration: normal    Speech: speech is normal   Level of consciousness: alert  Knowledge: good  Able to perform simple calculations  Able to name object  Normal comprehension  Cranial Nerves     CN III, IV, VI   Pupils are equal, round, and reactive to light  Extraocular motions are normal    CN III: no CN III palsy  CN VI: no CN VI palsy  Nystagmus: none   Diplopia: none  Conjugate gaze: present    CN V   Facial sensation intact  CN VII   Facial expression full, symmetric  CN VIII   CN VIII normal    Hearing: intact    CN IX, X   CN IX normal      CN XI   CN XI normal      CN XII   CN XII normal      Motor Exam   Muscle bulk: normal  Overall muscle tone: normal  Right arm pronator drift: absent  Left arm pronator drift: absent    Strength   Strength 5/5 throughout  Sensory Exam   Light touch normal    Proprioception normal      Gait, Coordination, and Reflexes     Gait  Gait: (wide base)    Coordination   Finger to nose coordination: normal    Tremor   Resting tremor: absent  Intention tremor: absent  Action tremor: absent    Reflexes   Right biceps: 2+  Left biceps: 2+  Right patellar: 2+  Left patellar: 2+  Right Fitch: absent  Left Fitch: absent  Right ankle clonus: absent  Left ankle clonus: absent        MEDICAL DECISION MAKING    Imaging Studies:     No results found  I have personally reviewed pertinent reports     and I have personally reviewed pertinent films in PACS

## 2022-11-10 NOTE — ASSESSMENT & PLAN NOTE
Patient seen outpatient office today for 1 year follow-up  · S/p posterior cervical decompressive laminectomy and lateral mass fixation fusion C3-5 on 05/24/2021 by Dr Ryan  · He states his balance has greatly improved but he still loses his balance at times  · He also states he is continues to be able to walk but does have to take breaks because his legs feel fatigued  Continues to have some leg weakness  · He is currently seeing physical therapy twice a week and will transition to his own therapy at the [facility] following this    Imaging:    MRI cervical spine 10/7/22:New postoperative change status post C3-C5 laminectomies and posterior instrumented fusion  Mild degenerative change without high-grade canal stenosis  Moderate right foraminal narrowing at level C4-5      Plan:  · Reviewed imaging with patient   · Recommend continue follow-up with physical therapy   · Patient will follow up p r n  or if symptoms worsen   · Patient made aware to seek care sooner if he develops any new or worsening neurological changes or red flag signs   · Patient made aware to contact Neurosurgery with any further questions or concerns

## 2022-11-11 ENCOUNTER — OFFICE VISIT (OUTPATIENT)
Dept: PHYSICAL THERAPY | Facility: CLINIC | Age: 67
End: 2022-11-11

## 2022-11-11 DIAGNOSIS — Z98.890 H/O OF HEMILAMINECTOMY: Primary | ICD-10-CM

## 2022-11-11 DIAGNOSIS — Z98.890 STATUS POST HEMILAMINOTOMY: ICD-10-CM

## 2022-11-11 DIAGNOSIS — Z98.890 POST-OPERATIVE STATE: ICD-10-CM

## 2022-11-11 NOTE — PROGRESS NOTES
Daily Note     Today's date: 2022  Patient name: Damaris Perez  : 1955  MRN: [id number]  Referring provider: Vj Gonzalez  Dx:   Encounter Diagnosis     ICD-10-CM    1  H/O of hemilaminectomy  Z98 890    2  Post-operative state  Z98 890    3  Status post hemilaminotomy  Z98 890                   Subjective: Feeling okay overall  Still slightly unsteady with ambulation  Objective: See treatment diary below      Assessment: Progression with balance with performance of added carioca step and fwd/bkwd quick stepping  Able to perform deadlift with up to 65 lbs today without posterior LOB  Able to perform reformer HR also without major difficulty with knees straight to isolate gastrocs  Plan: Continue per plan of care  Precautions: Prior fusion; balance/falls risk  EPOC: 10/3/22  HEP: seated HR    Manuals 10/12 10/17 10/19 10/31 11/2 11/7 11/11      LE PROM             Re-assess PF                                      Neuro Re-Ed             NSP             NSP march             NSP heel slide             NMES with PF                          Hernandez testing 10 min            Bosu Balance             SLS 3x15" ea 20"x5 ea  2x10 star  Cone taps  x10 ea 2x10      Tandem stance             Heel walking toe walking  //bars       Side step and carioca 3x20 ft      Hurdles fwd/lat   5 laps ea  5 laps 5 laps ea  Fwd & cross reaching 3 way  x20 ea 20# KB 20x 20 KB 20x  20#  x20       Step strategies with forces  6 mins     5 min   5 min 5 min       Ther Ex             Bridge             Iso ADD/ABD             SLR and H ABD             Clamshells             Iso PF with strap             Seated PF 20x 20# KB  x50 ea 20# KB 50x ea          Toe walk and retro walk       5 laps      Walking marches in //bars  5 laps  5 laps 5 laps  5 laps      TBand YTIs   GTB 2x10 ea          Straight leg RDL   25# 30x 25# 20x   65# 10x2      BAPS board PF STD             Spring reformer PF Bosu HR              Step lunge             Lat step down 6" 2x10 ea            Supine leg press HR 55# 2x10 205#  x40  305#  x20 205# 3x10  205#  3x10 205#  x20  305#  x20 Reformer 30x       Ther Activity     11/2        TM 10 min  10 min 10 min  10 min  10 min 10 min 10 min       Stair climbing   2 flights with 20# KB 1 rail assist 2 flights 10# KB  2 flights  10# KB 3 flights  10# KB 3 flights 15#KB      Sled push  5 laps  150#  5 laps 150#  5 laps  150# 5 laps  150# 5 laps 150#      U band - pull/hold             Gait Training                                       Modalities

## 2022-11-14 ENCOUNTER — OFFICE VISIT (OUTPATIENT)
Dept: PHYSICAL THERAPY | Facility: CLINIC | Age: 67
End: 2022-11-14

## 2022-11-14 DIAGNOSIS — Z98.890 POST-OPERATIVE STATE: ICD-10-CM

## 2022-11-14 DIAGNOSIS — Z98.890 H/O OF HEMILAMINECTOMY: Primary | ICD-10-CM

## 2022-11-14 NOTE — PROGRESS NOTES
Daily Note     Today's date: 2022  Patient name: Cliff Lindsay  : 1955  MRN: [id number]  Referring provider: Pamella Hurst  Dx:   Encounter Diagnosis     ICD-10-CM    1  H/O of hemilaminectomy  Z98 890    2  Post-operative state  Z98 890                   Subjective: Pt reports he is doing ok but cant shake the tight calves he has been dealing with that last few weeks  Objective: See treatment diary below      Assessment: Continued to challenge pt with dynamic balance activities  Some LOB with carioca but able to self correct  Did well with hurdles and ascending and descending steps with weight today  Would continue to benefit from skilled PT  Plan: Continue per plan of care  Precautions: Prior fusion; balance/falls risk  EPOC: 10/3/22  HEP: seated HR    Manuals 10/12 10/17 10/19 10/31 11/2 11/7 11/11 11/14     LE PROM             Re-assess PF                                      Neuro Re-Ed             NSP             NSP march             NSP heel slide             NMES with PF                          Hernandez testing 10 min            Bosu Balance             SLS 3x15" ea 20"x5 ea  2x10 star  Cone taps  x10 ea 2x10      Tandem stance             Heel walking toe walking  //bars       Side step and carioca 3x20 ft Side step and carioca 3x20 ft     Hurdles fwd/lat   5 laps ea  5 laps 5 laps ea  3 laps ea     Fwd & cross reaching 3 way  x20 ea 20# KB 20x 20 KB 20x  20#  x20       Step strategies with forces  6 mins     5 min   5 min 5 min  5 min     Ther Ex             Bridge             Iso ADD/ABD             SLR and H ABD             Clamshells             Iso PF with strap             Seated PF 20x 20# KB  x50 ea 20# KB 50x ea          Toe walk and retro walk       5 laps 5 laps     Walking marches in //bars  5 laps  5 laps 5 laps  5 laps 5 laps     TBand YTIs   GTB 2x10 ea          Straight leg RDL   25# 30x 25# 20x   65# 10x2 65# 10x2     BAPS board PF STD Spring reformer PF             Bosu HR              Step lunge             Lat step down 6" 2x10 ea            Supine leg press HR 55# 2x10 205#  x40  305#  x20 205# 3x10  205#  3x10 205#  x20  305#  x20 Reformer 30x  Reformer 30x      Ther Activity     11/2        TM 10 min  10 min 10 min  10 min  10 min 10 min 10 min  10 min     Stair climbing   2 flights with 20# KB 1 rail assist 2 flights 10# KB  2 flights  10# KB 3 flights  10# KB 3 flights 15#KB 3 flight  15# KB     Sled push  5 laps  150#  5 laps 150#  5 laps  150# 5 laps  150# 5 laps 150#      U band - pull/hold             Gait Training                                       Modalities

## 2022-11-16 ENCOUNTER — OFFICE VISIT (OUTPATIENT)
Dept: PHYSICAL THERAPY | Facility: CLINIC | Age: 67
End: 2022-11-16

## 2022-11-16 DIAGNOSIS — Z98.890 H/O OF HEMILAMINECTOMY: Primary | ICD-10-CM

## 2022-11-16 DIAGNOSIS — Z98.890 POST-OPERATIVE STATE: ICD-10-CM

## 2022-11-16 DIAGNOSIS — Z98.890 STATUS POST HEMILAMINOTOMY: ICD-10-CM

## 2022-11-16 NOTE — PROGRESS NOTES
Daily Note     Today's date: 2022  Patient name: Edda Nino  : 1955  MRN: [id number]  Referring provider: Sumi Mittal  Dx:   Encounter Diagnosis     ICD-10-CM    1  H/O of hemilaminectomy  Z98 890       2  Post-operative state  Z98 890       3  Status post hemilaminotomy  Z98 890                      Subjective: Notes he signed up for a gym membership  Eager to get more active  Objective: See treatment diary below      Assessment: Able to perform dynamic balance with obstacle course with improved control and ability to stop momentum with stepping up and over step, bosu and foam   With stair negotiation reviewed performance of slowing down with descending stairs and using step to pattern vs reciprocal for safety  Able to perform bosu balance without difficulty  Improved ability to perform OH ITYs without posterior LOB  Plan: Continue per plan of care  Precautions: Prior fusion; balance/falls risk  EPOC: 10/3/22  HEP: seated HR    Manuals 10/12 10/17 10/19 10/31 11/2 11/7 11/11 11/14 11/16    LE PROM             Re-assess PF                                      Neuro Re-Ed             NSP             NSP march             NSP heel slide             NMES with PF                          Hernandez testing 10 min            Bosu Balance             SLS 3x15" ea 20"x5 ea  2x10 star  Cone taps  x10 ea 2x10  2x10"    Tandem stance         3x10"    Heel walking toe walking  //bars       Side step and carioca 3x20 ft Side step and carioca 3x20 ft     Hurdles fwd/lat   5 laps ea  5 laps 5 laps ea  3 laps ea 3 laps    Fwd & cross reaching 3 way  x20 ea 20# KB 20x 20 KB 20x  20#  x20       Step strategies with forces  6 mins     5 min   5 min 5 min  5 min 5 min obstalces     Ther Ex             Bridge             Iso ADD/ABD             SLR and H ABD             Clamshells             Iso PF with strap             Seated PF 20x 20# KB  x50 ea 20# KB 50x ea          Toe walk and retro walk       5 laps 5 laps 5 laps    Walking marches in //bars  5 laps  5 laps 5 laps  5 laps 5 laps 5 laps    TBand YTIs   GTB 2x10 ea      GTB 2x10    Straight leg RDL   25# 30x 25# 20x   65# 10x2 65# 10x2     BAPS board PF STD             Spring reformer PF             Bosu HR              Step lunge             Lat step down 6" 2x10 ea            Supine leg press HR 55# 2x10 205#  x40  305#  x20 205# 3x10  205#  3x10 205#  x20  305#  x20 Reformer 30x  Reformer 30x  NV    Ther Activity     11/2        TM 10 min  10 min 10 min  10 min  10 min 10 min 10 min  10 min 10 min     Stair climbing   2 flights with 20# KB 1 rail assist 2 flights 10# KB  2 flights  10# KB 3 flights  10# KB 3 flights 15#KB 3 flight  15# KB 3 flights no KB    Sled push  5 laps  150#  5 laps 150#  5 laps  150# 5 laps  150# 5 laps 150#      U band - pull/hold             Gait Training                                       Modalities

## 2022-11-21 ENCOUNTER — OFFICE VISIT (OUTPATIENT)
Dept: PHYSICAL THERAPY | Facility: CLINIC | Age: 67
End: 2022-11-21

## 2022-11-21 DIAGNOSIS — Z98.890 H/O OF HEMILAMINECTOMY: Primary | ICD-10-CM

## 2022-11-21 DIAGNOSIS — Z98.890 POST-OPERATIVE STATE: ICD-10-CM

## 2022-11-21 NOTE — PROGRESS NOTES
Daily Note     Today's date: 2022  Patient name: Mimi Helton  : 1955  MRN: [id number]  Referring provider: Christine Roger  Dx:   Encounter Diagnosis     ICD-10-CM    1  H/O of hemilaminectomy  Z98 890       2  Post-operative state  Z98 890                      Subjective: Pt reports he's feeling pretty good today  Objective: See treatment diary below      Assessment: Continued to challenge pt balance in several way to try to replicate every day life as well as strengthen LE's  He continues to show improvement as far as being able to correct himself when he loses his balance but still does have several LOB t/o the activities  Trialed step downs on FF when doing step to both feet on each step rather than step over step- his balance decreased with this method and looked more stable with step over step, but we worked on his speed  Plan: Continue per plan of care  Precautions: Prior fusion; balance/falls risk  EPOC: 10/3/22  HEP: seated HR    Manuals 10/12 10/17 10/19 10/31 11/2 11/7 11/11 11/14 11/16 11/21   LE PROM             Re-assess PF                                      Neuro Re-Ed             NSP             NSP march             NSP heel slide             NMES with PF                          Hernandez testing 10 min            Bosu Balance             SLS 3x15" ea 20"x5 ea  2x10 star  Cone taps  x10 ea 2x10  2x10" 5"x10   Tandem stance         3x10" Airex  5"x10ea   Heel walking toe walking  //bars       Side step and carioca 3x20 ft Side step and carioca 3x20 ft     Hurdles fwd/lat   5 laps ea  5 laps 5 laps ea  3 laps ea 3 laps 3 laps   Fwd & cross reaching 3 way  x20 ea 20# KB 20x 20 KB 20x  20#  x20       Step strategies with forces  6 mins     5 min   5 min 5 min  5 min 5 min obstalces  5 min   Ther Ex             Bridge             Iso ADD/ABD             SLR and H ABD             Clamshells             Iso PF with strap             Seated PF 20x 20# KB  x50 ea 20# KB 50x ea          Toe walk and retro walk       5 laps 5 laps 5 laps 5 laps   Walking marches in //bars  5 laps  5 laps 5 laps  5 laps 5 laps 5 laps 5 laps   TBand YTIs   GTB 2x10 ea      GTB 2x10 GTB 2x10   Straight leg RDL   25# 30x 25# 20x   65# 10x2 65# 10x2     BAPS board PF STD             Spring reformer PF             Bosu HR              Step lunge             Lat step down 6" 2x10 ea            Supine leg press HR 55# 2x10 205#  x40  305#  x20 205# 3x10  205#  3x10 205#  x20  305#  x20 Reformer 30x  Reformer 30x  NV    Ther Activity     11/2        TM 10 min  10 min 10 min  10 min  10 min 10 min 10 min  10 min 10 min  10 min   Stair climbing   2 flights with 20# KB 1 rail assist 2 flights 10# KB  2 flights  10# KB 3 flights  10# KB 3 flights 15#KB 3 flight  15# KB 3 flights no KB 3 flights no KB   Sled push  5 laps  150#  5 laps 150#  5 laps  150# 5 laps  150# 5 laps 150#      U band - pull/hold             Gait Training                                       Modalities

## 2022-11-22 ENCOUNTER — TELEPHONE (OUTPATIENT)
Dept: GASTROENTEROLOGY | Facility: CLINIC | Age: 67
End: 2022-11-22

## 2022-11-23 ENCOUNTER — OFFICE VISIT (OUTPATIENT)
Dept: PHYSICAL THERAPY | Facility: CLINIC | Age: 67
End: 2022-11-23

## 2022-11-23 DIAGNOSIS — Z98.890 POST-OPERATIVE STATE: ICD-10-CM

## 2022-11-23 DIAGNOSIS — Z98.890 H/O OF HEMILAMINECTOMY: Primary | ICD-10-CM

## 2022-11-23 DIAGNOSIS — Z98.890 STATUS POST HEMILAMINOTOMY: ICD-10-CM

## 2022-11-23 NOTE — PROGRESS NOTES
Daily Note     Today's date: 2022  Patient name: Epi Dale  : 1955  MRN: [id number]  Referring provider: Treva Blackmon  Dx:   Encounter Diagnosis     ICD-10-CM    1  H/O of hemilaminectomy  Z98 890       2  Post-operative state  Z98 890       3  Status post hemilaminotomy  Z98 890                      Subjective: Notes feeling okay, just stiffness in his ankles  Getting more confident and has signed up for the gym to transition at the end of this month  Objective: See treatment diary below      Assessment: Continues to have limited gastroc strength but improved activation with posterior tibialis facilitation with TBand  Able to then perform trampline marching and jumping with posterior tibialis bias as well  Improved balance with SL step up with SLS on step working on slower tempos  Will continue with stability training until transition  Plan: Continue per plan of care  Precautions: Prior fusion; balance/falls risk  EPOC: 10/3/22  HEP: seated HR    Manuals 11/23 10/17 10/19 10/31 11/2 11/7 11/11 11/14 11/16 11/21   LE PROM PF            Re-assess                                       Neuro Re-Ed             NSP             NSP march             NSP heel slide             NMES with PF                          Hernandez testing             Bosu Balance             SLS 3x15" ea 20"x5 ea  2x10 star  Cone taps  x10 ea 2x10  2x10" 5"x10   Tandem stance         3x10" Airex  5"x10ea   Heel walking toe walking  //bars Side stepping/cariorca 3x20ft      Side step and carioca 3x20 ft Side step and carioca 3x20 ft     Hurdles fwd/lat  5 laps with 2 hurdles 5 laps ea  5 laps 5 laps ea  3 laps ea 3 laps 3 laps   Fwd & cross reaching 3 way  x20 ea 20# KB 20x 20 KB 20x  20#  x20       Step strategies with forces  6 mins     5 min   5 min 5 min  5 min 5 min obstalces  5 min   Ther Ex             Bridge             Iso ADD/ABD             SLR and H ABD             Clamshells Iso PF with strap             Seated PF  Tband with INV bias 20x 20# KB  x50 ea 20# KB 50x ea          Toe walk and retro walk       5 laps 5 laps 5 laps 5 laps   Walking marches in //bars 5 laps 5 laps  5 laps 5 laps  5 laps 5 laps 5 laps 5 laps   TBand YTIs GTB 2x10  GTB 2x10 ea      GTB 2x10 GTB 2x10   Straight leg RDL   25# 30x 25# 20x   65# 10x2 65# 10x2     BAPS board PF STD             Spring reformer PF Trampoline 5 min             Bosu HR              Step lunge             Lat step down 6" 2x10 ea            Supine leg press HR 55# 2x10 205#  x40  305#  x20 205# 3x10  205#  3x10 205#  x20  305#  x20 Reformer 30x  Reformer 30x  NV    Ther Activity     11/2        TM 10 min  10 min 10 min  10 min  10 min 10 min 10 min  10 min 10 min  10 min   Stair climbing   2 flights with 20# KB 1 rail assist 2 flights 10# KB  2 flights  10# KB 3 flights  10# KB 3 flights 15#KB 3 flight  15# KB 3 flights no KB 3 flights no KB   Sled push  5 laps  150#  5 laps 150#  5 laps  150# 5 laps  150# 5 laps 150#      U band - pull/hold             Gait Training                                       Modalities

## 2022-11-28 ENCOUNTER — OFFICE VISIT (OUTPATIENT)
Dept: PHYSICAL THERAPY | Facility: CLINIC | Age: 67
End: 2022-11-28

## 2022-11-28 DIAGNOSIS — Z98.890 STATUS POST HEMILAMINOTOMY: ICD-10-CM

## 2022-11-28 DIAGNOSIS — Z98.890 POST-OPERATIVE STATE: ICD-10-CM

## 2022-11-28 DIAGNOSIS — Z98.890 H/O OF HEMILAMINECTOMY: Primary | ICD-10-CM

## 2022-11-28 NOTE — PROGRESS NOTES
Daily Note     Today's date: 2022  Patient name: Julisa Downey  : 1955  MRN: [id number]  Referring provider: Solitario Arora  Dx:   Encounter Diagnosis     ICD-10-CM    1  H/O of hemilaminectomy  Z98 890       2  Post-operative state  Z98 890       3  Status post hemilaminotomy  Z98 890                      Subjective: Reports feeling okay, getting his feet under him  Objective: See treatment diary below      Assessment: Improved posterior tib activation with PF seated  Continues to have fair control of balance with TBand YTIs  No major discomfort with performance of trampoline hopping/marching  Trial of floor transfers from half kneel position- limited ability to perform 1/2 kneel to stand with R LE forward  Fair ability to pull a 20 lbs MB up overhead and from ground with continued posterior balance LOB requiring stepping strategy  Plan: Continue per plan of care  Precautions: Prior fusion; balance/falls risk  EPOC: 10/3/22  HEP: seated HR    Manuals 11/23 11/28 10/19 10/31 11/2 11/7 11/11 11/14 11/16 11/21   LE PROM PF            Re-assess                                       Neuro Re-Ed             NSP             NSP march             NSP heel slide             NMES with PF                          Hernandez testing             Bosu Balance             SLS 3x15" ea 20"x5 ea  2x10 star  Cone taps  x10 ea 2x10  2x10" 5"x10   Tandem stance         3x10" Airex  5"x10ea   Heel walking toe walking  //bars Side stepping/cariorca 3x20ft      Side step and carioca 3x20 ft Side step and carioca 3x20 ft     Hurdles fwd/lat  5 laps with 2 hurdles 5 laps ea  5 laps 5 laps ea     3 laps ea 3 laps 3 laps   Fwd & cross reaching 3 way  15# KB 10x ea 20# KB 20x 20 KB 20x  20#  x20       Step strategies with forces     5 min   5 min 5 min  5 min 5 min obstalces  5 min   Ther Ex             Bridge             Iso ADD/ABD             SLR and H ABD             Clamshells Iso PF with strap             Seated PF  Tband with INV bias 20x TBand with INV bias 30x2 ea 20# KB 50x ea          Toe walk and retro walk       5 laps 5 laps 5 laps 5 laps   Walking marches in //bars 5 laps 5 laps  5 laps 5 laps  5 laps 5 laps 5 laps 5 laps   TBand YTIs GTB 2x10 GTB 2x10 GTB 2x10 ea      GTB 2x10 GTB 2x10   Straight leg RDL   25# 30x 25# 20x   65# 10x2 65# 10x2     BAPS board PF STD             Spring reformer PF Trampoline 5 min  Tramploline 5 min            Bosu HR              Step lunge             Lat step down 6" 2x10 ea            Supine leg press HR 55# 2x10  205# 3x10  205#  3x10 205#  x20  305#  x20 Reformer 30x  Reformer 30x  NV    Ther Activity     11/2        TM 10 min  10 min 10 min  10 min  10 min 10 min 10 min  10 min 10 min  10 min   Stair climbing   2 flights with 20# KB 1 rail assist 2 flights 10# KB  2 flights  10# KB 3 flights  10# KB 3 flights 15#KB 3 flight  15# KB 3 flights no KB 3 flights no KB   Sled push  Pull 5 laps 25ft  5 laps 150#  5 laps  150# 5 laps  150# 5 laps 150#      Floor transfer from 1/2 kneel  10x ea           U band - pull/hold             Gait Training                                       Modalities

## 2022-11-30 ENCOUNTER — EVALUATION (OUTPATIENT)
Dept: PHYSICAL THERAPY | Facility: CLINIC | Age: 67
End: 2022-11-30

## 2022-11-30 DIAGNOSIS — Z98.890 H/O OF HEMILAMINECTOMY: Primary | ICD-10-CM

## 2022-11-30 DIAGNOSIS — Z98.890 STATUS POST HEMILAMINOTOMY: ICD-10-CM

## 2022-11-30 DIAGNOSIS — Z98.890 POST-OPERATIVE STATE: ICD-10-CM

## 2022-11-30 NOTE — PROGRESS NOTES
PT Re-Evaluation     Today's date: 2022  Patient name: Rom Fierro  : 1955  MRN: [id number]  Referring provider: Sima Bell  Dx:   Encounter Diagnosis     ICD-10-CM    1  H/O of hemilaminectomy  Z98 890       2  Post-operative state  Z98 890       3  Status post hemilaminotomy  Z98 890                      Assessment  Assessment details: Rom Fierro is a 69 y o  male who presents with increased low back pain S/P Lumbar hemilaminectomy consistent with referring diagnosis of H/O of hemilaminectomy  (primary encounter diagnosis) that is complex secondary to chronic and insidious onset, moderate fear avoidance and moderate pain levels  Clinically demonstrates decreased lumbar ROM, decreased core and hip strength, decreased LE proprioception leading to pain with ADLs and exercise  This suggests the need for skilled OPPT to address the above listed impairments, achieve established goals and return to PLOF pain-free  If you have any questions or concerns please contact me at [phone]  Thank you! Re-assessment 22:  Jenny Ledesma has attended 11 visits since the initiation of OPPT and reports a 75% GROC  Clinically demonstrates improved lumbar ROM and core strength, improved hip control as well as improved balance  Jenny Ledesma continues to have limited S1 myotomal strength limiting his balance and endurance with performance of ADLs/exercise and activity around his home  This suggests the need for continued skilled OPPT to address his remaining impairments, achieve established goals and return to PLOF pain-free  Re-assessment 10/12/22:  Jenny Ledesma has attended 21 visits since the initiation of OPPT and reports a 80% GROC    Clinically demonstrates proper lumbar ROM into all planes as well as good core strength; good hip strength but continued S1 myotomal weakness leading to continued difficulty with any anterior translation or anterior wt shifting leading to knee collapse and hip strategies for compensation  Stefanie Humphreys continues to have shown progress with lateral step down performance no longer needing UE support for performance but still with instability in ML planes  Stefanie Humphreys is also at risk for falls based on his HECTOR score of 38/56 showing he is still a high risk for falls  This suggests the need for continued skilled OPPT to address his remaining impairments, achieve established goals and return to PLOF pain-free  Re-assessment 11/30/22:  Stefanie Humphreys has attended 33 visits since the initiation of OPPT and reports 90% GROC  Clinically demonstrates normal lumbar ROM, good hip strength and improved balance and proprioception  Stefanie Humphreys feels he will be able to continue independently and will transition to a gym routine  Overall is happy with progress and will subsequently be D/C'ed from PT  Impairments: abnormal coordination, abnormal gait, abnormal muscle firing, abnormal or restricted ROM, abnormal movement, activity intolerance, impaired balance, impaired physical strength, lacks appropriate home exercise program, pain with function, safety issue, poor posture  and poor body mechanics    Symptom irritability: moderateUnderstanding of Dx/Px/POC: good   Prognosis: good    Goals  Short Term Goals (4 weeks)  1 ) Establish independence with HEP- MET  2 ) Decrease subjective pain levels from NPRS at least to 2-5/10 at rest and with activity- met  3 ) Improve lumbar ROM at least 5-10 degrees into all planes to allow for improved ease of movement with less guarding- met    Long Term Goals (8 weeks)  1 ) Improve lumbar ROM to WNL in all planes to restore normal movement with ADLs and function- met  2 ) Improve hip ABD, ER strength to 5/5 in all planes in order to return to pain-free ADLs and function-  met  3 ) Improve FOTO score at least to 75 points showing improved self reported disability - not met  4 ) Improve SLS to > 5 seconds B/L limiting falls risk    -  met  5 ) Improve HECTOR score to > 46/56 showing moderate falls risk - MET    Plan  Plan details: D/C  Patient would benefit from: PT eval and skilled physical therapy  Planned modality interventions: biofeedback, cryotherapy, electrical stimulation/Russian stimulation and TENS  Planned therapy interventions: abdominal trunk stabilization, activity modification, ADL retraining, ADL training, balance, behavior modification, coordination, dry needling, flexibility, functional ROM exercises, gait training, graded activity, graded exercise, graded motor, home exercise program, therapeutic training, therapeutic exercise, therapeutic activities, stretching, strengthening, sensory integrative techniques, self care, patient education, postural training, neuromuscular re-education, IADL retraining, joint mobilization, manual therapy and massage  Frequency: 2x week  Duration in visits: 16  Duration in weeks: 8  Plan of Care beginning date: 10/12/2022  Plan of Care expiration date: 12/14/2022  Treatment plan discussed with: patient        Subjective Evaluation    History of Present Illness  Date of onset: 5/1/2022  Date of surgery: 7/8/2022  Mechanism of injury: Sameer Tran is a 69 y o  male who presents with increased LBP S/P hemilamectomy starting ~ 3 weeks ago  Had a long complicated history of balance deficits related to cervical myelopathy  Notes after having 6 months of OPPT rehab for C/S still had deficits with balance  Decided to seek out MD consult where MRI and X-rays were (-) for fx but + for L4-L5 HNP and stenosis until referral for hemilaminectomy was scheduled  At current status using SPC and not using a clamshell brace  At F/U script for OPPT provided  Denies night pain- using a CPAP for or changes in bowel or bladder function  Reports continued LE NT numbness and peripheral weakness signs or sxs  F/U with MD in 2-3 weeks    Wishes to return to PLOF pain-free- improve strength and return to normal exercise, improve balance and normalize his ability to exercise and walk  Re-assessment 22:  Jenny Ledesma has attended 11 visits since the initiation of OPPT and reports a 75% GROC  Reports compliance with HEP; notes improved ability to go and down stairs- still needs to use a rail  Able to 7-8 steps without use of a railing at times  Notes getting improved power at times  Wishes to continue to work on balance and strength of his feet  F/U with MD in October  Still takes his [facility] with him at times  Notes still having a little back stiffness- less and less pain overall  Re-assessment 10/12/22:  Jenny Ledesma has attended 21 visits since the initiation of OPPT and reports a 80% GROC  Reports improved ability to walk without a SPC for prolonged periods up to 1/2 mile  Reports improved strength effectively in his LEs  Notes less numbness or tired sensations in his LEs  Denies any back stiffness  Reports being able to walk up 12 steps without a railing  Still reports having difficulty with pushing up on toes  F/U with MD in 2 weeks  Wishes to continue with OPPT to work on remaining strength until his transition to the gym  Re-assessment 22:  Jenny Ledesma has attended 33 visits since the initiation of OPPT and reports 90% GROC  Reports still having tightness of calves  Reports no pain or difficulty regarding his low back  Reports having slight difficulty with sleeping but he feels he has difficulty with his mattress  Is ready to transition to the gym with [employer]  Able to shop and perform chores without difficulty  Feels at this point he will be independent and will be D/C;ed from PT              Not a recurrent problem   Quality of life: good    Pain  Current pain ratin  At best pain ratin  At worst pain ratin  Location: L/S and LEs   Quality: burning and throbbing  Relieving factors: relaxation, medications, rest and support  Aggravating factors: stair climbing, standing and walking  Progression: improved      Diagnostic Tests  X-ray: abnormal  MRI studies: abnormal  Treatments  Previous treatment: physical therapy  Current treatment: immobilization and physical therapy  Patient Goals  Patient goals for therapy: decreased edema, decreased pain, improved balance, increased motion, increased strength, independence with ADLs/IADLs and return to sport/leisure activities  Patient goal: Get stabilized         Objective     Active Range of Motion     Lumbar   Flexion: 120 degrees   Extension: 32 degrees   Left lateral flexion: 45 degrees       Right lateral flexion: 50 degrees   Left rotation: 80 degrees   Right rotation: 87 degrees   Left Hip   External rotation (90/90): 40 degrees   Internal rotation (90/90): 35 degrees     Right Hip   External rotation (90/90): 40 degrees   Internal rotation (90/90): 30 degrees     Strength/Myotome Testing     Left Hip   Planes of Motion   Flexion: 5  Extension: 4+  Abduction: 5  Adduction: 5  External rotation: 5  Internal rotation: 5    Right Hip   Planes of Motion   Flexion: 5  Extension: 5  Abduction: 4+  Adduction: 5  External rotation: 5  Internal rotation: 5    Left Knee   Flexion: 5  Extension: 5    Right Knee   Flexion: 5  Extension: 5    Left Ankle/Foot   Dorsiflexion: 5  Plantar flexion: 4+    Right Ankle/Foot   Dorsiflexion: 5  Plantar flexion: 4-    Tests     Lumbar     Left   Negative crossed SLR, quadrant and slump test      Right   Negative crossed SLR, quadrant and slump test      Left Pelvic Girdle/Sacrum   Negative: active SLR test      Right Pelvic Girdle/Sacrum   Negative: active SLR test      Functional Assessment      Squat    Left within functional limits and right within functional limits       Single Leg Stance   Left: 8 seconds  Right: 6 seconds  Neuro Exam:     Functional outcomes   5x sit to stand: 12 1 (seconds)  TU 25 (seconds)             Precautions: Prior fusion; balance/falls risk  EPOC: 10/3/22  HEP: seated HR    Manuals 11/23 11/28 11/30 10/31 11/2 11/7 11/11 11/14 11/16 11/21   LE PROM PF Re-assess   PF                                    Neuro Re-Ed     11/2        NSP             NSP march             NSP heel slide             NMES with PF                          Hernandez testing             Bosu Balance             SLS 3x15" ea 20"x5 ea  2x10 star  Cone taps  x10 ea 2x10  2x10" 5"x10   Tandem stance         3x10" Airex  5"x10ea   Heel walking toe walking  //bars Side stepping/cariorca 3x20ft      Side step and carioca 3x20 ft Side step and carioca 3x20 ft     Hurdles fwd/lat  5 laps with 2 hurdles 5 laps ea  5 laps 5 laps ea     3 laps ea 3 laps 3 laps   Fwd & cross reaching 3 way  15# KB 10x ea 20# KB 20x 20 KB 20x  20#  x20       Step strategies with forces     5 min   5 min 5 min  5 min 5 min obstalces  5 min   Ther Ex     11/2        Bridge             Iso ADD/ABD             SLR and H ABD             Clamshells             Iso PF with strap             Seated PF  Tband with INV bias 20x TBand with INV bias 30x2 ea 20# KB 50x ea          Toe walk and retro walk       5 laps 5 laps 5 laps 5 laps   Walking marches in //bars 5 laps 5 laps  5 laps 5 laps  5 laps 5 laps 5 laps 5 laps   TBand YTIs GTB 2x10 GTB 2x10 GTB 2x10 ea      GTB 2x10 GTB 2x10   Straight leg RDL   25# 30x 25# 20x   65# 10x2 65# 10x2     BAPS board PF STD             Spring reformer PF Trampoline 5 min  Tramploline 5 min            Bosu HR              Step lunge             Lat step down 6" 2x10 ea            Supine leg press HR 55# 2x10  205# 3x10  205#  3x10 205#  x20  305#  x20 Reformer 30x  Reformer 30x  NV    Ther Activity     11/2        TM 10 min  10 min 10 min  10 min  10 min 10 min 10 min  10 min 10 min  10 min   Stair climbing   2 flights with 20# KB 1 rail assist 2 flights 10# KB  2 flights  10# KB 3 flights  10# KB 3 flights 15#KB 3 flight  15# KB 3 flights no KB 3 flights no KB   Sled push  Pull 5 laps 25ft  5 laps 150#  5 laps  150# 5 laps  150# 5 laps 150#      Floor transfer from 1/2 kneel  10x ea U band - pull/hold             Gait Training                                       Modalities

## 2022-12-05 ENCOUNTER — TELEPHONE (OUTPATIENT)
Dept: OTHER | Facility: OTHER | Age: 67
End: 2022-12-05

## 2022-12-05 NOTE — TELEPHONE ENCOUNTER
Patient has EGD scheduled for tomorrow and stated he was visiting his father in the hospital on Saturday for about 2-3 hours and found out the following day that his father tested positive for Covid  Patient wanted to know if this will affect his appointment at all for tomorrow since he was exposed  Please call patient back to discuss

## 2022-12-05 NOTE — TELEPHONE ENCOUNTER
Called pt back, he was exposed Saturday to Covid  Notified him he would have to reschedule and transferred him to scheduling  He is aware if tests positive will have to wait 4 weeks after recovery to reschedule  Jacqueline Scherer in [place] notified was cancelled

## 2022-12-06 DIAGNOSIS — I85.00 ESOPHAGEAL VARICES DETERMINED BY ENDOSCOPY (HCC): ICD-10-CM

## 2022-12-06 RX ORDER — NADOLOL 40 MG/1
40 TABLET ORAL DAILY
Qty: 90 TABLET | Refills: 3 | Status: SHIPPED | OUTPATIENT
Start: 2022-12-06

## 2022-12-29 ENCOUNTER — HOSPITAL ENCOUNTER (OUTPATIENT)
Dept: GASTROENTEROLOGY | Facility: AMBULATORY SURGERY CENTER | Age: 67
Discharge: HOME | End: 2022-12-29

## 2022-12-29 ENCOUNTER — ANESTHESIA EVENT (OUTPATIENT)
Dept: GASTROENTEROLOGY | Facility: AMBULATORY SURGERY CENTER | Age: 67
End: 2022-12-29

## 2022-12-29 ENCOUNTER — ANESTHESIA (OUTPATIENT)
Dept: GASTROENTEROLOGY | Facility: AMBULATORY SURGERY CENTER | Age: 67
End: 2022-12-29

## 2022-12-29 VITALS
OXYGEN SATURATION: 99 % | HEIGHT: 73 IN | WEIGHT: 272 LBS | SYSTOLIC BLOOD PRESSURE: 133 MMHG | HEART RATE: 68 BPM | TEMPERATURE: 99.6 F | BODY MASS INDEX: 36.05 KG/M2 | RESPIRATION RATE: 16 BRPM | DIASTOLIC BLOOD PRESSURE: 74 MMHG

## 2022-12-29 DIAGNOSIS — K22.10 EROSIVE ESOPHAGITIS: Primary | ICD-10-CM

## 2022-12-29 DIAGNOSIS — K74.69 OTHER CIRRHOSIS OF LIVER (HCC): ICD-10-CM

## 2022-12-29 DIAGNOSIS — I85.00 ESOPHAGEAL VARICES DETERMINED BY ENDOSCOPY (HCC): ICD-10-CM

## 2022-12-29 RX ORDER — OMEPRAZOLE 20 MG/1
20 CAPSULE, DELAYED RELEASE ORAL DAILY
Qty: 90 CAPSULE | Refills: 3 | Status: SHIPPED | OUTPATIENT
Start: 2022-12-29

## 2022-12-29 RX ORDER — SODIUM CHLORIDE, SODIUM LACTATE, POTASSIUM CHLORIDE, CALCIUM CHLORIDE 600; 310; 30; 20 MG/100ML; MG/100ML; MG/100ML; MG/100ML
50 INJECTION, SOLUTION INTRAVENOUS CONTINUOUS
Status: DISCONTINUED | OUTPATIENT
Start: 2022-12-29 | End: 2023-01-02 | Stop reason: HOSPADM

## 2022-12-29 RX ORDER — PROPOFOL 10 MG/ML
INJECTION, EMULSION INTRAVENOUS AS NEEDED
Status: DISCONTINUED | OUTPATIENT
Start: 2022-12-29 | End: 2022-12-29

## 2022-12-29 RX ORDER — LIDOCAINE HYDROCHLORIDE 20 MG/ML
INJECTION, SOLUTION EPIDURAL; INFILTRATION; INTRACAUDAL; PERINEURAL AS NEEDED
Status: DISCONTINUED | OUTPATIENT
Start: 2022-12-29 | End: 2022-12-29

## 2022-12-29 RX ADMIN — PROPOFOL 150 MG: 10 INJECTION, EMULSION INTRAVENOUS at 09:09

## 2022-12-29 RX ADMIN — LIDOCAINE HYDROCHLORIDE 100 MG: 20 INJECTION, SOLUTION EPIDURAL; INFILTRATION; INTRACAUDAL; PERINEURAL at 09:09

## 2022-12-29 RX ADMIN — SODIUM CHLORIDE, SODIUM LACTATE, POTASSIUM CHLORIDE, CALCIUM CHLORIDE: 600; 310; 30; 20 INJECTION, SOLUTION INTRAVENOUS at 09:09

## 2022-12-29 RX ADMIN — SODIUM CHLORIDE, SODIUM LACTATE, POTASSIUM CHLORIDE, CALCIUM CHLORIDE 50 ML/HR: 600; 310; 30; 20 INJECTION, SOLUTION INTRAVENOUS at 08:53

## 2022-12-29 NOTE — H&P
History and Physical - [facility]  Matt Haynes 69 y o  male MRN: [id number]    HPI: Matt Haynes is a 69y o  year old male who presents for surveillance of esophageal varices    REVIEW OF SYSTEMS: Per the HPI, and otherwise unremarkable      Historical Information   Past Medical History:   Diagnosis Date   • Abscess of back 03/01/2018   • Acquired pancytopenia ([facility] [address] ) 01/16/2017   • Acquired thrombocytopenia ([facility] [address] ) 01/16/2017   • Benign essential hypertension     Last Assessed:12/19/16; Pt reports heart and BP has been "fine" as of 4/16/2020   • Cirrhosis (HCC)    • Colon polyp    • CPAP (continuous positive airway pressure) dependence    • Cyst of skin     x6 from neck down back area   • Depression    • Esophageal varices (HCC)    • GERD (gastroesophageal reflux disease)    • Liver disease    • Macrocytosis     Last Assessed:1/16/17   • Major depression, chronic     Last Assessed:12/21/17   • Major depression, chronic 06/19/2017   • Myelopathy ([facility] [address] ) 4/27/2021   • Personal history of colonic polyps    • Sleep apnea    • SOB (shortness of breath) on exertion      Past Surgical History:   Procedure Laterality Date   • CHOLECYSTECTOMY  11/2018   • CHOLECYSTECTOMY LAPAROSCOPIC N/A 11/21/2018    Procedure: CHOLECYSTECTOMY LAPAROSCOPIC, wedge liver biopsy;  Surgeon: Jesse Desir MD;  Location: [facility];  Service: General   • COLONOSCOPY  05/2011   • COLONOSCOPY  05/2016   • COLONOSCOPY     • EGD  01/2019    Esophagitis, esophageal varices   • GALLBLADDER SURGERY     • INCISION AND DRAINAGE OF WOUND N/A 03/01/2018    SEBACEOUS CYST ABSCESS OF BACK-VIJAYA MONZON MD   • HI ARTHRD PST/PSTLAT TQ 1NTRSPC CRV BELW C2 SEGMENT N/A 5/24/2021    Procedure: Posterior cervical decompressive laminectomy and lateral mass fixation fusion C3-5;  Surgeon: Cinthia Tao MD;  Location: [facility];  Service: Neurosurgery   • HI EXC B9 LESION MRGN XCP SK TG T/A/L 1 1-2 0 CM N/A 8/22/2018    Procedure: EXCISION BIOPSY LESION/MASS BACK X4 NECK X1;  Surgeon: Judy Samuels MD;  Location: [facility];  Service: General   • NM VO FACETECTOMY & FORAMOTOMY 1 VRT SGM LUMBAR Left 7/8/2022    Procedure: L3-4, L4-5, and L5-S1 metrx decompressive hemilaminectomy with bilateral foraminotomy and discectomy;  Surgeon: Vlad Cheng MD;  Location: [facility];  Service: Neurosurgery     Social History   Social History     Substance and Sexual Activity   Alcohol Use Not Currently    Comment: 7/19/19-last drink 12/2018- Occasionally/1-2 drinks per weekend     Social History     Substance and Sexual Activity   Drug Use No     Social History     Tobacco Use   Smoking Status Never   Smokeless Tobacco Never     Family History   Problem Relation Age of Onset   • Alzheimer's disease Mother    • Valvular heart disease Father    • Stroke Brother         Mini   • Valvular heart disease Brother    • Colon cancer Neg Hx    • Colon polyps Neg Hx        Meds/Allergies       Current Outpatient Medications:   •  b complex vitamins tablet  •  buPROPion (WELLBUTRIN XL) 150 mg 24 hr tablet  •  hydrochlorothiazide (HYDRODIURIL) 25 mg tablet  •  multivitamin (THERAGRAN) TABS  •  nadolol (CORGARD) 40 mg tablet    Current Facility-Administered Medications:   •  lactated ringers infusion, 50 mL/hr, Intravenous, Continuous, 50 mL/hr at 12/29/22 0853    No Known Allergies    Objective     /74   Pulse 76   Temp 99 6 °F (37 6 °C) (Temporal)   Resp 20   Ht 6' 1" (1 854 m)   Wt 123 kg (272 lb)   SpO2 97%   BMI 35 89 kg/m²     PHYSICAL EXAM    Gen: NAD AAOx3  Head: Normocephalic, Atraumatic  CV: S1S2 RRR no m/r/g  CHEST: Clear b/l no c/r/w  ABD: soft, +BS NT/ND  EXT: no edema    ASSESSMENT/PLAN:  This is a 69y o  year old male here for EGD, and he is stable and optimized for his procedure

## 2022-12-29 NOTE — ANESTHESIA PREPROCEDURE EVALUATION
Procedure:  EGD    Relevant Problems   ANESTHESIA (within normal limits)      CARDIO   (+) Esophageal varices determined by endoscopy (HCC) (no hx bleeding)   (+) Hypercholesterolemia      GI/HEPATIC   (+) GERD (gastroesophageal reflux disease)   (+) Other cirrhosis of liver (HCC)      HEMATOLOGY   (+) Thrombocytopenia (HCC)      MUSCULOSKELETAL   (+) DDD (degenerative disc disease), lumbar   (+) Neurogenic claudication (HCC)      NEURO/PSYCH   (+) Chronic pain syndrome   (+) Major depression, chronic   (+) Neurogenic claudication (HCC)      PULMONARY   (+) Sleep apnea (intolerant of CPAP)   (-) Smoking   (-) URI (upper respiratory infection)      Other   (+) CPAP (continuous positive airway pressure) dependence   (+) S/P cervical spinal fusion    BMI 35    Physical Exam    Airway  Comment: Only mildly limited extension s/p fusion  Mallampati score: I  TM Distance: >3 FB  Neck ROM: limited     Dental   No notable dental hx     Cardiovascular      Pulmonary      Other Findings       Lab Results   Component Value Date    WBC 4 34 09/02/2022    HGB 15 1 09/02/2022    PLT 99 (L) 09/02/2022     Lab Results   Component Value Date    SODIUM 141 09/02/2022    K 4 2 09/02/2022    BUN 20 09/02/2022    CREATININE 1 05 09/02/2022    EGFR 72 09/02/2022    GLUCOSE 125 05/24/2021     Lab Results   Component Value Date    HGBA1C 5 5 06/20/2022         Anesthesia Plan  ASA Score- 3     Anesthesia Type- IV sedation with anesthesia with ASA Monitors  Additional Monitors:   Airway Plan:           Plan Factors-Exercise tolerance (METS): >4 METS  Chart reviewed  Existing labs reviewed  Patient summary reviewed  Patient is not a current smoker  Induction- intravenous  Postoperative Plan-     Informed Consent- Anesthetic plan and risks discussed with patient  I personally reviewed this patient with the CRNA  Discussed and agreed on the Anesthesia Plan with the CRNA  Megan Solorio

## 2022-12-29 NOTE — ANESTHESIA POSTPROCEDURE EVALUATION
Post-Op Assessment Note    CV Status:  Stable  Pain Score: 0    Pain management: adequate     Mental Status:  Arousable and sleepy   Hydration Status:  Stable   PONV Controlled:  Controlled   Airway Patency:  Patent      Post Op Vitals Reviewed: Yes      Staff: CRNA         No notable events documented      BP   112/66   Temp 97 8   Pulse 78   Resp 14   SpO2 99

## 2023-01-20 NOTE — PROGRESS NOTES
Assessment and Plan:     Problem List Items Addressed This Visit        Digestive    Esophageal varices determined by endoscopy ([facility] [address] )     Clinically stable on daily nadolol  Due to see GI next month  LFTs updated and to be reviewed             Respiratory    Sleep apnea     Adjusting to c-pap as rx'd by sleep medicine  Maintain f/u as planned            Musculoskeletal and Integument    DDD (degenerative disc disease), lumbar     S/P hemilaminectomy  Maintain PT and surgical follow up as planned            Other    Hypercholesterolemia     Acceptable FLP managing w/lifestyle  Continue to maximize lifestyle efforts w/diet, exercise, and weight loss  Plan to recheck next in 1 year         Major depression, chronic     Mood stable on daily bupropion  Continue same dose as he desires  Return in 6 months for follow up         BMI 36 0-36 9,adult     BMI has increased slightly  Continue weight loss efforts w/healthy diet and exercise as tolerated           Other Visit Diagnoses     Encounter for subsequent annual wellness visit (AWV) in Medicare patient    -  Primary    AWV updated, next due in 1 year        BMI Counseling: Body mass index is 36 33 kg/m²  The BMI is above normal  Nutrition recommendations include encouraging healthy choices of fruits and vegetables, consuming healthier snacks, limiting drinks that contain sugar, moderation in carbohydrate intake and reducing intake of cholesterol  Exercise recommendations include exercising 3-5 times per week  No pharmacotherapy was ordered  Rationale for BMI follow-up plan is due to patient being overweight or obese  Preventive health issues were discussed with patient, and age appropriate screening tests were ordered as noted in patient's After Visit Summary  Personalized health advice and appropriate referrals for health education or preventive services given if needed, as noted in patient's After Visit Summary       History of Present Illness:     Patient presents for a Medicare Wellness Visit    Here for AWV and review of blood work  States he has been well recovering from lumbar surgery in July Continues w/PT 2x/week  Continues to feel tired and is working w/sleep medicine adjusting to c-pap  Patient Care Team:  FADUMO Lemus as PCP - General (Family Medicine)  MD Hugo Curtis MD     Review of Systems:     Review of Systems   Constitutional: Positive for fatigue  Respiratory: Negative  Cardiovascular: Negative  Musculoskeletal: Positive for back pain (recovering from lumbar surgery in early July - PT 2x/week)  Psychiatric/Behavioral: Positive for sleep disturbance (working w/sleep specialist to arrange c-pap)  Negative for dysphoric mood          Problem List:     Patient Active Problem List   Diagnosis    DDD (degenerative disc disease), lumbar    Hypercholesterolemia    Major depression, chronic    Other cirrhosis of liver (HCC)    Enlarged prostate    Esophageal varices determined by endoscopy ([facility] [address] )    Colon cancer screening    Personal history of colonic polyps    Bilateral lower extremity edema    Lumbar spinal stenosis    Lumbar radiculopathy    Chronic pain syndrome    Unspecified abnormalities of gait and mobility    Cervical spinal stenosis    Sleep apnea    BMI 36 0-36 9,adult    Encounter for postoperative care related to surgical joint fusion    Neurogenic claudication [facility])      Past Medical and Surgical History:     Past Medical History:   Diagnosis Date    Abscess of back 03/01/2018    Acquired pancytopenia ([address] ) 01/16/2017    Acquired thrombocytopenia ([facility] [address] ) 01/16/2017    Benign essential hypertension     Last Assessed:12/19/16; Pt reports heart and BP has been "fine" as of 4/16/2020    Cirrhosis (HCC)     Colon polyp     CPAP (continuous positive airway pressure) dependence     Cyst of skin     x6 from neck down back area    Depression     Esophageal varices (HCC)     GERD (gastroesophageal reflux disease)     Liver disease     Macrocytosis     Last Assessed:1/16/17    Major depression, chronic     Last Assessed:12/21/17    Major depression, chronic 06/19/2017    Myelopathy ([address] ) 4/27/2021    Personal history of colonic polyps     Sleep apnea     SOB (shortness of breath) on exertion      Past Surgical History:   Procedure Laterality Date    CHOLECYSTECTOMY  11/2018    CHOLECYSTECTOMY LAPAROSCOPIC N/A 11/21/2018    Procedure: CHOLECYSTECTOMY LAPAROSCOPIC, wedge liver biopsy;  Surgeon: Gideon Rizo MD;  [facility];  Service: General    COLONOSCOPY  05/2011    COLONOSCOPY  05/2016    COLONOSCOPY      EGD  01/2019    Esophagitis, esophageal varices    GALLBLADDER SURGERY      INCISION AND DRAINAGE OF WOUND N/A 03/01/2018    SEBACEOUS CYST ABSCESS OF BACK-VIJAYA MONZON MD    OH ARTHRODESIS POSTERIOR/POSTERIORLATERAL CERVICAL BELOW C2 N/A 5/24/2021    Procedure: Posterior cervical decompressive laminectomy and lateral mass fixation fusion C3-5;  Surgeon: Jia Irving MD;  [facility];  Service: Neurosurgery    OH EXC SKIN BENIG 1 1-2 CM TRUNK,ARM,LEG N/A 8/22/2018    Procedure: EXCISION  BIOPSY LESION/MASS BACK X4 NECK X1;  Surgeon: Gideon Rizo MD;  [facility];  Service: General    OH LAMINEC/FACETECT/FORAMIN,LUMBAR 1 SEG Left 7/8/2022    Procedure: L3-4, L4-5, and L5-S1 metrx decompressive hemilaminectomy with bilateral foraminotomy and discectomy;  Surgeon: Natan Fontanez MD;  [facility];  Service: Neurosurgery      Family History:     Family History   Problem Relation Age of Onset    Alzheimer's disease Mother     Valvular heart disease Father     Stroke Brother         Mini    Valvular heart disease Brother     Colon cancer Neg Hx     Colon polyps Neg Hx       Social History:     Social History     Socioeconomic History    Marital status: /Civil Union     Spouse name: None    Number of children: None    Years of education: None    Highest education level: None   Occupational History    None   Tobacco Use    Smoking status: Never Smoker    Smokeless tobacco: Never Used   Vaping Use    Vaping Use: Never used   Substance and Sexual Activity    Alcohol use: Not Currently     Comment: 7/19/19-last drink 12/2018- Occasionally/1-2 drinks per weekend    Drug use: No    Sexual activity: None   Other Topics Concern    None   Social History Narrative    Always uses seatbelt     Social Determinants of Health     Financial Resource Strain: Unknown    Difficulty of Paying Living Expenses: Patient refused   Food Insecurity: Not on file   Transportation Needs: No Transportation Needs    Lack of Transportation (Medical): No    Lack of Transportation (Non-Medical): No   Physical Activity: Not on file   Stress: Not on file   Social Connections: Not on file   Intimate Partner Violence: Not on file   Housing Stability: Not on file      Medications and Allergies:     Current Outpatient Medications   Medication Sig Dispense Refill    b complex vitamins tablet Take 1 tablet by mouth every morning      buPROPion (WELLBUTRIN XL) 150 mg 24 hr tablet Take 1 tablet (150 mg total) by mouth daily (Patient taking differently: Take 150 mg by mouth every morning) 90 tablet 1    hydrochlorothiazide (HYDRODIURIL) 25 mg tablet Take 1 tablet (25 mg total) by mouth daily 30 tablet 2    multivitamin (THERAGRAN) TABS Take 1 tablet by mouth every morning      nadolol (CORGARD) 40 mg tablet Take 1 tablet (40 mg total) by mouth daily (Patient taking differently: Take 40 mg by mouth every morning) 90 tablet 3     No current facility-administered medications for this visit       No Known Allergies   Immunizations:     Immunization History   Administered Date(s) Administered    COVID-19 PFIZER VACCINE 0 3 ML IM 03/17/2021, 04/08/2021, 12/19/2021    Hep A, adult 04/03/2019, 10/03/2019    Hep B, adult 04/03/2019, 05/02/2019, 10/03/2019    INFLUENZA 10/20/2014, 10/07/2015, 01/11/2018, 11/22/2018, 10/23/2019, 09/04/2020, 09/04/2020, 09/15/2021    Influenza Quadrivalent, 6-35 Months IM 01/11/2018    Influenza, high dose seasonal 0 7 mL 11/22/2018    Influenza, seasonal, injectable 10/20/2014, 10/07/2015    Pneumococcal Conjugate 13-Valent 11/22/2018, 07/24/2020, 07/24/2020    Pneumococcal Polysaccharide PPV23 08/09/2021    Td (adult), adsorbed 12/02/2011      Health Maintenance:         Topic Date Due    Colorectal Cancer Screening  10/10/2026    Hepatitis C Screening  Completed         Topic Date Due    COVID-19 Vaccine (4 - Booster for Well series) 04/19/2022    Influenza Vaccine (1) 09/01/2022      Medicare Screening Tests and Risk Assessments:     Francine Valentin is here for his Subsequent Wellness visit  Last Medicare Wellness visit information reviewed, patient interviewed and updates made to the record today  Health Risk Assessment:   Patient rates overall health as very good  Patient feels that their physical health rating is much better  Patient is very satisfied with their life  Eyesight was rated as slightly worse  Hearing was rated as same  Patient feels that their emotional and mental health rating is same  Patients states they are never, rarely angry  Patient states they are sometimes unusually tired/fatigued  Pain experienced in the last 7 days has been none  Patient states that he has experienced weight loss or gain in last 6 months  Fall Risk Screening: In the past year, patient has experienced: no history of falling in past year      Home Safety:  Patient does not have trouble with stairs inside or outside of their home  Patient has working smoke alarms and has working carbon monoxide detector  Home safety hazards include: none  Nutrition:   Current diet is Regular  Medications:   Patient is currently taking over-the-counter supplements   OTC medications include: see medication list  Patient is able to manage medications  Activities of Daily Living (ADLs)/Instrumental Activities of Daily Living (IADLs):   Walk and transfer into and out of bed and chair?: Yes  Dress and groom yourself?: Yes    Bathe or shower yourself?: Yes    Feed yourself? Yes  Do your laundry/housekeeping?: Yes  Manage your money, pay your bills and track your expenses?: Yes  Make your own meals?: Yes    Do your own shopping?: Yes    Previous Hospitalizations:   Any hospitalizations or ED visits within the last 12 months?: Yes    How many hospitalizations have you had in the last year?: 1-2    Hospitalization Comments: Patient had surgery     PREVENTIVE SCREENINGS      Cardiovascular Screening:    General: Screening Not Indicated and History Lipid Disorder      Diabetes Screening:     General: Screening Current      Colorectal Cancer Screening:     General: Screening Current      Prostate Cancer Screening:    General: Screening Current      Osteoporosis Screening:    General: Screening Current      Abdominal Aortic Aneurysm (AAA) Screening:    Risk factors include: age between 73-67 yo        Lung Cancer Screening:     General: Screening Not Indicated      Hepatitis C Screening:    General: Screening Current    Screening, Brief Intervention, and Referral to Treatment (SBIRT)    Screening      Single Item Drug Screening:  How often have you used an illegal drug (including marijuana) or a prescription medication for non-medical reasons in the past year? never    Single Item Drug Screen Score: 0  Interpretation: Negative screen for possible drug use disorder    Other Counseling Topics:   Regular weightbearing exercise  Visual Acuity Screening    Right eye Left eye Both eyes   Without correction:      With correction: 20/40 20/30 20/40        Physical Exam:     /72   Pulse 85   Temp 97 5 °F (36 4 °C)   Ht 6' 1" (1 854 m)   Wt 125 kg (275 lb 6 4 oz)   SpO2 98%   BMI 36 33 kg/m²     Physical Exam  Vitals reviewed  Constitutional:       General: He is not in acute distress  Appearance: Normal appearance  He is obese  HENT:      Head: Normocephalic  Eyes:      General: No scleral icterus  Neck:      Thyroid: No thyromegaly  Vascular: No carotid bruit  Cardiovascular:      Rate and Rhythm: Normal rate and regular rhythm  Heart sounds: No murmur heard  Pulmonary:      Effort: Pulmonary effort is normal  No respiratory distress  Breath sounds: Normal breath sounds  Musculoskeletal:      Cervical back: Normal range of motion  Right lower leg: No edema  Left lower leg: No edema  Lymphadenopathy:      Cervical: No cervical adenopathy  Skin:     General: Skin is warm and dry  Neurological:      General: No focal deficit present  Mental Status: He is alert and oriented to person, place, and time  Psychiatric:         Mood and Affect: Mood normal          Behavior: Behavior normal          Thought Content: Thought content normal          Judgment: Judgment normal           FADUMO Acosta       BMI Counseling: Body mass index is 36 33 kg/m²  The BMI is above normal  Nutrition recommendations include 3-5 servings of fruits/vegetables daily, reducing fast food intake, consuming healthier snacks, moderation in carbohydrate intake and reducing intake of cholesterol  Exercise recommendations include exercising 3-5 times per week  No

## 2023-03-17 DIAGNOSIS — F32.A DEPRESSION, UNSPECIFIED DEPRESSION TYPE: ICD-10-CM

## 2023-03-20 RX ORDER — BUPROPION HYDROCHLORIDE 150 MG/1
150 TABLET ORAL DAILY
Qty: 90 TABLET | Refills: 1 | Status: SHIPPED | OUTPATIENT
Start: 2023-03-20

## 2023-03-21 ENCOUNTER — OFFICE VISIT (OUTPATIENT)
Dept: FAMILY MEDICINE CLINIC | Facility: HOSPITAL | Age: 68
End: 2023-03-21

## 2023-03-21 VITALS
TEMPERATURE: 98.2 F | BODY MASS INDEX: 36.45 KG/M2 | HEART RATE: 69 BPM | HEIGHT: 73 IN | WEIGHT: 275 LBS | DIASTOLIC BLOOD PRESSURE: 76 MMHG | SYSTOLIC BLOOD PRESSURE: 146 MMHG

## 2023-03-21 DIAGNOSIS — E78.00 HYPERCHOLESTEROLEMIA: ICD-10-CM

## 2023-03-21 DIAGNOSIS — D69.6 ACQUIRED THROMBOCYTOPENIA (HCC): ICD-10-CM

## 2023-03-21 DIAGNOSIS — Z12.5 PROSTATE CANCER SCREENING: ICD-10-CM

## 2023-03-21 DIAGNOSIS — M51.36 DDD (DEGENERATIVE DISC DISEASE), LUMBAR: ICD-10-CM

## 2023-03-21 DIAGNOSIS — I85.00 ESOPHAGEAL VARICES DETERMINED BY ENDOSCOPY (HCC): ICD-10-CM

## 2023-03-21 DIAGNOSIS — F32.9 MAJOR DEPRESSION, CHRONIC: Primary | ICD-10-CM

## 2023-03-21 PROBLEM — R60.0 BILATERAL LOWER EXTREMITY EDEMA: Status: RESOLVED | Noted: 2020-08-07 | Resolved: 2023-03-21

## 2023-03-21 NOTE — ASSESSMENT & PLAN NOTE
Mood stable w/PHQ score of 2 on daily bupropion  Continue same dose - denies need for refill today  Return in 6 months for next follow up

## 2023-03-21 NOTE — ASSESSMENT & PLAN NOTE
Cohort risk elevated at 15 7% managing w/lifestyle  Update FLP before next appt in 6 months - will discuss recommendation to start statin pending results

## 2023-03-21 NOTE — PROGRESS NOTES
Name: Tona Palumbo      : 1955      MRN: [id number]  Encounter Provider: FADUMO Ramirez  Encounter Date: 3/21/2023   Encounter department: [address] [place] [address]  203     Assessment & Plan     1  Major depression, chronic  Assessment & Plan:  Mood stable w/PHQ score of 2 on daily bupropion  Continue same dose - denies need for refill today  Return in 6 months for next follow up      Orders:  -     CBC and differential; Future; Expected date: 2023  -     Comprehensive metabolic panel; Future; Expected date: 2023    2  BMI 36 0-36 9,adult  Assessment & Plan:  Continue weight loss efforts w/healthy diet and regular exercise      3  Esophageal varices determined by endoscopy [facility])  Assessment & Plan:  Clinically stable as managed by GI  Maintain f/u as planned    Orders:  -     Comprehensive metabolic panel; Future; Expected date: 2023    4  Acquired thrombocytopenia (HCC)  Comments:  CBC stable - will continue to monitor, recheck in 6 months before next OV    5  DDD (degenerative disc disease), lumbar  Assessment & Plan:  Clinically stable  Continue exercise as tolerated/recommended by       6  Hypercholesterolemia  Assessment & Plan:  Cohort risk elevated at 15 7% managing w/lifestyle  Update FLP before next appt in 6 months - will discuss recommendation to start statin pending results    Orders:  -     Comprehensive metabolic panel; Future; Expected date: 2023  -     TSH, 3rd generation with Free T4 reflex; Future; Expected date: 2023  -     Lipid panel; Future; Expected date: 2023    7  Prostate cancer screening  -     PSA, Total Screen; Future; Expected date: 2023    Update PSA w/next labs due in 6 months  Colonoscopy UTD      BMI Counseling: Body mass index is 36 28 kg/m²   The BMI is above normal  Nutrition recommendations include encouraging healthy choices of fruits and vegetables, decreasing fast food intake, consuming healthier snacks, moderation in carbohydrate intake and reducing intake of cholesterol  Exercise recommendations include exercising 3-5 times per week  No pharmacotherapy was ordered  Rationale for BMI follow-up plan is due to patient being overweight or obese  Subjective        States he has been well  Working with a  at [employer] regularly to regain strength recovering from his back issues  No longer doing PT  Recently started nadolol as rx'd by Dr Gerald Salas  Had EGD recently  Review of Systems   Constitutional: Negative  Respiratory: Negative  Cardiovascular: Negative  Psychiatric/Behavioral: Negative for dysphoric mood (pleased w/mood on bupropion)  The patient is not nervous/anxious  Current Outpatient Medications on File Prior to Visit   Medication Sig   • b complex vitamins tablet Take 1 tablet by mouth every morning   • buPROPion (WELLBUTRIN XL) 150 mg 24 hr tablet Take 1 tablet (150 mg total) by mouth daily   • multivitamin (THERAGRAN) TABS Take 1 tablet by mouth every morning   • nadolol (CORGARD) 40 mg tablet Take 1 tablet (40 mg total) by mouth daily   • omeprazole (PriLOSEC) 20 mg delayed release capsule Take 1 capsule (20 mg total) by mouth daily   • [DISCONTINUED] hydrochlorothiazide (HYDRODIURIL) 25 mg tablet Take 1 tablet (25 mg total) by mouth daily (Patient not taking: Reported on 3/21/2023)       Objective     /76   Pulse 69   Temp 98 2 °F (36 8 °C)   Ht 6' 1" (1 854 m)   Wt 125 kg (275 lb)   BMI 36 28 kg/m²       Physical Exam  Vitals reviewed  Constitutional:       General: He is not in acute distress  Appearance: Normal appearance  He is obese  HENT:      Head: Normocephalic  Eyes:      General: No scleral icterus  Neck:      Thyroid: No thyromegaly  Vascular: No carotid bruit  Cardiovascular:      Rate and Rhythm: Normal rate and regular rhythm  Heart sounds: No murmur heard    Pulmonary:      Effort: Pulmonary effort is normal  No respiratory distress  Breath sounds: Normal breath sounds  Musculoskeletal:      Cervical back: Normal range of motion  Right lower leg: No edema  Left lower leg: No edema  Lymphadenopathy:      Cervical: No cervical adenopathy  Skin:     General: Skin is warm and dry  Neurological:      General: No focal deficit present  Mental Status: He is alert and oriented to person, place, and time  Gait: Gait abnormal (steady w/cane)  Psychiatric:         Mood and Affect: Mood normal          Behavior: Behavior normal             SWATHI-7 Flowsheet Screening    Flowsheet Row Most Recent Value   Over the last 2 weeks, how often have you been bothered by any of the following problems? Feeling nervous, anxious, or on edge 0   Not being able to stop or control worrying 0   Worrying too much about different things 0   Trouble relaxing 0   Being so restless that it is hard to sit still 0   Becoming easily annoyed or irritable 0   Feeling afraid as if something awful might happen 0   SWATHI-7 Total Score 0          PHQ-2/9 Depression Screening    Little interest or pleasure in doing things: 0 - not at all  Feeling down, depressed, or hopeless: 0 - not at all  Trouble falling or staying asleep, or sleeping too much: 1 - several days  Feeling tired or having little energy: 1 - several days  Poor appetite or overeatin - not at all  Feeling bad about yourself - or that you are a failure or have let yourself or your family down: 0 - not at all  Trouble concentrating on things, such as reading the newspaper or watching television: 0 - not at all  Moving or speaking so slowly that other people could have noticed   Or the opposite - being so fidgety or restless that you have been moving around a lot more than usual: 0 - not at all  Thoughts that you would be better off dead, or of hurting yourself in some way: 0 - not at all  PHQ-9 Score: 2   PHQ-9 Interpretation: No or Minimal depression FADUMO Eastman       BMI Counseling: Body mass index is 36 28 kg/m²  The BMI is above normal  Nutrition recommendations include 3-5 servings of fruits/vegetables daily, reducing fast food intake, consuming healthier snacks, moderation in carbohydrate intake and reducing intake of cholesterol  Exercise recommendations include exercising 3-5 times per week

## 2023-03-21 NOTE — PATIENT INSTRUCTIONS
Obesity   AMBULATORY CARE:   Obesity  means your body mass index (BMI) is greater than 30  Your healthcare provider will use your age, height, and weight to measure your BMI  The risks of obesity include  many health problems, including injuries or physical disability  • Diabetes (high blood sugar level)    • High blood pressure or high cholesterol    • Heart disease    • Stroke    • Gallbladder or liver disease    • Cancer of the colon, breast, prostate, liver, or kidney    • Sleep apnea    • Arthritis or gout    Screening  is done to check for health conditions before you have signs or symptoms  If you are 28to 79years old, your blood sugar level may be checked every 3 years for signs of prediabetes or diabetes  Your healthcare provider will check your blood pressure at each visit  High blood pressure can lead to a stroke or other problems  Your provider may check for signs of heart disease, cancer, or other health problems  Seek care immediately if:   • You have a severe headache, confusion, or difficulty speaking  • You have weakness on one side of your body  • You have chest pain, sweating, or shortness of breath  Call your doctor if:   • You have symptoms of gallbladder or liver disease, such as pain in your upper abdomen  • You have knee or hip pain and discomfort while walking  • You have symptoms of diabetes, such as intense hunger and thirst, and frequent urination  • You have symptoms of sleep apnea, such as snoring or daytime sleepiness  • You have questions or concerns about your condition or care  Treatment for obesity  focuses on helping you lose weight to improve your health  Even a small decrease in BMI can reduce the risk for many health problems  Your healthcare provider will help you set a weight-loss goal   • Lifestyle changes  are the first step in treating obesity  These include making healthy food choices and getting regular physical activity   Your healthcare provider may suggest a weight-loss program that involves coaching, education, and therapy  • Medicine  may help you lose weight when it is used with a healthy foods and physical activity  • Surgery  can help you lose weight if you have obesity along with other health problems  Several types of weight-loss surgery are available  Ask your healthcare provider for more information  Tips for safe weight loss:   • Set small, realistic goals  An example of a small goal is to walk for 20 minutes 5 days a week  Anther goal is to lose 5% of your body weight  • Ask for support  Tell friends, family members, and coworkers about your goals  Ask someone to lose weight with you  You may also want to join a weight-loss support group  • Identify foods or triggers that may cause you to overeat  Remove tempting high-calorie foods from your home and workplace  Place a bowl of fresh fruit on your kitchen counter  If stress causes you to eat, find other ways to cope with stress  A counselor or therapist may be able to help you  • Track your daily calories and activity  Write down what you eat and drink  Also write down how many minutes of physical activity you do each day  • Track your weekly weight  Weigh yourself in the morning, before you eat or drink anything but after you use the bathroom  Use the same scale, in the same place, and in similar clothing each time  Only weigh yourself 1 to 2 times each week, or as directed  You may become discouraged if you weigh yourself every day  Eating changes: You will need to eat 500 to 1,000 fewer calories each day than you currently eat to lose 1 to 2 pounds a week  The following changes will help you cut calories:  • Eat smaller portions  Use small plates, no larger than 9 inches in diameter  Fill your plate half full of fruits and vegetables  Measure your food using measuring cups until you know what a serving size looks like           • Eat 3 meals and 1 or 2 snacks each day  Plan your meals in advance  [employer] and eat at home most of the time  Eat slowly  Do not skip meals  Skipping meals can lead to overeating later in the day  This can make it harder for you to lose weight  Talk with a dietitian to help you make a meal plan and schedule that is right for you  • Eat fruits and vegetables at every meal   They are low in calories and high in fiber, which makes you feel full  Do not add butter, margarine, or cream sauce to vegetables  Use herbs to season steamed vegetables  • Eat less fat and fewer fried foods  Eat more baked or grilled chicken and fish  These protein sources are lower in calories and fat than red meat  Limit fast food  Dress your salads with olive oil and vinegar instead of bottled dressing  • Limit the amount of sugar you eat  Do not drink sugary beverages  Limit alcohol  Activity changes:  Physical activity is good for your body in many ways  It helps you burn calories and build strong muscles  It decreases stress and depression, and improves your mood  It can also help you sleep better  Talk to your healthcare provider before you begin an exercise program   • Exercise for at least 30 minutes 5 days a week  Start slowly  Set aside time each day for physical activity that you enjoy and that is convenient for you  It is best to do both weight training and an activity that increases your heart rate, such as walking, bicycling, or swimming  • Find ways to be more active  Do yard work and housecleaning  Walk up the stairs instead of using elevators  Spend your leisure time going to events that require walking, such as outdoor festivals or fairs  This extra physical activity can help you lose weight and keep it off  Follow up with your doctor as directed: You may need to meet with a dietitian  Write down your questions so you remember to ask them during your visits    © Copyright [employer] 2022 Information is for End User's use only and may not be sold, redistributed or otherwise used for commercial purposes  The above information is an  only  It is not intended as medical advice for individual conditions or treatments  Talk to your doctor, nurse or pharmacist before following any medical regimen to see if it is safe and effective for you

## 2023-04-06 ENCOUNTER — OFFICE VISIT (OUTPATIENT)
Dept: SLEEP CENTER | Facility: CLINIC | Age: 68
End: 2023-04-06

## 2023-04-06 ENCOUNTER — TELEPHONE (OUTPATIENT)
Dept: SLEEP CENTER | Facility: CLINIC | Age: 68
End: 2023-04-06

## 2023-04-06 VITALS
DIASTOLIC BLOOD PRESSURE: 82 MMHG | BODY MASS INDEX: 36.05 KG/M2 | HEIGHT: 73 IN | SYSTOLIC BLOOD PRESSURE: 124 MMHG | WEIGHT: 272 LBS

## 2023-04-06 DIAGNOSIS — G47.33 OSA (OBSTRUCTIVE SLEEP APNEA): Primary | ICD-10-CM

## 2023-04-06 NOTE — PROGRESS NOTES
"  Progress Note - Sleep Center   Yamile Valencia EHW:[birth year] MRN: [id number]      Reason for Visit:    69 y o male with a history of mild obstructive sleep apnea and poor tolerance of his positive airway pressure device    Assessment:  The patient has difficulty exhaling and may benefit from BiPAP  He finds the pressure intolerable at therapeutic levels  Plan:  I will order BiPAP at 13/9 cm based on data from his titration  He is also working with Hele Massageer on his fullface mask  He is using an F30 size medium  Consider Inspire  Follow up:  3 months    History of Present Illness:  Loud snoring and excessive daytime sleepiness  The patient reports that he is feeling no better since starting CPAP           Historical Information    Past Medical History:   Diagnosis Date   • Abscess of back 03/01/2018   • Acquired pancytopenia ([facility] [address] ) 01/16/2017   • Acquired thrombocytopenia ([facility] [address] ) 01/16/2017   • Benign essential hypertension     Last Assessed:12/19/16; Pt reports heart and BP has been \"fine\" as of 4/16/2020   • Bilateral lower extremity edema 8/7/2020   • Cirrhosis (HCC)    • Colon polyp    • CPAP (continuous positive airway pressure) dependence    • Cyst of skin     x6 from neck down back area   • Depression    • Esophageal varices (HCC)    • GERD (gastroesophageal reflux disease)    • Liver disease    • Macrocytosis     Last Assessed:1/16/17   • Major depression, chronic     Last Assessed:12/21/17   • Major depression, chronic 06/19/2017   • Myelopathy ([facility] [address] ) 4/27/2021   • Personal history of colonic polyps    • Sleep apnea    • SOB (shortness of breath) on exertion          Past Surgical History:   Procedure Laterality Date   • CHOLECYSTECTOMY  11/2018   • CHOLECYSTECTOMY LAPAROSCOPIC N/A 11/21/2018    Procedure: CHOLECYSTECTOMY LAPAROSCOPIC, wedge liver biopsy;  Surgeon: Nicolas Peterson MD;  Location:  MAIN OR;  Service: General   • COLONOSCOPY  05/2011   • COLONOSCOPY  05/2016   • COLONOSCOPY     • EGD  " 01/2019    Esophagitis, esophageal varices   • GALLBLADDER SURGERY     • INCISION AND DRAINAGE OF WOUND N/A 03/01/2018    SEBACEOUS CYST ABSCESS OF BACK-VIJAYA MONZON MD   • PA ARTHRD PST/PSTLAT TQ 1NTRSPC CRV BELW C2 SEGMENT N/A 5/24/2021    Procedure: Posterior cervical decompressive laminectomy and lateral mass fixation fusion C3-5;  Surgeon: Андрей Calixto MD;  [facility];  Service: Neurosurgery   • PA EXC B9 LESION MRGN XCP SK TG T/A/L 1 1-2 0 CM N/A 8/22/2018    Procedure: EXCISION  BIOPSY LESION/MASS BACK X4 NECK X1;  Surgeon: Brian Hernandez MD;  [facility];  Service: General   • PA VO FACETECTOMY & FORAMOTOMY 1 VRT SGM LUMBAR Left 7/8/2022    Procedure: L3-4, L4-5, and L5-S1 metrx decompressive hemilaminectomy with bilateral foraminotomy and discectomy;  Surgeon: Bossman Pepe MD;  [facility];  Service: Neurosurgery         Social History     Socioeconomic History   • Marital status: /Civil Union     Spouse name: Not on file   • Number of children: Not on file   • Years of education: Not on file   • Highest education level: Not on file   Occupational History   • Not on file   Tobacco Use   • Smoking status: Never   • Smokeless tobacco: Never   Vaping Use   • Vaping Use: Never used   Substance and Sexual Activity   • Alcohol use: Not Currently     Comment: 7/19/19-last drink 12/2018- Occasionally/1-2 drinks per weekend   • Drug use: No   • Sexual activity: Not on file   Other Topics Concern   • Not on file   Social History Narrative    Always uses seatbelt     Social Determinants of Health     Financial Resource Strain: Unknown   • Difficulty of Paying Living Expenses: Patient refused   Food Insecurity: Not on file   Transportation Needs: No Transportation Needs   • Lack of Transportation (Medical): No   • Lack of Transportation (Non-Medical):  No   Physical Activity: Not on file   Stress: Not on file   Social Connections: Not on file   Intimate Partner "Violence: Not on file   Housing Stability: Not on file           History   Alcohol use: Not on file       History   Smoking Status   • Not on file   Smokeless Tobacco   • Not on file       Family History:   Family History   Problem Relation Age of Onset   • Alzheimer's disease Mother    • Valvular heart disease Father    • Stroke Brother         Mini   • Valvular heart disease Brother    • Colon cancer Neg Hx    • Colon polyps Neg Hx        Medications/Allergies:      Current Outpatient Medications:   •  b complex vitamins tablet, Take 1 tablet by mouth every morning, Disp: , Rfl:   •  buPROPion (WELLBUTRIN XL) 150 mg 24 hr tablet, Take 1 tablet (150 mg total) by mouth daily, Disp: 90 tablet, Rfl: 1  •  multivitamin (THERAGRAN) TABS, Take 1 tablet by mouth every morning, Disp: , Rfl:   •  nadolol (CORGARD) 40 mg tablet, Take 1 tablet (40 mg total) by mouth daily, Disp: 90 tablet, Rfl: 3  •  omeprazole (PriLOSEC) 20 mg delayed release capsule, Take 1 capsule (20 mg total) by mouth daily, Disp: 90 capsule, Rfl: 3      Objective    Vital Signs:   Vitals:    04/06/23 0847   BP: 124/82   Weight: 123 kg (272 lb)   Height: 6' 1\" (1 854 m)     Ault Sleepiness Scale: Total score: 5    Physical Exam:    General: Alert, appropriate, cooperative, overweight    Head: NC/AT    Skin: Warm, dry    Neuro: No motor abnormalities, cranial nerves appear intact    Psych: Normal affect            KELLY Floyd    Board Certified Sleep Specialist  "

## 2023-06-02 ENCOUNTER — OFFICE VISIT (OUTPATIENT)
Dept: GASTROENTEROLOGY | Facility: CLINIC | Age: 68
End: 2023-06-02

## 2023-06-02 VITALS
HEIGHT: 73 IN | BODY MASS INDEX: 36.55 KG/M2 | SYSTOLIC BLOOD PRESSURE: 117 MMHG | WEIGHT: 275.8 LBS | DIASTOLIC BLOOD PRESSURE: 63 MMHG

## 2023-06-02 DIAGNOSIS — K74.69 OTHER CIRRHOSIS OF LIVER (HCC): Primary | ICD-10-CM

## 2023-06-02 DIAGNOSIS — K21.00 GASTROESOPHAGEAL REFLUX DISEASE WITH ESOPHAGITIS WITHOUT HEMORRHAGE: ICD-10-CM

## 2023-06-02 DIAGNOSIS — I85.00 ESOPHAGEAL VARICES DETERMINED BY ENDOSCOPY (HCC): ICD-10-CM

## 2023-06-02 NOTE — PATIENT INSTRUCTIONS
We discussed starting an antihistamine such as Xyzal Zyrtec Allegra, or another nondrowsy antihistamine

## 2023-06-02 NOTE — PROGRESS NOTES
[id number] - Outpatient Follow-up Note  Jassi Aly 69 y o  male MRN: [id number]  Encounter: [id number]    ASSESSMENT AND PLAN:      1  Other cirrhosis of liver ([facility])  Due to GRACIA  Decompensated on basis of varices  No ascites or encephalopathy   Discovered incidentally during laparoscopic cholecystectomy  No jaundice, icterus, pruritus or other liver symptoms  MRI August 2022 showed cirrhosis without mass  Due for imaging, ultrasound ordered  Upcoming labs for PCP, will add AFP     2  Esophageal varices determined by endoscopy [facility])  Grade 1 esophageal varices December 2022, continue nonselective beta-blocker     3  Personal history of colonic polyps  Most recent colonoscopy 10/2021, 5 year recall     4  Gastroesophageal reflux disease without esophagitis  Was asymptomatic  Upper endoscopy December 2022 for variceal surveillance showed grade C esophagitis  Resumed low-dose PPI and remains asymptomatic  No medication side effects  5   Cough-complains of phlegm in the morning  Already within the past few months  Has a pulmonary appointment coming up regarding BiPAP  I recommended a trial of OTC antihistamine in the interim  Followup Appointment: 6 months  ______________________________________________________________________    Chief Complaint   Patient presents with   • GERD     Follow up     • Wheezing     SOB   • Cirrhosis     Follow up     HPI: The patient returns for follow-up on cirrhosis  He was last seen at the time of an upper endoscopy in December that showed erosive esophagitis and small varices  Since then he is followed up with pulmonary and was started on BiPAP with good results  He denies any reflux complaints  He denies any symptoms of liver disease like jaundice/icterus, pruritus, or abdominal swelling  Denies any lower GI complaints      Historical Information   Past Medical History:   Diagnosis Date   • Abscess of back 03/01/2018   • "Acquired pancytopenia ([facility] ) 01/16/2017   • Acquired thrombocytopenia ([facility] ) 01/16/2017   • Benign essential hypertension     Last Assessed:12/19/16; Pt reports heart and BP has been \"fine\" as of 4/16/2020   • Bilateral lower extremity edema 8/7/2020   • Cirrhosis (HCC)    • Colon polyp    • CPAP (continuous positive airway pressure) dependence    • Cyst of skin     x6 from neck down back area   • Depression    • Esophageal varices (HCC)    • GERD (gastroesophageal reflux disease)    • Liver disease    • Macrocytosis     Last Assessed:1/16/17   • Major depression, chronic     Last Assessed:12/21/17   • Major depression, chronic 06/19/2017   • Myelopathy ([facility] ) 4/27/2021   • Personal history of colonic polyps    • Sleep apnea    • SOB (shortness of breath) on exertion      Past Surgical History:   Procedure Laterality Date   • CHOLECYSTECTOMY  11/2018   • CHOLECYSTECTOMY LAPAROSCOPIC N/A 11/21/2018    Procedure: CHOLECYSTECTOMY LAPAROSCOPIC, wedge liver biopsy;  Surgeon: Thompson Norman MD;  [facility];  Service: General   • COLONOSCOPY  05/2011   • COLONOSCOPY  05/2016   • COLONOSCOPY     • EGD  01/2019    Esophagitis, esophageal varices   • GALLBLADDER SURGERY     • INCISION AND DRAINAGE OF WOUND N/A 03/01/2018    SEBACEOUS CYST ABSCESS OF BACK-VIJAYA MONZON MD   • VT ARTHRD PST/PSTLAT TQ 1NTRSPC CRV BELW C2 SEGMENT N/A 5/24/2021    Procedure: Posterior cervical decompressive laminectomy and lateral mass fixation fusion C3-5;  Surgeon: Neville Moses MD;  [facility];  Service: Neurosurgery   • VT EXC B9 LESION MRGN XCP SK TG T/A/L 1 1-2 0 CM N/A 8/22/2018    Procedure: EXCISION  BIOPSY LESION/MASS BACK X4 NECK X1;  Surgeon: Thompson Norman MD;  [facility];  Service: General   • VT VO FACETECTOMY & FORAMOTOMY 1 VRT SGM LUMBAR Left 7/8/2022    Procedure: L3-4, L4-5, and L5-S1 metrx decompressive hemilaminectomy with bilateral foraminotomy and discectomy;  Surgeon: Juan Antonio Silva " Leida Bond MD;  [facility];  Service: Neurosurgery     Social History     Substance and Sexual Activity   Alcohol Use Not Currently    Comment: 7/19/19-last drink 12/2018- Occasionally/1-2 drinks per weekend     Social History     Substance and Sexual Activity   Drug Use No     Social History     Tobacco Use   Smoking Status Never   Smokeless Tobacco Never     Family History   Problem Relation Age of Onset   • Alzheimer's disease Mother    • Valvular heart disease Father    • Stroke Brother         Mini   • Valvular heart disease Brother    • Colon cancer Neg Hx    • Colon polyps Neg Hx          Current Outpatient Medications:   •  b complex vitamins tablet  •  buPROPion (WELLBUTRIN XL) 150 mg 24 hr tablet  •  multivitamin (THERAGRAN) TABS  •  nadolol (CORGARD) 40 mg tablet  •  omeprazole (PriLOSEC) 20 mg delayed release capsule  No Known Allergies  Reviewed medications and allergies and updated as indicated    PHYSICAL EXAM:    Blood pressure 117/63, height 6' 1\" (1 854 m), weight 125 kg (275 lb 12 8 oz)  Body mass index is 36 39 kg/m²  General Appearance: NAD, cooperative, alert  Eyes: Anicteric, PERRLA, EOMI  ENT:  Normocephalic, atraumatic, normal mucosa  Neck:  Supple, symmetrical, trachea midline  Resp:  Clear to auscultation bilaterally; no rales, rhonchi or wheezing; respirations unlabored   CV:  S1 S2, Regular rate and rhythm; no murmur, rub, or gallop  GI:  Soft, non-tender, non-distended; normal bowel sounds; no masses, no organomegaly   Rectal: Deferred  Musculoskeletal: No cyanosis, clubbing or edema  Normal ROM    Skin:  No jaundice, rashes, or lesions   Heme/Lymph: No palpable cervical lymphadenopathy  Psych: Normal affect, good eye contact  Neuro: No gross deficits, AAOx3    Lab Results:   Lab Results   Component Value Date    HCT 44 6 09/02/2022    HGB 15 1 09/02/2022     (H) 09/02/2022    PLT 99 (L) 09/02/2022    WBC 4 34 09/02/2022     Lab Results   Component Value Date    " ALKPHOS 143 (H) 09/02/2022    ALT 38 09/02/2022    ANIONGAP 8 06/06/2014    AST 45 09/02/2022    BILITOT 0 3 12/23/2016    BUN 20 09/02/2022    CALCIUM 9 1 09/02/2022     09/02/2022    CO2 31 09/02/2022    CORRECTEDCA 9 7 09/02/2022    CREATININE 1 05 09/02/2022    EGFR 72 09/02/2022    GLUCOSE 125 05/24/2021    GLUF 90 09/02/2022    K 4 2 09/02/2022     12/23/2016    PROT 6 3 12/23/2016     Lab Results   Component Value Date    FERRITIN 234 11/21/2018    IRON 103 11/21/2018    TIBC 289 11/21/2018     Lab Results   Component Value Date    LIPASE 73 11/21/2018       Radiology Results:   No results found

## 2023-06-06 ENCOUNTER — APPOINTMENT (OUTPATIENT)
Dept: LAB | Facility: HOSPITAL | Age: 68
End: 2023-06-06
Payer: MEDICARE

## 2023-06-06 ENCOUNTER — OFFICE VISIT (OUTPATIENT)
Dept: FAMILY MEDICINE CLINIC | Facility: HOSPITAL | Age: 68
End: 2023-06-06
Payer: MEDICARE

## 2023-06-06 VITALS
SYSTOLIC BLOOD PRESSURE: 138 MMHG | BODY MASS INDEX: 36.58 KG/M2 | HEIGHT: 73 IN | TEMPERATURE: 98.2 F | HEART RATE: 72 BPM | DIASTOLIC BLOOD PRESSURE: 81 MMHG | WEIGHT: 276 LBS

## 2023-06-06 DIAGNOSIS — F32.9 MAJOR DEPRESSION, CHRONIC: ICD-10-CM

## 2023-06-06 DIAGNOSIS — I85.00 ESOPHAGEAL VARICES DETERMINED BY ENDOSCOPY (HCC): ICD-10-CM

## 2023-06-06 DIAGNOSIS — E78.00 HYPERCHOLESTEROLEMIA: ICD-10-CM

## 2023-06-06 DIAGNOSIS — R06.09 DYSPNEA ON EXERTION: Primary | ICD-10-CM

## 2023-06-06 LAB — AFP-TM SERPL-MCNC: 3.3 NG/ML (ref 0–9)

## 2023-06-06 PROCEDURE — 99214 OFFICE O/P EST MOD 30 MIN: CPT | Performed by: NURSE PRACTITIONER

## 2023-06-06 RX ORDER — ALBUTEROL SULFATE 90 UG/1
2 AEROSOL, METERED RESPIRATORY (INHALATION) EVERY 6 HOURS PRN
Qty: 18 G | Refills: 0 | Status: SHIPPED | OUTPATIENT
Start: 2023-06-06

## 2023-06-06 NOTE — PROGRESS NOTES
"Name: Ivette Grullon      : 1955      MRN: [id number]  Encounter Provider: FADUMO Dickey  Encounter Date: 2023   Encounter department: [address] [place] [address]  203     Assessment & Plan     1  Dyspnea on exertion  -     Complete PFT with post bronchodilator; Future  -     Echo complete w/ contrast if indicated; Future; Expected date: 2023  -     albuterol (ProAir HFA) 90 mcg/act inhaler; Inhale 2 puffs every 6 (six) hours as needed for wheezing or shortness of breath    probable allergic bronchospasm - will evaluate sx's further to r/o pulmonary vs cardiac cause  rx issued to use albuterol inhaler as needed before activity/exertion - instructed on proper use  Continue OTC antihistamine daily & use mask when mowing grass  Call or present to ED w/worse sx's       Subjective      Here with wife who states he has had more trouble breathing on/off for about a month  States he has a \"wheeze\" at times (ie, when he bends to tie his shoes)  Wife sometimes notes he is breathing harder usually after \"little things\" (ie, getting into the truck)  [facility] [employer] [facility] but still having issues with his legs  Can ride a bike for a half hour without difficulty in breathing  Has been push mowing grass this year (for the first time in 2 years) - wife states he is not good when he's done - with breathing and balance  Has been taking an allergy med for 3 days without any noted benefit  Review of Systems   Constitutional: Negative for diaphoresis and fatigue  Respiratory: Positive for cough (w/alot phlegm, sarabjit in the morning), shortness of breath and wheezing  Negative for chest tightness  Cardiovascular: Negative  Negative for chest pain and palpitations  Allergic/Immunologic: Positive for environmental allergies (taking an OTC allergy pill)         Current Outpatient Medications on File Prior to Visit   Medication Sig   • b complex vitamins tablet Take 1 tablet " "by mouth every morning   • buPROPion (WELLBUTRIN XL) 150 mg 24 hr tablet Take 1 tablet (150 mg total) by mouth daily   • multivitamin (THERAGRAN) TABS Take 1 tablet by mouth every morning   • nadolol (CORGARD) 40 mg tablet Take 1 tablet (40 mg total) by mouth daily   • omeprazole (PriLOSEC) 20 mg delayed release capsule Take 1 capsule (20 mg total) by mouth daily       Objective     /81   Pulse 72   Temp 98 2 °F (36 8 °C)   Ht 6' 1\" (1 854 m)   Wt 125 kg (276 lb)   BMI 36 41 kg/m²       Physical Exam  Vitals reviewed  Constitutional:       General: He is not in acute distress  Appearance: Normal appearance  HENT:      Head: Normocephalic  Eyes:      General: No scleral icterus  Neck:      Thyroid: No thyromegaly  Cardiovascular:      Rate and Rhythm: Normal rate and regular rhythm  Heart sounds: No murmur heard  Pulmonary:      Effort: Pulmonary effort is normal  No respiratory distress  Breath sounds: Normal breath sounds  Comments: No cough  Musculoskeletal:      Right lower leg: No edema  Left lower leg: No edema  Lymphadenopathy:      Cervical: No cervical adenopathy  Skin:     General: Skin is warm and dry  Neurological:      General: No focal deficit present  Mental Status: He is alert and oriented to person, place, and time     Psychiatric:         Mood and Affect: Mood normal          Behavior: Behavior normal           FADUMO Freeman  "

## 2023-06-07 ENCOUNTER — HOSPITAL ENCOUNTER (OUTPATIENT)
Dept: ULTRASOUND IMAGING | Facility: HOSPITAL | Age: 68
Discharge: HOME | End: 2023-06-07
Attending: INTERNAL MEDICINE
Payer: MEDICARE

## 2023-06-07 DIAGNOSIS — K74.69 OTHER CIRRHOSIS OF LIVER (HCC): ICD-10-CM

## 2023-06-07 PROCEDURE — 76705 ECHO EXAM OF ABDOMEN: CPT

## 2023-06-14 ENCOUNTER — HOSPITAL ENCOUNTER (OUTPATIENT)
Dept: PULMONOLOGY | Facility: HOSPITAL | Age: 68
Discharge: HOME | End: 2023-06-14
Payer: MEDICARE

## 2023-06-14 DIAGNOSIS — R06.09 DYSPNEA ON EXERTION: ICD-10-CM

## 2023-06-14 PROCEDURE — 94726 PLETHYSMOGRAPHY LUNG VOLUMES: CPT | Performed by: INTERNAL MEDICINE

## 2023-06-14 PROCEDURE — 94060 EVALUATION OF WHEEZING: CPT | Performed by: INTERNAL MEDICINE

## 2023-06-14 PROCEDURE — 94729 DIFFUSING CAPACITY: CPT

## 2023-06-14 PROCEDURE — 94060 EVALUATION OF WHEEZING: CPT

## 2023-06-14 PROCEDURE — 94726 PLETHYSMOGRAPHY LUNG VOLUMES: CPT

## 2023-06-14 PROCEDURE — 94729 DIFFUSING CAPACITY: CPT | Performed by: INTERNAL MEDICINE

## 2023-06-14 PROCEDURE — 94760 N-INVAS EAR/PLS OXIMETRY 1: CPT

## 2023-06-14 RX ORDER — ALBUTEROL SULFATE 2.5 MG/3ML
2.5 SOLUTION RESPIRATORY (INHALATION) ONCE
Status: COMPLETED | OUTPATIENT
Start: 2023-06-14 | End: 2023-06-14

## 2023-06-14 RX ADMIN — ALBUTEROL SULFATE 2.5 MG: 2.5 SOLUTION RESPIRATORY (INHALATION) at 08:31

## 2023-06-20 ENCOUNTER — HOSPITAL ENCOUNTER (OUTPATIENT)
Dept: NON INVASIVE DIAGNOSTICS | Age: 68
Discharge: HOME | End: 2023-06-20
Payer: MEDICARE

## 2023-06-20 VITALS
HEIGHT: 73 IN | BODY MASS INDEX: 36.58 KG/M2 | SYSTOLIC BLOOD PRESSURE: 132 MMHG | WEIGHT: 276 LBS | HEART RATE: 72 BPM | DIASTOLIC BLOOD PRESSURE: 81 MMHG

## 2023-06-20 DIAGNOSIS — R06.09 DYSPNEA ON EXERTION: ICD-10-CM

## 2023-06-20 DIAGNOSIS — I35.8 AORTIC VALVE SCLEROSIS: Primary | ICD-10-CM

## 2023-06-20 LAB
AORTIC ROOT: 4 CM
APICAL FOUR CHAMBER EJECTION FRACTION: 55 %
ASCENDING AORTA: 3.8 CM
DOP CALC LVOT AREA: 4.15 CM2
DOP CALC LVOT DIAMETER: 2.3 CM
E WAVE DECELERATION TIME: 322 MS
FRACTIONAL SHORTENING: 34 (ref 28–44)
INTERVENTRICULAR SEPTUM IN DIASTOLE (PARASTERNAL SHORT AXIS VIEW): 1.1 CM
INTERVENTRICULAR SEPTUM: 1.1 CM (ref 0.6–1.1)
LAAS-AP2: 19.8 CM2
LAAS-AP4: 21.4 CM2
LEFT ATRIUM AREA SYSTOLE SINGLE PLANE A4C: 21.8 CM2
LEFT ATRIUM SIZE: 4 CM
LEFT INTERNAL DIMENSION IN SYSTOLE: 3.1 CM (ref 2.1–4)
LEFT VENTRICULAR INTERNAL DIMENSION IN DIASTOLE: 4.7 CM (ref 3.5–6)
LEFT VENTRICULAR POSTERIOR WALL IN END DIASTOLE: 1 CM
LEFT VENTRICULAR STROKE VOLUME: 63 ML
LVSV (TEICH): 63 ML
MV E'TISSUE VEL-SEP: 9 CM/S
MV PEAK A VEL: 0.86 M/S
MV PEAK E VEL: 66 CM/S
MV STENOSIS PRESSURE HALF TIME: 93 MS
MV VALVE AREA P 1/2 METHOD: 2.37
RIGHT ATRIUM AREA SYSTOLE A4C: 18.8 CM2
RIGHT VENTRICLE ID DIMENSION: 4.3 CM
SL CV LEFT ATRIUM LENGTH A2C: 5.7 CM
SL CV LV EF: 60
SL CV PED ECHO LEFT VENTRICLE DIASTOLIC VOLUME (MOD BIPLANE) 2D: 100 ML
SL CV PED ECHO LEFT VENTRICLE SYSTOLIC VOLUME (MOD BIPLANE) 2D: 37 ML
TRICUSPID ANNULAR PLANE SYSTOLIC EXCURSION: 2.2 CM

## 2023-06-20 PROCEDURE — 93306 TTE W/DOPPLER COMPLETE: CPT

## 2023-06-20 PROCEDURE — 93306 TTE W/DOPPLER COMPLETE: CPT | Performed by: INTERNAL MEDICINE

## 2023-08-29 ENCOUNTER — CONSULT (OUTPATIENT)
Dept: CARDIOLOGY CLINIC | Facility: CLINIC | Age: 68
End: 2023-08-29
Payer: MEDICARE

## 2023-08-29 VITALS
WEIGHT: 279 LBS | HEART RATE: 74 BPM | BODY MASS INDEX: 36.98 KG/M2 | SYSTOLIC BLOOD PRESSURE: 132 MMHG | HEIGHT: 73 IN | DIASTOLIC BLOOD PRESSURE: 86 MMHG

## 2023-08-29 DIAGNOSIS — R06.09 DYSPNEA ON EXERTION: Primary | ICD-10-CM

## 2023-08-29 DIAGNOSIS — I35.8 AORTIC VALVE SCLEROSIS: ICD-10-CM

## 2023-08-29 DIAGNOSIS — R60.0 BILATERAL LOWER EXTREMITY EDEMA: ICD-10-CM

## 2023-08-29 DIAGNOSIS — G47.33 OSA (OBSTRUCTIVE SLEEP APNEA): ICD-10-CM

## 2023-08-29 DIAGNOSIS — E78.00 HYPERCHOLESTEROLEMIA: ICD-10-CM

## 2023-08-29 PROCEDURE — 93000 ELECTROCARDIOGRAM COMPLETE: CPT | Performed by: INTERNAL MEDICINE

## 2023-08-29 PROCEDURE — 99204 OFFICE O/P NEW MOD 45 MIN: CPT | Performed by: INTERNAL MEDICINE

## 2023-08-29 RX ORDER — TORSEMIDE 20 MG/1
20 TABLET ORAL DAILY
Qty: 90 TABLET | Refills: 3 | Status: SHIPPED | OUTPATIENT
Start: 2023-08-29

## 2023-08-29 NOTE — PROGRESS NOTES
Cardiology Consultation     Jim Abraham  [id number]  1955  [address] [place] [address] [facility] [address] [address] [phone]    1. Dyspnea on exertion  Ambulatory Referral to Cardiology    torsemide (DEMADEX) 20 mg tablet    NM myocardial perfusion spect (stress and/or rest)      2. Aortic valve sclerosis  Ambulatory Referral to Cardiology    POCT ECG      3. Hypercholesterolemia        4. DANIEL (obstructive sleep apnea)        5. Bilateral lower extremity edema  torsemide (DEMADEX) 20 mg tablet            Discussion/Summary:    1. Shortness of breath with exertion, chronic fatigue and exercise intolerance - Tatum Owens has noticed this worsen at least over the last 6 months if not a year. I do feel the etiology to this is mostly his obstructive sleep apnea. He had been on CPAP but was switched to BiPAP within the last 6 months and he does follow with sleep medicine closely. As stated below we are going to start a diuretic. We did order an updated ischemic evaluation with a stress nuclear study. Closely with sleep medicine. 2.  Lower extremity edema - This is mostly associated with obstructive sleep apnea or his chronic liver disease. We are going to place him back on diuretic therapy, using torsemide 20 mg daily. We went over following a low-sodium diet and he should watch his weight. I have encouraged him to remain active. He will have blood work followed closely and we will see him back in 3 months. 3.  Hypercholesterolemia - He manages this with dietary/lifestyle modifications and over-the-counter supplements. He gets blood work followed by his PCP on a regular basis. His last LDL was 67. HPI:    Mr. Jose Enrique Welch comes in for consultation to discuss very similar symptoms that he had the first time we saw him in consultation back more than 3 years ago.   His symptoms include shortness of breath and exercise intolerance, with profound fatigue. Padmini Hargrove has no cardiac history, and his only risk factor appears to be hypercholesterolemia in which he has not been on pharmacologic treatment for. He does have cirrhosis associated with GRACIA and documented esophageal varices. He does take Nadolol for this. His family history includes 2 family members who have had valve replacements, but no premature CAD in the family. When I met him in 2020 we had him go through an exercise stress echocardiogram which was unremarkable. Earlier that same year he had an echocardiogram which showed Normal LV systolic function without significant valve disease. He had RV enlargement, mild dilation of the aortic root and mild biatrial enlargement. At that visit he also had significant lower extremity edema and prescribed a low-dose diuretic. We instructed him to come back in 6 months but he did not and just continue to follow with his PCP. He also at some point came off of the diuretic. He was also diagnosed with obstructive sleep apnea, something that he showed multiple features for and likely were the cause of most of his symptoms. He did go on CPAP and within the last 6 months he was switched over to BiPAP. He follows with sleep medicine closely. Padmini Hargrove describes shortness of breath with exertion very similar to what he had a few years back. His wife states that this shortness of breath has progressively gotten worse again over the last year. He frequents flea markets and does a lot a walking associated with this, and he has noticed that with just walking around at these herbert its he would become short of breath. He still tries to work through this, and even exercises at times. He denies any shortness of breath at rest.  He denies orthopnea or PND. He denies chest pain or any other symptoms of angina. His lower extremity edema has become worse over the years as well.   He does not follow a low-sodium diet.    In June of this year his PCP had him undergo an echocardiogram because of the symptoms. This did not show any changes from prior. He has normal LV systolic function with biatrial enlargement and mild RV enlargement. No significant valve disease and mild dilation of the aorta.       Patient Active Problem List   Diagnosis   • Thrombocytopenia (HCC)   • DDD (degenerative disc disease), lumbar   • Hypercholesterolemia   • Major depression, chronic   • Other cirrhosis of liver (HCC)   • Enlarged prostate   • Esophageal varices determined by endoscopy [facility])   • Colon cancer screening   • Personal history of colonic polyps   • Bilateral lower extremity edema   • Lumbar spinal stenosis   • Lumbar radiculopathy   • Chronic pain syndrome   • Unspecified abnormalities of gait and mobility   • Cervical spinal stenosis   • Sleep apnea   • BMI 36.0-36.9,adult   • Encounter for postoperative care related to surgical joint fusion   • Neurogenic claudication   • S/P cervical spinal fusion   • CPAP (continuous positive airway pressure) dependence   • GERD (gastroesophageal reflux disease)   • DANIEL (obstructive sleep apnea)   • Dyspnea on exertion     Past Medical History:   Diagnosis Date   • Abscess of back 03/01/2018   • Acquired pancytopenia ([address]) 01/16/2017   • Acquired thrombocytopenia ([address]) 01/16/2017   • Benign essential hypertension     Last Assessed:12/19/16; Pt reports heart and BP has been "fine" as of 4/16/2020   • Bilateral lower extremity edema 8/7/2020   • Cirrhosis (HCC)    • Colon polyp    • CPAP (continuous positive airway pressure) dependence    • Cyst of skin     x6 from neck down back area   • Depression    • Esophageal varices (HCC)    • GERD (gastroesophageal reflux disease)    • Liver disease    • Macrocytosis     Last Assessed:1/16/17   • Major depression, chronic     Last Assessed:12/21/17   • Major depression, chronic 06/19/2017   • Myelopathy ([address]) 4/27/2021   • Personal history of colonic polyps    • Sleep apnea    • SOB (shortness of breath) on exertion      Social History     Socioeconomic History   • Marital status: /Civil Union     Spouse name: Not on file   • Number of children: Not on file   • Years of education: Not on file   • Highest education level: Not on file   Occupational History   • Not on file   Tobacco Use   • Smoking status: Never   • Smokeless tobacco: Never   Vaping Use   • Vaping Use: Never used   Substance and Sexual Activity   • Alcohol use: Not Currently     Comment: 7/19/19-last drink 12/2018- Occasionally/1-2 drinks per weekend   • Drug use: No   • Sexual activity: Not on file   Other Topics Concern   • Not on file   Social History Narrative    Always uses seatbelt     Social Determinants of Health     Financial Resource Strain: Unknown (9/15/2022)    Overall Financial Resource Strain (CARDIA)    • Difficulty of Paying Living Expenses: Patient refused   Food Insecurity: Not on file   Transportation Needs: No Transportation Needs (9/15/2022)    PRAPARE - Transportation    • Lack of Transportation (Medical): No    • Lack of Transportation (Non-Medical):  No   Physical Activity: Not on file   Stress: Not on file   Social Connections: Not on file   Intimate Partner Violence: Not on file   Housing Stability: Not on file      Family History   Problem Relation Age of Onset   • Heart disease Mother         valve replacement   • Alzheimer's disease Mother    • Valvular heart disease Father    • Heart disease Brother         valve replacement   • Stroke Brother         Mini   • Valvular heart disease Brother    • Colon cancer Neg Hx    • Colon polyps Neg Hx      Past Surgical History:   Procedure Laterality Date   • CHOLECYSTECTOMY  11/2018   • CHOLECYSTECTOMY LAPAROSCOPIC N/A 11/21/2018    Procedure: CHOLECYSTECTOMY LAPAROSCOPIC, wedge liver biopsy;  Surgeon: Kenzie Willis MD;  Location: [facility] OR;  Service: General   • COLONOSCOPY  05/2011   • COLONOSCOPY  05/2016   • COLONOSCOPY     • EGD  01/2019    Esophagitis, esophageal varices   • GALLBLADDER SURGERY     • INCISION AND DRAINAGE OF WOUND N/A 03/01/2018    SEBACEOUS CYST ABSCESS OF BACK-VIJAYA MONZON MD   • VA ARTHRD PST/PSTLAT TQ 1NTRSPC CRV BELW C2 SEGMENT N/A 5/24/2021    Procedure: Posterior cervical decompressive laminectomy and lateral mass fixation fusion C3-5;  Surgeon: Wally Lazaro MD;  Location: [facility];  Service: Neurosurgery   • VA EXC B9 LESION MRGN XCP SK TG T/A/L 1.1-2.0 CM N/A 8/22/2018    Procedure: EXCISION  BIOPSY LESION/MASS BACK X4 NECK X1;  Surgeon: Carmen Serrano MD;  Location: [facility] OR;  Service: General   • VA VO FACETECTOMY & FORAMOTOMY 1 VRT SGM LUMBAR Left 7/8/2022    Procedure: L3-4, L4-5, and L5-S1 metrx decompressive hemilaminectomy with bilateral foraminotomy and discectomy;  Surgeon: Jazmin Kiran MD;  Location: [facility];  Service: Neurosurgery       Current Outpatient Medications:   •  b complex vitamins tablet, Take 1 tablet by mouth every morning, Disp: , Rfl:   •  buPROPion (WELLBUTRIN XL) 150 mg 24 hr tablet, Take 1 tablet (150 mg total) by mouth daily, Disp: 90 tablet, Rfl: 1  •  multivitamin (THERAGRAN) TABS, Take 1 tablet by mouth every morning, Disp: , Rfl:   •  nadolol (CORGARD) 40 mg tablet, Take 1 tablet (40 mg total) by mouth daily, Disp: 90 tablet, Rfl: 3  •  omeprazole (PriLOSEC) 20 mg delayed release capsule, Take 1 capsule (20 mg total) by mouth daily, Disp: 90 capsule, Rfl: 3  •  torsemide (DEMADEX) 20 mg tablet, Take 1 tablet (20 mg total) by mouth daily, Disp: 90 tablet, Rfl: 3  No Known Allergies  Vitals:    08/29/23 0942   BP: 132/86   BP Location: Left arm   Patient Position: Sitting   Cuff Size: Large   Pulse: 74   Weight: 127 kg (279 lb)   Height: 6' 1" (1.854 m)       Labs:  Lab Results   Component Value Date     12/23/2016    K 4.2 09/02/2022    K 4.2 06/03/2022     09/02/2022     06/03/2022    CO2 31 09/02/2022    CO2 23 06/03/2022    BUN 20 09/02/2022    BUN 16 06/03/2022    CREATININE 1.05 09/02/2022    CREATININE 1.21 12/23/2016    GLUCOSE 125 05/24/2021    GLUCOSE 97 12/23/2016    CALCIUM 9.1 09/02/2022    CALCIUM 9.2 12/23/2016     Lab Results   Component Value Date    WBC 4.34 09/02/2022    WBC 5.2 12/23/2016    HGB 15.1 09/02/2022    HGB 17.3 12/23/2016    HCT 44.6 09/02/2022    HCT 50.2 12/23/2016     (H) 09/02/2022    MCV 98 (H) 12/23/2016    PLT 99 (L) 09/02/2022     (L) 12/23/2016     Lab Results   Component Value Date    CHOL 169 12/23/2016    TRIG 68 09/02/2022    TRIG 85 09/01/2021    HDL 65 09/02/2022    HDL 60 09/01/2021     Imaging:  ECG today shows sinus rhythm, low voltage, otherwise normal ECG. ECHO (3/20/2023): •  Left Ventricle: Left ventricular cavity size is normal. Wall thickness is mildly increased. The left ventricular ejection fraction is 60%. Systolic function is normal. Wall motion is normal. Diastolic function is normal.  •  Right Ventricle: Right ventricular cavity size is mildly dilated. Systolic function is normal.  •  Right Atrium: The atrium is mildly dilated. •  Aortic Valve: There is aortic valve sclerosis. •  Mitral Valve: There is mild regurgitation. •  Aorta: The aortic root is mildly dilated. The ascending aorta is mildly dilated. The aortic root is 4.00 cm. The ascending aorta is 3.8 cm. Review of Systems:  Review of Systems   Constitutional: Positive for fatigue. HENT: Negative. Eyes: Negative. Respiratory: Positive for shortness of breath. Cardiovascular: Positive for leg swelling. Gastrointestinal: Negative. Musculoskeletal: Negative. Skin: Negative. Allergic/Immunologic: Negative. Neurological: Negative. Hematological: Negative. Psychiatric/Behavioral: Negative. All other systems reviewed and are negative.     Vitals:    08/29/23 0942   BP: 132/86   BP Location: Left arm   Patient Position: Sitting   Cuff Size: Large   Pulse: 74 Weight: 127 kg (279 lb)   Height: 6' 1" (1.854 m)     Physical Exam:  Physical Exam  Vitals and nursing note reviewed. Constitutional:       Appearance: He is well-developed. HENT:      Head: Normocephalic and atraumatic. Eyes:      General: No scleral icterus. Right eye: No discharge. Left eye: No discharge. Pupils: Pupils are equal, round, and reactive to light. Neck:      Thyroid: No thyromegaly. Vascular: No JVD. Cardiovascular:      Rate and Rhythm: Normal rate and regular rhythm. No extrasystoles are present. Pulses: Normal pulses. No decreased pulses. Heart sounds: Normal heart sounds, S1 normal and S2 normal. No murmur heard. No friction rub. No gallop. Pulmonary:      Effort: Pulmonary effort is normal. No respiratory distress. Breath sounds: Normal breath sounds. No wheezing or rales. Abdominal:      General: Bowel sounds are normal. There is no distension. Palpations: Abdomen is soft. Tenderness: There is no abdominal tenderness. Musculoskeletal:         General: No tenderness or deformity. Normal range of motion. Cervical back: Normal range of motion and neck supple. Right lower le+ Pitting Edema present. Left lower le+ Pitting Edema present. Skin:     General: Skin is warm and dry. Findings: No rash. Neurological:      Mental Status: He is alert and oriented to person, place, and time. Cranial Nerves: No cranial nerve deficit. Psychiatric:         Thought Content: Thought content normal.         Judgment: Judgment normal.       Counseling / Coordination of Care  Total office time spent today 40 minutes. Greater than 50% of total time was spent with the patient and / or family counseling and / or coordination of care.

## 2023-08-29 NOTE — PATIENT INSTRUCTIONS
Low-Sodium Diet   AMBULATORY CARE:   A low-sodium diet  limits foods that are high in sodium (salt). You will need to follow a low-sodium diet if you have high blood pressure, kidney disease, or heart failure. You may also need to follow this diet if you have a condition that is causing your body to retain (hold) extra fluid. You may need to limit the amount of sodium you eat in a day to 1,500 to 2,000 mg. Ask your healthcare provider how much sodium you can have each day. How to use food labels to choose foods that are low in sodium:  Read food labels to find the amount of sodium they contain. The amount of sodium is listed in milligrams (mg). The % Daily Value (DV) column tells you how much of your daily needs are met by 1 serving of the food for each nutrient listed. Choose foods that have less than 5% of the DV of sodium. These foods are considered low in sodium. Foods that have 20% or more of the DV of sodium are considered high in sodium. Some food labels may also list any of the following terms that tell you about the sodium content in the food:  Sodium-free:  Less than 5 mg in each serving    Very low sodium:  35 mg of sodium or less in each serving    Low sodium:  140 mg of sodium or less in each serving    Reduced sodium: At least 25% less sodium in each serving than the regular type    Light in sodium:  50% less sodium in each serving    Unsalted or no added salt:  No extra salt is added during processing (the food may still contain sodium)       Foods to avoid:  Salty foods are high in sodium.  You should avoid the following:  Processed foods:      Mixes for cornbread, biscuits, cake, and pudding     Instant foods, such as potatoes, cereals, noodles, and rice     Packaged foods, such as bread stuffing, rice and pasta mixes, snack dip mixes, and macaroni and cheese     Canned foods, such as canned vegetables, soups, broths, sauces, and vegetable or tomato juice    Snack foods, such as salted chips, popcorn, pretzels, pork rinds, salted crackers, and salted nuts    Frozen foods, such as dinners, entrees, vegetables with sauces, and breaded meats    Sauerkraut, pickled vegetables, and other foods prepared in brine    Meats and cheeses:      Smoked or cured meat, such as corned beef, saunders, ham, hot dogs, and sausage    Canned meats or spreads, such as potted meats, sardines, anchovies, and imitation seafood    Deli or lunch meats, such as bologna, ham, turkey, and roast beef    Processed cheese, such as American cheese and cheese spreads    Condiments, sauces, and seasonings:      Salt (¼ teaspoon of salt contains 575 mg of sodium)    Seasonings made with salt, such as garlic salt, celery salt, onion salt, and seasoned salt    Regular soy sauce, barbecue sauce, teriyaki sauce, steak sauce, Worcestershire sauce, and most flavored vinegars    Canned gravy and mixes     Regular condiments, such as mustard, ketchup, and salad dressings    Pickles and olives    Meat tenderizers and monosodium glutamate (MSG)    Foods to include:  Read the food label to find the exact amount of sodium in each serving. Bread and cereal:  Try to choose breads with less than 80 mg of sodium per serving. Bread, roll, dwight, tortilla, or unsalted crackers. Ready-to-eat cereals with less than 5% DV of sodium (examples include shredded wheat and puffed rice)    Pasta    Vegetables and fruits:      Unsalted fresh, frozen, or canned vegetables    Fresh, frozen, or canned fruits    Fruit juice    Dairy:  One serving has about 150 mg of sodium. Milk, all types    Yogurt    Hard cheese, such as cheddar, Swiss, [employer], or [employer] and other protein foods:  Some raw meats may have added sodium.      Plain meats, fish, and poultry     Eggs    Other foods:      Homemade pudding    Unsalted nuts, popcorn, or pretzels    Unsalted butter or margarine    Ways to get less sodium:  If you are used to the flavor of salt, it will take time to get used to low-sodium foods. Your healthcare provider or dietitian can help you create a plan for lowering sodium. The plan will be specific to your needs and your family's needs. You may focus on 1 or 2 changes each week, such as the following: Add spices and herbs to foods instead of salt during cooking. Use salt-free seasonings to add flavor to foods. Examples include onion powder, garlic powder, basil, heredia powder, paprika, and parsley. Try lemon or lime juice or vinegar to give foods a tart flavor. Use hot peppers, pepper, or cayenne pepper to add a spicy flavor. Do not keep a salt shaker at your kitchen table. This may help keep you from adding salt to food at the table. A teaspoon of salt has 2,300 mg of sodium. It may take time to get used to enjoying the natural flavor of food instead of adding salt. Talk to your healthcare provider before you use salt substitutes. Some salt substitutes have a high amount of a chemical called potassium chloride. This form of potassium needs to be avoided if you have kidney disease. Choose low-sodium foods at restaurants. Meals from restaurants are often high in sodium. Some restaurants have nutrition information on the menu that tells you the amount of sodium in their foods. If possible, ask for your food to be prepared with less, or no salt. Shop for unsalted or low-sodium foods and snacks at the grocery store. Examples include unsalted or low-sodium broths, soups, and canned vegetables. Choose fresh or frozen vegetables instead. Choose unsalted nuts or seeds or fresh fruits or vegetables as snacks. Read food labels and choose salt-free, very low-sodium, or low-sodium foods. You can also rinse canned vegetables under running water to remove extra sodium before you cook them. © Copyright Yesy Haney 2022 Information is for End User's use only and may not be sold, redistributed or otherwise used for commercial purposes.   The above information is an  only. It is not intended as medical advice for individual conditions or treatments. Talk to your doctor, nurse or pharmacist before following any medical regimen to see if it is safe and effective for you.

## 2023-09-07 ENCOUNTER — HOSPITAL ENCOUNTER (OUTPATIENT)
Dept: NUCLEAR MEDICINE | Facility: HOSPITAL | Age: 68
Discharge: HOME | End: 2023-09-07
Attending: INTERNAL MEDICINE

## 2023-09-07 ENCOUNTER — HOSPITAL ENCOUNTER (OUTPATIENT)
Dept: NUCLEAR MEDICINE | Facility: HOSPITAL | Age: 68
End: 2023-09-07
Attending: INTERNAL MEDICINE
Payer: MEDICARE

## 2023-09-07 ENCOUNTER — HOSPITAL ENCOUNTER (OUTPATIENT)
Dept: NON INVASIVE DIAGNOSTICS | Facility: HOSPITAL | Age: 68
Discharge: HOME | End: 2023-09-07
Attending: INTERNAL MEDICINE
Payer: MEDICARE

## 2023-09-07 DIAGNOSIS — R06.09 DYSPNEA ON EXERTION: ICD-10-CM

## 2023-09-07 LAB
CHEST PAIN STATEMENT: NORMAL
MAX DIASTOLIC BP: 94 MMHG
MAX HEART RATE: 93 BPM
MAX PREDICTED HEART RATE: 150 BPM
MAX. SYSTOLIC BP: 131 MMHG
NUC STRESS EJECTION FRACTION: 70 %
PROTOCOL NAME: NORMAL
REASON FOR TERMINATION: NORMAL
SL CV REST NUCLEAR ISOTOPE DOSE: 15.4 MCI
SL CV STRESS NUCLEAR ISOTOPE DOSE: 46.9 MCI
STRESS ANGINA INDEX: 0
STRESS BASELINE HR: 71 BPM
STRESS POST PEAK BP: 131 MMHG
TARGET HR FORMULA: NORMAL
TEST INDICATION: NORMAL
TIME IN EXERCISE PHASE: NORMAL

## 2023-09-07 PROCEDURE — 78452 HT MUSCLE IMAGE SPECT MULT: CPT | Performed by: INTERNAL MEDICINE

## 2023-09-07 PROCEDURE — A9502 TC99M TETROFOSMIN: HCPCS

## 2023-09-07 PROCEDURE — 93018 CV STRESS TEST I&R ONLY: CPT | Performed by: INTERNAL MEDICINE

## 2023-09-07 PROCEDURE — 78452 HT MUSCLE IMAGE SPECT MULT: CPT

## 2023-09-07 PROCEDURE — G1004 CDSM NDSC: HCPCS

## 2023-09-07 PROCEDURE — 93016 CV STRESS TEST SUPVJ ONLY: CPT | Performed by: INTERNAL MEDICINE

## 2023-09-07 PROCEDURE — 93017 CV STRESS TEST TRACING ONLY: CPT

## 2023-09-07 RX ORDER — REGADENOSON 0.08 MG/ML
0.4 INJECTION, SOLUTION INTRAVENOUS ONCE
Status: COMPLETED | OUTPATIENT
Start: 2023-09-07 | End: 2023-09-07

## 2023-09-07 RX ADMIN — REGADENOSON 0.4 MG: 0.08 INJECTION, SOLUTION INTRAVENOUS at 14:43

## 2023-09-12 LAB
CHEST PAIN STATEMENT: NORMAL
MAX DIASTOLIC BP: 94 MMHG
MAX HEART RATE: 93 BPM
MAX PREDICTED HEART RATE: 150 BPM
MAX. SYSTOLIC BP: 131 MMHG
PROTOCOL NAME: NORMAL
REASON FOR TERMINATION: NORMAL
TARGET HR FORMULA: NORMAL
TEST INDICATION: NORMAL
TIME IN EXERCISE PHASE: NORMAL

## 2023-09-15 DIAGNOSIS — F32.A DEPRESSION, UNSPECIFIED DEPRESSION TYPE: ICD-10-CM

## 2023-09-15 RX ORDER — BUPROPION HYDROCHLORIDE 150 MG/1
150 TABLET ORAL DAILY
Qty: 90 TABLET | Refills: 1 | Status: SHIPPED | OUTPATIENT
Start: 2023-09-15

## 2023-09-29 ENCOUNTER — TELEPHONE (OUTPATIENT)
Dept: CARDIOLOGY CLINIC | Facility: CLINIC | Age: 68
End: 2023-09-29

## 2023-09-29 NOTE — TELEPHONE ENCOUNTER
Patient's wife called for results of pt's nuc stress test on 9/7.     Please call wife's cell [phone]    Thank you

## 2023-10-03 ENCOUNTER — TELEPHONE (OUTPATIENT)
Dept: FAMILY MEDICINE CLINIC | Facility: HOSPITAL | Age: 68
End: 2023-10-03

## 2023-10-05 ENCOUNTER — APPOINTMENT (OUTPATIENT)
Dept: LAB | Facility: HOSPITAL | Age: 68
End: 2023-10-05
Payer: MEDICARE

## 2023-10-05 DIAGNOSIS — Z12.5 PROSTATE CANCER SCREENING: ICD-10-CM

## 2023-10-05 LAB — PSA SERPL-MCNC: 0.47 NG/ML (ref 0–4)

## 2023-10-05 PROCEDURE — 36415 COLL VENOUS BLD VENIPUNCTURE: CPT

## 2023-10-05 PROCEDURE — G0103 PSA SCREENING: HCPCS

## 2023-10-12 ENCOUNTER — OFFICE VISIT (OUTPATIENT)
Dept: FAMILY MEDICINE CLINIC | Facility: HOSPITAL | Age: 68
End: 2023-10-12
Payer: MEDICARE

## 2023-10-12 VITALS
TEMPERATURE: 96.9 F | WEIGHT: 270.4 LBS | DIASTOLIC BLOOD PRESSURE: 78 MMHG | BODY MASS INDEX: 37.85 KG/M2 | HEIGHT: 71 IN | SYSTOLIC BLOOD PRESSURE: 116 MMHG | HEART RATE: 77 BPM

## 2023-10-12 DIAGNOSIS — E78.00 HYPERCHOLESTEROLEMIA: ICD-10-CM

## 2023-10-12 DIAGNOSIS — Z00.00 MEDICARE ANNUAL WELLNESS VISIT, SUBSEQUENT: Primary | ICD-10-CM

## 2023-10-12 DIAGNOSIS — R06.09 DYSPNEA ON EXERTION: ICD-10-CM

## 2023-10-12 DIAGNOSIS — H61.22 IMPACTED CERUMEN OF LEFT EAR: ICD-10-CM

## 2023-10-12 DIAGNOSIS — H91.93 BILATERAL HEARING LOSS, UNSPECIFIED HEARING LOSS TYPE: ICD-10-CM

## 2023-10-12 DIAGNOSIS — D69.6 THROMBOCYTOPENIA (HCC): ICD-10-CM

## 2023-10-12 DIAGNOSIS — G47.33 OSA (OBSTRUCTIVE SLEEP APNEA): ICD-10-CM

## 2023-10-12 DIAGNOSIS — Z23 ENCOUNTER FOR IMMUNIZATION: ICD-10-CM

## 2023-10-12 PROCEDURE — 99214 OFFICE O/P EST MOD 30 MIN: CPT | Performed by: NURSE PRACTITIONER

## 2023-10-12 PROCEDURE — 69209 REMOVE IMPACTED EAR WAX UNI: CPT | Performed by: NURSE PRACTITIONER

## 2023-10-12 PROCEDURE — 90662 IIV NO PRSV INCREASED AG IM: CPT

## 2023-10-12 PROCEDURE — G0439 PPPS, SUBSEQ VISIT: HCPCS | Performed by: NURSE PRACTITIONER

## 2023-10-12 PROCEDURE — G0008 ADMIN INFLUENZA VIRUS VAC: HCPCS

## 2023-10-12 NOTE — PROGRESS NOTES
Assessment and Plan:     Problem List Items Addressed This Visit          Respiratory    DANIEL (obstructive sleep apnea)     Intolerant to c-pap so using bi-pap as managed by Dr Sofie Land  F/U due in July 2024            Hematopoietic and Hemostatic    Thrombocytopenia ([address])     Update CBC as ordered            Other    Hypercholesterolemia     ASCVD risk of 11.8%   Update FLP as ordered - will likely recommend start statin pending results         Relevant Orders    CBC and differential    Comprehensive metabolic panel    TSH, 3rd generation with Free T4 reflex    Lipid Panel with Direct LDL reflex    Dyspnea on exertion     Seems to be improving  S/P normal stress test & cardiology eval - scheduled for follow up in Dec  Advise he can pursue further eval w/Dr Sofie Land if sx's persist/ worsen          Other Visit Diagnoses       Medicare annual wellness visit, subsequent    -  Primary    AWV updated, next due in 1 year; flu shot given    Bilateral hearing loss, unspecified hearing loss type        he is willing to pursue hearing eval w/audiologist    Relevant Orders    Ambulatory Referral to Audiology    Impacted cerumen of left ear        complete removal w/lavage    Encounter for immunization        Relevant Orders    influenza vaccine, high-dose, PF 0.7 mL (FLUZONE HIGH-DOSE) (Completed)             Preventive health issues were discussed with patient, and age appropriate screening tests were ordered as noted in patient's After Visit Summary. Personalized health advice and appropriate referrals for health education or preventive services given if needed, as noted in patient's After Visit Summary. History of Present Illness:     Patient presents for a Medicare Wellness Visit    Here with wife for routine follow up and AWV. Saw cardiology for eval of SOB and had normal stress test done. Climbed steps easier today than he did at last appt. [facility].  Has been out of routine w/having to deal with dad's death (he is the executor). Patient Care Team:  FADUMO Stevenson as PCP - General (Family Medicine)  MD Maria Luisa Graham MD     Review of Systems:       Review of Systems   Constitutional:  Positive for fatigue (falls asleep easy during the day when he is sitting watching TV, wife states this is happening less since he is busier recently). HENT:  Positive for hearing loss. Respiratory:  Positive for shortness of breath (improving, climbed steps to office today). Cardiovascular: Negative. Psychiatric/Behavioral:  Negative for sleep disturbance (improved w/using bi-pap).          Problem List:     Patient Active Problem List   Diagnosis    Thrombocytopenia (HCC)    DDD (degenerative disc disease), lumbar    Hypercholesterolemia    Major depression, chronic    Other cirrhosis of liver (HCC)    Enlarged prostate    Esophageal varices determined by endoscopy [facility])    Colon cancer screening    Personal history of colonic polyps    Bilateral lower extremity edema    Lumbar spinal stenosis    Lumbar radiculopathy    Chronic pain syndrome    Unspecified abnormalities of gait and mobility    Cervical spinal stenosis    Sleep apnea    BMI 36.0-36.9,adult    Encounter for postoperative care related to surgical joint fusion    Neurogenic claudication    S/P cervical spinal fusion    CPAP (continuous positive airway pressure) dependence    GERD (gastroesophageal reflux disease)    DANIEL (obstructive sleep apnea)    Dyspnea on exertion      Past Medical and Surgical History:     Past Medical History:   Diagnosis Date    Abscess of back 03/01/2018    Acquired pancytopenia ([address]) 01/16/2017    Acquired thrombocytopenia ([address]) 01/16/2017    Benign essential hypertension     Last Assessed:12/19/16; Pt reports heart and BP has been "fine" as of 4/16/2020    Bilateral lower extremity edema 8/7/2020    Cirrhosis (HCC)     Colon polyp     CPAP (continuous positive airway pressure) dependence Cyst of skin     x6 from neck down back area    Depression     Esophageal varices (HCC)     GERD (gastroesophageal reflux disease)     Liver disease     Macrocytosis     Last Assessed:1/16/17    Major depression, chronic     Last Assessed:12/21/17    Major depression, chronic 06/19/2017    Myelopathy ([address]) 4/27/2021    Personal history of colonic polyps     Sleep apnea     SOB (shortness of breath) on exertion      Past Surgical History:   Procedure Laterality Date    CHOLECYSTECTOMY  11/2018    CHOLECYSTECTOMY LAPAROSCOPIC N/A 11/21/2018    Procedure: CHOLECYSTECTOMY LAPAROSCOPIC, wedge liver biopsy;  Surgeon: Ez Solis MD;  Location: [facility];  Service: General    COLONOSCOPY  05/2011    COLONOSCOPY  05/2016    COLONOSCOPY      EGD  01/2019    Esophagitis, esophageal varices    GALLBLADDER SURGERY      INCISION AND DRAINAGE OF WOUND N/A 03/01/2018    SEBACEOUS CYST ABSCESS OF BACK-VIJAYA MONZON MD    ME ARTHRD PST/PSTLAT TQ 1NTRSPC CRV BELW C2 SEGMENT N/A 5/24/2021    Procedure: Posterior cervical decompressive laminectomy and lateral mass fixation fusion C3-5;  Surgeon: Hemal Locke MD;  Location: [facility];  Service: Neurosurgery    ME EXC B9 LESION MRGN XCP SK TG T/A/L 1.1-2.0 CM N/A 8/22/2018    Procedure: EXCISION  BIOPSY LESION/MASS BACK X4 NECK X1;  Surgeon: Ez Solis MD;  Location: [facility];  Service: General    ME VO FACETECTOMY & Ramona Brandyn 1 VRT SGM LUMBAR Left 7/8/2022    Procedure: L3-4, L4-5, and L5-S1 metrx decompressive hemilaminectomy with bilateral foraminotomy and discectomy;  Surgeon: Karthik Sellers MD;  Location: [facility];  Service: Neurosurgery      Family History:     Family History   Problem Relation Age of Onset    Heart disease Mother         valve replacement    Alzheimer's disease Mother     Valvular heart disease Father     Heart disease Brother         valve replacement    Stroke Brother         Mini    Valvular heart disease Brother     Colon cancer Neg Hx     Colon polyps Neg Hx       Social History:     Social History     Socioeconomic History    Marital status: /Civil Union     Spouse name: None    Number of children: None    Years of education: None    Highest education level: None   Occupational History    None   Tobacco Use    Smoking status: Never    Smokeless tobacco: Never   Vaping Use    Vaping Use: Never used   Substance and Sexual Activity    Alcohol use: Not Currently     Comment: 7/19/19-last drink 12/2018- Occasionally/1-2 drinks per weekend    Drug use: No    Sexual activity: None   Other Topics Concern    None   Social History Narrative    Always uses seatbelt     Social Determinants of Health     Financial Resource Strain: Low Risk  (10/12/2023)    Overall Financial Resource Strain (CARDIA)     Difficulty of Paying Living Expenses: Not hard at all   Food Insecurity: Not on file   Transportation Needs: No Transportation Needs (10/12/2023)    PRAPARE - Transportation     Lack of Transportation (Medical): No     Lack of Transportation (Non-Medical): No   Physical Activity: Not on file   Stress: Not on file   Social Connections: Not on file   Intimate Partner Violence: Not on file   Housing Stability: Not on file      Medications and Allergies:     Current Outpatient Medications   Medication Sig Dispense Refill    b complex vitamins tablet Take 1 tablet by mouth every morning      buPROPion (WELLBUTRIN XL) 150 mg 24 hr tablet Take 1 tablet (150 mg total) by mouth daily 90 tablet 1    multivitamin (THERAGRAN) TABS Take 1 tablet by mouth every morning      nadolol (CORGARD) 40 mg tablet Take 1 tablet (40 mg total) by mouth daily 90 tablet 3    omeprazole (PriLOSEC) 20 mg delayed release capsule Take 1 capsule (20 mg total) by mouth daily 90 capsule 3    torsemide (DEMADEX) 20 mg tablet Take 1 tablet (20 mg total) by mouth daily 90 tablet 3     No current facility-administered medications for this visit.      No Known Allergies Immunizations:     Immunization History   Administered Date(s) Administered    COVID-19 PFIZER VACCINE 0.3 ML IM 03/17/2021, 04/08/2021, 12/19/2021    Hep A, adult 04/03/2019, 10/03/2019    Hep B, adult 04/03/2019, 05/02/2019, 10/03/2019    INFLUENZA 10/20/2014, 10/07/2015, 01/11/2018, 11/22/2018, 10/23/2019, 09/04/2020, 09/04/2020, 09/15/2021    Influenza Quadrivalent, 6-35 Months IM 01/11/2018    Influenza, high dose seasonal 0.7 mL 11/22/2018, 10/12/2023    Influenza, seasonal, injectable 10/20/2014, 10/07/2015    Pneumococcal Conjugate 13-Valent 11/22/2018, 07/24/2020, 07/24/2020    Pneumococcal Polysaccharide PPV23 08/09/2021    Td (adult), adsorbed 12/02/2011      Health Maintenance:         Topic Date Due    Colorectal Cancer Screening  10/10/2026    Hepatitis C Screening  Completed         Topic Date Due    COVID-19 Vaccine (4 - Pfizer series) 02/13/2022      Medicare Screening Tests and Risk Assessments:     Zi Valencia is here for his Subsequent Wellness visit. Last Medicare Wellness visit information reviewed, patient interviewed and updates made to the record today. Health Risk Assessment:   Patient rates overall health as fair. Patient feels that their physical health rating is same. Patient is satisfied with their life. Eyesight was rated as slightly worse. Hearing was rated as much worse. Patient feels that their emotional and mental health rating is same. Patients states they are never, rarely angry. Patient states they are always unusually tired/fatigued. Pain experienced in the last 7 days has been none. Patient states that he has experienced no weight loss or gain in last 6 months. Depression Screening:   PHQ-9 Score: 4      Fall Risk Screening: In the past year, patient has experienced: no history of falling in past year      Home Safety:  Patient does not have trouble with stairs inside or outside of their home. Patient has working smoke alarms and has working carbon monoxide detector.  Home safety hazards include: none. Nutrition:   Current diet is Regular. Medications:   Patient is currently taking over-the-counter supplements. OTC medications include: see medication list. Patient is able to manage medications. Activities of Daily Living (ADLs)/Instrumental Activities of Daily Living (IADLs):   Walk and transfer into and out of bed and chair?: Yes  Dress and groom yourself?: Yes    Bathe or shower yourself?: Yes    Feed yourself? Yes  Do your laundry/housekeeping?: Yes  Manage your money, pay your bills and track your expenses?: Yes  Make your own meals?: Yes    Do your own shopping?: Yes    Previous Hospitalizations:   Any hospitalizations or ED visits within the last 12 months?: No      Advance Care Planning:   Living will: Yes    Durable POA for healthcare: Yes    Advanced directive: Yes      PREVENTIVE SCREENINGS      Cardiovascular Screening:    General: History Lipid Disorder    Due for: Lipid Panel      Diabetes Screening:       Due for: Blood Glucose      Colorectal Cancer Screening:     General: Screening Current      Prostate Cancer Screening:    General: Screening Current      Osteoporosis Screening:    General: Screening Current      Abdominal Aortic Aneurysm (AAA) Screening:    Risk factors include: age between 70-75 yo        Lung Cancer Screening:     General: Screening Not Indicated      Hepatitis C Screening:    General: Screening Current    Screening, Brief Intervention, and Referral to Treatment (SBIRT)    Screening  Typical number of drinks in a day: 0  Typical number of drinks in a week: 0  Interpretation: Low risk drinking behavior.     AUDIT-C Screenin) How often did you have a drink containing alcohol in the past year? never  2) How many drinks did you have on a typical day when you were drinking in the past year? 0  3) How often did you have 6 or more drinks on one occasion in the past year? never    AUDIT-C Score: 0  Interpretation: Score 0-3 (male): Negative screen for alcohol misuse    Single Item Drug Screening:  How often have you used an illegal drug (including marijuana) or a prescription medication for non-medical reasons in the past year? never    Single Item Drug Screen Score: 0  Interpretation: Negative screen for possible drug use disorder    Other Counseling Topics:   Regular weightbearing exercise. Vision Screening    Right eye Left eye Both eyes   Without correction      With correction 20/40 20/40 20/30        Physical Exam:     /78   Pulse 77   Temp (!) 96.9 °F (36.1 °C)   Ht 5' 11.25" (1.81 m)   Wt 123 kg (270 lb 6.4 oz)   BMI 37.45 kg/m²     Physical Exam  Vitals reviewed. Constitutional:       General: He is not in acute distress. Appearance: Normal appearance. He is obese. HENT:      Head: Normocephalic. Right Ear: Tympanic membrane and ear canal normal.      Left Ear: There is impacted cerumen. Eyes:      General: No scleral icterus. Neck:      Thyroid: No thyromegaly. Vascular: No carotid bruit. Cardiovascular:      Rate and Rhythm: Normal rate and regular rhythm. Heart sounds: No murmur heard. Pulmonary:      Effort: Pulmonary effort is normal. No respiratory distress. Breath sounds: Normal breath sounds. Musculoskeletal:      Cervical back: Normal range of motion. Lymphadenopathy:      Cervical: No cervical adenopathy. Skin:     General: Skin is warm and dry. Neurological:      General: No focal deficit present. Mental Status: He is alert and oriented to person, place, and time. Psychiatric:         Mood and Affect: Mood normal.         Behavior: Behavior normal.          Ear cerumen removal    Date/Time: 10/12/2023 9:31 AM    Performed by: FADUMO Lucero  Authorized by: FADUMO Lucero  Universal Protocol:  Consent: Verbal consent obtained.   Consent given by: patient  Timeout called at: 10/12/2023 9:31 AM.  Patient understanding: patient states understanding of the procedure being performed  Patient identity confirmed: verbally with patient    Patient location:  Clinic  Procedure details:     Local anesthetic:  None    Location:  L ear    Procedure type: irrigation only      Approach:  External  Post-procedure details:     Complication:  None    Hearing quality:  Improved    Patient tolerance of procedure:   Tolerated well, no immediate complications      FADUMO Rosado

## 2023-10-12 NOTE — PATIENT INSTRUCTIONS
Medicare Preventive Visit Patient Instructions  Thank you for completing your Welcome to Medicare Visit or Medicare Annual Wellness Visit today. Your next wellness visit will be due in one year (10/12/2024). The screening/preventive services that you may require over the next 5-10 years are detailed below. Some tests may not apply to you based off risk factors and/or age. Screening tests ordered at today's visit but not completed yet may show as past due. Also, please note that scanned in results may not display below. Preventive Screenings:  Service Recommendations Previous Testing/Comments   Colorectal Cancer Screening  Colonoscopy    Fecal Occult Blood Test (FOBT)/Fecal Immunochemical Test (FIT)  Fecal DNA/Cologuard Test  Flexible Sigmoidoscopy Age: 43-73 years old   Colonoscopy: every 10 years (May be performed more frequently if at higher risk)  OR  FOBT/FIT: every 1 year  OR  Cologuard: every 3 years  OR  Sigmoidoscopy: every 5 years  Screening may be recommended earlier than age 39 if at higher risk for colorectal cancer. Also, an individualized decision between you and your healthcare provider will decide whether screening between the ages of 77-80 would be appropriate. Colonoscopy: 10/11/2021  FOBT/FIT: Not on file  Cologuard: Not on file  Sigmoidoscopy: Not on file          Prostate Cancer Screening Individualized decision between patient and health care provider in men between ages of 53-66   Medicare will cover every 12 months beginning on the day after your 50th birthday PSA: 0.47 ng/mL           Hepatitis C Screening Once for adults born between 1945 and 1965  More frequently in patients at high risk for Hepatitis C Hep C Antibody: 11/21/2018        Diabetes Screening 1-2 times per year if you're at risk for diabetes or have pre-diabetes Fasting glucose: 90 mg/dL (9/2/2022)  A1C: 5.5 % (6/20/2022)      Cholesterol Screening Once every 5 years if you don't have a lipid disorder.  May order more often based on risk factors. Lipid panel: 09/02/2022         Other Preventive Screenings Covered by Medicare:  Abdominal Aortic Aneurysm (AAA) Screening: covered once if your at risk. You're considered to be at risk if you have a family history of AAA or a male between the age of 70-76 who smoking at least 100 cigarettes in your lifetime. Lung Cancer Screening: covers low dose CT scan once per year if you meet all of the following conditions: (1) Age 48-67; (2) No signs or symptoms of lung cancer; (3) Current smoker or have quit smoking within the last 15 years; (4) You have a tobacco smoking history of at least 20 pack years (packs per day x number of years you smoked); (5) You get a written order from a healthcare provider. Glaucoma Screening: covered annually if you're considered high risk: (1) You have diabetes OR (2) Family history of glaucoma OR (3)  aged 48 and older OR (3)  American aged 72 and older  Osteoporosis Screening: covered every 2 years if you meet one of the following conditions: (1) Have a vertebral abnormality; (2) On glucocorticoid therapy for more than 3 months; (3) Have primary hyperparathyroidism; (4) On osteoporosis medications and need to assess response to drug therapy. HIV Screening: covered annually if you're between the age of 14-79. Also covered annually if you are younger than 13 and older than 72 with risk factors for HIV infection. For pregnant patients, it is covered up to 3 times per pregnancy.     Immunizations:  Immunization Recommendations   Influenza Vaccine Annual influenza vaccination during flu season is recommended for all persons aged >= 6 months who do not have contraindications   Pneumococcal Vaccine   * Pneumococcal conjugate vaccine = PCV13 (Prevnar 13), PCV15 (Vaxneuvance), PCV20 (Prevnar 20)  * Pneumococcal polysaccharide vaccine = PPSV23 (Pneumovax) Adults 10-75 yo with certain risk factors or if 69+ yo  If never received any pneumonia vaccine: recommend Prevnar 21 (PCV20)  Give PCV20 if previously received 1 dose of PCV13 or PPSV23   Hepatitis B Vaccine 3 dose series if at intermediate or high risk (ex: diabetes, end stage renal disease, liver disease)   Respiratory syncytial virus (RSV) Vaccine - COVERED BY MEDICARE PART D  * RSVPreF3 (Arexvy) CDC recommends that adults 61years of age and older may receive a single dose of RSV vaccine using shared clinical decision-making (SCDM)   Tetanus (Td) Vaccine - COST NOT COVERED BY MEDICARE PART B Following completion of primary series, a booster dose should be given every 10 years to maintain immunity against tetanus. Td may also be given as tetanus wound prophylaxis. Tdap Vaccine - COST NOT COVERED BY MEDICARE PART B Recommended at least once for all adults. For pregnant patients, recommended with each pregnancy. Shingles Vaccine (Shingrix) - COST NOT COVERED BY MEDICARE PART B  2 shot series recommended in those 19 years and older who have or will have weakened immune systems or those 50 years and older     Health Maintenance Due:      Topic Date Due   • Colorectal Cancer Screening  10/10/2026   • Hepatitis C Screening  Completed     Immunizations Due:      Topic Date Due   • COVID-19 Vaccine (4 - Pfizer series) 02/13/2022   • Influenza Vaccine (1) 09/01/2023     Advance Directives   What are advance directives? Advance directives are legal documents that state your wishes and plans for medical care. These plans are made ahead of time in case you lose your ability to make decisions for yourself. Advance directives can apply to any medical decision, such as the treatments you want, and if you want to donate organs. What are the types of advance directives? There are many types of advance directives, and each state has rules about how to use them. You may choose a combination of any of the following:  Living will: This is a written record of the treatment you want.  You can also choose which treatments you do not want, which to limit, and which to stop at a certain time. This includes surgery, medicine, IV fluid, and tube feedings. Durable power of  for [facility]): This is a written record that states who you want to make healthcare choices for you when you are unable to make them for yourself. This person, called a proxy, is usually a family member or a friend. You may choose more than 1 proxy. Do not resuscitate (DNR) order:  A DNR order is used in case your heart stops beating or you stop breathing. It is a request not to have certain forms of treatment, such as CPR. A DNR order may be included in other types of advance directives. Medical directive: This covers the care that you want if you are in a coma, near death, or unable to make decisions for yourself. You can list the treatments you want for each condition. Treatment may include pain medicine, surgery, blood transfusions, dialysis, IV or tube feedings, and a ventilator (breathing machine). Values history: This document has questions about your views, beliefs, and how you feel and think about life. This information can help others choose the care that you would choose. Why are advance directives important? An advance directive helps you control your care. Although spoken wishes may be used, it is better to have your wishes written down. Spoken wishes can be misunderstood, or not followed. Treatments may be given even if you do not want them. An advance directive may make it easier for your family to make difficult choices about your care. Weight Management   Why it is important to manage your weight:  Being overweight increases your risk of health conditions such as heart disease, high blood pressure, type 2 diabetes, and certain types of cancer. It can also increase your risk for osteoarthritis, sleep apnea, and other respiratory problems. Aim for a slow, steady weight loss.  Even a small amount of weight loss can lower your risk of health problems. How to lose weight safely:  A safe and healthy way to lose weight is to eat fewer calories and get regular exercise. You can lose up about 1 pound a week by decreasing the number of calories you eat by 500 calories each day. Healthy meal plan for weight management:  A healthy meal plan includes a variety of foods, contains fewer calories, and helps you stay healthy. A healthy meal plan includes the following:  Eat whole-grain foods more often. A healthy meal plan should contain fiber. Fiber is the part of grains, fruits, and vegetables that is not broken down by your body. Whole-grain foods are healthy and provide extra fiber in your diet. Some examples of whole-grain foods are whole-wheat breads and pastas, oatmeal, brown rice, and bulgur. Eat a variety of vegetables every day. Include dark, leafy greens such as spinach, kale, neli greens, and mustard greens. Eat yellow and orange vegetables such as carrots, sweet potatoes, and winter squash. Eat a variety of fruits every day. Choose fresh or canned fruit (canned in its own juice or light syrup) instead of juice. Fruit juice has very little or no fiber. Eat low-fat dairy foods. Drink fat-free (skim) milk or 1% milk. Eat fat-free yogurt and low-fat cottage cheese. Try low-fat cheeses such as mozzarella and other reduced-fat cheeses. Choose meat and other protein foods that are low in fat. Choose beans or other legumes such as split peas or lentils. Choose fish, skinless poultry (chicken or turkey), or lean cuts of red meat (beef or pork). Before you cook meat or poultry, cut off any visible fat. Use less fat and oil. Try baking foods instead of frying them. Add less fat, such as margarine, sour cream, regular salad dressing and mayonnaise to foods. Eat fewer high-fat foods. Some examples of high-fat foods include french fries, doughnuts, ice cream, and cakes. Eat fewer sweets.   Limit foods and drinks that are high in sugar. This includes candy, cookies, regular soda, and sweetened drinks. Exercise:  Exercise at least 30 minutes per day on most days of the week. Some examples of exercise include walking, biking, dancing, and swimming. You can also fit in more physical activity by taking the stairs instead of the elevator or parking farther away from stores. Ask your healthcare provider about the best exercise plan for you. © Copyright [employer] 2018 Information is for End User's use only and may not be sold, redistributed or otherwise used for commercial purposes.  All illustrations and images included in CareNotes® are the copyrighted property of A.D.A.M., Inc. or  [address]

## 2023-10-12 NOTE — ASSESSMENT & PLAN NOTE
Intolerant to c-pap so using bi-pap as managed by Dr Jaime Pineda  F/U due in July 2024 (0) indicator not present

## 2023-10-12 NOTE — ASSESSMENT & PLAN NOTE
Seems to be improving  S/P normal stress test & cardiology eval - scheduled for follow up in Dec  Advise he can pursue further eval w/Dr Sofie Land if sx's persist/ worsen

## 2023-10-19 ENCOUNTER — APPOINTMENT (OUTPATIENT)
Dept: LAB | Facility: HOSPITAL | Age: 68
End: 2023-10-19
Payer: MEDICARE

## 2023-10-19 DIAGNOSIS — E78.00 HYPERCHOLESTEROLEMIA: ICD-10-CM

## 2023-10-19 DIAGNOSIS — D69.6 THROMBOCYTOPENIA (HCC): ICD-10-CM

## 2023-10-19 LAB
ALBUMIN SERPL BCP-MCNC: 3.5 G/DL (ref 3.5–5)
ALP SERPL-CCNC: 128 U/L (ref 34–104)
ALT SERPL W P-5'-P-CCNC: 37 U/L (ref 7–52)
ANION GAP SERPL CALCULATED.3IONS-SCNC: 7 MMOL/L
AST SERPL W P-5'-P-CCNC: 75 U/L (ref 13–39)
BILIRUB SERPL-MCNC: 1.63 MG/DL (ref 0.2–1)
BUN SERPL-MCNC: 19 MG/DL (ref 5–25)
CALCIUM SERPL-MCNC: 9.1 MG/DL (ref 8.4–10.2)
CHLORIDE SERPL-SCNC: 107 MMOL/L (ref 96–108)
CHOLEST SERPL-MCNC: 140 MG/DL
CO2 SERPL-SCNC: 25 MMOL/L (ref 21–32)
CREAT SERPL-MCNC: 1.03 MG/DL (ref 0.6–1.3)
ERYTHROCYTE [DISTWIDTH] IN BLOOD BY AUTOMATED COUNT: 13.2 % (ref 11.6–15.1)
GFR SERPL CREATININE-BSD FRML MDRD: 73 ML/MIN/1.73SQ M
GLUCOSE P FAST SERPL-MCNC: 99 MG/DL (ref 65–99)
HCT VFR BLD AUTO: 42.5 % (ref 36.5–49.3)
HDLC SERPL-MCNC: 49 MG/DL
HGB BLD-MCNC: 14.5 G/DL (ref 12–17)
LDLC SERPL CALC-MCNC: 74 MG/DL (ref 0–100)
MCH RBC QN AUTO: 34.9 PG (ref 26.8–34.3)
MCHC RBC AUTO-ENTMCNC: 34.1 G/DL (ref 31.4–37.4)
MCV RBC AUTO: 102 FL (ref 82–98)
PLATELET # BLD AUTO: 92 THOUSANDS/UL (ref 149–390)
PMV BLD AUTO: 10.9 FL (ref 8.9–12.7)
POTASSIUM SERPL-SCNC: 4.1 MMOL/L (ref 3.5–5.3)
PROT SERPL-MCNC: 6.1 G/DL (ref 6.4–8.4)
RBC # BLD AUTO: 4.15 MILLION/UL (ref 3.88–5.62)
SODIUM SERPL-SCNC: 139 MMOL/L (ref 135–147)
TRIGL SERPL-MCNC: 84 MG/DL
TSH SERPL DL<=0.05 MIU/L-ACNC: 0.76 UIU/ML (ref 0.45–4.5)
WBC # BLD AUTO: 4.57 THOUSAND/UL (ref 4.31–10.16)

## 2023-10-19 PROCEDURE — 84443 ASSAY THYROID STIM HORMONE: CPT

## 2023-10-19 PROCEDURE — 80061 LIPID PANEL: CPT

## 2023-10-19 PROCEDURE — 36415 COLL VENOUS BLD VENIPUNCTURE: CPT

## 2023-10-19 PROCEDURE — 88184 FLOWCYTOMETRY/ TC 1 MARKER: CPT

## 2023-10-19 PROCEDURE — 88185 FLOWCYTOMETRY/TC ADD-ON: CPT

## 2023-10-19 PROCEDURE — 85027 COMPLETE CBC AUTOMATED: CPT

## 2023-10-19 PROCEDURE — 85007 BL SMEAR W/DIFF WBC COUNT: CPT

## 2023-10-19 PROCEDURE — 80053 COMPREHEN METABOLIC PANEL: CPT

## 2023-10-20 ENCOUNTER — TRANSCRIBE ORDERS (OUTPATIENT)
Dept: LAB | Facility: HOSPITAL | Age: 68
End: 2023-10-20

## 2023-10-20 DIAGNOSIS — D69.6 THROMBOCYTOPENIA (HCC): Primary | ICD-10-CM

## 2023-10-23 LAB
BASOPHILS NFR MAR MANUAL: 2 % (ref 0–1)
EOSINOPHIL NFR BLD MANUAL: 1 % (ref 0–6)
ERYTHROCYTE [DISTWIDTH] IN BLOOD BY AUTOMATED COUNT: 13.2 % (ref 11.6–15.1)
HCT VFR BLD AUTO: 42.5 % (ref 36.5–49.3)
HGB BLD-MCNC: 14.5 G/DL (ref 12–17)
LYMPHOCYTES # BLD AUTO: 45 % (ref 14–44)
MCH RBC QN AUTO: 34.9 PG (ref 26.8–34.3)
MCHC RBC AUTO-ENTMCNC: 34.1 G/DL (ref 31.4–37.4)
MCV RBC AUTO: 102 FL (ref 82–98)
MONOCYTES NFR BLD: 5 % (ref 4–12)
NEUTS SEG NFR BLD AUTO: 47 % (ref 43–75)
PATHOLOGY REVIEW: YES
PLATELET # BLD AUTO: 92 THOUSANDS/UL (ref 149–390)
PLATELET BLD QL SMEAR: ABNORMAL
PMV BLD AUTO: 10.9 FL (ref 8.9–12.7)
RBC # BLD AUTO: 4.15 MILLION/UL (ref 3.88–5.62)
SCAN RESULT: NORMAL
TOTAL CELLS COUNTED SPEC: 100
WBC # BLD AUTO: 4.57 THOUSAND/UL (ref 4.31–10.16)

## 2023-10-24 DIAGNOSIS — D69.6 THROMBOCYTOPENIA (HCC): Primary | ICD-10-CM

## 2023-10-25 ENCOUNTER — TELEPHONE (OUTPATIENT)
Dept: HEMATOLOGY ONCOLOGY | Facility: CLINIC | Age: 68
End: 2023-10-25

## 2023-10-25 NOTE — TELEPHONE ENCOUNTER
Appointment Schedule   Who are you speaking with? Patient   If it is not the patient, are they listed on an active communication consent form? N/A   Which provider is the appointment scheduled with? FADUMO Talley   At which location is the appointment scheduled for? [place]   When is the appointment scheduled? Please list date and time 11/17/23 @ 9   What is the reason for this appointment? Thrombocytopenia    Did patient voice understanding of the details of this appointment? Yes   Was the no show policy reviewed with patient?  Yes

## 2023-10-25 NOTE — TELEPHONE ENCOUNTER
I called Maddison Salamanca in response to a referral that was received for patient to establish care with Hematology. Outreach was made to schedule a consultation. Patient said he will call back to schedule. The referral has been closed.

## 2023-11-15 NOTE — PROGRESS NOTES
Hematology/Oncology Outpatient Consult Note  Elza Greene 77 y.o. male MRN: @ Encounter: [id number]        Date:  11/17/2023        CC: Thrombocytopenia      HPI:  Elza Greene is a 41-year-old gentleman with a history of Sim Phlegm cirrhosis, esophageal varices and chronic thrombocytopenia. He is referred to hematology for thrombocytopenia and is being seen for initial consultation 11/17/2023     Blood work on file reviewed. Patient has chronic thrombocytopenia dating back to at least 2018. Over the years his platelets have fluctuated between 90 and 113. He also has a history of mild macrocytosis without anemia. No leukopenia, or concerns on differential    Most recent CBC from 10/19/2023 showed white count 4.57, hemoglobin 14.5, , platelet count of 92. Peripheral blood smear shows hyperchromic macrocytic RBCs with no schistocytes, mild thrombocytopenia, neutrophils with dyspoeitic changes. Flow cytometric analysis does not show significant numbers of circulating blasts or an abnormal lymphoid or myeloid population. CMP has been consistent with mild hyperbilirubinemia, elevated alk phos, intermittent elevations in AST.    6/7/23 right upper quadrant abdominal ultrasound demonstrated evidence of cirrhosis and portal hypertension. Exam stable  8/2022 MRI of abdomen consistent with cirrhosis, sequelae of portal hypertension with distal esophageal varices and recanalized umbilical vein. No evidence of HCC. He follows closely with GI, Dr. Sancho Fontaine. He denies any abnormal bleeding or bruising. No epistaxis, gingival bleeding, melena, hematochezia, hematuria. Denies abdominal pain. Test Results:    Imaging: No results found.     Labs:   Lab Results   Component Value Date    WBC 4.57 10/19/2023    HGB 14.5 10/19/2023    HCT 42.5 10/19/2023     (H) 10/19/2023    PLT 92 (L) 10/19/2023     Lab Results   Component Value Date     12/23/2016    K 4.1 10/19/2023     10/19/2023    CO2 25 10/19/2023    ANIONGAP 8 06/06/2014    BUN 19 10/19/2023    CREATININE 1.03 10/19/2023    GLUCOSE 125 05/24/2021    GLUF 99 10/19/2023    CALCIUM 9.1 10/19/2023    CORRECTEDCA 9.7 09/02/2022    AST 75 (H) 10/19/2023    ALT 37 10/19/2023    ALKPHOS 128 (H) 10/19/2023    PROT 6.3 12/23/2016    BILITOT 0.3 12/23/2016    EGFR 73 10/19/2023         ROS:  Review of Systems   All other systems reviewed and are negative.           Active Problems:   Patient Active Problem List   Diagnosis    Thrombocytopenia (HCC)    DDD (degenerative disc disease), lumbar    Hypercholesterolemia    Major depression, chronic    Other cirrhosis of liver (HCC)    Enlarged prostate    Esophageal varices determined by endoscopy [facility])    Colon cancer screening    Personal history of colonic polyps    Bilateral lower extremity edema    Lumbar spinal stenosis    Lumbar radiculopathy    Chronic pain syndrome    Unspecified abnormalities of gait and mobility    Cervical spinal stenosis    Sleep apnea    BMI 36.0-36.9,adult    Encounter for postoperative care related to surgical joint fusion    Neurogenic claudication    S/P cervical spinal fusion    CPAP (continuous positive airway pressure) dependence    GERD (gastroesophageal reflux disease)    DANIEL (obstructive sleep apnea)    Dyspnea on exertion       Past Medical History:   Past Medical History:   Diagnosis Date    Abscess of back 03/01/2018    Acquired pancytopenia ([address]) 01/16/2017    Acquired thrombocytopenia ([address]) 01/16/2017    Benign essential hypertension     Last Assessed:12/19/16; Pt reports heart and BP has been "fine" as of 4/16/2020    Bilateral lower extremity edema 8/7/2020    Cirrhosis (HCC)     Colon polyp     CPAP (continuous positive airway pressure) dependence     Cyst of skin     x6 from neck down back area    Depression     Esophageal varices (HCC)     GERD (gastroesophageal reflux disease)     Liver disease     Macrocytosis     Last Assessed:1/16/17    Major depression, chronic     Last Assessed:12/21/17    Major depression, chronic 06/19/2017    Myelopathy ([address]) 4/27/2021    Personal history of colonic polyps     Sleep apnea     SOB (shortness of breath) on exertion        Surgical History:   Past Surgical History:   Procedure Laterality Date    CHOLECYSTECTOMY  11/2018    CHOLECYSTECTOMY LAPAROSCOPIC N/A 11/21/2018    Procedure: CHOLECYSTECTOMY LAPAROSCOPIC, wedge liver biopsy;  Surgeon: Briseida Dodd MD;  Location: [facility];  Service: General    COLONOSCOPY  05/2011    COLONOSCOPY  05/2016    COLONOSCOPY      EGD  01/2019    Esophagitis, esophageal varices    GALLBLADDER SURGERY      INCISION AND DRAINAGE OF WOUND N/A 03/01/2018    SEBACEOUS CYST ABSCESS OF BACK-VIJAYA MONZON MD    HI ARTHRD PST/PSTLAT TQ 1NTRSPC CRV BELW C2 SEGMENT N/A 5/24/2021    Procedure: Posterior cervical decompressive laminectomy and lateral mass fixation fusion C3-5;  Surgeon: Rom Rodriguez MD;  Location[facility];  Service: Neurosurgery    HI EXC B9 LESION MRGN XCP SK TG T/A/L 1.1-2.0 CM N/A 8/22/2018    Procedure: EXCISION  BIOPSY LESION/MASS BACK X4 NECK X1;  Surgeon: Briseida Dodd MD;  Location: [facility];  Service: General    HI VO FACETECTOMY & FORAMOTOMY 1 VRT SGM LUMBAR Left 7/8/2022    Procedure: L3-4, L4-5, and L5-S1 metrx decompressive hemilaminectomy with bilateral foraminotomy and discectomy;  Surgeon: Nichol Wood MD;  Location[facility];  Service: Neurosurgery       Family History:    Family History   Problem Relation Age of Onset    Heart disease Mother         valve replacement    Alzheimer's disease Mother     Valvular heart disease Father     Heart disease Brother         valve replacement    Stroke Brother         Mini    Valvular heart disease Brother     Colon cancer Neg Hx     Colon polyps Neg Hx        Cancer-related family history is negative for Colon cancer.     Social History:   Social History     Socioeconomic History    Marital status: /Civil [employer]     Spouse name: Not on file    Number of children: Not on file    Years of education: Not on file    Highest education level: Not on file   Occupational History    Not on file   Tobacco Use    Smoking status: Never    Smokeless tobacco: Never   Vaping Use    Vaping Use: Never used   Substance and Sexual Activity    Alcohol use: Not Currently     Comment: 7/19/19-last drink 12/2018- Occasionally/1-2 drinks per weekend    Drug use: No    Sexual activity: Not on file   Other Topics Concern    Not on file   Social History Narrative    Always uses seatbelt     Social Determinants of Health     Financial Resource Strain: Low Risk  (10/12/2023)    Overall Financial Resource Strain (CARDIA)     Difficulty of Paying Living Expenses: Not hard at all   Food Insecurity: Not on file   Transportation Needs: No Transportation Needs (10/12/2023)    PRAPARE - Transportation     Lack of Transportation (Medical): No     Lack of Transportation (Non-Medical): No   Physical Activity: Not on file   Stress: Not on file   Social Connections: Not on file   Intimate Partner Violence: Not on file   Housing Stability: Not on file       Current Medications:   Current Outpatient Medications   Medication Sig Dispense Refill    b complex vitamins tablet Take 1 tablet by mouth every morning      buPROPion (WELLBUTRIN XL) 150 mg 24 hr tablet Take 1 tablet (150 mg total) by mouth daily 90 tablet 1    multivitamin (THERAGRAN) TABS Take 1 tablet by mouth every morning      nadolol (CORGARD) 40 mg tablet Take 1 tablet (40 mg total) by mouth daily 90 tablet 3    omeprazole (PriLOSEC) 20 mg delayed release capsule Take 1 capsule (20 mg total) by mouth daily 90 capsule 3    torsemide (DEMADEX) 20 mg tablet Take 1 tablet (20 mg total) by mouth daily 90 tablet 3     No current facility-administered medications for this visit. Allergies: No Known Allergies      Physical Exam:    Body surface area is 2.41 meters squared.     Wt Readings from Last 3 Encounters:   11/17/23 124 kg (274 lb)   10/12/23 123 kg (270 lb 6.4 oz)   08/29/23 127 kg (279 lb)        Temp Readings from Last 3 Encounters:   11/17/23 98.4 °F (36.9 °C) (Temporal)   10/12/23 (!) 96.9 °F (36.1 °C)   07/05/23 97.6 °F (36.4 °C) (Temporal)        BP Readings from Last 3 Encounters:   11/17/23 119/70   10/12/23 116/78   08/29/23 132/86         Pulse Readings from Last 3 Encounters:   11/17/23 74   10/12/23 77   08/29/23 74          Physical Exam  Constitutional:       General: He is not in acute distress. Appearance: He is well-developed. He is not diaphoretic. HENT:      Head: Normocephalic and atraumatic. Mouth/Throat:      Pharynx: No oropharyngeal exudate. Eyes:      General: No scleral icterus. Pupils: Pupils are equal, round, and reactive to light. Cardiovascular:      Rate and Rhythm: Normal rate and regular rhythm. Heart sounds: No murmur heard. Pulmonary:      Effort: Pulmonary effort is normal. No respiratory distress. Breath sounds: Normal breath sounds. Abdominal:      General: Bowel sounds are normal. There is no distension. Palpations: Abdomen is soft. There is no mass. Tenderness: There is no abdominal tenderness. Musculoskeletal:         General: Normal range of motion. Cervical back: Normal range of motion and neck supple. Lymphadenopathy:      Cervical: No cervical adenopathy. Skin:     General: Skin is warm and dry. Findings: No bruising. Neurological:      General: No focal deficit present. Mental Status: He is alert and oriented to person, place, and time. Psychiatric:         Mood and Affect: Mood normal.         Behavior: Behavior normal.         Thought Content: Thought content normal.         Judgment: Judgment normal.           Assessment/ Plan:    1.  Thrombocytopenia ([address])    2. Other cirrhosis of liver ([address])    3. Esophageal varices determined by endoscopy [facility])      Patient is a very pleasant 68-year-old gentleman with known Tovar cirrhosis. He follows closely with GI. He has a history of chronic thrombocytopenia, I believe to be secondary to underlying liver disease. He has no evidence of any other cytopenias or concerns on differential.  His platelet count over the years has fluctuated between 90 and 100. He is asymptomatic. Patient has chronic thrombocytopenia secondary to cirrhosis, liver disease. He has no evidence of myelodysplasia. No signs of bleeding or bruising noted on exam.    I did review signs and symptoms of severe thrombocytopenia including mucosal bleeding such as gum bleeding, epistaxis, bleeding into the urine or stools. He also understands another possible manifestation is a petechial like rash. If he develops any of the symptoms she is to call our office     There is no recommended work-up or treatment indicated for his mild chronic thrombocytopenia. He does follow closely with GI and his PCP. Routine monitoring of CBC and platelet count is recommended. He does not need ongoing hematologic follow-up. If he develops sudden drop in his platelet count or any other CBC abnormalities, I would be happy to see him back to discuss work-up that may be indicated. Patient and his spouse were in agreement. Again, I did urge him to notify either hematology or one of his other physicians if he does develop any abnormal bleeding or excessive bruising as this would be an indication that his platelet count has dropped. Portions of the record may have been created with voice recognition software. Occasional wrong word or "sound a like" substitutions may have occurred due to the inherent limitations of voice recognition software. Read the chart carefully and recognize, using context, where substitutions have occurred.

## 2023-11-17 ENCOUNTER — OFFICE VISIT (OUTPATIENT)
Age: 68
End: 2023-11-17
Payer: MEDICARE

## 2023-11-17 VITALS
RESPIRATION RATE: 16 BRPM | DIASTOLIC BLOOD PRESSURE: 70 MMHG | SYSTOLIC BLOOD PRESSURE: 119 MMHG | HEART RATE: 74 BPM | TEMPERATURE: 98.4 F | WEIGHT: 274 LBS | BODY MASS INDEX: 38.36 KG/M2 | HEIGHT: 71 IN | OXYGEN SATURATION: 98 %

## 2023-11-17 DIAGNOSIS — K74.69 OTHER CIRRHOSIS OF LIVER (HCC): ICD-10-CM

## 2023-11-17 DIAGNOSIS — I85.00 ESOPHAGEAL VARICES DETERMINED BY ENDOSCOPY (HCC): ICD-10-CM

## 2023-11-17 DIAGNOSIS — D69.6 THROMBOCYTOPENIA (HCC): Primary | ICD-10-CM

## 2023-11-17 PROCEDURE — 99204 OFFICE O/P NEW MOD 45 MIN: CPT | Performed by: NURSE PRACTITIONER

## 2023-12-04 ENCOUNTER — OFFICE VISIT (OUTPATIENT)
Dept: CARDIOLOGY CLINIC | Facility: CLINIC | Age: 68
End: 2023-12-04
Payer: MEDICARE

## 2023-12-04 VITALS
DIASTOLIC BLOOD PRESSURE: 68 MMHG | SYSTOLIC BLOOD PRESSURE: 122 MMHG | BODY MASS INDEX: 38.5 KG/M2 | WEIGHT: 275 LBS | HEART RATE: 81 BPM | HEIGHT: 71 IN | OXYGEN SATURATION: 96 %

## 2023-12-04 DIAGNOSIS — E78.00 HYPERCHOLESTEROLEMIA: ICD-10-CM

## 2023-12-04 DIAGNOSIS — R60.0 BILATERAL LOWER EXTREMITY EDEMA: ICD-10-CM

## 2023-12-04 DIAGNOSIS — R06.09 DYSPNEA ON EXERTION: Primary | ICD-10-CM

## 2023-12-04 DIAGNOSIS — G47.33 OSA (OBSTRUCTIVE SLEEP APNEA): ICD-10-CM

## 2023-12-04 PROCEDURE — 99214 OFFICE O/P EST MOD 30 MIN: CPT | Performed by: INTERNAL MEDICINE

## 2023-12-04 NOTE — PATIENT INSTRUCTIONS
Low-Sodium Diet   AMBULATORY CARE:   A low-sodium diet  limits foods that are high in sodium (salt). You will need to follow a low-sodium diet if you have high blood pressure, kidney disease, or heart failure. You may also need to follow this diet if you have a condition that is causing your body to retain (hold) extra fluid. You may need to limit the amount of sodium you eat in a day to 1,500 to 2,000 mg. Ask your healthcare provider how much sodium you can have each day. How to use food labels to choose foods that are low in sodium:  Read food labels to find the amount of sodium they contain. The amount of sodium is listed in milligrams (mg). The % Daily Value (DV) column tells you how much of your daily needs are met by 1 serving of the food for each nutrient listed. Choose foods that have less than 5% of the DV of sodium. These foods are considered low in sodium. Foods that have 20% or more of the DV of sodium are considered high in sodium. Some food labels may also list any of the following terms that tell you about the sodium content in the food:  Sodium-free:  Less than 5 mg in each serving    Very low sodium:  35 mg of sodium or less in each serving    Low sodium:  140 mg of sodium or less in each serving    Reduced sodium: At least 25% less sodium in each serving than the regular type    Light in sodium:  50% less sodium in each serving    Unsalted or no added salt:  No extra salt is added during processing (the food may still contain sodium)       Foods to avoid:  Salty foods are high in sodium.  You should avoid the following:  Processed foods:      Mixes for cornbread, biscuits, cake, and pudding     Instant foods, such as potatoes, cereals, noodles, and rice     Packaged foods, such as bread stuffing, rice and pasta mixes, snack dip mixes, and macaroni and cheese     Canned foods, such as canned vegetables, soups, broths, sauces, and vegetable or tomato juice    Snack foods, such as salted chips, popcorn, pretzels, pork rinds, salted crackers, and salted nuts    Frozen foods, such as dinners, entrees, vegetables with sauces, and breaded meats    Sauerkraut, pickled vegetables, and other foods prepared in brine    Meats and cheeses:      Smoked or cured meat, such as corned beef, saunders, ham, hot dogs, and sausage    Canned meats or spreads, such as potted meats, sardines, anchovies, and imitation seafood    Deli or lunch meats, such as bologna, ham, turkey, and roast beef    Processed cheese, such as American cheese and cheese spreads    Condiments, sauces, and seasonings:      Salt (¼ teaspoon of salt contains 575 mg of sodium)    Seasonings made with salt, such as garlic salt, celery salt, onion salt, and seasoned salt    Regular soy sauce, barbecue sauce, teriyaki sauce, steak sauce, Worcestershire sauce, and most flavored vinegars    Canned gravy and mixes     Regular condiments, such as mustard, ketchup, and salad dressings    Pickles and olives    Meat tenderizers and monosodium glutamate (MSG)    Foods to include:  Read the food label to find the exact amount of sodium in each serving. Bread and cereal:  Try to choose breads with less than 80 mg of sodium per serving. Bread, roll, dwight, tortilla, or unsalted crackers. Ready-to-eat cereals with less than 5% DV of sodium (examples include shredded wheat and puffed rice)    Pasta    Vegetables and fruits:      Unsalted fresh, frozen, or canned vegetables    Fresh, frozen, or canned fruits    Fruit juice    Dairy:  One serving has about 150 mg of sodium. Milk, all types    Yogurt    Hard cheese, such as cheddar, Swiss, [employer], or [employer] and other protein foods:  Some raw meats may have added sodium.      Plain meats, fish, and poultry     Eggs    Other foods:      Homemade pudding    Unsalted nuts, popcorn, or pretzels    Unsalted butter or margarine    Ways to get less sodium:  If you are used to the flavor of salt, it will take time to get used to low-sodium foods. Your healthcare provider or dietitian can help you create a plan for lowering sodium. The plan will be specific to your needs and your family's needs. You may focus on 1 or 2 changes each week, such as the following: Add spices and herbs to foods instead of salt during cooking. Use salt-free seasonings to add flavor to foods. Examples include onion powder, garlic powder, basil, heredia powder, paprika, and parsley. Try lemon or lime juice or vinegar to give foods a tart flavor. Use hot peppers, pepper, or cayenne pepper to add a spicy flavor. Do not keep a salt shaker at your kitchen table. This may help keep you from adding salt to food at the table. A teaspoon of salt has 2,300 mg of sodium. It may take time to get used to enjoying the natural flavor of food instead of adding salt. Talk to your healthcare provider before you use salt substitutes. Some salt substitutes have a high amount of a chemical called potassium chloride. This form of potassium needs to be avoided if you have kidney disease. Choose low-sodium foods at restaurants. Meals from restaurants are often high in sodium. Some restaurants have nutrition information on the menu that tells you the amount of sodium in their foods. If possible, ask for your food to be prepared with less, or no salt. Shop for unsalted or low-sodium foods and snacks at the grocery store. Examples include unsalted or low-sodium broths, soups, and canned vegetables. Choose fresh or frozen vegetables instead. Choose unsalted nuts or seeds or fresh fruits or vegetables as snacks. Read food labels and choose salt-free, very low-sodium, or low-sodium foods. You can also rinse canned vegetables under running water to remove extra sodium before you cook them. © Copyright Thana Givens 2023 Information is for End User's use only and may not be sold, redistributed or otherwise used for commercial purposes.   The above information is an  only. It is not intended as medical advice for individual conditions or treatments. Talk to your doctor, nurse or pharmacist before following any medical regimen to see if it is safe and effective for you.

## 2023-12-04 NOTE — PROGRESS NOTES
Cardiology Follow-up    Shawn Zelaya  [id number]  1955  [address] [place] [address] [facility] [place] [address] [id number] [phone]    1. Dyspnea on exertion        2. Bilateral lower extremity edema        3. DANIEL (obstructive sleep apnea)        4. Hypercholesterolemia              Discussion/Summary:    1. Shortness of breath with exertion, chronic fatigue and exercise intolerance - Ana Bar noticed this worsened earlier this year. Cardiac testing overall has been unremarkable. He has normal LV systolic function without any significant valve disease, with a top normal aortic root size. His stress nuclear study in September was normal.  He is already starting to feel better from a shortness of breath standpoint, likely from the effects of now being on BiPAP for sleep apnea. I have encouraged him to continue to follow-up with sleep medicine. He only needs to see us back if needed. 2.  Lower extremity edema - This is mostly associated with obstructive sleep apnea or his chronic liver disease. With restarting torsemide 20 mg daily this has improved. I did tell him he could try cutting this in half down to 10 mg daily. Given his chronic liver disease I have asked him to follow closely with gastroenterology. Blood work will be followed closely. Low-sodium diet recommended. 3.  Hypercholesterolemia - He manages this with dietary/lifestyle modifications and over-the-counter supplements. He gets blood work followed by his PCP on a regular basis. His last LDL was 67. HPI:    Mr. Abi Salguero comes in for follow-up given his risk factors for cardiovascular disease, shortness of breath and lower extremity edema. I initially saw him in consultation about 3 years ago. His symptoms include shortness of breath and exercise intolerance, with profound fatigue.   Ana Bar has no cardiac history, and his only risk factor appears to be hypercholesterolemia in which he has not been on pharmacologic treatment for. He does have cirrhosis associated with GRACIA and documented esophageal varices. He does take Nadolol for this. His family history includes 2 family members who have had valve replacements, but no premature CAD in the family. When I met him in 2020 we had him go through an exercise stress echocardiogram which was unremarkable. Earlier that same year he had an echocardiogram which showed Normal LV systolic function without significant valve disease. He had RV enlargement, mild dilation of the aortic root and mild biatrial enlargement. At that visit he also had significant lower extremity edema and prescribed a low-dose diuretic. We instructed him to come back in 6 months but he did not and just continue to follow with his PCP. He also at some point came off of the diuretic. He was also diagnosed with obstructive sleep apnea. He did go on CPAP and earlier this year he was switched over to BiPAP. He follows with sleep medicine closely. At our last visit Matt Layton was describing to me shortness of breath with exertion very similar to what he had a few years back. His wife stated that this shortness of breath has progressively gotten worse again over the last year. In June his PCP had him undergo an echocardiogram because of the symptoms. This did not show any changes from prior. He has normal LV systolic function with biatrial enlargement and mild RV enlargement. His aortic root and ascending aorta were top normal to mildly dilated. I then had him undergo a stress nuclear study which was normal.  At that visit I placed him back on torsemide 20 mg daily. Overall Matt Layton feels better since I last saw him. His shortness of breath has overall improved, likely from the effects of being on BiPAP for his sleep apnea. His edema has improved on torsemide.   He does bell with chronic fatigue which is certainly multifactorial.  He denies orthopnea or PND. No chest pain or any symptoms of angina. No palpitations, lightheadedness or syncope.           Patient Active Problem List   Diagnosis    Thrombocytopenia (HCC)    DDD (degenerative disc disease), lumbar    Hypercholesterolemia    Major depression, chronic    Other cirrhosis of liver (HCC)    Enlarged prostate    Esophageal varices determined by endoscopy [facility])    Colon cancer screening    Personal history of colonic polyps    Bilateral lower extremity edema    Lumbar spinal stenosis    Lumbar radiculopathy    Chronic pain syndrome    Unspecified abnormalities of gait and mobility    Cervical spinal stenosis    Sleep apnea    BMI 36.0-36.9,adult    Encounter for postoperative care related to surgical joint fusion    Neurogenic claudication    S/P cervical spinal fusion    CPAP (continuous positive airway pressure) dependence    GERD (gastroesophageal reflux disease)    DANIEL (obstructive sleep apnea)    Dyspnea on exertion     Past Medical History:   Diagnosis Date    Abscess of back 03/01/2018    Acquired pancytopenia ([address]) 01/16/2017    Acquired thrombocytopenia ([address]) 01/16/2017    Benign essential hypertension     Last Assessed:12/19/16; Pt reports heart and BP has been "fine" as of 4/16/2020    Bilateral lower extremity edema 8/7/2020    Cirrhosis (HCC)     Colon polyp     CPAP (continuous positive airway pressure) dependence     Cyst of skin     x6 from neck down back area    Depression     Esophageal varices (HCC)     GERD (gastroesophageal reflux disease)     Liver disease     Macrocytosis     Last Assessed:1/16/17    Major depression, chronic     Last Assessed:12/21/17    Major depression, chronic 06/19/2017    Myelopathy ([address]) 4/27/2021    Personal history of colonic polyps     Sleep apnea     SOB (shortness of breath) on exertion      Social History     Socioeconomic History    Marital status: /Civil Union     Spouse name: Not on file    Number of children: Not on file    Years of education: Not on file    Highest education level: Not on file   Occupational History    Not on file   Tobacco Use    Smoking status: Never    Smokeless tobacco: Never   Vaping Use    Vaping Use: Never used   Substance and Sexual Activity    Alcohol use: Not Currently     Comment: 7/19/19-last drink 12/2018- Occasionally/1-2 drinks per weekend    Drug use: No    Sexual activity: Not on file   Other Topics Concern    Not on file   Social History Narrative    Always uses seatbelt     Social Determinants of Health     Financial Resource Strain: Low Risk  (10/12/2023)    Overall Financial Resource Strain (CARDIA)     Difficulty of Paying Living Expenses: Not hard at all   Food Insecurity: Not on file   Transportation Needs: No Transportation Needs (10/12/2023)    PRAPARE - Transportation     Lack of Transportation (Medical): No     Lack of Transportation (Non-Medical):  No   Physical Activity: Not on file   Stress: Not on file   Social Connections: Not on file   Intimate Partner Violence: Not on file   Housing Stability: Not on file      Family History   Problem Relation Age of Onset    Heart disease Mother         valve replacement    Alzheimer's disease Mother     Valvular heart disease Father     Heart disease Brother         valve replacement    Stroke Brother         Mini    Valvular heart disease Brother     Colon cancer Neg Hx     Colon polyps Neg Hx      Past Surgical History:   Procedure Laterality Date    CHOLECYSTECTOMY  11/2018    CHOLECYSTECTOMY LAPAROSCOPIC N/A 11/21/2018    Procedure: CHOLECYSTECTOMY LAPAROSCOPIC, wedge liver biopsy;  Surgeon: Debbie Flynn MD;  Location:  MAIN OR;  Service: General    COLONOSCOPY  05/2011    COLONOSCOPY  05/2016    COLONOSCOPY      EGD  01/2019    Esophagitis, esophageal varices    GALLBLADDER SURGERY      INCISION AND DRAINAGE OF WOUND N/A 03/01/2018    SEBACE[facility] CYST ABSCESS OF BACK[facility] N/A 5/24/2021    Procedure: Posterior cervical decompressive laminectomy and lateral mass fixation fusion C3-5;  Surgeon: Cecily Boo MD;  Location: [facility];  Service: Neurosurgery    LA EXC B9 LESION MRGN XCP SK TG T/A/L 1.1-2.0 CM N/A 8/22/2018    Procedure: EXCISION  BIOPSY LESION/MASS BACK X4 NECK X1;  Surgeon: Latha Brewster MD;  Location: [facility];  Service: General    LA VO FACETECTOMY & FORAMOTOMY 1 VRT SGM LUMBAR Left 7/8/2022    Procedure: L3-4, L4-5, and L5-S1 metrx decompressive hemilaminectomy with bilateral foraminotomy and discectomy;  Surgeon: Ibeth Barcenas MD;  Location: [facility];  Service: Neurosurgery       Current Outpatient Medications:     b complex vitamins tablet, Take 1 tablet by mouth every morning, Disp: , Rfl:     buPROPion (WELLBUTRIN XL) 150 mg 24 hr tablet, Take 1 tablet (150 mg total) by mouth daily, Disp: 90 tablet, Rfl: 1    multivitamin (THERAGRAN) TABS, Take 1 tablet by mouth every morning, Disp: , Rfl:     nadolol (CORGARD) 40 mg tablet, Take 1 tablet (40 mg total) by mouth daily, Disp: 90 tablet, Rfl: 3    omeprazole (PriLOSEC) 20 mg delayed release capsule, Take 1 capsule (20 mg total) by mouth daily, Disp: 90 capsule, Rfl: 3    torsemide (DEMADEX) 20 mg tablet, Take 1 tablet (20 mg total) by mouth daily, Disp: 90 tablet, Rfl: 3  No Known Allergies  Vitals:    12/04/23 0816   BP: 122/68   BP Location: Right arm   Patient Position: Sitting   Cuff Size: Large   Pulse: 81   SpO2: 96%   Weight: 125 kg (275 lb)   Height: 5' 11.25" (1.81 m)       Labs:  Lab Results   Component Value Date     12/23/2016    K 4.1 10/19/2023    K 4.2 06/03/2022     10/19/2023     06/03/2022    CO2 25 10/19/2023    CO2 23 06/03/2022    BUN 19 10/19/2023    BUN 16 06/03/2022    CREATININE 1.03 10/19/2023    CREATININE 1.21 12/23/2016    GLUCOSE 125 05/24/2021    GLUCOSE 97 12/23/2016    CALCIUM 9.1 10/19/2023    CALCIUM 9.2 12/23/2016 Lab Results   Component Value Date    WBC 4.57 10/19/2023    WBC 5.2 12/23/2016    HGB 14.5 10/19/2023    HGB 17.3 12/23/2016    HCT 42.5 10/19/2023    HCT 50.2 12/23/2016     (H) 10/19/2023    MCV 98 (H) 12/23/2016    PLT 92 (L) 10/19/2023     (L) 12/23/2016     Lab Results   Component Value Date    CHOL 169 12/23/2016    TRIG 84 10/19/2023    TRIG 85 09/01/2021    HDL 49 10/19/2023    HDL 60 09/01/2021     Imaging:  ECG today shows sinus rhythm, low voltage, otherwise normal ECG. STRESS NUCLEAR (9/7/2023):    Stress ECG: A pharmacological stress test was performed using regadenoson. The patient experienced no angina during the test. The patient reached the end of the protocol. The patient reported dyspnea during the stress test. Symptoms ended during recovery. Stress ECG: No ST deviation is noted. The ECG was not diagnostic due to pharmacological (vasodilator) stress. Perfusion: There are no perfusion defects. Stress Function: Left ventricular function post-stress is normal. Stress ejection fraction is 70 %. Stress Combined Conclusion: The ECG and SPECT imaging portions of the stress study are concordant with no evidence of stress induced myocardial ischemia. ECHO (3/20/2023):    Left Ventricle: Left ventricular cavity size is normal. Wall thickness is mildly increased. The left ventricular ejection fraction is 60%. Systolic function is normal. Wall motion is normal. Diastolic function is normal.    Right Ventricle: Right ventricular cavity size is mildly dilated. Systolic function is normal.    Right Atrium: The atrium is mildly dilated. Aortic Valve: There is aortic valve sclerosis. Mitral Valve: There is mild regurgitation. Aorta: The aortic root is mildly dilated. The ascending aorta is mildly dilated. The aortic root is 4.00 cm. The ascending aorta is 3.8 cm. Review of Systems:  Review of Systems   Constitutional:  Positive for fatigue. HENT: Negative. Eyes: Negative. Respiratory:  Positive for shortness of breath. Cardiovascular:  Positive for leg swelling. Gastrointestinal: Negative. Musculoskeletal: Negative. Skin: Negative. Allergic/Immunologic: Negative. Neurological: Negative. Hematological: Negative. Psychiatric/Behavioral: Negative. All other systems reviewed and are negative. Vitals:    23 0816   BP: 122/68   BP Location: Right arm   Patient Position: Sitting   Cuff Size: Large   Pulse: 81   SpO2: 96%   Weight: 125 kg (275 lb)   Height: 5' 11.25" (1.81 m)     Physical Exam  Vitals and nursing note reviewed. Constitutional:       Appearance: He is well-developed. HENT:      Head: Normocephalic and atraumatic. Eyes:      General: No scleral icterus. Right eye: No discharge. Left eye: No discharge. Pupils: Pupils are equal, round, and reactive to light. Neck:      Thyroid: No thyromegaly. Vascular: No JVD. Cardiovascular:      Rate and Rhythm: Normal rate and regular rhythm. No extrasystoles are present. Pulses: Normal pulses. No decreased pulses. Heart sounds: Normal heart sounds, S1 normal and S2 normal. No murmur heard. No friction rub. No gallop. Pulmonary:      Effort: Pulmonary effort is normal. No respiratory distress. Breath sounds: Normal breath sounds. No wheezing or rales. Abdominal:      General: Bowel sounds are normal. There is no distension. Palpations: Abdomen is soft. Tenderness: There is no abdominal tenderness. Musculoskeletal:         General: No tenderness or deformity. Normal range of motion. Cervical back: Normal range of motion and neck supple. Right lower le+ Pitting Edema present. Left lower le+ Pitting Edema present. Skin:     General: Skin is warm and dry. Findings: No rash. Neurological:      Mental Status: He is alert and oriented to person, place, and time.       Cranial Nerves: No cranial nerve deficit. Psychiatric:         Thought Content: Thought content normal.         Judgment: Judgment normal.       Counseling / Coordination of Care  Total office time spent today 40 minutes. Greater than 50% of total time was spent with the patient and / or family counseling and / or coordination of care.

## 2023-12-18 DIAGNOSIS — I85.00 ESOPHAGEAL VARICES DETERMINED BY ENDOSCOPY (HCC): ICD-10-CM

## 2023-12-18 RX ORDER — NADOLOL 40 MG/1
40 TABLET ORAL DAILY
Qty: 90 TABLET | Refills: 1 | Status: SHIPPED | OUTPATIENT
Start: 2023-12-18

## 2023-12-26 ENCOUNTER — TELEPHONE (OUTPATIENT)
Age: 68
End: 2023-12-26

## 2023-12-26 ENCOUNTER — PREP FOR PROCEDURE (OUTPATIENT)
Age: 68
End: 2023-12-26

## 2023-12-26 DIAGNOSIS — I85.00 ESOPHAGEAL VARICES DETERMINED BY ENDOSCOPY (HCC): Primary | ICD-10-CM

## 2023-12-26 NOTE — TELEPHONE ENCOUNTER
Scheduled date of EGD(as of today): 03/06/2024    Physician performing EGD: [facility]    Location of EGD: [facility]    Instructions reviewed with patient by: AL    Clearances: None

## 2023-12-26 NOTE — TELEPHONE ENCOUNTER
OA Questions for EGD  Date:12/26/2023 Screened by:AL     Referring Provider: Monique     Pre-Screening: BMI - 38.09  Past EGD? If yes - Date: 12/2022 Physician/Facility: [facility] Reason: Esophageal varices determined by endoscopy      SCHEDULING STAFF: If the patient is over 75 years old, please schedule an office visit.  ·      Does the patient want to see a gastroenterologist prior to their procedure to discuss any GI symptoms? No    ·      Has the patient been hospitalized or had abdominal surgery in the past 6 months? No    ·      Does the patient use supplemental oxygen? No    ·      Does the patient take [Coumadin], [Lovenox], [Plavix], [Eliquis], [Xarelto], or other blood thinning medication? No    ·      Has the patient had a stroke, cardiac event, or stent placed in the past year? No     SCHEDULING STAFF: If patient answers NO to the above questions, then schedule the procedure. If patient answers YES to any of the above questions, then schedule an office appointment.  ·       If a repeat EGD is belated and patient declines procedure à notify provider.

## 2024-01-05 DIAGNOSIS — K22.10 EROSIVE ESOPHAGITIS: ICD-10-CM

## 2024-01-05 RX ORDER — OMEPRAZOLE 20 MG/1
20 CAPSULE, DELAYED RELEASE ORAL DAILY
Qty: 90 CAPSULE | Refills: 1 | Status: SHIPPED | OUTPATIENT
Start: 2024-01-05

## 2024-01-09 ENCOUNTER — OFFICE VISIT (OUTPATIENT)
Dept: FAMILY MEDICINE CLINIC | Facility: HOSPITAL | Age: 69
End: 2024-01-09
Payer: MEDICARE

## 2024-01-09 VITALS
WEIGHT: 265.8 LBS | HEIGHT: 71 IN | TEMPERATURE: 97.8 F | BODY MASS INDEX: 37.21 KG/M2 | SYSTOLIC BLOOD PRESSURE: 126 MMHG | HEART RATE: 76 BPM | DIASTOLIC BLOOD PRESSURE: 76 MMHG

## 2024-01-09 DIAGNOSIS — I83.893 VARICOSE VEINS OF LEG WITH SWELLING, BILATERAL: ICD-10-CM

## 2024-01-09 DIAGNOSIS — I85.00 ESOPHAGEAL VARICES DETERMINED BY ENDOSCOPY (HCC): ICD-10-CM

## 2024-01-09 DIAGNOSIS — D69.6 THROMBOCYTOPENIA (HCC): ICD-10-CM

## 2024-01-09 DIAGNOSIS — F32.9 MAJOR DEPRESSION, CHRONIC: ICD-10-CM

## 2024-01-09 DIAGNOSIS — H61.23 BILATERAL HEARING LOSS DUE TO CERUMEN IMPACTION: Primary | ICD-10-CM

## 2024-01-09 DIAGNOSIS — R29.898 BILATERAL LEG WEAKNESS: ICD-10-CM

## 2024-01-09 PROCEDURE — 99214 OFFICE O/P EST MOD 30 MIN: CPT | Performed by: NURSE PRACTITIONER

## 2024-01-09 PROCEDURE — 69210 REMOVE IMPACTED EAR WAX UNI: CPT | Performed by: NURSE PRACTITIONER

## 2024-01-09 NOTE — PROGRESS NOTES
Name: Benji Morrow      : 1955      MRN: [id number]  Encounter Provider: FADUMO Patel  Encounter Date: 2024   Encounter department: [facility] PRIMARY CARE [address]     Assessment & Plan     1. Bilateral hearing loss due to cerumen impaction  Comments:  cerumen easily/fully removed w/lavage    2. Bilateral leg weakness  Comments:  likely multifactorial - r/o venous disease w/doppler as ordered - will determine further plan pending results (vascular consult vs neuro w/u with EMG/NCS)  Orders:  -     VAS lower limb venous duplex study, complete bilateral; Future; Expected date: 2024    3. Varicose veins of leg with swelling, bilateral  Assessment & Plan:  May be contributing to BLE pain/weakness  Evaluate further w/bilat doppler as ordered   Compression stocking rx issued - advise he wear regularly  May need consult to vascular    Orders:  -     VAS lower limb venous duplex study, complete bilateral; Future; Expected date: 2024  -     Compression Stocking    4. Esophageal varices determined by endoscopy ([facility])  Assessment & Plan:  Continue management w/GI      5. Thrombocytopenia ([facility])  Assessment & Plan:  Platelets stable/chronically low      6. Major depression, chronic  Assessment & Plan:  Mood stable w/PHQ score of 4  Continue same bupropion   Return in April as scheduled             Subjective      Here with wife with a couple concerns. He is going for a hearing test next week and needs ears cleaned before. Requesting ears be lavaged.   Wife states both legs still feel weak and heavy. Have been this way for awhile and they note skin is discolored and darker. Denies rash. Doesn't wear compression socks regularly. Unsure if this has been evaluated w/EMG or NCS. Denies having leg US. Does not see neurology.       Review of Systems   HENT:  Positive for hearing loss.    Cardiovascular:  Positive for leg swelling.   Neurological:  Positive for weakness (chronic BLE) and  "numbness (chronic BLE).   Psychiatric/Behavioral:  Negative for dysphoric mood.        Current Outpatient Medications on File Prior to Visit   Medication Sig    b complex vitamins tablet Take 1 tablet by mouth every morning    buPROPion (WELLBUTRIN XL) 150 mg 24 hr tablet Take 1 tablet (150 mg total) by mouth daily    multivitamin (THERAGRAN) TABS Take 1 tablet by mouth every morning    nadolol (CORGARD) 40 mg tablet TAKE ONE TABLET BY MOUTH EVERY DAY    omeprazole (PriLOSEC) 20 mg delayed release capsule TAKE ONE CAPSULE BY MOUTH EVERY DAY    torsemide (DEMADEX) 20 mg tablet Take 1 tablet (20 mg total) by mouth daily       Objective     /76   Pulse 76   Temp 97.8 °F (36.6 °C)   Ht 5' 11.25\" (1.81 m)   Wt 121 kg (265 lb 12.8 oz)   BMI 36.81 kg/m²     Physical Exam  Vitals reviewed.   Constitutional:       General: He is not in acute distress.     Appearance: Normal appearance. He is obese.   HENT:      Head: Normocephalic.      Right Ear: There is impacted cerumen.      Left Ear: There is impacted cerumen.      Ears:      Comments: Bilat TM normal after cerumen removal  Pulmonary:      Effort: Pulmonary effort is normal. No respiratory distress.   Musculoskeletal:      Right lower leg: Edema (+1 pretibial pitting/ankle w/multiple varicosities) present.      Left lower leg: Edema (+1 pitting pretibial/ankle w/multiple varicosities) present.   Skin:     General: Skin is warm and dry.      Findings: Rash (brown, thickened skin discoloration BLE) present.   Neurological:      General: No focal deficit present.      Mental Status: He is alert and oriented to person, place, and time.      Motor: Weakness (BLE) present.      Gait: Gait abnormal (slow, steady w/cane).   Psychiatric:         Mood and Affect: Mood normal.         Behavior: Behavior normal.       PHQ-2/9 Depression Screening    Little interest or pleasure in doing things: 1 - several days  Feeling down, depressed, or hopeless: 0 - not at " all  Trouble falling or staying asleep, or sleeping too much: 0 - not at all  Feeling tired or having little energy: 3 - nearly every day  Poor appetite or overeatin - not at all  Feeling bad about yourself - or that you are a failure or have let yourself or your family down: 0 - not at all  Trouble concentrating on things, such as reading the newspaper or watching television: 0 - not at all  Moving or speaking so slowly that other people could have noticed. Or the opposite - being so fidgety or restless that you have been moving around a lot more than usual: 0 - not at all  Thoughts that you would be better off dead, or of hurting yourself in some way: 0 - not at all  PHQ-9 Score: 4  PHQ-9 Interpretation: No or Minimal depression         Ear cerumen removal    Date/Time: 2024 8:00 AM    Performed by: FADUMO Patel  Authorized by: FADUMO Patel  Universal Protocol:  Consent: Verbal consent obtained.  Consent given by: patient  Timeout called at: 2024 8:00 AM.  Patient understanding: patient states understanding of the procedure being performed  Patient identity confirmed: verbally with patient    Patient location:  Clinic  Procedure details:     Local anesthetic:  None    Location:  L ear and R ear    Procedure type: irrigation with instrumentation      Instrumentation: curette      Approach:  External  Post-procedure details:     Complication:  None    Hearing quality:  Improved    Patient tolerance of procedure:  Tolerated well, no immediate complications      FADUMO Patel

## 2024-01-19 ENCOUNTER — TELEPHONE (OUTPATIENT)
Dept: FAMILY MEDICINE CLINIC | Facility: HOSPITAL | Age: 69
End: 2024-01-19

## 2024-01-19 DIAGNOSIS — I89.0 LYMPHEDEMA: Primary | ICD-10-CM

## 2024-01-19 NOTE — TELEPHONE ENCOUNTER
The compression socks are covered by medicare if he has lymphoedema.  If that is a diagnosis for him are you able to update the order to reflect that?    Please call to let them know.

## 2024-02-02 ENCOUNTER — HOSPITAL ENCOUNTER (OUTPATIENT)
Dept: NON INVASIVE DIAGNOSTICS | Facility: HOSPITAL | Age: 69
Discharge: HOME | End: 2024-02-02
Payer: MEDICARE

## 2024-02-02 DIAGNOSIS — R29.898 BILATERAL LEG WEAKNESS: ICD-10-CM

## 2024-02-02 DIAGNOSIS — I83.893 VARICOSE VEINS OF LEG WITH SWELLING, BILATERAL: ICD-10-CM

## 2024-02-02 PROCEDURE — 93970 EXTREMITY STUDY: CPT | Performed by: SURGERY

## 2024-02-02 PROCEDURE — 93970 EXTREMITY STUDY: CPT

## 2024-02-21 PROBLEM — Z12.11 COLON CANCER SCREENING: Status: RESOLVED | Noted: 2019-04-02 | Resolved: 2024-02-21

## 2024-03-06 ENCOUNTER — HOSPITAL ENCOUNTER (OUTPATIENT)
Dept: GASTROENTEROLOGY | Facility: HOSPITAL | Age: 69
Setting detail: OUTPATIENT SURGERY
Discharge: HOME | End: 2024-03-06
Attending: INTERNAL MEDICINE
Payer: MEDICARE

## 2024-03-06 ENCOUNTER — ANESTHESIA (OUTPATIENT)
Dept: GASTROENTEROLOGY | Facility: HOSPITAL | Age: 69
End: 2024-03-06

## 2024-03-06 ENCOUNTER — ANESTHESIA EVENT (OUTPATIENT)
Dept: GASTROENTEROLOGY | Facility: HOSPITAL | Age: 69
End: 2024-03-06

## 2024-03-06 VITALS
SYSTOLIC BLOOD PRESSURE: 111 MMHG | OXYGEN SATURATION: 97 % | RESPIRATION RATE: 17 BRPM | DIASTOLIC BLOOD PRESSURE: 68 MMHG | TEMPERATURE: 97.7 F | HEART RATE: 72 BPM

## 2024-03-06 DIAGNOSIS — I85.00 ESOPHAGEAL VARICES DETERMINED BY ENDOSCOPY (HCC): ICD-10-CM

## 2024-03-06 RX ORDER — LIDOCAINE HYDROCHLORIDE 20 MG/ML
INJECTION, SOLUTION EPIDURAL; INFILTRATION; INTRACAUDAL; PERINEURAL AS NEEDED
Status: DISCONTINUED | OUTPATIENT
Start: 2024-03-06 | End: 2024-03-06

## 2024-03-06 RX ORDER — SODIUM CHLORIDE 9 MG/ML
INJECTION, SOLUTION INTRAVENOUS CONTINUOUS PRN
Status: DISCONTINUED | OUTPATIENT
Start: 2024-03-06 | End: 2024-03-06

## 2024-03-06 RX ORDER — PROPOFOL 10 MG/ML
INJECTION, EMULSION INTRAVENOUS AS NEEDED
Status: DISCONTINUED | OUTPATIENT
Start: 2024-03-06 | End: 2024-03-06

## 2024-03-06 RX ADMIN — SODIUM CHLORIDE: 0.9 INJECTION, SOLUTION INTRAVENOUS at 09:28

## 2024-03-06 RX ADMIN — PROPOFOL 130 MG: 10 INJECTION, EMULSION INTRAVENOUS at 09:45

## 2024-03-06 RX ADMIN — LIDOCAINE HYDROCHLORIDE 100 MG: 20 INJECTION, SOLUTION EPIDURAL; INFILTRATION; INTRACAUDAL; PERINEURAL at 09:45

## 2024-03-06 NOTE — ANESTHESIA POSTPROCEDURE EVALUATION
Post-Op Assessment Note    CV Status:  Stable  Pain Score: 0    Pain management: adequate       Mental Status:  Alert and awake   Hydration Status:  Euvolemic   PONV Controlled:  Controlled   Airway Patency:  Patent     Post Op Vitals Reviewed: Yes    No anethesia notable event occurred.    Staff: Anesthesiologist, CRNA               BP   80/52   Temp   N/a   Pulse  75   Resp   18   SpO2   95   IV Fluids open.

## 2024-03-06 NOTE — ANESTHESIA PREPROCEDURE EVALUATION
Procedure:  EGD  Routine Cpap use, No recent URI, Cervical Spinal Fusion  Relevant Problems   CARDIO   (+) Dyspnea on exertion   (+) Hypercholesterolemia      GI/HEPATIC   (+) GERD (gastroesophageal reflux disease)   (+) Other cirrhosis of liver (HCC)      HEMATOLOGY   (+) Thrombocytopenia (HCC)      MUSCULOSKELETAL   (+) DDD (degenerative disc disease), lumbar   (+) Neurogenic claudication      NEURO/PSYCH   (+) Chronic pain syndrome   (+) Major depression, chronic   (+) Neurogenic claudication      PULMONARY   (+) Dyspnea on exertion   (+) DANIEL (obstructive sleep apnea)   (+) Sleep apnea   9/23   Stress Function: Left ventricular function post-stress is normal. Stress ejection fraction is 70 %.    Stress Combined Conclusion: The ECG and SPECT imaging portions of the stress study are concordant with no evidence of stress induced myocardial ischemia.     6/23  The left ventricular ejection fraction is 60%.        Physical Exam    Airway    Mallampati score: III  TM Distance: >3 FB  Neck ROM: limited     Dental   No notable dental hx     Cardiovascular      Pulmonary      Other Findings        Anesthesia Plan  ASA Score- 3     Anesthesia Type- IV sedation with anesthesia with ASA Monitors.         Additional Monitors:     Airway Plan:            Plan Factors-Exercise tolerance (METS): >4 METS.    Chart reviewed. EKG reviewed. Imaging results reviewed. Existing labs reviewed. Patient summary reviewed.    Patient is not a current smoker.              Induction- intravenous.    Postoperative Plan-     Informed Consent- Anesthetic plan and risks discussed with patient.  I personally reviewed this patient with the CRNA. Discussed and agreed on the Anesthesia Plan with the CRNA..            Lab Results   Component Value Date    GLUC 99 06/03/2022    GLUF 99 10/19/2023    ALT 37 10/19/2023    AST 75 (H) 10/19/2023    BUN 19 10/19/2023    CALCIUM 9.1 10/19/2023     10/19/2023    CHOL 169 12/23/2016    CO2 25 10/19/2023     CREATININE 1.03 10/19/2023    HDL 49 10/19/2023    INR 1.15 06/20/2022    HCT 42.5 10/19/2023    HGB 14.5 10/19/2023    PROT 6.3 12/23/2016    HGBA1C 5.5 06/20/2022    MG 2.0 06/03/2022    PLT 92 (L) 10/19/2023    K 4.1 10/19/2023    PSA 0.47 10/05/2023     12/23/2016    TRIG 84 10/19/2023    WBC 4.57 10/19/2023

## 2024-03-06 NOTE — H&P
"History and Physical -  [facility]  Benji Morrow 68 y.o. male MRN: [id number]    HPI: Benji Morrow is a 68 y.o. year old male who presents for esophagitis, esophageal varices    REVIEW OF SYSTEMS: Per the HPI, and otherwise unremarkable.    Historical Information   Past Medical History:   Diagnosis Date    Abscess of back 03/01/2018    Acquired pancytopenia (HCC) 01/16/2017    Acquired thrombocytopenia (HCC) 01/16/2017    Benign essential hypertension     Last Assessed:12/19/16; Pt reports heart and BP has been \"fine\" as of 4/16/2020    Bilateral lower extremity edema 8/7/2020    Cirrhosis (HCC)     Colon polyp     CPAP (continuous positive airway pressure) dependence     Cyst of skin     x6 from neck down back area    Depression     Esophageal varices (HCC)     GERD (gastroesophageal reflux disease)     Liver disease     Macrocytosis     Last Assessed:1/16/17    Major depression, chronic     Last Assessed:12/21/17    Major depression, chronic 06/19/2017    Myelopathy (HCC) 4/27/2021    Personal history of colonic polyps     Sleep apnea     SOB (shortness of breath) on exertion      Past Surgical History:   Procedure Laterality Date    CHOLECYSTECTOMY  11/2018    CHOLECYSTECTOMY LAPAROSCOPIC N/A 11/21/2018    Procedure: CHOLECYSTECTOMY LAPAROSCOPIC, wedge liver biopsy;  Surgeon: Vijaya Monzon MD;  Location: [facility];  Service: General    COLONOSCOPY  05/2011    COLONOSCOPY  05/2016    COLONOSCOPY      EGD  01/2019    Esophagitis, esophageal varices    GALLBLADDER SURGERY      INCISION AND DRAINAGE OF WOUND N/A 03/01/2018    SEBACEOUS CYST ABSCESS OF BACK-VIJAYA MONZON MD    NC ARTHRD PST/PSTLAT TQ 1NTRSPC CRV BELW C2 SEGMENT N/A 5/24/2021    Procedure: Posterior cervical decompressive laminectomy and lateral mass fixation fusion C3-5;  Surgeon: Miguel Ryan MD;  Location: [facility];  Service: Neurosurgery    NC EXC B9 LESION MRGN XCP SK TG T/A/L 1.1-2.0 CM N/A 8/22/2018    " Procedure: EXCISION  BIOPSY LESION/MASS BACK X4 NECK X1;  Surgeon: Ricardo Messer MD;  Location: [facility];  Service: General    CA VO FACETECTOMY & FORAMOTOMY 1 VRT SGM LUMBAR Left 7/8/2022    Procedure: L3-4, L4-5, and L5-S1 metrx decompressive hemilaminectomy with bilateral foraminotomy and discectomy;  Surgeon: Miguel Ryan MD;  Location: [facility];  Service: Neurosurgery     Social History   Social History     Substance and Sexual Activity   Alcohol Use Not Currently    Comment: 7/19/19-last drink 12/2018- Occasionally/1-2 drinks per weekend     Social History     Substance and Sexual Activity   Drug Use No     Social History     Tobacco Use   Smoking Status Never   Smokeless Tobacco Never     Family History   Problem Relation Age of Onset    Heart disease Mother         valve replacement    Alzheimer's disease Mother     Valvular heart disease Father     Heart disease Brother         valve replacement    Stroke Brother         Mini    Valvular heart disease Brother     Colon cancer Neg Hx     Colon polyps Neg Hx        Meds/Allergies       Current Outpatient Medications:     b complex vitamins tablet    buPROPion (WELLBUTRIN XL) 150 mg 24 hr tablet    multivitamin (THERAGRAN) TABS    nadolol (CORGARD) 40 mg tablet    omeprazole (PriLOSEC) 20 mg delayed release capsule    torsemide (DEMADEX) 20 mg tablet    No Known Allergies    Objective     /68   Pulse 74   Temp 97.7 °F (36.5 °C) (Oral)   Resp 18   SpO2 99%     PHYSICAL EXAM    Gen: NAD AAOx3  Head: Normocephalic, Atraumatic  CV: S1S2 RRR no m/r/g  CHEST: Clear b/l no c/r/w  ABD: soft, +BS NT/ND  EXT: no edema    ASSESSMENT/PLAN:  This is a 68 y.o. year old male here for EGD, and he is stable and optimized for his procedure.

## 2024-03-06 NOTE — ANESTHESIA PROCEDURE NOTES
Anesthesia Notable Event    Date/Time: 3/6/2024 10:00 AM    Performed by: Rick Cardozo MD  Authorized by: Rick Cardozo MD

## 2024-03-22 DIAGNOSIS — F32.A DEPRESSION, UNSPECIFIED DEPRESSION TYPE: ICD-10-CM

## 2024-03-22 RX ORDER — BUPROPION HYDROCHLORIDE 150 MG/1
150 TABLET ORAL DAILY
Qty: 90 TABLET | Refills: 1 | Status: SHIPPED | OUTPATIENT
Start: 2024-03-22

## 2024-04-18 ENCOUNTER — OFFICE VISIT (OUTPATIENT)
Dept: FAMILY MEDICINE CLINIC | Facility: HOSPITAL | Age: 69
End: 2024-04-18
Payer: MEDICARE

## 2024-04-18 VITALS
DIASTOLIC BLOOD PRESSURE: 72 MMHG | WEIGHT: 270.6 LBS | TEMPERATURE: 98.4 F | BODY MASS INDEX: 37.88 KG/M2 | HEART RATE: 71 BPM | HEIGHT: 71 IN | SYSTOLIC BLOOD PRESSURE: 124 MMHG

## 2024-04-18 DIAGNOSIS — R60.0 BILATERAL LOWER EXTREMITY EDEMA: ICD-10-CM

## 2024-04-18 DIAGNOSIS — Z12.5 PROSTATE CANCER SCREENING: ICD-10-CM

## 2024-04-18 DIAGNOSIS — K21.00 GASTROESOPHAGEAL REFLUX DISEASE WITH ESOPHAGITIS WITHOUT HEMORRHAGE: ICD-10-CM

## 2024-04-18 DIAGNOSIS — E78.00 HYPERCHOLESTEROLEMIA: Primary | ICD-10-CM

## 2024-04-18 DIAGNOSIS — I85.00 ESOPHAGEAL VARICES DETERMINED BY ENDOSCOPY (HCC): ICD-10-CM

## 2024-04-18 DIAGNOSIS — R06.09 DYSPNEA ON EXERTION: ICD-10-CM

## 2024-04-18 DIAGNOSIS — G47.33 OSA (OBSTRUCTIVE SLEEP APNEA): ICD-10-CM

## 2024-04-18 PROCEDURE — G2211 COMPLEX E/M VISIT ADD ON: HCPCS | Performed by: NURSE PRACTITIONER

## 2024-04-18 PROCEDURE — 99214 OFFICE O/P EST MOD 30 MIN: CPT | Performed by: NURSE PRACTITIONER

## 2024-04-18 RX ORDER — TORSEMIDE 20 MG/1
TABLET ORAL
Start: 2024-04-18

## 2024-04-18 NOTE — PROGRESS NOTES
Name: Benji Morrow      : 1955      MRN: [id number]  Encounter Provider: FADUMO Patel  Encounter Date: 2024   Encounter department: [facility] C[facility] [address]     Assessment & Plan     1. Hypercholesterolemia  Assessment & Plan:  ASCVD risk of 12% managing w/lifestyle  Update FLP before next OV - discuss need for statin tx pending results    Orders:  -     CBC and differential; Future; Expected date: 2024  -     Comprehensive metabolic panel; Future; Expected date: 2024  -     TSH, 3rd generation with Free T4 reflex; Future; Expected date: 2024  -     Lipid Panel with Direct LDL reflex; Future; Expected date: 2024    2. Bilateral lower extremity edema  Assessment & Plan:  Compliant w/torsemide as rx'd by cardiology  Maintain follow up as planned    Orders:  -     torsemide (DEMADEX) 20 mg tablet; Take 1/2 tablet daily    3. DANIEL (obstructive sleep apnea)  Assessment & Plan:  Compliant w/bi-pap per sleep medicine  Maintain follow up as planned      4. Esophageal varices determined by endoscopy ([facility])  Assessment & Plan:  S/P EGD w/Dr Amaya  Maintain GI follow up as planned      5. Gastroesophageal reflux disease with esophagitis without hemorrhage  Assessment & Plan:  Continue omeprazole as rx'd by GI      6. Dyspnea on exertion  Assessment & Plan:  Improved/clinically stable    Orders:  -     torsemide (DEMADEX) 20 mg tablet; Take 1/2 tablet daily    7. Prostate cancer screening  -     PSA, Total Screen; Future; Expected date: 2024      Update AWV at next appt in 6 months  Colon screening UTD until        Subjective      States he has been OK. Recently got hearing aides and is still adjusting to them.   Has been taking 1/2 water pill since advised by cardiology in December. Improved urinary frequency and not retaining any more fluid.   Has appt with GI and c-pap coming up. Recently had EGD.         Review of Systems   Constitutional:  "Negative.    HENT:  Positive for hearing loss (improved with recently getting aides).    Respiratory: Negative.     Cardiovascular: Negative.    Psychiatric/Behavioral:  Positive for sleep disturbance (waking at 2am, wears cpap but states \"it's a pain\").        Current Outpatient Medications on File Prior to Visit   Medication Sig    b complex vitamins tablet Take 1 tablet by mouth every morning    buPROPion (WELLBUTRIN XL) 150 mg 24 hr tablet TAKE ONE TABLET BY MOUTH EVERY DAY    multivitamin (THERAGRAN) TABS Take 1 tablet by mouth every morning    nadolol (CORGARD) 40 mg tablet TAKE ONE TABLET BY MOUTH EVERY DAY    omeprazole (PriLOSEC) 20 mg delayed release capsule TAKE ONE CAPSULE BY MOUTH EVERY DAY    [DISCONTINUED] torsemide (DEMADEX) 20 mg tablet Take 1 tablet (20 mg total) by mouth daily       Objective     /72   Pulse 71   Temp 98.4 °F (36.9 °C)   Ht 5' 11.25\" (1.81 m)   Wt 123 kg (270 lb 9.6 oz)   BMI 37.48 kg/m²       Physical Exam  Vitals reviewed.   Constitutional:       General: He is not in acute distress.     Appearance: Normal appearance. He is obese.   HENT:      Head: Normocephalic.   Eyes:      General: No scleral icterus.  Neck:      Vascular: No carotid bruit.   Cardiovascular:      Rate and Rhythm: Normal rate and regular rhythm.   Pulmonary:      Effort: Pulmonary effort is normal. No respiratory distress.      Breath sounds: Normal breath sounds.   Musculoskeletal:      Cervical back: Normal range of motion.      Right lower leg: Edema (+1 pitting) present.      Left lower leg: Edema (+1 pitting) present.   Lymphadenopathy:      Cervical: No cervical adenopathy.   Skin:     General: Skin is warm and dry.   Neurological:      General: No focal deficit present.      Mental Status: He is alert and oriented to person, place, and time.   Psychiatric:         Mood and Affect: Mood normal.         Behavior: Behavior normal.         FADUMO Patel    "

## 2024-04-18 NOTE — ASSESSMENT & PLAN NOTE
ASCVD risk of 12% managing w/lifestyle  Update FLP before next OV - discuss need for statin tx pending results

## 2024-04-21 ENCOUNTER — OFFICE VISIT (OUTPATIENT)
Dept: URGENT CARE | Facility: CLINIC | Age: 69
End: 2024-04-21
Payer: MEDICARE

## 2024-04-21 VITALS
BODY MASS INDEX: 36.7 KG/M2 | WEIGHT: 265 LBS | HEART RATE: 105 BPM | TEMPERATURE: 98.3 F | SYSTOLIC BLOOD PRESSURE: 106 MMHG | OXYGEN SATURATION: 97 % | DIASTOLIC BLOOD PRESSURE: 70 MMHG | RESPIRATION RATE: 18 BRPM

## 2024-04-21 DIAGNOSIS — R19.7 DIARRHEA, UNSPECIFIED TYPE: Primary | ICD-10-CM

## 2024-04-21 LAB — GLUCOSE SERPL-MCNC: 114 MG/DL (ref 65–140)

## 2024-04-21 PROCEDURE — 99212 OFFICE O/P EST SF 10 MIN: CPT | Performed by: PHYSICIAN ASSISTANT

## 2024-04-21 PROCEDURE — G0463 HOSPITAL OUTPT CLINIC VISIT: HCPCS | Performed by: PHYSICIAN ASSISTANT

## 2024-04-21 PROCEDURE — 82948 REAGENT STRIP/BLOOD GLUCOSE: CPT | Performed by: PHYSICIAN ASSISTANT

## 2024-04-21 NOTE — PROGRESS NOTES
[facility]        NAME: Benji Morrow is a 68 y.o. male  : 1955    MRN: [id number]  DATE: 2024  TIME: 7:15 PM    Assessment and Plan   Diarrhea, unspecified type [R19.7]  1. Diarrhea, unspecified type              Patient Instructions   Try a single dose of Immodium AD.  May go to ED for fluids if you become weaker or start to feel worse.  If diarrhea persists with same frequency into tomorrow afternoon call PCP.   Start with drinking clear liquids (i.e. Gatorade, Powerade, Pedialyte, etc but avoid red liquids).    You may advance to bland diet 6-8 hrs after last vomiting (ie. BRAT - bananas, rice, applesauce, toast) as tolerated.    Recommend avoiding milk products, spicy, greasy foods, and citrus products over the next 1-2 weeks. Advance diet as tolerated.    Follow up with your PCP in 1-2 days if symptoms persist.    Go to the ER if symptoms are worsening.     Follow up with PCP in 3-5 days.  Proceed to  ER if symptoms worsen.    If tests have been performed at [facility], our office will contact you with results if changes need to be made to the care plan discussed with you at the visit.  [facility]    Chief Complaint     Chief Complaint   Patient presents with    Diarrhea     Pt reports diarrhea since 8AM this morning. Pt has taken Pepto Bismol (approximately 4 doses) with little relief. Pt initially had abdominal pain, which started at 3 AM, but reports that abdominal pain has resolved. In addition, pt reports he and wife ate out yesterday and that he ate spaghetti and meatball and wife had fish (both ate salads as appetizers). Pt reports fatigue and weakness and has only been able to manage a few sips of water (last sip of water was at 1 pm).          History of Present Illness       Diarrhea   This is a new problem. Episode onset: this morning around 8 AM. The problem occurs more than 10 times per day. The problem has been unchanged. The  stool consistency is described as Watery. Associated symptoms include abdominal pain. Pertinent negatives include no arthralgias, bloating, chills, coughing, fever, increased  flatus or vomiting. Risk factors include suspect food intake. He has tried anti-motility drug for the symptoms. The treatment provided no relief. cirrhosis, h/o cholecystectomy   Benji reports BM's every 20 minutes.  Describes stool and watery without blood or melena.  He had abdominal pain initially across the abdomen which has resolved.  He states he feels weak.  He has been drinking sips of water but nothing else.   PMH: esophageal varices, cirrhosis, thrombocytopenia   Review of Systems   Review of Systems   Constitutional:  Negative for chills and fever.   HENT:  Negative for ear pain and sore throat.    Eyes:  Negative for pain and visual disturbance.   Respiratory:  Negative for cough and shortness of breath.    Cardiovascular:  Negative for chest pain and palpitations.   Gastrointestinal:  Positive for abdominal pain and diarrhea. Negative for bloating, flatus and vomiting.   Genitourinary:  Negative for dysuria and hematuria.   Musculoskeletal:  Negative for arthralgias and back pain.   Skin:  Negative for color change and rash.   Neurological:  Negative for seizures and syncope.   All other systems reviewed and are negative.        Current Medications       Current Outpatient Medications:     b complex vitamins tablet, Take 1 tablet by mouth every morning, Disp: , Rfl:     buPROPion (WELLBUTRIN XL) 150 mg 24 hr tablet, TAKE ONE TABLET BY MOUTH EVERY DAY, Disp: 90 tablet, Rfl: 1    multivitamin (THERAGRAN) TABS, Take 1 tablet by mouth every morning, Disp: , Rfl:     nadolol (CORGARD) 40 mg tablet, TAKE ONE TABLET BY MOUTH EVERY DAY, Disp: 90 tablet, Rfl: 1    omeprazole (PriLOSEC) 20 mg delayed release capsule, TAKE ONE CAPSULE BY MOUTH EVERY DAY, Disp: 90 capsule, Rfl: 1    torsemide (DEMADEX) 20 mg tablet, Take 1/2 tablet daily  "(Patient taking differently: 10 mg Take 1/2 tablet daily), Disp: , Rfl:     Current Allergies     Allergies as of 04/21/2024    (No Known Allergies)            The following portions of the patient's history were reviewed and updated as appropriate: allergies, current medications, past family history, past medical history, past social history, past surgical history and problem list.     Past Medical History:   Diagnosis Date    Abscess of back 03/01/2018    Acquired pancytopenia (HCC) 01/16/2017    Acquired thrombocytopenia (HCC) 01/16/2017    Benign essential hypertension     Last Assessed:12/19/16; Pt reports heart and BP has been \"fine\" as of 4/16/2020    Bilateral lower extremity edema 8/7/2020    Cirrhosis (HCC)     Colon polyp     CPAP (continuous positive airway pressure) dependence     Cyst of skin     x6 from neck down back area    Depression     Esophageal varices (HCC)     GERD (gastroesophageal reflux disease)     Liver disease     Macrocytosis     Last Assessed:1/16/17    Major depression, chronic     Last Assessed:12/21/17    Major depression, chronic 06/19/2017    Myelopathy (HCC) 4/27/2021    Personal history of colonic polyps     Sleep apnea     SOB (shortness of breath) on exertion        Past Surgical History:   Procedure Laterality Date    CHOLECYSTECTOMY  11/2018    CHOLECYSTECTOMY LAPAROSCOPIC N/A 11/21/2018    Procedure: CHOLECYSTECTOMY LAPAROSCOPIC, wedge liver biopsy;  Surgeon: Vijaya Monzon MD;  Location: [facility];  Service: General    COLONOSCOPY  05/2011    COLONOSCOPY  05/2016    COLONOSCOPY      EGD  01/2019    Esophagitis, esophageal varices    GALLBLADDER SURGERY      INCISION AND DRAINAGE OF WOUND N/A 03/01/2018    SEBACEOUS CYST ABSCESS OF BACK-VIJAYA MONZON MD    [place] ARTHRD PST/PSTLAT TQ 1NTRSPC CRV BELW C2 SEGMENT N/A 5/24/2021    Procedure: Posterior cervical decompressive laminectomy and lateral mass fixation fusion C3-5;  Surgeon: Miguel Ryan MD;  [facility] " [facility];  Service: Neurosurgery    TX EXC B9 LESION MRGN XCP SK TG T/A/L 1.1-2.0 CM N/A 8/22/2018    Procedure: EXCISION  BIOPSY LESION/MASS BACK X4 NECK X1;  Surgeon: Ricardo Messer MD;  Location: [facility];  Service: General    TX VO FACETECTOMY & FORAMOTOMY 1 VRT SGM LUMBAR Left 7/8/2022    Procedure: L3-4, L4-5, and L5-S1 metrx decompressive hemilaminectomy with bilateral foraminotomy and discectomy;  Surgeon: Miguel Ryan MD;  [facility];  Service: Neurosurgery       Family History   Problem Relation Age of Onset    Heart disease Mother         valve replacement    Alzheimer's disease Mother     Valvular heart disease Father     Heart disease Brother         valve replacement    Stroke Brother         Mini    Valvular heart disease Brother     Colon cancer Neg Hx     Colon polyps Neg Hx          Medications have been verified.        Objective   /70   Pulse 105   Temp 98.3 °F (36.8 °C)   Resp 18   Wt 120 kg (265 lb)   SpO2 97%   BMI 36.70 kg/m²   No LMP for male patient.       Physical Exam     Physical Exam  Vitals and nursing note reviewed.   Constitutional:       Appearance: Normal appearance. He is not ill-appearing.   HENT:      Head: Normocephalic and atraumatic.      Nose: Nose normal.   Cardiovascular:      Rate and Rhythm: Regular rhythm. Tachycardia present.      Pulses: Normal pulses.      Heart sounds: Normal heart sounds.   Pulmonary:      Effort: Pulmonary effort is normal.      Breath sounds: Normal breath sounds.   Abdominal:      General: There is no distension.      Comments: Bowel sounds hyperactive, no tenderness, no rebound, no guarding   Skin:     General: Skin is warm and dry.      Comments: Good skin turgor   Neurological:      Mental Status: He is alert and oriented to person, place, and time.   Psychiatric:         Mood and Affect: Mood normal.         Behavior: Behavior normal.         Blood glucose: 114

## 2024-04-21 NOTE — PATIENT INSTRUCTIONS
Start with drinking clear liquids (i.e. Gatorade, Powerade, Pedialyte, etc but avoid red liquids).    You may advance to bland diet 6-8 hrs after last vomiting (ie. BRAT - bananas, rice, applesauce, toast) as tolerated.    Recommend avoiding milk products, spicy, greasy foods, and citrus products over the next 1-2 weeks. Advance diet as tolerated.    Follow up with your PCP in 1-2 days if symptoms persist.    Go to the ER if symptoms are worsening.     Follow up with PCP in 3-5 days.  Proceed to  ER if symptoms worsen.    If tests have been performed at [facility], our office will contact you with results if changes need to be made to the care plan discussed with you at the visit.  You can review your full results on [facility].

## 2024-05-15 ENCOUNTER — OFFICE VISIT (OUTPATIENT)
Dept: GASTROENTEROLOGY | Facility: CLINIC | Age: 69
End: 2024-05-15
Payer: MEDICARE

## 2024-05-15 VITALS
HEIGHT: 71 IN | BODY MASS INDEX: 37.94 KG/M2 | WEIGHT: 271 LBS | SYSTOLIC BLOOD PRESSURE: 121 MMHG | DIASTOLIC BLOOD PRESSURE: 70 MMHG | HEART RATE: 88 BPM

## 2024-05-15 DIAGNOSIS — K22.10 EROSIVE ESOPHAGITIS: ICD-10-CM

## 2024-05-15 DIAGNOSIS — R14.0 BLOATING: ICD-10-CM

## 2024-05-15 DIAGNOSIS — I85.00 ESOPHAGEAL VARICES DETERMINED BY ENDOSCOPY (HCC): ICD-10-CM

## 2024-05-15 DIAGNOSIS — K74.69 OTHER CIRRHOSIS OF LIVER (HCC): Primary | ICD-10-CM

## 2024-05-15 PROCEDURE — G2211 COMPLEX E/M VISIT ADD ON: HCPCS | Performed by: INTERNAL MEDICINE

## 2024-05-15 PROCEDURE — 99214 OFFICE O/P EST MOD 30 MIN: CPT | Performed by: INTERNAL MEDICINE

## 2024-05-15 RX ORDER — NADOLOL 40 MG/1
40 TABLET ORAL DAILY
Qty: 90 TABLET | Refills: 3 | Status: SHIPPED | OUTPATIENT
Start: 2024-05-15

## 2024-05-15 RX ORDER — OMEPRAZOLE 20 MG/1
20 CAPSULE, DELAYED RELEASE ORAL DAILY
Qty: 90 CAPSULE | Refills: 3 | Status: SHIPPED | OUTPATIENT
Start: 2024-05-15

## 2024-05-15 NOTE — PATIENT INSTRUCTIONS
I recommend a trial of a probiotic like align, Culturelle or Florastor.  The store brand is okay.  I recommend using it for 1 month then stopping.  Please monitor your gas and especially the bowel movements at restaurants  If symptoms are well-controlled but return you can take the probiotic for longer  Please contact me if your symptoms get worse on the probiotic, I can arrange breath testing for small intestinal bacterial overgrowth    I submitted a lab order to be drawn when you get labs for your PCP.    Will plan to do the liver ultrasound in June or July    If the gas symptoms persist please contact me and we will discuss next steps in management

## 2024-05-15 NOTE — PROGRESS NOTES
[facility] - Outpatient Follow-up Note  Benji Morrow 68 y.o. male MRN: [id number]  Encounter: [id number]    ASSESSMENT AND PLAN:      1. Other cirrhosis of liver (HCC)  Due to GRACIA.  Decompensated on basis of varices.  No ascites or encephalopathy. Discovered incidentally during laparoscopic cholecystectomy.  No jaundice, icterus, pruritus or other liver symptoms.  MRI August 2022 and ultrasound June 2023 showed cirrhosis without mass.  Slip provided for follow-up ultrasound.  I placed an AFP ordered to be drawn with upcoming labs for his PCP     2. Esophageal varices determined by endoscopy (HCC)  Grade 1 esophageal varices March 2024, continue nonselective beta-blocker     3. Personal history of colonic polyps  Most recent colonoscopy 10/2021, 5 year recall     4. Gastroesophageal reflux disease without esophagitis  Denies classic reflux complaints or alarm symptoms like dysphagia  upper endoscopy December 2022 for variceal surveillance showed grade C esophagitis.  Esophagitis resolved on EGD March 2024, continue low-dose omeprazole    5. Bloating  Relatively new complaint.  Frequent flatus, often has urgent stools when eating at a restaurant  Will monitor for food triggers.  I recommended brief trial of a probiotic.  He will update me on his symptoms, if they fail to improve we discussed a breath test for bacterial overgrowth      Followup Appointment: Ultrasound now, office 1 year  ______________________________________________________________________    Chief Complaint   Patient presents with    Follow-up     To procedure     HPI: The patient presents for follow-up on cirrhosis and with a new complaint of bloating and urgent stools.  He is accompanied by his wife.  He was last seen at the have an upper endoscopy in March that showed a single small varix.  He denies any upper GI complaints.  His appetite is good and his weight is stable.  He denies any symptoms of liver  "disease like jaundice, icterus, ascites or pruritus.  Edema is well-controlled on Demadex.  His wife notes that he has had increase in flatus.  He had an urgent care visit with diarrhea April 21 and symptoms are somewhat better than then but not completely resolved.  He denies any diarrhea on a regular basis but usually has to excuse himself from the table for an urgent stool when eating at a restaurant.  He is unaware of any particular trigger foods    Historical Information   Past Medical History:   Diagnosis Date    Abscess of back 03/01/2018    Acquired pancytopenia (HCC) 01/16/2017    Acquired thrombocytopenia (HCC) 01/16/2017    Benign essential hypertension     Last Assessed:12/19/16; Pt reports heart and BP has been \"fine\" as of 4/16/2020    Bilateral lower extremity edema 8/7/2020    Cirrhosis (HCC)     Colon polyp     CPAP (continuous positive airway pressure) dependence     Cyst of skin     x6 from neck down back area    Depression     Esophageal varices (HCC)     GERD (gastroesophageal reflux disease)     Liver disease     Macrocytosis     Last Assessed:1/16/17    Major depression, chronic     Last Assessed:12/21/17    Major depression, chronic 06/19/2017    Myelopathy (HCC) 4/27/2021    Personal history of colonic polyps     Sleep apnea     SOB (shortness of breath) on exertion      Past Surgical History:   Procedure Laterality Date    CHOLECYSTECTOMY  11/2018    CHOLECYSTECTOMY LAPAROSCOPIC N/A 11/21/2018    Procedure: CHOLECYSTECTOMY LAPAROSCOPIC, wedge liver biopsy;  Surgeon: Vijaya Monzon MD;  Location:  MAIN OR;  Service: General    COLONOSCOPY  05/2011    COLONOSCOPY  05/2016    COLONOSCOPY      EGD  01/2019    Esophagitis, esophageal varices    GALLBLADDER SURGERY      INCISION AND DRAINAGE OF WOUND N/A 03/01/2018    SEBACEOUS CYST ABSCESS OF BACK-VIJAYA MONZON MD    [place] ARTHRD PST/PSTLAT TQ 1NTRSPC CRV BELW C2 SEGMENT N/A 5/24/2021    Procedure: Posterior cervical decompressive " "laminectomy and lateral mass fixation fusion C3-5;  Surgeon: Miguel Ryan MD;  [facility];  Service: Neurosurgery    RI EXC B9 LESION MRGN XCP SK TG T/A/L 1.1-2.0 CM N/A 8/22/2018    Procedure: EXCISION  BIOPSY LESION/MASS BACK X4 NECK X1;  Surgeon: Ricardo Messer MD;  [facility];  Service: General    RI VO FACETECTOMY & FORAMOTOMY 1 VRT SGM LUMBAR Left 7/8/2022    Procedure: L3-4, L4-5, and L5-S1 metrx decompressive hemilaminectomy with bilateral foraminotomy and discectomy;  Surgeon: Miguel Ryan MD;  [facility];  Service: Neurosurgery     Social History     Substance and Sexual Activity   Alcohol Use Not Currently    Comment: 7/19/19-last drink 12/2018- Occasionally/1-2 drinks per weekend     Social History     Substance and Sexual Activity   Drug Use No     Social History     Tobacco Use   Smoking Status Never   Smokeless Tobacco Never     Family History   Problem Relation Age of Onset    Heart disease Mother         valve replacement    Alzheimer's disease Mother     Valvular heart disease Father     Heart disease Brother         valve replacement    Stroke Brother         Mini    Valvular heart disease Brother     Colon cancer Neg Hx     Colon polyps Neg Hx          Current Outpatient Medications:     b complex vitamins tablet    buPROPion (WELLBUTRIN XL) 150 mg 24 hr tablet    multivitamin (THERAGRAN) TABS    nadolol (CORGARD) 40 mg tablet    omeprazole (PriLOSEC) 20 mg delayed release capsule    torsemide (DEMADEX) 20 mg tablet  No Known Allergies  Reviewed medications and allergies and updated as indicated    PHYSICAL EXAM:    Blood pressure 121/70, pulse 88, height 5' 11.25\" (1.81 m), weight 123 kg (271 lb). Body mass index is 37.53 kg/m².  General Appearance: NAD, cooperative, alert  Eyes: Anicteric, conjunctiva pink  ENT:  Normocephalic, atraumatic, normal mucosa.    Neck:  Supple, symmetrical, trachea midline  Resp:  Clear to auscultation bilaterally; no " rales, rhonchi or wheezing; respirations unlabored   CV:  S1 S2, Regular rate and rhythm; no murmur, rub, or gallop.  GI:  Soft, non-tender, non-distended; normal bowel sounds; no masses, no organomegaly   Rectal: Deferred  Musculoskeletal: No cyanosis, clubbing or edema. Normal ROM.  Skin:  No jaundice, rashes, or lesions   Heme/Lymph: No palpable cervical lymphadenopathy  Psych: Normal affect, good eye contact  Neuro: No gross deficits, AAOx3    Lab Results:   Lab Results   Component Value Date    WBC 4.57 10/19/2023    HGB 14.5 10/19/2023    HCT 42.5 10/19/2023     (H) 10/19/2023    PLT 92 (L) 10/19/2023     Lab Results   Component Value Date     12/23/2016    K 4.1 10/19/2023     10/19/2023    CO2 25 10/19/2023    ANIONGAP 8 06/06/2014    BUN 19 10/19/2023    CREATININE 1.03 10/19/2023    GLUCOSE 125 05/24/2021    GLUF 99 10/19/2023    CALCIUM 9.1 10/19/2023    CORRECTEDCA 9.7 09/02/2022    AST 75 (H) 10/19/2023    ALT 37 10/19/2023    ALKPHOS 128 (H) 10/19/2023    PROT 6.3 12/23/2016    BILITOT 0.3 12/23/2016    EGFR 73 10/19/2023       Radiology Results:   No results found.

## 2024-06-11 ENCOUNTER — OFFICE VISIT (OUTPATIENT)
Age: 69
End: 2024-06-11
Payer: MEDICARE

## 2024-06-11 VITALS
RESPIRATION RATE: 18 BRPM | SYSTOLIC BLOOD PRESSURE: 130 MMHG | DIASTOLIC BLOOD PRESSURE: 84 MMHG | OXYGEN SATURATION: 98 % | TEMPERATURE: 98.3 F | HEART RATE: 68 BPM | WEIGHT: 288.8 LBS | BODY MASS INDEX: 40.43 KG/M2 | HEIGHT: 71 IN

## 2024-06-11 DIAGNOSIS — M48.062 SPINAL STENOSIS OF LUMBAR REGION WITH NEUROGENIC CLAUDICATION: Primary | ICD-10-CM

## 2024-06-11 DIAGNOSIS — Z98.1 S/P CERVICAL SPINAL FUSION: ICD-10-CM

## 2024-06-11 DIAGNOSIS — G89.4 CHRONIC PAIN SYNDROME: ICD-10-CM

## 2024-06-11 PROCEDURE — 99214 OFFICE O/P EST MOD 30 MIN: CPT | Performed by: PHYSICIAN ASSISTANT

## 2024-06-11 NOTE — ASSESSMENT & PLAN NOTE
Hx of cervical stenosis and myelopathy   - s/p prior C3-5 PCDFw/ Dr. Ryan on 5/24/2021   - post-operatively, the pt reported improvement to his balance but does still require use of a cane intermittently when leaving his house for long periods   - continue to remain stable from this stanpoint

## 2024-06-11 NOTE — PROGRESS NOTES
"Neurosurgery Office Note  Benji Morrow 68 y.o. male MRN: [id number]    Assessment & Plan   S/P cervical spinal fusion  Hx of cervical stenosis and myelopathy   - s/p prior C3-5 PCDFw/ Dr. Ryan on 5/24/2021   - post-operatively, the pt reported improvement to his balance but does still require use of a cane intermittently when leaving his house for long periods   - continue to remain stable from this stanpoint     Lumbar spinal stenosis  Pt presents to the  nsgy office for evaluation in regard to acute on chronic LBP x 4 days.   Pt is known to the  nsgy practice as a pt of Dr. Ryan   S/p prior C3-5 PCDF in May 2021   S/p prior L L3-S1 metrx decompressive hemilaminectomy w/ antony foraminotomies and discectomy in July 2022   Prior to his lumbar surgery, pt reported significant back and RLE pain with associated weakness and numbness in antony LE.   Post-operatively, the pt reported resolution of his LE pain but does have some ongoing heaviness to his antony LE as well as chronic distal LE paraesthesias   Unfortunately, pt reports new, localized L sided low back that returned approximately 4 days ago   Pain worse with change in position, most significant when he gets up and out of bed in the morning   Began suddenly, no reported traumas or activities to incite pain   Denies any radiation of this pain, weakness, increased numbness, saddle anesthesia, BBI, urinary retention, or increased balance difficulty.   Taking OTC NSAIDs, such as Aleeve, PRN for pain with only temporary relief   No recent PT  Completed course of PT after his previous lumbar surgery in 2022, nothing recent   No recent follow-up with PM     Imaging:   No updated imaging to review     Plan:   Pt encouraged to continue to closely monitor his neurological exam and sx   Pt educated on \"red flag\" s/sx to monitor for including weakness, numbness, BBI, urinary retention, saddle anesthesia, inbaility to ambulate, severe/worsening pain, etc.   No updated " "imaging to review   Discussed at length with the pt his sx   Suspect a his symptoms to be secondary to more of a musculoskeletal strain versus new nerve compression or disc herniation in the spine as patient does not seem to describe any neurogenic pain, radicular pain, or report any new \"red flag\" signs or symptoms.  He remains neurologically intact on exam  Recommend further workup with lumbar flexion/extension x-rays  Recommend conservative management at this time  Continue current pain medication regimen  Can trial alternating ice/heat at the site  Referral placed to PM   Referral placed to PT   Will plan to follow-up with the pt again in in 2 to 4 weeks to review his x-rays and assess his symptoms after a trial of conservative measures  Appt to be scheduled with an AP solo  If patient is no better or his symptoms are worse, will consider further workup with an MRI lumbar spine  Pt and his wife are in agreement with the above-noted plan  All questions answered at this time   Pt encouraged to call the office with any further questions or concerns or should they experience any worsening sx       Diagnoses and all orders for this visit:    Spinal stenosis of lumbar region with neurogenic claudication  -     XR spine lumbar complete w bending minimum 6 views; Future  -     Ambulatory referral to Spine & Pain Management; Future  -     Ambulatory Referral to Physical Therapy; Future    S/P cervical spinal fusion    Chronic pain syndrome        I have spent a total time of 40 minutes on 06/11/24 in caring for this patient including Prognosis, Risks and benefits of tx options, Instructions for management, Patient and family education, Risk factor reductions, Impressions, Counseling / Coordination of care, Documenting in the medical record, Reviewing / ordering tests, medicine, procedures  , and Obtaining or reviewing history  .    CHIEF COMPLAINT  Chief Complaint   Patient presents with    Follow-up     EST. PT CHERRI " WORK UP BACK PAIN     HISTORY  Pt is a 67yo M with a PMH significant for GRACIA cirrhosis, grade 1 varices following with GI on a beta blocker, GERD, HLD, DANIEL, antony LE edema, and chronic thrombocytopenia who presents to the [facility] neurosurgery office for evaluation of acute on chronic low back pain x 4 days.    Patient is well-known to [facility] neurosurgery as a patient of Dr. Ryan as he has a history of prior cervical myelopathy status post prior C3-5 PCDF in May 2021 as well as chronic low back pain with neurogenic claudication and lumbar spinal stenosis status post L3-S1 left-sided metrx decompressive hemilaminectomy with bilateral foraminotomies and discectomy in July 2022.  Patient reports after his cervical surgery he had improvement to his balance but does still require use of a cane when ambulating outside of his home or on long distances.  Patient reports after his prior lumbar surgery in 2022 he has significant improvement to his back and leg pain but does still endorse some bilateral leg heaviness when ambulating long distances.    Unfortunately approximately 4 days ago the patient began to experience acute worsening of his low back pain, specifically to the left side of his low back.  Patient describes his pain as a constant aching pain that is worse with movement or change in position, specifically when getting out of bed in the morning.  Patient reports it has been so difficult for him to get out of bed that he has actually slept in his recliner 2 out of the past 4 nights.  Patient denies any radiation of this pain to his lower extremities, leg pain, increased numbness or tingling, weakness, bowel/bladder incontinence, urinary retention, saddle anesthesia.  Patient denies any recent falls or injuries.  Denies any inciting events to this pain.  Patient has been taking as needed Aleve with only temporary relief.  No recent physical therapy and no recent follow-up with pain management.  Patient  "presents today with his wife because he is concerned about this return of his pain especially given his past surgical history.      See Discussion    REVIEW OF SYSTEMS  Review of Systems   Constitutional: Negative.    HENT:  Negative for trouble swallowing.    Eyes: Negative.    Respiratory: Negative.     Cardiovascular: Negative.    Gastrointestinal: Negative.    Endocrine: Negative.    Genitourinary: Negative.    Musculoskeletal:  Positive for back pain (lbp radiates to left side of back, only happens with movement) and gait problem (uses a cane when out and walking due to being off balance). Negative for myalgias.   Skin: Negative.    Allergic/Immunologic: Negative.    Neurological:  Positive for numbness (b/l calf and feet n+t). Negative for weakness.   Hematological: Negative.  Does not bruise/bleed easily.   Psychiatric/Behavioral:  Positive for sleep disturbance.      ROS obtained by MA. Reviewed. See HPI.     Meds/Allergies   Current Outpatient Medications   Medication Sig Dispense Refill    b complex vitamins tablet Take 1 tablet by mouth every morning      buPROPion (WELLBUTRIN XL) 150 mg 24 hr tablet TAKE ONE TABLET BY MOUTH EVERY DAY 90 tablet 1    multivitamin (THERAGRAN) TABS Take 1 tablet by mouth every morning      nadolol (CORGARD) 40 mg tablet Take 1 tablet (40 mg total) by mouth daily 90 tablet 3    omeprazole (PriLOSEC) 20 mg delayed release capsule Take 1 capsule (20 mg total) by mouth daily 90 capsule 3    torsemide (DEMADEX) 20 mg tablet Take 1/2 tablet daily (Patient taking differently: 10 mg Take 1/2 tablet daily)       No current facility-administered medications for this visit.     No Known Allergies    PAST HISTORY  Past Medical History:   Diagnosis Date    Abscess of back 03/01/2018    Acquired pancytopenia (HCC) 01/16/2017    Acquired thrombocytopenia (HCC) 01/16/2017    Benign essential hypertension     Last Assessed:12/19/16; Pt reports heart and BP has been \"fine\" as of 4/16/2020    " Bilateral lower extremity edema 8/7/2020    Cirrhosis (HCC)     Colon polyp     CPAP (continuous positive airway pressure) dependence     Cyst of skin     x6 from neck down back area    Depression     Esophageal varices (HCC)     GERD (gastroesophageal reflux disease)     Liver disease     Macrocytosis     Last Assessed:1/16/17    Major depression, chronic     Last Assessed:12/21/17    Major depression, chronic 06/19/2017    Myelopathy (HCC) 4/27/2021    Personal history of colonic polyps     Sleep apnea     SOB (shortness of breath) on exertion      Past Surgical History:   Procedure Laterality Date    CHOLECYSTECTOMY  11/2018    CHOLECYSTECTOMY LAPAROSCOPIC N/A 11/21/2018    Procedure: CHOLECYSTECTOMY LAPAROSCOPIC, wedge liver biopsy;  Surgeon: Vijaya Monzon MD;  Location: [facility];  Service: General    COLONOSCOPY  05/2011    COLONOSCOPY  05/2016    COLONOSCOPY      EGD  01/2019    Esophagitis, esophageal varices    GALLBLADDER SURGERY      INCISION AND DRAINAGE OF WOUND N/A 03/01/2018    SEBACEOUS CYST ABSCESS OF BACK-VIJAYA MONZON MD    LA ARTHRD PST/PSTLAT TQ 1NTRSPC CRV BELW C2 SEGMENT N/A 5/24/2021    Procedure: Posterior cervical decompressive laminectomy and lateral mass fixation fusion C3-5;  Surgeon: Miguel Ryan MD;  Location: [facility];  Service: Neurosurgery    LA EXC B9 LESION MRGN XCP SK TG T/A/L 1.1-2.0 CM N/A 8/22/2018    Procedure: EXCISION  BIOPSY LESION/MASS BACK X4 NECK X1;  Surgeon: Vijaya Monzon MD;  Location: [facility];  Service: General    LA VO FACETECTOMY & FORAMOTOMY 1 VRT SGM LUMBAR Left 7/8/2022    Procedure: L3-4, L4-5, and L5-S1 metrx decompressive hemilaminectomy with bilateral foraminotomy and discectomy;  Surgeon: Miguel Ryan MD;  Location: [facility];  Service: Neurosurgery     Social History     Tobacco Use    Smoking status: Never    Smokeless tobacco: Never   Vaping Use    Vaping status: Never Used   Substance Use Topics    Alcohol use: Not  "Currently     Comment: 7/19/19-last drink 12/2018- Occasionally/1-2 drinks per weekend    Drug use: No     Family History   Problem Relation Age of Onset    Heart disease Mother         valve replacement    Alzheimer's disease Mother     Valvular heart disease Father     Heart disease Brother         valve replacement    Stroke Brother         Mini    Valvular heart disease Brother     Colon cancer Neg Hx     Colon polyps Neg Hx      Above history personally reviewed.     EXAM  Vitals:Blood pressure 130/84, pulse 68, temperature 98.3 °F (36.8 °C), temperature source Temporal, resp. rate 18, height 5' 11.25\" (1.81 m), weight 131 kg (288 lb 12.8 oz), SpO2 98%.,Body mass index is 40 kg/m².     Physical Exam  Constitutional:       General: He is not in acute distress.     Appearance: Normal appearance. He is obese. He is not ill-appearing or toxic-appearing.      Comments: Pleasant, elderly male sitting comfortably in the chair  No acute distress   HENT:      Head: Normocephalic and atraumatic.      Right Ear: External ear normal.      Left Ear: External ear normal.      Nose: Nose normal. No congestion or rhinorrhea.      Mouth/Throat:      Mouth: Mucous membranes are moist.   Eyes:      General: No scleral icterus.        Right eye: No discharge.         Left eye: No discharge.      Extraocular Movements: Extraocular movements intact.      Conjunctiva/sclera: Conjunctivae normal.      Pupils: Pupils are equal, round, and reactive to light.   Cardiovascular:      Rate and Rhythm: Normal rate.   Pulmonary:      Effort: Pulmonary effort is normal. No respiratory distress.      Comments: No respiratory distress on room air  Abdominal:      General: Abdomen is flat.   Musculoskeletal:         General: No deformity or signs of injury. Normal range of motion.      Cervical back: Normal range of motion and neck supple. No rigidity or tenderness.      Right lower leg: No edema.      Left lower leg: No edema.   Skin:     " General: Skin is warm and dry.      Capillary Refill: Capillary refill takes less than 2 seconds.   Neurological:      General: No focal deficit present.      Mental Status: He is alert and oriented to person, place, and time. Mental status is at baseline.      Cranial Nerves: No cranial nerve deficit.      Sensory: No sensory deficit.      Motor: No weakness.      Coordination: Coordination normal.      Gait: Gait abnormal.      Comments: GCS 15   A&Ox3   Appropriately answering questions and following commands   No dysarthria or aphasia   No appreciated CN deficits   Strength 5/5 throughout to antony UE and antony LE   Sensation intact to light touch to antony UE and antony LE   -Sensation grossly intact throughout  -Reported chronic paresthesias to bilateral feet and lower legs --> stable to baseline  No drift or ataxia appreciated antony   Ambulates with a cane   Psychiatric:         Mood and Affect: Mood normal.         Behavior: Behavior normal.         Thought Content: Thought content normal.         Judgment: Judgment normal.       Neurologic Exam     Mental Status   Oriented to person, place, and time.     Cranial Nerves     CN III, IV, VI   Pupils are equal, round, and reactive to light.        MEDICAL DECISION MAKING  Imaging Studies:     No results found.    I have personally reviewed pertinent reports.   and I have personally reviewed pertinent films in PACS

## 2024-06-11 NOTE — ASSESSMENT & PLAN NOTE
"Pt presents to the  nsgy office for evaluation in regard to acute on chronic LBP x 4 days.   Pt is known to the  nsgy practice as a pt of Dr. Ryan   S/p prior C3-5 PCDF in May 2021   S/p prior L L3-S1 metrx decompressive hemilaminectomy w/ antony foraminotomies and discectomy in July 2022   Prior to his lumbar surgery, pt reported significant back and RLE pain with associated weakness and numbness in antony LE.   Post-operatively, the pt reported resolution of his LE pain but does have some ongoing heaviness to his antony LE as well as chronic distal LE paraesthesias   Unfortunately, pt reports new, localized L sided low back that returned approximately 4 days ago   Pain worse with change in position, most significant when he gets up and out of bed in the morning   Began suddenly, no reported traumas or activities to incite pain   Denies any radiation of this pain, weakness, increased numbness, saddle anesthesia, BBI, urinary retention, or increased balance difficulty.   Taking OTC NSAIDs, such as Aleeve, PRN for pain with only temporary relief   No recent PT  Completed course of PT after his previous lumbar surgery in 2022, nothing recent   No recent follow-up with PM     Imaging:   No updated imaging to review     Plan:   Pt encouraged to continue to closely monitor his neurological exam and sx   Pt educated on \"red flag\" s/sx to monitor for including weakness, numbness, BBI, urinary retention, saddle anesthesia, inbaility to ambulate, severe/worsening pain, etc.   No updated imaging to review   Discussed at length with the pt his sx   Suspect a his symptoms to be secondary to more of a musculoskeletal strain versus new nerve compression or disc herniation in the spine as patient does not seem to describe any neurogenic pain, radicular pain, or report any new \"red flag\" signs or symptoms.  He remains neurologically intact on exam  Recommend further workup with lumbar flexion/extension x-rays  Recommend conservative " management at this time  Continue current pain medication regimen  Can trial alternating ice/heat at the site  Referral placed to PM   Referral placed to PT   Will plan to follow-up with the pt again in in 2 to 4 weeks to review his x-rays and assess his symptoms after a trial of conservative measures  Appt to be scheduled with an AP solo  If patient is no better or his symptoms are worse, will consider further workup with an MRI lumbar spine  Pt and his wife are in agreement with the above-noted plan  All questions answered at this time   Pt encouraged to call the office with any further questions or concerns or should they experience any worsening sx

## 2024-06-14 ENCOUNTER — TELEPHONE (OUTPATIENT)
Age: 69
End: 2024-06-14

## 2024-06-14 NOTE — TELEPHONE ENCOUNTER
Phone the patient as his last office visit he was to f/u 2-4wk with an AP but due to the AP schedule not being opened for July we would contact the patient once the schedule was released. Patient did not answer left a detailed message offering a 4wk appt with CAROLA Babin on 7/11 provided call back number for scheduling.

## 2024-06-24 NOTE — PROGRESS NOTES
[facility] PRACTICE PRE-OPERATIVE EVALUATION  [facility] PHYSICIAN GROUP [facility] PRIMARY CARE [address]    NAME: Benji Morrow  AGE: 68 y.o. SEX: male  : 1955   DATE: 2024    History of Present Illness:     Benji Morrow is a 68 y.o. male who presents to the office today for a preoperative consultation at the request of surgeon, Dr Germán Rico, [facility] , who plans on performing bilateral cataract extraction on 24 and 24. Planned anesthesia is  MAC/topical . Patient has a bleeding risk of: no recent abnormal bleeding, no remote history of abnormal bleeding, and no use of Ca-channel blockers. Patient does not have objections to receiving blood products if needed. Current anti-platelet/anti-coagulation medications that the patient is prescribed includes:  N/A .         Assessment of Cardiac Risk:  Denies unstable or severe angina or MI in the last 6 weeks or history of stent placement in the last year   Denies decompensated heart failure (e.g. New onset heart failure, NYHA functional class IV heart failure, or worsening existing heart failure)  Denies significant arrhythmias such as high grade AV block, symptomatic ventricular arrhythmia, newly recognized ventricular tachycardia, supraventricular tachycardia with resting heart rate >100, or symptomatic bradycardia  Denies severe heart valve disease including aortic stenosis or symptomatic mitral stenosis     Exercise Capacity:  Able to walk 4 blocks without symptoms?: Yes, with SPC  Able to walk 2 flights without symptoms?: Yes, with SPC    Prior Anesthesia Reactions: No  Personal history of venous thromboembolic disease? No  History of steroid use for >2 weeks within last year? No          Review of Systems:     Review of Systems   Constitutional: Negative.  Negative for activity change, appetite change, chills, fatigue and fever.   HENT:  Positive for hearing loss. Negative for congestion, ear pain, postnasal drip and  sinus pain.    Eyes: Negative.    Respiratory: Negative.  Negative for cough and shortness of breath.    Cardiovascular:  Positive for leg swelling. Negative for chest pain.   Gastrointestinal: Negative.  Negative for constipation and diarrhea.   Endocrine: Negative.    Genitourinary: Negative.  Negative for dysuria.   Musculoskeletal:  Positive for gait problem.   Skin: Negative.    Allergic/Immunologic: Negative.  Negative for immunocompromised state.   Neurological:  Negative for dizziness and light-headedness.   Hematological: Negative.    Psychiatric/Behavioral:  Positive for sleep disturbance.         Due to BIPAP       Current Problem List:     Patient Active Problem List   Diagnosis    Thrombocytopenia (HCC)    DDD (degenerative disc disease), lumbar    Hypercholesterolemia    Major depression, chronic    Other cirrhosis of liver (HCC)    Enlarged prostate    Esophageal varices determined by endoscopy (HCC)    Personal history of colonic polyps    Bilateral lower extremity edema    Lumbar spinal stenosis    Lumbar radiculopathy    Chronic pain syndrome    Unspecified abnormalities of gait and mobility    Cervical spinal stenosis    Sleep apnea    BMI 36.0-36.9,adult    Encounter for postoperative care related to surgical joint fusion    Neurogenic claudication    S/P cervical spinal fusion    CPAP (continuous positive airway pressure) dependence    GERD (gastroesophageal reflux disease)    DANIEL (obstructive sleep apnea)    Dyspnea on exertion    Varicose veins of leg with swelling, bilateral    Lymphedema       Allergies:     No Known Allergies    Current Medications:       Current Outpatient Medications:     b complex vitamins tablet, Take 1 tablet by mouth every morning, Disp: , Rfl:     buPROPion (WELLBUTRIN XL) 150 mg 24 hr tablet, TAKE ONE TABLET BY MOUTH EVERY DAY, Disp: 90 tablet, Rfl: 1    multivitamin (THERAGRAN) TABS, Take 1 tablet by mouth every morning, Disp: , Rfl:     nadolol (CORGARD) 40 mg  "tablet, Take 1 tablet (40 mg total) by mouth daily, Disp: 90 tablet, Rfl: 3    omeprazole (PriLOSEC) 20 mg delayed release capsule, Take 1 capsule (20 mg total) by mouth daily, Disp: 90 capsule, Rfl: 3    torsemide (DEMADEX) 20 mg tablet, Take 1/2 tablet daily (Patient taking differently: 10 mg Take 1/2 tablet daily), Disp: , Rfl:     Past Medical History:       Past Medical History:   Diagnosis Date    Abscess of back 03/01/2018    Acquired pancytopenia (HCC) 01/16/2017    Acquired thrombocytopenia (HCC) 01/16/2017    Benign essential hypertension     Last Assessed:12/19/16; Pt reports heart and BP has been \"fine\" as of 4/16/2020    Bilateral lower extremity edema 8/7/2020    Cirrhosis (HCC)     Colon polyp     CPAP (continuous positive airway pressure) dependence     Cyst of skin     x6 from neck down back area    Depression     Esophageal varices (HCC)     GERD (gastroesophageal reflux disease)     Liver disease     Macrocytosis     Last Assessed:1/16/17    Major depression, chronic     Last Assessed:12/21/17    Major depression, chronic 06/19/2017    Myelopathy (HCC) 4/27/2021    Personal history of colonic polyps     Sleep apnea     SOB (shortness of breath) on exertion         Past Surgical History:   Procedure Laterality Date    CHOLECYSTECTOMY  11/2018    CHOLECYSTECTOMY LAPAROSCOPIC N/A 11/21/2018    Procedure: CHOLECYSTECTOMY LAPAROSCOPIC, wedge liver biopsy;  Surgeon: Vijaya Monzon MD;  Location: [facility];  Service: General    COLONOSCOPY  05/2011    COLONOSCOPY  05/2016    COLONOSCOPY      EGD  01/2019    Esophagitis, esophageal varices    GALLBLADDER SURGERY      INCISION AND DRAINAGE OF WOUND N/A 03/01/2018    SEBACEOUS CYST ABSCESS OF BACK-VIJAYA MONZON MD    CO ARTHRD PST/PSTLAT TQ 1NTRSPC CRV BELW C2 SEGMENT N/A 5/24/2021    Procedure: Posterior cervical decompressive laminectomy and lateral mass fixation fusion C3-5;  Surgeon: Miguel Ryan MD;  Location: [facility];  Service: " Neurosurgery    AK EXC B9 LESION MRGN XCP SK TG T/A/L 1.1-2.0 CM N/A 8/22/2018    Procedure: EXCISION  BIOPSY LESION/MASS BACK X4 NECK X1;  Surgeon: Ricardo Messer MD;  Location: [facility];  Service: General    AK VO FACETECTOMY & FORAMOTOMY 1 VRT SGM LUMBAR Left 7/8/2022    Procedure: L3-4, L4-5, and L5-S1 metrx decompressive hemilaminectomy with bilateral foraminotomy and discectomy;  Surgeon: Miguel Ryan MD;  Location: [facility];  Service: Neurosurgery        Family History   Problem Relation Age of Onset    Heart disease Mother         valve replacement    Alzheimer's disease Mother     Valvular heart disease Father     Heart disease Brother         valve replacement    Stroke Brother         Mini    Valvular heart disease Brother     Colon cancer Neg Hx     Colon polyps Neg Hx         Social History     Socioeconomic History    Marital status: /Civil Union     Spouse name: Not on file    Number of children: Not on file    Years of education: Not on file    Highest education level: Not on file   Occupational History    Not on file   Tobacco Use    Smoking status: Never    Smokeless tobacco: Never   Vaping Use    Vaping status: Never Used   Substance and Sexual Activity    Alcohol use: Not Currently     Comment: 7/19/19-last drink 12/2018- Occasionally/1-2 drinks per weekend    Drug use: No    Sexual activity: Not on file   Other Topics Concern    Not on file   Social History Narrative    Always uses seatbelt     Social Determinants of Health     Financial Resource Strain: Low Risk  (10/12/2023)    Overall Financial Resource Strain (CARDIA)     Difficulty of Paying Living Expenses: Not hard at all   Food Insecurity: Not on file   Transportation Needs: No Transportation Needs (10/12/2023)    PRAPARE - Transportation     Lack of Transportation (Medical): No     Lack of Transportation (Non-Medical): No   Physical Activity: Not on file   Stress: Not on file   Social Connections: Not on file  "  Intimate Partner Violence: Not on file   Housing Stability: Not on file        Physical Exam:     /70   Pulse 72   Resp 20   Ht 5' 11\" (1.803 m)   Wt 121 kg (266 lb)   SpO2 97%   BMI 37.10 kg/m²     Physical Exam  Vitals and nursing note reviewed.   Constitutional:       General: He is awake.      Appearance: Normal appearance. He is obese.   HENT:      Head: Normocephalic and atraumatic.      Right Ear: Tympanic membrane, ear canal and external ear normal. Decreased hearing noted.      Left Ear: Tympanic membrane, ear canal and external ear normal. Decreased hearing noted.      Ears:      Comments: Bilateral hearing aides in place     Nose: Nose normal.      Mouth/Throat:      Mouth: Mucous membranes are moist.      Pharynx: Oropharynx is clear.   Eyes:      Extraocular Movements: Extraocular movements intact.      Conjunctiva/sclera: Conjunctivae normal.      Pupils: Pupils are equal, round, and reactive to light.   Cardiovascular:      Rate and Rhythm: Normal rate and regular rhythm.      Pulses: Normal pulses.      Heart sounds: Normal heart sounds.   Pulmonary:      Effort: Pulmonary effort is normal.      Breath sounds: Normal breath sounds.   Abdominal:      General: Bowel sounds are normal.      Palpations: Abdomen is soft.   Musculoskeletal:         General: Normal range of motion.      Cervical back: Normal range of motion and neck supple.      Right lower leg: Edema present.      Left lower leg: Edema present.      Comments: +1 pitting BLE edema with legs dependent   Skin:     General: Skin is warm and dry.   Neurological:      General: No focal deficit present.      Mental Status: He is alert and oriented to person, place, and time.      Sensory: Sensory deficit present.      Gait: Gait abnormal.      Comments: Ambulates with SPC   Psychiatric:         Mood and Affect: Mood normal.         Behavior: Behavior normal. Behavior is cooperative.         Thought Content: Thought content normal.   "       Judgment: Judgment normal.          Data:     Pre-operative work-up    Laboratory Results:      Latest Reference Range & Units 10/19/23 07:56   Sodium 135 - 147 mmol/L 139   Potassium 3.5 - 5.3 mmol/L 4.1   Chloride 96 - 108 mmol/L 107   Carbon Dioxide 21 - 32 mmol/L 25   ANION GAP mmol/L 7   BUN 5 - 25 mg/dL 19   Creatinine 0.60 - 1.30 mg/dL 1.03   GLUCOSE, FASTING 65 - 99 mg/dL 99   Calcium 8.4 - 10.2 mg/dL 9.1   AST 13 - 39 U/L 75 (H)   ALT 7 - 52 U/L 37   ALK PHOS 34 - 104 U/L 128 (H)   Total Protein 6.4 - 8.4 g/dL 6.1 (L)   Albumin 3.5 - 5.0 g/dL 3.5   Total Bilirubin 0.20 - 1.00 mg/dL 1.63 (H)   GFR, Calculated ml/min/1.73sq m 73   Cholesterol See Comment mg/dL 140   Triglycerides See Comment mg/dL 84   HDL >=40 mg/dL 49   LDL Calculated 0 - 100 mg/dL 74   WBC 4.31 - 10.16 Thousand/uL 4.57   RBC 3.88 - 5.62 Million/uL 4.15   Hemoglobin 12.0 - 17.0 g/dL 14.5   Hematocrit 36.5 - 49.3 % 42.5   MCV 82 - 98 fL 102 (H)   MCH 26.8 - 34.3 pg 34.9 (H)   MCHC 31.4 - 37.4 g/dL 34.1   RDW 11.6 - 15.1 % 13.2   Platelet Count 149 - 390 Thousands/uL 92 (L)   MPV 8.9 - 12.7 fL 10.9   Platelet Estimate Adequate  Decreased !   (H): Data is abnormally high  (L): Data is abnormally low  !: Data is abnormal     EKG:  N/A       Chest x-ray:  N/A    Previous cardiopulmonary studies within the past year:  Echocardiogram: 6/20/2023:  Left Ventricle: Left ventricular cavity size is normal. Wall thickness is mildly increased. The left ventricular ejection fraction is 60%. Systolic function is normal. Wall motion is normal. Diastolic function is normal.    Right Ventricle: Right ventricular cavity size is mildly dilated. Systolic function is normal.    Right Atrium: The atrium is mildly dilated.    Aortic Valve: There is aortic valve sclerosis.    Mitral Valve: There is mild regurgitation.    Aorta: The aortic root is mildly dilated. The ascending aorta is mildly dilated. The aortic root is 4.00 cm. The ascending aorta is 3.8  cm.  Cardiac Catheterization: N/A  Stress Test: 2023 regadenoson myocardial perfusion stress test negative for myocardia ischemia    Pulmonary Function Testin2023:  Spirometer shows no obstructive ventilatory defect.  No significant response after administration of bronchodilator according to the ats standards.  Normal lung volumes.  Mildly reduced diffusion capacity.  Normal airway resistance         Assessment & Recommendations:     1. Pre-op examination  2. Age-related cataract of both eyes, unspecified age-related cataract type  3. DANIEL (obstructive sleep apnea)  Assessment & Plan:  Adherent to BIPAP at HS.    4. Thrombocytopenia (HCC)  Assessment & Plan:  Plt chronically low; no s/s bleed present.        Pre-Op Evaluation Assessment  68 y.o. male with planned surgery: bilateral cataract extraction.    Known risk factors for perioperative complications: Anemia  Coronary disease  Chronic pulmonary disease  Hepatic dysfunction.    Current medications which may produce withdrawal symptoms if withheld perioperatively: N/A.    Pre-Op Evaluation Plan  1. Further preoperative workup as follows:   - None; no further preoperative work-up is required    2. Medication Management/Recommendations:   - None, continue medication regimen including morning of surgery, with sip of water  - Patient has been instructed to avoid herbs or non-directed vitamins the week prior to surgery to ensure no drug interactions with perioperative surgical and anesthetic medications.    3. Prophylaxis for cardiac events with perioperative beta-blockers: on nadolol for esophageal varices    4. Patient requires further consultation with: None    Clearance  Patient is CLEARED for surgery without any additional cardiac testing.     FADUMO Garcia  [facility] CARE [address] [place] [id number]  Phone#  [phone]  Fax#  [phone]

## 2024-06-25 ENCOUNTER — CONSULT (OUTPATIENT)
Dept: FAMILY MEDICINE CLINIC | Facility: HOSPITAL | Age: 69
End: 2024-06-25
Payer: MEDICARE

## 2024-06-25 VITALS
BODY MASS INDEX: 37.24 KG/M2 | HEART RATE: 72 BPM | HEIGHT: 71 IN | OXYGEN SATURATION: 97 % | SYSTOLIC BLOOD PRESSURE: 108 MMHG | WEIGHT: 266 LBS | DIASTOLIC BLOOD PRESSURE: 70 MMHG | RESPIRATION RATE: 20 BRPM

## 2024-06-25 DIAGNOSIS — D69.6 THROMBOCYTOPENIA (HCC): ICD-10-CM

## 2024-06-25 DIAGNOSIS — H25.9 AGE-RELATED CATARACT OF BOTH EYES, UNSPECIFIED AGE-RELATED CATARACT TYPE: ICD-10-CM

## 2024-06-25 DIAGNOSIS — Z01.818 PRE-OP EXAMINATION: Primary | ICD-10-CM

## 2024-06-25 DIAGNOSIS — G47.33 OSA (OBSTRUCTIVE SLEEP APNEA): ICD-10-CM

## 2024-06-25 PROCEDURE — G2211 COMPLEX E/M VISIT ADD ON: HCPCS | Performed by: NURSE PRACTITIONER

## 2024-06-25 PROCEDURE — 99214 OFFICE O/P EST MOD 30 MIN: CPT | Performed by: NURSE PRACTITIONER

## 2024-07-08 ENCOUNTER — HOSPITAL ENCOUNTER (OUTPATIENT)
Dept: RADIOLOGY | Facility: HOSPITAL | Age: 69
Discharge: HOME | End: 2024-07-08
Payer: MEDICARE

## 2024-07-08 DIAGNOSIS — M48.062 SPINAL STENOSIS OF LUMBAR REGION WITH NEUROGENIC CLAUDICATION: ICD-10-CM

## 2024-07-08 PROCEDURE — 72114 X-RAY EXAM L-S SPINE BENDING: CPT

## 2024-07-12 ENCOUNTER — TELEPHONE (OUTPATIENT)
Age: 69
End: 2024-07-12

## 2024-07-12 ENCOUNTER — OFFICE VISIT (OUTPATIENT)
Age: 69
End: 2024-07-12
Payer: MEDICARE

## 2024-07-12 VITALS
HEIGHT: 71 IN | RESPIRATION RATE: 16 BRPM | WEIGHT: 266 LBS | TEMPERATURE: 98.4 F | SYSTOLIC BLOOD PRESSURE: 108 MMHG | OXYGEN SATURATION: 96 % | DIASTOLIC BLOOD PRESSURE: 70 MMHG | BODY MASS INDEX: 37.24 KG/M2 | HEART RATE: 75 BPM

## 2024-07-12 DIAGNOSIS — M54.50 ACUTE MIDLINE LOW BACK PAIN WITHOUT SCIATICA: Primary | ICD-10-CM

## 2024-07-12 PROCEDURE — 99214 OFFICE O/P EST MOD 30 MIN: CPT | Performed by: PHYSICIAN ASSISTANT

## 2024-07-12 NOTE — TELEPHONE ENCOUNTER
Pt wife called after bad connection w/ [facility] office, attempted to transfer to [facility] but not available in agent list, pt states she would rather not wait so message sent for [facility] to return pt call

## 2024-07-12 NOTE — PROGRESS NOTES
Neurosurgery Office Note  Benji Morrow 68 y.o. male MRN: [id number]      Assessment & Plan     Acute midline low back pain without sciatica  Pt presents for follow up regarding his acute  Last seen in the neurosurgical office on 6/11 with acute onset back pain for 4 days  XR lumbar spine ordered and felt to be more likely musculoskeletal in etiology    Pt is known to the  nsgy practice as a pt of Dr. Ryan   S/p prior C3-5 PCDF in May 2021   S/p prior L L3-S1 metrx decompressive hemilaminectomy w/ antony foraminotomies and discectomy in July 2022     Imaging:   XR lumbar spine 7/8/2024: New moderate T12 compression deformity.  Stable L1 and L5 compression deformity.  Imaging compared to CT scan from 6/6/2022.    Plan:   Pt encouraged to continue to closely monitor his symptoms  Patient previously reported pain has resolved. Only complaint of stiffness in the morning that improves with ambulation  XR imaging reviewed with patient and wife in room   XR reports acute although patient does not display any clinical evidence of this  X-ray imaging compared to CT from 2022  In the absence of acute pain or focal tenderness, will defer bracing for fracture  No additional XR imaging required at this time. Patient has no radicular pain or concerns necessitating an MRI at this time.   Continue with conservative measures at this time   No current restrictions from neurosurgical standpoint.   Follow up as needed at this time.  Contact the neurosurgical office should any additional questions or concerns arise       Diagnoses and all orders for this visit:    Acute midline low back pain without sciatica          I have spent a total time of 30 minutes on 07/12/24 in caring for this patient including Diagnostic results, Prognosis, Risks and benefits of tx options, Instructions for management, Patient and family education, Importance of tx compliance, Risk factor reductions, Impressions, Counseling / Coordination of care,  Documenting in the medical record, Reviewing / ordering tests, medicine, procedures  , and Obtaining or reviewing history  .      CHIEF COMPLAINT    Chief Complaint   Patient presents with    Follow-up       HISTORY    History of Present Illness     68 y.o. year old male who returns to the outpatient neurosurgical office for follow-up regarding his acute onset back pain.  Patient was seen on 6/11 with a few day history of acute onset back pain.  The thought at the time was that this was possibly related to a musculoskeletal injury.  Baseline x-rays were repeated and he follows up today in the outpatient setting.  Clinically patient is doing much better.  He reports no back pain at this time.  He does have some stiffness in the mornings after waking up which does improve with ambulation.  He uses a cane for walking support as he does often feel unsteady on his feet.  He does have neuropathy in bilateral lower extremities as well as cataracts.  He is scheduled to have his second eye done next Tuesday.  Following his cataract surgery he will then transition and start with physical therapy.  We reviewed patient's x-ray imaging in the room with him and patient's wife.  We spent a long time discussing his current state as well as the potential for some improvement with conservative measures.        See Discussion    REVIEW OF SYSTEMS    Review of Systems   Constitutional: Negative.    HENT:  Negative for trouble swallowing.    Eyes: Negative.    Respiratory: Negative.     Cardiovascular: Negative.    Gastrointestinal: Negative.    Endocrine: Negative.    Genitourinary: Negative.    Musculoskeletal:  Positive for back pain (lbp radiates to left side of back, with more activity will tend to loosen up) and gait problem (uses a cane when out and walking due to being off balance). Negative for myalgias.   Skin: Negative.    Allergic/Immunologic: Negative.    Neurological:  Positive for weakness (bi/legs) and numbness (moslty  "feet).   Hematological: Negative.  Does not bruise/bleed easily.   Psychiatric/Behavioral:  Positive for sleep disturbance.        ROS obtained by MA. Reviewed. See HPI.     Meds/Allergies     Current Outpatient Medications   Medication Sig Dispense Refill    b complex vitamins tablet Take 1 tablet by mouth every morning      buPROPion (WELLBUTRIN XL) 150 mg 24 hr tablet TAKE ONE TABLET BY MOUTH EVERY DAY 90 tablet 1    multivitamin (THERAGRAN) TABS Take 1 tablet by mouth every morning      nadolol (CORGARD) 40 mg tablet Take 1 tablet (40 mg total) by mouth daily 90 tablet 3    omeprazole (PriLOSEC) 20 mg delayed release capsule Take 1 capsule (20 mg total) by mouth daily 90 capsule 3    torsemide (DEMADEX) 20 mg tablet Take 1/2 tablet daily (Patient taking differently: 10 mg Take 1/2 tablet daily)       No current facility-administered medications for this visit.       No Known Allergies    PAST HISTORY    Past Medical History:   Diagnosis Date    Abscess of back 03/01/2018    Acquired pancytopenia (HCC) 01/16/2017    Acquired thrombocytopenia (HCC) 01/16/2017    Benign essential hypertension     Last Assessed:12/19/16; Pt reports heart and BP has been \"fine\" as of 4/16/2020    Bilateral lower extremity edema 8/7/2020    Cirrhosis (HCC)     Colon polyp     CPAP (continuous positive airway pressure) dependence     Cyst of skin     x6 from neck down back area    Depression     Esophageal varices (HCC)     GERD (gastroesophageal reflux disease)     Liver disease     Macrocytosis     Last Assessed:1/16/17    Major depression, chronic     Last Assessed:12/21/17    Major depression, chronic 06/19/2017    Myelopathy (HCC) 4/27/2021    Personal history of colonic polyps     Sleep apnea     SOB (shortness of breath) on exertion        Past Surgical History:   Procedure Laterality Date    CHOLECYSTECTOMY  11/2018    CHOLECYSTECTOMY LAPAROSCOPIC N/A 11/21/2018    Procedure: CHOLECYSTECTOMY LAPAROSCOPIC, wedge liver biopsy;  " "Surgeon: Vijaya Monzon MD;  Location: [facility];  Service: General    COLONOSCOPY  05/2011    COLONOSCOPY  05/2016    COLONOSCOPY      EGD  01/2019    Esophagitis, esophageal varices    GALLBLADDER SURGERY      INCISION AND DRAINAGE OF WOUND N/A 03/01/2018    SEBACEOUS CYST ABSCESS OF BACK-VIJAYA MONZON MD    MT ARTHRD PST/PSTLAT TQ 1NTRSPC CRV BELW C2 SEGMENT N/A 5/24/2021    Procedure: Posterior cervical decompressive laminectomy and lateral mass fixation fusion C3-5;  Surgeon: Miguel Ryan MD;  Location: [facility];  Service: Neurosurgery    MT EXC B9 LESION MRGN XCP SK TG T/A/L 1.1-2.0 CM N/A 8/22/2018    Procedure: EXCISION  BIOPSY LESION/MASS BACK X4 NECK X1;  Surgeon: Vijaya Monzon MD;  Location: [facility];  Service: General    MT VO FACETECTOMY & FORAMOTOMY 1 VRT SGM LUMBAR Left 7/8/2022    Procedure: L3-4, L4-5, and L5-S1 metrx decompressive hemilaminectomy with bilateral foraminotomy and discectomy;  Surgeon: Miguel Ryan MD;  Location: [facility];  Service: Neurosurgery       Social History     Tobacco Use    Smoking status: Never    Smokeless tobacco: Never   Vaping Use    Vaping status: Never Used   Substance Use Topics    Alcohol use: Not Currently     Comment: 7/19/19-last drink 12/2018- Occasionally/1-2 drinks per weekend    Drug use: No       Family History   Problem Relation Age of Onset    Heart disease Mother         valve replacement    Alzheimer's disease Mother     Valvular heart disease Father     Heart disease Brother         valve replacement    Stroke Brother         Mini    Valvular heart disease Brother     Colon cancer Neg Hx     Colon polyps Neg Hx          Above history personally reviewed.       EXAM    Vitals:Blood pressure 108/70, pulse 75, temperature 98.4 °F (36.9 °C), resp. rate 16, height 5' 11\" (1.803 m), weight 121 kg (266 lb), SpO2 96%.,Body mass index is 37.1 kg/m².     Physical Exam  Constitutional:       Appearance: Normal appearance.   HENT: "      Head: Atraumatic.   Eyes:      Extraocular Movements: EOM normal.   Pulmonary:      Effort: Pulmonary effort is normal.   Musculoskeletal:         General: No tenderness. Normal range of motion.      Cervical back: Normal range of motion. No tenderness.      Right lower leg: Edema present.      Left lower leg: Edema present.   Neurological:      Mental Status: He is alert and oriented to person, place, and time.      Sensory: Sensory deficit present.      Motor: Weakness present.      Gait: Gait is intact.   Psychiatric:         Speech: Speech normal.         Neurologic Exam     Mental Status   Oriented to person, place, and time.   Attention: normal.   Speech: speech is normal   Level of consciousness: alert  Knowledge: good.   Normal comprehension.     Cranial Nerves     CN III, IV, VI   Extraocular motions are normal.   Upgaze: normal  Downgaze: normal    CN VII   Facial expression full, symmetric.     CN VIII   CN VIII normal.   Hearing: intact    Motor Exam   Muscle bulk: normal  Right arm tone: normal  Left arm tone: normal  Right leg tone: normal  Left leg tone: normal    Strength   Strength 5/5 except as noted. Generalized weakness in the BLE 4-4+/5 throughout. No focal weakness.      Sensory Exam   Right arm light touch: normal  Left arm light touch: normal  Decreased sensation from the mid calf distally consistent with chronic neuropathy     Gait, Coordination, and Reflexes     Gait  Gait: normal (uses a cane for balance support)    Tremor   Resting tremor: absent  Action tremor: absent    MEDICAL DECISION MAKING    Imaging Studies:     XR spine lumbar complete w bending minimum 6 views    Result Date: 7/8/2024  Narrative: LUMBAR SPINE INDICATION:   Spinal stenosis, lumbar region with neurogenic claudication. COMPARISON: CT 6/6/2022 VIEWS:  XR SPINE LUMBAR COMPLETE W BENDING MINIMUM 6 VIEWS Images: 7 FINDINGS: There are 5 non rib bearing lumbar vertebral bodies. New moderate T12 compression deformity  stable L1 and L5 compression deformities.. No evidence of instability during flexion/extension. Stable multilevel degenerative changes with advanced lower lumbar facet arthropathy. The pedicles appear intact. There are atherosclerotic calcifications. Soft tissues are otherwise unremarkable.     Impression: New moderate T12 compression deformity, stable L1 and L5 compression deformities and multilevel degenerative change. Electronically signed: 07/08/2024 10:16 AM Pietro Rasmussen, MPH,MD      I have personally reviewed pertinent reports.   and I have personally reviewed pertinent films in PACS

## 2024-07-12 NOTE — ASSESSMENT & PLAN NOTE
Pt presents for follow up regarding his acute  Last seen in the neurosurgical office on 6/11 with acute onset back pain for 4 days  XR lumbar spine ordered and felt to be more likely musculoskeletal in etiology    Pt is known to the  nsgy practice as a pt of Dr. Ryan   S/p prior C3-5 PCDF in May 2021   S/p prior L L3-S1 metrx decompressive hemilaminectomy w/ antony foraminotomies and discectomy in July 2022     Imaging:   XR lumbar spine 7/8/2024: New moderate T12 compression deformity.  Stable L1 and L5 compression deformity.  Imaging compared to CT scan from 6/6/2022.    Plan:   Pt encouraged to continue to closely monitor his symptoms  Patient previously reported pain has resolved. Only complaint of stiffness in the morning that improves with ambulation  XR imaging reviewed with patient and wife in room   XR reports acute although patient does not display any clinical evidence of this  X-ray imaging compared to CT from 2022  In the absence of acute pain or focal tenderness, will defer bracing for fracture  No additional XR imaging required at this time. Patient has no radicular pain or concerns necessitating an MRI at this time.   Continue with conservative measures at this time   No current restrictions from neurosurgical standpoint.   Follow up as needed at this time.  Contact the neurosurgical office should any additional questions or concerns arise

## 2024-07-30 PROBLEM — G47.33 OSA (OBSTRUCTIVE SLEEP APNEA): Status: ACTIVE | Noted: 2024-07-30

## 2024-07-30 PROBLEM — G47.00 INSOMNIA: Status: ACTIVE | Noted: 2024-07-30

## 2024-10-01 ENCOUNTER — APPOINTMENT (OUTPATIENT)
Dept: LAB | Facility: HOSPITAL | Age: 69
End: 2024-10-01
Payer: MEDICARE

## 2024-10-01 DIAGNOSIS — Z12.5 PROSTATE CANCER SCREENING: ICD-10-CM

## 2024-10-01 DIAGNOSIS — E78.00 HYPERCHOLESTEROLEMIA: ICD-10-CM

## 2024-10-01 LAB
AFP-TM SERPL-MCNC: 2.68 NG/ML (ref 0–9)
ALBUMIN SERPL BCG-MCNC: 3.5 G/DL (ref 3.5–5)
ALP SERPL-CCNC: 131 U/L (ref 34–104)
ALT SERPL W P-5'-P-CCNC: 30 U/L (ref 7–52)
ANION GAP SERPL CALCULATED.3IONS-SCNC: 5 MMOL/L (ref 4–13)
AST SERPL W P-5'-P-CCNC: 43 U/L (ref 13–39)
BASOPHILS # BLD AUTO: 0 THOUSANDS/ÂΜL (ref 0–0.1)
BASOPHILS NFR BLD AUTO: 0 % (ref 0–1)
BILIRUB SERPL-MCNC: 1.93 MG/DL (ref 0.2–1)
BUN SERPL-MCNC: 18 MG/DL (ref 5–25)
CALCIUM SERPL-MCNC: 9.3 MG/DL (ref 8.4–10.2)
CHLORIDE SERPL-SCNC: 109 MMOL/L (ref 96–108)
CHOLEST SERPL-MCNC: 140 MG/DL
CO2 SERPL-SCNC: 26 MMOL/L (ref 21–32)
CREAT SERPL-MCNC: 0.87 MG/DL (ref 0.6–1.3)
EOSINOPHIL # BLD AUTO: 0.16 THOUSAND/ÂΜL (ref 0–0.61)
EOSINOPHIL NFR BLD AUTO: 3 % (ref 0–6)
ERYTHROCYTE [DISTWIDTH] IN BLOOD BY AUTOMATED COUNT: 14.1 % (ref 11.6–15.1)
GFR SERPL CREATININE-BSD FRML MDRD: 86 ML/MIN/1.73SQ M
GLUCOSE P FAST SERPL-MCNC: 97 MG/DL (ref 65–99)
HCT VFR BLD AUTO: 44.7 % (ref 36.5–49.3)
HDLC SERPL-MCNC: 54 MG/DL
HGB BLD-MCNC: 14.9 G/DL (ref 12–17)
IMM GRANULOCYTES # BLD AUTO: 0.02 THOUSAND/UL (ref 0–0.2)
IMM GRANULOCYTES NFR BLD AUTO: 0 % (ref 0–2)
LDLC SERPL CALC-MCNC: 71 MG/DL (ref 0–100)
LYMPHOCYTES # BLD AUTO: 1.54 THOUSANDS/ÂΜL (ref 0.6–4.47)
LYMPHOCYTES NFR BLD AUTO: 32 % (ref 14–44)
MCH RBC QN AUTO: 34.5 PG (ref 26.8–34.3)
MCHC RBC AUTO-ENTMCNC: 33.3 G/DL (ref 31.4–37.4)
MCV RBC AUTO: 104 FL (ref 82–98)
MONOCYTES # BLD AUTO: 0.62 THOUSAND/ÂΜL (ref 0.17–1.22)
MONOCYTES NFR BLD AUTO: 13 % (ref 4–12)
NEUTROPHILS # BLD AUTO: 2.44 THOUSANDS/ÂΜL (ref 1.85–7.62)
NEUTS SEG NFR BLD AUTO: 52 % (ref 43–75)
NRBC BLD AUTO-RTO: 0 /100 WBCS
PLATELET # BLD AUTO: 91 THOUSANDS/UL (ref 149–390)
PMV BLD AUTO: 10.9 FL (ref 8.9–12.7)
POTASSIUM SERPL-SCNC: 4.3 MMOL/L (ref 3.5–5.3)
PROT SERPL-MCNC: 6.3 G/DL (ref 6.4–8.4)
PSA SERPL-MCNC: 0.49 NG/ML (ref 0–4)
RBC # BLD AUTO: 4.32 MILLION/UL (ref 3.88–5.62)
SODIUM SERPL-SCNC: 140 MMOL/L (ref 135–147)
TRIGL SERPL-MCNC: 77 MG/DL
TSH SERPL DL<=0.05 MIU/L-ACNC: 1.14 UIU/ML (ref 0.45–4.5)
WBC # BLD AUTO: 4.78 THOUSAND/UL (ref 4.31–10.16)

## 2024-10-01 PROCEDURE — G0103 PSA SCREENING: HCPCS

## 2024-10-01 PROCEDURE — 84443 ASSAY THYROID STIM HORMONE: CPT

## 2024-10-01 PROCEDURE — 80053 COMPREHEN METABOLIC PANEL: CPT

## 2024-10-01 PROCEDURE — 36415 COLL VENOUS BLD VENIPUNCTURE: CPT

## 2024-10-01 PROCEDURE — 80061 LIPID PANEL: CPT

## 2024-10-01 PROCEDURE — 85025 COMPLETE CBC W/AUTO DIFF WBC: CPT

## 2024-10-02 ENCOUNTER — RA CDI HCC (OUTPATIENT)
Dept: OTHER | Facility: HOSPITAL | Age: 69
End: 2024-10-02

## 2024-10-02 NOTE — PROGRESS NOTES
It is noted in the patient record that the patient has F32.9 major depression, chronic    If appropriate, can the depression be further specified as:     F33.0 major depressive disorder, recurrent, mild  OR   F33.1 major depressive disorder, recurrent, moderate  OR   F33.2 major depressive disorder, recurrent, severe without psychotic features OR   F33.41 major depressive disorder, recurrent, in partial remission OR   F33.42 major depressive disorder, recurrent, in full remission  HCC coding opportunities          Chart Reviewed number of suggestions sent to Provider: 1     Patients Insurance     Medicare Insurance: Medicare

## 2024-10-13 DIAGNOSIS — F32.A DEPRESSION, UNSPECIFIED DEPRESSION TYPE: ICD-10-CM

## 2024-10-15 RX ORDER — BUPROPION HYDROCHLORIDE 150 MG/1
150 TABLET ORAL DAILY
Qty: 90 TABLET | Refills: 1 | Status: SHIPPED | OUTPATIENT
Start: 2024-10-15

## 2024-10-17 ENCOUNTER — OFFICE VISIT (OUTPATIENT)
Dept: FAMILY MEDICINE CLINIC | Facility: HOSPITAL | Age: 69
End: 2024-10-17
Payer: MEDICARE

## 2024-10-17 VITALS
BODY MASS INDEX: 36.76 KG/M2 | HEIGHT: 71 IN | WEIGHT: 262.6 LBS | DIASTOLIC BLOOD PRESSURE: 74 MMHG | SYSTOLIC BLOOD PRESSURE: 120 MMHG | TEMPERATURE: 98.4 F | HEART RATE: 76 BPM

## 2024-10-17 DIAGNOSIS — Z00.00 MEDICARE ANNUAL WELLNESS VISIT, SUBSEQUENT: ICD-10-CM

## 2024-10-17 DIAGNOSIS — F33.9 DEPRESSION, RECURRENT (HCC): Primary | ICD-10-CM

## 2024-10-17 DIAGNOSIS — G47.33 OSA (OBSTRUCTIVE SLEEP APNEA): ICD-10-CM

## 2024-10-17 DIAGNOSIS — K74.69 OTHER CIRRHOSIS OF LIVER (HCC): ICD-10-CM

## 2024-10-17 PROCEDURE — 99214 OFFICE O/P EST MOD 30 MIN: CPT | Performed by: NURSE PRACTITIONER

## 2024-10-17 PROCEDURE — G0439 PPPS, SUBSEQ VISIT: HCPCS | Performed by: NURSE PRACTITIONER

## 2024-10-17 NOTE — PROGRESS NOTES
Ambulatory Visit  Name: Benji Morrow      : 1955      MRN: [id number]  Encounter Provider: FADUMO Patel  Encounter Date: 10/17/2024   Encounter department: [facility] [address]     Assessment & Plan  Depression, recurrent (HCC)  Depression Screening Follow-up Plan: Patient's depression screening was positive with a PHQ-9 score of 5.   He is contemplating wean off bupropion - advise he take 1/2 tablet daily x2 weeks then QOD x1-2 weeks before fully dc'ing. He may wait to dc med until spring       Medicare annual wellness visit, subsequent  AWV updated, next due in 1 year  Flu and covid vaccines declined today - he will obtain at a later date  Colonoscopy UTD until    PSA UTD       DANIEL (obstructive sleep apnea)  Established with pulmonary - maintain follow up as recommended       Other cirrhosis of liver (HCC)  Established with and managed by GI  Maintain follow up as planned         Depression Screening and Follow-up Plan: Patient's depression screening was positive with a PHQ-9 score of 5. Patient with underlying depression and was advised to continue current medications as prescribed.       Preventive health issues were discussed with patient, and age appropriate screening tests were ordered as noted in patient's After Visit Summary. Personalized health advice and appropriate referrals for health education or preventive services given if needed, as noted in patient's After Visit Summary.    History of Present Illness         Here with wife. States he has been well. Had cataract surgery this summer.   Feels stiffer in legs and feet some days. Rides a stationary bike every day.   Saw pulmonary for DANIEL and still doesn't sleep well. Takes c-pap off when he wakes about 2am. Sometimes can't fall back to sleep in bed and goes to recliner. Pulm suggested sleep med but he doesn't want to take another med.   Has been on bupropion for years from his previous PCP. Stress is better  now that he's not working or caring for his dad and considers wanting to stop med.          Patient Care Team:  FADUMO Patel as PCP - General (Family Medicine)  MD Marino Espinoza MD      Review of Systems   Constitutional:  Negative for unexpected weight change (deliberate, about 8 pounds since April).   Respiratory: Negative.     Cardiovascular: Negative.    Psychiatric/Behavioral:  Positive for sleep disturbance. Negative for dysphoric mood. The patient is not nervous/anxious.          Medical History Reviewed by provider this encounter:  Tobacco  Allergies  Meds  Problems  Med Hx  Surg Hx  Fam Hx       Annual Wellness Visit Questionnaire   Benji is here for his Subsequent Wellness visit. Last Medicare Wellness visit information reviewed, patient interviewed and updates made to the record today.      Health Risk Assessment:   Patient rates overall health as fair. Patient feels that their physical health rating is slightly better. Patient is satisfied with their life. Eyesight was rated as same. Hearing was rated as same. Patient feels that their emotional and mental health rating is same. Patients states they are never, rarely angry. Patient states they are always unusually tired/fatigued. Pain experienced in the last 7 days has been none. Patient states that he has experienced no weight loss or gain in last 6 months.     Depression Screening:   PHQ-9 Score: 5      Fall Risk Screening:   In the past year, patient has experienced: no history of falling in past year      Home Safety:  Patient has trouble with stairs inside or outside of their home. Patient has working smoke alarms and has working carbon monoxide detector. Home safety hazards include: none.     Nutrition:   Current diet is Regular.     Medications:   Patient is currently taking over-the-counter supplements. OTC medications include: see medication list. Patient is able to manage medications.     Activities of Daily Living  (ADLs)/Instrumental Activities of Daily Living (IADLs):   Walk and transfer into and out of bed and chair?: Yes  Dress and groom yourself?: Yes    Bathe or shower yourself?: Yes    Feed yourself? Yes  Do your laundry/housekeeping?: Yes  Manage your money, pay your bills and track your expenses?: Yes  Make your own meals?: Yes    Do your own shopping?: Yes    Previous Hospitalizations:   Any hospitalizations or ED visits within the last 12 months?: No      Advance Care Planning:   Living will: Yes    Durable POA for healthcare: Yes    Advanced directive: Yes      PREVENTIVE SCREENINGS      Cardiovascular Screening:    General: Screening Not Indicated and History Lipid Disorder      Diabetes Screening:     General: Screening Current      Colorectal Cancer Screening:     General: Screening Current      Prostate Cancer Screening:    General: Screening Current      Osteoporosis Screening:    General: Screening Current      Abdominal Aortic Aneurysm (AAA) Screening:    Risk factors include: age between 65-76 yo        Lung Cancer Screening:     General: Screening Not Indicated      Hepatitis C Screening:    General: Screening Current    Screening, Brief Intervention, and Referral to Treatment (SBIRT)    Screening      Single Item Drug Screening:  How often have you used an illegal drug (including marijuana) or a prescription medication for non-medical reasons in the past year? never    Single Item Drug Screen Score: 0  Interpretation: Negative screen for possible drug use disorder    Social Determinants of Health     Financial Resource Strain: Low Risk  (10/12/2023)    Overall Financial Resource Strain (CARDIA)     Difficulty of Paying Living Expenses: Not hard at all   Food Insecurity: No Food Insecurity (10/17/2024)    Hunger Vital Sign     Worried About Running Out of Food in the Last Year: Never true     Ran Out of Food in the Last Year: Never true   Transportation Needs: No Transportation Needs (10/17/2024)     "PRAPARE - Transportation     Lack of Transportation (Medical): No     Lack of Transportation (Non-Medical): No   Housing Stability: Low Risk  (10/17/2024)    Housing Stability Vital Sign     Unable to Pay for Housing in the Last Year: No     Number of Times Moved in the Last Year: 0     Homeless in the Last Year: No   Utilities: Not At Risk (10/17/2024)    [facility] Utilities     Threatened with loss of utilities: No     No results found.    Objective     /74   Pulse 76   Temp 98.4 °F (36.9 °C)   Ht 5' 11\" (1.803 m)   Wt 119 kg (262 lb 9.6 oz)   BMI 36.63 kg/m²         Physical Exam  Vitals reviewed.   Constitutional:       General: He is not in acute distress.     Appearance: Normal appearance. He is obese.   HENT:      Head: Normocephalic.   Eyes:      General: No scleral icterus.  Neck:      Thyroid: No thyromegaly.      Vascular: No carotid bruit.   Cardiovascular:      Rate and Rhythm: Normal rate and regular rhythm.   Pulmonary:      Effort: Pulmonary effort is normal. No respiratory distress.      Breath sounds: Normal breath sounds.   Musculoskeletal:      Cervical back: Normal range of motion.   Lymphadenopathy:      Cervical: No cervical adenopathy.   Skin:     General: Skin is warm and dry.   Neurological:      General: No focal deficit present.      Mental Status: He is alert and oriented to person, place, and time.   Psychiatric:         Mood and Affect: Mood normal.         Behavior: Behavior normal.         Thought Content: Thought content normal.         Judgment: Judgment normal.         Administrative Statements   I have spent a total time of 30 minutes in caring for this patient on the day of the visit/encounter including Diagnostic results, Risks and benefits of tx options, Instructions for management, Patient and family education, Impressions, Counseling / Coordination of care, Documenting in the medical record, Reviewing / ordering tests, medicine, procedures  , and Obtaining or reviewing " history  .    Depression Screening Follow-up Plan: Patient's depression screening was positive with a PHQ-2 score of . Their PHQ-9 score was 5. Patient with underlying depression and was advised to continue current medications as prescribed.

## 2024-10-17 NOTE — PATIENT INSTRUCTIONS
Medicare Preventive Visit Patient Instructions  Thank you for completing your Welcome to Medicare Visit or Medicare Annual Wellness Visit today. Your next wellness visit will be due in one year (10/18/2025).  The screening/preventive services that you may require over the next 5-10 years are detailed below. Some tests may not apply to you based off risk factors and/or age. Screening tests ordered at today's visit but not completed yet may show as past due. Also, please note that scanned in results may not display below.  Preventive Screenings:  Service Recommendations Previous Testing/Comments   Colorectal Cancer Screening  Colonoscopy    Fecal Occult Blood Test (FOBT)/Fecal Immunochemical Test (FIT)  Fecal DNA/Cologuard Test  Flexible Sigmoidoscopy Age: 45-75 years old   Colonoscopy: every 10 years (May be performed more frequently if at higher risk)  OR  FOBT/FIT: every 1 year  OR  Cologuard: every 3 years  OR  Sigmoidoscopy: every 5 years  Screening may be recommended earlier than age 45 if at higher risk for colorectal cancer. Also, an individualized decision between you and your healthcare provider will decide whether screening between the ages of 76-85 would be appropriate. Colonoscopy: 10/11/2021  FOBT/FIT: Not on file  Cologuard: Not on file  Sigmoidoscopy: Not on file    Screening Current     Prostate Cancer Screening Individualized decision between patient and health care provider in men between ages of 55-69   Medicare will cover every 12 months beginning on the day after your 50th birthday PSA: 0.491 ng/mL     Screening Current     Hepatitis C Screening Once for adults born between 1945 and 1965  More frequently in patients at high risk for Hepatitis C Hep C Antibody: 11/21/2018    Screening Current   Diabetes Screening 1-2 times per year if you're at risk for diabetes or have pre-diabetes Fasting glucose: 97 mg/dL (10/1/2024)  A1C: 5.5 % (6/20/2022)  Screening Current   Cholesterol Screening Once every  5 years if you don't have a lipid disorder. May order more often based on risk factors. Lipid panel: 10/01/2024  Screening Not Indicated  History Lipid Disorder      Other Preventive Screenings Covered by Medicare:  Abdominal Aortic Aneurysm (AAA) Screening: covered once if your at risk. You're considered to be at risk if you have a family history of AAA or a male between the age of 65-75 who smoking at least 100 cigarettes in your lifetime.  Lung Cancer Screening: covers low dose CT scan once per year if you meet all of the following conditions: (1) Age 55-77; (2) No signs or symptoms of lung cancer; (3) Current smoker or have quit smoking within the last 15 years; (4) You have a tobacco smoking history of at least 20 pack years (packs per day x number of years you smoked); (5) You get a written order from a healthcare provider.  Glaucoma Screening: covered annually if you're considered high risk: (1) You have diabetes OR (2) Family history of glaucoma OR (3)  aged 50 and older OR (4)  American aged 65 and older  Osteoporosis Screening: covered every 2 years if you meet one of the following conditions: (1) Have a vertebral abnormality; (2) On glucocorticoid therapy for more than 3 months; (3) Have primary hyperparathyroidism; (4) On osteoporosis medications and need to assess response to drug therapy.  HIV Screening: covered annually if you're between the age of 15-65. Also covered annually if you are younger than 15 and older than 65 with risk factors for HIV infection. For pregnant patients, it is covered up to 3 times per pregnancy.    Immunizations:  Immunization Recommendations   Influenza Vaccine Annual influenza vaccination during flu season is recommended for all persons aged >= 6 months who do not have contraindications   Pneumococcal Vaccine   * Pneumococcal conjugate vaccine = PCV13 (Prevnar 13), PCV15 (Vaxneuvance), PCV20 (Prevnar 20)  * Pneumococcal polysaccharide vaccine =  PPSV23 (Pneumovax) Adults 19-65 yo with certain risk factors or if 65+ yo  If never received any pneumonia vaccine: recommend Prevnar 20 (PCV20)  Give PCV20 if previously received 1 dose of PCV13 or PPSV23   Hepatitis B Vaccine 3 dose series if at intermediate or high risk (ex: diabetes, end stage renal disease, liver disease)   Respiratory syncytial virus (RSV) Vaccine - COVERED BY MEDICARE PART D  * RSVPreF3 (Arexvy) CDC recommends that adults 60 years of age and older may receive a single dose of RSV vaccine using shared clinical decision-making (SCDM)   Tetanus (Td) Vaccine - COST NOT COVERED BY MEDICARE PART B Following completion of primary series, a booster dose should be given every 10 years to maintain immunity against tetanus. Td may also be given as tetanus wound prophylaxis.   Tdap Vaccine - COST NOT COVERED BY MEDICARE PART B Recommended at least once for all adults. For pregnant patients, recommended with each pregnancy.   Shingles Vaccine (Shingrix) - COST NOT COVERED BY MEDICARE PART B  2 shot series recommended in those 19 years and older who have or will have weakened immune systems or those 50 years and older     Health Maintenance Due:      Topic Date Due   • Colorectal Cancer Screening  10/10/2026   • Hepatitis C Screening  Completed     Immunizations Due:      Topic Date Due   • Influenza Vaccine (1) 09/01/2024   • COVID-19 Vaccine (4 - 2023-24 season) 09/01/2024     Advance Directives   What are advance directives?  Advance directives are legal documents that state your wishes and plans for medical care. These plans are made ahead of time in case you lose your ability to make decisions for yourself. Advance directives can apply to any medical decision, such as the treatments you want, and if you want to donate organs.   What are the types of advance directives?  There are many types of advance directives, and each state has rules about how to use them. You may choose a combination of any of  the following:  Living will:  This is a written record of the treatment you want. You can also choose which treatments you do not want, which to limit, and which to stop at a certain time. This includes surgery, medicine, IV fluid, and tube feedings.   Durable power of  for healthcare (DPAHC):  This is a written record that states who you want to make healthcare choices for you when you are unable to make them for yourself. This person, called a proxy, is usually a family member or a friend. You may choose more than 1 proxy.  Do not resuscitate (DNR) order:  A DNR order is used in case your heart stops beating or you stop breathing. It is a request not to have certain forms of treatment, such as CPR. A DNR order may be included in other types of advance directives.  Medical directive:  This covers the care that you want if you are in a coma, near death, or unable to make decisions for yourself. You can list the treatments you want for each condition. Treatment may include pain medicine, surgery, blood transfusions, dialysis, IV or tube feedings, and a ventilator (breathing machine).  Values history:  This document has questions about your views, beliefs, and how you feel and think about life. This information can help others choose the care that you would choose.  Why are advance directives important?  An advance directive helps you control your care. Although spoken wishes may be used, it is better to have your wishes written down. Spoken wishes can be misunderstood, or not followed. Treatments may be given even if you do not want them. An advance directive may make it easier for your family to make difficult choices about your care.   Weight Management   Why it is important to manage your weight:  Being overweight increases your risk of health conditions such as heart disease, high blood pressure, type 2 diabetes, and certain types of cancer. It can also increase your risk for osteoarthritis, sleep apnea,  and other respiratory problems. Aim for a slow, steady weight loss. Even a small amount of weight loss can lower your risk of health problems.  How to lose weight safely:  A safe and healthy way to lose weight is to eat fewer calories and get regular exercise. You can lose up about 1 pound a week by decreasing the number of calories you eat by 500 calories each day.   Healthy meal plan for weight management:  A healthy meal plan includes a variety of foods, contains fewer calories, and helps you stay healthy. A healthy meal plan includes the following:  Eat whole-grain foods more often.  A healthy meal plan should contain fiber. Fiber is the part of grains, fruits, and vegetables that is not broken down by your body. Whole-grain foods are healthy and provide extra fiber in your diet. Some examples of whole-grain foods are whole-wheat breads and pastas, oatmeal, brown rice, and bulgur.  Eat a variety of vegetables every day.  Include dark, leafy greens such as spinach, kale, neli greens, and mustard greens. Eat yellow and orange vegetables such as carrots, sweet potatoes, and winter squash.   Eat a variety of fruits every day.  Choose fresh or canned fruit (canned in its own juice or light syrup) instead of juice. Fruit juice has very little or no fiber.  Eat low-fat dairy foods.  Drink fat-free (skim) milk or 1% milk. Eat fat-free yogurt and low-fat cottage cheese. Try low-fat cheeses such as mozzarella and other reduced-fat cheeses.  Choose meat and other protein foods that are low in fat.  Choose beans or other legumes such as split peas or lentils. Choose fish, skinless poultry (chicken or turkey), or lean cuts of red meat (beef or pork). Before you cook meat or poultry, cut off any visible fat.   Use less fat and oil.  Try baking foods instead of frying them. Add less fat, such as margarine, sour cream, regular salad dressing and mayonnaise to foods. Eat fewer high-fat foods. Some examples of high-fat foods  include french fries, doughnuts, ice cream, and cakes.  Eat fewer sweets.  Limit foods and drinks that are high in sugar. This includes candy, cookies, regular soda, and sweetened drinks.  Exercise:  Exercise at least 30 minutes per day on most days of the week. Some examples of exercise include walking, biking, dancing, and swimming. You can also fit in more physical activity by taking the stairs instead of the elevator or parking farther away from stores. Ask your healthcare provider about the best exercise plan for you.      © Copyright [employer] 2018 Information is for End User's use only and may not be sold, redistributed or otherwise used for commercial purposes. All illustrations and images included in CareNotes® are the copyrighted property of [employer]M., Inc[employer] or [employer]

## 2024-10-17 NOTE — ASSESSMENT & PLAN NOTE
Depression Screening Follow-up Plan: Patient's depression screening was positive with a PHQ-9 score of 5.   He is contemplating wean off bupropion - advise he take 1/2 tablet daily x2 weeks then QOD x1-2 weeks before fully dc'ing. He may wait to dc med until spring

## 2024-10-19 NOTE — PROGRESS NOTES
DISCUSSION SUMMARY  This is a 77 y o  male with a cervical myelopathy caused by cervical spinal stenosis and a region of myelomalacia  He has multifactorial neurogenic claudication including from his cervical and lumbar spines  I have recommended beginning with the cervical spine  To this end I recommended a cervical decompression fixation fusion  The risks, benefits and complications of surgery were explained in detail to Jannie Blackwell and his wife  including hemorrhage, infection, CSF leakage, wound problems, pain, weakness, numbness, dysesthesiae, paralysis, coma, and death  Also, the possibility  of further surgery being required was emphasized  Other potential medical complications were outlined, including deep venous thrombosis, pulmonary embolism, pneumonia, urinary tract infection, myocardial infarction,  and stroke  The need for physical therapy, occupational therapy, and rehabilitation was also mentioned  The alternatives to surgery were discussed  Jannie Blackwell and his wife asked relevant questions and asked that we proceed with arrangements for surgery  Return for Surgical intervention  Diagnosis ICD-10-CM Associated Orders   1  Cervical spinal stenosis  M48 02 Case request operating room: Posterior cervical decompressive laminectomy and lateral mass fixation fusion C3-5     PAT Covid Screening     Case request operating room: Posterior cervical decompressive laminectomy and lateral mass fixation fusion C3-5          Type of Visit: Follow-up       HPI: Patient presents for F/U after MRI Cspine 5/3/21    In review - Patient presented for initial consult on 4/27/21 for (2nd opinion) regarding LBP with MRI Lspine 11/16/20  Patient has profound balance difficulties and loss of position sense especially severe on the left lower extremity  It is possible that this is coming from narrowing of the neural foraminal at multiple levels but unlikely    More likely is a central cause for this type of ambulation such as cervical spinal stenosis  Is for these reasons that the myelopathy requires a workup with an MRI of the cervical spine  Review of Systems   Constitutional: Positive for activity change (unable to do his normal acitivites due to leg numbness)  HENT: Negative  Eyes: Negative  Respiratory: Negative  Cardiovascular: Positive for leg swelling (bilateral legs)  Gastrointestinal: Negative  Endocrine: Negative  Genitourinary: Negative  Musculoskeletal: Positive for back pain (he states that when he goes from sitting or lying position to standing he will feel a sharp pain in his low back which last for a couple minutes and it goes away) and gait problem (very limited)  NO recent PT; only a year ago and he did 5 weeks and doing exercises at home with slight relief  GINA: x2, without relief of symptoms  Allergic/Immunologic: Negative  Neurological: Positive for weakness (bilateral legs) and numbness (L>R from the knee down to the feet)  Hematological: Negative  Psychiatric/Behavioral: Negative  All other systems reviewed and are negative  I reviewed the ROS        Vitals:    /77 (BP Location: Left arm, Patient Position: Sitting, Cuff Size: Standard)   Pulse 81   Temp 98 °F (36 7 °C) (Probe) Comment: Spouse: 98  Resp 16   Ht 6' 1" (1 854 m)   Wt 119 kg (262 lb)   BMI 34 57 kg/m²       MEDICAL HISTORY  Past Medical History:   Diagnosis Date    Abscess of back 03/01/2018    Benign essential hypertension     Last Assessed:12/19/16; Pt reports heart and BP has been "fine" as of 4/16/2020    Cirrhosis (HCC)     Colon polyp     Cyst of skin     x6 from neck down back area    Esophageal varices (HCC)     Macrocytosis     Last Assessed:1/16/17    Major depression, chronic     Last Assessed:12/21/17    Major depression, chronic 6/19/2017    Personal history of colonic polyps      Past Surgical History:   Procedure Laterality Date    CHOLECYSTECTOMY  11/2018    CHOLECYSTECTOMY LAPAROSCOPIC N/A 11/21/2018    Procedure: CHOLECYSTECTOMY LAPAROSCOPIC, wedge liver biopsy;  Surgeon: Parrish Dennis MD;  Location: [facility];  Service: General    COLONOSCOPY  05/2011    COLONOSCOPY  05/2016    COLONOSCOPY      EGD  01/2019    Esophagitis, esophageal varices    GALLBLADDER SURGERY      INCISION AND DRAINAGE OF WOUND N/A 03/01/2018    SEBACEOUS CYST ABSCESS OF BACK-VIJAYA MONZON MD    TX EXC SKIN BENIG 1 1-2 CM TRUNK,ARM,LEG N/A 8/22/2018    Procedure: EXCISION  BIOPSY LESION/MASS BACK X4 NECK X1;  Surgeon: Parrish Dennis MD;  Location: [facility];  Service: General     Social History     Tobacco Use    Smoking status: Never Smoker    Smokeless tobacco: Never Used   Substance Use Topics    Alcohol use: Not Currently     Comment: 7/19/19-last drink 12/2018- Occasionally/1-2 drinks per weekend    Drug use: No        Current Outpatient Medications:     buPROPion (WELLBUTRIN XL) 150 mg 24 hr tablet, Take 1 tablet (150 mg total) by mouth daily, Disp: 90 tablet, Rfl: 2    Calcium 600-200 MG-UNIT per tablet, Take 1 tablet by mouth daily, Disp: , Rfl:     Cholecalciferol ([employer] VITAMIN D3) 1000 units capsule, Take 1,000 Units by mouth daily , Disp: , Rfl:     Flaxseed, Linseed, (FLAX SEED OIL PO), Take by mouth daily , Disp: , Rfl:     hydrochlorothiazide (HYDRODIURIL) 12 5 mg tablet, Take 1 tablet (12 5 mg total) by mouth daily As needed for leg swelling, Disp: 30 tablet, Rfl: 1    nadolol (CORGARD) 20 mg tablet, Take 1 tablet (20 mg total) by mouth daily, Disp: 90 tablet, Rfl: 3     No Known Allergies     The following portions of the patient's history were updated by MA and reviewed by MD: allergies, current medications, past family history, past medical history, past social history, past surgical history and problem list       Physical Exam  Vitals signs and nursing note reviewed     Constitutional:       General: He is not in acute distress  Appearance: Normal appearance  He is normal weight  He is not ill-appearing, toxic-appearing or diaphoretic  HENT:      Head: Normocephalic and atraumatic  Nose: Nose normal    Eyes:      Extraocular Movements: Extraocular movements intact  Pupils: Pupils are equal, round, and reactive to light  Neck:      Musculoskeletal: Normal range of motion and neck supple  Cardiovascular:      Rate and Rhythm: Normal rate and regular rhythm  Pulses: Normal pulses  Heart sounds: Normal heart sounds  No murmur  No friction rub  No gallop  Pulmonary:      Effort: Pulmonary effort is normal  No respiratory distress  Breath sounds: Normal breath sounds  No stridor  No wheezing, rhonchi or rales  Chest:      Chest wall: No tenderness  Musculoskeletal: Normal range of motion  General: No swelling, tenderness, deformity or signs of injury  Right lower leg: No edema  Left lower leg: No edema  Skin:     General: Skin is warm and dry  Neurological:      General: No focal deficit present  Mental Status: He is alert and oriented to person, place, and time  Mental status is at baseline  Cranial Nerves: No cranial nerve deficit  Sensory: Sensory deficit ( patchy sensory changes in the lower extremities bilaterally ) present  Motor: Weakness ( there is weakness of the dorsiflexion and plantar flexion bilaterally but there is also loss of position sense suggesting I myelopathy ) present  Coordination: Coordination normal       Gait: Gait abnormal ( Marked abnormality in the gait with a wide-based clumsy gait  He cannot perform tandem gait )  Deep Tendon Reflexes: Reflexes abnormal ( there is absent DTRs in the Achilles bilaterally)  Psychiatric:         Mood and Affect: Mood normal          Behavior: Behavior normal          Thought Content:  Thought content normal          Judgment: Judgment normal            RESULTS/DATA  I have personally reviewed pertinent films in PACS     MRI of the cervical spine is carefully reviewed  This demonstrates cervical spondylosis primarily at the C4-5 and C5-6 levels although there is spondylosis and spinal stenosis at the 3 4 level which is less severe  There is cord signal change noted especially at the C4-5 level  late pnc-from [place] 4/2024/Other

## 2024-11-26 ENCOUNTER — OFFICE VISIT (OUTPATIENT)
Dept: URGENT CARE | Facility: CLINIC | Age: 69
End: 2024-11-26
Payer: MEDICARE

## 2024-11-26 VITALS
HEIGHT: 71 IN | TEMPERATURE: 97.1 F | HEART RATE: 96 BPM | DIASTOLIC BLOOD PRESSURE: 68 MMHG | SYSTOLIC BLOOD PRESSURE: 112 MMHG | OXYGEN SATURATION: 97 % | RESPIRATION RATE: 18 BRPM | BODY MASS INDEX: 36.63 KG/M2

## 2024-11-26 DIAGNOSIS — S90.411A ABRASION OF RIGHT GREAT TOE, INITIAL ENCOUNTER: Primary | ICD-10-CM

## 2024-11-26 PROCEDURE — 99213 OFFICE O/P EST LOW 20 MIN: CPT | Performed by: FAMILY MEDICINE

## 2024-11-26 PROCEDURE — G0463 HOSPITAL OUTPT CLINIC VISIT: HCPCS | Performed by: FAMILY MEDICINE

## 2024-11-26 NOTE — PROGRESS NOTES
[facility]        NAME: Benji Morrow is a 69 y.o. male  : 1955    MRN: [id number]  DATE: 2024  TIME: 9:19 AM    Assessment and Plan   Abrasion of right great toe, initial encounter [S90.411A]  1. Abrasion of right great toe, initial encounter              Patient Instructions       Follow up with PCP in 3-5 days.  Proceed to  ER if symptoms worsen.    If tests have been performed at [facility], our office will contact you with results if changes need to be made to the care plan discussed with you at the visit.  You can review your full results on [facility].    Chief Complaint     Chief Complaint   Patient presents with    Abrasion     Pt rpesents with abrasion to right toe after stubbing it on cement last night.           History of Present Illness       69-year-old male presenting with abrasion to the tip of his toe.  He reported last evening striking the front of his toe on the edge of a concrete step.        Review of Systems   Review of Systems   Constitutional: Negative.    HENT: Negative.     Eyes: Negative.    Respiratory: Negative.     Cardiovascular: Negative.    Gastrointestinal: Negative.    Genitourinary: Negative.    Skin:  Positive for wound.   Allergic/Immunologic: Negative.    Neurological: Negative.    Hematological: Negative.    Psychiatric/Behavioral: Negative.           Current Medications       Current Outpatient Medications:     b complex vitamins tablet, Take 1 tablet by mouth every morning, Disp: , Rfl:     buPROPion (WELLBUTRIN XL) 150 mg 24 hr tablet, TAKE ONE TABLET BY MOUTH EVERY DAY, Disp: 90 tablet, Rfl: 1    multivitamin (THERAGRAN) TABS, Take 1 tablet by mouth every morning, Disp: , Rfl:     nadolol (CORGARD) 40 mg tablet, Take 1 tablet (40 mg total) by mouth daily, Disp: 90 tablet, Rfl: 3    omeprazole (PriLOSEC) 20 mg delayed release capsule, Take 1 capsule (20 mg total) by mouth daily, Disp: 90 capsule, Rfl: 3    torsemide (DEMADEX) 20 mg  "tablet, Take 1/2 tablet daily (Patient not taking: Reported on 11/26/2024), Disp: , Rfl:     Current Allergies     Allergies as of 11/26/2024    (No Known Allergies)            The following portions of the patient's history were reviewed and updated as appropriate: allergies, current medications, past family history, past medical history, past social history, past surgical history and problem list.     Past Medical History:   Diagnosis Date    Abscess of back 03/01/2018    Acquired pancytopenia (HCC) 01/16/2017    Acquired thrombocytopenia (HCC) 01/16/2017    Benign essential hypertension     Last Assessed:12/19/16; Pt reports heart and BP has been \"fine\" as of 4/16/2020    Bilateral lower extremity edema 8/7/2020    Cirrhosis (HCC)     Colon polyp     CPAP (continuous positive airway pressure) dependence     Cyst of skin     x6 from neck down back area    Depression     Esophageal varices (HCC)     GERD (gastroesophageal reflux disease)     Liver disease     Macrocytosis     Last Assessed:1/16/17    Major depression, chronic     Last Assessed:12/21/17    Major depression, chronic 06/19/2017    Myelopathy (HCC) 4/27/2021    Personal history of colonic polyps     Sleep apnea     SOB (shortness of breath) on exertion        Past Surgical History:   Procedure Laterality Date    CHOLECYSTECTOMY  11/2018    CHOLECYSTECTOMY LAPAROSCOPIC N/A 11/21/2018    Procedure: CHOLECYSTECTOMY LAPAROSCOPIC, wedge liver biopsy;  Surgeon: Vijaya Monzon MD;  [facility];  Service: General    COLONOSCOPY  05/2011    COLONOSCOPY  05/2016    COLONOSCOPY      EGD  01/2019    Esophagitis, esophageal varices    GALLBLADDER SURGERY      INCISION AND DRAINAGE OF WOUND N/A 03/01/2018    SEBACEOUS CYST ABSCESS OF BACK-VIJAYA MONZON MD    TX ARTHRD PST/PSTLAT TQ 1NTRSPC CRV BELW C2 SEGMENT N/A 5/24/2021    Procedure: Posterior cervical decompressive laminectomy and lateral mass fixation fusion C3-5;  Surgeon: Miguel Ryan MD;  " "[facility];  Service: Neurosurgery    FL EXC B9 LESION MRGN XCP SK TG T/A/L 1.1-2.0 CM N/A 8/22/2018    Procedure: EXCISION  BIOPSY LESION/MASS BACK X4 NECK X1;  Surgeon: Ricardo Messer MD;  [facility];  Service: General    FL VO FACETECTOMY & FORAMOTOMY 1 VRT SGM LUMBAR Left 7/8/2022    Procedure: L3-4, L4-5, and L5-S1 metrx decompressive hemilaminectomy with bilateral foraminotomy and discectomy;  Surgeon: Miguel Ryan MD;  [facility];  Service: Neurosurgery       Family History   Problem Relation Age of Onset    Heart disease Mother         valve replacement    Alzheimer's disease Mother     Valvular heart disease Father     Heart disease Brother         valve replacement    Stroke Brother         Mini    Valvular heart disease Brother     Colon cancer Neg Hx     Colon polyps Neg Hx          Medications have been verified.        Objective   /68   Pulse 96   Temp (!) 97.1 °F (36.2 °C)   Resp 18   Ht 5' 11\" (1.803 m)   SpO2 97%   BMI 36.63 kg/m²   No LMP for male patient.       Physical Exam     Physical Exam  Constitutional:       Appearance: He is well-developed.   HENT:      Head: Normocephalic.      Nose: Nose normal.   Eyes:      Pupils: Pupils are equal, round, and reactive to light.   Cardiovascular:      Rate and Rhythm: Normal rate and regular rhythm.   Pulmonary:      Effort: Pulmonary effort is normal.   Musculoskeletal:         General: Normal range of motion.      Cervical back: Normal range of motion.   Skin:     General: Skin is warm.      Findings: Abrasion present.          Neurological:      Mental Status: He is alert and oriented to person, place, and time.                   "

## 2024-12-19 ENCOUNTER — TELEPHONE (OUTPATIENT)
Age: 69
End: 2024-12-19

## 2024-12-19 NOTE — TELEPHONE ENCOUNTER
Pts wife Bina called requesting to schedule another visit with AP to further discuss ongoing issues/concerns with lumbar region.  Pt scheduled with Azucena in [facility] on 1/6/25 @ 2:15pm.  Requested Bina complete e-check-in closer to appt date.  She verbalized understanding to me and was appreciative.

## 2025-01-06 ENCOUNTER — OFFICE VISIT (OUTPATIENT)
Age: 70
End: 2025-01-06
Payer: MEDICARE

## 2025-01-06 VITALS
BODY MASS INDEX: 36.68 KG/M2 | TEMPERATURE: 98.7 F | HEART RATE: 80 BPM | RESPIRATION RATE: 18 BRPM | HEIGHT: 71 IN | DIASTOLIC BLOOD PRESSURE: 84 MMHG | SYSTOLIC BLOOD PRESSURE: 132 MMHG | WEIGHT: 262 LBS | OXYGEN SATURATION: 97 %

## 2025-01-06 DIAGNOSIS — M48.061 LUMBAR SPINAL STENOSIS: Primary | ICD-10-CM

## 2025-01-06 PROCEDURE — 99215 OFFICE O/P EST HI 40 MIN: CPT | Performed by: PHYSICIAN ASSISTANT

## 2025-01-06 NOTE — ASSESSMENT & PLAN NOTE
"Pt presents to the  nsgy office for evaluation in regard to worsening sx of neurogenic claudication x approx 5-6 months.   Pt is known to the  nsgy practice as a pt of Dr. Ryan   S/p prior C3-5 PCDF in May 2021   S/p prior L L3-S1 metrx decompressive hemilaminectomy w/ antony foraminotomies and discectomy in July 2022   Pt did well after both surgeries and was made to follow-up in our office on an as needed basis  Pt was recently seen in the office in the Summer of 2024 for an acute exacerbation of his localized LBP  He underwent xray imaging for further evaluation and was noted to have a new T12 compression fx   However, given resolved sx/pain and no point tenderness on exam, bracing on deferred and pt was advised to follow-up as needed  Unfortunately, pt returns to clinic today w/ reports of progressively worsening antony LE weakness, heaviness, and balance difficulty x approx 5-6 months   Pt cannot stand or ambulate for longer than a few minutes without needing a break   Denies any back pain or radicular pain, urinary retention, or recent falls/injuries. Denies UE sx and neck pain   Pt does report some intermittent episodes of bowel and bladder incontinence but he states he has an increased urgency and had trouble getting to the bathroom in time sometimes   Ambulates with a cane at baseline, but feels that he is relying on his much more these last few months     Imaging:   No updated imaging to review     Plan:   Pt encouraged to continue to closely monitor his neurological exam and sx   Pt educated on \"red flag\" s/sx to monitor for including weakness, numbness, BBI, urinary retention, saddle anesthesia, inbaility to ambulate, severe/worsening pain, etc.   No updated imaging to review   Discussed at length with the pt and his wife his sx. I explained that with the patient's history of known lumbar stenosis status post laminectomies and discectomies in 2022, I am concerned that the patient is experiencing a " return of his previously noted neurogenic claudication with noted recurrent lumbar stenosis  Recommend further workup with MRI lumbar spine.  Will also obtain new baseline lumbar flexion/extension x-rays.  Will plan to follow-up with the patient again in the office upon completion of this imaging  Appointment to be scheduled with Dr. Connor tony as pt and his wife would like to meet with their surgeon   In the meantime, I recommended ongoing conservative management  Referral placed to physical therapy  Encouraged to focus specifically on patient's balance  Encourage patient to take fall precautions and ambulate with his cane when out of the house  Held off on referral to [facility] pain management as patient is denying any pain symptoms  Pt agreeable to the above noted plan   All questions answered at this time   Pt encouraged to call the office with any further questions or concerns or should they experience any worsening sx    Orders:    MRI lumbar spine without contrast; Future    Ambulatory Referral to Physical Therapy; Future    XR spine lumbar complete w bending minimum 6 views; Future

## 2025-01-06 NOTE — PROGRESS NOTES
"Name: Benji Morrow      : 1955      MRN: [id number]  Encounter Provider: Azucena Cleveland PA-C  Encounter Date: 2025   Encounter department: [facility]  :  Assessment & Plan  Lumbar spinal stenosis  Pt presents to the  nsgy office for evaluation in regard to worsening sx of neurogenic claudication x approx 5-6 months.   Pt is known to the  nsgy practice as a pt of Dr. Ryan   S/p prior C3-5 PCDF in May 2021   S/p prior L L3-S1 metrx decompressive hemilaminectomy w/ antony foraminotomies and discectomy in 2022   Pt did well after both surgeries and was made to follow-up in our office on an as needed basis  Pt was recently seen in the office in the Summer of  for an acute exacerbation of his localized LBP  He underwent xray imaging for further evaluation and was noted to have a new T12 compression fx   However, given resolved sx/pain and no point tenderness on exam, bracing on deferred and pt was advised to follow-up as needed  Unfortunately, pt returns to clinic today w/ reports of progressively worsening antnoy LE weakness, heaviness, and balance difficulty x approx 5-6 months   Pt cannot stand or ambulate for longer than a few minutes without needing a break   Denies any back pain or radicular pain, urinary retention, or recent falls/injuries. Denies UE sx and neck pain   Pt does report some intermittent episodes of bowel and bladder incontinence but he states he has an increased urgency and had trouble getting to the bathroom in time sometimes   Ambulates with a cane at baseline, but feels that he is relying on his much more these last few months     Imaging:   No updated imaging to review     Plan:   Pt encouraged to continue to closely monitor his neurological exam and sx   Pt educated on \"red flag\" s/sx to monitor for including weakness, numbness, BBI, urinary retention, saddle anesthesia, inbaility to ambulate, severe/worsening pain, etc.   No " updated imaging to review   Discussed at length with the pt and his wife his sx. I explained that with the patient's history of known lumbar stenosis status post laminectomies and discectomies in 2022, I am concerned that the patient is experiencing a return of his previously noted neurogenic claudication with noted recurrent lumbar stenosis  Recommend further workup with MRI lumbar spine.  Will also obtain new baseline lumbar flexion/extension x-rays.  Will plan to follow-up with the patient again in the office upon completion of this imaging  Appointment to be scheduled with Dr. Connor tony as pt and his wife would like to meet with their surgeon   In the meantime, I recommended ongoing conservative management  Referral placed to physical therapy  Encouraged to focus specifically on patient's balance  Encourage patient to take fall precautions and ambulate with his cane when out of the house  Held off on referral to [facility] pain management as patient is denying any pain symptoms  Pt agreeable to the above noted plan   All questions answered at this time   Pt encouraged to call the office with any further questions or concerns or should they experience any worsening sx    Orders:    MRI lumbar spine without contrast; Future    Ambulatory Referral to Physical Therapy; Future    XR spine lumbar complete w bending minimum 6 views; Future      History of Present Illness   Pt is a 69yo M with a PMH significant for GRACIA cirrhosis, grade 1 varices following with GI on a beta blocker, GERD, HLD, DANIEL, antony LE edema, and chronic thrombocytopenia who presents to the [facility] neurosurgery office for evaluation of worsening symptoms of neurogenic claudication.    Patient is well-known to [facility] neurosurgery as a patient of Dr. Ryan as he has a history of prior cervical myelopathy status post prior C3-5 PCDF in May 2021 as well as chronic low back pain with neurogenic claudication and lumbar spinal stenosis status  post L3-S1 left-sided metrx decompressive hemilaminectomy with bilateral foraminotomies and discectomy in July 2022.  Patient reports after his cervical surgery he had improvement to his balance but does still require use of a cane when ambulating outside of his home or on long distances.  Patient reports after his prior lumbar surgery in 2022 he has significant improvement to his back and leg pain.  Patient was ultimately advised to follow-up in our office on an as-needed basis after his lumbar surgery he was doing well.  Patient did reach out in the summer 2024 with new mid to low back pain.  He underwent outpatient workup with lumbar x-rays which revealed a new, mild T12 compression fracture.  Thankfully, the patient had improved symptoms of pain and no point tenderness on exam so spine bracing was deferred and patient was again asked to follow-up on an as-needed basis.    Unfortunately, the patient returns to the office today with his wife with concerns for worsening symptoms of neurogenic claudication.  The patient states that for the last 5 to 6 months, he has had progressively worsening lower extremity weakness, heaviness, and balance difficulty.  Patient states he has ambulated with a cane for several years now, but feels that in the last few months he has relied heavily on this cane to avoid any falls.  Thankfully, the patient has had no new falls or injuries.  He states that he has trouble standing for a long period of time or ambulating longer distances due to the heaviness and weakness in his legs.  Patient states he is still able to sit and ride his stationary bike without much difficulty, but has noticed he cannot go as far as he once had.  Patient denies any low back pain or radicular pain.  He denies any urinary retention or saddle anesthesia.  Patient does admit to a few intermittent episodes of bowel and bladder incontinence over the last few months.  He reports he has some increased urgency and  "has some trouble getting to the bathroom in time.  Patient states it has been a few weeks since an episode of incontinence has occurred.  Patient denies any recent dedicated PT.  He has not follow-up with pain management as he does not have any significant pain per his report.  Patient and his wife express a lot of frustration over his symptoms and are tearful at points throughout this visit.  Patient adamantly denies any headaches, dizziness, neck pain, upper extremity symptoms.      Review of Systems   Musculoskeletal:  Positive for gait problem (unable to walk long distances). Negative for back pain and myalgias.   Neurological:  Positive for weakness (BLE) and numbness (BLE).   All other systems reviewed and are negative.   I have personally reviewed the MA's review of systems and made changes as necessary.    Medical History Reviewed by provider this encounter:     .  Past Medical History   Past Medical History:   Diagnosis Date    Abscess of back 03/01/2018    Acquired pancytopenia (HCC) 01/16/2017    Acquired thrombocytopenia (HCC) 01/16/2017    Benign essential hypertension     Last Assessed:12/19/16; Pt reports heart and BP has been \"fine\" as of 4/16/2020    Bilateral lower extremity edema 8/7/2020    Cirrhosis (HCC)     Colon polyp     CPAP (continuous positive airway pressure) dependence     Cyst of skin     x6 from neck down back area    Depression     Esophageal varices (HCC)     GERD (gastroesophageal reflux disease)     Liver disease     Macrocytosis     Last Assessed:1/16/17    Major depression, chronic     Last Assessed:12/21/17    Major depression, chronic 06/19/2017    Myelopathy (HCC) 4/27/2021    Personal history of colonic polyps     Sleep apnea     SOB (shortness of breath) on exertion      Past Surgical History:   Procedure Laterality Date    CHOLECYSTECTOMY  11/2018    CHOLECYSTECTOMY LAPAROSCOPIC N/A 11/21/2018    Procedure: CHOLECYSTECTOMY LAPAROSCOPIC, wedge liver biopsy;  Surgeon: " Vijaya Monzon MD;  [facility];  Service: General    COLONOSCOPY  05/2011    COLONOSCOPY  05/2016    COLONOSCOPY      EGD  01/2019    Esophagitis, esophageal varices    GALLBLADDER SURGERY      INCISION AND DRAINAGE OF WOUND N/A 03/01/2018    SEBACEOUS CYST ABSCESS OF BACK-VIJAYA MONZON MD    AL ARTHRD PST/PSTLAT TQ 1NTRSPC CRV BELW C2 SEGMENT N/A 5/24/2021    Procedure: Posterior cervical decompressive laminectomy and lateral mass fixation fusion C3-5;  Surgeon: Migule Ryan MD;  [facility];  Service: Neurosurgery    AL EXC B9 LESION MRGN XCP SK TG T/A/L 1.1-2.0 CM N/A 8/22/2018    Procedure: EXCISION  BIOPSY LESION/MASS BACK X4 NECK X1;  Surgeon: Vijaya Monzon MD;  [facility];  Service: General    AL VO FACETECTOMY & FORAMOTOMY 1 VRT SGM LUMBAR Left 7/8/2022    Procedure: L3-4, L4-5, and L5-S1 metrx decompressive hemilaminectomy with bilateral foraminotomy and discectomy;  Surgeon: Miguel Ryan MD;  [facility];  Service: Neurosurgery     Family History   Problem Relation Age of Onset    Heart disease Mother         valve replacement    Alzheimer's disease Mother     Valvular heart disease Father     Heart disease Brother         valve replacement    Stroke Brother         Mini    Valvular heart disease Brother     Colon cancer Neg Hx     Colon polyps Neg Hx       reports that he has never smoked. He has never used smokeless tobacco. He reports that he does not currently use alcohol. He reports that he does not use drugs.  Current Outpatient Medications on File Prior to Visit   Medication Sig Dispense Refill    b complex vitamins tablet Take 1 tablet by mouth every morning      buPROPion (WELLBUTRIN XL) 150 mg 24 hr tablet TAKE ONE TABLET BY MOUTH EVERY DAY 90 tablet 1    multivitamin (THERAGRAN) TABS Take 1 tablet by mouth every morning      nadolol (CORGARD) 40 mg tablet Take 1 tablet (40 mg total) by mouth daily 90 tablet 3    omeprazole (PriLOSEC)  "20 mg delayed release capsule Take 1 capsule (20 mg total) by mouth daily 90 capsule 3    torsemide (DEMADEX) 20 mg tablet Take 1/2 tablet daily (Patient not taking: Reported on 11/26/2024)       No current facility-administered medications on file prior to visit.   No Known Allergies   Current Outpatient Medications on File Prior to Visit   Medication Sig Dispense Refill    b complex vitamins tablet Take 1 tablet by mouth every morning      buPROPion (WELLBUTRIN XL) 150 mg 24 hr tablet TAKE ONE TABLET BY MOUTH EVERY DAY 90 tablet 1    multivitamin (THERAGRAN) TABS Take 1 tablet by mouth every morning      nadolol (CORGARD) 40 mg tablet Take 1 tablet (40 mg total) by mouth daily 90 tablet 3    omeprazole (PriLOSEC) 20 mg delayed release capsule Take 1 capsule (20 mg total) by mouth daily 90 capsule 3    torsemide (DEMADEX) 20 mg tablet Take 1/2 tablet daily (Patient not taking: Reported on 11/26/2024)       No current facility-administered medications on file prior to visit.         Objective   /84 (BP Location: Right arm, Patient Position: Sitting, Cuff Size: Standard)   Pulse 80   Temp 98.7 °F (37.1 °C) (Temporal)   Resp 18   Ht 5' 11\" (1.803 m)   Wt 119 kg (262 lb)   SpO2 97%   BMI 36.54 kg/m²     Physical Exam  General appearance: alert, appears stated age, cooperative and no distress  Head: Normocephalic, without obvious abnormality, atraumatic  Eyes: EOMI, PERRL  Neck: supple, symmetrical, trachea midline and NT  Back: no kyphosis present, no tenderness to percussion or palpation  Lungs: non labored breathing, no respiratory distress on room air  Heart: regular heart rate  Neurologic:   Mental status: Alert, oriented x3, thought content appropriate  Cranial nerves: grossly intact (Cranial nerves II-XII)  Sensory: normal to light touch bilaterally throughout  - baseline paresthesias noted to bilateral feet consistent with patient's history of chronic peripheral neuropathy  Motor: moving all " extremities without focal weakness   -Strength 5/5 throughout bilateral upper extremities and bilateral lower extremities  Reflexes: 2+ and symmetric, no Andrea's or clonus appreciated bilaterally  Coordination: finger to nose normal bilaterally, no drift bilaterally      Administrative Statements   I have spent a total time of 40 minutes in caring for this patient on the day of the visit/encounter including Diagnostic results, Prognosis, Risks and benefits of tx options, Instructions for management, Patient and family education, Importance of tx compliance, Risk factor reductions, Impressions, Counseling / Coordination of care, Documenting in the medical record, Reviewing / ordering tests, medicine, procedures  , and Obtaining or reviewing history  .

## 2025-01-07 ENCOUNTER — TELEPHONE (OUTPATIENT)
Age: 70
End: 2025-01-07

## 2025-01-08 ENCOUNTER — HOSPITAL ENCOUNTER (OUTPATIENT)
Dept: RADIOLOGY | Facility: HOSPITAL | Age: 70
Discharge: HOME | End: 2025-01-08
Payer: MEDICARE

## 2025-01-08 ENCOUNTER — HOSPITAL ENCOUNTER (OUTPATIENT)
Dept: ULTRASOUND IMAGING | Facility: HOSPITAL | Age: 70
Discharge: HOME | End: 2025-01-08
Attending: INTERNAL MEDICINE
Payer: MEDICARE

## 2025-01-08 DIAGNOSIS — K74.69 OTHER CIRRHOSIS OF LIVER (HCC): ICD-10-CM

## 2025-01-08 DIAGNOSIS — M48.061 LUMBAR SPINAL STENOSIS: ICD-10-CM

## 2025-01-08 PROCEDURE — 76705 ECHO EXAM OF ABDOMEN: CPT

## 2025-01-08 PROCEDURE — 72114 X-RAY EXAM L-S SPINE BENDING: CPT

## 2025-01-14 ENCOUNTER — RESULTS FOLLOW-UP (OUTPATIENT)
Dept: GASTROENTEROLOGY | Facility: CLINIC | Age: 70
End: 2025-01-14

## 2025-01-20 ENCOUNTER — OFFICE VISIT (OUTPATIENT)
Dept: FAMILY MEDICINE CLINIC | Facility: HOSPITAL | Age: 70
End: 2025-01-20
Payer: MEDICARE

## 2025-01-20 ENCOUNTER — RESULTS FOLLOW-UP (OUTPATIENT)
Dept: OTHER | Facility: HOSPITAL | Age: 70
End: 2025-01-20

## 2025-01-20 VITALS
SYSTOLIC BLOOD PRESSURE: 122 MMHG | WEIGHT: 266.2 LBS | HEART RATE: 70 BPM | DIASTOLIC BLOOD PRESSURE: 82 MMHG | BODY MASS INDEX: 37.27 KG/M2 | TEMPERATURE: 98.1 F | HEIGHT: 71 IN | OXYGEN SATURATION: 99 %

## 2025-01-20 DIAGNOSIS — J22 BACTERIAL LOWER RESPIRATORY INFECTION: Primary | ICD-10-CM

## 2025-01-20 DIAGNOSIS — B96.89 BACTERIAL LOWER RESPIRATORY INFECTION: Primary | ICD-10-CM

## 2025-01-20 PROCEDURE — G2211 COMPLEX E/M VISIT ADD ON: HCPCS | Performed by: INTERNAL MEDICINE

## 2025-01-20 PROCEDURE — 99213 OFFICE O/P EST LOW 20 MIN: CPT | Performed by: INTERNAL MEDICINE

## 2025-01-20 NOTE — PROGRESS NOTES
"Name: Benji Morrow      : 1955      MRN: [id number]  Encounter Provider: Rhonda Cordova DO  Encounter Date: 2025   Encounter department: [facility] [address]   :  Assessment & Plan  Bacterial lower respiratory infection  Almost 2 wks of symptoms w/o significant improvement, will start Augmentin bid x 7 days,   Rest/fluids/lozenges/hot tea/garles and OTC cough medication were recommended; d/w pt that cough can last 4-6 wks but call with new/worsening symptoms or if no better at all by end of abx  Orders:    amoxicillin-clavulanate (AUGMENTIN) 875-125 mg per tablet; Take 1 tablet by mouth every 12 (twelve) hours for 7 days           History of Present Illness   HPI Pt here for an acute visit    Pt here with 1.5 - 2 wks of resp symptoms. Symptoms started with cough and congestion.  He did not check his temp but only noted chills x 1 day. He had some wheezing but no SOB. Wheezing has improved.  He notes no ST/ear pain.  He notes no N/V/D/urinary symptoms/rashes/HA's.  He has been using Mucinex.   Despite 2 wks of symptoms he has only had mild improvement.       Review of Systems   Constitutional:  Positive for chills and fatigue. Negative for fever.   HENT:  Positive for congestion. Negative for ear pain and sore throat.    Eyes:  Negative for discharge and redness.   Respiratory:  Positive for cough and wheezing. Negative for shortness of breath.    Gastrointestinal:  Negative for abdominal pain, diarrhea, nausea and vomiting.   Genitourinary:  Negative for difficulty urinating and dysuria.   Skin:  Negative for rash and wound.   Neurological:  Negative for dizziness and headaches.   Hematological:  Negative for adenopathy.   Psychiatric/Behavioral:  Negative for confusion.        Objective   /82   Pulse 70   Temp 98.1 °F (36.7 °C)   Ht 5' 11\" (1.803 m)   Wt 121 kg (266 lb 3.2 oz)   SpO2 99%   BMI 37.13 kg/m²      Physical Exam  Vitals and nursing note " reviewed.   Constitutional:       General: He is not in acute distress.     Appearance: He is well-developed. He is not ill-appearing.   HENT:      Head: Normocephalic and atraumatic.      Right Ear: External ear normal. There is impacted cerumen.      Left Ear: External ear normal. There is impacted cerumen.      Ears:      Comments: B/L hearing aids removed for otoscopic exam     Mouth/Throat:      Mouth: Mucous membranes are moist.      Pharynx: Oropharynx is clear. Posterior oropharyngeal erythema present. No oropharyngeal exudate.   Eyes:      General:         Right eye: No discharge.         Left eye: No discharge.      Conjunctiva/sclera: Conjunctivae normal.   Neck:      Trachea: No tracheal deviation.   Cardiovascular:      Rate and Rhythm: Normal rate and regular rhythm.      Heart sounds: Normal heart sounds. No murmur heard.  Pulmonary:      Effort: Pulmonary effort is normal. No respiratory distress.      Breath sounds: Normal breath sounds. No wheezing, rhonchi or rales.   Musculoskeletal:      Cervical back: Neck supple.   Lymphadenopathy:      Cervical: Cervical adenopathy present.   Skin:     General: Skin is warm and dry.      Coloration: Skin is not pale.      Findings: No rash.   Neurological:      General: No focal deficit present.      Mental Status: He is alert.      Motor: No abnormal muscle tone.      Gait: Gait abnormal.      Comments: Ambulates with cane   Psychiatric:         Behavior: Behavior normal.         Thought Content: Thought content normal.

## 2025-01-29 ENCOUNTER — HOSPITAL ENCOUNTER (OUTPATIENT)
Dept: MRI IMAGING | Facility: HOSPITAL | Age: 70
Discharge: HOME | End: 2025-01-29
Payer: MEDICARE

## 2025-01-29 DIAGNOSIS — M48.061 LUMBAR SPINAL STENOSIS: ICD-10-CM

## 2025-01-29 PROCEDURE — 72148 MRI LUMBAR SPINE W/O DYE: CPT

## 2025-02-03 ENCOUNTER — EVALUATION (OUTPATIENT)
Dept: PHYSICAL THERAPY | Facility: CLINIC | Age: 70
End: 2025-02-03
Payer: MEDICARE

## 2025-02-03 DIAGNOSIS — M48.061 SPINAL STENOSIS OF LUMBAR REGION WITHOUT NEUROGENIC CLAUDICATION: Primary | ICD-10-CM

## 2025-02-03 PROCEDURE — 97163 PT EVAL HIGH COMPLEX 45 MIN: CPT | Performed by: PHYSICAL THERAPIST

## 2025-02-03 NOTE — PROGRESS NOTES
PT Evaluation     Today's date: 2/3/2025  Patient name: Benji Morrow  : 1955  MRN: [id number]  Referring provider: Azucena Cleveland*  Dx:   Encounter Diagnosis     ICD-10-CM    1. Spinal stenosis of lumbar region without neurogenic claudication  M48.061                      Assessment  Impairments: abnormal coordination, abnormal gait, abnormal muscle firing, abnormal muscle tone, abnormal or restricted ROM, abnormal movement, activity intolerance, impaired balance, impaired physical strength, pain with function, safety issue and poor posture   Symptom irritability: moderate    Assessment details: Problem List:  1) Limited myotome strength at L4-S1  2) Lumbar balance based on HECTOR screen 32/58    Benji Morrow is a pleasant 69 y.o. 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  Clinically Benji demonstrates weakness at L4-S1 myotomes, limited balance with 32/58 HECTOR testing showing high falls risk despite normal TUG and 5 time sit to stand scores suggesting the need for skilled PT to address balance deficits and improved neural health with nerve glides.    No further referral appears necessary at this time based upon examination results.  I expect he will benefit from at least 8 weeks of PT to address mobility and falls risk.        Comparable signs:  1) Unable to perform stairs without UE support  2) Unable to perform heel raise  Understanding of Dx/Px/POC: good     Prognosis: fair    Goals  Short Term Goals (4 weeks)  1.) Establish independence with HEP  2.) Decrease subjective pain levels from NPRS at least to 2-5/10 at rest and with activity  3.) Improve lumbar ROM at least 5-10 degrees into all planes to allow for improved ease of movement with less  guarding    Long Term Goals (8 weeks)  1.) Improve lumbar ROM to WNL in all planes to restore normal movement with ADLs and function  2.) Improve B/L ankle DF and PF strength to 5/5 in all planes in order to return to pain-free ADLs and function  3.) Improve FOTO score at least to 75 points showing improved self reported disability        Plan  Patient would benefit from: PT eval and skilled physical therapy  Planned modality interventions: biofeedback, cryotherapy, electrical stimulation/Russian stimulation and TENS    Planned therapy interventions: abdominal trunk stabilization, activity modification, ADL retraining, ADL training, balance, balance/weight bearing training, behavior modification, body mechanics training, coordination, compression, flexibility, functional ROM exercises, gait training, graded activity, graded exercise, graded motor, home exercise program, IADL retraining, transfer training, therapeutic training, therapeutic exercise, therapeutic activities, stretching, strengthening, sensory integrative techniques, self care, postural training, patient/caregiver education, neuromuscular re-education, nerve gliding, muscle pump exercises, motor coordination training, massage, manual therapy, IASTM and joint mobilization  Speech planned therapy intervention: NMES    Frequency: 2x week  Duration in weeks: 8  Plan of Care beginning date: 2/3/2025  Plan of Care expiration date: 4/7/2025  Treatment plan discussed with: patient  Plan details: Initiate POC for stability; monitor sxs and progress as able         Subjective Evaluation    History of Present Illness  Date of onset: 7/1/2024  Mechanism of injury: Benji Morrow is a 69 y.o. male who presents with increased chronic back pain with R LE NT sxs starting ~ in July months ago with TITI because he was having some pain- was provided an x-ray after going to the ER. Had a + T12 compression fracture and recent MRI on 1/29/25 + for L4-S1 R sided foraminal  stenosis.  Has been active around the house and riding a bike daily.  Wishes to improve stability with getting out of the chair, improved balance.   Decided to seek out MD consult where X-rays were (-) for fx and script for OPPT provided.  Denies night pain or changes in bowel or bladder function.  Reports having R sided NT signs or sxs but has chronic neuropathy.  F/U with MD in 2 weeks.  Wishes to return to PLOF pain-free.              Recurrent probem    Quality of life: fair    Patient Goals  Patient goals for therapy: decreased pain, decreased edema, improved balance, increased motion, increased strength, independence with ADLs/IADLs and return to sport/leisure activities  Patient goal: Improve balance and strength  Pain  Location: L/S and LEs  Quality: sharp, tight and dull ache  Relieving factors: relaxation and rest  Aggravating factors: sitting    Social Support  Steps to enter house: yes  8  Stairs in house: yes   13  Lives in: multiple-level home  Lives with: spouse    Employment status: not working  Exercise history: Daily  Life stress: High      Diagnostic Tests  MRI studies: abnormal  Treatments  Previous treatment: physical therapy  Current treatment: physical therapy        Objective     Neurological Testing     Sensation     Lumbar   Left   Intact: light touch    Right   Intact: light touch    Reflexes   Left   Patellar (L4): absent (0)  Achilles (S1): absent (0)  Babinski sign: negative  Clonus sign: negative    Right   Patellar (L4): absent (0)  Achilles (S1): absent (0)  Babinski sign: negative  Clonus sign: negative    Active Range of Motion     Lumbar   Flexion:  Restriction level: moderate  Extension:  Restriction level: moderate  Left lateral flexion:  Restriction level: moderate  Right lateral flexion:  Restriction level: moderate  Left rotation:  Restriction level: moderate  Right rotation:  Restriction level: moderate    Strength/Myotome Testing     Lumbar   Left   Heel walk: abnormal  Toe  walk: abnormal    Right   Heel walk: abnormal  Toe walk: abnormal    Left Hip   Planes of Motion   Flexion: 5  Adduction: 5  External rotation: 5  Internal rotation: 5    Right Hip   Planes of Motion   Flexion: 5  Adduction: 5  External rotation: 5  Internal rotation: 5    Left Knee   Flexion: 4+  Extension: 5    Right Knee   Flexion: 4+  Extension: 5    Left Ankle/Foot   Dorsiflexion: 4  Plantar flexion: 4  Great toe extension: 0    Right Ankle/Foot   Dorsiflexion: 4  Plantar flexion: 4  Great toe extension: 0    Tests     Lumbar     Left   Negative passive SLR and slump test.     Right   Negative passive SLR and slump test.   Neuro Exam:     Functional outcomes   5x sit to stand: 16.65 (seconds)  TU.43 no AD (seconds)            Daily Treatment Diary     Precautions: S/p prior C3-5 PCDF in May 2021   S/p prior L L3-S1 metrx decompressive hemilaminectomy w/ antony foraminotomies and discectomy in 2022      Imaging:   XR lumbar spine 2024: New moderate T12 compression deformity.  Stable L1 and L5 compression deformity.  Imaging compared to CT scan from 2022.  CO-MORBIDITIES: Falls risk, prior surgery  HEP ACCESS CODE: Access Code: [email or web address]  URL: [email or web address]  Date: 2025  Prepared by: Andrea Guadalupe    Exercises  - Seated Sciatic Tensioner  - 1 x daily - 7 x weekly - 3 sets - 10 reps  FOTO Completed On: 2/3/25    POC Expires Reeval for Medicare to be completed  Unit Limit Auth Expiration Date PT/OT/STVisit Limit   25 By visit 10 NA 25 BOMN    Completed on visit                    Auth Status DATE 2/3        NA Visit # 1         Remaining BOMN        MANUAL THERAPY         Lumbar CPA with neural tensioning in Sidelying                                                      THERAPEUTIC EXERCISE HEP         Seated slump 10x ea                                                                                                                                                       NEUROMUSCULAR REEDUCATION                                                                                                                                                       THERAPEUTIC ACTIVITY                                                  GAIT TRAINING                                                  MODALITIES

## 2025-02-05 ENCOUNTER — OFFICE VISIT (OUTPATIENT)
Dept: PHYSICAL THERAPY | Facility: CLINIC | Age: 70
End: 2025-02-05
Payer: MEDICARE

## 2025-02-05 DIAGNOSIS — M48.061 SPINAL STENOSIS OF LUMBAR REGION WITHOUT NEUROGENIC CLAUDICATION: Primary | ICD-10-CM

## 2025-02-05 PROCEDURE — 97112 NEUROMUSCULAR REEDUCATION: CPT | Performed by: PHYSICAL THERAPIST

## 2025-02-05 PROCEDURE — 97110 THERAPEUTIC EXERCISES: CPT | Performed by: PHYSICAL THERAPIST

## 2025-02-05 NOTE — PROGRESS NOTES
Daily Note     Today's date: 2025  Patient name: Benji Morrow  : 1955  MRN: [id number]  Referring provider: Azucena Cleveland*  Dx:   Encounter Diagnosis     ICD-10-CM    1. Spinal stenosis of lumbar region without neurogenic claudication  M48.061                      Subjective: Notes continued LOB with performance of walking and stairs.  Felt improved ability to move Les with HEP for nerve mobility.       Objective: See treatment diary below      Assessment: Able to perform biodex- showing limit AP wt shifting and ML wt shifting.  Able to perform with improved ability with screen feedback.   Still limited PF strength.  Unable to perform SL leg press with HR.  Trial of BFR with Tband to faciliate improvement in PF strength.  Able to perform AP wt shifting with improved ability noted with biodex performance able to achieve score of 20% accuracy in 1 min compared to initial session of 2% accuracy with 3 min .      Plan: Continue per plan of care.      Daily Treatment Diary     Precautions: S/p prior C3-5 PCDF in May 2021   S/p prior L L3-S1 metrx decompressive hemilaminectomy w/ antony foraminotomies and discectomy in 2022      Imaging:   XR lumbar spine 2024: New moderate T12 compression deformity.  Stable L1 and L5 compression deformity.  Imaging compared to CT scan from 2022.  CO-MORBIDITIES: Falls risk, prior surgery  HEP ACCESS CODE: Access Code: [email or web address]  URL: [email or web address]  Date: 2025  Prepared by: Andrea Guadalupe    Exercises  - Seated Sciatic Tensioner  - 1 x daily - 7 x weekly - 3 sets - 10 reps  FOTO Completed On: 2/3/25    POC Expires Reeval for Medicare to be completed  Unit Limit Auth Expiration Date PT/OT/STVisit Limit   25 By visit 10 NA 25 BOMN    Completed on visit                    Auth Status DATE 2/3 2/5       NA Visit # 1 2        Remaining BOMN        MANUAL THERAPY         Lumbar CPA with neural tensioning in Sidelying                                                       THERAPEUTIC EXERCISE HEP         Seated slump 10x ea   10x       SL leg press with HR   35# 10x ea        B/L leg press    205# 30x       BFR PF   BTB 75 reps                                                                                                                      NEUROMUSCULAR REEDUCATION           Biodex   15 min                                                                                                                                          THERAPEUTIC ACTIVITY                                                  GAIT TRAINING                                                  MODALITIES

## 2025-02-10 ENCOUNTER — OFFICE VISIT (OUTPATIENT)
Dept: PHYSICAL THERAPY | Facility: CLINIC | Age: 70
End: 2025-02-10
Payer: MEDICARE

## 2025-02-10 DIAGNOSIS — M48.061 SPINAL STENOSIS OF LUMBAR REGION WITHOUT NEUROGENIC CLAUDICATION: Primary | ICD-10-CM

## 2025-02-10 PROCEDURE — 97110 THERAPEUTIC EXERCISES: CPT | Performed by: PHYSICAL THERAPIST

## 2025-02-10 PROCEDURE — 97112 NEUROMUSCULAR REEDUCATION: CPT | Performed by: PHYSICAL THERAPIST

## 2025-02-10 NOTE — PROGRESS NOTES
Daily Note     Today's date: 2/10/2025  Patient name: Benji Morrow  : 1955  MRN: [id number]  Referring provider: Azucena Cleveland*  Dx:   Encounter Diagnosis     ICD-10-CM    1. Spinal stenosis of lumbar region without neurogenic claudication  M48.061                      Subjective: Feeling okay, has been compliant with HEP.       Objective: See treatment diary below      Assessment: 83% on ML weight shift, 26% for balanced Wbing in 3 mins and improved stair climb 10.81 sec; 152 ft in 2 min walk test and 165 1 RM on SL leg press B/L.  With BFR performance of gastroc activation had slight improvement with ambulation with less knee buckling with stance on L post- despite no lack of knee strength compared side to side- buckling affect with L stance contributing to LOB realted to gastroc weakness vs motor control of TKE with stance.       Plan: Continue per plan of care.  Consider if gait remains collapsible on L stance at knee.       Daily Treatment Diary     Precautions: S/p prior C3-5 PCDF in May 2021   S/p prior L L3-S1 metrx decompressive hemilaminectomy w/ antony foraminotomies and discectomy in 2022      Imaging:   XR lumbar spine 2024: New moderate T12 compression deformity.  Stable L1 and L5 compression deformity.  Imaging compared to CT scan from 2022.  CO-MORBIDITIES: Falls risk, prior surgery  HEP ACCESS CODE: Access Code: [email or web address]  URL: [email or web address]  Date: 2025  Prepared by: Andrea Guadalupe    Exercises  - Seated Sciatic Tensioner  - 1 x daily - 7 x weekly - 3 sets - 10 reps  FOTO Completed On: 2/3/25    POC Expires Reeval for Medicare to be completed  Unit Limit Auth Expiration Date PT/OT/STVisit Limit   25 By visit 10 NA 25 BOMN    Completed on visit                    Auth Status DATE 2/3 2/5 2/10      NA Visit # 1 2 3       Remaining BOMN        MANUAL THERAPY         Lumbar CPA with neural tensioning in Sidelying                                                       THERAPEUTIC EXERCISE HEP         Seated slump 10x ea   10x 10x      SL leg press with HR   35# 10x ea  1 # B/L 10 reps at 105# up to 165 lbs      B/L leg press    205# 30x 305# 20x      BFR PF   BTB 75 reps  75 reps PTB                                                                                                                    NEUROMUSCULAR REEDUCATION           Biodex   15 min  15 min                                                                                                                                         THERAPEUTIC ACTIVITY                                                  GAIT TRAINING                                                  MODALITIES

## 2025-02-12 ENCOUNTER — OFFICE VISIT (OUTPATIENT)
Dept: PHYSICAL THERAPY | Facility: CLINIC | Age: 70
End: 2025-02-12
Payer: MEDICARE

## 2025-02-12 DIAGNOSIS — M48.061 SPINAL STENOSIS OF LUMBAR REGION WITHOUT NEUROGENIC CLAUDICATION: Primary | ICD-10-CM

## 2025-02-12 PROCEDURE — 97112 NEUROMUSCULAR REEDUCATION: CPT | Performed by: PHYSICAL THERAPIST

## 2025-02-12 PROCEDURE — 97110 THERAPEUTIC EXERCISES: CPT | Performed by: PHYSICAL THERAPIST

## 2025-02-12 NOTE — PROGRESS NOTES
Daily Note     Today's date: 2025  Patient name: Benji Morrow  : 1955  MRN: [id number]  Referring provider: Azucena Cleveland*  Dx:   Encounter Diagnosis     ICD-10-CM    1. Spinal stenosis of lumbar region without neurogenic claudication  M48.061                      Subjective: Notes fair response to PT so far.       Objective: See treatment diary below      Assessment: Noting no difficulty with performacne of wt shifting AP and ML today.  Improved ability to perform all TE without difficulty.  Noting wt shifting towards R LE leads to quicken stance towards L LE and buckling of his knee.  Poor confidence with stance on R LE with cues for knee TKE.       Plan: Continue per plan of care.      Daily Treatment Diary     Precautions: S/p prior C3-5 PCDF in May 2021   S/p prior L L3-S1 metrx decompressive hemilaminectomy w/ antony foraminotomies and discectomy in 2022      Imaging:   XR lumbar spine 2024: New moderate T12 compression deformity.  Stable L1 and L5 compression deformity.  Imaging compared to CT scan from 2022.  CO-MORBIDITIES: Falls risk, prior surgery  HEP ACCESS CODE: Access Code: [email or web address]  URL: [email or web address]  Date: 2025  Prepared by: Andrea Guadalupe    Exercises  - Seated Sciatic Tensioner  - 1 x daily - 7 x weekly - 3 sets - 10 reps  FOTO Completed On: 2/3/25    POC Expires Reeval for Medicare to be completed  Unit Limit Auth Expiration Date PT/OT/STVisit Limit   25 By visit 10 NA 25 BOMN    Completed on visit                    Auth Status DATE 2/3 2/5 2/10 2/12     NA Visit # 1 2 3 4      Remaining BOMN        MANUAL THERAPY         Lumbar CPA with neural tensioning in Sidelying                                                      THERAPEUTIC EXERCISE HEP         Seated slump 10x ea   10x 10x 10x     SL leg press with HR   35# 10x ea  1 # B/L 10 reps at 105# up to 165 lbs 1 # 30x ea      B/L leg press    205# 30x 305# 20x 305#  30x     BFR PF   BTB 75 reps  75 reps PTB 75 reps                                                                                                                   NEUROMUSCULAR REEDUCATION           Biodex   15 min  15 min  15 min                                                                                                                                        THERAPEUTIC ACTIVITY                                                  GAIT TRAINING                                                  MODALITIES

## 2025-02-13 ENCOUNTER — OFFICE VISIT (OUTPATIENT)
Dept: NEUROSURGERY | Facility: CLINIC | Age: 70
End: 2025-02-13
Payer: MEDICARE

## 2025-02-13 VITALS
BODY MASS INDEX: 36.2 KG/M2 | HEART RATE: 78 BPM | SYSTOLIC BLOOD PRESSURE: 126 MMHG | HEIGHT: 71 IN | TEMPERATURE: 98.1 F | RESPIRATION RATE: 18 BRPM | OXYGEN SATURATION: 97 % | WEIGHT: 258.6 LBS | DIASTOLIC BLOOD PRESSURE: 72 MMHG

## 2025-02-13 DIAGNOSIS — M47.816 FACET ARTHROPATHY, LUMBAR: Primary | ICD-10-CM

## 2025-02-13 DIAGNOSIS — M47.816 SPONDYLOSIS WITHOUT MYELOPATHY OR RADICULOPATHY, LUMBAR REGION: ICD-10-CM

## 2025-02-13 PROCEDURE — 99213 OFFICE O/P EST LOW 20 MIN: CPT | Performed by: NEUROLOGICAL SURGERY
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
## 2025-02-19 ENCOUNTER — OFFICE VISIT (OUTPATIENT)
Dept: PHYSICAL THERAPY | Facility: CLINIC | Age: 70
End: 2025-02-19
Payer: MEDICARE

## 2025-02-19 DIAGNOSIS — M48.061 SPINAL STENOSIS OF LUMBAR REGION WITHOUT NEUROGENIC CLAUDICATION: Primary | ICD-10-CM

## 2025-02-19 PROCEDURE — 97110 THERAPEUTIC EXERCISES: CPT | Performed by: PHYSICAL THERAPIST

## 2025-02-19 PROCEDURE — 97530 THERAPEUTIC ACTIVITIES: CPT | Performed by: PHYSICAL THERAPIST

## 2025-02-19 NOTE — PROGRESS NOTES
Daily Note     Today's date: 2025  Patient name: Benji Morrow  : 1955  MRN: [id number]  Referring provider: Azucena Cleveland*  Dx:   Encounter Diagnosis     ICD-10-CM    1. Spinal stenosis of lumbar region without neurogenic claudication  M48.061                      Subjective: Notes that he is having improvement in overall balance and stability.        Objective: See treatment diary below      Assessment: Noting no difficulty with performance of biodex today- improved scores up to 83% with AP and 100% score with ML weight shifting.  Able to perform added ladder agility with AP and double stepping without major LOB to medial or lateral region.  Able to perform BFR PF with improved ability also.  Less LOB and path deviation with ambulation.  Able to perform stance on R LE with star tapping with fair ability- still with knee buckling at times.  Using more HS and gluteal activation will be beneficial.      Plan: Continue per plan of care. Progress with strength as able.      Daily Treatment Diary     Precautions: S/p prior C3-5 PCDF in May 2021   S/p prior L L3-S1 metrx decompressive hemilaminectomy w/ antony foraminotomies and discectomy in 2022      Imaging:   XR lumbar spine 2024: New moderate T12 compression deformity.  Stable L1 and L5 compression deformity.  Imaging compared to CT scan from 2022.  CO-MORBIDITIES: Falls risk, prior surgery  HEP ACCESS CODE: Access Code: [email or web address]  URL: [email or web address]  Date: 2025  Prepared by: Andrea Guadalupe    Exercises  - Seated Sciatic Tensioner  - 1 x daily - 7 x weekly - 3 sets - 10 reps  FOTO Completed On: 2/3/25    POC Expires Reeval for Medicare to be completed  Unit Limit Auth Expiration Date PT/OT/STVisit Limit   25 By visit 10 NA 25 BOMN    Completed on visit                    Auth Status DATE 2/3 2/5 2/10 2/12 2/19    NA Visit # 1 2 3 4 5     Remaining BOMN        MANUAL THERAPY         Lumbar CPA with  neural tensioning in [employer]                                                               THERAPEUTIC EXERCISE HEP         Seated slump 10x ea   10x 10x 10x 10x    SL leg press with HR   35# 10x ea  1 # B/L 10 reps at 105# up to 165 lbs 1 # 30x ea  225 B/L 30x # 20x ea     B/L leg press    205# 30x 305# 20x 305# 30x     BFR PF   BTB 75 reps  75 reps PTB 75 reps 75 reps GTB                                                                                                                  NEUROMUSCULAR REEDUCATION           Biodex   15 min  15 min  15 min  15 min     Star tapping      20x ea     Ambulation with cues for step length      5 laps                                                                                                                   THERAPEUTIC ACTIVITY                                                  GAIT TRAINING                                                  MODALITIES

## 2025-02-24 ENCOUNTER — OFFICE VISIT (OUTPATIENT)
Dept: PHYSICAL THERAPY | Facility: CLINIC | Age: 70
End: 2025-02-24
Payer: MEDICARE

## 2025-02-24 DIAGNOSIS — M48.061 SPINAL STENOSIS OF LUMBAR REGION WITHOUT NEUROGENIC CLAUDICATION: Primary | ICD-10-CM

## 2025-02-24 PROCEDURE — 97112 NEUROMUSCULAR REEDUCATION: CPT | Performed by: PHYSICAL THERAPIST

## 2025-02-24 PROCEDURE — 97530 THERAPEUTIC ACTIVITIES: CPT | Performed by: PHYSICAL THERAPIST

## 2025-02-24 PROCEDURE — 97110 THERAPEUTIC EXERCISES: CPT | Performed by: PHYSICAL THERAPIST

## 2025-02-24 NOTE — PROGRESS NOTES
Daily Note     Today's date: 2025  Patient name: Benji Morrow  : 1955  MRN: [id number]  Referring provider: Azucena Cleveland*  Dx:   Encounter Diagnosis     ICD-10-CM    1. Spinal stenosis of lumbar region without neurogenic claudication  M48.061                      Subjective: Feeling okay- still considering LOB posterior with going up stairs while carrying objects.        Objective: See treatment diary below      Assessment: ML wt shifting is improved overall with use of pelvic wt shift vs knee flex.  AP remains limited with mobility with use of knee flex for compensation with BW beyond COM.  Cues for wt shift over R LE will hold to be beneficial for stair performance.  Able to perform 1 out of 8 flights of stairs without path deviation and need for UE support to stabilize.  Will continue to work on stance stability as well as use of BFR for PF strength as able.     Plan: Continue per plan of care.      Daily Treatment Diary     Precautions: S/p prior C3-5 PCDF in May 2021   S/p prior L L3-S1 metrx decompressive hemilaminectomy w/ antony foraminotomies and discectomy in 2022      Imaging:   XR lumbar spine 2024: New moderate T12 compression deformity.  Stable L1 and L5 compression deformity.  Imaging compared to CT scan from 2022.  CO-MORBIDITIES: Falls risk, prior surgery  HEP ACCESS CODE: Access Code: [email or web address]  URL: [email or web address]  Date: 2025  Prepared by: Andrea Guadalupe    Exercises  - Seated Sciatic Tensioner  - 1 x daily - 7 x weekly - 3 sets - 10 reps  FOTO Completed On: 2/3/25    POC Expires Reeval for Medicare to be completed  Unit Limit Auth Expiration Date PT/OT/STVisit Limit   25 By visit 10 NA 25 BOMN    Completed on visit                    Auth Status DATE 2/3 2/5 2/10 2/12 2/19 2/24   NA Visit # 1 2 3 4 5 6     Remaining BOMN        MANUAL THERAPY         Lumbar CPA with neural tensioning in [employer]                                                                THERAPEUTIC EXERCISE HEP         Seated slump 10x ea   10x 10x 10x 10x 10 x    SL leg press with HR   35# 10x ea  1 # B/L 10 reps at 105# up to 165 lbs 1 # 30x ea  225 B/L 30x # 20x ea  305# B/L 20x 205# SL 20x ea    B/L leg press    205# 30x 305# 20x 305# 30x     BFR PF   BTB 75 reps  75 reps PTB 75 reps 75 reps GTB 75 reps GTB                                                                                                                 NEUROMUSCULAR REEDUCATION           Biodex   15 min  15 min  15 min  15 min  15 min    Star tapping      20x ea  20x ea    Ambulation with cues for step length      5 laps                                                                                                                   THERAPEUTIC ACTIVITY          Stair climbing        8 flights                                  GAIT TRAINING                                                  MODALITIES

## 2025-02-26 ENCOUNTER — OFFICE VISIT (OUTPATIENT)
Dept: PHYSICAL THERAPY | Facility: CLINIC | Age: 70
End: 2025-02-26
Payer: MEDICARE

## 2025-02-26 DIAGNOSIS — M48.061 SPINAL STENOSIS OF LUMBAR REGION WITHOUT NEUROGENIC CLAUDICATION: Primary | ICD-10-CM

## 2025-02-26 PROCEDURE — 97112 NEUROMUSCULAR REEDUCATION: CPT | Performed by: PHYSICAL THERAPIST

## 2025-02-26 PROCEDURE — 97110 THERAPEUTIC EXERCISES: CPT | Performed by: PHYSICAL THERAPIST

## 2025-02-26 PROCEDURE — 97530 THERAPEUTIC ACTIVITIES: CPT | Performed by: PHYSICAL THERAPIST

## 2025-02-26 NOTE — PROGRESS NOTES
Daily Note     Today's date: 2025  Patient name: Benji Morrow  : 1955  MRN: [id number]  Referring provider: Azucena Brennan, *  Dx:   Encounter Diagnosis     ICD-10-CM    1. Spinal stenosis of lumbar region without neurogenic claudication  M48.061                      Subjective: Feeling improved overall control with performance of squatting low and able to get up quick without major LOB.       Objective: See treatment diary below      Assessment: Continues to have limited push up with performance of step up due to limited strength with R knee extension.  Stance on R LE continues to have limited control as well.  Will progress with performance of R LE strength with step up.     Plan: Continue per plan of care.      Daily Treatment Diary     Precautions: S/p prior C3-5 PCDF in May 2021   S/p prior L L3-S1 metrx decompressive hemilaminectomy w/ antony foraminotomies and discectomy in 2022      Imaging:   XR lumbar spine 2024: New moderate T12 compression deformity.  Stable L1 and L5 compression deformity.  Imaging compared to CT scan from 2022.  CO-MORBIDITIES: Falls risk, prior surgery  HEP ACCESS CODE: Access Code: [email or web address]  URL: [email or web address]  Date: 2025  Prepared by: Andrea Guadalupe    Exercises  - Seated Sciatic Tensioner  - 1 x daily - 7 x weekly - 3 sets - 10 reps  FOTO Completed On: 2/3/25    POC Expires Reeval for Medicare to be completed  Unit Limit Auth Expiration Date PT/OT/STVisit Limit   25 By visit 10 NA 25 BOMN    Completed on visit                    Auth Status DATE 2/26 2/5 2/10 2/12 2/19 2/24   NA Visit # 7 2 3 4 5 6     Remaining BOMN        MANUAL THERAPY         Lumbar CPA with neural tensioning in [employer]                                                               THERAPEUTIC EXERCISE HEP         Seated slump 10x ea   10x 10x 10x 10x 10 x    SL leg press with HR  NV 35# 10x ea  1 # B/L 10 reps at 105# up to 165 lbs 1 RM  "165# 30x ea  225 B/L 30x # 20x ea  305# B/L 20x 205# SL 20x ea    B/L leg press   205# SL 30x B/L 305# 30x 205# 30x 305# 20x 305# 30x     BFR PF   BTB 75 reps  75 reps PTB 75 reps 75 reps GTB 75 reps GTB   Step up  8\" 30x                                                                                                            NEUROMUSCULAR REEDUCATION           Biodex  15 min  15 min  15 min  15 min  15 min  15 min    Star tapping  20x ea     20x ea  20x ea    Ambulation with cues for step length      5 laps                                                                                                                   THERAPEUTIC ACTIVITY          Stair climbing   10 flights      8 flights                                  GAIT TRAINING                                                  MODALITIES                                                  "

## 2025-03-03 ENCOUNTER — OFFICE VISIT (OUTPATIENT)
Dept: PHYSICAL THERAPY | Facility: CLINIC | Age: 70
End: 2025-03-03
Payer: MEDICARE

## 2025-03-03 DIAGNOSIS — M48.061 SPINAL STENOSIS OF LUMBAR REGION WITHOUT NEUROGENIC CLAUDICATION: Primary | ICD-10-CM

## 2025-03-03 PROCEDURE — 97110 THERAPEUTIC EXERCISES: CPT | Performed by: PHYSICAL THERAPIST

## 2025-03-03 NOTE — PROGRESS NOTES
Daily Note     Today's date: 3/3/2025  Patient name: Benji Morrow  : 1955  MRN: [id number]  Referring provider: Azucena Brennan, *  Dx:   Encounter Diagnosis     ICD-10-CM    1. Spinal stenosis of lumbar region without neurogenic claudication  M48.061                        Subjective: Patient states improved overall strength and LE control with step ups but does notice he sendy during the stance phase while performing step up.   Objective: See treatment diary below      Assessment: Tolerated session well. Patient demonstrates R LE weakness during hip extensor & abd , pf and knee extensor strength evident with 3+/5 & 4/5 mmt. Continues to have limited tke while performing step up due to limited strength with R knee extension & hip extension. Patient demonstrates difficulty of motor control and strength with PF, was provided a OTB with knee extended to improve gastro contraction to improve PF with knee extended.   Will progress with performance of R LE strength with step up and heel raises on reformer  .     Plan: Continue per plan of care.      Daily Treatment Diary     Precautions: S/p prior C3-5 PCDF in May 2021   S/p prior L L3-S1 metrx decompressive hemilaminectomy w/ antony foraminotomies and discectomy in 2022      Imaging:   XR lumbar spine 2024: New moderate T12 compression deformity.  Stable L1 and L5 compression deformity.  Imaging compared to CT scan from 2022.  CO-MORBIDITIES: Falls risk, prior surgery  HEP ACCESS CODE: Access Code: [email or web address]  URL: [email or web address]  Date: 2025  Prepared by: Andrea Guadalupe    Exercises  - Seated Sciatic Tensioner  - 1 x daily - 7 x weekly - 3 sets - 10 reps  FOTO Completed On: 2/3/25    POC Expires Reeval for Medicare to be completed  Unit Limit Auth Expiration Date PT/OT/STVisit Limit   25 By visit 10 NA 25 BOMN    Completed on visit                    Auth Status DATE 2/26 2/5 2/10 2/12 2/19 2/24 3/3   NA Visit # 7  "2 3 4 5 6  7    Remaining BOMN         MANUAL THERAPY          Lumbar CPA with neural tensioning in [employer]                                                                      THERAPEUTIC EXERCISE HEP          Seated slump 10x ea   10x 10x 10x 10x 10 x  10x   SL leg press with HR  NV 35# 10x ea  1 # B/L 10 reps at 105# up to 165 lbs 1 # 30x ea  225 B/L 30x # 20x ea  305# B/L 20x 205# SL 20x ea  SL leg press 45#    B/L leg press   205# SL 30x B/L 305# 30x 205# 30x 305# 20x 305# 30x   305# 30x   BFR PF   BTB 75 reps  75 reps PTB 75 reps 75 reps GTB 75 reps GTB    Step up  8\" 30x      8'' 30x   TB PF         20x                                                                                                       NEUROMUSCULAR REEDUCATION            Biodex  15 min  15 min  15 min  15 min  15 min  15 min     Star tapping  20x ea     20x ea  20x ea     Ambulation with cues for step length      5 laps                                                                                                                               THERAPEUTIC ACTIVITY           Stair climbing   10 flights      8 flights                                      GAIT TRAINING                                                       MODALITIES                                                     "

## 2025-03-05 ENCOUNTER — OFFICE VISIT (OUTPATIENT)
Dept: PHYSICAL THERAPY | Facility: CLINIC | Age: 70
End: 2025-03-05
Payer: MEDICARE

## 2025-03-05 DIAGNOSIS — M48.061 SPINAL STENOSIS OF LUMBAR REGION WITHOUT NEUROGENIC CLAUDICATION: Primary | ICD-10-CM

## 2025-03-05 PROCEDURE — 97110 THERAPEUTIC EXERCISES: CPT | Performed by: PHYSICAL THERAPIST

## 2025-03-05 PROCEDURE — 97112 NEUROMUSCULAR REEDUCATION: CPT | Performed by: PHYSICAL THERAPIST

## 2025-03-05 NOTE — PROGRESS NOTES
Daily Note     Today's date: 3/5/2025  Patient name: Benji Morrow  : 1955  MRN: [id number]  Referring provider: Azucena Brennan, *  Dx:   Encounter Diagnosis     ICD-10-CM    1. Spinal stenosis of lumbar region without neurogenic claudication  M48.061                      Subjective: Continues to have difficulty with balance and walking in tandem.       Objective: See treatment diary below      Assessment: Continues to have limited ability to perform static balance and movements without AP LOB.  Continues to have righting reaction with knee buckling.  Still having difficulty with stable step up/step down.  Will progress with strength as able.     Plan: Continue per plan of care.      Daily Treatment Diary     Precautions: S/p prior C3-5 PCDF in May 2021   S/p prior L L3-S1 metrx decompressive hemilaminectomy w/ antony foraminotomies and discectomy in 2022      Imaging:   XR lumbar spine 2024: New moderate T12 compression deformity.  Stable L1 and L5 compression deformity.  Imaging compared to CT scan from 2022.  CO-MORBIDITIES: Falls risk, prior surgery  HEP ACCESS CODE: Access Code: [email or web address]  URL: [email or web address]  Date: 2025  Prepared by: Andrea Guadalupe    Exercises  - Seated Sciatic Tensioner  - 1 x daily - 7 x weekly - 3 sets - 10 reps  FOTO Completed On: 2/3/25    POC Expires Reeval for Medicare to be completed  Unit Limit Auth Expiration Date PT/OT/STVisit Limit   25 By visit 10 NA 25 BOMN    Completed on visit                    Auth Status DATE 2/26 3/5 2/10 2/12 2/19 2/24 3/3   NA Visit # 7 8 3 4 5 6  7    Remaining BOMN         MANUAL THERAPY          Lumbar CPA with neural tensioning in [employer]                                                                      THERAPEUTIC EXERCISE HEP          Seated slump 10x ea   10x 10x 10x 10x 10 x  10x   SL leg press with HR  NV 35# 10x ea  1 # B/L 10 reps at 105# up to 165 lbs 1 # 30x ea  225 B/L  "30x # 20x ea  305# B/L 20x 205# SL 20x ea  SL leg press 45#    B/L leg press   205# SL 30x B/L 305# 30x 205# 30x 305# 20x 305# 30x   305# 30x   BFR PF   BTB 75 reps  75 reps PTB 75 reps 75 reps GTB 75 reps GTB    Step up  8\" 30x 8\" 30x with 10# weight     8'' 30x   TB PF         20x                                                                                                       NEUROMUSCULAR REEDUCATION            Biodex  15 min  15 min  15 min  15 min  15 min  15 min     Star tapping  20x ea     20x ea  20x ea     Ambulation with cues for step length      5 laps                                                                                                                               THERAPEUTIC ACTIVITY           Stair climbing   10 flights      8 flights                                      GAIT TRAINING                                                       MODALITIES                                                       "

## 2025-03-10 ENCOUNTER — OFFICE VISIT (OUTPATIENT)
Dept: PHYSICAL THERAPY | Facility: CLINIC | Age: 70
End: 2025-03-10
Payer: MEDICARE

## 2025-03-10 DIAGNOSIS — M48.061 SPINAL STENOSIS OF LUMBAR REGION WITHOUT NEUROGENIC CLAUDICATION: Primary | ICD-10-CM

## 2025-03-10 PROCEDURE — 97530 THERAPEUTIC ACTIVITIES: CPT | Performed by: PHYSICAL THERAPIST

## 2025-03-10 PROCEDURE — 97112 NEUROMUSCULAR REEDUCATION: CPT | Performed by: PHYSICAL THERAPIST

## 2025-03-10 PROCEDURE — 97110 THERAPEUTIC EXERCISES: CPT | Performed by: PHYSICAL THERAPIST

## 2025-03-10 NOTE — PROGRESS NOTES
Daily Note     Today's date: 3/10/2025  Patient name: Benji Morrow  : 1955  MRN: [id number]  Referring provider: Azucena Brennan, *  Dx:   Encounter Diagnosis     ICD-10-CM    1. Spinal stenosis of lumbar region without neurogenic claudication  M48.061                      Subjective: Feeling okay overall, still challenged with balance.        Objective: See treatment diary below      Assessment: Continues to have difficulty with stance on R LE with tapping/tandem step.  Able to perform floor to stand transfers with mod I and close supervision.  Continues to have improvement in wt shifting ML and AP wih cues for speed with biodex feedback.  Will continue with stability and addition of core strength as able.     Plan: Progress note during next visit.      Daily Treatment Diary     Precautions: S/p prior C3-5 PCDF in May 2021   S/p prior L L3-S1 metrx decompressive hemilaminectomy w/ antony foraminotomies and discectomy in 2022      Imaging:   XR lumbar spine 2024: New moderate T12 compression deformity.  Stable L1 and L5 compression deformity.  Imaging compared to CT scan from 2022.  CO-MORBIDITIES: Falls risk, prior surgery  HEP ACCESS CODE: Access Code: [email or web address]  URL: [email or web address]  Date: 2025  Prepared by: Andrea Guadalupe    Exercises  - Seated Sciatic Tensioner  - 1 x daily - 7 x weekly - 3 sets - 10 reps  FOTO Completed On: 2/3/25    POC Expires Reeval for Medicare to be completed  Unit Limit Auth Expiration Date PT/OT/STVisit Limit   25 By visit 10 NA 25 BOMN    Completed on visit                    Auth Status DATE 2/26 3/5 3/10 2/12 2/19 2/24 3/3   NA Visit # 7 8 9 4 5 6  7    Remaining BOMN         MANUAL THERAPY          Lumbar CPA with neural tensioning in [employer]                                                                      THERAPEUTIC EXERCISE HEP          Seated slump 10x ea   10x 10x 10x 10x 10 x  10x   SL leg press with HR  NV 35# 10x  "ea  1 # B/L 10 reps at 105# up to 165 lbs 1 # 30x ea  225 B/L 30x # 20x ea  305# B/L 20x 205# SL 20x ea  SL leg press 45#    B/L leg press   205# SL 30x B/L 305# 30x 205# 30x 305# 20x 305# 30x   305# 30x   BFR PF   BTB 75 reps  75 reps PTB 75 reps 75 reps GTB 75 reps GTB    Step up  8\" 30x 8\" 30x with 10# weight     8'' 30x   TB PF         20x                                                                                                       NEUROMUSCULAR REEDUCATION            Biodex  15 min  15 min  15 min  15 min  15 min  15 min     Star tapping  20x ea   10x ea   20x ea  20x ea     Ambulation with cues for step length      5 laps      Static step to foam    10x ea                                                                                                                      THERAPEUTIC ACTIVITY           Stair climbing   10 flights      8 flights     Floor to stand transfer    10x                             GAIT TRAINING                                                       MODALITIES                                                         "

## 2025-03-12 ENCOUNTER — EVALUATION (OUTPATIENT)
Dept: PHYSICAL THERAPY | Facility: CLINIC | Age: 70
End: 2025-03-12
Payer: MEDICARE

## 2025-03-12 DIAGNOSIS — M48.061 SPINAL STENOSIS OF LUMBAR REGION WITHOUT NEUROGENIC CLAUDICATION: Primary | ICD-10-CM

## 2025-03-12 PROCEDURE — 97140 MANUAL THERAPY 1/> REGIONS: CPT | Performed by: PHYSICAL THERAPIST

## 2025-03-12 PROCEDURE — 97110 THERAPEUTIC EXERCISES: CPT | Performed by: PHYSICAL THERAPIST

## 2025-03-12 NOTE — PROGRESS NOTES
PT Re-Evaluation     Today's date: 3/12/2025  Patient name: Benji Morrow  : 1955  MRN: [id number]  Referring provider: Azucena Brennan, *  Dx:   Encounter Diagnosis     ICD-10-CM    1. Spinal stenosis of lumbar region without neurogenic claudication  M48.061                      Assessment  Impairments: abnormal coordination, abnormal gait, abnormal muscle firing, abnormal muscle tone, abnormal or restricted ROM, abnormal movement, activity intolerance, impaired balance, impaired physical strength, pain with function, safety issue and poor posture   Symptom irritability: moderate    Assessment details: Problem List:  1) Limited myotome strength at L4-S1  2) Lumbar balance based on HECTOR screen 32/58    Benji Morrow is a pleasant 69 y.o. 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  Clinically Benji demonstrates weakness at L4-S1 myotomes, limited balance with 32/58 HECTOR testing showing high falls risk despite normal TUG and 5 time sit to stand scores suggesting the need for skilled PT to address balance deficits and improved neural health with nerve glides.    No further referral appears necessary at this time based upon examination results.  I expect he will benefit from at least 8 weeks of PT to address mobility and falls risk.        Comparable signs:  1) Unable to perform stairs without UE support  2) Unable to perform heel raise    Re-assessment 3/12/25:  Benji has attended 11 visits since the initiation of PT and reports a 20% GROC.  Clinically demonstrates improved funcitonal outcome measures with improved TUG and 5 time sit to stand testing as well as improved HECTOR score now 36/58.  His ambulation is improved with less knee buckling noted with stance on R  LE.  Overall continues to require skilled PT due to poor control with functional stair climbing and path deviations despite improved posture control in order to reduce falls risk.     Understanding of Dx/Px/POC: good     Prognosis: fair    Goals  Short Term Goals (4 weeks)  1.) Establish independence with HEP- partailly met  2.) Decrease subjective pain levels from NPRS at least to 2-5/10 at rest and with activity- partailly met  3.) Improve lumbar ROM at least 5-10 degrees into all planes to allow for improved ease of movement with less guarding- not met    Long Term Goals (8 weeks)  1.) Improve lumbar ROM to WNL in all planes to restore normal movement with ADLs and function- not met  2.) Improve B/L ankle DF and PF strength to 5/5 in all planes in order to return to pain-free ADLs and function- partailly met  3.) Improve FOTO score at least to 75 points showing improved self reported disability - not met       Plan  Patient would benefit from: PT eval and skilled physical therapy  Planned modality interventions: biofeedback, cryotherapy, electrical stimulation/Russian stimulation and TENS    Planned therapy interventions: abdominal trunk stabilization, activity modification, ADL retraining, ADL training, balance, balance/weight bearing training, behavior modification, body mechanics training, coordination, compression, flexibility, functional ROM exercises, gait training, graded activity, graded exercise, graded motor, home exercise program, IADL retraining, transfer training, therapeutic training, therapeutic exercise, therapeutic activities, stretching, strengthening, sensory integrative techniques, self care, postural training, patient/caregiver education, neuromuscular re-education, nerve gliding, muscle pump exercises, motor coordination training, massage, manual therapy, IASTM and joint mobilization  Speech planned therapy intervention: NMES    Frequency: 2x week  Duration in weeks: 8  Plan of Care  beginning date: 3/12/2025  Plan of Care expiration date: 5/14/2025  Treatment plan discussed with: patient  Plan details: Continue POC for stability; monitor sxs and progress as able         Subjective Evaluation    History of Present Illness  Date of onset: 7/1/2024  Mechanism of injury: Benji Morrow is a 69 y.o. male who presents with increased chronic back pain with R LE NT sxs starting ~ in July months ago with [facility] because he was having some pain- was provided an x-ray after going to the ER. Had a + T12 compression fracture and recent MRI on 1/29/25 + for L4-S1 R sided foraminal stenosis.  Has been active around the house and riding a bike daily.  Wishes to improve stability with getting out of the chair, improved balance.   Decided to seek out MD consult where X-rays were (-) for fx and script for OPPT provided.  Denies night pain or changes in bowel or bladder function.  Reports having R sided NT signs or sxs but has chronic neuropathy.  F/U with MD in 2 weeks.  Wishes to return to PLOF pain-free.     Re-assessment 3/12/25:  Benji has attended 11 visits since the initiation of PT and reports a 20% GROC.  Reports improved balance and control with walking and improved overall strength with getting moving again.  Not as much low back pain with activity despite continued balance deficits.  Benji wishes to continue with PT.              Recurrent probem    Quality of life: fair    Patient Goals  Patient goals for therapy: decreased pain, decreased edema, improved balance, increased motion, increased strength, independence with ADLs/IADLs and return to sport/leisure activities  Patient goal: Improve balance and strength  Pain  Location: L/S and LEs  Quality: sharp, tight and dull ache  Relieving factors: relaxation and rest  Aggravating factors: sitting    Social Support  Steps to enter house: yes  8  Stairs in house: yes   13  Lives in: multiple-level home  Lives with: spouse    Employment status: not  working  Exercise history: Daily  Life stress: High      Diagnostic Tests  MRI studies: abnormal  Treatments  Previous treatment: physical therapy  Current treatment: physical therapy        Objective     Neurological Testing     Sensation     Lumbar   Left   Intact: light touch    Right   Intact: light touch    Reflexes   Left   Patellar (L4): absent (0)  Achilles (S1): absent (0)  Babinski sign: negative  Clonus sign: negative    Right   Patellar (L4): absent (0)  Achilles (S1): absent (0)  Babinski sign: negative  Clonus sign: negative    Active Range of Motion     Lumbar   Flexion:  Restriction level: moderate  Extension:  Restriction level: moderate  Left lateral flexion:  Restriction level: moderate  Right lateral flexion:  Restriction level: moderate  Left rotation:  Restriction level: moderate  Right rotation:  Restriction level: moderate    Strength/Myotome Testing     Lumbar   Left   Heel walk: abnormal  Toe walk: abnormal    Right   Heel walk: abnormal  Toe walk: abnormal    Left Hip   Planes of Motion   Flexion: 5  Adduction: 5  External rotation: 5  Internal rotation: 5    Right Hip   Planes of Motion   Flexion: 5  Adduction: 5  External rotation: 5  Internal rotation: 5    Left Knee   Flexion: 4+  Extension: 5    Right Knee   Flexion: 4+  Extension: 5    Left Ankle/Foot   Dorsiflexion: 4  Plantar flexion: 4  Great toe extension: 0    Right Ankle/Foot   Dorsiflexion: 4  Plantar flexion: 4  Great toe extension: 0    Tests     Lumbar     Left   Negative passive SLR and slump test.     Right   Negative passive SLR and slump test.     Functional Assessment        Single Leg Stance   Left: 2 seconds  Right: 2 seconds  Neuro Exam:     Functional outcomes   5x sit to stand: 14.2 (seconds)  TU.1 no  AD (seconds)            Daily Treatment Diary     Precautions: S/p prior C3-5 PCDF in May 2021   S/p prior L L3-S1 metrx decompressive hemilaminectomy w/ antony foraminotomies and discectomy in 2022     "  Imaging:   XR lumbar spine 7/8/2024: New moderate T12 compression deformity.  Stable L1 and L5 compression deformity.  Imaging compared to CT scan from 6/6/2022.  CO-MORBIDITIES: Falls risk, prior surgery  HEP ACCESS CODE: Access Code: [email or web address]  URL: [email or web address]  Date: 02/03/2025  Prepared by: Andrea Guadalupe    Exercises  - Seated Sciatic Tensioner  - 1 x daily - 7 x weekly - 3 sets - 10 reps  FOTO Completed On: 2/3/25    POC Expires Reeval for Medicare to be completed  Unit Limit Auth Expiration Date PT/OT/STVisit Limit   5/14/25 By visit 10 NA 12/31/25 BOMN    Completed on visit                    Auth Status DATE 2/26 3/5 3/10 3/12 2/19 2/24 3/3   NA Visit # 7 8 9 10 5 6  7    Remaining BOMN         MANUAL THERAPY          Lumbar CPA with neural tensioning in [place]          Re-assess    30 min                                                         THERAPEUTIC EXERCISE HEP          Seated slump 10x ea   10x 10x 10x 10x 10 x  10x   SL leg press with HR  NV 35# 10x ea  1 # B/L 10 reps at 105# up to 165 lbs  225 B/L 30x # 20x ea  305# B/L 20x 205# SL 20x ea  SL leg press 45#    B/L leg press   205# SL 30x B/L 305# 30x 205# 30x 305# 20x    305# 30x   BFR PF   BTB 75 reps  75 reps PTB 75 reps PTB 75 reps GTB 75 reps GTB    Step up  8\" 30x 8\" 30x with 10# weight     8'' 30x   TB PF         20x                                                                                                       NEUROMUSCULAR REEDUCATION            Biodex  15 min  15 min  15 min  15 min  15 min  15 min     Star tapping  20x ea   10x ea   20x ea  20x ea     Ambulation with cues for step length      5 laps      Static step to foam    10x ea                                                                                                                      THERAPEUTIC ACTIVITY           Stair climbing   10 flights      8 flights     Floor to stand transfer    10x                             GAIT TRAINING "                                                       MODALITIES

## 2025-03-17 ENCOUNTER — OFFICE VISIT (OUTPATIENT)
Dept: PHYSICAL THERAPY | Facility: CLINIC | Age: 70
End: 2025-03-17
Payer: MEDICARE

## 2025-03-17 DIAGNOSIS — M48.061 SPINAL STENOSIS OF LUMBAR REGION WITHOUT NEUROGENIC CLAUDICATION: Primary | ICD-10-CM

## 2025-03-17 PROCEDURE — 97112 NEUROMUSCULAR REEDUCATION: CPT | Performed by: PHYSICAL THERAPIST

## 2025-03-17 PROCEDURE — 97110 THERAPEUTIC EXERCISES: CPT | Performed by: PHYSICAL THERAPIST

## 2025-03-17 NOTE — PROGRESS NOTES
Daily Note     Today's date: 3/17/2025  Patient name: Benji Morrow  : 1955  MRN: [id number]  Referring provider: Azucena Brennan, *  Dx:   Encounter Diagnosis     ICD-10-CM    1. Spinal stenosis of lumbar region without neurogenic claudication  M48.061                      Subjective: Noting no difficulty with performance of walking flat surface now- still conscious of keeping L knee undercontrol. Stairs remain a challenge.      Objective: See treatment diary below      Assessment: With stair negotiation has continued anterior fwd momentum leading to subsequent mis step and LOB as well as path deviation.  Error augmentation with anterior pulling with CC machine led to improved ability to perform AP weight shifting with biodex feedback.  Post session able to negotiate stairs down without UE support and improved control.     Plan: Continue per plan of care.      Daily Treatment Diary     Precautions: S/p prior C3-5 PCDF in May 2021   S/p prior L L3-S1 metrx decompressive hemilaminectomy w/ antony foraminotomies and discectomy in 2022      Imaging:   XR lumbar spine 2024: New moderate T12 compression deformity.  Stable L1 and L5 compression deformity.  Imaging compared to CT scan from 2022.  CO-MORBIDITIES: Falls risk, prior surgery  HEP ACCESS CODE: Access Code: [email or web address]  URL: [email or web address]  Date: 2025  Prepared by: Andrea Guadalupe    Exercises  - Seated Sciatic Tensioner  - 1 x daily - 7 x weekly - 3 sets - 10 reps  FOTO Completed On: 2/3/25    POC Expires Reeval for Medicare to be completed  Unit Limit Auth Expiration Date PT/OT/STVisit Limit   25 By visit 10 NA 25 BOMN    Completed on visit                    Auth Status DATE 2/26 3/5 3/10 3/12 3/17 2/24 3/3   NA Visit # 7 8 9 10 11 6  7    Remaining BOMN         MANUAL THERAPY          Lumbar CPA with neural tensioning in [place]          Re-assess    30 min                                                  "        THERAPEUTIC EXERCISE HEP          Seated slump 10x ea   10x 10x 10x 10x 10 x  10x   SL leg press with HR  NV 35# 10x ea  1 # B/L 10 reps at 105# up to 165 lbs  225 B/L 30x # 20x ea  305# B/L 20x 205# SL 20x ea  SL leg press 45#    B/L leg press   205# SL 30x B/L 305# 30x 205# 30x 305# 20x    305# 30x   BFR PF   BTB 75 reps  75 reps PTB 75 reps PTB 75 reps GTB 75 reps GTB    Step up  8\" 30x 8\" 30x with 10# weight     8'' 30x   TB PF         20x                                                                                                       NEUROMUSCULAR REEDUCATION            Biodex  15 min  15 min  15 min  15 min  15 min  15 min     Star tapping  20x ea   10x ea   20x ea  20x ea     Ambulation with cues for step length      5 laps      Static step to foam    10x ea                                                                                                                      THERAPEUTIC ACTIVITY           Stair climbing   10 flights      8 flights     Floor to stand transfer    10x                             GAIT TRAINING                                                       MODALITIES                                                              "

## 2025-03-18 ENCOUNTER — TELEPHONE (OUTPATIENT)
Dept: GASTROENTEROLOGY | Facility: CLINIC | Age: 70
End: 2025-03-18

## 2025-03-18 ENCOUNTER — OFFICE VISIT (OUTPATIENT)
Dept: GASTROENTEROLOGY | Facility: CLINIC | Age: 70
End: 2025-03-18
Payer: MEDICARE

## 2025-03-18 VITALS
BODY MASS INDEX: 35.42 KG/M2 | HEIGHT: 71 IN | DIASTOLIC BLOOD PRESSURE: 84 MMHG | SYSTOLIC BLOOD PRESSURE: 148 MMHG | WEIGHT: 253 LBS

## 2025-03-18 DIAGNOSIS — K21.00 GASTROESOPHAGEAL REFLUX DISEASE WITH ESOPHAGITIS WITHOUT HEMORRHAGE: ICD-10-CM

## 2025-03-18 DIAGNOSIS — K74.69 OTHER CIRRHOSIS OF LIVER (HCC): Primary | ICD-10-CM

## 2025-03-18 DIAGNOSIS — I85.00 ESOPHAGEAL VARICES DETERMINED BY ENDOSCOPY (HCC): ICD-10-CM

## 2025-03-18 DIAGNOSIS — Z86.0101 PERSONAL HISTORY OF ADENOMATOUS AND SERRATED COLON POLYPS: ICD-10-CM

## 2025-03-18 DIAGNOSIS — K22.10 EROSIVE ESOPHAGITIS: ICD-10-CM

## 2025-03-18 PROCEDURE — 99214 OFFICE O/P EST MOD 30 MIN: CPT | Performed by: INTERNAL MEDICINE

## 2025-03-18 PROCEDURE — G2211 COMPLEX E/M VISIT ADD ON: HCPCS | Performed by: INTERNAL MEDICINE

## 2025-03-18 RX ORDER — NADOLOL 40 MG/1
40 TABLET ORAL DAILY
Qty: 90 TABLET | Refills: 3 | Status: SHIPPED | OUTPATIENT
Start: 2025-03-18

## 2025-03-18 RX ORDER — OMEPRAZOLE 20 MG/1
20 CAPSULE, DELAYED RELEASE ORAL DAILY
Qty: 90 CAPSULE | Refills: 3 | Status: SHIPPED | OUTPATIENT
Start: 2025-03-18

## 2025-03-18 RX ORDER — SODIUM CHLORIDE, SODIUM LACTATE, POTASSIUM CHLORIDE, CALCIUM CHLORIDE 600; 310; 30; 20 MG/100ML; MG/100ML; MG/100ML; MG/100ML
125 INJECTION, SOLUTION INTRAVENOUS CONTINUOUS
OUTPATIENT
Start: 2025-03-18

## 2025-03-18 NOTE — PROGRESS NOTES
Name: Benji Morrow      : 1955      MRN: [id number]  Encounter Provider: Russ Amaya DO  Encounter Date: 3/18/2025   Encounter department: [facility]  :  Assessment & Plan  Other cirrhosis of liver (HCC)  Due to GRACIA.  Decompensated on basis of varices.  No ascites or encephalopathy. Discovered incidentally during laparoscopic cholecystectomy.  No jaundice, icterus, pruritus or other liver symptoms.  AFP negative.  Most recent ultrasound 2025 showed cirrhosis without mass         Esophageal varices determined by endoscopy (HCC)  Grade 1 esophageal varices 2024, I refilled nonselective beta-blocker.  Plan surveillance EGD at [facility]    Gastroesophageal reflux disease with esophagitis without hemorrhage  Asymptomatic on low-dose omeprazole.  Will assess further at time of upcoming EGD       Personal history of adenomatous and serrated colon polyps  Most recent colonoscopy 10/2021, 5-year recall was recommended           History of Present Illness   Benji Morrow is a 69 y.o. male who presents accompanied by his wife for follow-up on cirrhosis due to GRACIA.  He was last seen in the office May 2024.  Upper endoscopy 2024 showed grade 1 varices and the beta-blocker was continued.  He denies any upper GI symptoms on low-dose omeprazole.  He specifically denies dysphagia.  His appetite is good and his weight is stable.  He is intentionally losing weight.  He denies any lower GI complaints  HPI  History obtained from: patient and patient's Significant Other  Review of Systems A complete review of systems is negative other than that noted above in the HPI.    Medical History Reviewed by provider this encounter:     .  Current Outpatient Medications   Medication Sig Dispense Refill    b complex vitamins tablet Take 1 tablet by mouth every morning      buPROPion (WELLBUTRIN XL) 150 mg 24 hr tablet TAKE ONE TABLET BY MOUTH EVERY DAY  "90 tablet 1    multivitamin (THERAGRAN) TABS Take 1 tablet by mouth every morning      nadolol (CORGARD) 40 mg tablet Take 1 tablet (40 mg total) by mouth daily 90 tablet 3    omeprazole (PriLOSEC) 20 mg delayed release capsule Take 1 capsule (20 mg total) by mouth daily 90 capsule 3     No current facility-administered medications for this visit.     Objective   /84   Ht 5' 11\" (1.803 m)   Wt 115 kg (253 lb)   BMI 35.29 kg/m²     Physical Exam  Constitutional:       Appearance: Normal appearance.   HENT:      Head: Normocephalic and atraumatic.      Nose: Nose normal.   Eyes:      Extraocular Movements: Extraocular movements intact.      Conjunctiva/sclera: Conjunctivae normal.   Cardiovascular:      Rate and Rhythm: Normal rate and regular rhythm.   Pulmonary:      Effort: Pulmonary effort is normal.      Breath sounds: Normal breath sounds.   Abdominal:      General: Abdomen is flat. Bowel sounds are normal. There is no distension.      Palpations: Abdomen is soft. There is no mass.      Tenderness: There is no guarding.   Musculoskeletal:         General: Normal range of motion.      Cervical back: Normal range of motion.   Skin:     General: Skin is warm and dry.   Neurological:      General: No focal deficit present.      Mental Status: He is alert.   Psychiatric:         Mood and Affect: Mood normal.         Behavior: Behavior normal.            Lab Results: I personally reviewed relevant lab results. CBC/BMP, AFP: No new results in last 24 hours.     Radiology Results Review: I have reviewed radiology reports from [facility] including: Ultrasound(s).  Results for orders placed during the hospital encounter of 03/06/24    EGD    Impression  The duodenum appeared normal.  Mild, friable and mosaic portal hypertensive gastropathy in the fundus of the stomach and body of the stomach  Single grade II varix in the esophagus      RECOMMENDATION:  Esophagitis has improved, continue pantoprazole  Stable " esophageal varix, banding not indicated, continue nadolol  1 year EGD recall  Keep upcoming office visit        Russ Amaya, DO

## 2025-03-18 NOTE — TELEPHONE ENCOUNTER
Scheduled date of EGD(as of today):6-11-25  Physician performing EGD:Monique  Location of EGD:[facility]  Instructions reviewed with patient by:FLO  Clearances: no

## 2025-03-18 NOTE — ASSESSMENT & PLAN NOTE
Due to GRACIA.  Decompensated on basis of varices.  No ascites or encephalopathy. Discovered incidentally during laparoscopic cholecystectomy.  No jaundice, icterus, pruritus or other liver symptoms.  AFP negative.  Most recent ultrasound January 2025 showed cirrhosis without mass

## 2025-03-18 NOTE — ASSESSMENT & PLAN NOTE
Grade 1 esophageal varices March 2024, I refilled nonselective beta-blocker.  Plan surveillance EGD at [facility]

## 2025-03-19 ENCOUNTER — APPOINTMENT (OUTPATIENT)
Dept: PHYSICAL THERAPY | Facility: CLINIC | Age: 70
End: 2025-03-19
Payer: MEDICARE

## 2025-03-24 ENCOUNTER — OFFICE VISIT (OUTPATIENT)
Dept: PHYSICAL THERAPY | Facility: CLINIC | Age: 70
End: 2025-03-24
Payer: MEDICARE

## 2025-03-24 DIAGNOSIS — M48.061 SPINAL STENOSIS OF LUMBAR REGION WITHOUT NEUROGENIC CLAUDICATION: Primary | ICD-10-CM

## 2025-03-24 PROCEDURE — 97112 NEUROMUSCULAR REEDUCATION: CPT | Performed by: PHYSICAL THERAPIST

## 2025-03-24 PROCEDURE — 97530 THERAPEUTIC ACTIVITIES: CPT | Performed by: PHYSICAL THERAPIST

## 2025-03-24 PROCEDURE — 97110 THERAPEUTIC EXERCISES: CPT | Performed by: PHYSICAL THERAPIST

## 2025-03-24 NOTE — PROGRESS NOTES
Daily Note     Today's date: 3/24/2025  Patient name: Benji Morrow  : 1955  MRN: [id number]  Referring provider: Azucena Brennan, *  Dx:   Encounter Diagnosis     ICD-10-CM    1. Spinal stenosis of lumbar region without neurogenic claudication  M48.061                      Subjective: Feeling okay overall, still a little unsteady with getting up and down stairs.       Objective: See treatment diary below      Assessment: Initiate session today with cable column AP and PA pulling for perturbation and control of trunk and core stability.  Continues to have slight instability with step up/down but improved post cable column pull backs.  Able to negotiate up/down by end of 10 flights without path deviation. Will progress as able.     Plan: Continue per plan of care.      Daily Treatment Diary     Precautions: S/p prior C3-5 PCDF in May 2021   S/p prior L L3-S1 metrx decompressive hemilaminectomy w/ antony foraminotomies and discectomy in 2022      Imaging:   XR lumbar spine 2024: New moderate T12 compression deformity.  Stable L1 and L5 compression deformity.  Imaging compared to CT scan from 2022.  CO-MORBIDITIES: Falls risk, prior surgery  HEP ACCESS CODE: Access Code: [email or web address]  URL: [email or web address]  Date: 2025  Prepared by: Andrea Guadalupe    Exercises  - Seated Sciatic Tensioner  - 1 x daily - 7 x weekly - 3 sets - 10 reps  FOTO Completed On: 2/3/25    POC Expires Reeval for Medicare to be completed  Unit Limit Auth Expiration Date PT/OT/STVisit Limit   25 By visit 10 NA 25 BOMN    Completed on visit                    Auth Status DATE 2/26 3/5 3/10 3/12 3/17 3/24 3/3   NA Visit # 7 8 9 10 11 12 7    Remaining BOMN         MANUAL THERAPY          Lumbar CPA with neural tensioning in [place]          Re-assess    30 min                                                         THERAPEUTIC EXERCISE HEP          Seated slump 10x ea   10x 10x 10x 10x 10 x  10x   SL  "leg press with HR  NV 35# 10x ea  1 # B/L 10 reps at 105# up to 165 lbs  225 B/L 30x # 20x ea  305# B/L 20x 205# SL 20x ea  SL leg press 45#    B/L leg press   205# SL 30x B/L 305# 30x 205# 30x 305# 20x    305# 30x   BFR PF   BTB 75 reps  75 reps PTB 75 reps PTB 75 reps GTB 75 reps GTB    Step up  8\" 30x 8\" 30x with 10# weight     8'' 30x   TB PF         20x    CC pull backs       22# 30x AP and PA                                                                                            NEUROMUSCULAR REEDUCATION            Biodex  15 min  15 min  15 min  15 min  15 min      Star tapping  20x ea   10x ea   20x ea  20x ea     Ambulation with cues for step length      5 laps      Static step to foam    10x ea                                                                                                                      THERAPEUTIC ACTIVITY           Stair climbing   10 flights      10 flights     Floor to stand transfer    10x                             GAIT TRAINING                                                       MODALITIES                                                                "

## 2025-03-26 ENCOUNTER — OFFICE VISIT (OUTPATIENT)
Dept: PHYSICAL THERAPY | Facility: CLINIC | Age: 70
End: 2025-03-26
Payer: MEDICARE

## 2025-03-26 DIAGNOSIS — M48.061 SPINAL STENOSIS OF LUMBAR REGION WITHOUT NEUROGENIC CLAUDICATION: Primary | ICD-10-CM

## 2025-03-26 PROCEDURE — 97110 THERAPEUTIC EXERCISES: CPT | Performed by: PHYSICAL THERAPIST

## 2025-03-26 PROCEDURE — 97112 NEUROMUSCULAR REEDUCATION: CPT | Performed by: PHYSICAL THERAPIST

## 2025-03-26 NOTE — PROGRESS NOTES
Daily Note     Today's date: 3/26/2025  Patient name: Benji Morrow  : 1955  MRN: [id number]  Referring provider: Azucena Brennan, *  Dx:   Encounter Diagnosis     ICD-10-CM    1. Spinal stenosis of lumbar region without neurogenic claudication  M48.061                      Subjective: Feeling more jello legged today.       Objective: See treatment diary below      Assessment: Noting no difficulty with performance step down today.  Added bosu step lunge and anterior perturbation while in lunge position.  More ML LOB with stance on R LE compared to L.  Post bosu lunge- was able to perform on stable surface with improved control at ML.  Cone tapping - no cone falling with performance with stance on R LE but less post repeated performance.     Plan: Continue per plan of care.      Daily Treatment Diary     Precautions: S/p prior C3-5 PCDF in May 2021   S/p prior L L3-S1 metrx decompressive hemilaminectomy w/ antony foraminotomies and discectomy in 2022      Imaging:   XR lumbar spine 2024: New moderate T12 compression deformity.  Stable L1 and L5 compression deformity.  Imaging compared to CT scan from 2022.  CO-MORBIDITIES: Falls risk, prior surgery  HEP ACCESS CODE: Access Code: [email or web address]  URL: [email or web address]  Date: 2025  Prepared by: Andrea Guadalupe    Exercises  - Seated Sciatic Tensioner  - 1 x daily - 7 x weekly - 3 sets - 10 reps  FOTO Completed On: 2/3/25    POC Expires Reeval for Medicare to be completed  Unit Limit Auth Expiration Date PT/OT/STVisit Limit   25 By visit 10 NA 25 BOMN    Completed on visit                    Auth Status DATE 2/26 3/5 3/10 3/12 3/17 3/24 3/26   NA Visit # 7 8 9 10 11 12 13    Remaining BOMN         MANUAL THERAPY          Lumbar CPA with neural tensioning in [place]          Re-assess    30 min                                                         THERAPEUTIC EXERCISE HEP          Seated slump 10x ea   10x 10x 10x 10x 10  "x     SL leg press with HR  NV 35# 10x ea  1 # B/L 10 reps at 105# up to 165 lbs  225 B/L 30x # 20x ea  305# B/L 20x 205# SL 20x ea  SL leg press 45#    B/L leg press   205# SL 30x B/L 305# 30x 205# 30x 305# 20x    305# 30x   BFR PF   BTB 75 reps  75 reps PTB 75 reps PTB 75 reps GTB 75 reps GTB    Step up  8\" 30x 8\" 30x with 10# weight     8'' 30x   TB PF            CC pull backs       22# 30x AP and PA               Cone taps         30x   Step lunge with bosu        8\" 20x ea    Step lunge without bosu        8\" 20x ea                                                NEUROMUSCULAR REEDUCATION            Biodex  15 min  15 min  15 min  15 min  15 min      Star tapping  20x ea   10x ea   20x ea  20x ea  20x ea    Ambulation with cues for step length      5 laps      Static step to foam    10x ea                                                                                                                      THERAPEUTIC ACTIVITY           Stair climbing   10 flights      10 flights     Floor to stand transfer    10x                             GAIT TRAINING                                                       MODALITIES                                                                  "

## 2025-03-31 ENCOUNTER — OFFICE VISIT (OUTPATIENT)
Dept: PHYSICAL THERAPY | Facility: CLINIC | Age: 70
End: 2025-03-31
Payer: MEDICARE

## 2025-03-31 DIAGNOSIS — M48.061 SPINAL STENOSIS OF LUMBAR REGION WITHOUT NEUROGENIC CLAUDICATION: Primary | ICD-10-CM

## 2025-03-31 PROCEDURE — 97112 NEUROMUSCULAR REEDUCATION: CPT | Performed by: PHYSICAL THERAPIST

## 2025-03-31 NOTE — PROGRESS NOTES
Daily Note     Today's date: 3/31/2025  Patient name: Benji Morrow  : 1955  MRN: [id number]  Referring provider: Azucena Brennan, *  Dx:   Encounter Diagnosis     ICD-10-CM    1. Spinal stenosis of lumbar region without neurogenic claudication  M48.061                      Subjective: Feeling okay overall had fatigue and stiffness at his Les over the weekend.       Objective: See treatment diary below      Assessment: Stance on R LE leads more LOB.  Continued retropulsion at times today.  Repeated cable column pull backs led to slight improvement in sxs.  Trial of standing extension of L/S.  Fair change with LOB with repeated performacne.  Stil with weakness with R sided L5-S1 myotomes and no change with REIL.  Will consider progression with addition of PT OP next session for potential improvement in L5-S1 myotome.    Plan: Continue per plan of care.      Daily Treatment Diary     Precautions: S/p prior C3-5 PCDF in May 2021   S/p prior L L3-S1 metrx decompressive hemilaminectomy w/ antony foraminotomies and discectomy in 2022      Imaging:   XR lumbar spine 2024: New moderate T12 compression deformity.  Stable L1 and L5 compression deformity.  Imaging compared to CT scan from 2022.  CO-MORBIDITIES: Falls risk, prior surgery  HEP ACCESS CODE: Access Code: [email or web address]  URL: [email or web address]  Date: 2025  Prepared by: Andrea Guadalupe    Exercises  - Seated Sciatic Tensioner  - 1 x daily - 7 x weekly - 3 sets - 10 reps  FOTO Completed On: 2/3/25    POC Expires Reeval for Medicare to be completed  Unit Limit Auth Expiration Date PT/OT/STVisit Limit   25 By visit 10 NA 25 BOMN    Completed on visit                    Auth Status DATE 3/31 3/5 3/10 3/12 3/17 3/24 3/26   NA Visit # 14 8 9 10 11 12 13    Remaining BOMN         MANUAL THERAPY          Lumbar CPA with neural tensioning in [place]          Re-assess    30 min                                                    "      THERAPEUTIC EXERCISE HEP          Seated slump 10x ea   10x 10x 10x 10x 10 x     SL leg press with HR  NV 35# 10x ea  1 # B/L 10 reps at 105# up to 165 lbs  225 B/L 30x # 20x ea  305# B/L 20x 205# SL 20x ea  SL leg press 45#    B/L leg press   205# SL 30x B/L 305# 30x 205# 30x 305# 20x    305# 30x   BFR PF   BTB 75 reps  75 reps PTB 75 reps PTB 75 reps GTB 75 reps GTB    Step up  8\" 30x 8\" 30x with 10# weight     8'' 30x   TB PF            CC pull backs  44# 20x ea      22# 30x AP and PA               Cone taps   20x      30x   Step lunge with bosu  20x      8\" 20x ea    Step lunge without bosu  20x      8\" 20x ea                                                NEUROMUSCULAR REEDUCATION            Biodex  15 min  15 min  15 min  15 min  15 min      Star tapping  20x ea   10x ea   20x ea  20x ea  20x ea    Ambulation with cues for step length  Tandem walk 6x20 ft    5 laps      Static step to foam  10x2  10x ea                                                                                                                      THERAPEUTIC ACTIVITY           Stair climbing   10 flights      10 flights     Floor to stand transfer    10x                             GAIT TRAINING                                                       MODALITIES                                                                    "

## 2025-04-02 ENCOUNTER — OFFICE VISIT (OUTPATIENT)
Dept: PHYSICAL THERAPY | Facility: CLINIC | Age: 70
End: 2025-04-02
Payer: MEDICARE

## 2025-04-02 DIAGNOSIS — M48.061 SPINAL STENOSIS OF LUMBAR REGION WITHOUT NEUROGENIC CLAUDICATION: Primary | ICD-10-CM

## 2025-04-02 PROCEDURE — 97110 THERAPEUTIC EXERCISES: CPT | Performed by: PHYSICAL THERAPIST

## 2025-04-02 PROCEDURE — 97530 THERAPEUTIC ACTIVITIES: CPT | Performed by: PHYSICAL THERAPIST

## 2025-04-02 NOTE — PROGRESS NOTES
Daily Note     Today's date: 2025  Patient name: Benji Morrow  : 1955  MRN: [id number]  Referring provider: Azucena Brennan, *  Dx:   Encounter Diagnosis     ICD-10-CM    1. Spinal stenosis of lumbar region without neurogenic claudication  M48.061                      Subjective: Feeling better today in his Les and less heavy.       Objective: See treatment diary below      Assessment: Continues to have difficulty with performance of static step lunging.  Fair carry over with stair climb. Will continue to work on strength as able and stability.     Plan: Continue per plan of care.      Daily Treatment Diary     Precautions: S/p prior C3-5 PCDF in May 2021   S/p prior L L3-S1 metrx decompressive hemilaminectomy w/ antony foraminotomies and discectomy in 2022      Imaging:   XR lumbar spine 2024: New moderate T12 compression deformity.  Stable L1 and L5 compression deformity.  Imaging compared to CT scan from 2022.  CO-MORBIDITIES: Falls risk, prior surgery  HEP ACCESS CODE: Access Code: [email or web address]  URL: [email or web address]  Date: 2025  Prepared by: Andrea Guadalupe    Exercises  - Seated Sciatic Tensioner  - 1 x daily - 7 x weekly - 3 sets - 10 reps  FOTO Completed On: 2/3/25    POC Expires Reeval for Medicare to be completed  Unit Limit Auth Expiration Date PT/OT/STVisit Limit   25 By visit 10 NA 25 BOMN    Completed on visit                    Auth Status DATE 3/31 4/2 3/10 3/12 3/17 3/24 3/26   NA Visit # 14 15 9 10 11 12 13    Remaining BOMN         MANUAL THERAPY          Lumbar CPA with neural tensioning in [place]          Re-assess    30 min                                                         THERAPEUTIC EXERCISE HEP          Seated slump 10x ea    10x 10x 10x 10 x     SL leg press with HR  NV 35# 10x ea  1 # B/L 10 reps at 105# up to 165 lbs  225 B/L 30x # 20x ea  305# B/L 20x 205# SL 20x ea  SL leg press 45#    B/L leg press   205# SL  "30x B/L 305# 30x 305# 20x 305# 20x    305# 30x   BFR PF    75 reps PTB 75 reps PTB 75 reps GTB 75 reps GTB    Step up  8\" 30x 8\" 30x with 10# weight     8'' 30x   TB PF            CC pull backs  44# 20x ea  66# 20x ea     22# 30x AP and PA    Static step lunge weight shift   20x ea 6\"        Cone taps   20x 20x     30x   Step lunge with bosu  20x 20x     8\" 20x ea    Step lunge without bosu  20x      8\" 20x ea                                                NEUROMUSCULAR REEDUCATION            Biodex  15 min  15 min  15 min  15 min  15 min      Star tapping  20x ea   10x ea   20x ea  20x ea  20x ea    Ambulation with cues for step length  Tandem walk 6x20 ft    5 laps      Static step to foam  10x2  10x ea                                                                                                                      THERAPEUTIC ACTIVITY           Stair climbing   10 flights  10 flights    10 flights     Floor to stand transfer    10x                             GAIT TRAINING                                                       MODALITIES                                                                      "

## 2025-04-09 ENCOUNTER — OFFICE VISIT (OUTPATIENT)
Dept: PHYSICAL THERAPY | Facility: CLINIC | Age: 70
End: 2025-04-09
Payer: MEDICARE

## 2025-04-09 DIAGNOSIS — F32.A DEPRESSION, UNSPECIFIED DEPRESSION TYPE: ICD-10-CM

## 2025-04-09 DIAGNOSIS — M48.061 SPINAL STENOSIS OF LUMBAR REGION WITHOUT NEUROGENIC CLAUDICATION: Primary | ICD-10-CM

## 2025-04-09 PROCEDURE — 97110 THERAPEUTIC EXERCISES: CPT | Performed by: PHYSICAL THERAPIST

## 2025-04-09 PROCEDURE — 97140 MANUAL THERAPY 1/> REGIONS: CPT | Performed by: PHYSICAL THERAPIST

## 2025-04-09 NOTE — PROGRESS NOTES
Daily Note     Today's date: 2025  Patient name: Benji Morrow  : 1955  MRN: [id number]  Referring provider: Azucena Brennan, *  Dx:   Encounter Diagnosis     ICD-10-CM    1. Spinal stenosis of lumbar region without neurogenic claudication  M48.061                      Subjective: Reports feeling continued difficulty with stairs. Notes he put some heavy trash out on the curb yesterday and felt increased mid back pain today.       Objective: See treatment diary below      Assessment: Able to perform full treatment without difficulty or major LOB post TM warm up and performance of leg press.  Able to initiate CC pull backs and lunges fwd with improved strength and control.  Good transition to performance of stairs without posterior or fwd momentum swing.  Will progress with strength as able.     Plan: Continue per plan of care.      Daily Treatment Diary     Precautions: S/p prior C3-5 PCDF in May 2021   S/p prior L L3-S1 metrx decompressive hemilaminectomy w/ antony foraminotomies and discectomy in 2022      Imaging:   XR lumbar spine 2024: New moderate T12 compression deformity.  Stable L1 and L5 compression deformity.  Imaging compared to CT scan from 2022.  CO-MORBIDITIES: Falls risk, prior surgery  HEP ACCESS CODE: Access Code: [email or web address]  URL: [email or web address]  Date: 2025  Prepared by: Andrea Guadalupe    Exercises  - Seated Sciatic Tensioner  - 1 x daily - 7 x weekly - 3 sets - 10 reps  FOTO Completed On: 2/3/25    POC Expires Reeval for Medicare to be completed  Unit Limit Auth Expiration Date PT/OT/STVisit Limit   25 By visit 10 NA 25 BOMN    Completed on visit                    Auth Status DATE 3/31 4/2 4/9 3/12 3/17 3/24 3/26   NA Visit # 14 15 16 10 11 12 13    Remaining BOMN         MANUAL THERAPY          Lumbar CPA with neural tensioning in [place]   10 min to T/S       Re-assess    30 min                                                        "  THERAPEUTIC EXERCISE HEP          Seated slump 10x ea     10x 10x 10 x     SL leg press with HR  NV 35# 10x ea    225 B/L 30x # 20x ea  305# B/L 20x 205# SL 20x ea  SL leg press 45#    B/L leg press   205# SL 30x B/L 305# 30x 305# 20x 305# 30x    305# 30x   BFR PF     75 reps PTB 75 reps GTB 75 reps GTB    Step up  8\" 30x 8\" 30x with 10# weight 8\" 30x ea     8'' 30x   TB PF            CC pull backs  44# 20x ea  66# 20x ea  66# 20x ea    22# 30x AP and PA    Static step lunge weight shift   20x ea 6\" 20x ea 6\"       Cone taps   20x 20x 20x     30x   Step lunge with bosu  20x 20x 20x    8\" 20x ea    Step lunge without bosu  20x  20x     8\" 20x ea                                                NEUROMUSCULAR REEDUCATION            Biodex  15 min  15 min    15 min  15 min      Star tapping  20x ea     20x ea  20x ea  20x ea    Ambulation with cues for step length  Tandem walk 6x20 ft    5 laps      Static step to foam  10x2         Stair climbing    6 flights                                                                                                           THERAPEUTIC ACTIVITY           Stair climbing   10 flights  10 flights    10 flights     Floor to stand transfer    10x                             GAIT TRAINING                                                       MODALITIES                                                                        "

## 2025-04-10 RX ORDER — BUPROPION HYDROCHLORIDE 150 MG/1
150 TABLET ORAL DAILY
Qty: 90 TABLET | Refills: 0 | Status: SHIPPED | OUTPATIENT
Start: 2025-04-10

## 2025-04-11 ENCOUNTER — OFFICE VISIT (OUTPATIENT)
Dept: PHYSICAL THERAPY | Facility: CLINIC | Age: 70
End: 2025-04-11
Payer: MEDICARE

## 2025-04-11 DIAGNOSIS — M48.061 SPINAL STENOSIS OF LUMBAR REGION WITHOUT NEUROGENIC CLAUDICATION: Primary | ICD-10-CM

## 2025-04-11 PROCEDURE — 97110 THERAPEUTIC EXERCISES: CPT | Performed by: PHYSICAL THERAPIST

## 2025-04-11 PROCEDURE — 97112 NEUROMUSCULAR REEDUCATION: CPT | Performed by: PHYSICAL THERAPIST

## 2025-04-11 PROCEDURE — 97530 THERAPEUTIC ACTIVITIES: CPT | Performed by: PHYSICAL THERAPIST

## 2025-04-11 NOTE — PROGRESS NOTES
Daily Note     Today's date: 2025  Patient name: Benji Morrow  : 1955  MRN: [id number]  Referring provider: Azucena Brennan, *  Dx:   Encounter Diagnosis     ICD-10-CM    1. Spinal stenosis of lumbar region without neurogenic claudication  M48.061                      Subjective: Feeling okay- still a little unsteady. Continues to have difficulty with performance of going down stairs.        Objective: See treatment diary below      Assessment: With biodex- reviewed performance of posterior wt shift and lateral wt shift with biofeedback.  From there able to wt shift with posterior lean and lateral shift then  foot to march.  Was able to progress with motor learning to toe taps on step then progress to step ups on step. This led to improved ability to perform stairs with less difficulty.     Plan: Continue per plan of care.      Daily Treatment Diary     Precautions: S/p prior C3-5 PCDF in May 2021   S/p prior L L3-S1 metrx decompressive hemilaminectomy w/ antony foraminotomies and discectomy in 2022      Imaging:   XR lumbar spine 2024: New moderate T12 compression deformity.  Stable L1 and L5 compression deformity.  Imaging compared to CT scan from 2022.  CO-MORBIDITIES: Falls risk, prior surgery  HEP ACCESS CODE: Access Code: [email or web address]  URL: [email or web address]  Date: 2025  Prepared by: Andrea Guadalupe    Exercises  - Seated Sciatic Tensioner  - 1 x daily - 7 x weekly - 3 sets - 10 reps  FOTO Completed On: 2/3/25    POC Expires Reeval for Medicare to be completed  Unit Limit Auth Expiration Date PT/OT/STVisit Limit   25 By visit 10 NA 25 BOMN    Completed on visit                    Auth Status DATE 3/31 4/2 4/9 4/11 3/17 3/24 3/26   NA Visit # 14 15 16 17 11 12 13    Remaining BOMN         MANUAL THERAPY          Lumbar CPA with neural tensioning in [place]   10 min to T/S       Re-assess                                                            "  THERAPEUTIC EXERCISE HEP          Seated slump 10x ea     10x 10x 10 x     SL leg press with HR  NV 35# 10x ea    225 B/L 30x # 20x ea  305# B/L 20x 205# SL 20x ea  SL leg press 45#    B/L leg press   205# SL 30x B/L 305# 30x 305# 20x 305# 30x    305# 30x   BFR PF      75 reps GTB 75 reps GTB    Step up  8\" 30x 8\" 30x with 10# weight 8\" 30x ea  8\" 30x   8'' 30x   TB PF            CC pull backs  44# 20x ea  66# 20x ea  66# 20x ea    22# 30x AP and PA    Static step lunge weight shift   20x ea 6\" 20x ea 6\" 8\" 30x ea       Cone taps   20x 20x 20x     30x   Step lunge with bosu  20x 20x 20x    8\" 20x ea    Step lunge without bosu  20x  20x     8\" 20x ea                                                NEUROMUSCULAR REEDUCATION            Biodex  15 min  15 min    15 min  15 min      Star tapping  20x ea     20x ea  20x ea  20x ea    Ambulation with cues for step length  Tandem walk 6x20 ft    5 laps      Static step to foam  10x2         Stair climbing    6 flights  8 fligths                                                                                                          THERAPEUTIC ACTIVITY           Stair climbing   10 flights  10 flights    10 flights     Floor to stand transfer    10x       TM warm up     10 min                  GAIT TRAINING                                                       MODALITIES                                                                          "

## 2025-04-15 ENCOUNTER — EVALUATION (OUTPATIENT)
Dept: PHYSICAL THERAPY | Facility: CLINIC | Age: 70
End: 2025-04-15
Payer: MEDICARE

## 2025-04-15 DIAGNOSIS — M48.061 SPINAL STENOSIS OF LUMBAR REGION WITHOUT NEUROGENIC CLAUDICATION: Primary | ICD-10-CM

## 2025-04-15 PROCEDURE — 97140 MANUAL THERAPY 1/> REGIONS: CPT | Performed by: PHYSICAL THERAPIST

## 2025-04-15 PROCEDURE — 97112 NEUROMUSCULAR REEDUCATION: CPT | Performed by: PHYSICAL THERAPIST

## 2025-04-15 NOTE — PROGRESS NOTES
PT Re-Evaluation     Today's date: 4/15/2025  Patient name: Benji Morrow  : 1955  MRN: [id number]  Referring provider: Azucena Brennan, *  Dx:   Encounter Diagnosis     ICD-10-CM    1. Spinal stenosis of lumbar region without neurogenic claudication  M48.061                      Assessment  Impairments: abnormal coordination, abnormal gait, abnormal muscle firing, abnormal muscle tone, abnormal or restricted ROM, abnormal movement, activity intolerance, impaired balance, impaired physical strength, pain with function, safety issue and poor posture   Symptom irritability: moderate    Assessment details: Problem List:  1) Limited myotome strength at L4-S1  2) Lumbar balance based on HECTOR screen 32/58    Benji Morrow is a pleasant 69 y.o. 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  Clinically Benji demonstrates weakness at L4-S1 myotomes, limited balance with 3258 HECTOR testing showing high falls risk despite normal TUG and 5 time sit to stand scores suggesting the need for skilled PT to address balance deficits and improved neural health with nerve glides.    No further referral appears necessary at this time based upon examination results.  I expect he will benefit from at least 8 weeks of PT to address mobility and falls risk.        Comparable signs:  1) Unable to perform stairs without UE support  2) Unable to perform heel raise    Re-assessment 3/12/25:  Benji has attended 11 visits since the initiation of PT and reports a 20% GROC.  Clinically demonstrates improved funcitonal outcome measures with improved TUG and 5 time sit to stand testing as well as improved HECTOR score now 36/56.  His ambulation is improved with less knee buckling noted with stance on R  LE.  Overall continues to require skilled PT due to poor control with functional stair climbing and path deviations despite improved posture control in order to reduce falls risk.       Re-assessment 4/15/25:  Benji has attended 19 visits since the initiation of PT and reports a 45% GROC.  Clinically demonstrates improvements in 5 time sit to stand and TUG as well as great improvement in HECTOR balance score to now 43/56 suggesting improved control with ADLs and exercise. This suggests that he still is at risk for falls but will benefit from continued PT to address his remaining balance deficits and maximize function.   Understanding of Dx/Px/POC: good     Prognosis: fair    Goals  Short Term Goals (4 weeks)  1.) Establish independence with HEP- partailly met  2.) Decrease subjective pain levels from NPRS at least to 2-5/10 at rest and with activity- partailly met  3.) Improve lumbar ROM at least 5-10 degrees into all planes to allow for improved ease of movement with less guarding- not met    Long Term Goals (8 weeks)  1.) Improve lumbar ROM to WNL in all planes to restore normal movement with ADLs and function- not met  2.) Improve B/L ankle DF and PF strength to 5/5 in all planes in order to return to pain-free ADLs and function- partailly met  3.) Improve FOTO score at least to 75 points showing improved self reported disability - not met       Plan  Patient would benefit from: PT eval and skilled physical therapy  Planned modality interventions: biofeedback, cryotherapy, electrical stimulation/Russian stimulation and TENS    Planned therapy interventions: abdominal trunk stabilization, activity modification, ADL retraining, ADL training, balance, balance/weight bearing training, behavior modification, body mechanics training, coordination, compression, flexibility, functional ROM exercises, gait training, graded activity, graded exercise, graded motor, home exercise program, IADL retraining, transfer training,  therapeutic training, therapeutic exercise, therapeutic activities, stretching, strengthening, sensory integrative techniques, self care, postural training, patient/caregiver education, neuromuscular re-education, nerve gliding, muscle pump exercises, motor coordination training, massage, manual therapy, IASTM and joint mobilization  Speech planned therapy intervention: NMES    Frequency: 2x week  Duration in weeks: 8  Plan of Care beginning date: 4/15/2025  Plan of Care expiration date: 6/17/2025  Treatment plan discussed with: patient  Plan details: Continue POC for stability; monitor sxs and progress as able         Subjective Evaluation    History of Present Illness  Date of onset: 7/1/2024  Mechanism of injury: Benji Morrow is a 69 y.o. male who presents with increased chronic back pain with R LE NT sxs starting ~ in July months ago with [facility] because he was having some pain- was provided an x-ray after going to the ER. Had a + T12 compression fracture and recent MRI on 1/29/25 + for L4-S1 R sided foraminal stenosis.  Has been active around the house and riding a bike daily.  Wishes to improve stability with getting out of the chair, improved balance.   Decided to seek out MD consult where X-rays were (-) for fx and script for OPPT provided.  Denies night pain or changes in bowel or bladder function.  Reports having R sided NT signs or sxs but has chronic neuropathy.  F/U with MD in 2 weeks.  Wishes to return to PLOF pain-free.     Re-assessment 3/12/25:  Benji has attended 11 visits since the initiation of PT and reports a 20% GROC.  Reports improved balance and control with walking and improved overall strength with getting moving again.  Not as much low back pain with activity despite continued balance deficits.  Benji wishes to continue with PT.     Re-assessment 4/15/25:  Benji has attended 19 visits since the initiation of PT and reports 45% GROC.  Notes improved ability to walk in a straight line better  without path deviation as much. Can carry heavier objects.  Can lift from the ground and  to 40 lbs from the ground.              Recurrent probem    Quality of life: fair    Patient Goals  Patient goals for therapy: decreased pain, decreased edema, improved balance, increased motion, increased strength, independence with ADLs/IADLs and return to sport/leisure activities  Patient goal: Improve balance and strength  Pain  Location: L/S and LEs  Quality: sharp, tight and dull ache  Relieving factors: relaxation and rest  Aggravating factors: sitting    Social Support  Steps to enter house: yes  8  Stairs in house: yes   13  Lives in: multiple-level home  Lives with: spouse    Employment status: not working  Exercise history: Daily  Life stress: High      Diagnostic Tests  MRI studies: abnormal  Treatments  Previous treatment: physical therapy  Current treatment: physical therapy        Objective     Neurological Testing     Sensation     Lumbar   Left   Intact: light touch    Right   Intact: light touch    Reflexes   Left   Patellar (L4): absent (0)  Achilles (S1): absent (0)  Babinski sign: negative  Clonus sign: negative    Right   Patellar (L4): absent (0)  Achilles (S1): absent (0)  Babinski sign: negative  Clonus sign: negative    Active Range of Motion     Lumbar   Flexion:  Restriction level: moderate  Extension:  Restriction level: moderate  Left lateral flexion:  Restriction level: moderate  Right lateral flexion:  Restriction level: moderate  Left rotation:  Restriction level: moderate  Right rotation:  Restriction level: moderate    Strength/Myotome Testing     Lumbar   Left   Heel walk: abnormal  Toe walk: abnormal    Right   Heel walk: abnormal  Toe walk: abnormal    Left Hip   Planes of Motion   Flexion: 5  Adduction: 5  External rotation: 5  Internal rotation: 5    Right Hip   Planes of Motion   Flexion: 5  Adduction: 5  External rotation: 5  Internal rotation: 5    Left Knee   Flexion:  "4+  Extension: 5    Right Knee   Flexion: 4+  Extension: 5    Left Ankle/Foot   Dorsiflexion: 4+  Plantar flexion: 4  Great toe extension: 0    Right Ankle/Foot   Dorsiflexion: 4+  Plantar flexion: 4  Great toe extension: 0    Tests     Lumbar     Left   Negative passive SLR and slump test.     Right   Negative passive SLR and slump test.     Functional Assessment        Single Leg Stance   Left: 3 seconds  Right: 2 seconds  Neuro Exam:     Functional outcomes   5x sit to stand: 13.61 no hands (seconds)  TU.7 no  AD (seconds)            Daily Treatment Diary     Precautions: S/p prior C3-5 PCDF in May 2021   S/p prior L L3-S1 metrx decompressive hemilaminectomy w/ antony foraminotomies and discectomy in 2022      Imaging:   XR lumbar spine 2024: New moderate T12 compression deformity.  Stable L1 and L5 compression deformity.  Imaging compared to CT scan from 2022.  CO-MORBIDITIES: Falls risk, prior surgery  HEP ACCESS CODE: Access Code: [email or web address]  URL: [email or web address]  Date: 2025  Prepared by: Andrea Guadalupe    Exercises  - Seated Sciatic Tensioner  - 1 x daily - 7 x weekly - 3 sets - 10 reps  FOTO Completed On: 2/3/25    POC Expires Reeval for Medicare to be completed  Unit Limit Auth Expiration Date PT/OT/STVisit Limit   25 By visit 10 NA 25 BOMN    Completed on visit                    Auth Status DATE 3/31 4/2 4/9 4/11 4/14 3/24 3/26   NA Visit # 14 15 16 17 18 12 13    Remaining BOMN         MANUAL THERAPY          Lumbar CPA with neural tensioning in [place]   10 min to T/S       Re-assess      30 min                                                        THERAPEUTIC EXERCISE HEP          Seated slump 10x ea     10x  10 x     SL leg press with HR  NV 35# 10x ea     305# B/L 20x 205# SL 20x ea  SL leg press 45#    B/L leg press   205# SL 30x B/L 305# 30x 305# 20x 305# 30x    305# 30x   BFR PF       75 reps GTB    Step up  8\" 30x 8\" 30x with 10# weight 8\" 30x ea  " "8\" 30x 8\" 30x  8'' 30x   TB PF            CC pull backs  44# 20x ea  66# 20x ea  66# 20x ea    22# 30x AP and PA    Static step lunge weight shift   20x ea 6\" 20x ea 6\" 8\" 30x ea       Cone taps   20x 20x 20x     30x   Step lunge with bosu  20x 20x 20x    8\" 20x ea    Step lunge without bosu  20x  20x     8\" 20x ea                                                NEUROMUSCULAR REEDUCATION            Biodex  15 min  15 min    15 min  15 min      Star tapping  20x ea     20x ea  20x ea  20x ea    Ambulation with cues for step length  Tandem walk 6x20 ft    5 laps      Static step to foam  10x2         Stair climbing    6 flights  8 fligths                                                                                                          THERAPEUTIC ACTIVITY           Stair climbing   10 flights  10 flights    10 flights     Floor to stand transfer    10x       TM warm up     10 min                  GAIT TRAINING                                                       MODALITIES                                                                          "

## 2025-04-18 ENCOUNTER — OFFICE VISIT (OUTPATIENT)
Dept: PHYSICAL THERAPY | Facility: CLINIC | Age: 70
End: 2025-04-18
Attending: PHYSICIAN ASSISTANT
Payer: MEDICARE

## 2025-04-18 DIAGNOSIS — M48.061 SPINAL STENOSIS OF LUMBAR REGION WITHOUT NEUROGENIC CLAUDICATION: Primary | ICD-10-CM

## 2025-04-18 PROCEDURE — 97110 THERAPEUTIC EXERCISES: CPT

## 2025-04-18 PROCEDURE — 97112 NEUROMUSCULAR REEDUCATION: CPT

## 2025-04-18 NOTE — PROGRESS NOTES
Daily Note     Today's date: 2025  Patient name: Benji Morrow  : 1955  MRN: [id number]  Referring provider: Azucena Brennan, *  Dx:   Encounter Diagnosis     ICD-10-CM    1. Spinal stenosis of lumbar region without neurogenic claudication  M48.061           Start Time: 0730  Stop Time: 08  Total time in clinic (min): 43 minutes    Subjective: Presents to therapy noting continued difficulty with the increased amount of steps at his house. States he must use both railings and go up one step at a time.      Objective: See treatment diary below      Assessment: Patient demonstrated fair tolerance to therapeutic interventions, focusing on stability and stair navigation. Guard belt utilized while performing step ups due to slight LOB when ascending/descending step, L > R. Poor carryover with verbal cueing to use handrails for stability. Frequent bilateral knee buckling demonstrated throughout as muscular fatigue increased in lower extremities. Emphasized importance of seated rest breaks. Patient would benefit from continued PT      Plan: Continue per plan of care.      Daily Treatment Diary     Precautions: S/p prior C3-5 PCDF in May 2021   S/p prior L L3-S1 metrx decompressive hemilaminectomy w/ antony foraminotomies and discectomy in 2022      Imaging:   XR lumbar spine 2024: New moderate T12 compression deformity.  Stable L1 and L5 compression deformity.  Imaging compared to CT scan from 2022.  CO-MORBIDITIES: Falls risk, prior surgery  HEP ACCESS CODE: Access Code: [email or web address]  URL: [email or web address]  Date: 2025  Prepared by: Andrea Guadalupe    Exercises  - Seated Sciatic Tensioner  - 1 x daily - 7 x weekly - 3 sets - 10 reps  FOTO Completed On: 2/3/25    POC Expires Reeval for Medicare to be completed  Unit Limit Auth Expiration Date PT/OT/STVisit Limit   25 By visit 10 NA 25 BOMN    Completed on visit                    Auth Status DATE 3/31 4/2 4/9 4/11 4/14  "4/18 3/26   NA Visit # 14 15 16 17 18 19 13    Remaining BOMN         MANUAL THERAPY          Lumbar CPA with neural tensioning in [place]   10 min to T/S       Re-assess      30 min                                                        THERAPEUTIC EXERCISE HEP          Seated slump 10x ea     10x      SL leg press with HR  NV 35# 10x ea      SL leg press 45#    B/L leg press   205# SL 30x B/L 305# 30x 305# 20x 305# 30x    305# 30x   BFR PF           Step up  8\" 30x 8\" 30x with 10# weight 8\" 30x ea  8\" 30x  8\" x10 R, x10 L 8'' 30x   TB PF            CC pull backs  44# 20x ea  66# 20x ea  66# 20x ea        Static step lunge weight shift   20x ea 6\" 20x ea 6\" 8\" 30x ea  8'' x 20 ea     Cone taps   20x 20x 20x     30x   Step lunge with bosu  20x 20x 20x    8\" 20x ea    Step lunge without bosu  20x  20x     8\" 20x ea                                                NEUROMUSCULAR REEDUCATION            Biodex  15 min  15 min    15 min       Star tapping  20x ea     20x ea  1 cone 2 x 10  20x ea    Ambulation with cues for step length  Tandem walk 6x20 ft    5 laps  3 laps     Static step to foam  10x2         Stair climbing    6 flights  8 fligths                                                                                                          THERAPEUTIC ACTIVITY           Stair climbing   10 flights  10 flights        Floor to stand transfer    10x       TM warm up     10 min  5'                GAIT TRAINING                                                       MODALITIES                                                                               "

## 2025-04-22 ENCOUNTER — OFFICE VISIT (OUTPATIENT)
Dept: FAMILY MEDICINE CLINIC | Facility: HOSPITAL | Age: 70
End: 2025-04-22
Payer: MEDICARE

## 2025-04-22 ENCOUNTER — OFFICE VISIT (OUTPATIENT)
Dept: PHYSICAL THERAPY | Facility: CLINIC | Age: 70
End: 2025-04-22
Payer: MEDICARE

## 2025-04-22 VITALS
SYSTOLIC BLOOD PRESSURE: 112 MMHG | HEIGHT: 71 IN | HEART RATE: 70 BPM | WEIGHT: 259 LBS | BODY MASS INDEX: 36.26 KG/M2 | OXYGEN SATURATION: 97 % | DIASTOLIC BLOOD PRESSURE: 68 MMHG

## 2025-04-22 DIAGNOSIS — N40.0 ENLARGED PROSTATE: Primary | Chronic | ICD-10-CM

## 2025-04-22 DIAGNOSIS — E78.00 HYPERCHOLESTEROLEMIA: ICD-10-CM

## 2025-04-22 DIAGNOSIS — R26.9 UNSPECIFIED ABNORMALITIES OF GAIT AND MOBILITY: ICD-10-CM

## 2025-04-22 DIAGNOSIS — M48.061 SPINAL STENOSIS OF LUMBAR REGION WITHOUT NEUROGENIC CLAUDICATION: Primary | ICD-10-CM

## 2025-04-22 DIAGNOSIS — F32.9 MAJOR DEPRESSION, CHRONIC: ICD-10-CM

## 2025-04-22 DIAGNOSIS — Z12.5 SCREENING FOR PROSTATE CANCER: ICD-10-CM

## 2025-04-22 PROBLEM — M54.50 ACUTE MIDLINE LOW BACK PAIN WITHOUT SCIATICA: Status: RESOLVED | Noted: 2024-07-12 | Resolved: 2025-04-22

## 2025-04-22 PROCEDURE — 97112 NEUROMUSCULAR REEDUCATION: CPT | Performed by: PHYSICAL THERAPIST

## 2025-04-22 PROCEDURE — G2211 COMPLEX E/M VISIT ADD ON: HCPCS | Performed by: NURSE PRACTITIONER

## 2025-04-22 PROCEDURE — 97110 THERAPEUTIC EXERCISES: CPT | Performed by: PHYSICAL THERAPIST

## 2025-04-22 PROCEDURE — 99214 OFFICE O/P EST MOD 30 MIN: CPT | Performed by: NURSE PRACTITIONER

## 2025-04-22 NOTE — ASSESSMENT & PLAN NOTE
ASCVD risk of 12.8% managing w/lifestyle efforts   Update labs before next OV in Oct     Orders:    CBC and differential; Future    Comprehensive metabolic panel; Future    TSH, 3rd generation with Free T4 reflex; Future    Lipid Panel with Direct LDL reflex; Future

## 2025-04-22 NOTE — ASSESSMENT & PLAN NOTE
Mood stable w/PHQ score of 4 on daily bupropion  Continue same dose as rx'd  Return in 6 months

## 2025-04-22 NOTE — ASSESSMENT & PLAN NOTE
Urology consult ordered as he requests to establish with local urologist    Orders:    Ambulatory Referral to Urology; Future

## 2025-04-22 NOTE — PROGRESS NOTES
"Name: Benji Morrow      : 1955      MRN: [id number]  Encounter Provider: FADUMO Patel  Encounter Date: 2025   Encounter department: [facility] [address] [address]   :  Assessment & Plan  Major depression, chronic  Mood stable w/PHQ score of 4 on daily bupropion  Continue same dose as rx'd  Return in 6 months       Hypercholesterolemia  ASCVD risk of 12.8% managing w/lifestyle efforts   Update labs before next OV in Oct     Orders:    CBC and differential; Future    Comprehensive metabolic panel; Future    TSH, 3rd generation with Free T4 reflex; Future    Lipid Panel with Direct LDL reflex; Future    Unspecified abnormalities of gait and mobility  Chronic given extensive spinal degeneration/stenosis  Continue PT as recommended       Enlarged prostate  Urology consult ordered as he requests to establish with local urologist    Orders:    Ambulatory Referral to Urology; Future    Screening for prostate cancer    Orders:    PSA, Total Screen; Future          Depression Screening and Follow-up Plan: Patient was screened for depression during today's encounter. They screened negative with a PHQ-9 score of 4.        History of Present Illness       Has been well since prolonged illness in January. Did not tolerate augmentin well with severe diarrhea. He stopped few days into taking and symptoms eventually improved.    Requests to see local urologist for known enlarged prostate.         Review of Systems   Constitutional: Negative.    Respiratory: Negative.     Cardiovascular:  Positive for leg swelling (chronic, no worse than usual).   Musculoskeletal:  Positive for gait problem (due to leg pain and weakness, uses cane).   Neurological:  Positive for weakness (\"legs are bothering me\", doing PT [facility]   Psychiatric/Behavioral:  Negative for dysphoric mood.          Objective   /68 (BP Location: Left arm, Patient Position: Sitting)   Pulse 70   Ht 5' " "11\" (1.803 m)   Wt 117 kg (259 lb)   SpO2 97%   BMI 36.12 kg/m²          Physical Exam  Vitals reviewed.   Constitutional:       General: He is not in acute distress.     Appearance: Normal appearance. He is obese.   HENT:      Head: Normocephalic.   Eyes:      General: No scleral icterus.  Neck:      Vascular: No carotid bruit.   Cardiovascular:      Rate and Rhythm: Normal rate and regular rhythm.   Pulmonary:      Effort: Pulmonary effort is normal. No respiratory distress.      Breath sounds: Normal breath sounds.   Musculoskeletal:      Cervical back: Normal range of motion.      Right lower leg: Edema (+1 pitting) present.      Left lower leg: Edema (+1 pitting) present.   Skin:     General: Skin is warm and dry.   Neurological:      Mental Status: He is alert.      Motor: Weakness (chronic BLE) present.      Gait: Gait abnormal (slow, steady w/cane).   Psychiatric:         Mood and Affect: Mood normal.         Behavior: Behavior normal.         Administrative Statements   I have spent a total time of 20 minutes in caring for this patient on the day of the visit/encounter including Diagnostic results, Instructions for management, Patient and family education, Impressions, Counseling / Coordination of care, Documenting in the medical record, Reviewing/placing orders in the medical record (including tests, medications, and/or procedures), and Obtaining or reviewing history  .  "

## 2025-04-22 NOTE — PROGRESS NOTES
Daily Note     Today's date: 2025  Patient name: Benji Morrow  : 1955  MRN: [id number]  Referring provider: Azucena Brennan, *  Dx:   Encounter Diagnosis     ICD-10-CM    1. Spinal stenosis of lumbar region without neurogenic claudication  M48.061                      Subjective: Feeling okay overall, continues to have difficulty with performance of cone tapping.        Objective: See treatment diary below      Assessment: continued + response iwht balance progression and posterior weight shifting with lateral side shifting.  Able to wt shift and march with improved ability.  Continues to have limited control with step up performance.  Will progress with floor to stand transfers.     Plan: Continue per plan of care.      Daily Treatment Diary     Precautions: S/p prior C3-5 PCDF in May 2021   S/p prior L L3-S1 metrx decompressive hemilaminectomy w/ antony foraminotomies and discectomy in 2022      Imaging:   XR lumbar spine 2024: New moderate T12 compression deformity.  Stable L1 and L5 compression deformity.  Imaging compared to CT scan from 2022.  CO-MORBIDITIES: Falls risk, prior surgery  HEP ACCESS CODE: Access Code: [email or web address]  URL: [email or web address]  Date: 2025  Prepared by: Andrea Guadalupe    Exercises  - Seated Sciatic Tensioner  - 1 x daily - 7 x weekly - 3 sets - 10 reps  FOTO Completed On: 2/3/25    POC Expires Reeval for Medicare to be completed  Unit Limit Auth Expiration Date PT/OT/STVisit Limit   25 By visit 10 NA 25 BOMN    Completed on visit                    Auth Status DATE 3/31 4/2 4/9 4/11 4/14 4/18 4/22   NA Visit # 14 15 16 17 18 19 20    Remaining BOMN         MANUAL THERAPY          Lumbar CPA with neural tensioning in [place]   10 min to T/S       Re-assess      30 min                                                        THERAPEUTIC EXERCISE HEP          Seated slump 10x ea     10x      SL leg press with HR  NV 35# 10x ea        "  B/L leg press   205# SL 30x B/L 305# 30x 305# 20x 305# 30x    305# 30x   BFR PF           Step up  8\" 30x 8\" 30x with 10# weight 8\" 30x ea  8\" 30x  8\" x10 R, x10 L 8'' 30x   TB PF            CC pull backs  44# 20x ea  66# 20x ea  66# 20x ea     66# 30x   Static step lunge weight shift   20x ea 6\" 20x ea 6\" 8\" 30x ea  8'' x 20 ea  20x    Cone taps   20x 20x 20x     30x   Step lunge with bosu  20x 20x 20x    8\" 20x ea    Step lunge without bosu  20x  20x     8\" 20x ea                                                NEUROMUSCULAR REEDUCATION            Biodex  15 min  15 min    15 min    15 min    Star tapping  20x ea     20x ea  1 cone 2 x 10     Ambulation with cues for step length  Tandem walk 6x20 ft    5 laps  3 laps     Static step to foam  10x2         Stair climbing    6 flights  8 fligths                                                                                                          THERAPEUTIC ACTIVITY           Stair climbing   10 flights  10 flights        Floor to stand transfer    10x       TM warm up     10 min  5'                GAIT TRAINING                                                       MODALITIES                                                                                 "

## 2025-04-25 ENCOUNTER — OFFICE VISIT (OUTPATIENT)
Dept: PHYSICAL THERAPY | Facility: CLINIC | Age: 70
End: 2025-04-25
Payer: MEDICARE

## 2025-04-25 DIAGNOSIS — M48.061 SPINAL STENOSIS OF LUMBAR REGION WITHOUT NEUROGENIC CLAUDICATION: Primary | ICD-10-CM

## 2025-04-25 PROCEDURE — 97110 THERAPEUTIC EXERCISES: CPT | Performed by: PHYSICAL THERAPIST

## 2025-04-25 PROCEDURE — 97112 NEUROMUSCULAR REEDUCATION: CPT | Performed by: PHYSICAL THERAPIST

## 2025-04-25 NOTE — PROGRESS NOTES
Daily Note     Today's date: 2025  Patient name: Benji Morrow  : 1955  MRN: [id number]  Referring provider: Azucena Brennan, *  Dx:   Encounter Diagnosis     ICD-10-CM    1. Spinal stenosis of lumbar region without neurogenic claudication  M48.061                      Subjective: Reports feeling pretty good today. Going to initiate a gym routine.       Objective: See treatment diary below      Assessment: Continues to have improved control with posterior wt shift and marching as well as addition of lunges.  Stability with much improved with step up and step down. Able to tolerate bosu step lunge.  Will re-asses next session.     Plan: Continue per plan of care.      Daily Treatment Diary     Precautions: S/p prior C3-5 PCDF in May 2021   S/p prior L L3-S1 metrx decompressive hemilaminectomy w/ antony foraminotomies and discectomy in 2022      Imaging:   XR lumbar spine 2024: New moderate T12 compression deformity.  Stable L1 and L5 compression deformity.  Imaging compared to CT scan from 2022.  CO-MORBIDITIES: Falls risk, prior surgery  HEP ACCESS CODE: Access Code: [email or web address]  URL: [email or web address]  Date: 2025  Prepared by: Andrea Guadalupe    Exercises  - Seated Sciatic Tensioner  - 1 x daily - 7 x weekly - 3 sets - 10 reps  FOTO Completed On: 2/3/25    POC Expires Reeval for Medicare to be completed  Unit Limit Auth Expiration Date PT/OT/STVisit Limit   25 By visit 10 NA 25 BOMN    Completed on visit                    Auth Status DATE    NA Visit # 22 15 16 17 18 19 20    Remaining BOMN         MANUAL THERAPY          Lumbar CPA with neural tensioning in [place]   10 min to T/S       Re-assess      30 min                                                        THERAPEUTIC EXERCISE HEP          Seated slump 10x ea     10x      SL leg press with HR  NV 35# 10x ea         B/L leg press   205# SL 30x B/L 305# 30x 305# 20x 305#  "30x    305# 30x   BFR PF           Step up  8\" 30x 8\" 30x with 10# weight 8\" 30x ea  8\" 30x  8\" x10 R, x10 L 8'' 30x   TB PF            CC pull backs  44# 20x ea  66# 20x ea  66# 20x ea     66# 30x   Static step lunge weight shift   20x ea 6\" 20x ea 6\" 8\" 30x ea  8'' x 20 ea  20x    Cone taps   20x 20x 20x     30x   Step lunge with bosu  20x 20x 20x    8\" 20x ea    Step lunge without bosu  20x  20x     8\" 20x ea                                                NEUROMUSCULAR REEDUCATION            Biodex  15 min  15 min    15 min    15 min    Star tapping  20x ea     20x ea  1 cone 2 x 10     Ambulation with cues for step length  Tandem walk 6x20 ft    5 laps  3 laps     Static step to foam  10x2         Stair climbing    6 flights  8 fligths                                                                                                          THERAPEUTIC ACTIVITY           Stair climbing   10 flights  10 flights        Floor to stand transfer    10x       TM warm up     10 min  5'                GAIT TRAINING                                                       MODALITIES                                                                                   "

## 2025-05-07 ENCOUNTER — EVALUATION (OUTPATIENT)
Dept: PHYSICAL THERAPY | Facility: CLINIC | Age: 70
End: 2025-05-07
Attending: PHYSICIAN ASSISTANT
Payer: MEDICARE

## 2025-05-07 DIAGNOSIS — M48.061 SPINAL STENOSIS OF LUMBAR REGION WITHOUT NEUROGENIC CLAUDICATION: Primary | ICD-10-CM

## 2025-05-07 PROCEDURE — 97110 THERAPEUTIC EXERCISES: CPT | Performed by: PHYSICAL THERAPIST

## 2025-05-07 PROCEDURE — 97112 NEUROMUSCULAR REEDUCATION: CPT | Performed by: PHYSICAL THERAPIST

## 2025-05-07 NOTE — PROGRESS NOTES
Daily Note / Discharge     Today's date: 2025  Patient name: Benji Morrow  : 1955  MRN: [id number]  Referring provider: Azucena Brennan, *  Dx:   Encounter Diagnosis     ICD-10-CM    1. Spinal stenosis of lumbar region without neurogenic claudication  M48.061                      Subjective: Reports that he was able to return to the gym and initiate exercise routine.  At this point feels able to continue independently from PT.      Objective: See treatment diary below      Assessment:  Initiated session today with discussion of gym routine - reviewed exercise machines and how to progress with weight and repetitions.  Noting fair control with biodex exercise today for balance keeping weight posterior.  No difficulty with toe tapping.  Able to perform lifting from floor to waist of 30 lbs without LOB and fair endurance.  Pt eager to build more strength at his gym for power.  Feels ready to continue independently.    Plan: D/C     Daily Treatment Diary     Precautions: S/p prior C3-5 PCDF in May 2021   S/p prior L L3-S1 metrx decompressive hemilaminectomy w/ antony foraminotomies and discectomy in 2022      Imaging:   XR lumbar spine 2024: New moderate T12 compression deformity.  Stable L1 and L5 compression deformity.  Imaging compared to CT scan from 2022.  CO-MORBIDITIES: Falls risk, prior surgery  HEP ACCESS CODE: Access Code: [email or web address]  URL: [email or web address]  Date: 2025  Prepared by: Andrea Guadalupe    Exercises  - Seated Sciatic Tensioner  - 1 x daily - 7 x weekly - 3 sets - 10 reps  FOTO Completed On: 2/3/25    POC Expires Reeval for Medicare to be completed  Unit Limit Auth Expiration Date PT/OT/STVisit Limit   25 By visit 10 NA 25 BOMN    Completed on visit                    Auth Status DATE    NA Visit # 22 23 16 17 18 19 20    Remaining BOMN         MANUAL THERAPY          Lumbar CPA with neural tensioning in [place]  "  10 min to T/S       Re-assess      30 min                                                        THERAPEUTIC EXERCISE HEP          Seated slump 10x ea     10x      SL leg press with HR  NV          B/L leg press     305# 30x    305# 30x   BFR PF           Step up  8\" 30x 8\" 30x with 10# weight 8\" 30x ea  8\" 30x  8\" x10 R, x10 L 8'' 30x   TB PF            CC pull backs  44# 20x ea  66# 20x ea  66# 20x ea     66# 30x   Static step lunge weight shift   20x ea 6\" 20x ea 6\" 8\" 30x ea  8'' x 20 ea  20x    Cone taps   20x 20x 20x     30x   Step lunge with bosu  20x 20x 20x    8\" 20x ea    Step lunge without bosu  20x  20x     8\" 20x ea                                                NEUROMUSCULAR REEDUCATION            Biodex  15 min  15 min    15 min    15 min    Star tapping  20x ea     20x ea  1 cone 2 x 10     Ambulation with cues for step length  Tandem walk 6x20 ft    5 laps  3 laps     Static step to foam  10x2         Stair climbing    6 flights  8 fligths                                                                                                          THERAPEUTIC ACTIVITY           Stair climbing   10 flights  10 flights        Floor to stand transfer    10x       TM warm up     10 min  5'                GAIT TRAINING                                                       MODALITIES                                                                                     "

## (undated) DEVICE — INTENDED FOR TISSUE SEPARATION, AND OTHER PROCEDURES THAT REQUIRE A SHARP SURGICAL BLADE TO PUNCTURE OR CUT.: Brand: BARD-PARKER SAFETY BLADES SIZE 15, STERILE

## (undated) DEVICE — 3M™ TEGADERM™ TRANSPARENT FILM DRESSING FRAME STYLE, 1626W, 4 IN X 4-3/4 IN (10 CM X 12 CM), 50/CT 4CT/CASE: Brand: 3M™ TEGADERM™

## (undated) DEVICE — ENDOPATH XCEL BLUNT TIP TROCARS WITH SMOOTH SLEEVES: Brand: ENDOPATH XCEL

## (undated) DEVICE — GLOVE INDICATOR PI UNDERGLOVE SZ 7.5 BLUE

## (undated) DEVICE — INTENDED FOR TISSUE SEPARATION, AND OTHER PROCEDURES THAT REQUIRE A SHARP SURGICAL BLADE TO PUNCTURE OR CUT.: Brand: BARD-PARKER ® CARBON RIB-BACK BLADES

## (undated) DEVICE — CHLORAPREP HI-LITE 26ML ORANGE

## (undated) DEVICE — SUT VICRYL PLUS 3-0 RB-1 CR/8 18 IN VCP713D

## (undated) DEVICE — DRAPE EQUIPMENT RF WAND

## (undated) DEVICE — DRAPE SHEET X-LG

## (undated) DEVICE — SNAP KOVER: Brand: UNBRANDED

## (undated) DEVICE — GLOVE INDICATOR PI UNDERGLOVE SZ 8.5 BLUE

## (undated) DEVICE — BIPOLAR SEALER 23-113-1 AQM 2.3: Brand: AQUAMANTYS™

## (undated) DEVICE — BETADINE OINTMENT FOIL PACK

## (undated) DEVICE — GLOVE SRG BIOGEL 7

## (undated) DEVICE — DRILL BIT G3606010 2.4MM

## (undated) DEVICE — JACKSON-PRATT 100CC BULB RESERVOIR: Brand: CARDINAL HEALTH

## (undated) DEVICE — GLOVE INDICATOR PI UNDERGLOVE SZ 6.5 BLUE

## (undated) DEVICE — GLOVE INDICATOR PI UNDERGLOVE SZ 8 BLUE

## (undated) DEVICE — ENDOPATH 5MM CURVED SCISSORS WITH MONOPOLAR CAUTERY: Brand: ENDOPATH

## (undated) DEVICE — TOOL 9MH30 LEGEND 9CM 3MM MH: Brand: MIDAS REX

## (undated) DEVICE — PLASTIC ADHESIVE BANDAGE: Brand: CURITY

## (undated) DEVICE — NEURO PATTIES 1/2 X 3

## (undated) DEVICE — SUT MONOCRYL 4-0 PS-2 27 IN Y426H

## (undated) DEVICE — ENDOPATH XCEL BLADELESS TROCARS WITH STABILITY SLEEVES: Brand: ENDOPATH XCEL

## (undated) DEVICE — BETHLEHEM UNIVERSAL SPINE, KIT: Brand: CARDINAL HEALTH

## (undated) DEVICE — MAYO STAND COVER: Brand: CONVERTORS

## (undated) DEVICE — 2000CC GUARDIAN II: Brand: GUARDIAN

## (undated) DEVICE — VIAL DECANTER

## (undated) DEVICE — JP CHAN DRN SIL HUBLESS 15FR W/TRO: Brand: CARDINAL HEALTH

## (undated) DEVICE — DRAPE C-ARM X-RAY

## (undated) DEVICE — SUPPLY FEE STD

## (undated) DEVICE — ELECTRODE BLADE MOD E-Z CLEAN 2.5IN 6.4CM -0012M

## (undated) DEVICE — SPONGE LAP 18 X 18 IN STRL RFD

## (undated) DEVICE — HEMOSTATIC MATRIX SURGIFLO 8ML W/THROMBIN

## (undated) DEVICE — DRAPE SURGIKIT SADDLE BAG

## (undated) DEVICE — GLOVE SRG BIOGEL ECLIPSE 8

## (undated) DEVICE — ADHESIVE SKN CLSR HISTOACRYL FLEX 0.5ML LF

## (undated) DEVICE — GLOVE INDICATOR PI UNDERGLOVE SZ 7 BLUE

## (undated) DEVICE — ENDOPOUCH RETRIEVER SPECIMEN RETRIEVAL BAGS: Brand: ENDOPOUCH RETRIEVER

## (undated) DEVICE — PREP SURGICAL PURPREP 26ML

## (undated) DEVICE — 3M™ TEGADERM™ TRANSPARENT FILM DRESSING FRAME STYLE, 1624W, 2-3/8 IN X 2-3/4 IN (6 CM X 7 CM), 100/CT 4CT/CASE: Brand: 3M™ TEGADERM™

## (undated) DEVICE — MAYFIELD® DISPOSABLE ADULT SKULL PIN (PLASTIC BASE): Brand: MAYFIELD®

## (undated) DEVICE — DRAPE MICROSCOPE OPMI PENTERO

## (undated) DEVICE — SUT VICRYL 0 UR-6 27 IN J603H

## (undated) DEVICE — DISPOSABLE EQUIPMENT COVER: Brand: SMALL TOWEL DRAPE

## (undated) DEVICE — LIGAMAX 5 MM ENDOSCOPIC MULTIPLE CLIP APPLIER: Brand: LIGAMAX

## (undated) DEVICE — PAD GROUNDING ADULT

## (undated) DEVICE — TIBURON SPLIT SHEET: Brand: CONVERTORS

## (undated) DEVICE — LIGHT HANDLE COVER SLEEVE DISP BLUE STELLAR

## (undated) DEVICE — GLOVE SRG BIOGEL 8

## (undated) DEVICE — IRRIG ENDO FLO TUBING

## (undated) DEVICE — SYRINGE 30ML LL

## (undated) DEVICE — PDS II VLT 0 107CM AG ST3: Brand: ENDOLOOP

## (undated) DEVICE — SPONGE PVP SCRUB WING STERILE

## (undated) DEVICE — TOOL 15BA50 LEGEND 15CM 5MM BA: Brand: MIDAS REX™

## (undated) DEVICE — JP PERF DRN SIL FLT 7MM FULL: Brand: CARDINAL HEALTH

## (undated) DEVICE — SURGICAL CLIPPER BLADE GENERAL USE

## (undated) DEVICE — MONITORING SPINAL IMPULSE CASE FEE

## (undated) DEVICE — NEEDLE 18 G X 1 1/2

## (undated) DEVICE — SWABSTCK, BENZOIN TINCTURE, 1/PK, STRL: Brand: APLICARE

## (undated) DEVICE — PROXIMATE PLUS MD MULTI-DIRECTIONAL RELEASE SKIN STAPLERS CONTAINS 35 STAINLESS STEEL STAPLES APPROXIMATE CLOSED DIMENSIONS: 6.9MM X 3.9MM WIDE: Brand: PROXIMATE

## (undated) DEVICE — ALLENTOWN LAP CHOLE APP PACK: Brand: CARDINAL HEALTH

## (undated) DEVICE — ELECTRODE LAP L WIRE E-Z CLEAN 33CM -0100

## (undated) DEVICE — TRAY FOLEY 16FR URIMETER SURESTEP

## (undated) DEVICE — 3M™ STERI-STRIP™ REINFORCED ADHESIVE SKIN CLOSURES, R1547, 1/2 IN X 4 IN (12 MM X 100 MM), 6 STRIPS/ENVELOPE: Brand: 3M™ STERI-STRIP™

## (undated) DEVICE — ENDOPATH XCEL UNIVERSAL TROCAR STABLILITY SLEEVES: Brand: ENDOPATH XCEL

## (undated) DEVICE — OCCLUSIVE GAUZE STRIP,3% BISMUTH TRIBROMOPHENATE IN PETROLATUM BLEND: Brand: XEROFORM

## (undated) DEVICE — TAUT CATH INTRODUCER 4.5 FR

## (undated) DEVICE — STOPCOCK 4-WAY

## (undated) DEVICE — BETHLEHEM UNIVERSAL MINOR GEN: Brand: CARDINAL HEALTH

## (undated) DEVICE — BLUE HEAT SCOPE WARMER

## (undated) DEVICE — ELECTRODE BLADE MOD  E-Z CLEAN 6.5IN -0014M

## (undated) DEVICE — IV CATH 14 G SAFETY

## (undated) DEVICE — SUT VICRYL 3-0 SH 27 IN J416H

## (undated) DEVICE — DRESSING MEPILEX AG BORDER 4 X 8 IN

## (undated) DEVICE — TUBING SMOKE EVAC W/FILTRATION DEVICE PLUMEPORT ACTIV

## (undated) DEVICE — SURGICEL 2 X 3

## (undated) DEVICE — ELECTRODE BLADE E-Z CLEAN 4IN -0014A

## (undated) DEVICE — PLUMEPEN PRO 10FT

## (undated) DEVICE — SPONGE 4 X 4 XRAY 16 PLY STRL LF RFD

## (undated) DEVICE — INTENDED FOR TISSUE SEPARATION, AND OTHER PROCEDURES THAT REQUIRE A SHARP SURGICAL BLADE TO PUNCTURE OR CUT.: Brand: BARD-PARKER SAFETY BLADES SIZE 11, STERILE

## (undated) DEVICE — SPECIMEN CONTAINER STERILE PEEL PACK

## (undated) DEVICE — HARMONIC ACE 5MM DIAMETER SHEARS 36CM SHAFT LENGTH + ADAPTIVE TISSUE TECHNOLOGY FOR USE WITH GENERATOR G11: Brand: HARMONIC ACE

## (undated) DEVICE — SUT ETHILON 3-0 FS-1 18 IN 663G

## (undated) DEVICE — SCD SEQUENTIAL COMPRESSION COMFORT SLEEVE MEDIUM KNEE LENGTH: Brand: KENDALL SCD

## (undated) DEVICE — PENCIL ELECTROSURG E-Z CLEAN -0035H

## (undated) DEVICE — GLOVE SRG BIOGEL 6.5

## (undated) DEVICE — SURGIFOAM 7 X 12 SPONGE ABS

## (undated) DEVICE — BOWL ASSY BM210 DUAL BLADE DISPOSABLE: Brand: MIDAS REX™

## (undated) DEVICE — SUT PROLENE 3-0 V-7 36 IN 8976H

## (undated) DEVICE — SYRINGE 3ML LL

## (undated) DEVICE — ANTIBACTERIAL VIOLET BRAIDED (POLYGLACTIN 910), SYNTHETIC ABSORBABLE SUTURE: Brand: COATED VICRYL